# Patient Record
Sex: MALE | Race: WHITE | NOT HISPANIC OR LATINO | URBAN - METROPOLITAN AREA
[De-identification: names, ages, dates, MRNs, and addresses within clinical notes are randomized per-mention and may not be internally consistent; named-entity substitution may affect disease eponyms.]

---

## 2023-01-01 ENCOUNTER — HOSPITAL ENCOUNTER (INPATIENT)
Facility: HOSPITAL | Age: 63
LOS: 8 days | Discharge: HOME HEALTH | DRG: 862 | End: 2023-12-31
Attending: SURGERY | Admitting: SURGERY
Payer: COMMERCIAL

## 2023-01-01 VITALS
WEIGHT: 188.5 LBS | RESPIRATION RATE: 18 BRPM | TEMPERATURE: 99 F | DIASTOLIC BLOOD PRESSURE: 64 MMHG | HEIGHT: 71 IN | OXYGEN SATURATION: 92 % | HEART RATE: 79 BPM | BODY MASS INDEX: 26.39 KG/M2 | SYSTOLIC BLOOD PRESSURE: 132 MMHG

## 2023-01-01 DIAGNOSIS — N17.9 AKI (ACUTE KIDNEY INJURY): ICD-10-CM

## 2023-01-01 DIAGNOSIS — C79.9 METASTATIC ADENOCARCINOMA: Chronic | ICD-10-CM

## 2023-01-01 DIAGNOSIS — Z91.89 AT RISK FOR PROLONGED QT INTERVAL SYNDROME: ICD-10-CM

## 2023-01-01 DIAGNOSIS — Z71.89 ACP (ADVANCE CARE PLANNING): ICD-10-CM

## 2023-01-01 DIAGNOSIS — Z51.5 PALLIATIVE CARE ENCOUNTER: ICD-10-CM

## 2023-01-01 DIAGNOSIS — C16.9 GASTRIC ADENOCARCINOMA: ICD-10-CM

## 2023-01-01 DIAGNOSIS — I48.91 ATRIAL FIBRILLATION WITH RVR: ICD-10-CM

## 2023-01-01 DIAGNOSIS — T81.43XA POSTPROCEDURAL INTRAABDOMINAL ABSCESS: Primary | ICD-10-CM

## 2023-01-01 DIAGNOSIS — R52 PAIN: ICD-10-CM

## 2023-01-01 DIAGNOSIS — K65.9 PERITONITIS: ICD-10-CM

## 2023-01-01 LAB
ACANTHOCYTES BLD QL SMEAR: PRESENT
ALBUMIN SERPL BCP-MCNC: 1.4 G/DL (ref 3.5–5.2)
ALBUMIN SERPL BCP-MCNC: 1.5 G/DL (ref 3.5–5.2)
ALBUMIN SERPL BCP-MCNC: 1.7 G/DL (ref 3.5–5.2)
ALBUMIN SERPL BCP-MCNC: 1.7 G/DL (ref 3.5–5.2)
ALBUMIN SERPL BCP-MCNC: 2 G/DL (ref 3.5–5.2)
ALP SERPL-CCNC: 101 U/L (ref 55–135)
ALP SERPL-CCNC: 113 U/L (ref 55–135)
ALP SERPL-CCNC: 120 U/L (ref 55–135)
ALP SERPL-CCNC: 121 U/L (ref 55–135)
ALP SERPL-CCNC: 124 U/L (ref 55–135)
ALP SERPL-CCNC: 127 U/L (ref 55–135)
ALP SERPL-CCNC: 127 U/L (ref 55–135)
ALP SERPL-CCNC: 132 U/L (ref 55–135)
ALP SERPL-CCNC: 73 U/L (ref 55–135)
ALP SERPL-CCNC: 78 U/L (ref 55–135)
ALT SERPL W/O P-5'-P-CCNC: 10 U/L (ref 10–44)
ALT SERPL W/O P-5'-P-CCNC: 11 U/L (ref 10–44)
ALT SERPL W/O P-5'-P-CCNC: 11 U/L (ref 10–44)
ALT SERPL W/O P-5'-P-CCNC: 12 U/L (ref 10–44)
ALT SERPL W/O P-5'-P-CCNC: 12 U/L (ref 10–44)
ALT SERPL W/O P-5'-P-CCNC: 15 U/L (ref 10–44)
ALT SERPL W/O P-5'-P-CCNC: 27 U/L (ref 10–44)
ALT SERPL W/O P-5'-P-CCNC: 28 U/L (ref 10–44)
ALT SERPL W/O P-5'-P-CCNC: 31 U/L (ref 10–44)
ALT SERPL W/O P-5'-P-CCNC: 9 U/L (ref 10–44)
ANION GAP SERPL CALC-SCNC: 10 MMOL/L (ref 8–16)
ANION GAP SERPL CALC-SCNC: 11 MMOL/L (ref 8–16)
ANION GAP SERPL CALC-SCNC: 4 MMOL/L (ref 8–16)
ANION GAP SERPL CALC-SCNC: 5 MMOL/L (ref 8–16)
ANION GAP SERPL CALC-SCNC: 6 MMOL/L (ref 8–16)
ANION GAP SERPL CALC-SCNC: 7 MMOL/L (ref 8–16)
ANION GAP SERPL CALC-SCNC: 7 MMOL/L (ref 8–16)
ANION GAP SERPL CALC-SCNC: 8 MMOL/L (ref 8–16)
ANION GAP SERPL CALC-SCNC: 8 MMOL/L (ref 8–16)
ANION GAP SERPL CALC-SCNC: 9 MMOL/L (ref 8–16)
ANISOCYTOSIS BLD QL SMEAR: SLIGHT
APTT PPP: 39.4 SEC (ref 21–32)
AST SERPL-CCNC: 17 U/L (ref 10–40)
AST SERPL-CCNC: 18 U/L (ref 10–40)
AST SERPL-CCNC: 19 U/L (ref 10–40)
AST SERPL-CCNC: 21 U/L (ref 10–40)
AST SERPL-CCNC: 23 U/L (ref 10–40)
AST SERPL-CCNC: 24 U/L (ref 10–40)
AST SERPL-CCNC: 34 U/L (ref 10–40)
AST SERPL-CCNC: 38 U/L (ref 10–40)
BASO STIPL BLD QL SMEAR: ABNORMAL
BASOPHILS # BLD AUTO: 0.02 K/UL (ref 0–0.2)
BASOPHILS # BLD AUTO: 0.03 K/UL (ref 0–0.2)
BASOPHILS # BLD AUTO: 0.04 K/UL (ref 0–0.2)
BASOPHILS # BLD AUTO: 0.05 K/UL (ref 0–0.2)
BASOPHILS # BLD AUTO: 0.08 K/UL (ref 0–0.2)
BASOPHILS # BLD AUTO: 0.09 K/UL (ref 0–0.2)
BASOPHILS # BLD AUTO: ABNORMAL K/UL (ref 0–0.2)
BASOPHILS # BLD AUTO: ABNORMAL K/UL (ref 0–0.2)
BASOPHILS NFR BLD: 0 % (ref 0–1.9)
BASOPHILS NFR BLD: 0.2 % (ref 0–1.9)
BASOPHILS NFR BLD: 0.2 % (ref 0–1.9)
BASOPHILS NFR BLD: 0.3 % (ref 0–1.9)
BASOPHILS NFR BLD: 0.4 % (ref 0–1.9)
BASOPHILS NFR BLD: 0.5 % (ref 0–1.9)
BASOPHILS NFR BLD: 0.6 % (ref 0–1.9)
BILIRUB SERPL-MCNC: 0.3 MG/DL (ref 0.1–1)
BILIRUB SERPL-MCNC: 0.3 MG/DL (ref 0.1–1)
BILIRUB SERPL-MCNC: 0.4 MG/DL (ref 0.1–1)
BILIRUB SERPL-MCNC: 0.5 MG/DL (ref 0.1–1)
BILIRUB SERPL-MCNC: 0.6 MG/DL (ref 0.1–1)
BILIRUB SERPL-MCNC: 0.7 MG/DL (ref 0.1–1)
BILIRUB SERPL-MCNC: 0.8 MG/DL (ref 0.1–1)
BUN SERPL-MCNC: 14 MG/DL (ref 8–23)
BUN SERPL-MCNC: 15 MG/DL (ref 8–23)
BUN SERPL-MCNC: 16 MG/DL (ref 8–23)
BUN SERPL-MCNC: 17 MG/DL (ref 8–23)
BUN SERPL-MCNC: 24 MG/DL (ref 8–23)
BUN SERPL-MCNC: 46 MG/DL (ref 8–23)
BUN SERPL-MCNC: 63 MG/DL (ref 8–23)
BUN SERPL-MCNC: 70 MG/DL (ref 8–23)
BURR CELLS BLD QL SMEAR: ABNORMAL
CA-I BLDV-SCNC: 1.01 MMOL/L (ref 1.06–1.42)
CALCIUM SERPL-MCNC: 7.5 MG/DL (ref 8.7–10.5)
CALCIUM SERPL-MCNC: 7.6 MG/DL (ref 8.7–10.5)
CALCIUM SERPL-MCNC: 7.6 MG/DL (ref 8.7–10.5)
CALCIUM SERPL-MCNC: 7.7 MG/DL (ref 8.7–10.5)
CALCIUM SERPL-MCNC: 7.7 MG/DL (ref 8.7–10.5)
CALCIUM SERPL-MCNC: 7.8 MG/DL (ref 8.7–10.5)
CALCIUM SERPL-MCNC: 7.9 MG/DL (ref 8.7–10.5)
CALCIUM SERPL-MCNC: 8.1 MG/DL (ref 8.7–10.5)
CHLORIDE SERPL-SCNC: 104 MMOL/L (ref 95–110)
CHLORIDE SERPL-SCNC: 105 MMOL/L (ref 95–110)
CHLORIDE SERPL-SCNC: 89 MMOL/L (ref 95–110)
CHLORIDE SERPL-SCNC: 90 MMOL/L (ref 95–110)
CHLORIDE SERPL-SCNC: 92 MMOL/L (ref 95–110)
CHLORIDE SERPL-SCNC: 94 MMOL/L (ref 95–110)
CHLORIDE SERPL-SCNC: 95 MMOL/L (ref 95–110)
CHLORIDE SERPL-SCNC: 96 MMOL/L (ref 95–110)
CHLORIDE SERPL-SCNC: 98 MMOL/L (ref 95–110)
CHLORIDE SERPL-SCNC: 99 MMOL/L (ref 95–110)
CO2 SERPL-SCNC: 21 MMOL/L (ref 23–29)
CO2 SERPL-SCNC: 21 MMOL/L (ref 23–29)
CO2 SERPL-SCNC: 24 MMOL/L (ref 23–29)
CO2 SERPL-SCNC: 24 MMOL/L (ref 23–29)
CO2 SERPL-SCNC: 29 MMOL/L (ref 23–29)
CO2 SERPL-SCNC: 31 MMOL/L (ref 23–29)
CO2 SERPL-SCNC: 34 MMOL/L (ref 23–29)
CO2 SERPL-SCNC: 36 MMOL/L (ref 23–29)
CO2 SERPL-SCNC: 37 MMOL/L (ref 23–29)
CO2 SERPL-SCNC: 39 MMOL/L (ref 23–29)
CO2 SERPL-SCNC: 39 MMOL/L (ref 23–29)
CO2 SERPL-SCNC: 40 MMOL/L (ref 23–29)
CREAT SERPL-MCNC: 0.8 MG/DL (ref 0.5–1.4)
CREAT SERPL-MCNC: 0.9 MG/DL (ref 0.5–1.4)
CREAT SERPL-MCNC: 0.9 MG/DL (ref 0.5–1.4)
CREAT SERPL-MCNC: 1.1 MG/DL (ref 0.5–1.4)
CREAT SERPL-MCNC: 1.6 MG/DL (ref 0.5–1.4)
CREAT SERPL-MCNC: 2.4 MG/DL (ref 0.5–1.4)
CREAT SERPL-MCNC: 2.8 MG/DL (ref 0.5–1.4)
DACRYOCYTES BLD QL SMEAR: ABNORMAL
DIFFERENTIAL METHOD BLD: ABNORMAL
DOHLE BOD BLD QL SMEAR: PRESENT
EOSINOPHIL # BLD AUTO: 0 K/UL (ref 0–0.5)
EOSINOPHIL # BLD AUTO: ABNORMAL K/UL (ref 0–0.5)
EOSINOPHIL # BLD AUTO: ABNORMAL K/UL (ref 0–0.5)
EOSINOPHIL NFR BLD: 0 % (ref 0–8)
EOSINOPHIL NFR BLD: 0.1 % (ref 0–8)
EOSINOPHIL NFR BLD: 0.1 % (ref 0–8)
EOSINOPHIL NFR BLD: 0.2 % (ref 0–8)
EOSINOPHIL NFR BLD: 0.3 % (ref 0–8)
ERYTHROCYTE [DISTWIDTH] IN BLOOD BY AUTOMATED COUNT: 14.2 % (ref 11.5–14.5)
ERYTHROCYTE [DISTWIDTH] IN BLOOD BY AUTOMATED COUNT: 14.4 % (ref 11.5–14.5)
ERYTHROCYTE [DISTWIDTH] IN BLOOD BY AUTOMATED COUNT: 14.4 % (ref 11.5–14.5)
ERYTHROCYTE [DISTWIDTH] IN BLOOD BY AUTOMATED COUNT: 14.7 % (ref 11.5–14.5)
ERYTHROCYTE [DISTWIDTH] IN BLOOD BY AUTOMATED COUNT: 15.1 % (ref 11.5–14.5)
ERYTHROCYTE [DISTWIDTH] IN BLOOD BY AUTOMATED COUNT: 15.3 % (ref 11.5–14.5)
ERYTHROCYTE [DISTWIDTH] IN BLOOD BY AUTOMATED COUNT: 15.4 % (ref 11.5–14.5)
ERYTHROCYTE [DISTWIDTH] IN BLOOD BY AUTOMATED COUNT: 15.5 % (ref 11.5–14.5)
ERYTHROCYTE [DISTWIDTH] IN BLOOD BY AUTOMATED COUNT: 15.5 % (ref 11.5–14.5)
EST. GFR  (NO RACE VARIABLE): 24.6 ML/MIN/1.73 M^2
EST. GFR  (NO RACE VARIABLE): 29.6 ML/MIN/1.73 M^2
EST. GFR  (NO RACE VARIABLE): 48.1 ML/MIN/1.73 M^2
EST. GFR  (NO RACE VARIABLE): >60 ML/MIN/1.73 M^2
GIANT PLATELETS BLD QL SMEAR: PRESENT
GLUCOSE SERPL-MCNC: 104 MG/DL (ref 70–110)
GLUCOSE SERPL-MCNC: 121 MG/DL (ref 70–110)
GLUCOSE SERPL-MCNC: 124 MG/DL (ref 70–110)
GLUCOSE SERPL-MCNC: 125 MG/DL (ref 70–110)
GLUCOSE SERPL-MCNC: 127 MG/DL (ref 70–110)
GLUCOSE SERPL-MCNC: 128 MG/DL (ref 70–110)
GLUCOSE SERPL-MCNC: 138 MG/DL (ref 70–110)
GLUCOSE SERPL-MCNC: 164 MG/DL (ref 70–110)
GLUCOSE SERPL-MCNC: 71 MG/DL (ref 70–110)
GLUCOSE SERPL-MCNC: 71 MG/DL (ref 70–110)
GLUCOSE SERPL-MCNC: 77 MG/DL (ref 70–110)
GLUCOSE SERPL-MCNC: 83 MG/DL (ref 70–110)
HCT VFR BLD AUTO: 30.7 % (ref 40–54)
HCT VFR BLD AUTO: 32 % (ref 40–54)
HCT VFR BLD AUTO: 32.1 % (ref 40–54)
HCT VFR BLD AUTO: 32.2 % (ref 40–54)
HCT VFR BLD AUTO: 32.7 % (ref 40–54)
HCT VFR BLD AUTO: 33.3 % (ref 40–54)
HCT VFR BLD AUTO: 33.6 % (ref 40–54)
HCT VFR BLD AUTO: 34.2 % (ref 40–54)
HCT VFR BLD AUTO: 36.8 % (ref 40–54)
HGB BLD-MCNC: 10.2 G/DL (ref 14–18)
HGB BLD-MCNC: 10.2 G/DL (ref 14–18)
HGB BLD-MCNC: 10.7 G/DL (ref 14–18)
HGB BLD-MCNC: 10.8 G/DL (ref 14–18)
HGB BLD-MCNC: 11.1 G/DL (ref 14–18)
HGB BLD-MCNC: 11.1 G/DL (ref 14–18)
HGB BLD-MCNC: 11.4 G/DL (ref 14–18)
HGB BLD-MCNC: 11.5 G/DL (ref 14–18)
HGB BLD-MCNC: 11.8 G/DL (ref 14–18)
HYPOCHROMIA BLD QL SMEAR: ABNORMAL
IMM GRANULOCYTES # BLD AUTO: 0.11 K/UL (ref 0–0.04)
IMM GRANULOCYTES # BLD AUTO: 0.18 K/UL (ref 0–0.04)
IMM GRANULOCYTES # BLD AUTO: 0.32 K/UL (ref 0–0.04)
IMM GRANULOCYTES # BLD AUTO: 0.36 K/UL (ref 0–0.04)
IMM GRANULOCYTES # BLD AUTO: 0.61 K/UL (ref 0–0.04)
IMM GRANULOCYTES # BLD AUTO: 0.7 K/UL (ref 0–0.04)
IMM GRANULOCYTES # BLD AUTO: ABNORMAL K/UL (ref 0–0.04)
IMM GRANULOCYTES NFR BLD AUTO: 1.1 % (ref 0–0.5)
IMM GRANULOCYTES NFR BLD AUTO: 1.5 % (ref 0–0.5)
IMM GRANULOCYTES NFR BLD AUTO: 2.5 % (ref 0–0.5)
IMM GRANULOCYTES NFR BLD AUTO: 2.6 % (ref 0–0.5)
IMM GRANULOCYTES NFR BLD AUTO: 4.3 % (ref 0–0.5)
IMM GRANULOCYTES NFR BLD AUTO: 4.5 % (ref 0–0.5)
IMM GRANULOCYTES NFR BLD AUTO: ABNORMAL % (ref 0–0.5)
INR PPP: 1.1 (ref 0.8–1.2)
LYMPHOCYTES # BLD AUTO: 1.4 K/UL (ref 1–4.8)
LYMPHOCYTES # BLD AUTO: 1.5 K/UL (ref 1–4.8)
LYMPHOCYTES # BLD AUTO: 1.5 K/UL (ref 1–4.8)
LYMPHOCYTES # BLD AUTO: 1.9 K/UL (ref 1–4.8)
LYMPHOCYTES # BLD AUTO: ABNORMAL K/UL (ref 1–4.8)
LYMPHOCYTES # BLD AUTO: ABNORMAL K/UL (ref 1–4.8)
LYMPHOCYTES NFR BLD: 10.2 % (ref 18–48)
LYMPHOCYTES NFR BLD: 11.3 % (ref 18–48)
LYMPHOCYTES NFR BLD: 11.5 % (ref 18–48)
LYMPHOCYTES NFR BLD: 11.6 % (ref 18–48)
LYMPHOCYTES NFR BLD: 11.6 % (ref 18–48)
LYMPHOCYTES NFR BLD: 13.9 % (ref 18–48)
LYMPHOCYTES NFR BLD: 2 % (ref 18–48)
LYMPHOCYTES NFR BLD: 6.6 % (ref 18–48)
LYMPHOCYTES NFR BLD: 9 % (ref 18–48)
MAGNESIUM SERPL-MCNC: 1.6 MG/DL (ref 1.6–2.6)
MAGNESIUM SERPL-MCNC: 1.6 MG/DL (ref 1.6–2.6)
MAGNESIUM SERPL-MCNC: 1.7 MG/DL (ref 1.6–2.6)
MAGNESIUM SERPL-MCNC: 1.8 MG/DL (ref 1.6–2.6)
MAGNESIUM SERPL-MCNC: 1.9 MG/DL (ref 1.6–2.6)
MAGNESIUM SERPL-MCNC: 1.9 MG/DL (ref 1.6–2.6)
MCH RBC QN AUTO: 24.8 PG (ref 27–31)
MCH RBC QN AUTO: 24.8 PG (ref 27–31)
MCH RBC QN AUTO: 24.9 PG (ref 27–31)
MCH RBC QN AUTO: 24.9 PG (ref 27–31)
MCH RBC QN AUTO: 25.1 PG (ref 27–31)
MCH RBC QN AUTO: 25.4 PG (ref 27–31)
MCH RBC QN AUTO: 25.5 PG (ref 27–31)
MCHC RBC AUTO-ENTMCNC: 31.9 G/DL (ref 32–36)
MCHC RBC AUTO-ENTMCNC: 32.1 G/DL (ref 32–36)
MCHC RBC AUTO-ENTMCNC: 33 G/DL (ref 32–36)
MCHC RBC AUTO-ENTMCNC: 33.2 G/DL (ref 32–36)
MCHC RBC AUTO-ENTMCNC: 33.2 G/DL (ref 32–36)
MCHC RBC AUTO-ENTMCNC: 33.3 G/DL (ref 32–36)
MCHC RBC AUTO-ENTMCNC: 33.6 G/DL (ref 32–36)
MCHC RBC AUTO-ENTMCNC: 33.9 G/DL (ref 32–36)
MCHC RBC AUTO-ENTMCNC: 34.6 G/DL (ref 32–36)
MCV RBC AUTO: 74 FL (ref 82–98)
MCV RBC AUTO: 74 FL (ref 82–98)
MCV RBC AUTO: 75 FL (ref 82–98)
MCV RBC AUTO: 76 FL (ref 82–98)
MCV RBC AUTO: 76 FL (ref 82–98)
MCV RBC AUTO: 78 FL (ref 82–98)
MCV RBC AUTO: 79 FL (ref 82–98)
METAMYELOCYTES NFR BLD MANUAL: 0.7 %
METAMYELOCYTES NFR BLD MANUAL: 1 %
MONOCYTES # BLD AUTO: 1.3 K/UL (ref 0.3–1)
MONOCYTES # BLD AUTO: 1.8 K/UL (ref 0.3–1)
MONOCYTES # BLD AUTO: 2 K/UL (ref 0.3–1)
MONOCYTES # BLD AUTO: 2.1 K/UL (ref 0.3–1)
MONOCYTES # BLD AUTO: 2.3 K/UL (ref 0.3–1)
MONOCYTES # BLD AUTO: 3 K/UL (ref 0.3–1)
MONOCYTES # BLD AUTO: ABNORMAL K/UL (ref 0.3–1)
MONOCYTES # BLD AUTO: ABNORMAL K/UL (ref 0.3–1)
MONOCYTES NFR BLD: 11.3 % (ref 4–15)
MONOCYTES NFR BLD: 12.7 % (ref 4–15)
MONOCYTES NFR BLD: 14.8 % (ref 4–15)
MONOCYTES NFR BLD: 15.5 % (ref 4–15)
MONOCYTES NFR BLD: 15.7 % (ref 4–15)
MONOCYTES NFR BLD: 16.3 % (ref 4–15)
MONOCYTES NFR BLD: 17.9 % (ref 4–15)
MONOCYTES NFR BLD: 7 % (ref 4–15)
MONOCYTES NFR BLD: 8 % (ref 4–15)
MYELOCYTES NFR BLD MANUAL: 0.7 %
NEUTROPHILS # BLD AUTO: 10.8 K/UL (ref 1.8–7.7)
NEUTROPHILS # BLD AUTO: 7.1 K/UL (ref 1.8–7.7)
NEUTROPHILS # BLD AUTO: 8.6 K/UL (ref 1.8–7.7)
NEUTROPHILS # BLD AUTO: 8.8 K/UL (ref 1.8–7.7)
NEUTROPHILS # BLD AUTO: 9.1 K/UL (ref 1.8–7.7)
NEUTROPHILS # BLD AUTO: 9.9 K/UL (ref 1.8–7.7)
NEUTROPHILS NFR BLD: 65.6 % (ref 38–73)
NEUTROPHILS NFR BLD: 67.9 % (ref 38–73)
NEUTROPHILS NFR BLD: 69.6 % (ref 38–73)
NEUTROPHILS NFR BLD: 70.6 % (ref 38–73)
NEUTROPHILS NFR BLD: 71.7 % (ref 38–73)
NEUTROPHILS NFR BLD: 71.8 % (ref 38–73)
NEUTROPHILS NFR BLD: 78 % (ref 38–73)
NEUTROPHILS NFR BLD: 78 % (ref 38–73)
NEUTROPHILS NFR BLD: 81 % (ref 38–73)
NEUTS BAND NFR BLD MANUAL: 12 %
NEUTS BAND NFR BLD MANUAL: 2 %
NEUTS BAND NFR BLD MANUAL: 2.7 %
NRBC BLD-RTO: 0 /100 WBC
OVALOCYTES BLD QL SMEAR: ABNORMAL
OVALOCYTES BLD QL SMEAR: ABNORMAL
PHOSPHATE SERPL-MCNC: 1.7 MG/DL (ref 2.7–4.5)
PHOSPHATE SERPL-MCNC: 1.7 MG/DL (ref 2.7–4.5)
PHOSPHATE SERPL-MCNC: 1.9 MG/DL (ref 2.7–4.5)
PHOSPHATE SERPL-MCNC: 2.3 MG/DL (ref 2.7–4.5)
PHOSPHATE SERPL-MCNC: 2.6 MG/DL (ref 2.7–4.5)
PHOSPHATE SERPL-MCNC: 2.6 MG/DL (ref 2.7–4.5)
PHOSPHATE SERPL-MCNC: 2.7 MG/DL (ref 2.7–4.5)
PHOSPHATE SERPL-MCNC: 2.9 MG/DL (ref 2.7–4.5)
PHOSPHATE SERPL-MCNC: 3.5 MG/DL (ref 2.7–4.5)
PHOSPHATE SERPL-MCNC: 5.2 MG/DL (ref 2.7–4.5)
PLATELET # BLD AUTO: 104 K/UL (ref 150–450)
PLATELET # BLD AUTO: 133 K/UL (ref 150–450)
PLATELET # BLD AUTO: 152 K/UL (ref 150–450)
PLATELET # BLD AUTO: 230 K/UL (ref 150–450)
PLATELET # BLD AUTO: 249 K/UL (ref 150–450)
PLATELET # BLD AUTO: 298 K/UL (ref 150–450)
PLATELET # BLD AUTO: 87 K/UL (ref 150–450)
PLATELET # BLD AUTO: 88 K/UL (ref 150–450)
PLATELET # BLD AUTO: 95 K/UL (ref 150–450)
PLATELET BLD QL SMEAR: ABNORMAL
PMV BLD AUTO: 10 FL (ref 9.2–12.9)
PMV BLD AUTO: 10.1 FL (ref 9.2–12.9)
PMV BLD AUTO: 10.2 FL (ref 9.2–12.9)
PMV BLD AUTO: 11.1 FL (ref 9.2–12.9)
PMV BLD AUTO: 11.9 FL (ref 9.2–12.9)
PMV BLD AUTO: 8.9 FL (ref 9.2–12.9)
PMV BLD AUTO: 9.5 FL (ref 9.2–12.9)
PMV BLD AUTO: 9.6 FL (ref 9.2–12.9)
PMV BLD AUTO: 9.9 FL (ref 9.2–12.9)
POCT GLUCOSE: 101 MG/DL (ref 70–110)
POCT GLUCOSE: 103 MG/DL (ref 70–110)
POCT GLUCOSE: 115 MG/DL (ref 70–110)
POCT GLUCOSE: 116 MG/DL (ref 70–110)
POCT GLUCOSE: 121 MG/DL (ref 70–110)
POCT GLUCOSE: 121 MG/DL (ref 70–110)
POCT GLUCOSE: 123 MG/DL (ref 70–110)
POCT GLUCOSE: 124 MG/DL (ref 70–110)
POCT GLUCOSE: 125 MG/DL (ref 70–110)
POCT GLUCOSE: 132 MG/DL (ref 70–110)
POCT GLUCOSE: 133 MG/DL (ref 70–110)
POCT GLUCOSE: 134 MG/DL (ref 70–110)
POCT GLUCOSE: 135 MG/DL (ref 70–110)
POCT GLUCOSE: 136 MG/DL (ref 70–110)
POCT GLUCOSE: 137 MG/DL (ref 70–110)
POCT GLUCOSE: 137 MG/DL (ref 70–110)
POCT GLUCOSE: 142 MG/DL (ref 70–110)
POCT GLUCOSE: 153 MG/DL (ref 70–110)
POCT GLUCOSE: 158 MG/DL (ref 70–110)
POCT GLUCOSE: 159 MG/DL (ref 70–110)
POCT GLUCOSE: 166 MG/DL (ref 70–110)
POCT GLUCOSE: 167 MG/DL (ref 70–110)
POCT GLUCOSE: 187 MG/DL (ref 70–110)
POCT GLUCOSE: 67 MG/DL (ref 70–110)
POCT GLUCOSE: 75 MG/DL (ref 70–110)
POCT GLUCOSE: 76 MG/DL (ref 70–110)
POCT GLUCOSE: 87 MG/DL (ref 70–110)
POCT GLUCOSE: 90 MG/DL (ref 70–110)
POCT GLUCOSE: 91 MG/DL (ref 70–110)
POIKILOCYTOSIS BLD QL SMEAR: ABNORMAL
POIKILOCYTOSIS BLD QL SMEAR: SLIGHT
POLYCHROMASIA BLD QL SMEAR: ABNORMAL
POTASSIUM SERPL-SCNC: 3 MMOL/L (ref 3.5–5.1)
POTASSIUM SERPL-SCNC: 3 MMOL/L (ref 3.5–5.1)
POTASSIUM SERPL-SCNC: 3.3 MMOL/L (ref 3.5–5.1)
POTASSIUM SERPL-SCNC: 3.3 MMOL/L (ref 3.5–5.1)
POTASSIUM SERPL-SCNC: 3.6 MMOL/L (ref 3.5–5.1)
POTASSIUM SERPL-SCNC: 4.1 MMOL/L (ref 3.5–5.1)
POTASSIUM SERPL-SCNC: 4.1 MMOL/L (ref 3.5–5.1)
POTASSIUM SERPL-SCNC: 4.2 MMOL/L (ref 3.5–5.1)
POTASSIUM SERPL-SCNC: 4.4 MMOL/L (ref 3.5–5.1)
POTASSIUM SERPL-SCNC: 5.2 MMOL/L (ref 3.5–5.1)
PROT SERPL-MCNC: 4.6 G/DL (ref 6–8.4)
PROT SERPL-MCNC: 4.6 G/DL (ref 6–8.4)
PROT SERPL-MCNC: 4.7 G/DL (ref 6–8.4)
PROT SERPL-MCNC: 4.7 G/DL (ref 6–8.4)
PROT SERPL-MCNC: 4.8 G/DL (ref 6–8.4)
PROT SERPL-MCNC: 4.9 G/DL (ref 6–8.4)
PROT SERPL-MCNC: 5.1 G/DL (ref 6–8.4)
PROT SERPL-MCNC: 5.2 G/DL (ref 6–8.4)
PROT SERPL-MCNC: 5.4 G/DL (ref 6–8.4)
PROT SERPL-MCNC: 5.4 G/DL (ref 6–8.4)
PROTHROMBIN TIME: 11.8 SEC (ref 9–12.5)
RBC # BLD AUTO: 4.02 M/UL (ref 4.6–6.2)
RBC # BLD AUTO: 4.11 M/UL (ref 4.6–6.2)
RBC # BLD AUTO: 4.29 M/UL (ref 4.6–6.2)
RBC # BLD AUTO: 4.33 M/UL (ref 4.6–6.2)
RBC # BLD AUTO: 4.36 M/UL (ref 4.6–6.2)
RBC # BLD AUTO: 4.43 M/UL (ref 4.6–6.2)
RBC # BLD AUTO: 4.48 M/UL (ref 4.6–6.2)
RBC # BLD AUTO: 4.63 M/UL (ref 4.6–6.2)
RBC # BLD AUTO: 4.65 M/UL (ref 4.6–6.2)
SCHISTOCYTES BLD QL SMEAR: ABNORMAL
SCHISTOCYTES BLD QL SMEAR: PRESENT
SODIUM SERPL-SCNC: 133 MMOL/L (ref 136–145)
SODIUM SERPL-SCNC: 135 MMOL/L (ref 136–145)
SODIUM SERPL-SCNC: 136 MMOL/L (ref 136–145)
SODIUM SERPL-SCNC: 137 MMOL/L (ref 136–145)
SODIUM SERPL-SCNC: 138 MMOL/L (ref 136–145)
SPHEROCYTES BLD QL SMEAR: ABNORMAL
TOXIC GRANULES BLD QL SMEAR: PRESENT
TRIGL SERPL-MCNC: 153 MG/DL (ref 30–150)
WBC # BLD AUTO: 11.68 K/UL (ref 3.9–12.7)
WBC # BLD AUTO: 12.04 K/UL (ref 3.9–12.7)
WBC # BLD AUTO: 12.64 K/UL (ref 3.9–12.7)
WBC # BLD AUTO: 13.44 K/UL (ref 3.9–12.7)
WBC # BLD AUTO: 13.99 K/UL (ref 3.9–12.7)
WBC # BLD AUTO: 14.17 K/UL (ref 3.9–12.7)
WBC # BLD AUTO: 16.45 K/UL (ref 3.9–12.7)
WBC # BLD AUTO: 9.86 K/UL (ref 3.9–12.7)
WBC # BLD AUTO: 9.89 K/UL (ref 3.9–12.7)
WBC TOXIC VACUOLES BLD QL SMEAR: PRESENT
WBC TOXIC VACUOLES BLD QL SMEAR: PRESENT

## 2023-01-01 PROCEDURE — 84478 ASSAY OF TRIGLYCERIDES: CPT | Performed by: SURGERY

## 2023-01-01 PROCEDURE — 27000221 HC OXYGEN, UP TO 24 HOURS

## 2023-01-01 PROCEDURE — 94761 N-INVAS EAR/PLS OXIMETRY MLT: CPT

## 2023-01-01 PROCEDURE — 25000003 PHARM REV CODE 250: Performed by: STUDENT IN AN ORGANIZED HEALTH CARE EDUCATION/TRAINING PROGRAM

## 2023-01-01 PROCEDURE — 25000003 PHARM REV CODE 250

## 2023-01-01 PROCEDURE — 94664 DEMO&/EVAL PT USE INHALER: CPT

## 2023-01-01 PROCEDURE — 94640 AIRWAY INHALATION TREATMENT: CPT

## 2023-01-01 PROCEDURE — 25000242 PHARM REV CODE 250 ALT 637 W/ HCPCS: Performed by: STUDENT IN AN ORGANIZED HEALTH CARE EDUCATION/TRAINING PROGRAM

## 2023-01-01 PROCEDURE — 36573 INSJ PICC RS&I 5 YR+: CPT

## 2023-01-01 PROCEDURE — 82330 ASSAY OF CALCIUM: CPT | Performed by: SURGERY

## 2023-01-01 PROCEDURE — 63600175 PHARM REV CODE 636 W HCPCS

## 2023-01-01 PROCEDURE — 63600175 PHARM REV CODE 636 W HCPCS: Performed by: STUDENT IN AN ORGANIZED HEALTH CARE EDUCATION/TRAINING PROGRAM

## 2023-01-01 PROCEDURE — B4185 PARENTERAL SOL 10 GM LIPIDS: HCPCS

## 2023-01-01 PROCEDURE — 99223 1ST HOSP IP/OBS HIGH 75: CPT | Mod: ,,,

## 2023-01-01 PROCEDURE — 3E0436Z INTRODUCTION OF NUTRITIONAL SUBSTANCE INTO CENTRAL VEIN, PERCUTANEOUS APPROACH: ICD-10-PCS | Performed by: STUDENT IN AN ORGANIZED HEALTH CARE EDUCATION/TRAINING PROGRAM

## 2023-01-01 PROCEDURE — 99900035 HC TECH TIME PER 15 MIN (STAT)

## 2023-01-01 PROCEDURE — 99233 SBSQ HOSP IP/OBS HIGH 50: CPT | Mod: ,,, | Performed by: STUDENT IN AN ORGANIZED HEALTH CARE EDUCATION/TRAINING PROGRAM

## 2023-01-01 PROCEDURE — 63600175 PHARM REV CODE 636 W HCPCS: Performed by: SURGERY

## 2023-01-01 PROCEDURE — C1751 CATH, INF, PER/CENT/MIDLINE: HCPCS

## 2023-01-01 PROCEDURE — 83735 ASSAY OF MAGNESIUM: CPT | Performed by: SURGERY

## 2023-01-01 PROCEDURE — 80053 COMPREHEN METABOLIC PANEL: CPT | Performed by: STUDENT IN AN ORGANIZED HEALTH CARE EDUCATION/TRAINING PROGRAM

## 2023-01-01 PROCEDURE — 83735 ASSAY OF MAGNESIUM: CPT | Mod: 91 | Performed by: STUDENT IN AN ORGANIZED HEALTH CARE EDUCATION/TRAINING PROGRAM

## 2023-01-01 PROCEDURE — 85027 COMPLETE CBC AUTOMATED: CPT | Performed by: STUDENT IN AN ORGANIZED HEALTH CARE EDUCATION/TRAINING PROGRAM

## 2023-01-01 PROCEDURE — 25000003 PHARM REV CODE 250: Performed by: INTERNAL MEDICINE

## 2023-01-01 PROCEDURE — 25000003 PHARM REV CODE 250: Performed by: SURGERY

## 2023-01-01 PROCEDURE — 84100 ASSAY OF PHOSPHORUS: CPT | Performed by: STUDENT IN AN ORGANIZED HEALTH CARE EDUCATION/TRAINING PROGRAM

## 2023-01-01 PROCEDURE — A4216 STERILE WATER/SALINE, 10 ML: HCPCS | Performed by: SURGERY

## 2023-01-01 PROCEDURE — A4217 STERILE WATER/SALINE, 500 ML: HCPCS | Performed by: STUDENT IN AN ORGANIZED HEALTH CARE EDUCATION/TRAINING PROGRAM

## 2023-01-01 PROCEDURE — 84100 ASSAY OF PHOSPHORUS: CPT | Mod: 91 | Performed by: STUDENT IN AN ORGANIZED HEALTH CARE EDUCATION/TRAINING PROGRAM

## 2023-01-01 PROCEDURE — 25000242 PHARM REV CODE 250 ALT 637 W/ HCPCS: Performed by: NURSE PRACTITIONER

## 2023-01-01 PROCEDURE — 25500020 PHARM REV CODE 255

## 2023-01-01 PROCEDURE — 25500020 PHARM REV CODE 255: Performed by: SURGERY

## 2023-01-01 PROCEDURE — B4185 PARENTERAL SOL 10 GM LIPIDS: HCPCS | Performed by: STUDENT IN AN ORGANIZED HEALTH CARE EDUCATION/TRAINING PROGRAM

## 2023-01-01 PROCEDURE — 83735 ASSAY OF MAGNESIUM: CPT | Performed by: STUDENT IN AN ORGANIZED HEALTH CARE EDUCATION/TRAINING PROGRAM

## 2023-01-01 PROCEDURE — S5010 5% DEXTROSE AND 0.45% SALINE: HCPCS | Performed by: STUDENT IN AN ORGANIZED HEALTH CARE EDUCATION/TRAINING PROGRAM

## 2023-01-01 PROCEDURE — 0W9G30Z DRAINAGE OF PERITONEAL CAVITY WITH DRAINAGE DEVICE, PERCUTANEOUS APPROACH: ICD-10-PCS | Performed by: RADIOLOGY

## 2023-01-01 PROCEDURE — 83735 ASSAY OF MAGNESIUM: CPT

## 2023-01-01 PROCEDURE — 85025 COMPLETE CBC W/AUTO DIFF WBC: CPT | Performed by: STUDENT IN AN ORGANIZED HEALTH CARE EDUCATION/TRAINING PROGRAM

## 2023-01-01 PROCEDURE — 20000000 HC ICU ROOM

## 2023-01-01 PROCEDURE — 20600001 HC STEP DOWN PRIVATE ROOM

## 2023-01-01 PROCEDURE — 80048 BASIC METABOLIC PNL TOTAL CA: CPT | Mod: XB | Performed by: STUDENT IN AN ORGANIZED HEALTH CARE EDUCATION/TRAINING PROGRAM

## 2023-01-01 PROCEDURE — 99233 SBSQ HOSP IP/OBS HIGH 50: CPT | Mod: ,,,

## 2023-01-01 PROCEDURE — A4217 STERILE WATER/SALINE, 500 ML: HCPCS

## 2023-01-01 PROCEDURE — 99233 SBSQ HOSP IP/OBS HIGH 50: CPT | Mod: 24,,, | Performed by: STUDENT IN AN ORGANIZED HEALTH CARE EDUCATION/TRAINING PROGRAM

## 2023-01-01 PROCEDURE — 85730 THROMBOPLASTIN TIME PARTIAL: CPT | Performed by: SURGERY

## 2023-01-01 PROCEDURE — 02HV33Z INSERTION OF INFUSION DEVICE INTO SUPERIOR VENA CAVA, PERCUTANEOUS APPROACH: ICD-10-PCS | Performed by: SURGERY

## 2023-01-01 PROCEDURE — C9113 INJ PANTOPRAZOLE SODIUM, VIA: HCPCS

## 2023-01-01 PROCEDURE — 76937 US GUIDE VASCULAR ACCESS: CPT

## 2023-01-01 PROCEDURE — 25000003 PHARM REV CODE 250: Performed by: NURSE PRACTITIONER

## 2023-01-01 PROCEDURE — 85007 BL SMEAR W/DIFF WBC COUNT: CPT | Performed by: SURGERY

## 2023-01-01 PROCEDURE — 80048 BASIC METABOLIC PNL TOTAL CA: CPT | Mod: XB

## 2023-01-01 PROCEDURE — 84100 ASSAY OF PHOSPHORUS: CPT | Performed by: SURGERY

## 2023-01-01 PROCEDURE — 94667 MNPJ CHEST WALL 1ST: CPT

## 2023-01-01 PROCEDURE — 99291 CRITICAL CARE FIRST HOUR: CPT | Mod: 24,,, | Performed by: STUDENT IN AN ORGANIZED HEALTH CARE EDUCATION/TRAINING PROGRAM

## 2023-01-01 PROCEDURE — 80053 COMPREHEN METABOLIC PANEL: CPT | Performed by: SURGERY

## 2023-01-01 PROCEDURE — 85610 PROTHROMBIN TIME: CPT | Performed by: SURGERY

## 2023-01-01 PROCEDURE — 99223 1ST HOSP IP/OBS HIGH 75: CPT | Mod: 25,,, | Performed by: PHYSICIAN ASSISTANT

## 2023-01-01 PROCEDURE — S5010 5% DEXTROSE AND 0.45% SALINE: HCPCS

## 2023-01-01 PROCEDURE — 85007 BL SMEAR W/DIFF WBC COUNT: CPT | Performed by: STUDENT IN AN ORGANIZED HEALTH CARE EDUCATION/TRAINING PROGRAM

## 2023-01-01 PROCEDURE — 99223 1ST HOSP IP/OBS HIGH 75: CPT | Mod: ,,, | Performed by: STUDENT IN AN ORGANIZED HEALTH CARE EDUCATION/TRAINING PROGRAM

## 2023-01-01 PROCEDURE — 80053 COMPREHEN METABOLIC PANEL: CPT | Mod: 91 | Performed by: STUDENT IN AN ORGANIZED HEALTH CARE EDUCATION/TRAINING PROGRAM

## 2023-01-01 PROCEDURE — 85027 COMPLETE CBC AUTOMATED: CPT | Performed by: SURGERY

## 2023-01-01 PROCEDURE — 84100 ASSAY OF PHOSPHORUS: CPT

## 2023-01-01 PROCEDURE — 63600175 PHARM REV CODE 636 W HCPCS: Performed by: RADIOLOGY

## 2023-01-01 RX ORDER — CETIRIZINE HYDROCHLORIDE 10 MG/1
10 TABLET ORAL DAILY
Status: DISCONTINUED | OUTPATIENT
Start: 2023-01-01 | End: 2023-01-01 | Stop reason: HOSPADM

## 2023-01-01 RX ORDER — ACETAMINOPHEN 10 MG/ML
1000 INJECTION, SOLUTION INTRAVENOUS EVERY 8 HOURS
Status: DISPENSED | OUTPATIENT
Start: 2023-01-01 | End: 2023-01-01

## 2023-01-01 RX ORDER — CALCIUM GLUCONATE 20 MG/ML
2 INJECTION, SOLUTION INTRAVENOUS
Status: DISCONTINUED | OUTPATIENT
Start: 2023-01-01 | End: 2023-01-01

## 2023-01-01 RX ORDER — SULFAMETHOXAZOLE AND TRIMETHOPRIM 800; 160 MG/1; MG/1
1 TABLET ORAL 2 TIMES DAILY
Status: DISCONTINUED | OUTPATIENT
Start: 2023-01-01 | End: 2023-01-01 | Stop reason: HOSPADM

## 2023-01-01 RX ORDER — SENNOSIDES 8.6 MG/1
8.6 TABLET ORAL 2 TIMES DAILY
Status: DISCONTINUED | OUTPATIENT
Start: 2023-01-01 | End: 2023-01-01

## 2023-01-01 RX ORDER — POTASSIUM CHLORIDE 7.45 MG/ML
60 INJECTION INTRAVENOUS
Status: DISCONTINUED | OUTPATIENT
Start: 2023-01-01 | End: 2023-01-01

## 2023-01-01 RX ORDER — ACETAMINOPHEN 10 MG/ML
1000 INJECTION, SOLUTION INTRAVENOUS EVERY 8 HOURS
Status: DISCONTINUED | OUTPATIENT
Start: 2023-01-01 | End: 2023-01-01

## 2023-01-01 RX ORDER — POTASSIUM CHLORIDE 7.45 MG/ML
40 INJECTION INTRAVENOUS
Status: DISCONTINUED | OUTPATIENT
Start: 2023-01-01 | End: 2023-01-01

## 2023-01-01 RX ORDER — SODIUM CHLORIDE 0.9 % (FLUSH) 0.9 %
10 SYRINGE (ML) INJECTION EVERY 6 HOURS
Status: DISCONTINUED | OUTPATIENT
Start: 2023-01-01 | End: 2023-01-01 | Stop reason: HOSPADM

## 2023-01-01 RX ORDER — POTASSIUM CHLORIDE 29.8 MG/ML
40 INJECTION INTRAVENOUS
Status: DISCONTINUED | OUTPATIENT
Start: 2023-01-01 | End: 2023-01-01

## 2023-01-01 RX ORDER — LANOLIN ALCOHOL/MO/W.PET/CERES
800 CREAM (GRAM) TOPICAL ONCE
Status: COMPLETED | OUTPATIENT
Start: 2023-01-01 | End: 2023-01-01

## 2023-01-01 RX ORDER — LEVALBUTEROL INHALATION SOLUTION 0.63 MG/3ML
0.63 SOLUTION RESPIRATORY (INHALATION)
Status: DISCONTINUED | OUTPATIENT
Start: 2023-01-01 | End: 2023-01-01 | Stop reason: HOSPADM

## 2023-01-01 RX ORDER — MAGNESIUM SULFATE HEPTAHYDRATE 40 MG/ML
4 INJECTION, SOLUTION INTRAVENOUS
Status: DISCONTINUED | OUTPATIENT
Start: 2023-01-01 | End: 2023-01-01

## 2023-01-01 RX ORDER — CALCIUM GLUCONATE 20 MG/ML
1 INJECTION, SOLUTION INTRAVENOUS
Status: DISCONTINUED | OUTPATIENT
Start: 2023-01-01 | End: 2023-01-01

## 2023-01-01 RX ORDER — MAGNESIUM SULFATE HEPTAHYDRATE 40 MG/ML
2 INJECTION, SOLUTION INTRAVENOUS ONCE
Status: COMPLETED | OUTPATIENT
Start: 2023-01-01 | End: 2023-01-01

## 2023-01-01 RX ORDER — MAGNESIUM SULFATE HEPTAHYDRATE 40 MG/ML
2 INJECTION, SOLUTION INTRAVENOUS
Status: DISCONTINUED | OUTPATIENT
Start: 2023-01-01 | End: 2023-01-01

## 2023-01-01 RX ORDER — TAMSULOSIN HYDROCHLORIDE 0.4 MG/1
0.4 CAPSULE ORAL DAILY
Status: DISCONTINUED | OUTPATIENT
Start: 2023-01-01 | End: 2023-01-01

## 2023-01-01 RX ORDER — ACETAMINOPHEN 325 MG/1
650 TABLET ORAL EVERY 6 HOURS PRN
Status: DISCONTINUED | OUTPATIENT
Start: 2023-01-01 | End: 2023-01-01 | Stop reason: HOSPADM

## 2023-01-01 RX ORDER — ACETAMINOPHEN 10 MG/ML
1000 INJECTION, SOLUTION INTRAVENOUS EVERY 8 HOURS
Status: COMPLETED | OUTPATIENT
Start: 2023-01-01 | End: 2023-01-01

## 2023-01-01 RX ORDER — GUAIFENESIN 600 MG/1
600 TABLET, EXTENDED RELEASE ORAL 2 TIMES DAILY
Status: DISCONTINUED | OUTPATIENT
Start: 2023-01-01 | End: 2023-01-01 | Stop reason: HOSPADM

## 2023-01-01 RX ORDER — SODIUM,POTASSIUM PHOSPHATES 280-250MG
2 POWDER IN PACKET (EA) ORAL ONCE
Status: COMPLETED | OUTPATIENT
Start: 2023-01-01 | End: 2023-01-01

## 2023-01-01 RX ORDER — ONDANSETRON 2 MG/ML
4 INJECTION INTRAMUSCULAR; INTRAVENOUS EVERY 6 HOURS PRN
Status: DISCONTINUED | OUTPATIENT
Start: 2023-01-01 | End: 2023-01-01 | Stop reason: HOSPADM

## 2023-01-01 RX ORDER — HEPARIN SODIUM 5000 [USP'U]/ML
5000 INJECTION, SOLUTION INTRAVENOUS; SUBCUTANEOUS EVERY 8 HOURS
Status: DISCONTINUED | OUTPATIENT
Start: 2023-01-01 | End: 2023-01-01

## 2023-01-01 RX ORDER — SODIUM CHLORIDE 0.9 % (FLUSH) 0.9 %
10 SYRINGE (ML) INJECTION
Status: DISCONTINUED | OUTPATIENT
Start: 2023-01-01 | End: 2023-01-01 | Stop reason: HOSPADM

## 2023-01-01 RX ORDER — POTASSIUM CHLORIDE 7.45 MG/ML
80 INJECTION INTRAVENOUS
Status: DISCONTINUED | OUTPATIENT
Start: 2023-01-01 | End: 2023-01-01

## 2023-01-01 RX ORDER — ENOXAPARIN SODIUM 100 MG/ML
40 INJECTION SUBCUTANEOUS EVERY 24 HOURS
Status: DISCONTINUED | OUTPATIENT
Start: 2023-01-01 | End: 2023-01-01 | Stop reason: HOSPADM

## 2023-01-01 RX ORDER — DEXTROSE MONOHYDRATE, SODIUM CHLORIDE, AND POTASSIUM CHLORIDE 50; 1.49; 4.5 G/1000ML; G/1000ML; G/1000ML
INJECTION, SOLUTION INTRAVENOUS CONTINUOUS
Status: DISCONTINUED | OUTPATIENT
Start: 2023-01-01 | End: 2023-01-01

## 2023-01-01 RX ORDER — FLUCONAZOLE 2 MG/ML
200 INJECTION, SOLUTION INTRAVENOUS
Status: DISCONTINUED | OUTPATIENT
Start: 2023-01-01 | End: 2023-01-01

## 2023-01-01 RX ORDER — CALCIUM GLUCONATE 20 MG/ML
3 INJECTION, SOLUTION INTRAVENOUS
Status: DISCONTINUED | OUTPATIENT
Start: 2023-01-01 | End: 2023-01-01

## 2023-01-01 RX ORDER — PROCHLORPERAZINE EDISYLATE 5 MG/ML
2.5 INJECTION INTRAMUSCULAR; INTRAVENOUS EVERY 6 HOURS PRN
Status: DISCONTINUED | OUTPATIENT
Start: 2023-01-01 | End: 2023-01-01 | Stop reason: HOSPADM

## 2023-01-01 RX ORDER — OXYCODONE HYDROCHLORIDE 5 MG/1
5 TABLET ORAL EVERY 6 HOURS PRN
Status: DISCONTINUED | OUTPATIENT
Start: 2023-01-01 | End: 2023-01-01 | Stop reason: HOSPADM

## 2023-01-01 RX ORDER — GLUCAGON 1 MG
1 KIT INJECTION
Status: DISCONTINUED | OUTPATIENT
Start: 2023-01-01 | End: 2023-01-01 | Stop reason: HOSPADM

## 2023-01-01 RX ORDER — MORPHINE SULFATE 2 MG/ML
2 INJECTION, SOLUTION INTRAMUSCULAR; INTRAVENOUS EVERY 4 HOURS PRN
Status: DISCONTINUED | OUTPATIENT
Start: 2023-01-01 | End: 2023-01-01

## 2023-01-01 RX ORDER — SULFAMETHOXAZOLE AND TRIMETHOPRIM 800; 160 MG/1; MG/1
1 TABLET ORAL 2 TIMES DAILY
Qty: 28 TABLET | Refills: 0 | Status: SHIPPED | OUTPATIENT
Start: 2023-01-01 | End: 2024-01-01

## 2023-01-01 RX ORDER — LEVALBUTEROL INHALATION SOLUTION 0.63 MG/3ML
0.63 SOLUTION RESPIRATORY (INHALATION) EVERY 6 HOURS
Status: DISCONTINUED | OUTPATIENT
Start: 2023-01-01 | End: 2023-01-01

## 2023-01-01 RX ORDER — OXYCODONE HYDROCHLORIDE 10 MG/1
10 TABLET ORAL EVERY 6 HOURS PRN
Status: DISCONTINUED | OUTPATIENT
Start: 2023-01-01 | End: 2023-01-01 | Stop reason: HOSPADM

## 2023-01-01 RX ORDER — NALOXONE HCL 0.4 MG/ML
0.02 VIAL (ML) INJECTION
Status: DISCONTINUED | OUTPATIENT
Start: 2023-01-01 | End: 2023-01-01 | Stop reason: HOSPADM

## 2023-01-01 RX ORDER — DEXTROSE MONOHYDRATE AND SODIUM CHLORIDE 5; .45 G/100ML; G/100ML
INJECTION, SOLUTION INTRAVENOUS CONTINUOUS
Status: DISCONTINUED | OUTPATIENT
Start: 2023-01-01 | End: 2023-01-01

## 2023-01-01 RX ORDER — ACETAMINOPHEN 325 MG/1
650 TABLET ORAL EVERY 4 HOURS PRN
Status: DISCONTINUED | OUTPATIENT
Start: 2023-01-01 | End: 2023-01-01

## 2023-01-01 RX ORDER — FLUCONAZOLE 200 MG/1
200 TABLET ORAL DAILY
Qty: 14 TABLET | Refills: 0 | Status: SHIPPED | OUTPATIENT
Start: 2024-01-01 | End: 2023-01-01

## 2023-01-01 RX ORDER — FLUCONAZOLE 200 MG/1
200 TABLET ORAL DAILY
Status: DISCONTINUED | OUTPATIENT
Start: 2023-01-01 | End: 2023-01-01 | Stop reason: HOSPADM

## 2023-01-01 RX ORDER — PANTOPRAZOLE SODIUM 40 MG/1
40 TABLET, DELAYED RELEASE ORAL DAILY
Status: DISCONTINUED | OUTPATIENT
Start: 2023-01-01 | End: 2023-01-01 | Stop reason: HOSPADM

## 2023-01-01 RX ORDER — SULFAMETHOXAZOLE AND TRIMETHOPRIM 800; 160 MG/1; MG/1
1 TABLET ORAL 2 TIMES DAILY
Qty: 28 TABLET | Refills: 0 | Status: SHIPPED | OUTPATIENT
Start: 2023-01-01 | End: 2023-01-01

## 2023-01-01 RX ORDER — LIDOCAINE HYDROCHLORIDE 10 MG/ML
INJECTION INFILTRATION; PERINEURAL
Status: COMPLETED | OUTPATIENT
Start: 2023-01-01 | End: 2023-01-01

## 2023-01-01 RX ORDER — HYDROXYZINE HYDROCHLORIDE 25 MG/1
25 TABLET, FILM COATED ORAL ONCE
Status: COMPLETED | OUTPATIENT
Start: 2023-01-01 | End: 2023-01-01

## 2023-01-01 RX ORDER — PANTOPRAZOLE SODIUM 40 MG/1
40 TABLET, DELAYED RELEASE ORAL DAILY
COMMUNITY
Start: 2024-01-01

## 2023-01-01 RX ORDER — FUROSEMIDE 10 MG/ML
20 INJECTION INTRAMUSCULAR; INTRAVENOUS ONCE
Status: COMPLETED | OUTPATIENT
Start: 2023-01-01 | End: 2023-01-01

## 2023-01-01 RX ORDER — POTASSIUM CHLORIDE 14.9 MG/ML
20 INJECTION INTRAVENOUS
Status: DISCONTINUED | OUTPATIENT
Start: 2023-01-01 | End: 2023-01-01

## 2023-01-01 RX ORDER — MIRTAZAPINE 15 MG/1
15 TABLET, FILM COATED ORAL NIGHTLY
Status: DISCONTINUED | OUTPATIENT
Start: 2023-01-01 | End: 2023-01-01 | Stop reason: HOSPADM

## 2023-01-01 RX ORDER — TALC
6 POWDER (GRAM) TOPICAL NIGHTLY PRN
Status: DISCONTINUED | OUTPATIENT
Start: 2023-01-01 | End: 2023-01-01 | Stop reason: HOSPADM

## 2023-01-01 RX ORDER — MIDAZOLAM HYDROCHLORIDE 1 MG/ML
INJECTION INTRAMUSCULAR; INTRAVENOUS
Status: COMPLETED | OUTPATIENT
Start: 2023-01-01 | End: 2023-01-01

## 2023-01-01 RX ORDER — ACETAMINOPHEN 325 MG/1
650 TABLET ORAL EVERY 8 HOURS PRN
Status: DISCONTINUED | OUTPATIENT
Start: 2023-01-01 | End: 2023-01-01

## 2023-01-01 RX ORDER — PANTOPRAZOLE SODIUM 40 MG/10ML
40 INJECTION, POWDER, LYOPHILIZED, FOR SOLUTION INTRAVENOUS DAILY
Status: DISCONTINUED | OUTPATIENT
Start: 2023-01-01 | End: 2023-01-01

## 2023-01-01 RX ORDER — OXYCODONE AND ACETAMINOPHEN 10; 325 MG/1; MG/1
1 TABLET ORAL EVERY 4 HOURS PRN
Status: DISCONTINUED | OUTPATIENT
Start: 2023-01-01 | End: 2023-01-01

## 2023-01-01 RX ORDER — FLUCONAZOLE 200 MG/1
200 TABLET ORAL DAILY
Qty: 14 TABLET | Refills: 0 | Status: SHIPPED | OUTPATIENT
Start: 2024-01-01 | End: 2024-01-01

## 2023-01-01 RX ORDER — FENTANYL CITRATE 50 UG/ML
INJECTION, SOLUTION INTRAMUSCULAR; INTRAVENOUS
Status: COMPLETED | OUTPATIENT
Start: 2023-01-01 | End: 2023-01-01

## 2023-01-01 RX ADMIN — Medication 10 ML: at 12:12

## 2023-01-01 RX ADMIN — HEPARIN SODIUM 5000 UNITS: 5000 INJECTION INTRAVENOUS; SUBCUTANEOUS at 01:12

## 2023-01-01 RX ADMIN — POTASSIUM CHLORIDE, DEXTROSE MONOHYDRATE AND SODIUM CHLORIDE: 150; 5; 450 INJECTION, SOLUTION INTRAVENOUS at 09:12

## 2023-01-01 RX ADMIN — PIPERACILLIN SODIUM AND TAZOBACTAM SODIUM 4.5 G: 4; .5 INJECTION, POWDER, FOR SOLUTION INTRAVENOUS at 08:12

## 2023-01-01 RX ADMIN — DEXTROSE AND SODIUM CHLORIDE: 5; 450 INJECTION, SOLUTION INTRAVENOUS at 01:12

## 2023-01-01 RX ADMIN — MAGNESIUM SULFATE HEPTAHYDRATE 2 G: 40 INJECTION, SOLUTION INTRAVENOUS at 03:12

## 2023-01-01 RX ADMIN — CETIRIZINE HYDROCHLORIDE 10 MG: 10 TABLET, FILM COATED ORAL at 12:12

## 2023-01-01 RX ADMIN — IOHEXOL 75 ML: 350 INJECTION, SOLUTION INTRAVENOUS at 10:12

## 2023-01-01 RX ADMIN — PANTOPRAZOLE SODIUM 40 MG: 40 INJECTION, POWDER, FOR SOLUTION INTRAVENOUS at 08:12

## 2023-01-01 RX ADMIN — HEPARIN SODIUM 5000 UNITS: 5000 INJECTION INTRAVENOUS; SUBCUTANEOUS at 06:12

## 2023-01-01 RX ADMIN — PIPERACILLIN SODIUM AND TAZOBACTAM SODIUM 4.5 G: 4; .5 INJECTION, POWDER, FOR SOLUTION INTRAVENOUS at 09:12

## 2023-01-01 RX ADMIN — POTASSIUM & SODIUM PHOSPHATES POWDER PACK 280-160-250 MG 2 PACKET: 280-160-250 PACK at 12:12

## 2023-01-01 RX ADMIN — ONDANSETRON 4 MG: 2 INJECTION INTRAMUSCULAR; INTRAVENOUS at 04:12

## 2023-01-01 RX ADMIN — LEVALBUTEROL HYDROCHLORIDE 0.63 MG: 0.63 SOLUTION RESPIRATORY (INHALATION) at 11:12

## 2023-01-01 RX ADMIN — PROCHLORPERAZINE EDISYLATE 2.5 MG: 5 INJECTION INTRAMUSCULAR; INTRAVENOUS at 01:12

## 2023-01-01 RX ADMIN — CETIRIZINE HYDROCHLORIDE 10 MG: 10 TABLET, FILM COATED ORAL at 08:12

## 2023-01-01 RX ADMIN — I.V. FAT EMULSION 250 ML: 20 EMULSION INTRAVENOUS at 09:12

## 2023-01-01 RX ADMIN — SULFAMETHOXAZOLE AND TRIMETHOPRIM 1 TABLET: 800; 160 TABLET ORAL at 09:12

## 2023-01-01 RX ADMIN — FLUCONAZOLE 200 MG: 2 INJECTION, SOLUTION INTRAVENOUS at 11:12

## 2023-01-01 RX ADMIN — MIRTAZAPINE 15 MG: 15 TABLET, FILM COATED ORAL at 08:12

## 2023-01-01 RX ADMIN — Medication 10 ML: at 06:12

## 2023-01-01 RX ADMIN — PROCHLORPERAZINE EDISYLATE 2.5 MG: 5 INJECTION INTRAMUSCULAR; INTRAVENOUS at 09:12

## 2023-01-01 RX ADMIN — FLUCONAZOLE 200 MG: 2 INJECTION, SOLUTION INTRAVENOUS at 10:12

## 2023-01-01 RX ADMIN — MIRTAZAPINE 15 MG: 15 TABLET, FILM COATED ORAL at 09:12

## 2023-01-01 RX ADMIN — LEVALBUTEROL HYDROCHLORIDE 0.63 MG: 0.63 SOLUTION RESPIRATORY (INHALATION) at 01:12

## 2023-01-01 RX ADMIN — IOHEXOL 15 ML: 350 INJECTION, SOLUTION INTRAVENOUS at 02:12

## 2023-01-01 RX ADMIN — Medication 800 MG: at 12:12

## 2023-01-01 RX ADMIN — LEVALBUTEROL HYDROCHLORIDE 0.63 MG: 0.63 SOLUTION RESPIRATORY (INHALATION) at 07:12

## 2023-01-01 RX ADMIN — PROCHLORPERAZINE EDISYLATE 2.5 MG: 5 INJECTION INTRAMUSCULAR; INTRAVENOUS at 11:12

## 2023-01-01 RX ADMIN — ACETAMINOPHEN 650 MG: 325 TABLET ORAL at 09:12

## 2023-01-01 RX ADMIN — LIDOCAINE HYDROCHLORIDE 5 ML: 10 INJECTION, SOLUTION INFILTRATION; PERINEURAL at 04:12

## 2023-01-01 RX ADMIN — MORPHINE SULFATE 2 MG: 2 INJECTION, SOLUTION INTRAMUSCULAR; INTRAVENOUS at 08:12

## 2023-01-01 RX ADMIN — MAGNESIUM SULFATE HEPTAHYDRATE: 500 INJECTION, SOLUTION INTRAMUSCULAR; INTRAVENOUS at 10:12

## 2023-01-01 RX ADMIN — LEVALBUTEROL HYDROCHLORIDE 0.63 MG: 0.63 SOLUTION RESPIRATORY (INHALATION) at 08:12

## 2023-01-01 RX ADMIN — PANTOPRAZOLE SODIUM 40 MG: 40 TABLET, DELAYED RELEASE ORAL at 08:12

## 2023-01-01 RX ADMIN — OXYCODONE HYDROCHLORIDE 5 MG: 5 TABLET ORAL at 08:12

## 2023-01-01 RX ADMIN — PIPERACILLIN SODIUM AND TAZOBACTAM SODIUM 4.5 G: 4; .5 INJECTION, POWDER, FOR SOLUTION INTRAVENOUS at 12:12

## 2023-01-01 RX ADMIN — ENOXAPARIN SODIUM 40 MG: 40 INJECTION SUBCUTANEOUS at 05:12

## 2023-01-01 RX ADMIN — Medication 10 ML: at 02:12

## 2023-01-01 RX ADMIN — PROCHLORPERAZINE EDISYLATE 2.5 MG: 5 INJECTION INTRAMUSCULAR; INTRAVENOUS at 10:12

## 2023-01-01 RX ADMIN — GUAIFENESIN 600 MG: 600 TABLET, EXTENDED RELEASE ORAL at 08:12

## 2023-01-01 RX ADMIN — HEPARIN SODIUM 5000 UNITS: 5000 INJECTION INTRAVENOUS; SUBCUTANEOUS at 05:12

## 2023-01-01 RX ADMIN — MAGNESIUM SULFATE HEPTAHYDRATE: 500 INJECTION, SOLUTION INTRAMUSCULAR; INTRAVENOUS at 09:12

## 2023-01-01 RX ADMIN — LEVALBUTEROL HYDROCHLORIDE 0.63 MG: 0.63 SOLUTION RESPIRATORY (INHALATION) at 12:12

## 2023-01-01 RX ADMIN — PIPERACILLIN SODIUM AND TAZOBACTAM SODIUM 4.5 G: 4; .5 INJECTION, POWDER, FOR SOLUTION INTRAVENOUS at 04:12

## 2023-01-01 RX ADMIN — MAGNESIUM SULFATE HEPTAHYDRATE 2 G: 40 INJECTION, SOLUTION INTRAVENOUS at 04:12

## 2023-01-01 RX ADMIN — SULFAMETHOXAZOLE AND TRIMETHOPRIM 1 TABLET: 800; 160 TABLET ORAL at 08:12

## 2023-01-01 RX ADMIN — PANTOPRAZOLE SODIUM 40 MG: 40 INJECTION, POWDER, FOR SOLUTION INTRAVENOUS at 10:12

## 2023-01-01 RX ADMIN — Medication 10 ML: at 05:12

## 2023-01-01 RX ADMIN — ACETAMINOPHEN 1000 MG: 10 INJECTION, SOLUTION INTRAVENOUS at 05:12

## 2023-01-01 RX ADMIN — POTASSIUM CHLORIDE 10 MEQ: 7.46 INJECTION, SOLUTION INTRAVENOUS at 08:12

## 2023-01-01 RX ADMIN — LIDOCAINE HYDROCHLORIDE 5 ML: 10 INJECTION, SOLUTION INFILTRATION; PERINEURAL at 05:12

## 2023-01-01 RX ADMIN — DEXTROSE MONOHYDRATE 125 ML: 100 INJECTION, SOLUTION INTRAVENOUS at 07:12

## 2023-01-01 RX ADMIN — POTASSIUM PHOSPHATE, MONOBASIC AND POTASSIUM PHOSPHATE, DIBASIC 30 MMOL: 224; 236 INJECTION, SOLUTION, CONCENTRATE INTRAVENOUS at 10:12

## 2023-01-01 RX ADMIN — ACETAMINOPHEN 1000 MG: 10 INJECTION, SOLUTION INTRAVENOUS at 09:12

## 2023-01-01 RX ADMIN — IOHEXOL 15 ML: 350 INJECTION, SOLUTION INTRAVENOUS at 08:12

## 2023-01-01 RX ADMIN — MIDAZOLAM HYDROCHLORIDE 0.5 MG: 2 INJECTION, SOLUTION INTRAMUSCULAR; INTRAVENOUS at 04:12

## 2023-01-01 RX ADMIN — ONDANSETRON 4 MG: 2 INJECTION INTRAMUSCULAR; INTRAVENOUS at 06:12

## 2023-01-01 RX ADMIN — POTASSIUM CHLORIDE 10 MEQ: 7.46 INJECTION, SOLUTION INTRAVENOUS at 12:12

## 2023-01-01 RX ADMIN — OXYCODONE HYDROCHLORIDE 5 MG: 5 TABLET ORAL at 03:12

## 2023-01-01 RX ADMIN — PANTOPRAZOLE SODIUM 40 MG: 40 INJECTION, POWDER, FOR SOLUTION INTRAVENOUS at 09:12

## 2023-01-01 RX ADMIN — DEXTROSE AND SODIUM CHLORIDE: 5; 450 INJECTION, SOLUTION INTRAVENOUS at 04:12

## 2023-01-01 RX ADMIN — Medication 10 ML: at 11:12

## 2023-01-01 RX ADMIN — DEXTROSE AND SODIUM CHLORIDE: 5; 450 INJECTION, SOLUTION INTRAVENOUS at 09:12

## 2023-01-01 RX ADMIN — HYDROXYZINE HYDROCHLORIDE 25 MG: 25 TABLET, FILM COATED ORAL at 10:12

## 2023-01-01 RX ADMIN — MORPHINE SULFATE 2 MG: 2 INJECTION, SOLUTION INTRAMUSCULAR; INTRAVENOUS at 10:12

## 2023-01-01 RX ADMIN — PANTOPRAZOLE SODIUM 40 MG: 40 TABLET, DELAYED RELEASE ORAL at 09:12

## 2023-01-01 RX ADMIN — PIPERACILLIN SODIUM AND TAZOBACTAM SODIUM 4.5 G: 4; .5 INJECTION, POWDER, FOR SOLUTION INTRAVENOUS at 05:12

## 2023-01-01 RX ADMIN — PIPERACILLIN SODIUM AND TAZOBACTAM SODIUM 4.5 G: 4; .5 INJECTION, POWDER, FOR SOLUTION INTRAVENOUS at 06:12

## 2023-01-01 RX ADMIN — LEVALBUTEROL HYDROCHLORIDE 0.63 MG: 0.63 SOLUTION RESPIRATORY (INHALATION) at 04:12

## 2023-01-01 RX ADMIN — ENOXAPARIN SODIUM 40 MG: 40 INJECTION SUBCUTANEOUS at 04:12

## 2023-01-01 RX ADMIN — HEPARIN SODIUM 5000 UNITS: 5000 INJECTION INTRAVENOUS; SUBCUTANEOUS at 09:12

## 2023-01-01 RX ADMIN — ACETAMINOPHEN 1000 MG: 10 INJECTION, SOLUTION INTRAVENOUS at 03:12

## 2023-01-01 RX ADMIN — POTASSIUM CHLORIDE 10 MEQ: 7.46 INJECTION, SOLUTION INTRAVENOUS at 04:12

## 2023-01-01 RX ADMIN — FUROSEMIDE 20 MG: 10 INJECTION, SOLUTION INTRAMUSCULAR; INTRAVENOUS at 08:12

## 2023-01-01 RX ADMIN — GUAIFENESIN 600 MG: 600 TABLET, EXTENDED RELEASE ORAL at 09:12

## 2023-01-01 RX ADMIN — POTASSIUM CHLORIDE 10 MEQ: 7.46 INJECTION, SOLUTION INTRAVENOUS at 06:12

## 2023-01-01 RX ADMIN — POTASSIUM CHLORIDE 10 MEQ: 7.46 INJECTION, SOLUTION INTRAVENOUS at 05:12

## 2023-01-01 RX ADMIN — ASCORBIC ACID, VITAMIN A PALMITATE, CHOLECALCIFEROL, THIAMINE HYDROCHLORIDE, RIBOFLAVIN-5 PHOSPHATE SODIUM, PYRIDOXINE HYDROCHLORIDE, NIACINAMIDE, DEXPANTHENOL, ALPHA-TOCOPHEROL ACETATE, VITAMIN K1, FOLIC ACID, BIOTIN, CYANOCOBALAMIN: 200; 3300; 200; 6; 3.6; 6; 40; 15; 10; 150; 600; 60; 5 INJECTION, SOLUTION INTRAVENOUS at 09:12

## 2023-01-01 RX ADMIN — FENTANYL CITRATE 50 MCG: 50 INJECTION, SOLUTION INTRAMUSCULAR; INTRAVENOUS at 04:12

## 2023-01-01 RX ADMIN — PIPERACILLIN SODIUM AND TAZOBACTAM SODIUM 4.5 G: 4; .5 INJECTION, POWDER, FOR SOLUTION INTRAVENOUS at 01:12

## 2023-01-01 RX ADMIN — ONDANSETRON 4 MG: 2 INJECTION INTRAMUSCULAR; INTRAVENOUS at 02:12

## 2023-01-01 RX ADMIN — ONDANSETRON 4 MG: 2 INJECTION INTRAMUSCULAR; INTRAVENOUS at 11:12

## 2023-01-01 RX ADMIN — SODIUM CHLORIDE, POTASSIUM CHLORIDE, SODIUM LACTATE AND CALCIUM CHLORIDE 1000 ML: 600; 310; 30; 20 INJECTION, SOLUTION INTRAVENOUS at 09:12

## 2023-01-01 RX ADMIN — SODIUM PHOSPHATE, MONOBASIC, MONOHYDRATE AND SODIUM PHOSPHATE, DIBASIC, ANHYDROUS 30 MMOL: 142; 276 INJECTION, SOLUTION INTRAVENOUS at 02:12

## 2023-01-01 RX ADMIN — MORPHINE SULFATE 2 MG: 2 INJECTION, SOLUTION INTRAMUSCULAR; INTRAVENOUS at 01:12

## 2023-01-01 RX ADMIN — POTASSIUM PHOSPHATE, MONOBASIC AND POTASSIUM PHOSPHATE, DIBASIC 15 MMOL: 224; 236 INJECTION, SOLUTION, CONCENTRATE INTRAVENOUS at 12:12

## 2023-01-01 RX ADMIN — PROCHLORPERAZINE EDISYLATE 2.5 MG: 5 INJECTION INTRAMUSCULAR; INTRAVENOUS at 04:12

## 2023-01-01 RX ADMIN — PIPERACILLIN SODIUM AND TAZOBACTAM SODIUM 4.5 G: 4; .5 INJECTION, POWDER, FOR SOLUTION INTRAVENOUS at 02:12

## 2023-01-01 RX ADMIN — ACETAMINOPHEN 1000 MG: 10 INJECTION, SOLUTION INTRAVENOUS at 01:12

## 2023-01-01 RX ADMIN — ENOXAPARIN SODIUM 40 MG: 40 INJECTION SUBCUTANEOUS at 06:12

## 2023-01-01 RX ADMIN — LEVALBUTEROL HYDROCHLORIDE 0.63 MG: 0.63 SOLUTION RESPIRATORY (INHALATION) at 03:12

## 2023-01-01 RX ADMIN — ACETAMINOPHEN 650 MG: 325 TABLET ORAL at 05:12

## 2023-01-01 RX ADMIN — I.V. FAT EMULSION 250 ML: 20 EMULSION INTRAVENOUS at 10:12

## 2023-01-01 RX ADMIN — FLUCONAZOLE 200 MG: 200 TABLET ORAL at 08:12

## 2023-01-01 RX ADMIN — DEXTROSE AND SODIUM CHLORIDE: 5; 450 INJECTION, SOLUTION INTRAVENOUS at 08:12

## 2023-01-01 RX ADMIN — Medication 800 MG: at 02:12

## 2023-01-01 RX ADMIN — ONDANSETRON 4 MG: 2 INJECTION INTRAMUSCULAR; INTRAVENOUS at 08:12

## 2023-01-01 RX ADMIN — HEPARIN SODIUM 5000 UNITS: 5000 INJECTION INTRAVENOUS; SUBCUTANEOUS at 10:12

## 2023-01-01 RX ADMIN — POTASSIUM CHLORIDE 10 MEQ: 7.46 INJECTION, SOLUTION INTRAVENOUS at 03:12

## 2023-01-01 RX ADMIN — ACETAMINOPHEN 650 MG: 325 TABLET ORAL at 11:12

## 2023-01-01 RX ADMIN — CETIRIZINE HYDROCHLORIDE 10 MG: 10 TABLET, FILM COATED ORAL at 09:12

## 2023-01-01 RX ADMIN — SODIUM PHOSPHATE, MONOBASIC, MONOHYDRATE AND SODIUM PHOSPHATE, DIBASIC, ANHYDROUS 20.01 MMOL: 142; 276 INJECTION, SOLUTION INTRAVENOUS at 08:12

## 2023-01-01 RX ADMIN — FLUCONAZOLE 200 MG: 200 TABLET ORAL at 09:12

## 2023-01-01 RX ADMIN — ASCORBIC ACID, VITAMIN A PALMITATE, CHOLECALCIFEROL, THIAMINE HYDROCHLORIDE, RIBOFLAVIN-5 PHOSPHATE SODIUM, PYRIDOXINE HYDROCHLORIDE, NIACINAMIDE, DEXPANTHENOL, ALPHA-TOCOPHEROL ACETATE, VITAMIN K1, FOLIC ACID, BIOTIN, CYANOCOBALAMIN: 200; 3300; 200; 6; 3.6; 6; 40; 15; 10; 150; 600; 60; 5 INJECTION, SOLUTION INTRAVENOUS at 10:12

## 2023-01-01 RX ADMIN — SODIUM CHLORIDE 1000 ML: 9 INJECTION, SOLUTION INTRAVENOUS at 04:12

## 2023-01-01 RX ADMIN — ACETAMINOPHEN 1000 MG: 10 INJECTION, SOLUTION INTRAVENOUS at 07:12

## 2023-01-01 RX ADMIN — FENTANYL CITRATE 25 MCG: 50 INJECTION, SOLUTION INTRAMUSCULAR; INTRAVENOUS at 04:12

## 2023-01-01 RX ADMIN — Medication 6 MG: at 08:12

## 2023-06-27 ENCOUNTER — TELEPHONE (OUTPATIENT)
Dept: GASTROENTEROLOGY | Facility: CLINIC | Age: 63
End: 2023-06-27
Payer: COMMERCIAL

## 2023-06-27 NOTE — TELEPHONE ENCOUNTER
"Spoke with pt. Pt states he had imaging done with [facility] & was advised to see GI for review. Pt scheduled his own appointment but wanted to make sure we would be able to see him for "gastric wall thickening". Pt informed we could. Records requested from [facility]. Pt verbalized understanding to all.   "

## 2023-06-27 NOTE — TELEPHONE ENCOUNTER
----- Message from Brayan Montes, Patient Care Assistant sent at 6/27/2023  9:18 AM CDT -----  Contact: Pt  Type: Needs Medical Advice    Who Called: Pt  Best Call Back Number: [phone]  Inquiry/Question: Pt is calling to see how long it will be before he can get scheduled for an EGD. Please advise Thank you~

## 2023-06-28 ENCOUNTER — OFFICE VISIT (OUTPATIENT)
Dept: GASTROENTEROLOGY | Facility: CLINIC | Age: 63
End: 2023-06-28
Payer: COMMERCIAL

## 2023-06-28 VITALS — HEIGHT: 71 IN | BODY MASS INDEX: 27.16 KG/M2 | WEIGHT: 194 LBS

## 2023-06-28 DIAGNOSIS — R07.9 NONSPECIFIC CHEST PAIN: ICD-10-CM

## 2023-06-28 DIAGNOSIS — Z87.898 HISTORY OF NAUSEA AND VOMITING: ICD-10-CM

## 2023-06-28 DIAGNOSIS — Z87.898 HISTORY OF NASAL CONGESTION: ICD-10-CM

## 2023-06-28 DIAGNOSIS — R10.13 EPIGASTRIC PAIN: Primary | ICD-10-CM

## 2023-06-28 DIAGNOSIS — K31.89 GASTRIC WALL THICKENING: ICD-10-CM

## 2023-06-28 DIAGNOSIS — R93.3 ABNORMAL CT SCAN, GASTROINTESTINAL TRACT: ICD-10-CM

## 2023-06-28 DIAGNOSIS — Z12.11 SCREENING FOR COLON CANCER: ICD-10-CM

## 2023-06-28 DIAGNOSIS — R12 HEARTBURN: ICD-10-CM

## 2023-06-28 DIAGNOSIS — Z87.898 HISTORY OF DIARRHEA: ICD-10-CM

## 2023-06-28 DIAGNOSIS — R14.2 BELCHING: ICD-10-CM

## 2023-06-28 PROCEDURE — 1160F RVW MEDS BY RX/DR IN RCRD: CPT | Mod: CPTII,S$GLB,, | Performed by: NURSE PRACTITIONER

## 2023-06-28 PROCEDURE — 99999 PR PBB SHADOW E&M-EST. PATIENT-LVL III: CPT | Mod: PBBFAC,,, | Performed by: NURSE PRACTITIONER

## 2023-06-28 PROCEDURE — 99999 PR PBB SHADOW E&M-EST. PATIENT-LVL III: ICD-10-PCS | Mod: PBBFAC,,, | Performed by: NURSE PRACTITIONER

## 2023-06-28 PROCEDURE — 1159F PR MEDICATION LIST DOCUMENTED IN MEDICAL RECORD: ICD-10-PCS | Mod: CPTII,S$GLB,, | Performed by: NURSE PRACTITIONER

## 2023-06-28 PROCEDURE — 99204 OFFICE O/P NEW MOD 45 MIN: CPT | Mod: S$GLB,,, | Performed by: NURSE PRACTITIONER

## 2023-06-28 PROCEDURE — 99204 PR OFFICE/OUTPT VISIT, NEW, LEVL IV, 45-59 MIN: ICD-10-PCS | Mod: S$GLB,,, | Performed by: NURSE PRACTITIONER

## 2023-06-28 PROCEDURE — 3008F PR BODY MASS INDEX (BMI) DOCUMENTED: ICD-10-PCS | Mod: CPTII,S$GLB,, | Performed by: NURSE PRACTITIONER

## 2023-06-28 PROCEDURE — 1160F PR REVIEW ALL MEDS BY PRESCRIBER/CLIN PHARMACIST DOCUMENTED: ICD-10-PCS | Mod: CPTII,S$GLB,, | Performed by: NURSE PRACTITIONER

## 2023-06-28 PROCEDURE — 1159F MED LIST DOCD IN RCRD: CPT | Mod: CPTII,S$GLB,, | Performed by: NURSE PRACTITIONER

## 2023-06-28 PROCEDURE — 3008F BODY MASS INDEX DOCD: CPT | Mod: CPTII,S$GLB,, | Performed by: NURSE PRACTITIONER

## 2023-06-28 RX ORDER — SUCRALFATE 1 G/1
1 TABLET ORAL
Qty: 120 TABLET | Refills: 0 | Status: SHIPPED | OUTPATIENT
Start: 2023-06-28 | End: 2023-08-07

## 2023-06-28 RX ORDER — SUCRALFATE 1 G/1
TABLET ORAL 2 TIMES DAILY
COMMUNITY
Start: 2023-05-16 | End: 2023-06-28 | Stop reason: DRUGHIGH

## 2023-06-28 RX ORDER — OMEPRAZOLE 40 MG/1
40 CAPSULE, DELAYED RELEASE ORAL DAILY
COMMUNITY
End: 2023-07-13 | Stop reason: SDUPTHER

## 2023-06-28 RX ORDER — ONDANSETRON 8 MG/1
8 TABLET, ORALLY DISINTEGRATING ORAL 3 TIMES DAILY
COMMUNITY
Start: 2023-06-26 | End: 2023-10-31 | Stop reason: ALTCHOICE

## 2023-06-28 RX ORDER — PANTOPRAZOLE SODIUM 40 MG/1
40 TABLET, DELAYED RELEASE ORAL EVERY MORNING
COMMUNITY
Start: 2023-05-12 | End: 2023-06-28

## 2023-06-28 NOTE — PATIENT INSTRUCTIONS
"GERD (Adult)    The esophagus is a tube that carries food from the mouth to the stomach. A valve at the lower end of the esophagus prevents stomach acid from flowing upward. When this valve doesn't work properly, stomach contents may repeatedly flow back up (reflux) into the esophagus. This is called gastroesophageal reflux disease (GERD). GERD can irritate the esophagus. It can cause problems with swallowing or breathing. In severe cases, GERD can cause recurrent pneumonia or other serious problems.  Symptoms of reflux include burning, pressure or sharp pain in the upper abdomen or mid to lower chest. The pain can spread to the neck, back, or shoulder. There may be belching, an acid taste in the back of the throat, chronic cough, or sore throat or hoarseness. GERD symptoms often occur during the day after a big meal. They can also occur at night when lying down.   Home care  Lifestyle changes can help reduce symptoms. If needed, medicines may be prescribed. Symptoms often improve with treatment, but if treatment is stopped, the symptoms often return after a few months. So most persons with GERD will need to continue treatment.  Lifestyle changes  Limit or avoid fatty, fried, and spicy foods, as well as coffee, chocolate, mint, and foods with high acid content such as tomatoes and citrus fruit and juices (orange, grapefruit, lemon).  Dont eat large meals, especially at night. Frequent, smaller meals are best. Do not lie down right after eating. And dont eat anything 3 hours before going to bed.  Avoid drinking alcohol and smoking. As much as possible, stay away from second hand smoke.  If you are overweight, losing weight will reduce symptoms.   Avoid wearing tight clothing around your stomach area.  If your symptoms occur during sleep, use a foam wedge to elevate your upper body (not just your head.) Or, place 4" blocks under the head of your bed.  Medicines  If needed, medicines can help relieve the symptoms of " GERD and prevent damage to the esophagus. Discuss a medicine plan with your healthcare provider. This may include one or more of the following medicines:  Antacids to help neutralize the normal acids in your stomach.  Acid blockers (H2 blockers) to decrease acid production.  Acid inhibitors (PPIs) to decrease acid production in a different way than the blockers. They may work better, but can take a little longer to take effect.  Take an antacid 30-60 minutes after eating and at bedtime, but not at the same time as an acid blocker.  Try not to take medicines such as ibuprofen and aspirin. If you are taking aspirin for your heart or other medical reasons, talk to your healthcare provider about stopping it.  Follow-up care  Follow up with your healthcare provider or as advised by our staff.  When to seek medical advice  Call your healthcare provider if any of the following occur:  Stomach pain gets worse or moves to the lower right abdomen (appendix area)  Chest pain appears or gets worse, or spreads to the back, neck, shoulder, or arm  Frequent vomiting (cant keep down liquids)  Blood in the stool or vomit (red or black in color)  Feeling weak or dizzy  Fever of 100.4ºF (38ºC) or higher, or as directed by your healthcare provider  Date Last Reviewed: 6/23/2015  © 0648-8382 [employer]. [address] [place] [address], [place] [address]. All rights reserved. This information is not intended as a substitute for professional medical care. Always follow your healthcare professional's instructions.

## 2023-06-28 NOTE — PROGRESS NOTES
"Subjective:       Patient ID: Danish Valladares is a 62 y.o. male Body mass index is 27.06 kg/m².    Chief Complaint: GI Problem (Gastric wall thickening. )    This patient is new to me.     Reviewed medical records received from Dr. Bates's office, summarized below and in medical & surgical history (endoscopies, etc), copies made & given to nurse to be scanned into system:   Visit note reviewed: 5/18/2023 (protonix not helping, symptoms started after eating a steak and drinking red wine)  Labs: 5/19/2023 UA WNL; CBC WNL; CMP WNL except for glucose 113 (H), lipid panel WNL except for HDL 38 (L); TSH WNL; hemoglobin A1c 5.8 (H); PSA WNL h pylori IgG negative  6/22/2023 CT abdomen pelvis c/s contrast "impression: 1 thickening of the wall of the antrum of the stomach is most significant posteriorly and inferiorly. It measures up to 1 point a centimeters in thickness. If clinically indicated, endoscopy with biopsy of this area would be helpful for further evaluation. 2. Liver is normal in size and appearance." Kidney cyst noted to Left kidney.  6/9/2023 limited abdominal ultrasound "impression: 1. Heterogeneous echogenic liver questioning liver disease and possible gastric antral wall thickening versus artifact with pre and post contrast CT available for further assessment. 2. No other potentially significant findings detected sonographically in the right upper quadrant as described above."    GI Problem  The primary symptoms include weight loss (losing weight; has not eaten much in the past 4-5 days with having diarrhea and vomiting after CT with contrast, also has changed diet due to recent pre-diabetes diagnosis), abdominal pain, nausea (none since 6/25/2023), vomiting (none since 6/25/2023) and diarrhea (started after CT scan with contrast; none since 6/25/2023). Primary symptoms do not include fever, fatigue, melena, hematemesis, jaundice, hematochezia or dysuria.   The abdominal pain began more than 2 days ago " (started a few months ago). The abdominal pain is located in the epigastric region (described as gas pain). The abdominal pain radiates to the chest. The severity of the abdominal pain is 0/10 (currently). The abdominal pain is relieved by belching.   The illness does not include chills, dysphagia, odynophagia or constipation. Associated symptoms comments: Bowel movements are once daily currently of formed stool  TREATMENT: prilosec 40 mg once daily (mild relief at times)- started a month ago, carafate 1 gram BID- helps  PAST TREATMENT: protonix- no relief, Mylanta PRN helps. Significant associated medical issues include GERD (CHIEF COMPLAINT: started several months ago with increased belching; reflux).     Review of Systems   Constitutional:  Positive for weight loss (losing weight; has not eaten much in the past 4-5 days with having diarrhea and vomiting after CT with contrast, also has changed diet due to recent pre-diabetes diagnosis). Negative for appetite change, chills, fatigue and fever.   HENT:  Positive for congestion (history of chronic sinus issues). Negative for sore throat and trouble swallowing.    Respiratory:  Negative for cough, choking and shortness of breath.    Cardiovascular:  Positive for chest pain (described as intermittent discomfort, relieved with belching; woke him up from sleeping this past weekend; denies currently).   Gastrointestinal:  Positive for abdominal pain, diarrhea (started after CT scan with contrast; none since 6/25/2023), nausea (none since 6/25/2023) and vomiting (none since 6/25/2023). Negative for anal bleeding, blood in stool, constipation, dysphagia, hematemesis, hematochezia, jaundice, melena and rectal pain.   Genitourinary:  Negative for difficulty urinating, dysuria and flank pain.   Neurological:  Negative for weakness.       Past Medical History:   Diagnosis Date    Psoriasis      History reviewed. No pertinent surgical history.  Family History   Problem Relation  Age of Onset    Colon cancer Neg Hx     Crohn's disease Neg Hx     Esophageal cancer Neg Hx     Stomach cancer Neg Hx     Ulcerative colitis Neg Hx     Liver disease Neg Hx      Social History     Tobacco Use    Smoking status: Former     Types: Cigarettes     Quit date: 5/27/2013     Years since quitting: 10.0    Smokeless tobacco: Never   Substance Use Topics    Alcohol use: Yes     Comment: not a weekly basis     Wt Readings from Last 10 Encounters:   06/28/23 88 kg (194 lb 0.1 oz)     Lab Results   Component Value Date    WBC 7.0 05/19/2023    HGB 15.5 05/19/2023    HCT 47.0 05/19/2023     05/19/2023     Lab Results   Component Value Date    TSH 0.646 05/19/2023     Objective:      Physical Exam  Vitals and nursing note reviewed.   Constitutional:       General: He is not in acute distress.     Appearance: Normal appearance. He is well-developed. He is not diaphoretic.   HENT:      Mouth/Throat:      Lips: Pink. No lesions.      Mouth: Mucous membranes are moist. No oral lesions.      Tongue: No lesions.      Pharynx: Oropharynx is clear. No pharyngeal swelling or posterior oropharyngeal erythema.   Eyes:      General: No scleral icterus.     Conjunctiva/sclera: Conjunctivae normal.   Pulmonary:      Effort: Pulmonary effort is normal. No respiratory distress.      Breath sounds: Normal breath sounds. No wheezing.   Abdominal:      General: Bowel sounds are normal. There is no distension or abdominal bruit.      Palpations: Abdomen is soft. Abdomen is not rigid. There is no mass.      Tenderness: There is no abdominal tenderness. There is no guarding or rebound. Negative signs include Olmos's sign and McBurney's sign.   Skin:     General: Skin is warm and dry.      Coloration: Skin is not jaundiced or pale.      Findings: No erythema or rash.   Neurological:      Mental Status: He is alert and oriented to person, place, and time.   Psychiatric:         Behavior: Behavior normal.         Thought Content:  Thought content normal.         Judgment: Judgment normal.       Assessment:       1. Epigastric pain    2. Nonspecific chest pain    3. Heartburn    4. Belching    5. History of nausea and vomiting    6. History of diarrhea    7. History of nasal congestion    8. Screening for colon cancer    9. Abnormal CT scan, gastrointestinal tract    10. Gastric wall thickening        Plan:       Epigastric pain, Abnormal Ct scan, gastrointestinal tract, & Gastric wall thickening  -   INCREASE TO  sucralfate (CARAFATE) 1 gram tablet; Take 1 tablet (1 g total) by mouth 4 (four) times daily before meals and nightly.  Dispense: 120 tablet; Refill: 0  - CONTINUE PRILOSEC 40 MG ONCE DAILY AS DIRECTED  - avoid/minimize use of NSAIDs- since they can cause GI upset, bleeding and/or ulcers. If NSAID must be taken, recommend take with food.  - schedule EGD, discussed procedure with patient, including risks and benefits, patient verbalized understanding    Nonspecific chest pain  - schedule EGD, discussed procedure with patient, including risks and benefits, patient verbalized understanding  - follow-up with PCP &/or cardiologist for continued evaluation and management ASAP  - if experiencing symptoms of headache, chest pain, shortness of breath, and/or blurred vision, recommend going to ER for further evaluation and management    Heartburn & Belching  - schedule EGD, discussed procedure with patient, including risks and benefits, patient verbalized understanding  -   INCREASE TO  sucralfate (CARAFATE) 1 gram tablet; Take 1 tablet (1 g total) by mouth 4 (four) times daily before meals and nightly.  Dispense: 120 tablet; Refill: 0  - CONTINUE PRILOSEC 40 MG ONCE DAILY AS DIRECTED    History of nausea and vomiting  - schedule EGD, discussed procedure with patient, including risks and benefits, patient verbalized understanding  - CONTINUE ZOFRAN PRN AS DIRECTED FOR NAUSEA    History of diarrhea  - recommend OTC probiotic, such as Florastor  or Culturelle, taken as directed on packaging  - avoid lactose, alcohol, & caffeine  - avoid known triggers  - if diarrhea recurs, recommend follow-up for continued evaluation and management, to include stool studies    History of nasal congestion  Recommend follow-up with Primary Care Provider/ENT for continued evaluation and management.    Screening for colon cancer  - schedule Colonoscopy, discussed procedure with the patient, including risks and benefits, patient verbalized understanding    Follow up in about 1 month (around 7/28/2023), or if symptoms worsen or fail to improve.      If no improvement in symptoms or symptoms worsen, call/follow-up at clinic or go to ER.        45 minutes of total time spent on the encounter, which includes face to face time and non-face to face time preparing to see the patient (e.g., review of tests), Obtaining and/or reviewing separately obtained history, Documenting clinical information in the electronic or other health record, Independently interpreting results (not separately reported) and communicating results to the patient/family/caregiver, or Care coordination (not separately reported).

## 2023-07-13 DIAGNOSIS — R12 HEARTBURN: Primary | ICD-10-CM

## 2023-07-13 RX ORDER — OMEPRAZOLE 40 MG/1
40 CAPSULE, DELAYED RELEASE ORAL DAILY
Qty: 90 CAPSULE | Refills: 0 | Status: SHIPPED | OUTPATIENT
Start: 2023-07-13 | End: 2023-10-31 | Stop reason: ALTCHOICE

## 2023-07-13 NOTE — TELEPHONE ENCOUNTER
----- Message from Debbie Bebo sent at 7/13/2023 12:06 PM CDT -----  Regarding: Needs return call  Type: Needs Medical Advice  Who Called:  Danish    [id number] - [place] [address] AT [facility] [address] [facility], [facility] [address]  [address] [address] [address]  Phone: [phone] Fax: [phone]      Best Call Back Number: [phone]    Additional Information: Pt needs a script refill for omeprazole (PRILOSEC) 40 MG capsule       Sig - Route: Take 40 mg by mouth once daily. - Oral   Class: Historical Med     please call to christopher.

## 2023-07-24 ENCOUNTER — TELEPHONE (OUTPATIENT)
Dept: GASTROENTEROLOGY | Facility: CLINIC | Age: 63
End: 2023-07-24
Payer: COMMERCIAL

## 2023-07-24 NOTE — TELEPHONE ENCOUNTER
----- Message from eCly Dean sent at 7/24/2023 11:28 AM CDT -----  Type: Needs Medical Advice  Who Called:  pt  Best Call Back Number: [phone]    Additional Information: needs some information on last office visit, since then his symptoms have gotten worse. Please call back to advise. Thanks

## 2023-07-24 NOTE — TELEPHONE ENCOUNTER
Returned call to the patient, patient states that he is no better since he was last seen and continues to lose weight, patient states that he is having difficulty getting his insurance to pay for his medications as PA's have not praful completed, offered to set up an appointment with the patient, patient states that the appointment offered was only 2 days prior than his scope, patient demanded to have a call returned by a NP or physician and declined to set up the appointment.

## 2023-07-24 NOTE — TELEPHONE ENCOUNTER
Tell pt continue medications; EGD as scheduled 8/3.  If symptoms progressing in interim, recommend ER evaluation.

## 2023-07-24 NOTE — TELEPHONE ENCOUNTER
Called and notified patient of the message below from Dr. Marvin, patient verbalized understanding of this.    Per Dr. Marvin- continue medications; EGD as scheduled 8/3.  If symptoms progressing in interim, recommend ER evaluation.

## 2023-08-03 ENCOUNTER — ANESTHESIA EVENT (OUTPATIENT)
Dept: ENDOSCOPY | Facility: HOSPITAL | Age: 63
End: 2023-08-03
Payer: COMMERCIAL

## 2023-08-03 ENCOUNTER — HOSPITAL ENCOUNTER (OUTPATIENT)
Facility: HOSPITAL | Age: 63
Discharge: HOME | End: 2023-08-03
Attending: INTERNAL MEDICINE | Admitting: INTERNAL MEDICINE
Payer: COMMERCIAL

## 2023-08-03 ENCOUNTER — ANESTHESIA (OUTPATIENT)
Dept: ENDOSCOPY | Facility: HOSPITAL | Age: 63
End: 2023-08-03
Payer: COMMERCIAL

## 2023-08-03 VITALS
DIASTOLIC BLOOD PRESSURE: 73 MMHG | HEART RATE: 76 BPM | HEIGHT: 71 IN | RESPIRATION RATE: 16 BRPM | WEIGHT: 164 LBS | OXYGEN SATURATION: 100 % | SYSTOLIC BLOOD PRESSURE: 130 MMHG | BODY MASS INDEX: 22.96 KG/M2 | TEMPERATURE: 98 F

## 2023-08-03 DIAGNOSIS — R10.13 EPIGASTRIC PAIN: ICD-10-CM

## 2023-08-03 PROCEDURE — 27201012 HC FORCEPS, HOT/COLD, DISP: Mod: PO | Performed by: INTERNAL MEDICINE

## 2023-08-03 PROCEDURE — D9220A PRA ANESTHESIA: Mod: ANES,,, | Performed by: ANESTHESIOLOGY

## 2023-08-03 PROCEDURE — 37000009 HC ANESTHESIA EA ADD 15 MINS: Mod: PO | Performed by: INTERNAL MEDICINE

## 2023-08-03 PROCEDURE — 88342 IMHCHEM/IMCYTCHM 1ST ANTB: CPT | Mod: 26,,, | Performed by: PATHOLOGY

## 2023-08-03 PROCEDURE — D9220A PRA ANESTHESIA: ICD-10-PCS | Mod: ANES,,, | Performed by: ANESTHESIOLOGY

## 2023-08-03 PROCEDURE — 88312 PR  SPECIAL STAINS,GROUP I: ICD-10-PCS | Mod: 26,,, | Performed by: PATHOLOGY

## 2023-08-03 PROCEDURE — D9220A PRA ANESTHESIA: Mod: CRNA,,, | Performed by: NURSE ANESTHETIST, CERTIFIED REGISTERED

## 2023-08-03 PROCEDURE — 88305 TISSUE EXAM BY PATHOLOGIST: CPT | Mod: 26,,, | Performed by: PATHOLOGY

## 2023-08-03 PROCEDURE — 88342 CHG IMMUNOCYTOCHEMISTRY: ICD-10-PCS | Mod: 26,,, | Performed by: PATHOLOGY

## 2023-08-03 PROCEDURE — 88341 IMHCHEM/IMCYTCHM EA ADD ANTB: CPT | Mod: 59,PO | Performed by: PATHOLOGY

## 2023-08-03 PROCEDURE — 63600175 PHARM REV CODE 636 W HCPCS: Mod: PO | Performed by: INTERNAL MEDICINE

## 2023-08-03 PROCEDURE — 43239 EGD BIOPSY SINGLE/MULTIPLE: CPT | Mod: 74,PO | Performed by: INTERNAL MEDICINE

## 2023-08-03 PROCEDURE — 43239 PR EGD, FLEX, W/BIOPSY, SGL/MULTI: ICD-10-PCS | Mod: 52,,, | Performed by: INTERNAL MEDICINE

## 2023-08-03 PROCEDURE — 88312 SPECIAL STAINS GROUP 1: CPT | Mod: PO | Performed by: PATHOLOGY

## 2023-08-03 PROCEDURE — D9220A PRA ANESTHESIA: ICD-10-PCS | Mod: CRNA,,, | Performed by: NURSE ANESTHETIST, CERTIFIED REGISTERED

## 2023-08-03 PROCEDURE — 63600175 PHARM REV CODE 636 W HCPCS: Mod: PO | Performed by: NURSE ANESTHETIST, CERTIFIED REGISTERED

## 2023-08-03 PROCEDURE — 88312 SPECIAL STAINS GROUP 1: CPT | Mod: 26,,, | Performed by: PATHOLOGY

## 2023-08-03 PROCEDURE — 88305 TISSUE EXAM BY PATHOLOGIST: ICD-10-PCS | Mod: 26,,, | Performed by: PATHOLOGY

## 2023-08-03 PROCEDURE — 88305 TISSUE EXAM BY PATHOLOGIST: CPT | Mod: PO | Performed by: PATHOLOGY

## 2023-08-03 PROCEDURE — 88341 IMHCHEM/IMCYTCHM EA ADD ANTB: CPT | Mod: 26,,, | Performed by: PATHOLOGY

## 2023-08-03 PROCEDURE — 43239 EGD BIOPSY SINGLE/MULTIPLE: CPT | Mod: 52,,, | Performed by: INTERNAL MEDICINE

## 2023-08-03 PROCEDURE — 88341 PR IHC OR ICC EACH ADD'L SINGLE ANTIBODY  STAINPR: ICD-10-PCS | Mod: 26,,, | Performed by: PATHOLOGY

## 2023-08-03 PROCEDURE — 88342 IMHCHEM/IMCYTCHM 1ST ANTB: CPT | Mod: PO | Performed by: PATHOLOGY

## 2023-08-03 PROCEDURE — 37000008 HC ANESTHESIA 1ST 15 MINUTES: Mod: PO | Performed by: INTERNAL MEDICINE

## 2023-08-03 PROCEDURE — 25000003 PHARM REV CODE 250: Mod: PO | Performed by: NURSE ANESTHETIST, CERTIFIED REGISTERED

## 2023-08-03 RX ORDER — SODIUM CHLORIDE 0.9 % (FLUSH) 0.9 %
10 SYRINGE (ML) INJECTION
Status: DISCONTINUED | OUTPATIENT
Start: 2023-08-03 | End: 2023-08-03 | Stop reason: HOSPADM

## 2023-08-03 RX ORDER — SODIUM CHLORIDE, SODIUM LACTATE, POTASSIUM CHLORIDE, CALCIUM CHLORIDE 600; 310; 30; 20 MG/100ML; MG/100ML; MG/100ML; MG/100ML
INJECTION, SOLUTION INTRAVENOUS CONTINUOUS
Status: DISCONTINUED | OUTPATIENT
Start: 2023-08-03 | End: 2023-08-03 | Stop reason: HOSPADM

## 2023-08-03 RX ORDER — LIDOCAINE HYDROCHLORIDE 20 MG/ML
INJECTION INTRAVENOUS
Status: DISCONTINUED | OUTPATIENT
Start: 2023-08-03 | End: 2023-08-03

## 2023-08-03 RX ORDER — PROPOFOL 10 MG/ML
VIAL (ML) INTRAVENOUS
Status: DISCONTINUED | OUTPATIENT
Start: 2023-08-03 | End: 2023-08-03

## 2023-08-03 RX ADMIN — PROPOFOL 40 MG: 10 INJECTION, EMULSION INTRAVENOUS at 12:08

## 2023-08-03 RX ADMIN — LIDOCAINE HYDROCHLORIDE 75 MG: 20 INJECTION INTRAVENOUS at 12:08

## 2023-08-03 RX ADMIN — SODIUM CHLORIDE, POTASSIUM CHLORIDE, SODIUM LACTATE AND CALCIUM CHLORIDE: 600; 310; 30; 20 INJECTION, SOLUTION INTRAVENOUS at 12:08

## 2023-08-03 RX ADMIN — PROPOFOL 120 MG: 10 INJECTION, EMULSION INTRAVENOUS at 12:08

## 2023-08-03 NOTE — H&P
History & Physical - Short Stay  Gastroenterology      SUBJECTIVE:     Procedure: EGD    Chief Complaint/Indication for Procedure: Epigastric Pain and Reflux    PTA Medications   Medication Sig    omeprazole (PRILOSEC) 40 MG capsule Take 1 capsule (40 mg total) by mouth once daily.    sucralfate (CARAFATE) 1 gram tablet Take 1 tablet (1 g total) by mouth 4 (four) times daily before meals and nightly.    ondansetron (ZOFRAN-ODT) 8 MG TbDL Take 8 mg by mouth 3 (three) times daily.       Review of patient's allergies indicates:   Allergen Reactions    Penicillins Rash        Past Medical History:   Diagnosis Date    Psoriasis      History reviewed. No pertinent surgical history.  Family History   Problem Relation Age of Onset    Colon cancer Neg Hx     Crohn's disease Neg Hx     Esophageal cancer Neg Hx     Stomach cancer Neg Hx     Ulcerative colitis Neg Hx     Liver disease Neg Hx      Social History     Tobacco Use    Smoking status: Former     Current packs/day: 0.00     Types: Cigarettes     Quit date: 5/27/2013     Years since quitting: 10.1    Smokeless tobacco: Never   Substance Use Topics    Alcohol use: Yes     Comment: not a weekly basis         OBJECTIVE:     Vital Signs (Most Recent)       Physical Exam:                                                       GENERAL:  Comfortable, in no acute distress.                                 HEENT EXAM:  Nonicteric.  No adenopathy.  Oropharynx is clear.               NECK:  Supple.                                                               LUNGS:  Clear.                                                               CARDIAC:  Regular rate and rhythm.  S1, S2.  No murmur.                      ABDOMEN:  Soft, positive bowel sounds, nontender.  No hepatosplenomegaly or masses.  No rebound or guarding.                                             EXTREMITIES:  No edema.     MENTAL STATUS:  Normal, alert and oriented.      ASSESSMENT/PLAN:     Assessment: Epigastric  Pain and Reflux    Plan: EGD    Anesthesia Plan: General    ASA Grade: ASA 2 - Patient with mild systemic disease with no functional limitations    MALLAMPATI SCORE:  I (soft palate, uvula, fauces, and tonsillar pillars visible)

## 2023-08-03 NOTE — PROVATION PATIENT INSTRUCTIONS
Discharge Summary/Instructions after an Endoscopic Procedure  Patient Name: Danish Valladares  Patient MRN: [id number]  Patient YOB: 1960  Thursday, August 3, 2023  Loco Marvin MD  Dear patient,  As a result of recent federal legislation (The Federal Cures Act), you may   receive lab or pathology results from your procedure in your [facility]   account before your physician is able to contact you. Your physician or   their representative will relay the results to you with their   recommendations at their soonest availability.  Thank you,  RESTRICTIONS:  During your procedure today, you received medications for sedation.  These   medications may affect your judgment, balance and coordination.  Therefore,   for 24 hours, you have the following restrictions:   - DO NOT drive a car, operate machinery, make legal/financial decisions,   sign important papers or drink alcohol.    ACTIVITY:  Today: no heavy lifting, straining or running due to procedural   sedation/anesthesia.  The following day: return to full activity including work.  DIET:  Eat and drink normally unless instructed otherwise.     TREATMENT FOR COMMON SIDE EFFECTS:  - Mild abdominal pain, nausea, belching, bloating or excessive gas:  rest,   eat lightly and use a heating pad.  - Sore Throat: treat with throat lozenges and/or gargle with warm salt   water.  - Because air was used during the procedure, expelling large amounts of air   from your rectum or belching is normal.  - If a bowel prep was taken, you may not have a bowel movement for 1-3 days.    This is normal.  SYMPTOMS TO WATCH FOR AND REPORT TO YOUR PHYSICIAN:  1. Abdominal pain or bloating, other than gas cramps.  2. Chest pain.  3. Back pain.  4. Signs of infection such as: chills or fever occurring within 24 hours   after the procedure.  5. Rectal bleeding, which would show as bright red, maroon, or black stools.   (A tablespoon of blood from the rectum is not serious, especially  if   hemorrhoids are present.)  6. Vomiting.  7. Weakness or dizziness.  GO DIRECTLY TO THE NEAREST EMERGENCY ROOM IF YOU HAVE ANY OF THE FOLLOWING:      Difficulty breathing              Chills and/or fever over 101 F   Persistent vomiting and/or vomiting blood   Severe abdominal pain   Severe chest pain   Black, tarry stools   Bleeding- more than one tablespoon   Any other symptom or condition that you feel may need urgent attention  Your doctor recommends these additional instructions:  If any biopsies were taken, your doctors clinic will contact you in 1 to 2   weeks with any results.  We are waiting for your pathology results.   Continue your present medications.   You are being discharged to home.  For questions, problems or results please call your physician - Loco Marvin MD at Work:  [phone].  EMERGENCY PHONE NUMBER: [phone], LAB RESULTS: [phone]  IF A COMPLICATION OR EMERGENCY SITUATION ARISES AND YOU ARE UNABLE TO REACH   YOUR PHYSICIAN - GO DIRECTLY TO THE EMERGENCY ROOM.  ___________________________________________  Nurse Signature  ___________________________________________  Patient/Designated Responsible Party Signature  Loco Marvin MD  8/3/2023 1:07:55 PM  This report has been verified and signed electronically.  Dear patient,  As a result of recent federal legislation (The Federal Cures Act), you may   receive lab or pathology results from your procedure in your [facility]   account before your physician is able to contact you. Your physician or   their representative will relay the results to you with their   recommendations at their soonest availability.  Thank you.  PROVATION

## 2023-08-03 NOTE — TRANSFER OF CARE
"Anesthesia Transfer of Care Note    Patient: Danish Valladares    Procedure(s) Performed: Procedure(s) (LRB):  EGD (ESOPHAGOGASTRODUODENOSCOPY) (N/A)    Patient location: PACU    Anesthesia Type: general    Transport from OR: Transported from OR on room air with adequate spontaneous ventilation    Post pain: adequate analgesia    Post assessment: no apparent anesthetic complications    Post vital signs: stable    Level of consciousness: awake and sedated    Nausea/Vomiting: no nausea/vomiting    Complications: none    Transfer of care protocol was followed      Last vitals:   Visit Vitals  /66   Pulse 74   Temp 36.5 °C (97.7 °F) (Skin)   Resp 13   Ht 5' 11" (1.803 m)   Wt 74.4 kg (164 lb)   SpO2 98%   BMI 22.87 kg/m²     "

## 2023-08-03 NOTE — ANESTHESIA POSTPROCEDURE EVALUATION
Anesthesia Post Evaluation    Patient: Danish Valladares    Procedure(s) Performed: Procedure(s) (LRB):  EGD (ESOPHAGOGASTRODUODENOSCOPY) (N/A)    Final Anesthesia Type: general      Patient location during evaluation: PACU  Patient participation: Yes- Able to Participate  Level of consciousness: awake and alert and oriented  Post-procedure vital signs: reviewed and stable  Pain management: adequate  Airway patency: patent    PONV status at discharge: No PONV  Anesthetic complications: no      Cardiovascular status: blood pressure returned to baseline  Respiratory status: unassisted, spontaneous ventilation and room air  Hydration status: euvolemic  Follow-up not needed.          Vitals Value Taken Time   /73 08/03/23 1345   Temp 36.5 °C (97.7 °F) 08/03/23 1305   Pulse 76 08/03/23 1345   Resp 16 08/03/23 1345   SpO2 100 % 08/03/23 1345         Event Time   Out of Recovery 13:45:29         Pain/Feliz Score: Feliz Score: 8 (8/3/2023  1:13 PM)

## 2023-08-03 NOTE — ANESTHESIA PREPROCEDURE EVALUATION
08/03/2023  Danish Valladares is a 62 y.o., male.      Pre-op Assessment    I have reviewed the NPO Status.   I have reviewed the Medications.     Review of Systems  Anesthesia Hx:  No problems with previous Anesthesia    Social:  Non-Smoker    Cardiovascular:   Exercise tolerance: good    Pulmonary:  Pulmonary Normal    Hepatic/GI:   Bloating/weight loss   Neurological:  Neurology Normal    Endocrine:  Endocrine Normal        Physical Exam  General: Well nourished    Airway:  Mallampati: II   Mouth Opening: Normal  TM Distance: Normal  Tongue: Normal  Neck ROM: Normal ROM    Dental:  Intact    Chest/Lungs:  Clear to auscultation, Normal Respiratory Rate        Anesthesia Plan  Type of Anesthesia, risks & benefits discussed:    Anesthesia Type: Gen Natural Airway  Intra-op Monitoring Plan: Standard ASA Monitors  Induction:  IV  Informed Consent: Informed consent signed with the Patient and all parties understand the risks and agree with anesthesia plan.  All questions answered.   ASA Score: 2    Ready For Surgery From Anesthesia Perspective.     .

## 2023-08-03 NOTE — DISCHARGE SUMMARY
[facility]  Discharge Note  Short Stay    Discharge Note  Short Stay      SUMMARY     Admit Date: 8/3/2023    Attending Physician: Loco Marvin MD     Discharge Physician: Loco Marvin MD    Discharge Date: 8/3/2023 1:08 PM    Final Diagnosis: Epigastric pain [R10.13]  Belching [R14.2]  Gastroesophageal reflux disease, unspecified whether esophagitis present [K21.9]    Disposition: HOME OR SELF CARE    Patient Instructions:   Current Discharge Medication List        CONTINUE these medications which have NOT CHANGED    Details   omeprazole (PRILOSEC) 40 MG capsule Take 1 capsule (40 mg total) by mouth once daily.  Qty: 90 capsule, Refills: 0    Associated Diagnoses: Heartburn      sucralfate (CARAFATE) 1 gram tablet Take 1 tablet (1 g total) by mouth 4 (four) times daily before meals and nightly.  Qty: 120 tablet, Refills: 0    Associated Diagnoses: Epigastric pain      ondansetron (ZOFRAN-ODT) 8 MG TbDL Take 8 mg by mouth 3 (three) times daily.             Discharge Procedure Orders (must include Diet, Follow-up, Activity)    Follow Up:  Follow up with PCP as previously scheduled  Resume routine diet.  Activity as tolerated.    No driving day of procedure.

## 2023-08-07 DIAGNOSIS — R10.13 EPIGASTRIC PAIN: ICD-10-CM

## 2023-08-07 RX ORDER — SUCRALFATE 1 G/1
TABLET ORAL
Qty: 360 TABLET | Refills: 0 | Status: ON HOLD | OUTPATIENT
Start: 2023-08-07 | End: 2023-09-25 | Stop reason: HOSPADM

## 2023-08-09 ENCOUNTER — PATIENT MESSAGE (OUTPATIENT)
Dept: ENDOSCOPY | Facility: HOSPITAL | Age: 63
End: 2023-08-09
Payer: COMMERCIAL

## 2023-08-09 NOTE — TELEPHONE ENCOUNTER
OK to give work excuse for this week. Any additional time off will need to be done through PCP. OTC laxative of choice for constipation. Gastric biopsies are still pending.

## 2023-08-10 NOTE — TELEPHONE ENCOUNTER
[place] [facility] [place] for [place], where if he needs surgery can be done there.

## 2023-08-14 ENCOUNTER — TELEPHONE (OUTPATIENT)
Dept: GASTROENTEROLOGY | Facility: CLINIC | Age: 63
End: 2023-08-14
Payer: COMMERCIAL

## 2023-08-14 DIAGNOSIS — C16.9 MALIGNANT NEOPLASM OF STOMACH, UNSPECIFIED LOCATION: Primary | ICD-10-CM

## 2023-08-14 LAB
FINAL PATHOLOGIC DIAGNOSIS: ABNORMAL
GROSS: ABNORMAL
Lab: ABNORMAL
MICROSCOPIC EXAM: ABNORMAL
SUPPLEMENTAL DIAGNOSIS: ABNORMAL

## 2023-08-14 NOTE — TELEPHONE ENCOUNTER
----- Message from Jd Cruz sent at 8/14/2023  8:06 AM CDT -----  Type: Needs Medical Advice  Who Called:  pt  Symptoms (please be specific):  pt said he need to speak to the dr--said he sure he knows what it's about--said he been trying to get in touch with him all last week and he need a plan of action today--please call and advise  Best Call Back Number: [phone]    Additional Information: thank you

## 2023-08-14 NOTE — TELEPHONE ENCOUNTER
I informed pt of gastric biopsy results, carcinoma (which he was already aware via the portal).  Needs referral [place] [facility] [place], Ash Barros, and also referral to Hem/Onc (Lucina).  Instructed pt to go to [facility] in interim if needed. Pt and pt's wife understand and in agreement.

## 2023-08-14 NOTE — TELEPHONE ENCOUNTER
Spoke with pt. Canceled c-scope. Pt informed referrals have been placed to hem/onc & to Dr. Barros's office. Pt informed to go to ER if he cannot be seen by Wednesday. Pt verbalized understanding to all.

## 2023-08-14 NOTE — TELEPHONE ENCOUNTER
----- Message from Casi Nunez sent at 8/14/2023  9:12 AM CDT -----  Type: Needs Medical Advice  Who Called:  pt     Best Call Back Number: [phone]'   Additional Information: pt wants to cancel procedure please advise

## 2023-08-15 ENCOUNTER — TELEPHONE (OUTPATIENT)
Dept: SURGERY | Facility: CLINIC | Age: 63
End: 2023-08-15
Payer: COMMERCIAL

## 2023-08-16 ENCOUNTER — HOSPITAL ENCOUNTER (INPATIENT)
Facility: HOSPITAL | Age: 63
LOS: 16 days | Discharge: HOME HEALTH | DRG: 326 | End: 2023-09-01
Attending: EMERGENCY MEDICINE | Admitting: SURGERY
Payer: COMMERCIAL

## 2023-08-16 DIAGNOSIS — C16.9 GASTRIC ADENOCARCINOMA: Primary | ICD-10-CM

## 2023-08-16 DIAGNOSIS — C16.9 GASTRIC CARCINOMA: ICD-10-CM

## 2023-08-16 DIAGNOSIS — R11.10 EMESIS: ICD-10-CM

## 2023-08-16 LAB
ALBUMIN SERPL BCP-MCNC: 4.1 G/DL (ref 3.5–5.2)
ALP SERPL-CCNC: 45 U/L (ref 55–135)
ALT SERPL W/O P-5'-P-CCNC: 39 U/L (ref 10–44)
ANION GAP SERPL CALC-SCNC: 19 MMOL/L (ref 8–16)
AST SERPL-CCNC: 23 U/L (ref 10–40)
BASOPHILS # BLD AUTO: 0.04 K/UL (ref 0–0.2)
BASOPHILS NFR BLD: 0.5 % (ref 0–1.9)
BILIRUB SERPL-MCNC: 1.5 MG/DL (ref 0.1–1)
BUN BLD-MCNC: 24 MG/DL (ref 4–21)
BUN SERPL-MCNC: 23 MG/DL (ref 8–23)
BUN SERPL-MCNC: 24 MG/DL (ref 6–30)
CALCIUM SERPL-MCNC: 9.7 MG/DL (ref 8.7–10.5)
CANCER AG19-9 SERPL-ACNC: 15.4 U/ML (ref 0–40)
CEA SERPL-MCNC: 2.5 NG/ML (ref 0–5)
CHLORIDE SERPL-SCNC: 84 MMOL/L (ref 95–110)
CHLORIDE SERPL-SCNC: 85 MMOL/L (ref 95–110)
CHLORIDE: 84 MMOL/L
CO2 SERPL-SCNC: 33 MMOL/L
CO2 SERPL-SCNC: 35 MMOL/L (ref 23–29)
CREAT SERPL-MCNC: 1.3 MG/DL
CREAT SERPL-MCNC: 1.3 MG/DL (ref 0.5–1.4)
CREAT SERPL-MCNC: 1.3 MG/DL (ref 0.5–1.4)
DIFFERENTIAL METHOD: NORMAL
EOSINOPHIL # BLD AUTO: 0 K/UL (ref 0–0.5)
EOSINOPHIL NFR BLD: 0.3 % (ref 0–8)
ERYTHROCYTE [DISTWIDTH] IN BLOOD BY AUTOMATED COUNT: 12.7 % (ref 11.5–14.5)
EST. GFR  (NO RACE VARIABLE): >60 ML/MIN/1.73 M^2
GLUCOSE SERPL-MCNC: 104 MG/DL (ref 70–110)
GLUCOSE SERPL-MCNC: 109 MG/DL (ref 70–110)
GLUCOSE: 109 MG/DL
HCT VFR BLD AUTO: 50 % (ref 40–54)
HCT VFR BLD CALC: 52 %PCV (ref 36–54)
HCT: 52
HGB BLD-MCNC: 16.7 G/DL (ref 14–18)
IMM GRANULOCYTES # BLD AUTO: 0.02 K/UL (ref 0–0.04)
IMM GRANULOCYTES NFR BLD AUTO: 0.3 % (ref 0–0.5)
IONIZED CALCIUM I-STAT: 1.02
LIPASE SERPL-CCNC: 22 U/L (ref 4–60)
LYMPHOCYTES # BLD AUTO: 1.6 K/UL (ref 1–4.8)
LYMPHOCYTES NFR BLD: 20.8 % (ref 18–48)
MCH RBC QN AUTO: 27.3 PG (ref 27–31)
MCHC RBC AUTO-ENTMCNC: 33.4 G/DL (ref 32–36)
MCV RBC AUTO: 82 FL (ref 82–98)
MONOCYTES # BLD AUTO: 1 K/UL (ref 0.3–1)
MONOCYTES NFR BLD: 13.1 % (ref 4–15)
NEUTROPHILS # BLD AUTO: 4.9 K/UL (ref 1.8–7.7)
NEUTROPHILS NFR BLD: 65 % (ref 38–73)
NRBC BLD-RTO: 0 /100 WBC
PLATELET # BLD AUTO: 264 K/UL (ref 150–450)
PMV BLD AUTO: 10.7 FL (ref 9.2–12.9)
POC IONIZED CALCIUM: 1.02 MMOL/L (ref 1.06–1.42)
POC TCO2 (MEASURED): 33 MMOL/L (ref 23–29)
POTASSIUM BLD-SCNC: 3 MMOL/L (ref 3.5–5.1)
POTASSIUM SERPL-SCNC: 3.2 MMOL/L (ref 3.5–5.1)
POTASSIUM: 3 MMOL/L
PREALB SERPL-MCNC: 20 MG/DL (ref 20–43)
PROT SERPL-MCNC: 7.4 G/DL (ref 6–8.4)
RBC # BLD AUTO: 6.12 M/UL (ref 4.6–6.2)
SAMPLE: ABNORMAL
SODIUM BLD-SCNC: 134 MMOL/L (ref 136–145)
SODIUM SERPL-SCNC: 139 MMOL/L (ref 136–145)
SODIUM: 134 MMOL/L
WBC # BLD AUTO: 7.53 K/UL (ref 3.9–12.7)

## 2023-08-16 PROCEDURE — 25000003 PHARM REV CODE 250: Performed by: PHYSICIAN ASSISTANT

## 2023-08-16 PROCEDURE — 25000003 PHARM REV CODE 250: Performed by: STUDENT IN AN ORGANIZED HEALTH CARE EDUCATION/TRAINING PROGRAM

## 2023-08-16 PROCEDURE — 85025 COMPLETE CBC W/AUTO DIFF WBC: CPT | Performed by: PHYSICIAN ASSISTANT

## 2023-08-16 PROCEDURE — 63600175 PHARM REV CODE 636 W HCPCS: Performed by: STUDENT IN AN ORGANIZED HEALTH CARE EDUCATION/TRAINING PROGRAM

## 2023-08-16 PROCEDURE — 99285 EMERGENCY DEPT VISIT HI MDM: CPT | Mod: 25

## 2023-08-16 PROCEDURE — 63600175 PHARM REV CODE 636 W HCPCS: Performed by: PHYSICIAN ASSISTANT

## 2023-08-16 PROCEDURE — 25500020 PHARM REV CODE 255: Performed by: SURGERY

## 2023-08-16 PROCEDURE — 84134 ASSAY OF PREALBUMIN: CPT | Performed by: STUDENT IN AN ORGANIZED HEALTH CARE EDUCATION/TRAINING PROGRAM

## 2023-08-16 PROCEDURE — 94761 N-INVAS EAR/PLS OXIMETRY MLT: CPT

## 2023-08-16 PROCEDURE — 20600001 HC STEP DOWN PRIVATE ROOM

## 2023-08-16 PROCEDURE — 80053 COMPREHEN METABOLIC PANEL: CPT | Performed by: PHYSICIAN ASSISTANT

## 2023-08-16 PROCEDURE — 96374 THER/PROPH/DIAG INJ IV PUSH: CPT

## 2023-08-16 PROCEDURE — 93010 ELECTROCARDIOGRAM REPORT: CPT | Mod: ,,, | Performed by: INTERNAL MEDICINE

## 2023-08-16 PROCEDURE — 83690 ASSAY OF LIPASE: CPT | Performed by: PHYSICIAN ASSISTANT

## 2023-08-16 PROCEDURE — 96361 HYDRATE IV INFUSION ADD-ON: CPT

## 2023-08-16 PROCEDURE — 82378 CARCINOEMBRYONIC ANTIGEN: CPT | Performed by: STUDENT IN AN ORGANIZED HEALTH CARE EDUCATION/TRAINING PROGRAM

## 2023-08-16 PROCEDURE — 93010 EKG 12-LEAD: ICD-10-PCS | Mod: ,,, | Performed by: INTERNAL MEDICINE

## 2023-08-16 PROCEDURE — 86301 IMMUNOASSAY TUMOR CA 19-9: CPT | Performed by: STUDENT IN AN ORGANIZED HEALTH CARE EDUCATION/TRAINING PROGRAM

## 2023-08-16 PROCEDURE — 80047 BASIC METABLC PNL IONIZED CA: CPT

## 2023-08-16 PROCEDURE — 93005 ELECTROCARDIOGRAM TRACING: CPT

## 2023-08-16 RX ORDER — ONDANSETRON 2 MG/ML
8 INJECTION INTRAMUSCULAR; INTRAVENOUS EVERY 6 HOURS PRN
Status: DISCONTINUED | OUTPATIENT
Start: 2023-08-16 | End: 2023-08-16

## 2023-08-16 RX ORDER — FAMOTIDINE 10 MG/ML
20 INJECTION INTRAVENOUS 2 TIMES DAILY
Status: DISCONTINUED | OUTPATIENT
Start: 2023-08-16 | End: 2023-09-01 | Stop reason: HOSPADM

## 2023-08-16 RX ORDER — SODIUM CHLORIDE 0.9 % (FLUSH) 0.9 %
10 SYRINGE (ML) INJECTION
Status: DISCONTINUED | OUTPATIENT
Start: 2023-08-16 | End: 2023-09-01 | Stop reason: HOSPADM

## 2023-08-16 RX ORDER — ONDANSETRON 2 MG/ML
4 INJECTION INTRAMUSCULAR; INTRAVENOUS
Status: COMPLETED | OUTPATIENT
Start: 2023-08-16 | End: 2023-08-16

## 2023-08-16 RX ORDER — POTASSIUM CHLORIDE 7.45 MG/ML
10 INJECTION INTRAVENOUS
Status: DISPENSED | OUTPATIENT
Start: 2023-08-16 | End: 2023-08-16

## 2023-08-16 RX ORDER — LIDOCAINE HYDROCHLORIDE 10 MG/ML
1 INJECTION, SOLUTION EPIDURAL; INFILTRATION; INTRACAUDAL; PERINEURAL ONCE AS NEEDED
Status: DISCONTINUED | OUTPATIENT
Start: 2023-08-16 | End: 2023-09-01 | Stop reason: HOSPADM

## 2023-08-16 RX ORDER — POTASSIUM CHLORIDE 7.45 MG/ML
INJECTION INTRAVENOUS
Status: DISPENSED
Start: 2023-08-16 | End: 2023-08-17

## 2023-08-16 RX ORDER — PROCHLORPERAZINE EDISYLATE 5 MG/ML
5 INJECTION INTRAMUSCULAR; INTRAVENOUS EVERY 4 HOURS PRN
Status: DISCONTINUED | OUTPATIENT
Start: 2023-08-16 | End: 2023-09-01 | Stop reason: HOSPADM

## 2023-08-16 RX ORDER — ONDANSETRON 2 MG/ML
4 INJECTION INTRAMUSCULAR; INTRAVENOUS EVERY 6 HOURS PRN
Status: DISCONTINUED | OUTPATIENT
Start: 2023-08-16 | End: 2023-08-16

## 2023-08-16 RX ORDER — FAMOTIDINE 10 MG/ML
INJECTION INTRAVENOUS
Status: COMPLETED
Start: 2023-08-16 | End: 2023-08-18

## 2023-08-16 RX ORDER — ENOXAPARIN SODIUM 100 MG/ML
40 INJECTION SUBCUTANEOUS EVERY 24 HOURS
Status: DISCONTINUED | OUTPATIENT
Start: 2023-08-16 | End: 2023-09-01

## 2023-08-16 RX ORDER — SODIUM CHLORIDE, SODIUM LACTATE, POTASSIUM CHLORIDE, CALCIUM CHLORIDE 600; 310; 30; 20 MG/100ML; MG/100ML; MG/100ML; MG/100ML
INJECTION, SOLUTION INTRAVENOUS CONTINUOUS
Status: DISCONTINUED | OUTPATIENT
Start: 2023-08-16 | End: 2023-08-17

## 2023-08-16 RX ADMIN — FAMOTIDINE 20 MG: 10 INJECTION, SOLUTION INTRAVENOUS at 08:08

## 2023-08-16 RX ADMIN — POTASSIUM CHLORIDE 10 MEQ: 7.46 INJECTION, SOLUTION INTRAVENOUS at 06:08

## 2023-08-16 RX ADMIN — POTASSIUM CHLORIDE 10 MEQ: 7.46 INJECTION, SOLUTION INTRAVENOUS at 08:08

## 2023-08-16 RX ADMIN — SODIUM CHLORIDE 1000 ML: 9 INJECTION, SOLUTION INTRAVENOUS at 11:08

## 2023-08-16 RX ADMIN — IOHEXOL 75 ML: 350 INJECTION, SOLUTION INTRAVENOUS at 05:08

## 2023-08-16 RX ADMIN — SODIUM CHLORIDE, POTASSIUM CHLORIDE, SODIUM LACTATE AND CALCIUM CHLORIDE: 600; 310; 30; 20 INJECTION, SOLUTION INTRAVENOUS at 05:08

## 2023-08-16 RX ADMIN — ENOXAPARIN SODIUM 40 MG: 40 INJECTION SUBCUTANEOUS at 06:08

## 2023-08-16 RX ADMIN — ONDANSETRON 4 MG: 2 INJECTION INTRAMUSCULAR; INTRAVENOUS at 11:08

## 2023-08-16 NOTE — ED NOTES
Patient identifiers verified and correct for   LOC: The patient is awake, alert and aware of environment with an appropriate affect, the patient is oriented x 3 and speaking appropriately.   APPEARANCE: Patient appears comfortable and in no acute distress, patient is clean and well groomed.  SKIN: The skin is warm and dry, color consistent with ethnicity, patient has normal skin turgor and moist mucus membranes, skin intact, no breakdown or bruising noted.   MUSCULOSKELETAL: Patient moving all extremities spontaneously, no swelling noted.  RESPIRATORY: Airway is open and patent, respirations are spontaneous, patient has a normal effort and rate, no accessory muscle use noted, pt placed on continuous pulse ox with O2 sats noted at 97% on room air.  CARDIAC: Pt placed on cardiac monitor. Patient has a normal rate and regular rhythm, no edema noted, capillary refill < 3 seconds.   GASTRO: Soft and non tender to palpation, no distention noted, normoactive bowel sounds present in all four quadrants. Pt states bowel movements have been regular. Pt report nausea and gas.  : Pt denies any pain or frequency with urination.  NEURO: Pt opens eyes spontaneously, behavior appropriate to situation, follows commands, facial expression symmetrical, bilateral hand grasp equal and even, purposeful motor response noted, normal sensation in all extremities when touched with a finger.

## 2023-08-16 NOTE — CONSULTS
[facility] - Emergency Dept  Surgical Oncology  Consult Note    Inpatient consult to Surgical Oncology  Consult performed by: Vijaya Hernandez MD  Consult ordered by: Farhat Thomas III, MD        Subjective:     Chief Complaint/Reason for Admission: abdominal pain    History of Present Illness:   Patient is a 62y M w/ hx of psoriasis who presents to the ER with significant abdominal pain, no bowel movement for over 2 weeks. He has a history of a recent EGD w/ biopsy - path showing diagnosis of gastric carcinoma.   The patient reports he began having intolerance to food early May of this year. This has progressed to him only being able to tolerate some very soft foods or liquids. He reports he vomits about once a day and is unable to control it when it happens. He has had a weight loss of around 30lbs since this started. He has been passing gas about 2x/day but has not had a bowel movement in a month.     He has no other medical problems. No hx of abdominal surgeries. A history of smoking from youth until about 50yrs old - 2 ppd - quit about 10 years ago. No AC, no hx of MI or CVA. Family history of brain cancer in his father. Says he is otherwise an active person and has been able to work up until about a week or two ago.     No current facility-administered medications on file prior to encounter.     Current Outpatient Medications on File Prior to Encounter   Medication Sig    omeprazole (PRILOSEC) 40 MG capsule Take 1 capsule (40 mg total) by mouth once daily.    ondansetron (ZOFRAN-ODT) 8 MG TbDL Take 8 mg by mouth 3 (three) times daily.    sucralfate (CARAFATE) 1 gram tablet TAKE 1 TABLET(1 GRAM) BY MOUTH FOUR TIMES DAILY BEFORE MEALS AND AT NIGHT       Review of patient's allergies indicates:   Allergen Reactions    Penicillins Rash       Past Medical History:   Diagnosis Date    Psoriasis      Past Surgical History:   Procedure Laterality Date    ESOPHAGOGASTRODUODENOSCOPY N/A 8/3/2023    Procedure: EGD  (ESOPHAGOGASTRODUODENOSCOPY);  Surgeon: Loco Marvin MD;  Location: [facility];  Service: Gastroenterology;  Laterality: N/A;    TONSILLECTOMY       Family History    None       Tobacco Use    Smoking status: Former     Current packs/day: 0.00     Types: Cigarettes     Quit date: 5/27/2013     Years since quitting: 10.2    Smokeless tobacco: Never   Substance and Sexual Activity    Alcohol use: Yes     Comment: occ    Drug use: Never    Sexual activity: Yes     Partners: Female     Review of Systems   Constitutional:  Positive for fatigue and unexpected weight change. Negative for appetite change and chills.   HENT: Negative.     Eyes: Negative.    Respiratory: Negative.     Cardiovascular: Negative.    Gastrointestinal:  Positive for abdominal pain, constipation, nausea and vomiting. Negative for abdominal distention, anal bleeding, blood in stool, diarrhea and rectal pain.   Genitourinary: Negative.      Objective:     Vital Signs (Most Recent):  Temp: 98.2 °F (36.8 °C) (08/16/23 1020)  Pulse: 71 (08/16/23 1205)  Resp: 18 (08/16/23 1205)  BP: (!) 142/76 (08/16/23 1205)  SpO2: 98 % (08/16/23 1205) Vital Signs (24h Range):  Temp:  [98.2 °F (36.8 °C)] 98.2 °F (36.8 °C)  Pulse:  [] 71  Resp:  [16-18] 18  SpO2:  [95 %-98 %] 98 %  BP: (132-144)/(59-81) 142/76        There is no height or weight on file to calculate BMI.    No intake or output data in the 24 hours ending 08/16/23 1412    Physical Exam  Constitutional:       General: He is not in acute distress.     Appearance: Normal appearance.   HENT:      Head: Normocephalic and atraumatic.      Mouth/Throat:      Mouth: Mucous membranes are moist.   Eyes:      Pupils: Pupils are equal, round, and reactive to light.   Cardiovascular:      Rate and Rhythm: Normal rate.   Pulmonary:      Effort: Pulmonary effort is normal. No respiratory distress.   Abdominal:      General: Abdomen is flat. There is no distension.      Palpations: Abdomen is soft.    Musculoskeletal:         General: Normal range of motion.      Cervical back: Normal range of motion.   Skin:     General: Skin is warm and dry.   Neurological:      General: No focal deficit present.      Mental Status: He is alert and oriented to person, place, and time.   Psychiatric:         Mood and Affect: Mood normal.         Behavior: Behavior normal.         Significant Labs:  All pertinent labs from the last 24 hours have been reviewed.    Significant Diagnostics:  I have reviewed all pertinent imaging results/findings within the past 24 hours.    Assessment/Plan:     There are no hospital problems to display for this patient.  Patient is a 62 y M w/ no significant past medical history who presents to the ER today with abdominal pain and a recent diagnosis of gastric adenocarcinoma. No bowel movements for several weeks, concern for obstruction.     Admit to surgical oncology  NPO  mIVF  PPI  CT chest abdomen pelvis w/ PO and IV contrast  CEA, CA 19-9  Pre-albumin  EKG  Home meds as appropriate  May plan to place NGT following imaging  DVT ppx  Ambulate, OOBTC    Dispo: floor      Thank you for your consult. I will follow-up with patient. Please contact us if you have any additional questions.    Vijaya Hernandez MD  General Surgery  [facility] - [facility]

## 2023-08-16 NOTE — ED PROVIDER NOTES
Encounter Date: 8/16/2023       History     Chief Complaint   Patient presents with    Abdominal Pain     Weight loss, decreased appetite, has stomach cancer states was told to come in for blockage, been on liquid diet for 2 weeks      Mr. Valladares is a 62 y.o. male w/ PMH of psoriasis who is presenting with abdominal pain and inability to tolerate solid foods and without a bowel movement for last 2 weeks. He states this has been ongoing since early May with progressive intolerance of solid foods and decreased appetite. He reports losing 30-35 pounds since this started, and he now can only tolerate a liquid diet. He states the abdominal pain only occurs later in the day after his stomach has filled from eating. He will also have associated daily nausea and vomiting as the day progresses. He is able to pass flatus, but hasn't had a bowel movement in the last 2 weeks roughly. He saw his GI 2 weeks ago and had an EGD done, but the scope was reportedly unable to pass through the stomach. A biopsy was then done and showed a gastric carcinoma in the antrum of the stomach. He has been referred to Surgical Oncology and Heme/Onc, but he was instructed to come to the ED in the interim as a means to possibly get into surgery. He denies any chest pain, SOB, dizziness, hematemesis, hemoptysis, dysuria, melena, or hematochezia.     The history is provided by the patient and the spouse.     Review of patient's allergies indicates:   Allergen Reactions    Penicillins Rash     Past Medical History:   Diagnosis Date    Psoriasis      Past Surgical History:   Procedure Laterality Date    ESOPHAGOGASTRODUODENOSCOPY N/A 8/3/2023    Procedure: EGD (ESOPHAGOGASTRODUODENOSCOPY);  Surgeon: Loco Marvin MD;  Location: [facility];  Service: Gastroenterology;  Laterality: N/A;    TONSILLECTOMY       Family History   Problem Relation Age of Onset    Colon cancer Neg Hx     Crohn's disease Neg Hx     Esophageal cancer Neg Hx     Stomach  cancer Neg Hx     Ulcerative colitis Neg Hx     Liver disease Neg Hx      Social History     Tobacco Use    Smoking status: Former     Current packs/day: 0.00     Types: Cigarettes     Quit date: 5/27/2013     Years since quitting: 10.2    Smokeless tobacco: Never   Substance Use Topics    Alcohol use: Yes     Comment: occ    Drug use: Never     Review of Systems    Physical Exam     Initial Vitals [08/16/23 1020]   BP Pulse Resp Temp SpO2   (!) 144/59 100 18 98.2 °F (36.8 °C) 95 %      MAP       --         Physical Exam    Constitutional: He appears well-developed. No distress.   HENT:   Head: Normocephalic and atraumatic.   Eyes: EOM are normal. Pupils are equal, round, and reactive to light.   Cardiovascular:  Normal rate, regular rhythm, normal heart sounds and intact distal pulses.           Pulmonary/Chest: Breath sounds normal. He has no wheezes. He has no rhonchi. He has no rales.   Abdominal: Abdomen is soft. He exhibits no distension. There is no abdominal tenderness.   Absent bowel sounds There is no rebound and no guarding.   Musculoskeletal:         General: No tenderness or edema.     Neurological: He is alert and oriented to person, place, and time.   Skin: Skin is warm and dry.   Psychiatric: He has a normal mood and affect. His behavior is normal. Judgment and thought content normal.         ED Course   Procedures  Labs Reviewed   COMPREHENSIVE METABOLIC PANEL - Abnormal; Notable for the following components:       Result Value    Potassium 3.2 (*)     Chloride 85 (*)     CO2 35 (*)     Total Bilirubin 1.5 (*)     Alkaline Phosphatase 45 (*)     Anion Gap 19 (*)     All other components within normal limits   ISTAT CHEM8 - Abnormal; Notable for the following components:    BUN 24 (*)     All other components within normal limits   ISTAT PROCEDURE - Abnormal; Notable for the following components:    POC Sodium 134 (*)     POC Potassium 3.0 (*)     POC Chloride 84 (*)     POC TCO2 (MEASURED) 33 (*)      POC Ionized Calcium 1.02 (*)     All other components within normal limits   CBC W/ AUTO DIFFERENTIAL   LIPASE   URINALYSIS, REFLEX TO URINE CULTURE   PREALBUMIN   CEA   CANCER ANTIGEN 19-9          Imaging Results    None          Medications   LIDOcaine (PF) 10 mg/ml (1%) injection 10 mg (has no administration in time range)   sodium chloride 0.9% flush 10 mL (has no administration in time range)   lactated ringers infusion (has no administration in time range)   enoxaparin injection 30 mg (has no administration in time range)   ondansetron injection 8 mg (has no administration in time range)   famotidine (PF) injection 20 mg (has no administration in time range)   sodium chloride 0.9% bolus 1,000 mL 1,000 mL (0 mLs Intravenous Stopped 8/16/23 1208)   ondansetron injection 4 mg (4 mg Intravenous Given 8/16/23 1108)     Medical Decision Making:   Initial Assessment:   Patient is a 62 y.o. male with a recent diagnosis of gastric carcinoma located in the antrum, and he is presenting with complaints of inability to tolerate solid foods, no bowel movement in last 2 weeks, and abdominal pain. Vitally stable and benign physical exam.   Lipase WNL. CBC unremarkable. CMP unremarkable except for decreased potassium, chloride, and increased bicarb, likely due to daily vomiting causing hypokalemic hypochloremic metabolic alkalosis.     Likely gastric outlet obstruction due to antral gastric carcinoma.  Surgical Oncology was consulted, and patient was seen by them in the ED.       APC / Resident Notes:   I evaluated this patient with the resident physician was involved in all aspects of his care.  I obtained history from the patient and performed a physical examination.  He I discussed his case with Surgical Oncology staff and he was admitted to their service for further management.  I agree with the history, examination, assessment, and plan as documented by the resident physician.                     Clinical Impression:    Final diagnoses:  [R11.10] Emesis  [C16.9] Gastric carcinoma        ED Disposition Condition    Admit                 Farhat Thomas III, MD  08/23/23 7142

## 2023-08-16 NOTE — PROGRESS NOTES
Pharmacist Dose Adjustment Note    Danish Valladares is a 62 y.o. male being treated with the enoxaparin 30 mg every 24 hours for VTE ppx     Patient Data:    Vital Signs (Most Recent):  Temp: 98.2 °F (36.8 °C) (08/16/23 1020)  Pulse: 94 (08/16/23 1528)  Resp: 20 (08/16/23 1528)  BP: 135/73 (08/16/23 1435)  SpO2: 95 % (08/16/23 1528) Vital Signs (72h Range):  Temp:  [98.2 °F (36.8 °C)]   Pulse:  []   Resp:  [16-20]   BP: (131-144)/(59-81)   SpO2:  [95 %-98 %]      Recent Labs   Lab 08/16/23  1058 08/16/23  1109   CREATININE 1.3 1.3     Serum creatinine: 1.3 mg/dL 08/16/23 1109  Estimated creatinine clearance: 62 mL/min    Adjusted to   Enoxaparin 40 mg every 24 hours per protocol     Pharmacist's Name: Adela Smith  Pharmacist's Extension: [id number]

## 2023-08-16 NOTE — FIRST PROVIDER EVALUATION
Emergency Department TeleTriage Encounter Note      CHIEF COMPLAINT    Chief Complaint   Patient presents with    Abdominal Pain     Weight loss, decreased appetite, has stomach cancer states was told to come in for blockage, been on liquid diet for 2 weeks        VITAL SIGNS   Initial Vitals [08/16/23 1020]   BP Pulse Resp Temp SpO2   (!) 144/59 100 18 98.2 °F (36.8 °C) 95 %      MAP       --            ALLERGIES    Review of patient's allergies indicates:   Allergen Reactions    Penicillins Rash       PROVIDER TRIAGE NOTE  Patient presents with complaint of N/V and abdominal pain. Also reports no bowel movements. Recently diagnosed with stomach cancer. No fever. Decreased urine.      Phy:   Constitutional: well nourished, well developed, appearing stated age, NAD   HEENT: NCAT, symmetrical lids, No obvious facial deformity.  Normal phonation. Normal Conjunctiva   Neck: NAROM   Respiratory: Normal effort.  No obvious use of accessory muscles   Musculoskeletal: Moved upper extremities well   Neuro: Alert, answers questions appropriately    Psych: appropriate mood and affect      Initial orders will be placed and care will be transferred to an alternate provider when patient is roomed for a full evaluation. Any additional orders and the final disposition will be determined by that provider.        ORDERS  Labs Reviewed   CBC W/ AUTO DIFFERENTIAL   COMPREHENSIVE METABOLIC PANEL   LIPASE   URINALYSIS, REFLEX TO URINE CULTURE   ISTAT CHEM8       ED Orders (720h ago, onward)      Start Ordered     Status Ordering Provider    08/16/23 1100 08/16/23 1050  sodium chloride 0.9% bolus 1,000 mL 1,000 mL  ED 1 Time         Ordered LOULOU CASANOVA    08/16/23 1100 08/16/23 1050  ondansetron injection 4 mg  ED 1 Time         Ordered LOULOU CASANOVA    08/16/23 1051 08/16/23 1050  Vital signs  Every 2 hours         Ordered LOULOU CASANOVA    08/16/23 1050 08/16/23 1050  Diet NPO  Diet effective  now         Ordered NEGROTTO RYAN, LOULOU    08/16/23 1050 08/16/23 1050  Insert peripheral IV  Once         Ordered NEGROTTO RYAN, LOULOU    08/16/23 1050 08/16/23 1050  CBC W/ AUTO DIFFERENTIAL  STAT         Ordered NEGROTTO RYAN, LOULOU    08/16/23 1050 08/16/23 1050  Comp. Metabolic Panel  STAT         Ordered NEGROTTO RYAN, LOULOU    08/16/23 1050 08/16/23 1050  ISTAT CHEM8  Once         Ordered NEGROTTO RYAN, LOULOU    08/16/23 1050 08/16/23 1050  Lipase  STAT         Ordered NEGROTTO RYAN, LOULOU    08/16/23 1050 08/16/23 1050  Urinalysis, Reflex to Urine Culture Urine, Clean Catch  STAT         Ordered LOULOU CASANOVA              Virtual Visit Note: The provider triage portion of this emergency department evaluation and documentation was performed via ebindle, a HIPAA-compliant telemedicine application, in concert with a tele-presenter in the room. A face to face patient evaluation with one of my colleagues will occur once the patient is placed in an emergency department room.      DISCLAIMER: This note was prepared with [employer] voice recognition transcription software. Garbled syntax, mangled pronouns, and other bizarre constructions may be attributed to that software system.

## 2023-08-17 PROBLEM — C16.9 GASTRIC ADENOCARCINOMA: Status: ACTIVE | Noted: 2023-08-17

## 2023-08-17 LAB
ALBUMIN SERPL BCP-MCNC: 3 G/DL (ref 3.5–5.2)
ALP SERPL-CCNC: 34 U/L (ref 55–135)
ALT SERPL W/O P-5'-P-CCNC: 24 U/L (ref 10–44)
ANION GAP SERPL CALC-SCNC: 12 MMOL/L (ref 8–16)
ANION GAP SERPL CALC-SCNC: 15 MMOL/L (ref 8–16)
AST SERPL-CCNC: 18 U/L (ref 10–40)
BASOPHILS # BLD AUTO: 0.04 K/UL (ref 0–0.2)
BASOPHILS NFR BLD: 0.7 % (ref 0–1.9)
BILIRUB SERPL-MCNC: 1 MG/DL (ref 0.1–1)
BUN SERPL-MCNC: 14 MG/DL (ref 8–23)
BUN SERPL-MCNC: 19 MG/DL (ref 8–23)
CALCIUM SERPL-MCNC: 8.1 MG/DL (ref 8.7–10.5)
CALCIUM SERPL-MCNC: 8.3 MG/DL (ref 8.7–10.5)
CHLORIDE SERPL-SCNC: 93 MMOL/L (ref 95–110)
CHLORIDE SERPL-SCNC: 95 MMOL/L (ref 95–110)
CO2 SERPL-SCNC: 28 MMOL/L (ref 23–29)
CO2 SERPL-SCNC: 33 MMOL/L (ref 23–29)
CREAT SERPL-MCNC: 0.8 MG/DL (ref 0.5–1.4)
CREAT SERPL-MCNC: 1 MG/DL (ref 0.5–1.4)
DIFFERENTIAL METHOD: ABNORMAL
EOSINOPHIL # BLD AUTO: 0 K/UL (ref 0–0.5)
EOSINOPHIL NFR BLD: 0.7 % (ref 0–8)
ERYTHROCYTE [DISTWIDTH] IN BLOOD BY AUTOMATED COUNT: 12.7 % (ref 11.5–14.5)
EST. GFR  (NO RACE VARIABLE): >60 ML/MIN/1.73 M^2
EST. GFR  (NO RACE VARIABLE): >60 ML/MIN/1.73 M^2
GLUCOSE SERPL-MCNC: 67 MG/DL (ref 70–110)
GLUCOSE SERPL-MCNC: 75 MG/DL (ref 70–110)
HCT VFR BLD AUTO: 38.2 % (ref 40–54)
HGB BLD-MCNC: 13 G/DL (ref 14–18)
IMM GRANULOCYTES # BLD AUTO: 0.01 K/UL (ref 0–0.04)
IMM GRANULOCYTES NFR BLD AUTO: 0.2 % (ref 0–0.5)
LYMPHOCYTES # BLD AUTO: 1.8 K/UL (ref 1–4.8)
LYMPHOCYTES NFR BLD: 32.3 % (ref 18–48)
MAGNESIUM SERPL-MCNC: 1.9 MG/DL (ref 1.6–2.6)
MCH RBC QN AUTO: 27.4 PG (ref 27–31)
MCHC RBC AUTO-ENTMCNC: 34 G/DL (ref 32–36)
MCV RBC AUTO: 80 FL (ref 82–98)
MONOCYTES # BLD AUTO: 0.8 K/UL (ref 0.3–1)
MONOCYTES NFR BLD: 14.6 % (ref 4–15)
NEUTROPHILS # BLD AUTO: 2.8 K/UL (ref 1.8–7.7)
NEUTROPHILS NFR BLD: 51.5 % (ref 38–73)
NRBC BLD-RTO: 0 /100 WBC
PHOSPHATE SERPL-MCNC: 3.3 MG/DL (ref 2.7–4.5)
PLATELET # BLD AUTO: 175 K/UL (ref 150–450)
PMV BLD AUTO: 10 FL (ref 9.2–12.9)
POTASSIUM SERPL-SCNC: 2.7 MMOL/L (ref 3.5–5.1)
POTASSIUM SERPL-SCNC: 3 MMOL/L (ref 3.5–5.1)
PROT SERPL-MCNC: 5.2 G/DL (ref 6–8.4)
RBC # BLD AUTO: 4.75 M/UL (ref 4.6–6.2)
SODIUM SERPL-SCNC: 138 MMOL/L (ref 136–145)
SODIUM SERPL-SCNC: 138 MMOL/L (ref 136–145)
WBC # BLD AUTO: 5.41 K/UL (ref 3.9–12.7)

## 2023-08-17 PROCEDURE — 25000003 PHARM REV CODE 250: Performed by: SURGERY

## 2023-08-17 PROCEDURE — 36573 INSJ PICC RS&I 5 YR+: CPT

## 2023-08-17 PROCEDURE — 63600175 PHARM REV CODE 636 W HCPCS: Performed by: STUDENT IN AN ORGANIZED HEALTH CARE EDUCATION/TRAINING PROGRAM

## 2023-08-17 PROCEDURE — 76937 US GUIDE VASCULAR ACCESS: CPT

## 2023-08-17 PROCEDURE — 63600175 PHARM REV CODE 636 W HCPCS

## 2023-08-17 PROCEDURE — B4185 PARENTERAL SOL 10 GM LIPIDS: HCPCS | Performed by: STUDENT IN AN ORGANIZED HEALTH CARE EDUCATION/TRAINING PROGRAM

## 2023-08-17 PROCEDURE — 25000003 PHARM REV CODE 250: Performed by: STUDENT IN AN ORGANIZED HEALTH CARE EDUCATION/TRAINING PROGRAM

## 2023-08-17 PROCEDURE — 80048 BASIC METABOLIC PNL TOTAL CA: CPT | Mod: XB | Performed by: STUDENT IN AN ORGANIZED HEALTH CARE EDUCATION/TRAINING PROGRAM

## 2023-08-17 PROCEDURE — 94761 N-INVAS EAR/PLS OXIMETRY MLT: CPT

## 2023-08-17 PROCEDURE — 83735 ASSAY OF MAGNESIUM: CPT | Performed by: STUDENT IN AN ORGANIZED HEALTH CARE EDUCATION/TRAINING PROGRAM

## 2023-08-17 PROCEDURE — 99223 PR INITIAL HOSPITAL CARE,LEVL III: ICD-10-PCS | Mod: ,,, | Performed by: SURGERY

## 2023-08-17 PROCEDURE — 99223 1ST HOSP IP/OBS HIGH 75: CPT | Mod: ,,, | Performed by: SURGERY

## 2023-08-17 PROCEDURE — 36415 COLL VENOUS BLD VENIPUNCTURE: CPT | Performed by: STUDENT IN AN ORGANIZED HEALTH CARE EDUCATION/TRAINING PROGRAM

## 2023-08-17 PROCEDURE — A4217 STERILE WATER/SALINE, 500 ML: HCPCS | Performed by: STUDENT IN AN ORGANIZED HEALTH CARE EDUCATION/TRAINING PROGRAM

## 2023-08-17 PROCEDURE — 20600001 HC STEP DOWN PRIVATE ROOM

## 2023-08-17 PROCEDURE — A4216 STERILE WATER/SALINE, 10 ML: HCPCS | Performed by: SURGERY

## 2023-08-17 PROCEDURE — 85025 COMPLETE CBC W/AUTO DIFF WBC: CPT | Performed by: STUDENT IN AN ORGANIZED HEALTH CARE EDUCATION/TRAINING PROGRAM

## 2023-08-17 PROCEDURE — 84100 ASSAY OF PHOSPHORUS: CPT | Performed by: STUDENT IN AN ORGANIZED HEALTH CARE EDUCATION/TRAINING PROGRAM

## 2023-08-17 PROCEDURE — 80053 COMPREHEN METABOLIC PANEL: CPT | Performed by: STUDENT IN AN ORGANIZED HEALTH CARE EDUCATION/TRAINING PROGRAM

## 2023-08-17 PROCEDURE — C1751 CATH, INF, PER/CENT/MIDLINE: HCPCS

## 2023-08-17 RX ORDER — HYDROMORPHONE HYDROCHLORIDE 1 MG/ML
1 INJECTION, SOLUTION INTRAMUSCULAR; INTRAVENOUS; SUBCUTANEOUS
Status: DISCONTINUED | OUTPATIENT
Start: 2023-08-17 | End: 2023-08-19

## 2023-08-17 RX ORDER — SODIUM CHLORIDE 0.9 % (FLUSH) 0.9 %
10 SYRINGE (ML) INJECTION
Status: DISCONTINUED | OUTPATIENT
Start: 2023-08-17 | End: 2023-09-01 | Stop reason: HOSPADM

## 2023-08-17 RX ORDER — LIDOCAINE HYDROCHLORIDE 10 MG/ML
1 INJECTION INFILTRATION; PERINEURAL ONCE AS NEEDED
Status: DISCONTINUED | OUTPATIENT
Start: 2023-08-17 | End: 2023-09-01 | Stop reason: HOSPADM

## 2023-08-17 RX ORDER — POTASSIUM CHLORIDE 7.45 MG/ML
10 INJECTION INTRAVENOUS
Status: COMPLETED | OUTPATIENT
Start: 2023-08-17 | End: 2023-08-17

## 2023-08-17 RX ORDER — HYDROMORPHONE HYDROCHLORIDE 1 MG/ML
0.5 INJECTION, SOLUTION INTRAMUSCULAR; INTRAVENOUS; SUBCUTANEOUS EVERY 6 HOURS PRN
Status: DISCONTINUED | OUTPATIENT
Start: 2023-08-17 | End: 2023-08-17

## 2023-08-17 RX ORDER — HYDROMORPHONE HYDROCHLORIDE 1 MG/ML
0.5 INJECTION, SOLUTION INTRAMUSCULAR; INTRAVENOUS; SUBCUTANEOUS
Status: DISCONTINUED | OUTPATIENT
Start: 2023-08-17 | End: 2023-08-19

## 2023-08-17 RX ORDER — SODIUM CHLORIDE 0.9 % (FLUSH) 0.9 %
10 SYRINGE (ML) INJECTION EVERY 6 HOURS
Status: DISCONTINUED | OUTPATIENT
Start: 2023-08-17 | End: 2023-09-01 | Stop reason: HOSPADM

## 2023-08-17 RX ORDER — DEXTROSE, SODIUM CHLORIDE, SODIUM LACTATE, POTASSIUM CHLORIDE, AND CALCIUM CHLORIDE 5; .6; .31; .03; .02 G/100ML; G/100ML; G/100ML; G/100ML; G/100ML
INJECTION, SOLUTION INTRAVENOUS CONTINUOUS
Status: DISCONTINUED | OUTPATIENT
Start: 2023-08-17 | End: 2023-08-18

## 2023-08-17 RX ADMIN — Medication 10 ML: at 06:08

## 2023-08-17 RX ADMIN — POTASSIUM CHLORIDE 10 MEQ: 7.46 INJECTION, SOLUTION INTRAVENOUS at 01:08

## 2023-08-17 RX ADMIN — SOYBEAN OIL 250 ML: 20 INJECTION, SOLUTION INTRAVENOUS at 09:08

## 2023-08-17 RX ADMIN — POTASSIUM CHLORIDE 10 MEQ: 7.46 INJECTION, SOLUTION INTRAVENOUS at 06:08

## 2023-08-17 RX ADMIN — POTASSIUM CHLORIDE 10 MEQ: 7.46 INJECTION, SOLUTION INTRAVENOUS at 09:08

## 2023-08-17 RX ADMIN — SODIUM CHLORIDE, SODIUM LACTATE, POTASSIUM CHLORIDE, CALCIUM CHLORIDE AND DEXTROSE MONOHYDRATE: 5; 600; 310; 30; 20 INJECTION, SOLUTION INTRAVENOUS at 06:08

## 2023-08-17 RX ADMIN — POTASSIUM CHLORIDE 10 MEQ: 7.46 INJECTION, SOLUTION INTRAVENOUS at 08:08

## 2023-08-17 RX ADMIN — MAGNESIUM SULFATE HEPTAHYDRATE: 500 INJECTION, SOLUTION INTRAMUSCULAR; INTRAVENOUS at 09:08

## 2023-08-17 RX ADMIN — FAMOTIDINE 20 MG: 10 INJECTION, SOLUTION INTRAVENOUS at 08:08

## 2023-08-17 RX ADMIN — SODIUM CHLORIDE, POTASSIUM CHLORIDE, SODIUM LACTATE AND CALCIUM CHLORIDE: 600; 310; 30; 20 INJECTION, SOLUTION INTRAVENOUS at 09:08

## 2023-08-17 RX ADMIN — Medication 10 ML: at 12:08

## 2023-08-17 RX ADMIN — POTASSIUM CHLORIDE 10 MEQ: 7.46 INJECTION, SOLUTION INTRAVENOUS at 11:08

## 2023-08-17 RX ADMIN — POTASSIUM CHLORIDE 10 MEQ: 7.46 INJECTION, SOLUTION INTRAVENOUS at 07:08

## 2023-08-17 RX ADMIN — POTASSIUM CHLORIDE 10 MEQ: 7.46 INJECTION, SOLUTION INTRAVENOUS at 02:08

## 2023-08-17 RX ADMIN — ENOXAPARIN SODIUM 40 MG: 40 INJECTION SUBCUTANEOUS at 05:08

## 2023-08-17 NOTE — PROGRESS NOTES
[facility]  General Surgery  Progress Note    Subjective:     History of Present Illness:  No notes on file    Post-Op Info:  * No surgery found *         Interval History: Had one episode of emesis overnight, it was clear and yellow in color. Pt reports that this is how it has been since this all started in 5/2023. NGT placed and pulled out 800cc of clear gastric contents. Pt denies nausea and reports feeling better after NGT was placed. Pt denies pain and admits passing a little flatus. Denies BM (has not had a full BM in a month).     Medications:  Continuous Infusions:   lactated ringers 125 mL/hr at 08/16/23 1739     Scheduled Meds:   enoxparin  40 mg Subcutaneous Daily    famotidine (PF)  20 mg Intravenous BID    potassium chloride  10 mEq Intravenous Q1H     PRN Meds:LIDOcaine (PF) 10 mg/ml (1%), prochlorperazine, sodium chloride 0.9%     Review of patient's allergies indicates:   Allergen Reactions    Penicillins Rash     Objective:     Vital Signs (Most Recent):  Temp: 98.5 °F (36.9 °C) (08/17/23 0717)  Pulse: 71 (08/17/23 0717)  Resp: 18 (08/17/23 0717)  BP: 130/65 (08/17/23 0717)  SpO2: (!) 93 % (08/17/23 0717) Vital Signs (24h Range):  Temp:  [98.1 °F (36.7 °C)-98.5 °F (36.9 °C)] 98.5 °F (36.9 °C)  Pulse:  [] 71  Resp:  [16-21] 18  SpO2:  [93 %-98 %] 93 %  BP: (120-144)/(59-81) 130/65     Weight: 74.4 kg (164 lb 0.4 oz)  Body mass index is 22.88 kg/m².    Intake/Output - Last 3 Shifts         08/15 0700  08/16 0659 08/16 0700  08/17 0659 08/17 0700  08/18 0659           Stool Occurrence  0 x     Emesis Occurrence  1 x              Physical Exam  Constitutional:       Appearance: Normal appearance.   HENT:      Head: Normocephalic and atraumatic.      Nose: Nose normal.      Comments: NGT     Mouth/Throat:      Mouth: Mucous membranes are moist.      Pharynx: Oropharynx is clear.   Eyes:      Extraocular Movements: Extraocular movements intact.      Conjunctiva/sclera: Conjunctivae  normal.   Cardiovascular:      Rate and Rhythm: Normal rate and regular rhythm.   Pulmonary:      Effort: Pulmonary effort is normal.      Breath sounds: Normal breath sounds.   Abdominal:      General: Abdomen is flat.      Palpations: Abdomen is soft.   Skin:     General: Skin is warm and dry.      Capillary Refill: Capillary refill takes less than 2 seconds.   Neurological:      General: No focal deficit present.      Mental Status: He is alert.   Psychiatric:         Mood and Affect: Mood normal.         Thought Content: Thought content normal.          Significant Labs:  I have reviewed all pertinent lab results within the past 24 hours.    Significant Diagnostics:  I have reviewed all pertinent imaging results/findings within the past 24 hours.    Assessment/Plan:     Gastric adenocarcinoma  Patient is a 62 y M w/ no significant past medical history (has not seen a doctor in over 40 years) who presented to the ER on 8/16 with abdominal pain and a recent diagnosis of gastric adenocarcinoma. No bowel movements for several weeks, concerning for obstruction. After review of images, very small amounts of contrast going through from stomach to duodenum. Will plan for distal gastrectomy on 8/18 after repletion of fluids and balance of electrolytes.     -cont NGT to LIS  -cont NPO  -cont mIVF  -PPI  -Home meds as appropriate  -DVT ppx  -Ambulate, OOBTC    Dispo: pending surgical planning        Bubba Chirinos MD  General Surgery  [facility]

## 2023-08-17 NOTE — CONSULTS
D/L PICC placed in right basilic vein, 38 cm in length with 0 cm exposed. Arm circumference 30 cm. Lot# [id number]

## 2023-08-17 NOTE — CONSULTS
RD consulted for TPN recs.    Recommendations    1) TPN RECS: 230 D + 95 AA + IV Lipids to provide 1662 kcals, 95g PRO, (GIR: 2.14)     2) ADAT per MD     3) Monitor labs     4) Following    Goals: Meet % een/epn by next RD f/u  Nutrition Goal Status: new  Communication of RD Recs: other (comment) (POC)    Re-consult as need. RD to follow.    Sudha Ulrich RD, LDN

## 2023-08-17 NOTE — PLAN OF CARE
Recommendations     1) TPN RECS: 230 D + 95 AA + IV Lipids to provide 1662 kcals, 95g PRO, (GIR: 2.14)      2) ADAT per MD      3) Monitor labs      4) Following     Goals: Meet % een/epn by next RD f/u  Nutrition Goal Status: new  Communication of RD Recs: other (comment) (POC)

## 2023-08-17 NOTE — SUBJECTIVE & OBJECTIVE
Interval History: Had one episode of emesis overnight, it was clear and yellow in color. Pt reports that this is how it has been since this all started in 5/2023. NGT placed and pulled out 800cc of clear gastric contents. Pt denies nausea and reports feeling better after NGT was placed. Pt denies pain and admits passing a little flatus. Denies BM (has not had a full BM in a month).     Medications:  Continuous Infusions:   lactated ringers 125 mL/hr at 08/16/23 1739     Scheduled Meds:   enoxparin  40 mg Subcutaneous Daily    famotidine (PF)  20 mg Intravenous BID    potassium chloride  10 mEq Intravenous Q1H     PRN Meds:LIDOcaine (PF) 10 mg/ml (1%), prochlorperazine, sodium chloride 0.9%     Review of patient's allergies indicates:   Allergen Reactions    Penicillins Rash     Objective:     Vital Signs (Most Recent):  Temp: 98.5 °F (36.9 °C) (08/17/23 0717)  Pulse: 71 (08/17/23 0717)  Resp: 18 (08/17/23 0717)  BP: 130/65 (08/17/23 0717)  SpO2: (!) 93 % (08/17/23 0717) Vital Signs (24h Range):  Temp:  [98.1 °F (36.7 °C)-98.5 °F (36.9 °C)] 98.5 °F (36.9 °C)  Pulse:  [] 71  Resp:  [16-21] 18  SpO2:  [93 %-98 %] 93 %  BP: (120-144)/(59-81) 130/65     Weight: 74.4 kg (164 lb 0.4 oz)  Body mass index is 22.88 kg/m².    Intake/Output - Last 3 Shifts         08/15 0700  08/16 0659 08/16 0700 08/17 0659 08/17 0700 08/18 0659           Stool Occurrence  0 x     Emesis Occurrence  1 x              Physical Exam  Constitutional:       Appearance: Normal appearance.   HENT:      Head: Normocephalic and atraumatic.      Nose: Nose normal.      Comments: NGT     Mouth/Throat:      Mouth: Mucous membranes are moist.      Pharynx: Oropharynx is clear.   Eyes:      Extraocular Movements: Extraocular movements intact.      Conjunctiva/sclera: Conjunctivae normal.   Cardiovascular:      Rate and Rhythm: Normal rate and regular rhythm.   Pulmonary:      Effort: Pulmonary effort is normal.      Breath sounds: Normal breath  sounds.   Abdominal:      General: Abdomen is flat.      Palpations: Abdomen is soft.   Skin:     General: Skin is warm and dry.      Capillary Refill: Capillary refill takes less than 2 seconds.   Neurological:      General: No focal deficit present.      Mental Status: He is alert.   Psychiatric:         Mood and Affect: Mood normal.         Thought Content: Thought content normal.          Significant Labs:  I have reviewed all pertinent lab results within the past 24 hours.    Significant Diagnostics:  I have reviewed all pertinent imaging results/findings within the past 24 hours.

## 2023-08-17 NOTE — NURSING
[facility] Plan of Care Note  Dx emesis    Shift Events vomited once when arrived to unit    Goals of Care: no emesis    Neuro: AAOx4    Vital Signs: stable     Respiratory: room air    Diet: NPO    Is patient tolerating current diet? yes    GTTS: LR@125    Urine Output/Bowel Movement:     Drains/Tubes/Tube Feeds (include total output/shift): n/a    Lines: peripheral      Accuchecks:n/a    Skin: nonblanchable redness to bilateral buttocks, left elbow skin tear    Fall Risk Score: 4    Activity level? independent    Any scheduled procedures? N/a    Any safety concerns? N/a    Other: n/a

## 2023-08-17 NOTE — ASSESSMENT & PLAN NOTE
Patient is a 62 y M w/ no significant past medical history (has not seen a doctor in over 40 years) who presented to the ER on 8/16 with abdominal pain and a recent diagnosis of gastric adenocarcinoma. No bowel movements for several weeks, concerning for obstruction. After review of images, very small amounts of contrast going through from stomach to duodenum. Will plan for distal gastrectomy on 8/18 after repletion of fluids and balance of electrolytes.     -cont NGT to LIS  -cont NPO  -cont mIVF  -PPI  -Home meds as appropriate  -DVT ppx  -Ambulate, OOBTC    Dispo: pending surgical planning

## 2023-08-17 NOTE — NURSING
On call resident paged for low BGM seen on labs done at 1413, not resulted until 1630. Pt asymptomatic at this time, but not starting TPN until 2200. Pt also c/o headache, icepack given

## 2023-08-17 NOTE — NURSING TRANSFER
Nursing Transfer Note      8/17/2023   4:20 AM    Nurse giving handoff:SHER Jeong  Nurse receiving handoff:SHER Alfonso    Reason patient is being transferred: admit     Transfer To: [facility]    Transfer via stretcher    Transfer with IV pump      Transported by transport    Transfer Vital Signs:  Blood Pressure:120/65  Heart Rate:84  O2:94  Temperature:98.1  Respirations:20    Telemetry:   Order for Tele Monitor? No    Additional Lines:     4eyes on Skin: yes    Medicines sent: none    Any special needs or follow-up needed: no    Patient belongings transferred with patient: Yes    Chart send with patient: Yes    Notified: wife at bedside    Patient reassessed at:  (date, time)  1  Upon arrival to floor: patient oriented to room, call bell in reach, and bed in lowest position

## 2023-08-17 NOTE — NURSING
Nurses Note -- 4 Eyes      8/17/2023   4:15 AM      Skin assessed during: Admit      [] No Altered Skin Integrity Present    []Prevention Measures Documented      [x] Yes- Altered Skin Integrity Present or Discovered   [] LDA Added if Not in Epic (Describe Wound)   [x] New Altered Skin Integrity was Present on Admit and Documented in LDA   [] Wound Image Taken    Wound Care Consulted? Yes    Attending Nurse:  Naty Rodriguez RN/Staff Member:   Tex Charge nurse

## 2023-08-18 ENCOUNTER — ANESTHESIA EVENT (OUTPATIENT)
Dept: SURGERY | Facility: HOSPITAL | Age: 63
DRG: 326 | End: 2023-08-18
Payer: COMMERCIAL

## 2023-08-18 LAB
ANION GAP SERPL CALC-SCNC: 8 MMOL/L (ref 8–16)
ANION GAP SERPL CALC-SCNC: 8 MMOL/L (ref 8–16)
BUN SERPL-MCNC: 10 MG/DL (ref 8–23)
BUN SERPL-MCNC: 8 MG/DL (ref 8–23)
CALCIUM SERPL-MCNC: 8.3 MG/DL (ref 8.7–10.5)
CALCIUM SERPL-MCNC: 8.4 MG/DL (ref 8.7–10.5)
CHLORIDE SERPL-SCNC: 100 MMOL/L (ref 95–110)
CHLORIDE SERPL-SCNC: 94 MMOL/L (ref 95–110)
CO2 SERPL-SCNC: 33 MMOL/L (ref 23–29)
CO2 SERPL-SCNC: 34 MMOL/L (ref 23–29)
CREAT SERPL-MCNC: 0.8 MG/DL (ref 0.5–1.4)
CREAT SERPL-MCNC: 0.8 MG/DL (ref 0.5–1.4)
EST. GFR  (NO RACE VARIABLE): >60 ML/MIN/1.73 M^2
EST. GFR  (NO RACE VARIABLE): >60 ML/MIN/1.73 M^2
GLUCOSE SERPL-MCNC: 108 MG/DL (ref 70–110)
GLUCOSE SERPL-MCNC: 135 MG/DL (ref 70–110)
MAGNESIUM SERPL-MCNC: 1.8 MG/DL (ref 1.6–2.6)
PHOSPHATE SERPL-MCNC: 1.7 MG/DL (ref 2.7–4.5)
POTASSIUM SERPL-SCNC: 2.8 MMOL/L (ref 3.5–5.1)
POTASSIUM SERPL-SCNC: 3.1 MMOL/L (ref 3.5–5.1)
SODIUM SERPL-SCNC: 135 MMOL/L (ref 136–145)
SODIUM SERPL-SCNC: 142 MMOL/L (ref 136–145)
TRIGL SERPL-MCNC: 148 MG/DL (ref 30–150)

## 2023-08-18 PROCEDURE — 99232 PR SUBSEQUENT HOSPITAL CARE,LEVL II: ICD-10-PCS | Mod: ,,, | Performed by: SURGERY

## 2023-08-18 PROCEDURE — 63600175 PHARM REV CODE 636 W HCPCS: Performed by: STUDENT IN AN ORGANIZED HEALTH CARE EDUCATION/TRAINING PROGRAM

## 2023-08-18 PROCEDURE — 84100 ASSAY OF PHOSPHORUS: CPT | Performed by: STUDENT IN AN ORGANIZED HEALTH CARE EDUCATION/TRAINING PROGRAM

## 2023-08-18 PROCEDURE — A4216 STERILE WATER/SALINE, 10 ML: HCPCS | Performed by: SURGERY

## 2023-08-18 PROCEDURE — 20600001 HC STEP DOWN PRIVATE ROOM

## 2023-08-18 PROCEDURE — 80048 BASIC METABOLIC PNL TOTAL CA: CPT | Mod: 91

## 2023-08-18 PROCEDURE — 83735 ASSAY OF MAGNESIUM: CPT | Performed by: STUDENT IN AN ORGANIZED HEALTH CARE EDUCATION/TRAINING PROGRAM

## 2023-08-18 PROCEDURE — 63600175 PHARM REV CODE 636 W HCPCS: Performed by: NURSE PRACTITIONER

## 2023-08-18 PROCEDURE — B4185 PARENTERAL SOL 10 GM LIPIDS: HCPCS | Performed by: NURSE PRACTITIONER

## 2023-08-18 PROCEDURE — 25000003 PHARM REV CODE 250

## 2023-08-18 PROCEDURE — 99232 SBSQ HOSP IP/OBS MODERATE 35: CPT | Mod: ,,, | Performed by: SURGERY

## 2023-08-18 PROCEDURE — 63600175 PHARM REV CODE 636 W HCPCS

## 2023-08-18 PROCEDURE — 25000003 PHARM REV CODE 250: Performed by: STUDENT IN AN ORGANIZED HEALTH CARE EDUCATION/TRAINING PROGRAM

## 2023-08-18 PROCEDURE — 97161 PT EVAL LOW COMPLEX 20 MIN: CPT

## 2023-08-18 PROCEDURE — 84478 ASSAY OF TRIGLYCERIDES: CPT | Performed by: STUDENT IN AN ORGANIZED HEALTH CARE EDUCATION/TRAINING PROGRAM

## 2023-08-18 PROCEDURE — 25000003 PHARM REV CODE 250: Performed by: NURSE PRACTITIONER

## 2023-08-18 PROCEDURE — 80048 BASIC METABOLIC PNL TOTAL CA: CPT | Performed by: STUDENT IN AN ORGANIZED HEALTH CARE EDUCATION/TRAINING PROGRAM

## 2023-08-18 PROCEDURE — 25000003 PHARM REV CODE 250: Performed by: SURGERY

## 2023-08-18 PROCEDURE — A4217 STERILE WATER/SALINE, 500 ML: HCPCS | Performed by: NURSE PRACTITIONER

## 2023-08-18 RX ORDER — OXYMETAZOLINE HCL 0.05 %
2 SPRAY, NON-AEROSOL (ML) NASAL 2 TIMES DAILY
Status: DISPENSED | OUTPATIENT
Start: 2023-08-18 | End: 2023-08-21

## 2023-08-18 RX ORDER — POTASSIUM CHLORIDE 7.45 MG/ML
10 INJECTION INTRAVENOUS
Status: COMPLETED | OUTPATIENT
Start: 2023-08-18 | End: 2023-08-18

## 2023-08-18 RX ORDER — MAGNESIUM SULFATE HEPTAHYDRATE 40 MG/ML
2 INJECTION, SOLUTION INTRAVENOUS ONCE
Status: COMPLETED | OUTPATIENT
Start: 2023-08-18 | End: 2023-08-19

## 2023-08-18 RX ORDER — POTASSIUM CHLORIDE 7.45 MG/ML
10 INJECTION INTRAVENOUS
Status: COMPLETED | OUTPATIENT
Start: 2023-08-18 | End: 2023-08-19

## 2023-08-18 RX ADMIN — MAGNESIUM SULFATE HEPTAHYDRATE 2 G: 40 INJECTION, SOLUTION INTRAVENOUS at 11:08

## 2023-08-18 RX ADMIN — ENOXAPARIN SODIUM 40 MG: 40 INJECTION SUBCUTANEOUS at 08:08

## 2023-08-18 RX ADMIN — Medication 10 ML: at 05:08

## 2023-08-18 RX ADMIN — IBUPROFEN 400 MG: 800 INJECTION INTRAVENOUS at 12:08

## 2023-08-18 RX ADMIN — POTASSIUM PHOSPHATE, MONOBASIC AND POTASSIUM PHOSPHATE, DIBASIC 30 MMOL: 224; 236 INJECTION, SOLUTION, CONCENTRATE INTRAVENOUS at 11:08

## 2023-08-18 RX ADMIN — FAMOTIDINE 20 MG: 10 INJECTION, SOLUTION INTRAVENOUS at 07:08

## 2023-08-18 RX ADMIN — POTASSIUM CHLORIDE 10 MEQ: 7.46 INJECTION, SOLUTION INTRAVENOUS at 12:08

## 2023-08-18 RX ADMIN — POTASSIUM CHLORIDE 10 MEQ: 7.46 INJECTION, SOLUTION INTRAVENOUS at 10:08

## 2023-08-18 RX ADMIN — POTASSIUM CHLORIDE 10 MEQ: 7.46 INJECTION, SOLUTION INTRAVENOUS at 01:08

## 2023-08-18 RX ADMIN — Medication 10 ML: at 12:08

## 2023-08-18 RX ADMIN — SOYBEAN OIL 250 ML: 20 INJECTION, SOLUTION INTRAVENOUS at 09:08

## 2023-08-18 RX ADMIN — POTASSIUM CHLORIDE 10 MEQ: 7.46 INJECTION, SOLUTION INTRAVENOUS at 11:08

## 2023-08-18 RX ADMIN — POTASSIUM CHLORIDE 10 MEQ: 7.46 INJECTION, SOLUTION INTRAVENOUS at 04:08

## 2023-08-18 RX ADMIN — MAGNESIUM SULFATE HEPTAHYDRATE: 500 INJECTION, SOLUTION INTRAMUSCULAR; INTRAVENOUS at 09:08

## 2023-08-18 RX ADMIN — OXYMETAZOLINE HCL 2 SPRAY: 0.05 SPRAY NASAL at 09:08

## 2023-08-18 RX ADMIN — FAMOTIDINE 20 MG: 10 INJECTION, SOLUTION INTRAVENOUS at 09:08

## 2023-08-18 RX ADMIN — POTASSIUM CHLORIDE 10 MEQ: 7.46 INJECTION, SOLUTION INTRAVENOUS at 05:08

## 2023-08-18 RX ADMIN — SODIUM CHLORIDE, SODIUM LACTATE, POTASSIUM CHLORIDE, CALCIUM CHLORIDE AND DEXTROSE MONOHYDRATE: 5; 600; 310; 30; 20 INJECTION, SOLUTION INTRAVENOUS at 05:08

## 2023-08-18 RX ADMIN — POTASSIUM CHLORIDE 10 MEQ: 7.46 INJECTION, SOLUTION INTRAVENOUS at 03:08

## 2023-08-18 NOTE — SUBJECTIVE & OBJECTIVE
Interval History: No issues overnight. NGT with 300 cc output. Feeling better, ambulating out of bed. No nausea/vomiting. TPN via PICC    Medications:  Continuous Infusions:   dextrose 5% lactated ringers 125 mL/hr at 08/18/23 0536    TPN ADULT CENTRAL LINE CUSTOM 42 mL/hr at 08/17/23 2105     Scheduled Meds:   enoxparin  40 mg Subcutaneous Daily    famotidine (PF)  20 mg Intravenous BID    fat emulsion 20%  250 mL Intravenous Daily    oxymetazoline  2 spray Each Nostril BID    potassium chloride  10 mEq Intravenous Q1H    potassium phosphate IVPB  30 mmol Intravenous Once    sodium chloride 0.9%  10 mL Intravenous Q6H     PRN Meds:HYDROmorphone, HYDROmorphone, LIDOcaine (PF) 10 mg/ml (1%), LIDOcaine HCL 10 mg/ml (1%), prochlorperazine, sodium chloride 0.9%, Flushing PICC Protocol **AND** sodium chloride 0.9% **AND** sodium chloride 0.9%     Review of patient's allergies indicates:   Allergen Reactions    Penicillins Rash     Objective:     Vital Signs (Most Recent):  Temp: 98.5 °F (36.9 °C) (08/18/23 0711)  Pulse: 77 (08/18/23 0711)  Resp: 18 (08/18/23 0711)  BP: (!) 140/74 (08/18/23 0711)  SpO2: 95 % (08/18/23 0711) Vital Signs (24h Range):  Temp:  [96.4 °F (35.8 °C)-98.5 °F (36.9 °C)] 98.5 °F (36.9 °C)  Pulse:  [64-93] 77  Resp:  [18-19] 18  SpO2:  [93 %-95 %] 95 %  BP: (131-162)/(64-88) 140/74     Weight: 74.4 kg (164 lb 0.4 oz)  Body mass index is 22.88 kg/m².    Intake/Output - Last 3 Shifts         08/16 0700 08/17 0659 08/17 0700 08/18 0659 08/18 0700 08/19 0659    I.V. (mL/kg)  10 (0.1)     Total Intake(mL/kg)  10 (0.1)     Drains  1000     Total Output  1000     Net  -990            Stool Occurrence 0 x 0 x     Emesis Occurrence 1 x               Physical Exam  Vitals and nursing note reviewed.   Constitutional:       General: He is not in acute distress.  Cardiovascular:      Rate and Rhythm: Normal rate.      Pulses: Normal pulses.   Pulmonary:      Effort: Pulmonary effort is normal. No respiratory  distress.   Abdominal:      General: There is no distension.      Palpations: Abdomen is soft.      Tenderness: There is no abdominal tenderness.   Neurological:      Mental Status: He is alert.          Significant Labs:  I have reviewed all pertinent lab results within the past 24 hours.  CBC:   Recent Labs   Lab 08/17/23 0458   WBC 5.41   RBC 4.75   HGB 13.0*   HCT 38.2*      MCV 80*   MCH 27.4   MCHC 34.0     BMP:   Recent Labs   Lab 08/18/23  0537   *   *   K 2.8*   CL 94*   CO2 33*   BUN 10   CREATININE 0.8   CALCIUM 8.3*   MG 1.8     CMP:   Recent Labs   Lab 08/17/23  0458 08/17/23  1413 08/18/23  0537   GLU 75   < > 135*   CALCIUM 8.3*   < > 8.3*   ALBUMIN 3.0*  --   --    PROT 5.2*  --   --       < > 135*   K 2.7*   < > 2.8*   CO2 33*   < > 33*   CL 93*   < > 94*   BUN 19   < > 10   CREATININE 1.0   < > 0.8   ALKPHOS 34*  --   --    ALT 24  --   --    AST 18  --   --    BILITOT 1.0  --   --     < > = values in this interval not displayed.       Significant Diagnostics:  I have reviewed all pertinent imaging results/findings within the past 24 hours.

## 2023-08-18 NOTE — ASSESSMENT & PLAN NOTE
Patient is a 62 y M w/ no significant past medical history (has not seen a doctor in over 40 years) who presented to the ER on 8/16 with abdominal pain and a recent diagnosis of gastric adenocarcinoma. No bowel movements for several weeks, concerning for obstruction. After review of images, very small amounts of contrast going through from stomach to duodenum. Will plan for distal gastrectomy on 8/18 after repletion of fluids and balance of electrolytes.     -NGT to LIS  -NPO  -TPN  -PPI  -DVT ppx  -Ambulate, OOBTC    Dispo: Anticipate surgery (EGD, gastrectomy) on 8/21   multiple medical complaints

## 2023-08-18 NOTE — PROGRESS NOTES
[facility]  General Surgery  Progress Note    Subjective:     History of Present Illness:      Post-Op Info:  Procedure(s) (LRB):  GASTRECTOMY (N/A)  EGD (ESOPHAGOGASTRODUODENOSCOPY) (N/A)         Interval History: No issues overnight. NGT with 300 cc output. Feeling better, ambulating out of bed. No nausea/vomiting. TPN via PICC    Medications:  Continuous Infusions:   dextrose 5% lactated ringers 125 mL/hr at 08/18/23 0536    TPN ADULT CENTRAL LINE CUSTOM 42 mL/hr at 08/17/23 2105     Scheduled Meds:   enoxparin  40 mg Subcutaneous Daily    famotidine (PF)  20 mg Intravenous BID    fat emulsion 20%  250 mL Intravenous Daily    oxymetazoline  2 spray Each Nostril BID    potassium chloride  10 mEq Intravenous Q1H    potassium phosphate IVPB  30 mmol Intravenous Once    sodium chloride 0.9%  10 mL Intravenous Q6H     PRN Meds:HYDROmorphone, HYDROmorphone, LIDOcaine (PF) 10 mg/ml (1%), LIDOcaine HCL 10 mg/ml (1%), prochlorperazine, sodium chloride 0.9%, Flushing PICC Protocol **AND** sodium chloride 0.9% **AND** sodium chloride 0.9%     Review of patient's allergies indicates:   Allergen Reactions    Penicillins Rash     Objective:     Vital Signs (Most Recent):  Temp: 98.5 °F (36.9 °C) (08/18/23 0711)  Pulse: 77 (08/18/23 0711)  Resp: 18 (08/18/23 0711)  BP: (!) 140/74 (08/18/23 0711)  SpO2: 95 % (08/18/23 0711) Vital Signs (24h Range):  Temp:  [96.4 °F (35.8 °C)-98.5 °F (36.9 °C)] 98.5 °F (36.9 °C)  Pulse:  [64-93] 77  Resp:  [18-19] 18  SpO2:  [93 %-95 %] 95 %  BP: (131-162)/(64-88) 140/74     Weight: 74.4 kg (164 lb 0.4 oz)  Body mass index is 22.88 kg/m².    Intake/Output - Last 3 Shifts         08/16 0700 08/17 0659 08/17 0700 08/18 0659 08/18 0700 08/19 0659    I.V. (mL/kg)  10 (0.1)     Total Intake(mL/kg)  10 (0.1)     Drains  1000     Total Output  1000     Net  -990            Stool Occurrence 0 x 0 x     Emesis Occurrence 1 x               Physical Exam  Vitals and nursing note reviewed.    Constitutional:       General: He is not in acute distress.  Cardiovascular:      Rate and Rhythm: Normal rate.      Pulses: Normal pulses.   Pulmonary:      Effort: Pulmonary effort is normal. No respiratory distress.   Abdominal:      General: There is no distension.      Palpations: Abdomen is soft.      Tenderness: There is no abdominal tenderness.   Neurological:      Mental Status: He is alert.          Significant Labs:  I have reviewed all pertinent lab results within the past 24 hours.  CBC:   Recent Labs   Lab 08/17/23  0458   WBC 5.41   RBC 4.75   HGB 13.0*   HCT 38.2*      MCV 80*   MCH 27.4   MCHC 34.0     BMP:   Recent Labs   Lab 08/18/23  0537   *   *   K 2.8*   CL 94*   CO2 33*   BUN 10   CREATININE 0.8   CALCIUM 8.3*   MG 1.8     CMP:   Recent Labs   Lab 08/17/23  0458 08/17/23  1413 08/18/23  0537   GLU 75   < > 135*   CALCIUM 8.3*   < > 8.3*   ALBUMIN 3.0*  --   --    PROT 5.2*  --   --       < > 135*   K 2.7*   < > 2.8*   CO2 33*   < > 33*   CL 93*   < > 94*   BUN 19   < > 10   CREATININE 1.0   < > 0.8   ALKPHOS 34*  --   --    ALT 24  --   --    AST 18  --   --    BILITOT 1.0  --   --     < > = values in this interval not displayed.       Significant Diagnostics:  I have reviewed all pertinent imaging results/findings within the past 24 hours.    Assessment/Plan:     Gastric adenocarcinoma  Patient is a 62 y M w/ no significant past medical history (has not seen a doctor in over 40 years) who presented to the ER on 8/16 with abdominal pain and a recent diagnosis of gastric adenocarcinoma. No bowel movements for several weeks, concerning for obstruction. After review of images, very small amounts of contrast going through from stomach to duodenum. Will plan for distal gastrectomy on 8/18 after repletion of fluids and balance of electrolytes.     -NGT to LIS  -NPO  -TPN  -PPI  -DVT ppx  -Ambulate, OOBTC    Dispo: Anticipate surgery (EGD, gastrectomy) on  8/21        Diana Alvares MD  General Surgery  [facility]  [facility]

## 2023-08-18 NOTE — NURSING
[facility] Plan of Care Note  Dx emesis     Shift Events n/a     Goals of Care: no emesis     Neuro: AAOx4     Vital Signs: stable            Respiratory: room air     Diet: NPO     Is patient tolerating current diet? yes     GTTS: D5LR@125     Urine Output/Bowel Movement:      Drains/Tubes/Tube Feeds (include total output/shift): nasogastric tube drained 300ml     Lines: right upper PICC, peripheral        Accuchecks:n/a     Skin: edness to bilateral buttocks, left elbow skin tear     Fall Risk Score: 6     Activity level? independent     Any scheduled procedures? N/a     Any safety concerns? N/a     Other: n/a

## 2023-08-18 NOTE — PT/OT/SLP EVAL
"Physical Therapy Evaluation and Discharge Note    Patient Name:  Danish Valladares   MRN:  [id number]    Recommendations:     Discharge Recommendations: home  Discharge Equipment Recommendations: none   Barriers to discharge: None    Assessment:     Danish Valladares is a 62 y.o. male admitted with a medical diagnosis of Emesis. Patient ambulated 400' with no AD and independence, ascended/descended 10 steps with no HR and supervision assistance. At this time, patient is functioning at their prior level of function and does not require further acute PT services.     Recent Surgery: Procedure(s) (LRB):  GASTRECTOMY (N/A)  EGD (ESOPHAGOGASTRODUODENOSCOPY) (N/A)      Plan:     During this hospitalization, patient does not require further acute PT services.  Please re-consult if situation changes.      Subjective     Chief Complaint: "I've just got a bad headache", per wife "I have him walking the floors multiple times a day"  Patient/Family Comments/goals: "I had a BM last night!", return home ASAP  Pain/Comfort:  Pain Rating 1: 8/10  Location 1:  (headache)  Pain Addressed 1: Distraction, Reposition, Nurse notified  Pain Rating Post-Intervention 1: 8/10    Patients cultural, spiritual, Temple conflicts given the current situation: no    Living Environment:  The patient lives with his wife in a [facility], [address] with [employer] HR, T/S. Drives, working as a  at [employer].   Prior to admission, patients level of function was independent.  Equipment used at home: none.  DME owned (not currently used): none.  Upon discharge, patient will have assistance from wife.    Objective:     Communicated with RN prior to session.  Patient found up in chair with NG tube upon PT entry to room. Wife at bedside, patient with pants on in hospital gown.     General Precautions: Standard, fall    Orthopedic Precautions:N/A   Braces: N/A  Respiratory Status: Room air    Exams:    Cognitive Exam  Patient is A&O x4 and follows 100% of one -step " commands    Fine Motor Coordination   -       WNL     Postural Exam Patient presented with the following abnormalities:    -       Rounded shoulders  -       Forward head     Sensation    -       Light touch intact PAULO UE and LE   Skin Integrity/Edema     -       Skin integrity: visibly intact  -       Edema: NA   R LE ROM WNL   R LE Strength 5/5 hip flexion, knee ext/flex, and ankle DF/PF   L LE ROM WNL   L LE Strength  5/5 hip flexion, knee ext/flex, and ankle DF/PF          Functional Mobility:    Bed Mobility  Deferred, sitting up in chair   Transfers Sit to Stand:  independent    Gait  Gait Distance: 400 ft with no AD  Assistance Level: independent  Description: reciprocal strides, no gross deviations   Stairs Ascended/descended: 10 stairs   Quality: reciprocal, good speed  Level of assist: supervision assistance   Rails used: no HR used        AM-PAC 6 CLICK MOBILITY  Total Score:24       Treatment  and Education:  Patient and wife educated on:  -role of therapy  -goals of session  -PT POC  -benefits of out of bed mobility and consequences of immobility  -calling for staff assist to mobilize safely  Patient agreeable to mobilize with therapy.     The patient and his wife are in agreement with PT POC: discharge acute PT, patient responsible for ambulating 3x/day, sitting up in chair. He is aware to request therapy re-consult should he experience a decline in functional mobility.     He is safe to ambulate with RN/family supervision.     AM-PAC 6 CLICK MOBILITY  Total Score:24     Patient left ambulatory in room/travis with all lines intact, call button in reach, and wife present.    GOALS:   Multidisciplinary Problems       Physical Therapy Goals       Not on file              Multidisciplinary Problems (Resolved)          Problem: Physical Therapy    Goal Priority Disciplines Outcome Goal Variances Interventions   Physical Therapy Goal   (Resolved)     PT, PT/OT Met     Description: The patient is at their  functional baseline, they are safe to return home with no therapy needs. Discharge acute PT, patient in agreement and aware to request therapy re-consult should they experience a decline in functional mobility. Encouraged patient to continue ambulating daily with RN assist to prevent functional decline.     The patient is safe to ambulate with supervision.                        History:     Past Medical History:   Diagnosis Date    Psoriasis        Past Surgical History:   Procedure Laterality Date    ESOPHAGOGASTRODUODENOSCOPY N/A 8/3/2023    Procedure: EGD (ESOPHAGOGASTRODUODENOSCOPY);  Surgeon: Loco Marvin MD;  [facility]: [facility];  Service: Gastroenterology;  Laterality: N/A;    TONSILLECTOMY         Time Tracking:     PT Received On: 08/18/23  PT Start Time: 1027     PT Stop Time: 1038  PT Total Time (min): 11 min     Billable Minutes: Evaluation 11      08/18/2023

## 2023-08-18 NOTE — PLAN OF CARE
Problem: Physical Therapy  Goal: Physical Therapy Goal  Description: The patient is at their functional baseline, they are safe to return home with no therapy needs. Discharge acute PT, patient in agreement and aware to request therapy re-consult should they experience a decline in functional mobility. Encouraged patient to continue ambulating daily with RN assist to prevent functional decline.     The patient is safe to ambulate with supervision.   Outcome: Met   Radha Nuñez, PT  8/18/2023

## 2023-08-18 NOTE — ANESTHESIA PREPROCEDURE EVALUATION
[facility]  Anesthesia Pre-Operative Evaluation         Patient Name: Danish Valladares  YOB: 1960  MRN: [id number]    SUBJECTIVE:     Pre-operative evaluation for Procedure(s) (LRB):  GASTRECTOMY (N/A)  EGD (ESOPHAGOGASTRODUODENOSCOPY) (N/A)     08/18/2023    Danish Valladares is a 62 y.o. male w/ a significant PMHx of psoriasis, former smoking, and gastric adenocarcinoma who is admitted for GOO 2/2 antral gastric carcinoma.     Patient now presents for the above procedure(s).      LDA:   PICC Double Lumen 08/17/23 1117 right basilic (Active)   Line Necessity Review Medication caustic to vasculature 08/17/23 1930   Verification by X-ray Yes 08/17/23 1930   Site Assessment No redness;No swelling;No warmth;No drainage 08/17/23 1930   Extremity Assessment Distal to IV No abnormal discoloration;No redness;No swelling;No warmth 08/17/23 1930   Line Securement Device Secured with sutureless device 08/17/23 1930   Dressing Type CHG impregnated dressing/sponge;Central line dressing;Transparent (Tegaderm) 08/17/23 1930   Dressing Status Clean;Dry;Intact 08/17/23 1930   Dressing Intervention Integrity maintained 08/17/23 1930   Date on Dressing 08/17/23 08/17/23 1930   Dressing Due to be Changed 08/24/23 08/17/23 1930   Distal Patency/Care flushed w/o difficulty;blood return present;normal saline locked 08/17/23 1115   Proximal 1 Patency/Care flushed w/o difficulty;blood return present;normal saline locked 08/17/23 1115   Current Insertion Depth (cm) 38 cm 08/17/23 1115   Current Exposed Catheter (cm) 0 cm 08/17/23 1115   Extremity Circumference (cm) 30 cm 08/17/23 1115   Number of days: 0            Peripheral IV - Single Lumen 08/16/23 1741 20 G Left Antecubital (Active)   Site Assessment Clean;Dry;Intact;No redness;No swelling 08/18/23 0000   Extremity Assessment Distal to IV No  redness;No abnormal discoloration;No swelling;No warmth 08/17/23 1930   Line Status Saline locked 08/18/23 0000   Dressing Status Clean;Dry;Intact 08/18/23 0000   Dressing Intervention Integrity maintained 08/18/23 0000   Dressing Change Due 08/20/23 08/17/23 1930   Site Change Due 08/20/23 08/17/23 1930   Reason Not Rotated Not due 08/16/23 2004   Number of days: 1            NG/OG Tube 08/17/23 0655 Right nostril (Active)   Tolerance no signs/symptoms of discomfort 08/17/23 1930   Securement secured to nostril center;secured to nostril center w/ adhesive device 08/17/23 1930   Clamp Status/Tolerance unclamped;no emesis;no nausea;no residual;no restlessness 08/17/23 1930   Suction Setting/Drainage Method suction at;low;intermittent setting 08/17/23 1930   Insertion Site Appearance no redness, warmth, tenderness, skin breakdown, drainage 08/17/23 1930   Drainage Brown 08/17/23 1930   Tube Output(mL)(Include Discarded Residual) 300 mL 08/18/23 0540   Number of days: 1       Prev airway: None documented.    Drips:    dextrose 5% lactated ringers 125 mL/hr at 08/18/23 0536    TPN ADULT CENTRAL LINE CUSTOM 42 mL/hr at 08/17/23 2105       Patient Active Problem List   Diagnosis    Emesis    Gastric adenocarcinoma       Review of patient's allergies indicates:   Allergen Reactions    Penicillins Rash       Current Inpatient Medications:   enoxparin  40 mg Subcutaneous Daily    famotidine (PF)  20 mg Intravenous BID    fat emulsion 20%  250 mL Intravenous Daily    oxymetazoline  2 spray Each Nostril BID    potassium chloride  10 mEq Intravenous Q1H    potassium phosphate IVPB  30 mmol Intravenous Once    sodium chloride 0.9%  10 mL Intravenous Q6H       No current facility-administered medications on file prior to encounter.     Current Outpatient Medications on File Prior to Encounter   Medication Sig Dispense Refill    omeprazole (PRILOSEC) 40 MG capsule Take 1 capsule (40 mg total) by mouth once daily. 90  capsule 0    ondansetron (ZOFRAN-ODT) 8 MG TbDL Take 8 mg by mouth 3 (three) times daily.      sucralfate (CARAFATE) 1 gram tablet TAKE 1 TABLET(1 GRAM) BY MOUTH FOUR TIMES DAILY BEFORE MEALS AND AT NIGHT 360 tablet 0       Past Surgical History:   Procedure Laterality Date    ESOPHAGOGASTRODUODENOSCOPY N/A 8/3/2023    Procedure: EGD (ESOPHAGOGASTRODUODENOSCOPY);  Surgeon: Loco Marvin MD;  Location: [facility];  Service: Gastroenterology;  Laterality: N/A;    TONSILLECTOMY         Social History     Socioeconomic History    Marital status:    Tobacco Use    Smoking status: Former     Current packs/day: 0.00     Types: Cigarettes     Quit date: 5/27/2013     Years since quitting: 10.2    Smokeless tobacco: Never   Substance and Sexual Activity    Alcohol use: Yes     Comment: occ    Drug use: Never    Sexual activity: Yes     Partners: Female       OBJECTIVE:     Vital Signs Range (Last 24H):  Temp:  [35.8 °C (96.4 °F)-36.9 °C (98.5 °F)]   Pulse:  [64-93]   Resp:  [18-19]   BP: (131-162)/(64-88)   SpO2:  [93 %-95 %]       Significant Labs:  Lab Results   Component Value Date    WBC 5.41 08/17/2023    HGB 13.0 (L) 08/17/2023    HCT 38.2 (L) 08/17/2023     08/17/2023    CHOL 156 05/19/2023    TRIG 148 08/18/2023    HDL 38 (L) 05/19/2023    ALT 24 08/17/2023    AST 18 08/17/2023     (L) 08/18/2023    K 2.8 (L) 08/18/2023    CL 94 (L) 08/18/2023    CREATININE 0.8 08/18/2023    BUN 10 08/18/2023    CO2 33 (H) 08/18/2023    TSH 0.646 05/19/2023    HGBA1C 5.8 (H) 05/19/2023       Diagnostic Studies: No relevant studies.    EKG:   Results for orders placed or performed during the hospital encounter of 08/16/23   EKG 12-lead    Collection Time: 08/16/23  3:44 PM    Narrative    Test Reason : C16.9,    Vent. Rate : 085 BPM     Atrial Rate : 085 BPM     P-R Int : 142 ms          QRS Dur : 096 ms      QT Int : 482 ms       P-R-T Axes : 083 077 081 degrees     QTc Int : 573 ms    Normal sinus  rhythm with sinus arrhythmia  Biatrial enlargement  Incomplete right bundle branch block    Prolonged QT  Abnormal ECG  No previous ECGs available  Confirmed by PAUL PIKE MD ([id number]) on 8/17/2023 8:23:19 AM    Referred By: [email or web address]   SELF           Confirmed By:PAUL PIKE MD       2D ECHO:  TTE:  No results found for this or any previous visit.    JENNIFER:  No results found for this or any previous visit.    ASSESSMENT/PLAN:           Pre-op Assessment    I have reviewed the Patient Summary Reports.     I have reviewed the Nursing Notes.    I have reviewed the Medications.     Review of Systems  Anesthesia Hx:  No problems with previous Anesthesia  History of prior surgery of interest to airway management or planning: Denies Family Hx of Anesthesia complications.   Denies Personal Hx of Anesthesia complications.   Social:  No Alcohol Use, Former Smoker    Hematology/Oncology:         -- Denies Anemia: Current/Recent Cancer.   Cardiovascular:   Denies Hypertension.  Denies CAD.     Denies CHF. no hyperlipidemia    Pulmonary:   Denies COPD.  Denies Asthma.    Renal/:   Denies Chronic Renal Disease.     Hepatic/GI:   Denies GERD.    Musculoskeletal:   Denies Arthritis.     Neurological:   Denies CVA. Denies Seizures.    Endocrine:   Denies Diabetes.    Psych:   Denies Psychiatric History.          Physical Exam  General: Well nourished, Cooperative, Alert and Oriented  NG Tube   Airway:  Mallampati: I   Mouth Opening: Normal  TM Distance: Normal  Tongue: Normal  Neck ROM: Normal ROM    Dental:  Dentures  Full top and bottom dentures      Anesthesia Plan  Type of Anesthesia, risks & benefits discussed:    Anesthesia Type: Gen ETT  Intra-op Monitoring Plan: Standard ASA Monitors  Post Op Pain Control Plan: multimodal analgesia and IV/PO Opioids PRN  Induction:  IV and rapid sequence  Airway Plan: Direct and Video, Post-Induction  ASA Score: 3  Day of Surgery Review of History & Physical: H&P Update referred to the  surgeon/provider.    Ready For Surgery From Anesthesia Perspective.     .

## 2023-08-19 LAB
ALBUMIN SERPL BCP-MCNC: 3 G/DL (ref 3.5–5.2)
ALP SERPL-CCNC: 39 U/L (ref 55–135)
ALT SERPL W/O P-5'-P-CCNC: 23 U/L (ref 10–44)
ANION GAP SERPL CALC-SCNC: 10 MMOL/L (ref 8–16)
AST SERPL-CCNC: 16 U/L (ref 10–40)
BILIRUB SERPL-MCNC: 0.8 MG/DL (ref 0.1–1)
BUN SERPL-MCNC: 9 MG/DL (ref 8–23)
CALCIUM SERPL-MCNC: 8.4 MG/DL (ref 8.7–10.5)
CHLORIDE SERPL-SCNC: 99 MMOL/L (ref 95–110)
CO2 SERPL-SCNC: 30 MMOL/L (ref 23–29)
CREAT SERPL-MCNC: 0.8 MG/DL (ref 0.5–1.4)
EST. GFR  (NO RACE VARIABLE): >60 ML/MIN/1.73 M^2
GLUCOSE SERPL-MCNC: 103 MG/DL (ref 70–110)
MAGNESIUM SERPL-MCNC: 2.2 MG/DL (ref 1.6–2.6)
PHOSPHATE SERPL-MCNC: 5 MG/DL (ref 2.7–4.5)
POTASSIUM SERPL-SCNC: 3.5 MMOL/L (ref 3.5–5.1)
PROT SERPL-MCNC: 5.7 G/DL (ref 6–8.4)
SODIUM SERPL-SCNC: 139 MMOL/L (ref 136–145)

## 2023-08-19 PROCEDURE — 80053 COMPREHEN METABOLIC PANEL: CPT | Performed by: SURGERY

## 2023-08-19 PROCEDURE — 83735 ASSAY OF MAGNESIUM: CPT | Performed by: SURGERY

## 2023-08-19 PROCEDURE — 20600001 HC STEP DOWN PRIVATE ROOM

## 2023-08-19 PROCEDURE — 84100 ASSAY OF PHOSPHORUS: CPT | Performed by: SURGERY

## 2023-08-19 PROCEDURE — 25000003 PHARM REV CODE 250

## 2023-08-19 PROCEDURE — 25000003 PHARM REV CODE 250: Performed by: STUDENT IN AN ORGANIZED HEALTH CARE EDUCATION/TRAINING PROGRAM

## 2023-08-19 PROCEDURE — A4216 STERILE WATER/SALINE, 10 ML: HCPCS | Performed by: SURGERY

## 2023-08-19 PROCEDURE — A4217 STERILE WATER/SALINE, 500 ML: HCPCS

## 2023-08-19 PROCEDURE — 25000003 PHARM REV CODE 250: Performed by: SURGERY

## 2023-08-19 PROCEDURE — 63600175 PHARM REV CODE 636 W HCPCS

## 2023-08-19 PROCEDURE — B4185 PARENTERAL SOL 10 GM LIPIDS: HCPCS

## 2023-08-19 PROCEDURE — 63600175 PHARM REV CODE 636 W HCPCS: Performed by: STUDENT IN AN ORGANIZED HEALTH CARE EDUCATION/TRAINING PROGRAM

## 2023-08-19 RX ORDER — POTASSIUM CHLORIDE 7.45 MG/ML
10 INJECTION INTRAVENOUS
Status: COMPLETED | OUTPATIENT
Start: 2023-08-19 | End: 2023-08-19

## 2023-08-19 RX ORDER — HYDROMORPHONE HYDROCHLORIDE 1 MG/ML
0.5 INJECTION, SOLUTION INTRAMUSCULAR; INTRAVENOUS; SUBCUTANEOUS
Status: DISCONTINUED | OUTPATIENT
Start: 2023-08-19 | End: 2023-08-23

## 2023-08-19 RX ORDER — POTASSIUM CHLORIDE 7.45 MG/ML
10 INJECTION INTRAVENOUS
Status: DISCONTINUED | OUTPATIENT
Start: 2023-08-19 | End: 2023-08-19

## 2023-08-19 RX ADMIN — ENOXAPARIN SODIUM 40 MG: 40 INJECTION SUBCUTANEOUS at 05:08

## 2023-08-19 RX ADMIN — Medication 10 ML: at 11:08

## 2023-08-19 RX ADMIN — Medication 10 ML: at 12:08

## 2023-08-19 RX ADMIN — POTASSIUM CHLORIDE 10 MEQ: 7.46 INJECTION, SOLUTION INTRAVENOUS at 04:08

## 2023-08-19 RX ADMIN — FAMOTIDINE 20 MG: 10 INJECTION, SOLUTION INTRAVENOUS at 08:08

## 2023-08-19 RX ADMIN — POTASSIUM CHLORIDE 10 MEQ: 7.46 INJECTION, SOLUTION INTRAVENOUS at 06:08

## 2023-08-19 RX ADMIN — POTASSIUM CHLORIDE 10 MEQ: 7.46 INJECTION, SOLUTION INTRAVENOUS at 12:08

## 2023-08-19 RX ADMIN — POTASSIUM CHLORIDE 10 MEQ: 7.46 INJECTION, SOLUTION INTRAVENOUS at 09:08

## 2023-08-19 RX ADMIN — Medication 10 ML: at 05:08

## 2023-08-19 RX ADMIN — POTASSIUM CHLORIDE 10 MEQ: 7.46 INJECTION, SOLUTION INTRAVENOUS at 11:08

## 2023-08-19 RX ADMIN — POTASSIUM CHLORIDE 10 MEQ: 7.46 INJECTION, SOLUTION INTRAVENOUS at 01:08

## 2023-08-19 RX ADMIN — MAGNESIUM SULFATE HEPTAHYDRATE: 500 INJECTION, SOLUTION INTRAMUSCULAR; INTRAVENOUS at 11:08

## 2023-08-19 RX ADMIN — IBUPROFEN 400 MG: 800 INJECTION INTRAVENOUS at 11:08

## 2023-08-19 RX ADMIN — POTASSIUM CHLORIDE 10 MEQ: 7.46 INJECTION, SOLUTION INTRAVENOUS at 03:08

## 2023-08-19 RX ADMIN — Medication 10 ML: at 01:08

## 2023-08-19 RX ADMIN — POTASSIUM CHLORIDE 10 MEQ: 7.46 INJECTION, SOLUTION INTRAVENOUS at 02:08

## 2023-08-19 RX ADMIN — Medication 10 ML: at 06:08

## 2023-08-19 RX ADMIN — POTASSIUM PHOSPHATE, MONOBASIC AND POTASSIUM PHOSPHATE, DIBASIC 30 MMOL: 224; 236 INJECTION, SOLUTION, CONCENTRATE INTRAVENOUS at 02:08

## 2023-08-19 RX ADMIN — SOYBEAN OIL 250 ML: 20 INJECTION, SOLUTION INTRAVENOUS at 11:08

## 2023-08-19 NOTE — SUBJECTIVE & OBJECTIVE
Interval History: No acute events overnight, HDS, AF.     Medications:  Continuous Infusions:   TPN ADULT CENTRAL LINE CUSTOM 42 mL/hr at 08/18/23 2128     Scheduled Meds:   enoxparin  40 mg Subcutaneous Daily    famotidine (PF)  20 mg Intravenous BID    fat emulsion 20%  250 mL Intravenous Daily    oxymetazoline  2 spray Each Nostril BID    potassium phosphate IVPB  30 mmol Intravenous Once    sodium chloride 0.9%  10 mL Intravenous Q6H     PRN Meds:HYDROmorphone, ibuprofen, LIDOcaine (PF) 10 mg/ml (1%), LIDOcaine HCL 10 mg/ml (1%), prochlorperazine, sodium chloride 0.9%, Flushing PICC Protocol **AND** sodium chloride 0.9% **AND** sodium chloride 0.9%     Review of patient's allergies indicates:   Allergen Reactions    Penicillins Rash     Objective:     Vital Signs (Most Recent):  Temp: 98.8 °F (37.1 °C) (08/18/23 1953)  Pulse: 69 (08/18/23 1953)  Resp: 19 (08/18/23 1953)  BP: 121/64 (08/18/23 1953)  SpO2: 96 % (08/18/23 1953) Vital Signs (24h Range):  Temp:  [98.2 °F (36.8 °C)-98.8 °F (37.1 °C)] 98.8 °F (37.1 °C)  Pulse:  [69-81] 69  Resp:  [18-19] 19  SpO2:  [93 %-96 %] 96 %  BP: (117-127)/(64-69) 121/64     Weight: 74.4 kg (164 lb 0.4 oz)  Body mass index is 22.88 kg/m².    Intake/Output - Last 3 Shifts         08/17 0700  08/18 0659 08/18 0700 08/19 0659 08/19 0700 08/20 0659    I.V. (mL/kg) 10 (0.1)      Total Intake(mL/kg) 10 (0.1)      Drains 1000 500     Stool  0     Total Output 1000 500     Net -990 -500            Stool Occurrence 0 x 1 x              Physical Exam  Vitals and nursing note reviewed.   Constitutional:       General: He is not in acute distress.  Cardiovascular:      Rate and Rhythm: Normal rate.      Pulses: Normal pulses.   Pulmonary:      Effort: Pulmonary effort is normal. No respiratory distress.   Abdominal:      General: There is no distension.      Palpations: Abdomen is soft.      Tenderness: There is no abdominal tenderness.   Neurological:      Mental Status: He is alert.           Significant Labs:  I have reviewed all pertinent lab results within the past 24 hours.    Significant Diagnostics:  I have reviewed all pertinent imaging results/findings within the past 24 hours.

## 2023-08-19 NOTE — ASSESSMENT & PLAN NOTE
Patient is a 62 y M w/ no significant past medical history (has not seen a doctor in over 40 years) who presented to the ER on 8/16 with abdominal pain and a recent diagnosis of gastric adenocarcinoma. No bowel movements for several weeks, concerning for obstruction. After review of images, very small amounts of contrast going through from stomach to duodenum. Will plan for distal gastrectomy on 8/18 after repletion of fluids and balance of electrolytes.     - Plan for OR Gastrectomy and EGD 8/21  - Informed consent to be obtained   -NGT to LIS  -NPO  -TPN  -PPI  -DVT ppx  -Ambulate, OOBTC  - Daily labs  - Replete electrolytes PRN       Dispo: Anticipate surgery (EGD, gastrectomy) on 8/21

## 2023-08-19 NOTE — PROGRESS NOTES
[facility] - [facility]  General Surgery  Progress Note    Subjective:         Post-Op Info:  Procedure(s) (LRB):  GASTRECTOMY (N/A)  EGD (ESOPHAGOGASTRODUODENOSCOPY) (N/A)         Interval History: No issues overnight, feeling well this morning. 750 cc thin brown output from NGT. Ambulating multiple times down hallways/stairs. NPO with TPN. Slight improvement to sinus drainage from right nare (NGT site)    Medications:  Continuous Infusions:   TPN ADULT CENTRAL LINE CUSTOM 42 mL/hr at 08/18/23 2128    TPN ADULT CENTRAL LINE CUSTOM       Scheduled Meds:   enoxparin  40 mg Subcutaneous Daily    famotidine (PF)  20 mg Intravenous BID    fat emulsion 20%  250 mL Intravenous Daily    fat emulsion 20%  250 mL Intravenous Daily    oxymetazoline  2 spray Each Nostril BID    potassium chloride  10 mEq Intravenous Q1H    sodium chloride 0.9%  10 mL Intravenous Q6H     PRN Meds:HYDROmorphone, ibuprofen, LIDOcaine (PF) 10 mg/ml (1%), LIDOcaine HCL 10 mg/ml (1%), prochlorperazine, sodium chloride 0.9%, Flushing PICC Protocol **AND** sodium chloride 0.9% **AND** sodium chloride 0.9%     Review of patient's allergies indicates:   Allergen Reactions    Penicillins Rash     Objective:     Vital Signs (Most Recent):  Temp: 98.8 °F (37.1 °C) (08/18/23 1953)  Pulse: 69 (08/18/23 1953)  Resp: 19 (08/18/23 1953)  BP: 121/64 (08/18/23 1953)  SpO2: 96 % (08/18/23 1953) Vital Signs (24h Range):  Temp:  [98.2 °F (36.8 °C)-98.8 °F (37.1 °C)] 98.8 °F (37.1 °C)  Pulse:  [69-81] 69  Resp:  [18-19] 19  SpO2:  [93 %-96 %] 96 %  BP: (117-127)/(64-69) 121/64     Weight: 74.4 kg (164 lb 0.4 oz)  Body mass index is 22.88 kg/m².    Intake/Output - Last 3 Shifts         08/17 0700  08/18 0659 08/18 0700 08/19 0659 08/19 0700 08/20 0659    I.V. (mL/kg) 10 (0.1)      Total Intake(mL/kg) 10 (0.1)      Drains 1000 500     Stool  0     Total Output 1000 500     Net -990 -500            Stool Occurrence 0 x 1 x              Physical Exam  Vitals and  nursing note reviewed.   Constitutional:       General: He is not in acute distress.  Cardiovascular:      Rate and Rhythm: Normal rate.      Pulses: Normal pulses.   Pulmonary:      Effort: Pulmonary effort is normal. No respiratory distress.   Abdominal:      General: There is no distension.      Palpations: Abdomen is soft.      Tenderness: There is no abdominal tenderness.      Comments:  thin, light brown output overnight; soft non-tender and non-distended abdomen    Neurological:      Mental Status: He is alert.          Significant Labs:  I have reviewed all pertinent lab results within the past 24 hours.  CBC:   Recent Labs   Lab 08/17/23  0458   WBC 5.41   RBC 4.75   HGB 13.0*   HCT 38.2*      MCV 80*   MCH 27.4   MCHC 34.0     BMP:   Recent Labs   Lab 08/19/23  0559         K 3.5   CL 99   CO2 30*   BUN 9   CREATININE 0.8   CALCIUM 8.4*   MG 2.2     CMP:   Recent Labs   Lab 08/19/23  0559      CALCIUM 8.4*   ALBUMIN 3.0*   PROT 5.7*      K 3.5   CO2 30*   CL 99   BUN 9   CREATININE 0.8   ALKPHOS 39*   ALT 23   AST 16   BILITOT 0.8       Significant Diagnostics:  I have reviewed all pertinent imaging results/findings within the past 24 hours.    Assessment/Plan:     Gastric adenocarcinoma  Patient is a 62 y M w/ no significant past medical history (has not seen a doctor in over 40 years) who presented to the ER on 8/16 with abdominal pain and a recent diagnosis of gastric adenocarcinoma. No bowel movements for several weeks, concerning for obstruction. After review of images, very small amounts of contrast going through from stomach to duodenum. Will plan for distal gastrectomy on 8/18 after repletion of fluids and balance of electrolytes.     - Plan for OR Gastrectomy and EGD 8/21. Will obtain informed consent  -NGT to LIS  -NPO  -TPN  -PPI  -DVT ppx  -Ambulate, OOBTC  - Daily labs  - Replete electrolytes PRN       Dispo: Anticipate surgery (EGD, gastrectomy) on  8/21        Diana Alvares MD  General Surgery  [facility]  GIS

## 2023-08-19 NOTE — PLAN OF CARE
Problem: Adult Inpatient Plan of Care  Goal: Plan of Care Review  Outcome: Ongoing, Progressing  Goal: Patient-Specific Goal (Individualized)  Outcome: Ongoing, Progressing  Goal: Absence of Hospital-Acquired Illness or Injury  Outcome: Ongoing, Progressing  Goal: Optimal Comfort and Wellbeing  Outcome: Ongoing, Progressing  Goal: Readiness for Transition of Care  Outcome: Ongoing, Progressing     Problem: Infection  Goal: Absence of Infection Signs and Symptoms  Outcome: Ongoing, Progressing     Problem: Impaired Wound Healing  Goal: Optimal Wound Healing  Outcome: Ongoing, Progressing       [facility] Plan of Care Note  Dx: Emesis     Shift Events: none     Goals of Care: Electrolyte Maintenance     Neuro: WDL     Vital Signs: WDL     Respiratory: WDL     Diet: NPO     Is patient tolerating current diet? NA     GTTS: TPN/ Lipids     Urine Output/Bowel Movement: up to toilet     Drains/Tubes/Tube Feeds (include total output/shift):      Lines: R UA Double Lumen PICC PIVx 1     Accuchecks: NA     Skin: WDL     Fall Risk Score: 4     Activity level? Independent     Any scheduled procedures?      Any safety concerns? NA     Other: NA

## 2023-08-19 NOTE — SUBJECTIVE & OBJECTIVE
Interval History: No issues overnight, feeling well this morning. 750 cc thin brown output from NGT. Ambulating multiple times down hallways/stairs. NPO with TPN. Slight improvement to sinus drainage from right nare (NGT site)    Medications:  Continuous Infusions:   TPN ADULT CENTRAL LINE CUSTOM 42 mL/hr at 08/18/23 2128    TPN ADULT CENTRAL LINE CUSTOM       Scheduled Meds:   enoxparin  40 mg Subcutaneous Daily    famotidine (PF)  20 mg Intravenous BID    fat emulsion 20%  250 mL Intravenous Daily    fat emulsion 20%  250 mL Intravenous Daily    oxymetazoline  2 spray Each Nostril BID    potassium chloride  10 mEq Intravenous Q1H    sodium chloride 0.9%  10 mL Intravenous Q6H     PRN Meds:HYDROmorphone, ibuprofen, LIDOcaine (PF) 10 mg/ml (1%), LIDOcaine HCL 10 mg/ml (1%), prochlorperazine, sodium chloride 0.9%, Flushing PICC Protocol **AND** sodium chloride 0.9% **AND** sodium chloride 0.9%     Review of patient's allergies indicates:   Allergen Reactions    Penicillins Rash     Objective:     Vital Signs (Most Recent):  Temp: 98.8 °F (37.1 °C) (08/18/23 1953)  Pulse: 69 (08/18/23 1953)  Resp: 19 (08/18/23 1953)  BP: 121/64 (08/18/23 1953)  SpO2: 96 % (08/18/23 1953) Vital Signs (24h Range):  Temp:  [98.2 °F (36.8 °C)-98.8 °F (37.1 °C)] 98.8 °F (37.1 °C)  Pulse:  [69-81] 69  Resp:  [18-19] 19  SpO2:  [93 %-96 %] 96 %  BP: (117-127)/(64-69) 121/64     Weight: 74.4 kg (164 lb 0.4 oz)  Body mass index is 22.88 kg/m².    Intake/Output - Last 3 Shifts         08/17 0700  08/18 0659 08/18 0700 08/19 0659 08/19 0700 08/20 0659    I.V. (mL/kg) 10 (0.1)      Total Intake(mL/kg) 10 (0.1)      Drains 1000 500     Stool  0     Total Output 1000 500     Net -990 -500            Stool Occurrence 0 x 1 x              Physical Exam  Vitals and nursing note reviewed.   Constitutional:       General: He is not in acute distress.  Cardiovascular:      Rate and Rhythm: Normal rate.      Pulses: Normal pulses.   Pulmonary:       Effort: Pulmonary effort is normal. No respiratory distress.   Abdominal:      General: There is no distension.      Palpations: Abdomen is soft.      Tenderness: There is no abdominal tenderness.      Comments:  thin, light brown output overnight; soft non-tender and non-distended abdomen    Neurological:      Mental Status: He is alert.          Significant Labs:  I have reviewed all pertinent lab results within the past 24 hours.  CBC:   Recent Labs   Lab 08/17/23  0458   WBC 5.41   RBC 4.75   HGB 13.0*   HCT 38.2*      MCV 80*   MCH 27.4   MCHC 34.0     BMP:   Recent Labs   Lab 08/19/23  0559         K 3.5   CL 99   CO2 30*   BUN 9   CREATININE 0.8   CALCIUM 8.4*   MG 2.2     CMP:   Recent Labs   Lab 08/19/23  0559      CALCIUM 8.4*   ALBUMIN 3.0*   PROT 5.7*      K 3.5   CO2 30*   CL 99   BUN 9   CREATININE 0.8   ALKPHOS 39*   ALT 23   AST 16   BILITOT 0.8       Significant Diagnostics:  I have reviewed all pertinent imaging results/findings within the past 24 hours.

## 2023-08-19 NOTE — PLAN OF CARE
[facility] Plan of Care Note  Dx emesis     Shift Events K replacement today      Goals of Care: no emesis     Neuro: AAOx4     Vital Signs: stable            Respiratory: room air     Diet: NPO     Is patient tolerating current diet? yes     GTTS: TPN     Urine Output/Bowel Movement:      Drains/Tubes/Tube Feeds (include total output/shift): nasogastric tube drained 500 ml     Lines: right upper PICC, peripheral        Accuchecks:n/a     Skin: edness to bilateral buttocks, left elbow skin tear     Fall Risk Score: 6     Activity level? independent     Any scheduled procedures? N/a     Any safety concerns? N/a     Other: n/a

## 2023-08-19 NOTE — PROGRESS NOTES
[facility]  General Surgery  Progress Note    Subjective:     History of Present Illness:  No notes on file    Post-Op Info:  Procedure(s) (LRB):  GASTRECTOMY (N/A)  EGD (ESOPHAGOGASTRODUODENOSCOPY) (N/A)         Interval History: No acute events overnight, HDS, AF.      Medications:  Continuous Infusions:   TPN ADULT CENTRAL LINE CUSTOM 42 mL/hr at 08/18/23 2128    TPN ADULT CENTRAL LINE CUSTOM        Scheduled Meds:   enoxparin  40 mg Subcutaneous Daily    famotidine (PF)  20 mg Intravenous BID    fat emulsion 20%  250 mL Intravenous Daily    fat emulsion 20%  250 mL Intravenous Daily    oxymetazoline  2 spray Each Nostril BID    potassium chloride  10 mEq Intravenous Q1H    sodium chloride 0.9%  10 mL Intravenous Q6H      PRN Meds:HYDROmorphone, ibuprofen, LIDOcaine (PF) 10 mg/ml (1%), LIDOcaine HCL 10 mg/ml (1%), prochlorperazine, sodium chloride 0.9%, Flushing PICC Protocol **AND** sodium chloride 0.9% **AND** sodium chloride 0.9%           Review of patient's allergies indicates:   Allergen Reactions    Penicillins Rash      Objective:      Vital Signs (Most Recent):  Temp: 98.8 °F (37.1 °C) (08/18/23 1953)  Pulse: 69 (08/18/23 1953)  Resp: 19 (08/18/23 1953)  BP: 121/64 (08/18/23 1953)  SpO2: 96 % (08/18/23 1953) Vital Signs (24h Range):  Temp:  [98.2 °F (36.8 °C)-98.8 °F (37.1 °C)] 98.8 °F (37.1 °C)  Pulse:  [69-81] 69  Resp:  [18-19] 19  SpO2:  [93 %-96 %] 96 %  BP: (117-127)/(64-69) 121/64      Weight: 74.4 kg (164 lb 0.4 oz)  Body mass index is 22.88 kg/m².     Intake/Output - Last 3 Shifts           08/17 0700  08/18 0659 08/18 0700 08/19 0659 08/19 0700 08/20 0659     I.V. (mL/kg) 10 (0.1)         Total Intake(mL/kg) 10 (0.1)         Drains 1000 500       Stool   0       Total Output 1000 500       Net -990 -500                   Stool Occurrence 0 x 1 x                  Physical Exam  Vitals and nursing note reviewed.   Constitutional:       General: He is not in acute  distress.  Cardiovascular:      Rate and Rhythm: Normal rate.      Pulses: Normal pulses.   Pulmonary:      Effort: Pulmonary effort is normal. No respiratory distress.   Abdominal:      General: There is no distension.      Palpations: Abdomen is soft.      Tenderness: There is no abdominal tenderness.      Comments:  thin, light brown output overnight; soft non-tender and non-distended abdomen    Neurological:      Mental Status: He is alert.            Significant Labs:  I have reviewed all pertinent lab results within the past 24 hours.  CBC:       Recent Labs   Lab 08/17/23  0458   WBC 5.41   RBC 4.75   HGB 13.0*   HCT 38.2*      MCV 80*   MCH 27.4   MCHC 34.0      BMP:       Recent Labs   Lab 08/19/23  0559         K 3.5   CL 99   CO2 30*   BUN 9   CREATININE 0.8   CALCIUM 8.4*   MG 2.2      CMP:       Recent Labs   Lab 08/19/23  0559      CALCIUM 8.4*   ALBUMIN 3.0*   PROT 5.7*      K 3.5   CO2 30*   CL 99   BUN 9   CREATININE 0.8   ALKPHOS 39*   ALT 23   AST 16   BILITOT 0.8         Significant Diagnostics:  I have reviewed all pertinent imaging results/findings within the past 24 hours.      Assessment/Plan:     Gastric adenocarcinoma  Patient is a 62 y M w/ no significant past medical history (has not seen a doctor in over 40 years) who presented to the ER on 8/16 with abdominal pain and a recent diagnosis of gastric adenocarcinoma. No bowel movements for several weeks, concerning for obstruction. After review of images, very small amounts of contrast going through from stomach to duodenum. Will plan for distal gastrectomy on 8/18 after repletion of fluids and balance of electrolytes.     - Plan for OR Gastrectomy and EGD 8/21  - Informed consent to be obtained   -NGT to LIS  -NPO  -TPN  -PPI  -DVT ppx  -Ambulate, OOBTC  - Daily labs  - Replete electrolytes PRN       Dispo: Anticipate surgery (EGD, gastrectomy) on 8/21        Angel Coffey MD  General Surgery  [facility]  [facility]

## 2023-08-19 NOTE — ASSESSMENT & PLAN NOTE
Patient is a 62 y M w/ no significant past medical history (has not seen a doctor in over 40 years) who presented to the ER on 8/16 with abdominal pain and a recent diagnosis of gastric adenocarcinoma. No bowel movements for several weeks, concerning for obstruction. After review of images, very small amounts of contrast going through from stomach to duodenum. Will plan for distal gastrectomy on 8/18 after repletion of fluids and balance of electrolytes.     - Plan for OR Gastrectomy and EGD 8/21. Will obtain informed consent  -NGT to LIS  -NPO  -TPN  -PPI  -DVT ppx  -Ambulate, OOBTC  - Daily labs  - Replete electrolytes PRN       Dispo: Anticipate surgery (EGD, gastrectomy) on 8/21

## 2023-08-20 LAB
ALBUMIN SERPL BCP-MCNC: 2.9 G/DL (ref 3.5–5.2)
ALP SERPL-CCNC: 37 U/L (ref 55–135)
ALT SERPL W/O P-5'-P-CCNC: 26 U/L (ref 10–44)
ANION GAP SERPL CALC-SCNC: 8 MMOL/L (ref 8–16)
AST SERPL-CCNC: 20 U/L (ref 10–40)
BILIRUB SERPL-MCNC: 0.8 MG/DL (ref 0.1–1)
BUN SERPL-MCNC: 16 MG/DL (ref 8–23)
CALCIUM SERPL-MCNC: 8.6 MG/DL (ref 8.7–10.5)
CHLORIDE SERPL-SCNC: 102 MMOL/L (ref 95–110)
CO2 SERPL-SCNC: 28 MMOL/L (ref 23–29)
CREAT SERPL-MCNC: 0.8 MG/DL (ref 0.5–1.4)
EST. GFR  (NO RACE VARIABLE): >60 ML/MIN/1.73 M^2
GLUCOSE SERPL-MCNC: 95 MG/DL (ref 70–110)
MAGNESIUM SERPL-MCNC: 2.1 MG/DL (ref 1.6–2.6)
PHOSPHATE SERPL-MCNC: 3.6 MG/DL (ref 2.7–4.5)
POTASSIUM SERPL-SCNC: 4.2 MMOL/L (ref 3.5–5.1)
PROT SERPL-MCNC: 5.7 G/DL (ref 6–8.4)
SODIUM SERPL-SCNC: 138 MMOL/L (ref 136–145)

## 2023-08-20 PROCEDURE — 80053 COMPREHEN METABOLIC PANEL: CPT | Performed by: NURSE PRACTITIONER

## 2023-08-20 PROCEDURE — 63600175 PHARM REV CODE 636 W HCPCS: Performed by: STUDENT IN AN ORGANIZED HEALTH CARE EDUCATION/TRAINING PROGRAM

## 2023-08-20 PROCEDURE — A4217 STERILE WATER/SALINE, 500 ML: HCPCS | Performed by: SURGERY

## 2023-08-20 PROCEDURE — 83735 ASSAY OF MAGNESIUM: CPT | Performed by: STUDENT IN AN ORGANIZED HEALTH CARE EDUCATION/TRAINING PROGRAM

## 2023-08-20 PROCEDURE — 25000003 PHARM REV CODE 250: Performed by: STUDENT IN AN ORGANIZED HEALTH CARE EDUCATION/TRAINING PROGRAM

## 2023-08-20 PROCEDURE — 20600001 HC STEP DOWN PRIVATE ROOM

## 2023-08-20 PROCEDURE — 84100 ASSAY OF PHOSPHORUS: CPT | Performed by: STUDENT IN AN ORGANIZED HEALTH CARE EDUCATION/TRAINING PROGRAM

## 2023-08-20 PROCEDURE — B4185 PARENTERAL SOL 10 GM LIPIDS: HCPCS

## 2023-08-20 PROCEDURE — 25000003 PHARM REV CODE 250: Performed by: SURGERY

## 2023-08-20 PROCEDURE — 63600175 PHARM REV CODE 636 W HCPCS: Performed by: SURGERY

## 2023-08-20 PROCEDURE — 25000003 PHARM REV CODE 250

## 2023-08-20 PROCEDURE — A4216 STERILE WATER/SALINE, 10 ML: HCPCS | Performed by: SURGERY

## 2023-08-20 RX ADMIN — OXYMETAZOLINE HCL 2 SPRAY: 0.05 SPRAY NASAL at 08:08

## 2023-08-20 RX ADMIN — FAMOTIDINE 20 MG: 10 INJECTION, SOLUTION INTRAVENOUS at 09:08

## 2023-08-20 RX ADMIN — Medication 10 ML: at 06:08

## 2023-08-20 RX ADMIN — MAGNESIUM SULFATE HEPTAHYDRATE: 500 INJECTION, SOLUTION INTRAMUSCULAR; INTRAVENOUS at 11:08

## 2023-08-20 RX ADMIN — SOYBEAN OIL 250 ML: 20 INJECTION, SOLUTION INTRAVENOUS at 11:08

## 2023-08-20 RX ADMIN — Medication 10 ML: at 12:08

## 2023-08-20 RX ADMIN — FAMOTIDINE 20 MG: 10 INJECTION, SOLUTION INTRAVENOUS at 12:08

## 2023-08-20 RX ADMIN — ENOXAPARIN SODIUM 40 MG: 40 INJECTION SUBCUTANEOUS at 06:08

## 2023-08-20 RX ADMIN — IBUPROFEN 400 MG: 800 INJECTION INTRAVENOUS at 06:08

## 2023-08-20 RX ADMIN — FAMOTIDINE 20 MG: 10 INJECTION, SOLUTION INTRAVENOUS at 08:08

## 2023-08-20 NOTE — SUBJECTIVE & OBJECTIVE
Interval History: No acute events overnight, HDS, AF. No N/V, ambulating okay     Medications:  Continuous Infusions:   TPN ADULT CENTRAL LINE CUSTOM 45 mL/hr at 08/19/23 2354    TPN ADULT CENTRAL LINE CUSTOM       Scheduled Meds:   enoxparin  40 mg Subcutaneous Daily    famotidine (PF)  20 mg Intravenous BID    fat emulsion 20%  250 mL Intravenous Daily    fat emulsion 20%  250 mL Intravenous Daily    oxymetazoline  2 spray Each Nostril BID    sodium chloride 0.9%  10 mL Intravenous Q6H     PRN Meds:HYDROmorphone, ibuprofen, LIDOcaine (PF) 10 mg/ml (1%), LIDOcaine HCL 10 mg/ml (1%), prochlorperazine, sodium chloride 0.9%, Flushing PICC Protocol **AND** sodium chloride 0.9% **AND** sodium chloride 0.9%     Review of patient's allergies indicates:   Allergen Reactions    Penicillins Rash     Objective:     Vital Signs (Most Recent):  Temp: 98 °F (36.7 °C) (08/20/23 0734)  Pulse: 98 (08/20/23 0734)  Resp: 20 (08/20/23 0734)  BP: 104/60 (08/20/23 0734)  SpO2: 97 % (08/20/23 0734) Vital Signs (24h Range):  Temp:  [98 °F (36.7 °C)-98.6 °F (37 °C)] 98 °F (36.7 °C)  Pulse:  [72-98] 98  Resp:  [17-20] 20  SpO2:  [94 %-99 %] 97 %  BP: (104-142)/(60-79) 104/60     Weight: 74.4 kg (164 lb 0.4 oz)  Body mass index is 22.88 kg/m².    Intake/Output - Last 3 Shifts         08/18 0700 08/19 0659 08/19 0700 08/20 0659 08/20 0700 08/21 0659    P.O.  0     I.V. (mL/kg)       Total Intake(mL/kg)  0 (0)     Drains 500 500     Stool 0      Total Output 500 500     Net -500 -500            Stool Occurrence 1 x               Physical Exam  Vitals and nursing note reviewed.   Constitutional:       General: He is not in acute distress.  Cardiovascular:      Rate and Rhythm: Normal rate.      Pulses: Normal pulses.   Pulmonary:      Effort: Pulmonary effort is normal. No respiratory distress.   Abdominal:      General: There is no distension.      Palpations: Abdomen is soft.      Tenderness: There is no abdominal tenderness.       Comments: NGT thin, light brown output overnight; soft non-tender and non-distended abdomen    Neurological:      Mental Status: He is alert.          Significant Labs:  I have reviewed all pertinent lab results within the past 24 hours.  CBC:   Recent Labs   Lab 08/17/23  0458   WBC 5.41   RBC 4.75   HGB 13.0*   HCT 38.2*      MCV 80*   MCH 27.4   MCHC 34.0     CMP:   Recent Labs   Lab 08/20/23  0551   GLU 95   CALCIUM 8.6*   ALBUMIN 2.9*   PROT 5.7*      K 4.2   CO2 28      BUN 16   CREATININE 0.8   ALKPHOS 37*   ALT 26   AST 20   BILITOT 0.8       Significant Diagnostics:  I have reviewed all pertinent imaging results/findings within the past 24 hours.

## 2023-08-20 NOTE — ASSESSMENT & PLAN NOTE
Patient is a 62 y M w/ no significant past medical history (has not seen a doctor in over 40 years) who presented to the ER on 8/16 with abdominal pain and a recent diagnosis of gastric adenocarcinoma. No bowel movements for several weeks, concerning for obstruction. After review of images, very small amounts of contrast going through from stomach to duodenum. Will plan for distal gastrectomy on 8/18 after repletion of fluids and balance of electrolytes.     - Plan for OR Gastrectomy and EGD 8/21. Informed consent obtained   -NGT to LIS  -NPO  -TPN  -PPI  -DVT ppx  -Ambulate, OOBTC  - Daily labs  - Replete electrolytes PRN       Dispo: Anticipate surgery (EGD, gastrectomy) on 8/21

## 2023-08-20 NOTE — PLAN OF CARE
Problem: Adult Inpatient Plan of Care  Goal: Plan of Care Review  Outcome: Ongoing, Progressing  Goal: Patient-Specific Goal (Individualized)  Outcome: Ongoing, Progressing  Goal: Absence of Hospital-Acquired Illness or Injury  Outcome: Ongoing, Progressing  Goal: Optimal Comfort and Wellbeing  Outcome: Ongoing, Progressing  Goal: Readiness for Transition of Care  Outcome: Ongoing, Progressing     Problem: Infection  Goal: Absence of Infection Signs and Symptoms  Outcome: Ongoing, Progressing     Problem: Impaired Wound Healing  Goal: Optimal Wound Healing  Outcome: Ongoing, Progressing                                              [facility] Plan of Care Note  Dx: Emesis     Shift Events: none     Goals of Care: TPN/Lipids, Electrolyte Maintenance     Neuro: WDL     Vital Signs: WDL     Respiratory: WDL     Diet: NPO     Is patient tolerating current diet? NA     GTTS: TPN/ Lipids     Urine Output/Bowel Movement: up to toilet     Drains/Tubes/Tube Feeds (include total output/shift): see flow sheets     Lines: R UA Double Lumen PICC PIVx 1     Accuchecks: NA     Skin: WDL     Fall Risk Score: 4     Activity level? Independent     Any scheduled procedures? 8/21     Any safety concerns? NA     Other: no changes overnight.

## 2023-08-20 NOTE — PROGRESS NOTES
[facility]  General Surgery  Progress Note    Subjective:     History of Present Illness:  No notes on file    Post-Op Info:  Procedure(s) (LRB):  GASTRECTOMY (N/A)  EGD (ESOPHAGOGASTRODUODENOSCOPY) (N/A)         Interval History: No acute events overnight, HDS, AF. No N/V, ambulating okay     Medications:  Continuous Infusions:   TPN ADULT CENTRAL LINE CUSTOM 45 mL/hr at 08/19/23 2354    TPN ADULT CENTRAL LINE CUSTOM       Scheduled Meds:   enoxparin  40 mg Subcutaneous Daily    famotidine (PF)  20 mg Intravenous BID    fat emulsion 20%  250 mL Intravenous Daily    fat emulsion 20%  250 mL Intravenous Daily    oxymetazoline  2 spray Each Nostril BID    sodium chloride 0.9%  10 mL Intravenous Q6H     PRN Meds:HYDROmorphone, ibuprofen, LIDOcaine (PF) 10 mg/ml (1%), LIDOcaine HCL 10 mg/ml (1%), prochlorperazine, sodium chloride 0.9%, Flushing PICC Protocol **AND** sodium chloride 0.9% **AND** sodium chloride 0.9%     Review of patient's allergies indicates:   Allergen Reactions    Penicillins Rash     Objective:     Vital Signs (Most Recent):  Temp: 98 °F (36.7 °C) (08/20/23 0734)  Pulse: 98 (08/20/23 0734)  Resp: 20 (08/20/23 0734)  BP: 104/60 (08/20/23 0734)  SpO2: 97 % (08/20/23 0734) Vital Signs (24h Range):  Temp:  [98 °F (36.7 °C)-98.6 °F (37 °C)] 98 °F (36.7 °C)  Pulse:  [72-98] 98  Resp:  [17-20] 20  SpO2:  [94 %-99 %] 97 %  BP: (104-142)/(60-79) 104/60     Weight: 74.4 kg (164 lb 0.4 oz)  Body mass index is 22.88 kg/m².    Intake/Output - Last 3 Shifts         08/18 0700 08/19 0659 08/19 0700 08/20 0659 08/20 0700 08/21 0659    P.O.  0     I.V. (mL/kg)       Total Intake(mL/kg)  0 (0)     Drains 500 500     Stool 0      Total Output 500 500     Net -500 -500            Stool Occurrence 1 x               Physical Exam  Vitals and nursing note reviewed.   Constitutional:       General: He is not in acute distress.  Cardiovascular:      Rate and Rhythm: Normal rate.      Pulses: Normal pulses.    Pulmonary:      Effort: Pulmonary effort is normal. No respiratory distress.   Abdominal:      General: There is no distension.      Palpations: Abdomen is soft.      Tenderness: There is no abdominal tenderness.      Comments: NGT thin, light brown output overnight; soft non-tender and non-distended abdomen    Neurological:      Mental Status: He is alert.          Significant Labs:  I have reviewed all pertinent lab results within the past 24 hours.  CBC:   Recent Labs   Lab 08/17/23  0458   WBC 5.41   RBC 4.75   HGB 13.0*   HCT 38.2*      MCV 80*   MCH 27.4   MCHC 34.0     CMP:   Recent Labs   Lab 08/20/23  0551   GLU 95   CALCIUM 8.6*   ALBUMIN 2.9*   PROT 5.7*      K 4.2   CO2 28      BUN 16   CREATININE 0.8   ALKPHOS 37*   ALT 26   AST 20   BILITOT 0.8       Significant Diagnostics:  I have reviewed all pertinent imaging results/findings within the past 24 hours.    Assessment/Plan:     Gastric adenocarcinoma  Patient is a 62 y M w/ no significant past medical history (has not seen a doctor in over 40 years) who presented to the ER on 8/16 with abdominal pain and a recent diagnosis of gastric adenocarcinoma. No bowel movements for several weeks, concerning for obstruction. After review of images, very small amounts of contrast going through from stomach to duodenum. Will plan for distal gastrectomy on 8/18 after repletion of fluids and balance of electrolytes.     - Plan for OR Gastrectomy and EGD 8/21. Informed consent obtained   -NGT to LIS  -NPO  -TPN  -PPI  -DVT ppx  -Ambulate, OOBTC  - Daily labs  - Replete electrolytes PRN       Dispo: Anticipate surgery (EGD, gastrectomy) on 8/21        Angel Coffey MD  General Surgery  [facility]

## 2023-08-21 ENCOUNTER — ANESTHESIA (OUTPATIENT)
Dept: SURGERY | Facility: HOSPITAL | Age: 63
DRG: 326 | End: 2023-08-21
Payer: COMMERCIAL

## 2023-08-21 LAB
ALBUMIN SERPL BCP-MCNC: 3 G/DL (ref 3.5–5.2)
ALP SERPL-CCNC: 38 U/L (ref 55–135)
ALT SERPL W/O P-5'-P-CCNC: 29 U/L (ref 10–44)
ANION GAP SERPL CALC-SCNC: 7 MMOL/L (ref 8–16)
AST SERPL-CCNC: 22 U/L (ref 10–40)
BILIRUB SERPL-MCNC: 0.6 MG/DL (ref 0.1–1)
BUN SERPL-MCNC: 23 MG/DL (ref 8–23)
CALCIUM SERPL-MCNC: 8.7 MG/DL (ref 8.7–10.5)
CHLORIDE SERPL-SCNC: 106 MMOL/L (ref 95–110)
CO2 SERPL-SCNC: 28 MMOL/L (ref 23–29)
CREAT SERPL-MCNC: 0.8 MG/DL (ref 0.5–1.4)
EST. GFR  (NO RACE VARIABLE): >60 ML/MIN/1.73 M^2
GLUCOSE SERPL-MCNC: 99 MG/DL (ref 70–110)
MAGNESIUM SERPL-MCNC: 2.1 MG/DL (ref 1.6–2.6)
PHOSPHATE SERPL-MCNC: 3.1 MG/DL (ref 2.7–4.5)
POTASSIUM SERPL-SCNC: 4.2 MMOL/L (ref 3.5–5.1)
PROT SERPL-MCNC: 5.8 G/DL (ref 6–8.4)
SODIUM SERPL-SCNC: 141 MMOL/L (ref 136–145)

## 2023-08-21 PROCEDURE — 49999 PR BIOPSY, PERITONEUM, OPEN: ICD-10-PCS | Mod: ,,, | Performed by: SURGERY

## 2023-08-21 PROCEDURE — 20600001 HC STEP DOWN PRIVATE ROOM

## 2023-08-21 PROCEDURE — 43632 REMOVAL OF STOMACH PARTIAL: CPT | Mod: ,,, | Performed by: SURGERY

## 2023-08-21 PROCEDURE — 71000033 HC RECOVERY, INTIAL HOUR: Performed by: SURGERY

## 2023-08-21 PROCEDURE — 25000003 PHARM REV CODE 250: Performed by: NURSE ANESTHETIST, CERTIFIED REGISTERED

## 2023-08-21 PROCEDURE — 63600175 PHARM REV CODE 636 W HCPCS

## 2023-08-21 PROCEDURE — 71000039 HC RECOVERY, EACH ADD'L HOUR: Performed by: SURGERY

## 2023-08-21 PROCEDURE — 25000242 PHARM REV CODE 250 ALT 637 W/ HCPCS: Performed by: STUDENT IN AN ORGANIZED HEALTH CARE EDUCATION/TRAINING PROGRAM

## 2023-08-21 PROCEDURE — D9220A PRA ANESTHESIA: ICD-10-PCS | Mod: CRNA,,, | Performed by: NURSE ANESTHETIST, CERTIFIED REGISTERED

## 2023-08-21 PROCEDURE — 63600175 PHARM REV CODE 636 W HCPCS: Performed by: STUDENT IN AN ORGANIZED HEALTH CARE EDUCATION/TRAINING PROGRAM

## 2023-08-21 PROCEDURE — D9220A PRA ANESTHESIA: Mod: CRNA,,, | Performed by: NURSE ANESTHETIST, CERTIFIED REGISTERED

## 2023-08-21 PROCEDURE — A4216 STERILE WATER/SALINE, 10 ML: HCPCS | Performed by: SURGERY

## 2023-08-21 PROCEDURE — 37000009 HC ANESTHESIA EA ADD 15 MINS: Performed by: SURGERY

## 2023-08-21 PROCEDURE — 80053 COMPREHEN METABOLIC PANEL: CPT | Performed by: NURSE PRACTITIONER

## 2023-08-21 PROCEDURE — A4217 STERILE WATER/SALINE, 500 ML: HCPCS | Performed by: STUDENT IN AN ORGANIZED HEALTH CARE EDUCATION/TRAINING PROGRAM

## 2023-08-21 PROCEDURE — 25000003 PHARM REV CODE 250: Performed by: STUDENT IN AN ORGANIZED HEALTH CARE EDUCATION/TRAINING PROGRAM

## 2023-08-21 PROCEDURE — 27201423 OPTIME MED/SURG SUP & DEVICES STERILE SUPPLY: Performed by: SURGERY

## 2023-08-21 PROCEDURE — 36000709 HC OR TIME LEV III EA ADD 15 MIN: Performed by: SURGERY

## 2023-08-21 PROCEDURE — 25000003 PHARM REV CODE 250: Performed by: SURGERY

## 2023-08-21 PROCEDURE — 94640 AIRWAY INHALATION TREATMENT: CPT

## 2023-08-21 PROCEDURE — 83735 ASSAY OF MAGNESIUM: CPT | Performed by: STUDENT IN AN ORGANIZED HEALTH CARE EDUCATION/TRAINING PROGRAM

## 2023-08-21 PROCEDURE — 63600175 PHARM REV CODE 636 W HCPCS: Performed by: NURSE ANESTHETIST, CERTIFIED REGISTERED

## 2023-08-21 PROCEDURE — 71000015 HC POSTOP RECOV 1ST HR: Performed by: SURGERY

## 2023-08-21 PROCEDURE — 36000708 HC OR TIME LEV III 1ST 15 MIN: Performed by: SURGERY

## 2023-08-21 PROCEDURE — D9220A PRA ANESTHESIA: Mod: ANES,,, | Performed by: ANESTHESIOLOGY

## 2023-08-21 PROCEDURE — 49999 UNLISTED PX ABD PERTM&OMN: CPT | Mod: ,,, | Performed by: SURGERY

## 2023-08-21 PROCEDURE — 84100 ASSAY OF PHOSPHORUS: CPT | Performed by: STUDENT IN AN ORGANIZED HEALTH CARE EDUCATION/TRAINING PROGRAM

## 2023-08-21 PROCEDURE — D9220A PRA ANESTHESIA: ICD-10-PCS | Mod: ANES,,, | Performed by: ANESTHESIOLOGY

## 2023-08-21 PROCEDURE — 94761 N-INVAS EAR/PLS OXIMETRY MLT: CPT

## 2023-08-21 PROCEDURE — B4185 PARENTERAL SOL 10 GM LIPIDS: HCPCS | Performed by: STUDENT IN AN ORGANIZED HEALTH CARE EDUCATION/TRAINING PROGRAM

## 2023-08-21 PROCEDURE — 63600175 PHARM REV CODE 636 W HCPCS: Performed by: ANESTHESIOLOGY

## 2023-08-21 PROCEDURE — 71000016 HC POSTOP RECOV ADDL HR: Performed by: SURGERY

## 2023-08-21 PROCEDURE — 43632 PR REMV STOMACH,PART,DISTAL,GASTROJEJUN: ICD-10-PCS | Mod: ,,, | Performed by: SURGERY

## 2023-08-21 PROCEDURE — 37000008 HC ANESTHESIA 1ST 15 MINUTES: Performed by: SURGERY

## 2023-08-21 RX ORDER — LIDOCAINE HYDROCHLORIDE 20 MG/ML
INJECTION INTRAVENOUS
Status: DISCONTINUED | OUTPATIENT
Start: 2023-08-21 | End: 2023-08-21

## 2023-08-21 RX ORDER — CEFAZOLIN SODIUM 1 G/3ML
INJECTION, POWDER, FOR SOLUTION INTRAMUSCULAR; INTRAVENOUS
Status: DISCONTINUED | OUTPATIENT
Start: 2023-08-21 | End: 2023-08-21

## 2023-08-21 RX ORDER — ROCURONIUM BROMIDE 10 MG/ML
INJECTION, SOLUTION INTRAVENOUS
Status: DISCONTINUED | OUTPATIENT
Start: 2023-08-21 | End: 2023-08-21

## 2023-08-21 RX ORDER — HYDROMORPHONE HYDROCHLORIDE 1 MG/ML
INJECTION, SOLUTION INTRAMUSCULAR; INTRAVENOUS; SUBCUTANEOUS
Status: COMPLETED
Start: 2023-08-21 | End: 2023-08-21

## 2023-08-21 RX ORDER — SODIUM CHLORIDE 0.9 % (FLUSH) 0.9 %
10 SYRINGE (ML) INJECTION
Status: DISCONTINUED | OUTPATIENT
Start: 2023-08-21 | End: 2023-08-21 | Stop reason: HOSPADM

## 2023-08-21 RX ORDER — HYDROMORPHONE HCL IN 0.9% NACL 6 MG/30 ML
PATIENT CONTROLLED ANALGESIA SYRINGE INTRAVENOUS CONTINUOUS
Status: DISCONTINUED | OUTPATIENT
Start: 2023-08-21 | End: 2023-08-22

## 2023-08-21 RX ORDER — VASOPRESSIN 20 [USP'U]/ML
INJECTION, SOLUTION INTRAMUSCULAR; SUBCUTANEOUS
Status: DISCONTINUED | OUTPATIENT
Start: 2023-08-21 | End: 2023-08-21

## 2023-08-21 RX ORDER — ONDANSETRON 2 MG/ML
INJECTION INTRAMUSCULAR; INTRAVENOUS
Status: DISCONTINUED | OUTPATIENT
Start: 2023-08-21 | End: 2023-08-21

## 2023-08-21 RX ORDER — PROPOFOL 10 MG/ML
VIAL (ML) INTRAVENOUS
Status: DISCONTINUED | OUTPATIENT
Start: 2023-08-21 | End: 2023-08-21

## 2023-08-21 RX ORDER — DEXMEDETOMIDINE HYDROCHLORIDE 100 UG/ML
INJECTION, SOLUTION INTRAVENOUS
Status: DISCONTINUED | OUTPATIENT
Start: 2023-08-21 | End: 2023-08-21

## 2023-08-21 RX ORDER — METRONIDAZOLE 500 MG/100ML
INJECTION, SOLUTION INTRAVENOUS
Status: DISCONTINUED | OUTPATIENT
Start: 2023-08-21 | End: 2023-08-21

## 2023-08-21 RX ORDER — ACETAMINOPHEN 10 MG/ML
INJECTION, SOLUTION INTRAVENOUS
Status: DISCONTINUED | OUTPATIENT
Start: 2023-08-21 | End: 2023-08-21

## 2023-08-21 RX ORDER — DEXAMETHASONE SODIUM PHOSPHATE 4 MG/ML
INJECTION, SOLUTION INTRA-ARTICULAR; INTRALESIONAL; INTRAMUSCULAR; INTRAVENOUS; SOFT TISSUE
Status: DISCONTINUED | OUTPATIENT
Start: 2023-08-21 | End: 2023-08-21

## 2023-08-21 RX ORDER — LEVALBUTEROL INHALATION SOLUTION 0.63 MG/3ML
0.63 SOLUTION RESPIRATORY (INHALATION)
Status: DISPENSED | OUTPATIENT
Start: 2023-08-21 | End: 2023-08-23

## 2023-08-21 RX ORDER — MIDAZOLAM HYDROCHLORIDE 1 MG/ML
INJECTION INTRAMUSCULAR; INTRAVENOUS
Status: DISCONTINUED | OUTPATIENT
Start: 2023-08-21 | End: 2023-08-21

## 2023-08-21 RX ORDER — HALOPERIDOL 5 MG/ML
0.5 INJECTION INTRAMUSCULAR EVERY 10 MIN PRN
Status: DISCONTINUED | OUTPATIENT
Start: 2023-08-21 | End: 2023-08-21 | Stop reason: HOSPADM

## 2023-08-21 RX ORDER — FENTANYL CITRATE 50 UG/ML
INJECTION, SOLUTION INTRAMUSCULAR; INTRAVENOUS
Status: DISCONTINUED | OUTPATIENT
Start: 2023-08-21 | End: 2023-08-21

## 2023-08-21 RX ORDER — PHENYLEPHRINE HYDROCHLORIDE 10 MG/ML
INJECTION INTRAVENOUS
Status: DISCONTINUED | OUTPATIENT
Start: 2023-08-21 | End: 2023-08-21

## 2023-08-21 RX ORDER — SUCCINYLCHOLINE CHLORIDE 20 MG/ML
INJECTION INTRAMUSCULAR; INTRAVENOUS
Status: DISCONTINUED | OUTPATIENT
Start: 2023-08-21 | End: 2023-08-21

## 2023-08-21 RX ORDER — HYDROMORPHONE HYDROCHLORIDE 1 MG/ML
0.2 INJECTION, SOLUTION INTRAMUSCULAR; INTRAVENOUS; SUBCUTANEOUS EVERY 5 MIN PRN
Status: DISCONTINUED | OUTPATIENT
Start: 2023-08-21 | End: 2023-08-21 | Stop reason: HOSPADM

## 2023-08-21 RX ORDER — KETAMINE HCL IN 0.9 % NACL 50 MG/5 ML
SYRINGE (ML) INTRAVENOUS
Status: DISCONTINUED | OUTPATIENT
Start: 2023-08-21 | End: 2023-08-21

## 2023-08-21 RX ORDER — NALOXONE HCL 0.4 MG/ML
0.02 VIAL (ML) INJECTION
Status: DISCONTINUED | OUTPATIENT
Start: 2023-08-21 | End: 2023-08-22

## 2023-08-21 RX ADMIN — PHENYLEPHRINE HYDROCHLORIDE 200 MCG: 10 INJECTION INTRAVENOUS at 01:08

## 2023-08-21 RX ADMIN — SODIUM CHLORIDE: 0.9 INJECTION, SOLUTION INTRAVENOUS at 12:08

## 2023-08-21 RX ADMIN — VASOPRESSIN 1 UNITS: 20 INJECTION INTRAVENOUS at 02:08

## 2023-08-21 RX ADMIN — ACETAMINOPHEN 1000 MG: 10 INJECTION, SOLUTION INTRAVENOUS at 03:08

## 2023-08-21 RX ADMIN — VASOPRESSIN 1 UNITS: 20 INJECTION INTRAVENOUS at 01:08

## 2023-08-21 RX ADMIN — FENTANYL CITRATE 50 MCG: 50 INJECTION, SOLUTION INTRAMUSCULAR; INTRAVENOUS at 04:08

## 2023-08-21 RX ADMIN — VASOPRESSIN 2 UNITS: 20 INJECTION INTRAVENOUS at 02:08

## 2023-08-21 RX ADMIN — HYDROMORPHONE HYDROCHLORIDE 0.2 MG: 1 INJECTION, SOLUTION INTRAMUSCULAR; INTRAVENOUS; SUBCUTANEOUS at 05:08

## 2023-08-21 RX ADMIN — PROPOFOL 150 MG: 10 INJECTION, EMULSION INTRAVENOUS at 12:08

## 2023-08-21 RX ADMIN — LIDOCAINE HYDROCHLORIDE 80 MG: 20 INJECTION INTRAVENOUS at 12:08

## 2023-08-21 RX ADMIN — DEXMEDETOMIDINE 8 MCG: 100 INJECTION, SOLUTION, CONCENTRATE INTRAVENOUS at 04:08

## 2023-08-21 RX ADMIN — ROCURONIUM BROMIDE 20 MG: 10 INJECTION, SOLUTION INTRAVENOUS at 02:08

## 2023-08-21 RX ADMIN — DEXAMETHASONE SODIUM PHOSPHATE 4 MG: 4 INJECTION, SOLUTION INTRAMUSCULAR; INTRAVENOUS at 01:08

## 2023-08-21 RX ADMIN — Medication 10 ML: at 05:08

## 2023-08-21 RX ADMIN — SOYBEAN OIL 250 ML: 20 INJECTION, SOLUTION INTRAVENOUS at 09:08

## 2023-08-21 RX ADMIN — Medication 10 ML: at 12:08

## 2023-08-21 RX ADMIN — ONDANSETRON 4 MG: 2 INJECTION INTRAMUSCULAR; INTRAVENOUS at 03:08

## 2023-08-21 RX ADMIN — SUCCINYLCHOLINE CHLORIDE 160 MG: 20 INJECTION, SOLUTION INTRAMUSCULAR; INTRAVENOUS at 12:08

## 2023-08-21 RX ADMIN — ROCURONIUM BROMIDE 45 MG: 10 INJECTION, SOLUTION INTRAVENOUS at 01:08

## 2023-08-21 RX ADMIN — FAMOTIDINE 20 MG: 10 INJECTION, SOLUTION INTRAVENOUS at 09:08

## 2023-08-21 RX ADMIN — SODIUM CHLORIDE, SODIUM ACETATE ANHYDROUS, SODIUM GLUCONATE, POTASSIUM CHLORIDE, AND MAGNESIUM CHLORIDE: 526; 222; 502; 37; 30 INJECTION, SOLUTION INTRAVENOUS at 01:08

## 2023-08-21 RX ADMIN — ROCURONIUM BROMIDE 5 MG: 10 INJECTION, SOLUTION INTRAVENOUS at 12:08

## 2023-08-21 RX ADMIN — Medication: at 07:08

## 2023-08-21 RX ADMIN — Medication 15 MG: at 02:08

## 2023-08-21 RX ADMIN — Medication 15 MG: at 03:08

## 2023-08-21 RX ADMIN — SUGAMMADEX 200 MG: 100 INJECTION, SOLUTION INTRAVENOUS at 04:08

## 2023-08-21 RX ADMIN — MAGNESIUM SULFATE HEPTAHYDRATE: 500 INJECTION, SOLUTION INTRAMUSCULAR; INTRAVENOUS at 09:08

## 2023-08-21 RX ADMIN — Medication 20 MG: at 01:08

## 2023-08-21 RX ADMIN — PHENYLEPHRINE HYDROCHLORIDE 100 MCG: 10 INJECTION INTRAVENOUS at 02:08

## 2023-08-21 RX ADMIN — ROCURONIUM BROMIDE 20 MG: 10 INJECTION, SOLUTION INTRAVENOUS at 01:08

## 2023-08-21 RX ADMIN — LEVALBUTEROL HYDROCHLORIDE 0.63 MG: 0.63 SOLUTION RESPIRATORY (INHALATION) at 08:08

## 2023-08-21 RX ADMIN — METRONIDAZOLE 500 MG: 500 INJECTION, SOLUTION INTRAVENOUS at 01:08

## 2023-08-21 RX ADMIN — ENOXAPARIN SODIUM 40 MG: 40 INJECTION SUBCUTANEOUS at 07:08

## 2023-08-21 RX ADMIN — DEXMEDETOMIDINE 4 MCG: 100 INJECTION, SOLUTION, CONCENTRATE INTRAVENOUS at 04:08

## 2023-08-21 RX ADMIN — VASOPRESSIN 2 UNITS: 20 INJECTION INTRAVENOUS at 03:08

## 2023-08-21 RX ADMIN — CEFAZOLIN 2 G: 330 INJECTION, POWDER, FOR SOLUTION INTRAMUSCULAR; INTRAVENOUS at 01:08

## 2023-08-21 RX ADMIN — FENTANYL CITRATE 50 MCG: 50 INJECTION, SOLUTION INTRAMUSCULAR; INTRAVENOUS at 01:08

## 2023-08-21 RX ADMIN — FENTANYL CITRATE 50 MCG: 50 INJECTION, SOLUTION INTRAMUSCULAR; INTRAVENOUS at 12:08

## 2023-08-21 RX ADMIN — SODIUM CHLORIDE, SODIUM ACETATE ANHYDROUS, SODIUM GLUCONATE, POTASSIUM CHLORIDE, AND MAGNESIUM CHLORIDE: 526; 222; 502; 37; 30 INJECTION, SOLUTION INTRAVENOUS at 02:08

## 2023-08-21 RX ADMIN — MIDAZOLAM HYDROCHLORIDE 2 MG: 2 INJECTION, SOLUTION INTRAMUSCULAR; INTRAVENOUS at 12:08

## 2023-08-21 RX ADMIN — VASOPRESSIN 3 UNITS: 20 INJECTION INTRAVENOUS at 02:08

## 2023-08-21 NOTE — PROGRESS NOTES
"[facility]  General Surgery  Progress Note    Subjective:     History of Present Illness:  No notes on file    Post-Op Info:  Procedure(s) (LRB):  GASTRECTOMY (N/A)  EGD (ESOPHAGOGASTRODUODENOSCOPY) (N/A)         Interval History: No acute events overnight. Feeling well, reports that he's "hungry". In good spirits with family at bedside this morning. NGT put out 1200ml gastric content.     Medications:  Continuous Infusions:   TPN ADULT CENTRAL LINE CUSTOM 45 mL/hr at 08/20/23 2307     Scheduled Meds:   enoxparin  40 mg Subcutaneous Daily    famotidine (PF)  20 mg Intravenous BID    fat emulsion 20%  250 mL Intravenous Daily    oxymetazoline  2 spray Each Nostril BID    sodium chloride 0.9%  10 mL Intravenous Q6H     PRN Meds:HYDROmorphone, ibuprofen, LIDOcaine (PF) 10 mg/ml (1%), LIDOcaine HCL 10 mg/ml (1%), prochlorperazine, sodium chloride 0.9%, Flushing PICC Protocol **AND** sodium chloride 0.9% **AND** sodium chloride 0.9%     Review of patient's allergies indicates:   Allergen Reactions    Penicillins Rash     Objective:     Vital Signs (Most Recent):  Temp: 98.1 °F (36.7 °C) (08/21/23 0521)  Pulse: 63 (08/21/23 0521)  Resp: 18 (08/21/23 0521)  BP: 124/67 (08/21/23 0521)  SpO2: 99 % (08/21/23 0521) Vital Signs (24h Range):  Temp:  [98 °F (36.7 °C)-98.5 °F (36.9 °C)] 98.1 °F (36.7 °C)  Pulse:  [61-98] 63  Resp:  [18-20] 18  SpO2:  [95 %-99 %] 99 %  BP: (104-138)/(60-82) 124/67     Weight: 74.4 kg (164 lb 0.4 oz)  Body mass index is 22.88 kg/m².    Intake/Output - Last 3 Shifts         08/19 0700 08/20 0659 08/20 0700 08/21 0659 08/21 0700 08/22 0659    P.O. 0 0     Total Intake(mL/kg) 0 (0) 0 (0)     Urine (mL/kg/hr)  0 (0)     Drains 500 1200     Stool  0     Total Output 500 1200     Net -500 -1200            Urine Occurrence  5 x     Stool Occurrence  0 x              Physical Exam  Vitals and nursing note reviewed.   Constitutional:       General: He is not in acute " distress.  Cardiovascular:      Rate and Rhythm: Normal rate.      Pulses: Normal pulses.   Pulmonary:      Effort: Pulmonary effort is normal. No respiratory distress.   Abdominal:      General: There is no distension.      Palpations: Abdomen is soft.      Tenderness: There is no abdominal tenderness.      Comments: NGT thin gastric content, soft non-tender and non-distended abdomen    Neurological:      Mental Status: He is alert.          Significant Labs:  I have reviewed all pertinent lab results within the past 24 hours.  CBC:   Recent Labs   Lab 08/17/23  0458   WBC 5.41   RBC 4.75   HGB 13.0*   HCT 38.2*      MCV 80*   MCH 27.4   MCHC 34.0     CMP:   Recent Labs   Lab 08/21/23  0457   GLU 99   CALCIUM 8.7   ALBUMIN 3.0*   PROT 5.8*      K 4.2   CO2 28      BUN 23   CREATININE 0.8   ALKPHOS 38*   ALT 29   AST 22   BILITOT 0.6       Significant Diagnostics:  I have reviewed all pertinent imaging results/findings within the past 24 hours.    Assessment/Plan:     Gastric adenocarcinoma  Patient is a 62 y M w/ no significant past medical history (has not seen a doctor in over 40 years) who presented to the ER on 8/16 with abdominal pain and a recent diagnosis of gastric adenocarcinoma. No bowel movements for several weeks, concerning for obstruction. After review of images, very small amounts of contrast going through from stomach to duodenum. NGT placed for decompression. PICC placed and started on TPN for nutrition optimization as well as correction of electrolytes. To OR today for gastrectomy.     - Plan for OR Gastrectomy and EGD 8/21. Informed consent obtained   - NGT to LIWS  - NPO  - TPN, renew daily   - PPI  - DVT ppx  - Ambulate, OOBTC  - Daily labs  - Replete electrolytes PRN       Dispo: [facility], OR today         Tex Lucas MD  General Surgery  [facility] - [facility]

## 2023-08-21 NOTE — TRANSFER OF CARE
"Anesthesia Transfer of Care Note    Patient: Danish Valladares    Procedure(s) Performed: Procedure(s) (LRB):  GASTRECTOMY (N/A)    Patient location: PACU    Anesthesia Type: general    Transport from OR: Transported from OR on 100% O2 by closed face mask with adequate spontaneous ventilation    Post pain: adequate analgesia    Post assessment: no apparent anesthetic complications and tolerated procedure well    Post vital signs: stable    Level of consciousness: awake, alert and oriented    Nausea/Vomiting: no nausea/vomiting    Complications: none    Transfer of care protocol was followed      Last vitals:   Visit Vitals  /68   Pulse 73   Temp 37.1 °C (98.8 °F) (Oral)   Resp 20   Ht 5' 11" (1.803 m)   Wt 74.4 kg (164 lb 0.4 oz)   SpO2 (!) 94%   BMI 22.88 kg/m²     "

## 2023-08-21 NOTE — PLAN OF CARE
[facility] Plan of Care Note  Dx Pending Gastrectomy and EGD on tomorrow      Shift Events None     Goals of Care: no emesis     Neuro: AAOx4     Vital Signs: stable            Respiratory: room air     Diet: NPO     Is patient tolerating current diet? yes     GTTS: TPN     Urine Output/Bowel Movement:      Drains/Tubes/Tube Feeds (include total output/shift): nasogastric tube drained 750 ml     Lines: right upper PICC, peripheral        Accuchecks:n/a     Skin: edness to bilateral buttocks, left elbow skin tear     Fall Risk Score: 6     Activity level? independent     Any scheduled procedures? N/a     Any safety concerns? N/a     Other: n/a

## 2023-08-21 NOTE — PLAN OF CARE
Problem: Adult Inpatient Plan of Care  Goal: Plan of Care Review  Outcome: Ongoing, Progressing  Goal: Patient-Specific Goal (Individualized)  Outcome: Ongoing, Progressing  Goal: Absence of Hospital-Acquired Illness or Injury  Outcome: Ongoing, Progressing  Goal: Optimal Comfort and Wellbeing  Outcome: Ongoing, Progressing  Goal: Readiness for Transition of Care  Outcome: Ongoing, Progressing     Problem: Infection  Goal: Absence of Infection Signs and Symptoms  Outcome: Ongoing, Progressing     Problem: Impaired Wound Healing  Goal: Optimal Wound Healing  Outcome: Ongoing, Progressing                                              [facility] Plan of Care Note  Dx: Emesis     Shift Events: none     Goals of Care: TPN/Lipids, Electrolyte Maintenance     Neuro: WDL     Vital Signs: WDL     Respiratory: WDL     Diet: NPO     Is patient tolerating current diet? NA     GTTS: TPN/ Lipids     Urine Output/Bowel Movement: up to toilet     Drains/Tubes/Tube Feeds (include total output/shift): see flow sheets     Lines: R UA Double Lumen PICC PIVx 1     Accuchecks: NA     Skin: WDL     Fall Risk Score: 4     Activity level? Independent     Any scheduled procedures? 8/21     Any safety concerns? NA     Other: no changes overnight. NADN, no complaints of n/v.

## 2023-08-21 NOTE — BRIEF OP NOTE
[facility] - Surgery ([facility])  Brief Operative Note    SUMMARY     Surgery Date: 8/21/2023     Surgeon(s) and Role:     * Antwan Moy MD - Primary     * Mino Dockery MD - Resident - Assisting     * Diana Alvares MD - Resident - Chief        Pre-op Diagnosis:  Gastric adenocarcinoma [C16.9]    Post-op Diagnosis:  Post-Op Diagnosis Codes:     * Gastric adenocarcinoma [C16.9]    Procedure(s) (LRB):  GASTRECTOMY (N/A)    Anesthesia: General    Operative Findings: See op note    Estimated Blood Loss: * No values recorded between 8/21/2023  1:18 PM and 8/21/2023  4:56 PM *    Estimated Blood Loss has been documented.         Specimens:   Specimen (24h ago, onward)       Start     Ordered    08/21/23 1602  Specimen to Pathology, Surgery General Surgery  Once        Comments: Pre-op Diagnosis: Gastric adenocarcinoma [C16.9]Procedure(s):GASTRECTOMYEGD (ESOPHAGOGASTRODUODENOSCOPY) Number of specimens: 4Name of specimens: 1.) peritoneal nodules - frozen. 2.) peritoneal nodules - frozen. 3.) Distal gastrectomy - permanent. 4.) peritoneal nodules - permanent.     References:    Click here for ordering Quick Tip   Question Answer Comment   Procedure Type: General Surgery    Specimen Class: Known or suspected malignancy    Which provider would you like to cc? ANTWAN MOY        08/21/23 3243                    [id number]

## 2023-08-21 NOTE — SUBJECTIVE & OBJECTIVE
"Interval History: No acute events overnight. Feeling well, reports that he's "hungry". In good spirits with family at bedside this morning. NGT put out 1200ml gastric content.     Medications:  Continuous Infusions:   TPN ADULT CENTRAL LINE CUSTOM 45 mL/hr at 08/20/23 2307     Scheduled Meds:   enoxparin  40 mg Subcutaneous Daily    famotidine (PF)  20 mg Intravenous BID    fat emulsion 20%  250 mL Intravenous Daily    oxymetazoline  2 spray Each Nostril BID    sodium chloride 0.9%  10 mL Intravenous Q6H     PRN Meds:HYDROmorphone, ibuprofen, LIDOcaine (PF) 10 mg/ml (1%), LIDOcaine HCL 10 mg/ml (1%), prochlorperazine, sodium chloride 0.9%, Flushing PICC Protocol **AND** sodium chloride 0.9% **AND** sodium chloride 0.9%     Review of patient's allergies indicates:   Allergen Reactions    Penicillins Rash     Objective:     Vital Signs (Most Recent):  Temp: 98.1 °F (36.7 °C) (08/21/23 0521)  Pulse: 63 (08/21/23 0521)  Resp: 18 (08/21/23 0521)  BP: 124/67 (08/21/23 0521)  SpO2: 99 % (08/21/23 0521) Vital Signs (24h Range):  Temp:  [98 °F (36.7 °C)-98.5 °F (36.9 °C)] 98.1 °F (36.7 °C)  Pulse:  [61-98] 63  Resp:  [18-20] 18  SpO2:  [95 %-99 %] 99 %  BP: (104-138)/(60-82) 124/67     Weight: 74.4 kg (164 lb 0.4 oz)  Body mass index is 22.88 kg/m².    Intake/Output - Last 3 Shifts         08/19 0700 08/20 0659 08/20 0700 08/21 0659 08/21 0700 08/22 0659    P.O. 0 0     Total Intake(mL/kg) 0 (0) 0 (0)     Urine (mL/kg/hr)  0 (0)     Drains 500 1200     Stool  0     Total Output 500 1200     Net -500 -1200            Urine Occurrence  5 x     Stool Occurrence  0 x              Physical Exam  Vitals and nursing note reviewed.   Constitutional:       General: He is not in acute distress.  Cardiovascular:      Rate and Rhythm: Normal rate.      Pulses: Normal pulses.   Pulmonary:      Effort: Pulmonary effort is normal. No respiratory distress.   Abdominal:      General: There is no distension.      Palpations: Abdomen is " soft.      Tenderness: There is no abdominal tenderness.      Comments: NGT thin gastric content, soft non-tender and non-distended abdomen    Neurological:      Mental Status: He is alert.          Significant Labs:  I have reviewed all pertinent lab results within the past 24 hours.  CBC:   Recent Labs   Lab 08/17/23  0458   WBC 5.41   RBC 4.75   HGB 13.0*   HCT 38.2*      MCV 80*   MCH 27.4   MCHC 34.0     CMP:   Recent Labs   Lab 08/21/23  0457   GLU 99   CALCIUM 8.7   ALBUMIN 3.0*   PROT 5.8*      K 4.2   CO2 28      BUN 23   CREATININE 0.8   ALKPHOS 38*   ALT 29   AST 22   BILITOT 0.6       Significant Diagnostics:  I have reviewed all pertinent imaging results/findings within the past 24 hours.

## 2023-08-21 NOTE — PHYSICIAN QUERY
PT Name: Danish Valladares  MR #: [id number]     DOCUMENTATION CLARIFICATION     CDS: Brandy Capley, RN  Email: [email or web address]     This form is a permanent document in the medical record.    Query Date: August 21, 2023    By submitting this query, we are merely seeking further clarification of documentation to reflect the severity of illness of your patient. Please utilize your independent clinical judgment when addressing the question(s) below.    The Medical Record reflects the following:     Indicators   Supporting Clinical Findings Location in Medical Record   X Lab Value(s)    08/16/23 10:58 08/17/23 04:58 08/17/23 14:13 08/18/23 05:37 08/18/23 20:46   Potassium 3.2 (L) 2.7 (LL) 3.0 (L) 2.8 (L) 3.1 (L)        Labs 8/16-8/18   X Treatment/Medication potassium chloride 10 mEq in 100 mL IVPB  Dose: 10 mEq  Freq: Every hour Route: IV  Start: 08/16/23 1745 End: 08/16/23 2144    potassium chloride 10 mEq in 100 mL IVPB  Dose: 10 mEq  Freq: Every hour Route: IV  Start: 08/17/23 0616 End: 08/17/23 1421    potassium chloride 10 mEq in 100 mL IVPB  Dose: 10 mEq  Freq: Every hour Route: IV  Start: 08/17/23 1800 End: 08/17/23 2101    potassium chloride 10 mEq in 100 mL IVPB  Dose: 10 mEq  Freq: Every hour Route: IV  Start: 08/18/23 0930 End: 08/18/23 1718    potassium chloride 10 mEq in 100 mL IVPB  Dose: 10 mEq  Freq: Every hour Route: IV  Start: 08/18/23 2230 End: 08/19/23 0610   MAR 8/16-8/18   X Other GOO 2/2 distal gastric cancer.  Has lost 20#, albumin and prealbumin ok.  Rec decompression, start TPN, plan on exploration/resection when electrolytes corrected, stomach empty.     General surgery consults 8/16, filed 8/17     Provider, please specify the diagnosis or diagnoses that correspond(s) to the above indicators. Elroy all that apply:    [ xx  ] Hypokalemia     [   ] Other electrolyte disturbance (please specify): __________     [   ] Clinically Undetermined       Please document in your progress notes daily for  the duration of treatment until resolved, and include in your discharge summary.

## 2023-08-21 NOTE — ASSESSMENT & PLAN NOTE
Patient is a 62 y M w/ no significant past medical history (has not seen a doctor in over 40 years) who presented to the ER on 8/16 with abdominal pain and a recent diagnosis of gastric adenocarcinoma. No bowel movements for several weeks, concerning for obstruction. After review of images, very small amounts of contrast going through from stomach to duodenum. NGT placed for decompression. PICC placed and started on TPN for nutrition optimization as well as correction of electrolytes. To OR today for gastrectomy.     - Plan for OR Gastrectomy and EGD 8/21. Informed consent obtained   - NGT to LIWS  - NPO  - TPN, renew daily   - PPI  - DVT ppx  - Ambulate, OOBTC  - Daily labs  - Replete electrolytes PRN       Dispo: [facility], OR today

## 2023-08-21 NOTE — ANESTHESIA PROCEDURE NOTES
Intubation    Date/Time: 8/21/2023 1:00 PM    Performed by: Ad Spencer CRNA  Authorized by: Steven Oates MD    Intubation:     Induction:  Rapid sequence induction    Intubated:  Postinduction    Mask Ventilation:  Not attempted    Attempts:  1    Attempted By:  CRNA    Method of Intubation:  Video laryngoscopy    Blade:  Cole 4    Laryngeal View Grade: Grade I - full view of cords      Difficult Airway Encountered?: No      Complications:  None    Airway Device:  Oral endotracheal tube    Airway Device Size:  8.0    Style/Cuff Inflation:  Cuffed (inflated to minimal occlusive pressure)    Tube secured:  23    Secured at:  The lips    Placement Verified By:  Capnometry    Complicating Factors:  None    Findings Post-Intubation:  BS equal bilateral and atraumatic/condition of teeth unchanged

## 2023-08-22 LAB
ALBUMIN SERPL BCP-MCNC: 2.8 G/DL (ref 3.5–5.2)
ALP SERPL-CCNC: 40 U/L (ref 55–135)
ALT SERPL W/O P-5'-P-CCNC: 42 U/L (ref 10–44)
ANION GAP SERPL CALC-SCNC: 5 MMOL/L (ref 8–16)
AST SERPL-CCNC: 33 U/L (ref 10–40)
BASOPHILS # BLD AUTO: 0.01 K/UL (ref 0–0.2)
BASOPHILS NFR BLD: 0.1 % (ref 0–1.9)
BILIRUB SERPL-MCNC: 0.5 MG/DL (ref 0.1–1)
BUN SERPL-MCNC: 26 MG/DL (ref 8–23)
CALCIUM SERPL-MCNC: 8.1 MG/DL (ref 8.7–10.5)
CHLORIDE SERPL-SCNC: 107 MMOL/L (ref 95–110)
CO2 SERPL-SCNC: 27 MMOL/L (ref 23–29)
CREAT SERPL-MCNC: 0.8 MG/DL (ref 0.5–1.4)
DIFFERENTIAL METHOD: ABNORMAL
EOSINOPHIL # BLD AUTO: 0 K/UL (ref 0–0.5)
EOSINOPHIL NFR BLD: 0 % (ref 0–8)
ERYTHROCYTE [DISTWIDTH] IN BLOOD BY AUTOMATED COUNT: 12.8 % (ref 11.5–14.5)
EST. GFR  (NO RACE VARIABLE): >60 ML/MIN/1.73 M^2
GLUCOSE SERPL-MCNC: 139 MG/DL (ref 70–110)
HCT VFR BLD AUTO: 39.4 % (ref 40–54)
HGB BLD-MCNC: 13.2 G/DL (ref 14–18)
IMM GRANULOCYTES # BLD AUTO: 0.05 K/UL (ref 0–0.04)
IMM GRANULOCYTES NFR BLD AUTO: 0.4 % (ref 0–0.5)
LYMPHOCYTES # BLD AUTO: 0.7 K/UL (ref 1–4.8)
LYMPHOCYTES NFR BLD: 5.8 % (ref 18–48)
MAGNESIUM SERPL-MCNC: 2.1 MG/DL (ref 1.6–2.6)
MCH RBC QN AUTO: 27.7 PG (ref 27–31)
MCHC RBC AUTO-ENTMCNC: 33.5 G/DL (ref 32–36)
MCV RBC AUTO: 83 FL (ref 82–98)
MONOCYTES # BLD AUTO: 1.3 K/UL (ref 0.3–1)
MONOCYTES NFR BLD: 10.9 % (ref 4–15)
NEUTROPHILS # BLD AUTO: 9.5 K/UL (ref 1.8–7.7)
NEUTROPHILS NFR BLD: 82.8 % (ref 38–73)
NRBC BLD-RTO: 0 /100 WBC
PHOSPHATE SERPL-MCNC: 2.6 MG/DL (ref 2.7–4.5)
PLATELET # BLD AUTO: 175 K/UL (ref 150–450)
PMV BLD AUTO: 10.7 FL (ref 9.2–12.9)
POTASSIUM SERPL-SCNC: 4.4 MMOL/L (ref 3.5–5.1)
PROT SERPL-MCNC: 5.5 G/DL (ref 6–8.4)
RBC # BLD AUTO: 4.77 M/UL (ref 4.6–6.2)
SODIUM SERPL-SCNC: 139 MMOL/L (ref 136–145)
WBC # BLD AUTO: 11.51 K/UL (ref 3.9–12.7)

## 2023-08-22 PROCEDURE — 63600175 PHARM REV CODE 636 W HCPCS: Performed by: STUDENT IN AN ORGANIZED HEALTH CARE EDUCATION/TRAINING PROGRAM

## 2023-08-22 PROCEDURE — A4217 STERILE WATER/SALINE, 500 ML: HCPCS

## 2023-08-22 PROCEDURE — 84100 ASSAY OF PHOSPHORUS: CPT | Performed by: STUDENT IN AN ORGANIZED HEALTH CARE EDUCATION/TRAINING PROGRAM

## 2023-08-22 PROCEDURE — 25000242 PHARM REV CODE 250 ALT 637 W/ HCPCS: Performed by: STUDENT IN AN ORGANIZED HEALTH CARE EDUCATION/TRAINING PROGRAM

## 2023-08-22 PROCEDURE — 25000003 PHARM REV CODE 250

## 2023-08-22 PROCEDURE — 85025 COMPLETE CBC W/AUTO DIFF WBC: CPT | Performed by: STUDENT IN AN ORGANIZED HEALTH CARE EDUCATION/TRAINING PROGRAM

## 2023-08-22 PROCEDURE — 99900035 HC TECH TIME PER 15 MIN (STAT)

## 2023-08-22 PROCEDURE — 94640 AIRWAY INHALATION TREATMENT: CPT

## 2023-08-22 PROCEDURE — 63600175 PHARM REV CODE 636 W HCPCS: Performed by: NURSE PRACTITIONER

## 2023-08-22 PROCEDURE — 25000003 PHARM REV CODE 250: Performed by: STUDENT IN AN ORGANIZED HEALTH CARE EDUCATION/TRAINING PROGRAM

## 2023-08-22 PROCEDURE — 63600175 PHARM REV CODE 636 W HCPCS

## 2023-08-22 PROCEDURE — 83735 ASSAY OF MAGNESIUM: CPT | Performed by: STUDENT IN AN ORGANIZED HEALTH CARE EDUCATION/TRAINING PROGRAM

## 2023-08-22 PROCEDURE — B4185 PARENTERAL SOL 10 GM LIPIDS: HCPCS | Performed by: STUDENT IN AN ORGANIZED HEALTH CARE EDUCATION/TRAINING PROGRAM

## 2023-08-22 PROCEDURE — 94761 N-INVAS EAR/PLS OXIMETRY MLT: CPT

## 2023-08-22 PROCEDURE — 97165 OT EVAL LOW COMPLEX 30 MIN: CPT

## 2023-08-22 PROCEDURE — 80053 COMPREHEN METABOLIC PANEL: CPT | Performed by: STUDENT IN AN ORGANIZED HEALTH CARE EDUCATION/TRAINING PROGRAM

## 2023-08-22 PROCEDURE — 20600001 HC STEP DOWN PRIVATE ROOM

## 2023-08-22 RX ORDER — SCOLOPAMINE TRANSDERMAL SYSTEM 1 MG/1
1 PATCH, EXTENDED RELEASE TRANSDERMAL
Status: DISCONTINUED | OUTPATIENT
Start: 2023-08-22 | End: 2023-09-01 | Stop reason: HOSPADM

## 2023-08-22 RX ORDER — ONDANSETRON 2 MG/ML
4 INJECTION INTRAMUSCULAR; INTRAVENOUS EVERY 6 HOURS
Status: DISCONTINUED | OUTPATIENT
Start: 2023-08-22 | End: 2023-08-22

## 2023-08-22 RX ORDER — ACETAMINOPHEN 10 MG/ML
1000 INJECTION, SOLUTION INTRAVENOUS EVERY 8 HOURS
Status: COMPLETED | OUTPATIENT
Start: 2023-08-22 | End: 2023-08-23

## 2023-08-22 RX ORDER — NALOXONE HCL 0.4 MG/ML
0.02 VIAL (ML) INJECTION
Status: DISCONTINUED | OUTPATIENT
Start: 2023-08-22 | End: 2023-08-24

## 2023-08-22 RX ORDER — ONDANSETRON 2 MG/ML
8 INJECTION INTRAMUSCULAR; INTRAVENOUS EVERY 6 HOURS
Status: DISCONTINUED | OUTPATIENT
Start: 2023-08-22 | End: 2023-08-23

## 2023-08-22 RX ORDER — HYDROMORPHONE HCL IN 0.9% NACL 6 MG/30 ML
PATIENT CONTROLLED ANALGESIA SYRINGE INTRAVENOUS CONTINUOUS
Status: DISCONTINUED | OUTPATIENT
Start: 2023-08-22 | End: 2023-08-23

## 2023-08-22 RX ADMIN — Medication: at 04:08

## 2023-08-22 RX ADMIN — ACETAMINOPHEN 1000 MG: 10 INJECTION, SOLUTION INTRAVENOUS at 10:08

## 2023-08-22 RX ADMIN — FAMOTIDINE 20 MG: 10 INJECTION, SOLUTION INTRAVENOUS at 10:08

## 2023-08-22 RX ADMIN — METHOCARBAMOL 500 MG: 100 INJECTION, SOLUTION INTRAMUSCULAR; INTRAVENOUS at 11:08

## 2023-08-22 RX ADMIN — ONDANSETRON 8 MG: 2 INJECTION INTRAMUSCULAR; INTRAVENOUS at 06:08

## 2023-08-22 RX ADMIN — LEVALBUTEROL HYDROCHLORIDE 0.63 MG: 0.63 SOLUTION RESPIRATORY (INHALATION) at 01:08

## 2023-08-22 RX ADMIN — METHOCARBAMOL 500 MG: 100 INJECTION, SOLUTION INTRAMUSCULAR; INTRAVENOUS at 10:08

## 2023-08-22 RX ADMIN — ONDANSETRON 4 MG: 2 INJECTION INTRAMUSCULAR; INTRAVENOUS at 07:08

## 2023-08-22 RX ADMIN — SOYBEAN OIL 250 ML: 20 INJECTION, SOLUTION INTRAVENOUS at 10:08

## 2023-08-22 RX ADMIN — MAGNESIUM SULFATE HEPTAHYDRATE: 500 INJECTION, SOLUTION INTRAMUSCULAR; INTRAVENOUS at 10:08

## 2023-08-22 RX ADMIN — ACETAMINOPHEN 1000 MG: 10 INJECTION, SOLUTION INTRAVENOUS at 03:08

## 2023-08-22 RX ADMIN — FAMOTIDINE 20 MG: 10 INJECTION, SOLUTION INTRAVENOUS at 07:08

## 2023-08-22 RX ADMIN — SCOPALAMINE 1 PATCH: 1 PATCH, EXTENDED RELEASE TRANSDERMAL at 07:08

## 2023-08-22 RX ADMIN — LEVALBUTEROL HYDROCHLORIDE 0.63 MG: 0.63 SOLUTION RESPIRATORY (INHALATION) at 07:08

## 2023-08-22 RX ADMIN — ENOXAPARIN SODIUM 40 MG: 40 INJECTION SUBCUTANEOUS at 05:08

## 2023-08-22 RX ADMIN — ONDANSETRON 8 MG: 2 INJECTION INTRAMUSCULAR; INTRAVENOUS at 01:08

## 2023-08-22 NOTE — OP NOTE
Siddhartha Thurman - [facility]  Surgery Department  Operative Note       Date of Procedure: 8/21/2023       Surgeon(s):  Surgeon(s) and Role:     * Eulogio Moy MD - Primary     * Mino Dockery MD - Resident - Assisting     * Diana Alvares MD - Resident - Chief      Pre-Operative Diagnosis:   Gastric adenocarcinoma [C16.9]  Gastric outlet obstruction  Severe protein-calorie malnutrition    Post-Operative Diagnosis:   Same with peritoneal metastases     Anesthesia: General    Operative Findings:   Plaques grossly consistent with peritoneal metastases on falciform, lesser sac, mesocolon, frozen = atypical cells  Bulky distal gastric cancer with outlet obstruction  Palliative resection with B2 antecolic/isoperistaltic reconstruction    Procedures:  Distal gastrectomy with Bilroth II reconstruction  Biopsy of peritoneal nodules    Estimated Blood Loss (EBL):  <50 mL      Specimen(s):  submitted    Indications:  Danish Valladares presents for the above procedures.  Risks and benefits were reviewed including bleeding, infection, damage to local structures, discovery of additional disease, need for additional procedures, death, and imponderables.  He understands and gave informed consent to proceed.    Details:  The patient was transported to the operating room and satisfactory anesthesia established.  Preoperative antibiotics were administered.  The patient was placed in the supine position and extremities were padded and protected throughout.  A casarez catheter was placed.  Appropriate lines were placed by anesthesia.    The operative field was prepped and draped in sterile fashion.  A timeout was performed.  The abdomen was entered sharply via a  midline incision through the linea alba.  The peritoneum was breached sharply under direct vision.  The falciform ligament was divided.  An Len wound retractor was placed and an Omni retractor was used for exposure.    The abdomen was explored.  There was a bulky  "gastric mass in the distal stomach.  There was no ascites, the liver had no focal nodule.  There were numerous plaques along the falciform, lesser sac and transverse mesocolon which grossly looked like peritoneal metastases.  These were sampled and submitted for frozen section which would return as "atypical".  With this information, a palliative gastrectomy was planned with his ongoing gastric outlet obstruction.      The lesser sac was entered and the hepatic flexure of the colon mobilized. The duodenum was Kocherized and the alejandro elevated into the field.  Adhesions to the pancreas were divided and the gastrocolic ligament divided up the to the short gastric vessels.  The antrum was relatively adherent and I was concerned about invasion into the pancreas.  To sort this out, we divided the right gastric and gastroepiploic arteries on the wall of the duodenm with silks, and opened the anterior peritoneum over the alejandro, exposing duodenum distal to the pylorus which was pliable and non-dilated.  We were able to dissect circumferentially here beyond tumor and place a vessel loop.  I felt we could get the tumor up and we divided the mid body of the stomach by isolating the lesser and greater curve vessels with ties and dividing the stomach with serial firings of the black EndoGIA.      With the prior vessel loop on the duodenum as a target, the stomach was elevated up and off the pancreas through an inflammatory plane without violating the gland.   This got us to the prior vessel loop, verifying we were clear of the bile duct.   The duodenum was divided with a 60 mm purple EndoGIA with good effect, freeing the specimen.  I examined the interior of the stomach on the back table.  Our proximal margin was not grossly through tumor but the entire stomach was thick and I would not be surprised with an R1 margin in this palliative situation.      Attention was turned to the reconstruction.  The corners of the stomach were " oversewn with silk.  An antecolic, isoperistaltic Bilroth 2 gastojejunostomy was fashioned in two layers with 2-0 vicryl and 3-0 PDS.  Both limbs were patent and the NG was repositioned.  The duodenal stump had some capillary bleeding and surgicel was applied   The field was inspected for hemostasis.    A separate sterile closure setup was used.  The abdomen was closed over a fish retractor using looped #1 PDS suture in continuous shallow bite/short advance fashion.  Wound was irrigated copiously and the skin was closed with Monocryl and Dermabond.      All needle, lap, and sponge counts were reported as correct.  I communicated the intraoperative findings with the family following the procedure.     Condition: Good    Disposition: PACU - hemodynamically stable.    Attestation: I was present and scrubbed for the key portions of the procedure.

## 2023-08-22 NOTE — PROGRESS NOTES
Siddhartha Thurman - [facility]  General Surgery  Progress Note    Subjective:     History of Present Illness:  No notes on file    Post-Op Info:  Procedure(s) (LRB):  GASTRECTOMY (N/A)   1 Day Post-Op     Interval History: POD 1 from open distal gastrectomy with BII reconstruction. Has had some difficulty with pain control and confusion overnight. Also with complaints of persistent nausea. NG tube with appropriate output since surgery. No bowel function yet.     Medications:  Continuous Infusions:   hydromorphone in 0.9 % NaCl 6 mg/30 ml      TPN ADULT CENTRAL LINE CUSTOM 45 mL/hr at 08/21/23 2128     Scheduled Meds:   acetaminophen  1,000 mg Intravenous Q8H    enoxparin  40 mg Subcutaneous Daily    famotidine (PF)  20 mg Intravenous BID    levalbuterol  0.63 mg Nebulization Q6H WAKE    ondansetron  4 mg Intravenous Q6H    scopolamine  1 patch Transdermal Q3 Days    sodium chloride 0.9%  10 mL Intravenous Q6H     PRN Meds:HYDROmorphone, LIDOcaine (PF) 10 mg/ml (1%), LIDOcaine HCL 10 mg/ml (1%), naloxone, prochlorperazine, sodium chloride 0.9%, Flushing PICC Protocol **AND** sodium chloride 0.9% **AND** sodium chloride 0.9%     Review of patient's allergies indicates:   Allergen Reactions    Penicillins Rash     Objective:     Vital Signs (Most Recent):  Temp: 98 °F (36.7 °C) (08/22/23 0410)  Pulse: (!) 51 (08/22/23 0744)  Resp: 20 (08/22/23 0744)  BP: 139/66 (08/22/23 0410)  SpO2: (!) 91 % (08/22/23 0744) Vital Signs (24h Range):  Temp:  [97.2 °F (36.2 °C)-98.8 °F (37.1 °C)] 98 °F (36.7 °C)  Pulse:  [51-85] 51  Resp:  [11-20] 20  SpO2:  [91 %-100 %] 91 %  BP: (125-170)/(66-81) 139/66     Weight: 74.4 kg (164 lb 0.4 oz)  Body mass index is 22.88 kg/m².    Intake/Output - Last 3 Shifts         08/20 0700 08/21 0659 08/21 0700 08/22 0659 08/22 0700 08/23 0659    P.O. 0      I.V. (mL/kg)  1000 (13.4)     IV Piggyback  1100     Total Intake(mL/kg) 0 (0) 2100 (28.2)     Urine (mL/kg/hr) 0 (0) 200 (0.1)     Drains 1200 350     Stool  0 0     Total Output 1200 550     Net -1200 +1550            Urine Occurrence 5 x      Stool Occurrence 0 x 0 x              Physical Exam  Vitals and nursing note reviewed.   Constitutional:       Appearance: He is not ill-appearing.   HENT:      Head: Normocephalic.      Nose:      Comments: NG to LIWS     Mouth/Throat:      Mouth: Mucous membranes are dry.   Eyes:      General: No scleral icterus.     Conjunctiva/sclera: Conjunctivae normal.   Cardiovascular:      Rate and Rhythm: Normal rate.      Pulses: Normal pulses.   Pulmonary:      Effort: Pulmonary effort is normal. No respiratory distress.      Breath sounds: No wheezing.   Abdominal:      General: Abdomen is flat. There is no distension.      Palpations: Abdomen is soft.      Tenderness: There is abdominal tenderness.      Comments: Midline incision with dressing in place. No strikethrough. Maryellen-incisional tenderness present. Abdomen is soft.   Genitourinary:     Comments: Zapata  Musculoskeletal:         General: No swelling. Normal range of motion.   Skin:     General: Skin is warm and dry.      Coloration: Skin is not jaundiced or pale.   Neurological:      Mental Status: He is alert.          Significant Labs:  I have reviewed all pertinent lab results within the past 24 hours.  CBC:   Recent Labs   Lab 08/22/23  0459   WBC 11.51   RBC 4.77   HGB 13.2*   HCT 39.4*      MCV 83   MCH 27.7   MCHC 33.5     CMP:   Recent Labs   Lab 08/22/23  0459   *   CALCIUM 8.1*   ALBUMIN 2.8*   PROT 5.5*      K 4.4   CO2 27      BUN 26*   CREATININE 0.8   ALKPHOS 40*   ALT 42   AST 33   BILITOT 0.5       Significant Diagnostics:  I have reviewed all pertinent imaging results/findings within the past 24 hours.    Assessment/Plan:     Gastric adenocarcinoma  Patient is a 62 y M w/ no significant past medical history (has not seen a doctor in over 40 years) who presented to the ER on 8/16 with abdominal pain and a recent diagnosis of gastric  adenocarcinoma. No bowel movements for several weeks, concerning for obstruction. After review of images, very small amounts of contrast going through from stomach to duodenum. NGT placed for decompression. PICC placed and started on TPN for nutrition optimization as well as correction of electrolytes. S/P Open distal gastrectomy with BII reconstruction on 8/21/23.    - NGT to LIWS  - NPO  - Scheduled Zofran  - Multimodal pain control. IV Tylenol and Robaxin added today  - PCA  - TPN, renew daily   - Continue casarez this morning due to mobility restrictions  - PPI  - DVT ppx  - PT/OT ordered for postoperative care and rehabilitation  - OK to Ambulate, OOBTC  - Daily labs  - Replete electrolytes PRN       Dispo: [facility], OR today         Mino Dockery MD  General Surgery  Siddhartha Thurman - [facility]

## 2023-08-22 NOTE — PROGRESS NOTES
Mr Valladares agitated and trying to come out of bed, ? Disorientation and appears to be in a lot of pain, guarding of abdomen noted, settled with IV analgesia.    During visiting hours refused any family to visit in recovery, spouse updated about the same    Mr Valladares much calm once the pain settled, PCA initiated and explained the use of PCA, says not in pain and does not want to use the PCA at the moment.

## 2023-08-22 NOTE — PT/OT/SLP EVAL
Occupational Therapy   Evaluation    Name: Danish Valladares  MRN: [id number]  Admitting Diagnosis: Emesis  Recent Surgery: Procedure(s) (LRB):  GASTRECTOMY (N/A) 1 Day Post-Op    Recommendations:     Discharge Recommendations: home  Discharge Equipment Recommendations:  none  Barriers to discharge:  None    Assessment:     Danish Valladares is a 62 y.o. male with a medical diagnosis of Emesis.  He presents s/p gastrectomy on 8/21. Performance deficits affecting function: impaired endurance, impaired functional mobility, gait instability, impaired self care skills, impaired balance, decreased coordination.      Rehab Prognosis: Good; patient would benefit from acute skilled OT services to address these deficits and reach maximum level of function.       Plan:     Patient to be seen 3 x/week to address the above listed problems via self-care/home management, therapeutic activities, therapeutic exercises  Plan of Care Expires: 09/21/23  Plan of Care Reviewed with: patient, spouse    Subjective     Occupational Profile:  Living Environment: The patient lives with his wife in a [facility], [facility] with [employer], working as a  at [employer].   Prior to admission, patients level of function was independent.    Equipment used at home: none.  DME owned (not currently used): none.    Upon discharge, patient will have assistance from wife.    Pain/Comfort:  Pain Rating 1: 0/10    Patients cultural, spiritual, Latter day conflicts given the current situation:      Objective:     Communicated with: rn prior to session.  Patient found supine with peripheral IV, casarez catheter upon OT entry to room.    General Precautions: Standard, fall  Orthopedic Precautions:    Braces:    Respiratory Status: Room air    Occupational Performance:    Bed Mobility:    Patient completed Rolling/Turning to Right with contact guard assistance  Patient completed Scooting/Bridging with stand by assistance  Patient completed Supine to Sit  with minimum assistance    Functional Mobility/Transfers:  Patient completed Sit <> Stand Transfer with minimum assistance  with  hand-held assist   Patient completed Bed <> Chair Transfer using Stand Pivot technique with contact guard assistance with hand-held assist    Activities of Daily Living:  Feeding:  independence  Grooming: supervision EOB.    Cognitive/Visual Perceptual:  Cognitive/Psychosocial Skills:     -       Oriented to: Person, Place, Time, and Situation   -       Safety awareness/insight to disability: intact     Physical Exam:  BUE AROM/MMT: WNL    AMPAC 6 Click ADL:  AMPAC Total Score: 19    Treatment & Education:  UE ROM/MMT  Bed mobility training / assessment  Functional mobility assessment  Sit/standing balance assessment  Educated on importance of sitting OOB in bedside chair to promote increased strength, endurance & breathing.  Discussed OT POC / Post-acute plan    Patient left up in chair with all lines intact and call button in reach    GOALS:   Multidisciplinary Problems       Occupational Therapy Goals          Problem: Occupational Therapy    Goal Priority Disciplines Outcome Interventions   Occupational Therapy Goal     OT, PT/OT Ongoing, Progressing    Description: Goals to be met by: 8/29/23    Patient will increase functional independence with ADLs by performing:    LE Dressing with Supervision.  Grooming while standing at sink with Supervision.  Toileting from toilet with Supervision for hygiene and clothing management.   Supine to sit with Supervision.  Toilet transfer to toilet with Modified [place].                         History:     Past Medical History:   Diagnosis Date    Psoriasis          Past Surgical History:   Procedure Laterality Date    ESOPHAGOGASTRODUODENOSCOPY N/A 8/3/2023    Procedure: EGD (ESOPHAGOGASTRODUODENOSCOPY);  Surgeon: Loco Marvin MD;  Location: [facility];  Service: Gastroenterology;  Laterality: N/A;    GASTRECTOMY N/A 8/21/2023     Procedure: GASTRECTOMY;  Surgeon: Eulogio Moy MD;  Location: [facility] OR [address] [place] [address];  Service: General;  Laterality: N/A;  Open gastrectomy with EGD. Needs Omni retractor, requesting room 24    TONSILLECTOMY         Time Tracking:     OT Date of Treatment: 08/22/23  OT Start Time: 1047  OT Stop Time: 1103  OT Total Time (min): 16 min    Billable Minutes:Evaluation 16    8/22/2023

## 2023-08-22 NOTE — PLAN OF CARE
Dx: Emesis     Shift Events: None     Goals of Care: TPN/Lipids, Electrolyte Maintenance, pain control, prevent nausea     Neuro: WDL     Vital Signs: see flowsheet     Respiratory: .5 L NC, ETCO2 monitor     Diet: NPO     Is patient tolerating current diet? NA     GTTS: TPN/ Lipids/PCA     Urine Output/Bowel Movement: Zapata     Drains/Tubes/Tube Feeds (include total output/shift): NGT to R nare, see flowsheet for output     Lines: HAM Double Lumen     Accuchecks: NA     Skin: WDL     Fall Risk Score: 4     Activity level? 1a     Any scheduled procedures? Recent gastrectomy 8/21     Any safety concerns? NA     Other: NGT to LIWS has been intermittently clogging, new order to flush with 30 mL about every 4 hours per MD        Problem: Adult Inpatient Plan of Care  Goal: Absence of Hospital-Acquired Illness or Injury  Outcome: Ongoing, Progressing  Goal: Optimal Comfort and Wellbeing  Outcome: Ongoing, Progressing     Problem: Fall Injury Risk  Goal: Absence of Fall and Fall-Related Injury  Outcome: Ongoing, Progressing

## 2023-08-22 NOTE — NURSING TRANSFER
Nursing Transfer Note      Reason patient is being transferred: post procedure    Transfer To: room 1024    Transfer via bed    Transfer with TPN infusion, PCA, IV Carrier infusion (NaCl)    Transported by PCTx2    Order for Tele Monitor? No    Additional Lines: Oxygen Zapata catheter    Medicines sent: none    Any special needs or follow-up needed: routine    Patient belongings transferred with patient:  none    Chart send with patient: Yes    Notified: spouse    Patient reassessed at: 8/21/2023 2938

## 2023-08-22 NOTE — ASSESSMENT & PLAN NOTE
Patient is a 62 y M w/ no significant past medical history (has not seen a doctor in over 40 years) who presented to the ER on 8/16 with abdominal pain and a recent diagnosis of gastric adenocarcinoma. No bowel movements for several weeks, concerning for obstruction. After review of images, very small amounts of contrast going through from stomach to duodenum. NGT placed for decompression. PICC placed and started on TPN for nutrition optimization as well as correction of electrolytes. S/P Open distal gastrectomy with BII reconstruction on 8/21/23.    - NGT to LIWS  - NPO  - Scheduled Zofran  - Multimodal pain control. IV Tylenol and Robaxin added today  - PCA  - TPN, renew daily   - PPI  - DVT ppx  - PT/OT ordered for postoperative care and rehabilitation  - OK to Ambulate, OOBTC  - Daily labs  - Replete electrolytes PRN       Dispo: [facility], OR today

## 2023-08-22 NOTE — SUBJECTIVE & OBJECTIVE
Interval History: POD 1 from open distal gastrectomy with BII reconstruction. Has had some difficulty with pain control and confusion overnight. Also with complaints of persistent nausea. NG tube with appropriate output since surgery. No bowel function yet.     Medications:  Continuous Infusions:   hydromorphone in 0.9 % NaCl 6 mg/30 ml      TPN ADULT CENTRAL LINE CUSTOM 45 mL/hr at 08/21/23 2128     Scheduled Meds:   acetaminophen  1,000 mg Intravenous Q8H    enoxparin  40 mg Subcutaneous Daily    famotidine (PF)  20 mg Intravenous BID    levalbuterol  0.63 mg Nebulization Q6H WAKE    ondansetron  4 mg Intravenous Q6H    scopolamine  1 patch Transdermal Q3 Days    sodium chloride 0.9%  10 mL Intravenous Q6H     PRN Meds:HYDROmorphone, LIDOcaine (PF) 10 mg/ml (1%), LIDOcaine HCL 10 mg/ml (1%), naloxone, prochlorperazine, sodium chloride 0.9%, Flushing PICC Protocol **AND** sodium chloride 0.9% **AND** sodium chloride 0.9%     Review of patient's allergies indicates:   Allergen Reactions    Penicillins Rash     Objective:     Vital Signs (Most Recent):  Temp: 98 °F (36.7 °C) (08/22/23 0410)  Pulse: (!) 51 (08/22/23 0744)  Resp: 20 (08/22/23 0744)  BP: 139/66 (08/22/23 0410)  SpO2: (!) 91 % (08/22/23 0744) Vital Signs (24h Range):  Temp:  [97.2 °F (36.2 °C)-98.8 °F (37.1 °C)] 98 °F (36.7 °C)  Pulse:  [51-85] 51  Resp:  [11-20] 20  SpO2:  [91 %-100 %] 91 %  BP: (125-170)/(66-81) 139/66     Weight: 74.4 kg (164 lb 0.4 oz)  Body mass index is 22.88 kg/m².    Intake/Output - Last 3 Shifts         08/20 0700  08/21 0659 08/21 0700 08/22 0659 08/22 0700 08/23 0659    P.O. 0      I.V. (mL/kg)  1000 (13.4)     IV Piggyback  1100     Total Intake(mL/kg) 0 (0) 2100 (28.2)     Urine (mL/kg/hr) 0 (0) 200 (0.1)     Drains 1200 350     Stool 0 0     Total Output 1200 550     Net -1200 +1550            Urine Occurrence 5 x      Stool Occurrence 0 x 0 x              Physical Exam  Vitals and nursing note reviewed.   Constitutional:        Appearance: He is not ill-appearing.   HENT:      Head: Normocephalic.      Nose:      Comments: NG to LIWS     Mouth/Throat:      Mouth: Mucous membranes are dry.   Eyes:      General: No scleral icterus.     Conjunctiva/sclera: Conjunctivae normal.   Cardiovascular:      Rate and Rhythm: Normal rate.      Pulses: Normal pulses.   Pulmonary:      Effort: Pulmonary effort is normal. No respiratory distress.      Breath sounds: No wheezing.   Abdominal:      General: Abdomen is flat. There is no distension.      Palpations: Abdomen is soft.      Tenderness: There is abdominal tenderness.      Comments: Midline incision with dressing in place. No strikethrough. Maryellen-incisional tenderness present. Abdomen is soft.   Genitourinary:     Comments: Zapata  Musculoskeletal:         General: No swelling. Normal range of motion.   Skin:     General: Skin is warm and dry.      Coloration: Skin is not jaundiced or pale.   Neurological:      Mental Status: He is alert.          Significant Labs:  I have reviewed all pertinent lab results within the past 24 hours.  CBC:   Recent Labs   Lab 08/22/23  0459   WBC 11.51   RBC 4.77   HGB 13.2*   HCT 39.4*      MCV 83   MCH 27.7   MCHC 33.5     CMP:   Recent Labs   Lab 08/22/23  0459   *   CALCIUM 8.1*   ALBUMIN 2.8*   PROT 5.5*      K 4.4   CO2 27      BUN 26*   CREATININE 0.8   ALKPHOS 40*   ALT 42   AST 33   BILITOT 0.5       Significant Diagnostics:  I have reviewed all pertinent imaging results/findings within the past 24 hours.

## 2023-08-22 NOTE — PLAN OF CARE
Problem: Occupational Therapy  Goal: Occupational Therapy Goal  Description: Goals to be met by: 8/29/23    Patient will increase functional independence with ADLs by performing:    LE Dressing with Supervision.  Grooming while standing at sink with Supervision.  Toileting from toilet with Supervision for hygiene and clothing management.   Supine to sit with Supervision.  Toilet transfer to toilet with Modified [place].    Outcome: Ongoing, Progressing

## 2023-08-22 NOTE — ANESTHESIA POSTPROCEDURE EVALUATION
Anesthesia Post Evaluation    Patient: Danish Valladares    Procedure(s) Performed: Procedure(s) (LRB):  GASTRECTOMY (N/A)    Final Anesthesia Type: general      Patient location during evaluation: PACU  Patient participation: Yes- Able to Participate  Level of consciousness: awake and alert  Post-procedure vital signs: reviewed and stable  Pain management: adequate  Airway patency: patent    PONV status at discharge: No PONV  Anesthetic complications: no      Cardiovascular status: blood pressure returned to baseline  Respiratory status: unassisted  Hydration status: euvolemic  Follow-up not needed.          Vitals Value Taken Time   /74 08/22/23 0848   Temp 36.4 °C (97.6 °F) 08/22/23 0848   Pulse 87 08/22/23 1118   Resp 18 08/22/23 1135   SpO2 91 % 08/22/23 1118         Event Time   Out of Recovery 08/21/2023 18:00:00         Pain/Feliz Score: Pain Rating Prior to Med Admin: 0 (8/21/2023  7:25 PM)  Feliz Score: 9 (8/21/2023  8:30 PM)

## 2023-08-22 NOTE — ASSESSMENT & PLAN NOTE
Patient is a 62 y M w/ no significant past medical history (has not seen a doctor in over 40 years) who presented to the ER on 8/16 with abdominal pain and a recent diagnosis of gastric adenocarcinoma. No bowel movements for several weeks, concerning for obstruction. After review of images, very small amounts of contrast going through from stomach to duodenum. NGT placed for decompression. PICC placed and started on TPN for nutrition optimization as well as correction of electrolytes. S/P Open distal gastrectomy with BII reconstruction on 8/21/23.    - NGT to LIWS. Will re-check output at noon today to determine if we can remove this  - NPO. Sips of liquids / ice chips later today if NG removed.  - Scheduled Zofran  - Multimodal pain control. IV Tylenol and Robaxin added. Will begin transition to PO regimen once NG removed.   - Continue PCA for now.   - TPN, renew daily   - CHELA casarez today  - PPI  - DVT ppx  - PT/OT ordered for postoperative care and rehabilitation  - OK to Ambulate, OOBTC  - Daily labs  - Replete electrolytes PRN       Dispo: Continue inpatient care

## 2023-08-22 NOTE — PT/OT/SLP PROGRESS
Physical Therapy  Not Seen  Patient Name:  Danish Valladares   MRN:  [id number]  Admitting Diagnosis:  Emesis   Recent Surgery: Procedure(s) (LRB):  GASTRECTOMY (N/A) 1 Day Post-Op  Admit Date: 8/16/2023  Length of Stay: 6 days    Patient not seen on this date for PT evaluation - patient declined this morning, saying he had just gotten up and comfortable in the recliner. PT unable to return this afternoon for second attempt. PT will check back tomorrow to complete evaluation.    Ruth Monroy, PT, DPT  8/22/2023

## 2023-08-23 LAB
ALBUMIN SERPL BCP-MCNC: 2.8 G/DL (ref 3.5–5.2)
ALP SERPL-CCNC: 45 U/L (ref 55–135)
ALT SERPL W/O P-5'-P-CCNC: 33 U/L (ref 10–44)
ANION GAP SERPL CALC-SCNC: 7 MMOL/L (ref 8–16)
AST SERPL-CCNC: 21 U/L (ref 10–40)
BASOPHILS # BLD AUTO: 0.03 K/UL (ref 0–0.2)
BASOPHILS NFR BLD: 0.2 % (ref 0–1.9)
BILIRUB SERPL-MCNC: 0.6 MG/DL (ref 0.1–1)
BUN SERPL-MCNC: 23 MG/DL (ref 8–23)
CALCIUM SERPL-MCNC: 8.4 MG/DL (ref 8.7–10.5)
CHLORIDE SERPL-SCNC: 105 MMOL/L (ref 95–110)
CO2 SERPL-SCNC: 26 MMOL/L (ref 23–29)
CREAT SERPL-MCNC: 0.7 MG/DL (ref 0.5–1.4)
DIFFERENTIAL METHOD: ABNORMAL
EOSINOPHIL # BLD AUTO: 0.1 K/UL (ref 0–0.5)
EOSINOPHIL NFR BLD: 0.9 % (ref 0–8)
ERYTHROCYTE [DISTWIDTH] IN BLOOD BY AUTOMATED COUNT: 13.1 % (ref 11.5–14.5)
EST. GFR  (NO RACE VARIABLE): >60 ML/MIN/1.73 M^2
GLUCOSE SERPL-MCNC: 97 MG/DL (ref 70–110)
HCT VFR BLD AUTO: 41.5 % (ref 40–54)
HGB BLD-MCNC: 13.8 G/DL (ref 14–18)
IMM GRANULOCYTES # BLD AUTO: 0.05 K/UL (ref 0–0.04)
IMM GRANULOCYTES NFR BLD AUTO: 0.4 % (ref 0–0.5)
LYMPHOCYTES # BLD AUTO: 1.5 K/UL (ref 1–4.8)
LYMPHOCYTES NFR BLD: 12.1 % (ref 18–48)
MAGNESIUM SERPL-MCNC: 2.1 MG/DL (ref 1.6–2.6)
MCH RBC QN AUTO: 27.8 PG (ref 27–31)
MCHC RBC AUTO-ENTMCNC: 33.3 G/DL (ref 32–36)
MCV RBC AUTO: 84 FL (ref 82–98)
MONOCYTES # BLD AUTO: 1.5 K/UL (ref 0.3–1)
MONOCYTES NFR BLD: 12.3 % (ref 4–15)
NEUTROPHILS # BLD AUTO: 9 K/UL (ref 1.8–7.7)
NEUTROPHILS NFR BLD: 74.1 % (ref 38–73)
NRBC BLD-RTO: 0 /100 WBC
PHOSPHATE SERPL-MCNC: 2.4 MG/DL (ref 2.7–4.5)
PLATELET # BLD AUTO: 200 K/UL (ref 150–450)
PMV BLD AUTO: 10.4 FL (ref 9.2–12.9)
POTASSIUM SERPL-SCNC: 4.1 MMOL/L (ref 3.5–5.1)
PREALB SERPL-MCNC: 11 MG/DL (ref 20–43)
PROT SERPL-MCNC: 5.8 G/DL (ref 6–8.4)
RBC # BLD AUTO: 4.97 M/UL (ref 4.6–6.2)
SODIUM SERPL-SCNC: 138 MMOL/L (ref 136–145)
WBC # BLD AUTO: 12.1 K/UL (ref 3.9–12.7)

## 2023-08-23 PROCEDURE — 94664 DEMO&/EVAL PT USE INHALER: CPT

## 2023-08-23 PROCEDURE — A4217 STERILE WATER/SALINE, 500 ML: HCPCS

## 2023-08-23 PROCEDURE — 25000003 PHARM REV CODE 250: Performed by: STUDENT IN AN ORGANIZED HEALTH CARE EDUCATION/TRAINING PROGRAM

## 2023-08-23 PROCEDURE — 84134 ASSAY OF PREALBUMIN: CPT | Performed by: STUDENT IN AN ORGANIZED HEALTH CARE EDUCATION/TRAINING PROGRAM

## 2023-08-23 PROCEDURE — 85025 COMPLETE CBC W/AUTO DIFF WBC: CPT | Performed by: STUDENT IN AN ORGANIZED HEALTH CARE EDUCATION/TRAINING PROGRAM

## 2023-08-23 PROCEDURE — 83735 ASSAY OF MAGNESIUM: CPT | Performed by: STUDENT IN AN ORGANIZED HEALTH CARE EDUCATION/TRAINING PROGRAM

## 2023-08-23 PROCEDURE — 27000646 HC AEROBIKA DEVICE

## 2023-08-23 PROCEDURE — 97530 THERAPEUTIC ACTIVITIES: CPT

## 2023-08-23 PROCEDURE — 63600175 PHARM REV CODE 636 W HCPCS: Performed by: STUDENT IN AN ORGANIZED HEALTH CARE EDUCATION/TRAINING PROGRAM

## 2023-08-23 PROCEDURE — 80053 COMPREHEN METABOLIC PANEL: CPT | Performed by: STUDENT IN AN ORGANIZED HEALTH CARE EDUCATION/TRAINING PROGRAM

## 2023-08-23 PROCEDURE — 99900035 HC TECH TIME PER 15 MIN (STAT)

## 2023-08-23 PROCEDURE — 20600001 HC STEP DOWN PRIVATE ROOM

## 2023-08-23 PROCEDURE — 94799 UNLISTED PULMONARY SVC/PX: CPT

## 2023-08-23 PROCEDURE — 63600175 PHARM REV CODE 636 W HCPCS

## 2023-08-23 PROCEDURE — 94640 AIRWAY INHALATION TREATMENT: CPT

## 2023-08-23 PROCEDURE — 25000242 PHARM REV CODE 250 ALT 637 W/ HCPCS: Performed by: STUDENT IN AN ORGANIZED HEALTH CARE EDUCATION/TRAINING PROGRAM

## 2023-08-23 PROCEDURE — 25000003 PHARM REV CODE 250

## 2023-08-23 PROCEDURE — 94761 N-INVAS EAR/PLS OXIMETRY MLT: CPT

## 2023-08-23 PROCEDURE — 84100 ASSAY OF PHOSPHORUS: CPT | Performed by: STUDENT IN AN ORGANIZED HEALTH CARE EDUCATION/TRAINING PROGRAM

## 2023-08-23 PROCEDURE — A4216 STERILE WATER/SALINE, 10 ML: HCPCS | Performed by: STUDENT IN AN ORGANIZED HEALTH CARE EDUCATION/TRAINING PROGRAM

## 2023-08-23 PROCEDURE — B4185 PARENTERAL SOL 10 GM LIPIDS: HCPCS

## 2023-08-23 PROCEDURE — 97164 PT RE-EVAL EST PLAN CARE: CPT

## 2023-08-23 RX ORDER — OXYCODONE HYDROCHLORIDE 10 MG/1
10 TABLET ORAL EVERY 4 HOURS PRN
Status: DISCONTINUED | OUTPATIENT
Start: 2023-08-23 | End: 2023-08-24

## 2023-08-23 RX ORDER — HYDROMORPHONE HYDROCHLORIDE 1 MG/ML
0.2 INJECTION, SOLUTION INTRAMUSCULAR; INTRAVENOUS; SUBCUTANEOUS
Status: DISCONTINUED | OUTPATIENT
Start: 2023-08-23 | End: 2023-08-28

## 2023-08-23 RX ORDER — LIDOCAINE 50 MG/G
1 PATCH TOPICAL
Status: DISCONTINUED | OUTPATIENT
Start: 2023-08-23 | End: 2023-09-01 | Stop reason: HOSPADM

## 2023-08-23 RX ORDER — ONDANSETRON 2 MG/ML
8 INJECTION INTRAMUSCULAR; INTRAVENOUS EVERY 6 HOURS PRN
Status: DISCONTINUED | OUTPATIENT
Start: 2023-08-23 | End: 2023-09-01 | Stop reason: HOSPADM

## 2023-08-23 RX ORDER — LEVALBUTEROL INHALATION SOLUTION 0.63 MG/3ML
0.63 SOLUTION RESPIRATORY (INHALATION)
Status: DISPENSED | OUTPATIENT
Start: 2023-08-23 | End: 2023-08-25

## 2023-08-23 RX ORDER — OXYCODONE HYDROCHLORIDE 10 MG/1
10 TABLET ORAL EVERY 6 HOURS PRN
Status: DISCONTINUED | OUTPATIENT
Start: 2023-08-23 | End: 2023-08-23

## 2023-08-23 RX ORDER — OXYCODONE HYDROCHLORIDE 5 MG/1
5 TABLET ORAL EVERY 4 HOURS PRN
Status: DISCONTINUED | OUTPATIENT
Start: 2023-08-23 | End: 2023-08-26

## 2023-08-23 RX ADMIN — POTASSIUM PHOSPHATE, MONOBASIC AND POTASSIUM PHOSPHATE, DIBASIC 15 MMOL: 224; 236 INJECTION, SOLUTION, CONCENTRATE INTRAVENOUS at 10:08

## 2023-08-23 RX ADMIN — ACETAMINOPHEN 1000 MG: 10 INJECTION, SOLUTION INTRAVENOUS at 06:08

## 2023-08-23 RX ADMIN — LIDOCAINE 1 PATCH: 50 PATCH CUTANEOUS at 05:08

## 2023-08-23 RX ADMIN — FAMOTIDINE 20 MG: 10 INJECTION, SOLUTION INTRAVENOUS at 09:08

## 2023-08-23 RX ADMIN — ONDANSETRON 8 MG: 2 INJECTION INTRAMUSCULAR; INTRAVENOUS at 05:08

## 2023-08-23 RX ADMIN — Medication 10 ML: at 06:08

## 2023-08-23 RX ADMIN — HYDROMORPHONE HYDROCHLORIDE 0.2 MG: 1 INJECTION, SOLUTION INTRAMUSCULAR; INTRAVENOUS; SUBCUTANEOUS at 09:08

## 2023-08-23 RX ADMIN — FAMOTIDINE 20 MG: 10 INJECTION, SOLUTION INTRAVENOUS at 08:08

## 2023-08-23 RX ADMIN — METHOCARBAMOL 500 MG: 100 INJECTION, SOLUTION INTRAMUSCULAR; INTRAVENOUS at 09:08

## 2023-08-23 RX ADMIN — Medication 10 ML: at 05:08

## 2023-08-23 RX ADMIN — Medication 10 ML: at 12:08

## 2023-08-23 RX ADMIN — LEVALBUTEROL HYDROCHLORIDE 0.63 MG: 0.63 SOLUTION RESPIRATORY (INHALATION) at 01:08

## 2023-08-23 RX ADMIN — ONDANSETRON 8 MG: 2 INJECTION INTRAMUSCULAR; INTRAVENOUS at 11:08

## 2023-08-23 RX ADMIN — ONDANSETRON 8 MG: 2 INJECTION INTRAMUSCULAR; INTRAVENOUS at 12:08

## 2023-08-23 RX ADMIN — LEVALBUTEROL HYDROCHLORIDE 0.63 MG: 0.63 SOLUTION RESPIRATORY (INHALATION) at 08:08

## 2023-08-23 RX ADMIN — OXYCODONE HYDROCHLORIDE 10 MG: 10 TABLET ORAL at 07:08

## 2023-08-23 RX ADMIN — OXYCODONE HYDROCHLORIDE 10 MG: 10 TABLET ORAL at 03:08

## 2023-08-23 RX ADMIN — METHOCARBAMOL 500 MG: 100 INJECTION, SOLUTION INTRAMUSCULAR; INTRAVENOUS at 02:08

## 2023-08-23 RX ADMIN — OXYCODONE HYDROCHLORIDE 5 MG: 5 TABLET ORAL at 10:08

## 2023-08-23 RX ADMIN — SOYBEAN OIL 250 ML: 20 INJECTION, SOLUTION INTRAVENOUS at 09:08

## 2023-08-23 RX ADMIN — MAGNESIUM SULFATE HEPTAHYDRATE: 500 INJECTION, SOLUTION INTRAMUSCULAR; INTRAVENOUS at 09:08

## 2023-08-23 RX ADMIN — METHOCARBAMOL 500 MG: 100 INJECTION, SOLUTION INTRAMUSCULAR; INTRAVENOUS at 05:08

## 2023-08-23 RX ADMIN — POTASSIUM PHOSPHATE, MONOBASIC AND POTASSIUM PHOSPHATE, DIBASIC 15 MMOL: 224; 236 INJECTION, SOLUTION, CONCENTRATE INTRAVENOUS at 03:08

## 2023-08-23 RX ADMIN — ENOXAPARIN SODIUM 40 MG: 40 INJECTION SUBCUTANEOUS at 05:08

## 2023-08-23 NOTE — PLAN OF CARE
Problem: Physical Therapy  Goal: Physical Therapy Goal  Description: Goals to be met by: 9/3     Patient will increase functional independence with mobility by performin. Supine to sit with Modified [place]  2. Sit to supine with Modified [place]  3. Sit to stand transfer with Modified [place]  4. Gait  x 400 feet with Supervision using LRAD or no AD.   5. Ascend/descend 6 stair with unilateral Handrails Stand-by Assistance using LRAD or No AD.     2023 1255 by Radha Nuñez, PT  Outcome: Ongoing, Progressing  2023 1254 by Radha Nuñez, PT  Reactivated   Re-Evaluation completed, initiated plan of care.   Radha Nuñez, PT  2023

## 2023-08-23 NOTE — PROGRESS NOTES
"[facility]  Adult Nutrition  Progress Note    SUMMARY     Recommendations    1) TPN RECS: 230 D + 95 AA + IV Lipids to provide 1662 kcals, 95g PRO, (GIR: 2.14)      2) ADAT per MD      3) Monitor labs      4) Following     Goals: Meet % een/epn by next RD f/u  Nutrition Goal Status: progressing towards goal  Communication of RD Recs: other (comment) (POC)    Assessment and Plan    Nutrition Problem  Inadequate oral intake    Related to (etiology):   Inability to consume sufficient energy     Signs and Symptoms (as evidenced by):   NPO     Interventions/Recommendations (treatment strategy):  Collaboration with other providers  PN    Nutrition Diagnosis Status:   New      Reason for Assessment    Reason For Assessment: RD follow-up  Diagnosis: other (see comments) (emesis)  Relevant Medical History: esophagogastroduodenoscopy  Interdisciplinary Rounds: did not attend  General Information Comments: RD f/u. TPN infusing. Pt denies N/V. Rn provided routine/preventative zofran during f/u. Pt appears nourished. Family member at bedside endorses pt being sick for 4 months. Per chart review noted with 30# wt loss in 2 months. RD to follow up and completed NFPE.   Nutrition Discharge Planning: pending medical course    Nutrition Risk Screen    Nutrition Risk Screen: tube feeding or parenteral nutrition    Nutrition/Diet History    Spiritual, Cultural Beliefs, Christianity Practices, Values that Affect Care: no  Food Allergies: NKFA    Anthropometrics    Temp: 98.5 °F (36.9 °C)  Height Method: Stated  Height: 5' 11" (180.3 cm)  Height (inches): 71 in  Weight Method: Bed Scale  Weight: 74.4 kg (164 lb)  Weight (lb): 164 lb  Ideal Body Weight (IBW), Male: 172 lb  % Ideal Body Weight, Male (lb): 95.35 %  BMI (Calculated): 22.9  BMI Grade: 18.5-24.9 - normal       Lab/Procedures/Meds    Pertinent Labs Reviewed: reviewed  Pertinent Labs Comments: hemoglobin: 13.8, phos:2.4  Pertinent Medications Reviewed: " reviewed  Pertinent Medications Comments: enoxaparin, famotidine      Estimated/Assessed Needs    Weight Used For Calorie Calculations: 74.4 kg (164 lb)  Energy Calorie Requirements (kcal): 6141-5041 (20-25 kcal/kg)  Energy Need Method: Kcal/kg  Protein Requirements:  (1.2-1.4)  Weight Used For Protein Calculations: 74.4 kg (164 lb)     Estimated Fluid Requirement Method: RDA Method  RDA Method (mL): 1487  CHO Requirement: 185-232      Nutrition Prescription Ordered    Current Diet Order: NPO  Current Nutrition Support Formula Ordered: Other (Comment) (TPN: 230 D + 95AA + IV Lipids)    Evaluation of Received Nutrient/Fluid Intake    Parenteral Calories (kcal): 1662  Parenteral Protein (gm): 95  Lipid Calories (kcals): 500 kcals  GIR (Glucose Infusion Rate) (mg/kg/min): 2.14 mg/kg/min  % Kcal Needs: 100  % Protein Needs: 100  I/O: -371 L since admit  Comments: LBM 8/18  Tolerance: tolerating  % Intake of Estimated Energy Needs: 75 - 100 %  % Meal Intake: NPO    Nutrition Risk    Level of Risk/Frequency of Follow-up: low (f/u 1x/week)     Monitor and Evaluation    Food and Nutrient Intake: parenteral nutrition intake  Food and Nutrient Adminstration: enteral and parenteral nutrition administration  Knowledge/Beliefs/Attitudes: food and nutrition knowledge/skill, beliefs and attitudes  Physical Activity and Function: nutrition-related ADLs and IADLs  Anthropometric Measurements: height/length, weight change, weight, body mass index  Biochemical Data, Medical Tests and Procedures: gastrointestinal profile, glucose/endocrine profile, electrolyte and renal panel, inflammatory profile, lipid profile  Nutrition-Focused Physical Findings: overall appearance     Nutrition Follow-Up    RD Follow-up?: Yes    Sudha Ulrich RD, LDN

## 2023-08-23 NOTE — SUBJECTIVE & OBJECTIVE
Interval History: No major changes overnight. Still with no significant bowel function. NG output low overnight. Pain better controlled today.     Medications:  Continuous Infusions:   hydromorphone in 0.9 % NaCl 6 mg/30 ml      TPN ADULT CENTRAL LINE CUSTOM 45 mL/hr at 08/22/23 2221    TPN ADULT CENTRAL LINE CUSTOM       Scheduled Meds:   enoxparin  40 mg Subcutaneous Daily    famotidine (PF)  20 mg Intravenous BID    fat emulsion 20%  250 mL Intravenous Daily    fat emulsion 20%  250 mL Intravenous Daily    levalbuterol  0.63 mg Nebulization Q6H WAKE    methocarbamol (ROBAXIN) IVPB  500 mg Intravenous Q8H    ondansetron  8 mg Intravenous Q6H    potassium phosphate IVPB  15 mmol Intravenous Once    scopolamine  1 patch Transdermal Q3 Days    sodium chloride 0.9%  10 mL Intravenous Q6H     PRN Meds:HYDROmorphone, LIDOcaine (PF) 10 mg/ml (1%), LIDOcaine HCL 10 mg/ml (1%), naloxone, prochlorperazine, sodium chloride 0.9%, Flushing PICC Protocol **AND** sodium chloride 0.9% **AND** sodium chloride 0.9%     Review of patient's allergies indicates:   Allergen Reactions    Penicillins Rash     Objective:     Vital Signs (Most Recent):  Temp: 98.5 °F (36.9 °C) (08/23/23 0731)  Pulse: 80 (08/23/23 0818)  Resp: 18 (08/23/23 0818)  BP: (!) 146/72 (08/23/23 0731)  SpO2: 95 % (08/23/23 0818) Vital Signs (24h Range):  Temp:  [98.3 °F (36.8 °C)-99 °F (37.2 °C)] 98.5 °F (36.9 °C)  Pulse:  [65-87] 80  Resp:  [18-20] 18  SpO2:  [89 %-95 %] 95 %  BP: (118-166)/(62-92) 146/72     Weight: 74.4 kg (164 lb 0.4 oz)  Body mass index is 22.88 kg/m².    Intake/Output - Last 3 Shifts         08/21 0700  08/22 0659 08/22 0700  08/23 0659 08/23 0700  08/24 0659    P.O.       I.V. (mL/kg) 1000 (13.4)      NG/GT  40     IV Piggyback 1100      Total Intake(mL/kg) 2100 (28.2) 40 (0.5)     Urine (mL/kg/hr) 200 (0.1) 1250 (0.7) 125 (0.8)    Drains 350 860     Stool 0 0     Total Output 550 2110 125    Net +1550 -2070 -125           Stool Occurrence 0  x 0 x              Physical Exam  Vitals and nursing note reviewed.   Constitutional:       Appearance: He is not ill-appearing.   HENT:      Head: Normocephalic.      Nose:      Comments: NG to LIWS     Mouth/Throat:      Mouth: Mucous membranes are dry.   Eyes:      General: No scleral icterus.     Conjunctiva/sclera: Conjunctivae normal.   Cardiovascular:      Rate and Rhythm: Normal rate.      Pulses: Normal pulses.   Pulmonary:      Effort: Pulmonary effort is normal. No respiratory distress.      Breath sounds: No wheezing.   Abdominal:      General: Abdomen is flat. There is no distension.      Palpations: Abdomen is soft.      Tenderness: There is abdominal tenderness.      Comments: Midline incision with dressing in place. No strikethrough. Maryellen-incisional tenderness present. Abdomen is soft.   Genitourinary:     Comments: Zapata  Musculoskeletal:         General: No swelling. Normal range of motion.   Skin:     General: Skin is warm and dry.      Coloration: Skin is not jaundiced or pale.   Neurological:      Mental Status: He is alert.          Significant Labs:  I have reviewed all pertinent lab results within the past 24 hours.  CBC:   Recent Labs   Lab 08/23/23  0449   WBC 12.10   RBC 4.97   HGB 13.8*   HCT 41.5      MCV 84   MCH 27.8   MCHC 33.3     CMP:   Recent Labs   Lab 08/23/23  0449   GLU 97   CALCIUM 8.4*   ALBUMIN 2.8*   PROT 5.8*      K 4.1   CO2 26      BUN 23   CREATININE 0.7   ALKPHOS 45*   ALT 33   AST 21   BILITOT 0.6       Significant Diagnostics:  I have reviewed all pertinent imaging results/findings within the past 24 hours.

## 2023-08-23 NOTE — PROGRESS NOTES
[facility]  General Surgery  Progress Note    Subjective:     History of Present Illness:  No notes on file    Post-Op Info:  Procedure(s) (LRB):  GASTRECTOMY (N/A)   2 Days Post-Op     Interval History: No major changes overnight. Still with no significant bowel function. NG output low overnight. Pain better controlled today.     Medications:  Continuous Infusions:   hydromorphone in 0.9 % NaCl 6 mg/30 ml      TPN ADULT CENTRAL LINE CUSTOM 45 mL/hr at 08/22/23 2221    TPN ADULT CENTRAL LINE CUSTOM       Scheduled Meds:   enoxparin  40 mg Subcutaneous Daily    famotidine (PF)  20 mg Intravenous BID    fat emulsion 20%  250 mL Intravenous Daily    fat emulsion 20%  250 mL Intravenous Daily    levalbuterol  0.63 mg Nebulization Q6H WAKE    methocarbamol (ROBAXIN) IVPB  500 mg Intravenous Q8H    ondansetron  8 mg Intravenous Q6H    potassium phosphate IVPB  15 mmol Intravenous Once    scopolamine  1 patch Transdermal Q3 Days    sodium chloride 0.9%  10 mL Intravenous Q6H     PRN Meds:HYDROmorphone, LIDOcaine (PF) 10 mg/ml (1%), LIDOcaine HCL 10 mg/ml (1%), naloxone, prochlorperazine, sodium chloride 0.9%, Flushing PICC Protocol **AND** sodium chloride 0.9% **AND** sodium chloride 0.9%     Review of patient's allergies indicates:   Allergen Reactions    Penicillins Rash     Objective:     Vital Signs (Most Recent):  Temp: 98.5 °F (36.9 °C) (08/23/23 0731)  Pulse: 80 (08/23/23 0818)  Resp: 18 (08/23/23 0818)  BP: (!) 146/72 (08/23/23 0731)  SpO2: 95 % (08/23/23 0818) Vital Signs (24h Range):  Temp:  [98.3 °F (36.8 °C)-99 °F (37.2 °C)] 98.5 °F (36.9 °C)  Pulse:  [65-87] 80  Resp:  [18-20] 18  SpO2:  [89 %-95 %] 95 %  BP: (118-166)/(62-92) 146/72     Weight: 74.4 kg (164 lb 0.4 oz)  Body mass index is 22.88 kg/m².    Intake/Output - Last 3 Shifts         08/21 0700 08/22 0659 08/22 0700 08/23 0659 08/23 0700 08/24 0659    P.O.       I.V. (mL/kg) 1000 (13.4)      NG/GT  40     IV Piggyback 1100       Total Intake(mL/kg) 2100 (28.2) 40 (0.5)     Urine (mL/kg/hr) 200 (0.1) 1250 (0.7) 125 (0.8)    Drains 350 860     Stool 0 0     Total Output 550 2110 125    Net +1550 -2070 -125           Stool Occurrence 0 x 0 x              Physical Exam  Vitals and nursing note reviewed.   Constitutional:       Appearance: He is not ill-appearing.   HENT:      Head: Normocephalic.      Nose:      Comments: NG to LIWS     Mouth/Throat:      Mouth: Mucous membranes are dry.   Eyes:      General: No scleral icterus.     Conjunctiva/sclera: Conjunctivae normal.   Cardiovascular:      Rate and Rhythm: Normal rate.      Pulses: Normal pulses.   Pulmonary:      Effort: Pulmonary effort is normal. No respiratory distress.      Breath sounds: No wheezing.   Abdominal:      General: Abdomen is flat. There is no distension.      Palpations: Abdomen is soft.      Tenderness: There is abdominal tenderness.      Comments: Midline incision with dressing in place. No strikethrough. Maryellen-incisional tenderness present. Abdomen is soft.   Genitourinary:     Comments: Zapata  Musculoskeletal:         General: No swelling. Normal range of motion.   Skin:     General: Skin is warm and dry.      Coloration: Skin is not jaundiced or pale.   Neurological:      Mental Status: He is alert.          Significant Labs:  I have reviewed all pertinent lab results within the past 24 hours.  CBC:   Recent Labs   Lab 08/23/23  0449   WBC 12.10   RBC 4.97   HGB 13.8*   HCT 41.5      MCV 84   MCH 27.8   MCHC 33.3     CMP:   Recent Labs   Lab 08/23/23  0449   GLU 97   CALCIUM 8.4*   ALBUMIN 2.8*   PROT 5.8*      K 4.1   CO2 26      BUN 23   CREATININE 0.7   ALKPHOS 45*   ALT 33   AST 21   BILITOT 0.6       Significant Diagnostics:  I have reviewed all pertinent imaging results/findings within the past 24 hours.    Assessment/Plan:     Gastric adenocarcinoma  Patient is a 62 y M w/ no significant past medical history (has not seen a doctor in over  40 years) who presented to the ER on 8/16 with abdominal pain and a recent diagnosis of gastric adenocarcinoma. No bowel movements for several weeks, concerning for obstruction. After review of images, very small amounts of contrast going through from stomach to duodenum. NGT placed for decompression. PICC placed and started on TPN for nutrition optimization as well as correction of electrolytes. S/P Open distal gastrectomy with BII reconstruction on 8/21/23.    - NGT to LIWS. Will re-check output at noon today to determine if we can remove this  - NPO. Sips of liquids / ice chips later today if NG removed.  - Scheduled Zofran  - Multimodal pain control. IV Tylenol and Robaxin added. Will begin transition to PO regimen once NG removed.   - Continue PCA for now.   - TPN, renew daily   - CHELA casarez today  - PPI  - DVT ppx  - PT/OT ordered for postoperative care and rehabilitation  - OK to Ambulate, OOBTC  - Daily labs  - Replete electrolytes PRN       Dispo: Continue inpatient care        Mino Dockery MD  General Surgery  [facility]

## 2023-08-23 NOTE — PLAN OF CARE
Pt AAOx4.   VSS on RA.   Zapata catheter removed this morning, patient has voided.   NGT removed this morning, no issues.   Tolerating sips of water and ice chips  Scheduled zofran for nausea prevention  POC discussed with pt and all questions and concerns addressed. Refer to flowsheet for VS and further assessment.      Problem: Adult Inpatient Plan of Care  Goal: Plan of Care Review  Outcome: Ongoing, Progressing  Goal: Patient-Specific Goal (Individualized)  Outcome: Ongoing, Progressing  Goal: Absence of Hospital-Acquired Illness or Injury  Outcome: Ongoing, Progressing  Goal: Optimal Comfort and Wellbeing  Outcome: Ongoing, Progressing  Goal: Readiness for Transition of Care  Outcome: Ongoing, Progressing     Problem: Infection  Goal: Absence of Infection Signs and Symptoms  Outcome: Ongoing, Progressing     Problem: Impaired Wound Healing  Goal: Optimal Wound Healing  Outcome: Ongoing, Progressing     Problem: Fall Injury Risk  Goal: Absence of Fall and Fall-Related Injury  Outcome: Ongoing, Progressing     Problem: Skin Injury Risk Increased  Goal: Skin Health and Integrity  Outcome: Ongoing, Progressing

## 2023-08-23 NOTE — PLAN OF CARE
Problem: Adult Inpatient Plan of Care  Goal: Plan of Care Review  Outcome: Ongoing, Progressing  Goal: Patient-Specific Goal (Individualized)  Outcome: Ongoing, Progressing  Goal: Absence of Hospital-Acquired Illness or Injury  Outcome: Ongoing, Progressing  Goal: Optimal Comfort and Wellbeing  Outcome: Ongoing, Progressing  Goal: Readiness for Transition of Care  Outcome: Ongoing, Progressing     Problem: Infection  Goal: Absence of Infection Signs and Symptoms  Outcome: Ongoing, Progressing     Problem: Impaired Wound Healing  Goal: Optimal Wound Healing  Outcome: Ongoing, Progressing     Problem: Skin Injury Risk Increased  Goal: Skin Health and Integrity  Outcome: Ongoing, Progressing      [facility] PLAN OF CARE   Dx: Emesis     Shift Events: None     Goals of Care: TPN/Lipids, Electrolyte Maintenance, pain control, prevent nausea     Neuro: WDL     Vital Signs: Vitals Stable     Respiratory: RA (PCA DISCONTINUED )     Diet: NPO     Is patient tolerating current diet? YES     GTTS: TPN/ Lipids     Urine Output/Bowel Movement: DUE TO VOID VEGA Discontinued 0615 08/22/23     Drains/Tubes/Tube Feeds (include total output/shift): NGT to R nare, see flowsheet for output     Lines: HAM Double Lumen     Accuchecks: NA     Skin: WDL midline incision island dressing in place     Fall Risk Score: 14     Activity level? up to chair  Patient no longer wants the PCA he did not keep the co2 /resp detector on patient stated to let md know he would like some other means of pain management

## 2023-08-23 NOTE — PLAN OF CARE
Recommendations    1) TPN RECS: 230 D + 95 AA + IV Lipids to provide 1662 kcals, 95g PRO, (GIR: 2.14)      2) ADAT per MD      3) Monitor labs      4) Following     Goals: Meet % een/epn by next RD f/u  Nutrition Goal Status: progressing towards goal  Communication of RD Recs: other (comment) (POC)

## 2023-08-23 NOTE — PT/OT/SLP PROGRESS
Occupational Therapy   Treatment    Name: Danish Valladares  MRN: [id number]  Admitting Diagnosis:  Emesis  2 Days Post-Op    Recommendations:     Discharge Recommendations: home  Discharge Equipment Recommendations:  none  Barriers to discharge:  None    Assessment:     Danish Valladares is a 62 y.o. male with a medical diagnosis of Emesis. Pt walked 280' this session with HHA. Performance deficits affecting function are impaired endurance, impaired self care skills, gait instability, decreased coordination, pain.     Rehab Prognosis:  Good; patient would benefit from acute skilled OT services to address these deficits and reach maximum level of function.       Plan:     Patient to be seen 3 x/week to address the above listed problems via self-care/home management, therapeutic exercises, therapeutic activities  Plan of Care Expires: 09/21/23  Plan of Care Reviewed with: patient, spouse    Subjective     Pain/Comfort:  Pain Rating 1: 0/10    Objective:     Communicated with: rn prior to session.  Patient found supine with peripheral IV, telemetry upon OT entry to room.    General Precautions: Standard, fall    Orthopedic Precautions:   Braces:    Respiratory Status: Room air     Occupational Performance:     Bed Mobility:    Patient completed Supine to Sit with minimum assistance  Patient completed Sit to Supine with minimum assistance     Functional Mobility/Transfers:  Patient completed Sit <> Stand Transfer with contact guard assistance  with  hand-held assist   Functional Mobility: Pt walked 280' this session with HHA.    Activities of Daily Living:  Lower Body Dressing: moderate assistance  Pt declined all other ADLs.    [facility] 6 Click ADL: 20    Treatment & Education:  Discussed OT POC and progress.    Patient left supine with all lines intact and call button in reach    GOALS:   Multidisciplinary Problems       Occupational Therapy Goals          Problem: Occupational Therapy    Goal Priority Disciplines Outcome  Interventions   Occupational Therapy Goal     OT, PT/OT Ongoing, Progressing    Description: Goals to be met by: 8/29/23    Patient will increase functional independence with ADLs by performing:    LE Dressing with Supervision.  Grooming while standing at sink with Supervision.  Toileting from toilet with Supervision for hygiene and clothing management.   Supine to sit with Supervision.  Toilet transfer to toilet with Modified [place].                         Time Tracking:     OT Date of Treatment: 08/23/23  OT Start Time: 0823  OT Stop Time: 0854  OT Total Time (min): 31 min    Billable Minutes:Therapeutic Activity 31    OT/TIMOTEO: OT          8/23/2023

## 2023-08-23 NOTE — PT/OT/SLP RE-EVAL
"Physical Therapy Re-evaluation    Patient Name:  Danish Valladares   MRN:  [id number]    Recommendations:     Discharge Recommendations: home health PT  Discharge Equipment Recommendations: shower chair, walker, rolling   Barriers to discharge: None    Assessment:     Danish Valladares is a 62 y.o. male admitted with a medical diagnosis of Emesis.  He presents with the following impairments/functional limitations: weakness, impaired endurance, gait instability, impaired functional mobility, decreased lower extremity function, decreased upper extremity function, pain, edema, impaired skin.   The patient presents for PT re-evaluation s/p gastrectomy on 8/21. His mobility is primarily limited due to post-op pain. He demo'd kyphotic posture due to incisional pulling at abdomen. He had decreased activity tolerance due to pain and nausea (RN notified). He would benefit from HHPT to address the above deficits.   Rehab Prognosis:  Good; patient would benefit from acute skilled PT services to address these deficits and reach maximum level of function.      Recent Surgery: Procedure(s) (LRB):  GASTRECTOMY (N/A) 2 Days Post-Op    Plan:     During this hospitalization, patient to be seen 3 x/week to address the above listed problems via gait training, therapeutic activities, therapeutic exercises, neuromuscular re-education  Plan of Care Expires:  09/22/23  Plan of Care Reviewed with: patient, spouse    Subjective     Communicated with RN prior to session.  Patient found HOB elevated with peripheral IV, PICC line, telemetry upon PT entry to room, agreeable to evaluation.  Wife at bedside.     Chief Complaint: "It hurts up in my shoulders, I can't take a breath without it hurting"  Patient comments/goals: return to PLOF  Pain/Comfort:  Pain Rating 1: 0/10  Location - Side 1: Left  Location 1: abdomen  Pain Addressed 1: Reposition, Distraction, Cessation of Activity  Pain Rating Post-Intervention 1:  (increased pain and soreness " with mobility)    Patients cultural, spiritual, Christianity conflicts given the current situation: no      Objective:     Patient found with: peripheral IV, PICC line, telemetry     General Precautions: Standard, fall  Orthopedic Precautions: N/A  Braces: N/A  Respiratory Status: Room air    Exams:    Cognitive Exam  Patient is A&O x4 and follows 100% of one -step commands    Fine Motor Coordination   -       WNL     Postural Exam Patient presented with the following abnormalities:    -       Rounded shoulders  -       Forward head  -       Kyphosis  -       Posterior pelvic tilt  -        Sensation    -       Light touch intact PAULO LE   Skin Integrity/Edema     -       Skin integrity: visibly intact  -       Edema: mild PAULO lower extremities   R LE ROM WNL   R LE Strength WNL   L LE ROM WNL   L LE Strength  WNL     \  Functional Mobility:    Bed Mobility  Supine to Sit on the R side:  minimum assistance HHA, assist at LE and trunk   Transfers Sit to Stand:  contact guard assist ,from EOB with no AD, cued for pushing with UE   Gait  Gait Distance: 175 ft with no AD  Assistance Level: contact guard assist   Description: kyphotic rigid posture, decreased arm swing, decreased step length, deliberate gait speed/pacing, decreased foot clearance and step length    Patient needing sitting rest break due to fatigue, patient unable to walk back to room due to nausea, patient wheeled back to room in bedside chair        AM-PAC 6 CLICK MOBILITY  Total Score:18       Treatment  and Education:   Patient and wife educated on:  -role of therapy  -goals of session  -PT POC  -benefits of out of bed mobility and consequences of immobility  -calling for staff assist to mobilize safely  Patient agreeable to mobilize with therapy.     Gait training: cued for upright posture, reciprocal strides, cued to ambulate inside RW KEN, pacing for energy conservation     Patient encouraged to ambulate, sit up in chair 3x/day to prevent  deconditioning during hospitalization. Patient verbalized understanding and agreement to mobilize only with RN assist for safety.     Patient safe to ambulate with RN assist x1 person.     Patient left up in chair with all lines intact and call button in reach.    GOALS:   Multidisciplinary Problems       Physical Therapy Goals          Problem: Physical Therapy    Goal Priority Disciplines Outcome Goal Variances Interventions   Physical Therapy Goal     PT, PT/OT Ongoing, Progressing     Description: Goals to be met by: 9/3     Patient will increase functional independence with mobility by performin. Supine to sit with Modified [place]  2. Sit to supine with Modified [place]  3. Sit to stand transfer with Modified [place]  4. Gait  x 400 feet with Supervision using LRAD or no AD.   5. Ascend/descend 6 stair with unilateral Handrails Stand-by Assistance using LRAD or No AD.                          History:     Past Medical History:   Diagnosis Date    Psoriasis        Past Surgical History:   Procedure Laterality Date    ESOPHAGOGASTRODUODENOSCOPY N/A 8/3/2023    Procedure: EGD (ESOPHAGOGASTRODUODENOSCOPY);  Surgeon: Loco Marvin MD;  Location: [facility];  Service: Gastroenterology;  Laterality: N/A;    GASTRECTOMY N/A 2023    Procedure: GASTRECTOMY;  Surgeon: Eulogio Moy MD;  Location: [address];  Service: General;  Laterality: N/A;  Open gastrectomy with EGD. Needs Omni retractor, requesting room 24    TONSILLECTOMY         Time Tracking:     PT Received On: 23  PT Start Time: 1037     PT Stop Time: 1100  PT Total Time (min): 23 min     Billable Minutes: Re-eval 10 and Therapeutic Activity 13      2023

## 2023-08-24 LAB
ALBUMIN SERPL BCP-MCNC: 2.4 G/DL (ref 3.5–5.2)
ALP SERPL-CCNC: 48 U/L (ref 55–135)
ALT SERPL W/O P-5'-P-CCNC: 24 U/L (ref 10–44)
ANION GAP SERPL CALC-SCNC: 6 MMOL/L (ref 8–16)
AST SERPL-CCNC: 17 U/L (ref 10–40)
BASOPHILS # BLD AUTO: 0.02 K/UL (ref 0–0.2)
BASOPHILS NFR BLD: 0.2 % (ref 0–1.9)
BILIRUB SERPL-MCNC: 0.8 MG/DL (ref 0.1–1)
BUN SERPL-MCNC: 19 MG/DL (ref 8–23)
CALCIUM SERPL-MCNC: 8.4 MG/DL (ref 8.7–10.5)
CHLORIDE SERPL-SCNC: 104 MMOL/L (ref 95–110)
CO2 SERPL-SCNC: 26 MMOL/L (ref 23–29)
CREAT SERPL-MCNC: 0.7 MG/DL (ref 0.5–1.4)
DIFFERENTIAL METHOD: ABNORMAL
EOSINOPHIL # BLD AUTO: 0.1 K/UL (ref 0–0.5)
EOSINOPHIL NFR BLD: 0.8 % (ref 0–8)
ERYTHROCYTE [DISTWIDTH] IN BLOOD BY AUTOMATED COUNT: 13.5 % (ref 11.5–14.5)
EST. GFR  (NO RACE VARIABLE): >60 ML/MIN/1.73 M^2
GLUCOSE SERPL-MCNC: 108 MG/DL (ref 70–110)
HCT VFR BLD AUTO: 37.5 % (ref 40–54)
HGB BLD-MCNC: 12.4 G/DL (ref 14–18)
IMM GRANULOCYTES # BLD AUTO: 0.04 K/UL (ref 0–0.04)
IMM GRANULOCYTES NFR BLD AUTO: 0.5 % (ref 0–0.5)
LYMPHOCYTES # BLD AUTO: 1.3 K/UL (ref 1–4.8)
LYMPHOCYTES NFR BLD: 15.6 % (ref 18–48)
MAGNESIUM SERPL-MCNC: 1.8 MG/DL (ref 1.6–2.6)
MCH RBC QN AUTO: 26.9 PG (ref 27–31)
MCHC RBC AUTO-ENTMCNC: 33.1 G/DL (ref 32–36)
MCV RBC AUTO: 81 FL (ref 82–98)
MONOCYTES # BLD AUTO: 0.9 K/UL (ref 0.3–1)
MONOCYTES NFR BLD: 11.2 % (ref 4–15)
NEUTROPHILS # BLD AUTO: 5.9 K/UL (ref 1.8–7.7)
NEUTROPHILS NFR BLD: 71.7 % (ref 38–73)
NRBC BLD-RTO: 0 /100 WBC
PHOSPHATE SERPL-MCNC: 2.8 MG/DL (ref 2.7–4.5)
PLATELET # BLD AUTO: 175 K/UL (ref 150–450)
PMV BLD AUTO: 10.4 FL (ref 9.2–12.9)
POTASSIUM SERPL-SCNC: 4 MMOL/L (ref 3.5–5.1)
PROT SERPL-MCNC: 5.3 G/DL (ref 6–8.4)
RBC # BLD AUTO: 4.61 M/UL (ref 4.6–6.2)
SODIUM SERPL-SCNC: 136 MMOL/L (ref 136–145)
WBC # BLD AUTO: 8.25 K/UL (ref 3.9–12.7)

## 2023-08-24 PROCEDURE — 25000003 PHARM REV CODE 250

## 2023-08-24 PROCEDURE — 20600001 HC STEP DOWN PRIVATE ROOM

## 2023-08-24 PROCEDURE — 25000003 PHARM REV CODE 250: Performed by: STUDENT IN AN ORGANIZED HEALTH CARE EDUCATION/TRAINING PROGRAM

## 2023-08-24 PROCEDURE — 63600175 PHARM REV CODE 636 W HCPCS: Performed by: STUDENT IN AN ORGANIZED HEALTH CARE EDUCATION/TRAINING PROGRAM

## 2023-08-24 PROCEDURE — 27000646 HC AEROBIKA DEVICE

## 2023-08-24 PROCEDURE — 99900035 HC TECH TIME PER 15 MIN (STAT)

## 2023-08-24 PROCEDURE — 80053 COMPREHEN METABOLIC PANEL: CPT | Performed by: STUDENT IN AN ORGANIZED HEALTH CARE EDUCATION/TRAINING PROGRAM

## 2023-08-24 PROCEDURE — 84100 ASSAY OF PHOSPHORUS: CPT | Performed by: STUDENT IN AN ORGANIZED HEALTH CARE EDUCATION/TRAINING PROGRAM

## 2023-08-24 PROCEDURE — A4216 STERILE WATER/SALINE, 10 ML: HCPCS | Performed by: STUDENT IN AN ORGANIZED HEALTH CARE EDUCATION/TRAINING PROGRAM

## 2023-08-24 PROCEDURE — B4185 PARENTERAL SOL 10 GM LIPIDS: HCPCS | Performed by: STUDENT IN AN ORGANIZED HEALTH CARE EDUCATION/TRAINING PROGRAM

## 2023-08-24 PROCEDURE — A4217 STERILE WATER/SALINE, 500 ML: HCPCS | Performed by: STUDENT IN AN ORGANIZED HEALTH CARE EDUCATION/TRAINING PROGRAM

## 2023-08-24 PROCEDURE — 94799 UNLISTED PULMONARY SVC/PX: CPT

## 2023-08-24 PROCEDURE — 63600175 PHARM REV CODE 636 W HCPCS

## 2023-08-24 PROCEDURE — 94664 DEMO&/EVAL PT USE INHALER: CPT

## 2023-08-24 PROCEDURE — 94640 AIRWAY INHALATION TREATMENT: CPT

## 2023-08-24 PROCEDURE — 85025 COMPLETE CBC W/AUTO DIFF WBC: CPT | Performed by: STUDENT IN AN ORGANIZED HEALTH CARE EDUCATION/TRAINING PROGRAM

## 2023-08-24 PROCEDURE — 83735 ASSAY OF MAGNESIUM: CPT | Performed by: STUDENT IN AN ORGANIZED HEALTH CARE EDUCATION/TRAINING PROGRAM

## 2023-08-24 PROCEDURE — 25000242 PHARM REV CODE 250 ALT 637 W/ HCPCS: Performed by: STUDENT IN AN ORGANIZED HEALTH CARE EDUCATION/TRAINING PROGRAM

## 2023-08-24 PROCEDURE — 94761 N-INVAS EAR/PLS OXIMETRY MLT: CPT

## 2023-08-24 RX ORDER — METHOCARBAMOL 500 MG/1
500 TABLET, FILM COATED ORAL 4 TIMES DAILY
Status: DISCONTINUED | OUTPATIENT
Start: 2023-08-24 | End: 2023-08-26

## 2023-08-24 RX ORDER — ENOXAPARIN SODIUM 100 MG/ML
40 INJECTION SUBCUTANEOUS DAILY
Qty: 5.6 ML | Refills: 0 | Status: ON HOLD | OUTPATIENT
Start: 2023-08-24 | End: 2023-09-25 | Stop reason: HOSPADM

## 2023-08-24 RX ORDER — ACETAMINOPHEN 325 MG/1
650 TABLET ORAL EVERY 6 HOURS
Status: DISCONTINUED | OUTPATIENT
Start: 2023-08-24 | End: 2023-08-26

## 2023-08-24 RX ADMIN — METHOCARBAMOL 500 MG: 500 TABLET ORAL at 09:08

## 2023-08-24 RX ADMIN — HYDROMORPHONE HYDROCHLORIDE 0.2 MG: 1 INJECTION, SOLUTION INTRAMUSCULAR; INTRAVENOUS; SUBCUTANEOUS at 09:08

## 2023-08-24 RX ADMIN — MAGNESIUM SULFATE HEPTAHYDRATE: 500 INJECTION, SOLUTION INTRAMUSCULAR; INTRAVENOUS at 10:08

## 2023-08-24 RX ADMIN — LEVALBUTEROL HYDROCHLORIDE 0.63 MG: 0.63 SOLUTION RESPIRATORY (INHALATION) at 02:08

## 2023-08-24 RX ADMIN — METHOCARBAMOL 500 MG: 100 INJECTION, SOLUTION INTRAMUSCULAR; INTRAVENOUS at 05:08

## 2023-08-24 RX ADMIN — FAMOTIDINE 20 MG: 10 INJECTION, SOLUTION INTRAVENOUS at 09:08

## 2023-08-24 RX ADMIN — Medication 10 ML: at 06:08

## 2023-08-24 RX ADMIN — OXYCODONE HYDROCHLORIDE 10 MG: 10 TABLET ORAL at 05:08

## 2023-08-24 RX ADMIN — SCOPALAMINE 1 PATCH: 1 PATCH, EXTENDED RELEASE TRANSDERMAL at 08:08

## 2023-08-24 RX ADMIN — OXYCODONE HYDROCHLORIDE 10 MG: 10 TABLET ORAL at 01:08

## 2023-08-24 RX ADMIN — OXYCODONE HYDROCHLORIDE 10 MG: 10 TABLET ORAL at 09:08

## 2023-08-24 RX ADMIN — I.V. FAT EMULSION 250 ML: 20 EMULSION INTRAVENOUS at 10:08

## 2023-08-24 RX ADMIN — METHOCARBAMOL 500 MG: 100 INJECTION, SOLUTION INTRAMUSCULAR; INTRAVENOUS at 01:08

## 2023-08-24 RX ADMIN — LEVALBUTEROL HYDROCHLORIDE 0.63 MG: 0.63 SOLUTION RESPIRATORY (INHALATION) at 07:08

## 2023-08-24 RX ADMIN — FAMOTIDINE 20 MG: 10 INJECTION, SOLUTION INTRAVENOUS at 08:08

## 2023-08-24 RX ADMIN — METHOCARBAMOL 500 MG: 500 TABLET ORAL at 05:08

## 2023-08-24 RX ADMIN — ENOXAPARIN SODIUM 40 MG: 40 INJECTION SUBCUTANEOUS at 05:08

## 2023-08-24 RX ADMIN — OXYCODONE HYDROCHLORIDE 5 MG: 5 TABLET ORAL at 05:08

## 2023-08-24 RX ADMIN — Medication 10 ML: at 12:08

## 2023-08-24 RX ADMIN — LEVALBUTEROL HYDROCHLORIDE 0.63 MG: 0.63 SOLUTION RESPIRATORY (INHALATION) at 09:08

## 2023-08-24 RX ADMIN — ACETAMINOPHEN 650 MG: 325 TABLET ORAL at 05:08

## 2023-08-24 NOTE — PLAN OF CARE
08/24/23 1350   Post-Acute Status   Post-Acute Authorization Placement   Post-Acute Placement Status Referrals Sent      referral sent.

## 2023-08-24 NOTE — SUBJECTIVE & OBJECTIVE
Interval History: NAEON. Pt pain controlled. No Gas. Denies N/V. Wants to get up and walk again today.    Medications:  Continuous Infusions:   TPN ADULT CENTRAL LINE CUSTOM 45 mL/hr at 08/23/23 2154     Scheduled Meds:   enoxparin  40 mg Subcutaneous Daily    famotidine (PF)  20 mg Intravenous BID    fat emulsion 20%  250 mL Intravenous Daily    levalbuterol  0.63 mg Nebulization Q6H WAKE    LIDOcaine  1 patch Transdermal Q24H    methocarbamol (ROBAXIN) IVPB  500 mg Intravenous Q8H    scopolamine  1 patch Transdermal Q3 Days    sodium chloride 0.9%  10 mL Intravenous Q6H     PRN Meds:HYDROmorphone, LIDOcaine (PF) 10 mg/ml (1%), LIDOcaine HCL 10 mg/ml (1%), naloxone, ondansetron, oxyCODONE, oxyCODONE, prochlorperazine, sodium chloride 0.9%, Flushing PICC Protocol **AND** sodium chloride 0.9% **AND** sodium chloride 0.9%     Review of patient's allergies indicates:   Allergen Reactions    Penicillins Rash     Objective:     Vital Signs (Most Recent):  Temp: 98.3 °F (36.8 °C) (08/24/23 0531)  Pulse: 76 (08/24/23 0727)  Resp: 18 (08/24/23 0727)  BP: 125/62 (08/24/23 0531)  SpO2: (!) 94 % (08/24/23 0727) Vital Signs (24h Range):  Temp:  [97.5 °F (36.4 °C)-99.2 °F (37.3 °C)] 98.3 °F (36.8 °C)  Pulse:  [71-88] 76  Resp:  [17-18] 18  SpO2:  [89 %-95 %] 94 %  BP: (118-143)/(61-72) 125/62     Weight: 74.4 kg (164 lb)  Body mass index is 22.87 kg/m².    Intake/Output - Last 3 Shifts         08/22 0700 08/23 0659 08/23 0700 08/24 0659 08/24 0700 08/25 0659    I.V. (mL/kg)       NG/GT 40      IV Piggyback   0    Total Intake(mL/kg) 40 (0.5)  0 (0)    Urine (mL/kg/hr) 1250 (0.7) 125 (0.1)     Drains 860      Stool 0      Total Output 2110 125     Net -2070 -125 0           Stool Occurrence 0 x 0 x              Physical Exam  Vitals and nursing note reviewed.   Constitutional:       Appearance: He is not ill-appearing.   HENT:      Head: Normocephalic.      Nose:      Comments: NG to LIWS     Mouth/Throat:      Mouth: Mucous  membranes are dry.   Eyes:      General: No scleral icterus.     Conjunctiva/sclera: Conjunctivae normal.   Cardiovascular:      Rate and Rhythm: Normal rate.      Pulses: Normal pulses.   Pulmonary:      Effort: Pulmonary effort is normal. No respiratory distress.      Breath sounds: No wheezing.   Abdominal:      General: Abdomen is flat. There is no distension.      Palpations: Abdomen is soft.      Tenderness: There is abdominal tenderness.      Comments: Midline incision with dressing in place. No strikethrough. Maryellen-incisional tenderness present. Abdomen is soft.   Genitourinary:     Comments: [place]  Musculoskeletal:         General: No swelling. Normal range of motion.   Skin:     General: Skin is warm and dry.      Coloration: Skin is not jaundiced or pale.   Neurological:      Mental Status: He is alert.          Significant Labs:  I have reviewed all pertinent lab results within the past 24 hours.    Significant Diagnostics:  I have reviewed all pertinent imaging results/findings within the past 24 hours.

## 2023-08-24 NOTE — ASSESSMENT & PLAN NOTE
Patient is a 62 y M w/ no significant past medical history (has not seen a doctor in over 40 years) who presented to the ER on 8/16 with abdominal pain and a recent diagnosis of gastric adenocarcinoma. No bowel movements for several weeks, concerning for obstruction. After review of images, very small amounts of contrast going through from stomach to duodenum. NGT placed for decompression. PICC placed and started on TPN for nutrition optimization as well as correction of electrolytes. S/P Open distal gastrectomy with BII reconstruction on 8/21/23. Recovering appropriately.    - Sips of liquids / ice chips  - Scheduled Zofran  - Multimodal pain control. IV Tylenol and Robaxin added. Transition to PO  - TPN, renew daily   - PPI  - DVT ppx  - PT/OT ordered for postoperative care and rehabilitation  - OK to Ambulate, OOBTC  - Daily labs  - Replete electrolytes PRN       Dispo: Continue inpatient care

## 2023-08-24 NOTE — PLAN OF CARE
Problem: Adult Inpatient Plan of Care  Goal: Plan of Care Review  Outcome: Ongoing, Progressing  Goal: Patient-Specific Goal (Individualized)  Outcome: Ongoing, Progressing  Goal: Absence of Hospital-Acquired Illness or Injury  Outcome: Ongoing, Progressing  Goal: Optimal Comfort and Wellbeing  Outcome: Ongoing, Progressing  Goal: Readiness for Transition of Care  Outcome: Ongoing, Progressing     Problem: Infection  Goal: Absence of Infection Signs and Symptoms  Outcome: Ongoing, Progressing     Problem: Impaired Wound Healing  Goal: Optimal Wound Healing  Outcome: Ongoing, Progressing     Problem: Skin Injury Risk Increased  Goal: Skin Health and Integrity  Outcome: Ongoing, Progressing      [facility] PLAN OF CARE   Dx: Emesis     Shift Events: None     Goals of Care: TPN/Lipids, Electrolyte Maintenance, pain control, prevent nausea     Neuro: WDL     Vital Signs: Vitals Stable     Respiratory: RA      Diet: NPO ice chips sips     Is patient tolerating current diet? YES     GTTS: TPN/ Lipids/lipds     Urine Output/Bowel Movement: continent b&b     Drains/Tubes/Tube Feeds (include total output/shift):      Lines: HAM Double Lumen     Accuchecks: NA     Skin: WDL midline incision island dressing in place     Fall Risk Score: 14     Activity level? up to chair

## 2023-08-24 NOTE — PROGRESS NOTES
Siddhartha Thurman - [facility]  Surgical Oncology  Progress Note    Subjective:     Post-Op Info:  Procedure(s) (LRB):  GASTRECTOMY (N/A)   3 Days Post-Op     Interval History: NAEON. Pt pain controlled. No Gas. Denies N/V. Wants to get up and walk again today.    Medications:  Continuous Infusions:   TPN ADULT CENTRAL LINE CUSTOM 45 mL/hr at 08/23/23 2154     Scheduled Meds:   enoxparin  40 mg Subcutaneous Daily    famotidine (PF)  20 mg Intravenous BID    fat emulsion 20%  250 mL Intravenous Daily    levalbuterol  0.63 mg Nebulization Q6H WAKE    LIDOcaine  1 patch Transdermal Q24H    methocarbamol (ROBAXIN) IVPB  500 mg Intravenous Q8H    scopolamine  1 patch Transdermal Q3 Days    sodium chloride 0.9%  10 mL Intravenous Q6H     PRN Meds:HYDROmorphone, LIDOcaine (PF) 10 mg/ml (1%), LIDOcaine HCL 10 mg/ml (1%), naloxone, ondansetron, oxyCODONE, oxyCODONE, prochlorperazine, sodium chloride 0.9%, Flushing PICC Protocol **AND** sodium chloride 0.9% **AND** sodium chloride 0.9%     Review of patient's allergies indicates:   Allergen Reactions    Penicillins Rash     Objective:     Vital Signs (Most Recent):  Temp: 98.3 °F (36.8 °C) (08/24/23 0531)  Pulse: 76 (08/24/23 0727)  Resp: 18 (08/24/23 0727)  BP: 125/62 (08/24/23 0531)  SpO2: (!) 94 % (08/24/23 0727) Vital Signs (24h Range):  Temp:  [97.5 °F (36.4 °C)-99.2 °F (37.3 °C)] 98.3 °F (36.8 °C)  Pulse:  [71-88] 76  Resp:  [17-18] 18  SpO2:  [89 %-95 %] 94 %  BP: (118-143)/(61-72) 125/62     Weight: 74.4 kg (164 lb)  Body mass index is 22.87 kg/m².    Intake/Output - Last 3 Shifts         08/22 0700 08/23 0659 08/23 0700 08/24 0659 08/24 0700  08/25 0659    I.V. (mL/kg)       NG/GT 40      IV Piggyback   0    Total Intake(mL/kg) 40 (0.5)  0 (0)    Urine (mL/kg/hr) 1250 (0.7) 125 (0.1)     Drains 860      Stool 0      Total Output 2110 125     Net -2070 -125 0           Stool Occurrence 0 x 0 x              Physical Exam  Vitals and nursing note reviewed.    Constitutional:       Appearance: He is not ill-appearing.   HENT:      Head: Normocephalic.      Mouth/Throat:      Mouth: Mucous membranes are dry.   Eyes:      General: No scleral icterus.     Conjunctiva/sclera: Conjunctivae normal.   Cardiovascular:      Rate and Rhythm: Normal rate.      Pulses: Normal pulses.   Pulmonary:      Effort: Pulmonary effort is normal. No respiratory distress.      Breath sounds: No wheezing.   Abdominal:      General: Abdomen is flat. There is no distension.      Palpations: Abdomen is soft.      Tenderness: There is abdominal tenderness.      Comments: Midline incision with no erythema or induration  Musculoskeletal:         General: No swelling. Normal range of motion.   Skin:     General: Skin is warm and dry.      Coloration: Skin is not jaundiced or pale.   Neurological:      Mental Status: He is alert.          Significant Labs:  I have reviewed all pertinent lab results within the past 24 hours.    Significant Diagnostics:  I have reviewed all pertinent imaging results/findings within the past 24 hours.    Assessment/Plan:     Gastric adenocarcinoma  Patient is a 62 y M w/ no significant past medical history (has not seen a doctor in over 40 years) who presented to the ER on 8/16 with abdominal pain and a recent diagnosis of gastric adenocarcinoma. No bowel movements for several weeks, concerning for obstruction. After review of images, very small amounts of contrast going through from stomach to duodenum. NGT placed for decompression. PICC placed and started on TPN for nutrition optimization as well as correction of electrolytes. S/P Open distal gastrectomy with BII reconstruction on 8/21/23. Recovering appropriately.    - Sips of liquids / ice chips  - Scheduled Zofran  - Multimodal pain control. IV Tylenol and Robaxin added. Transition to PO  - TPN, renew daily   - PPI  - DVT ppx  - PT/OT ordered for postoperative care and rehabilitation  - OK to Ambulate, OOBTC  -  Daily labs  - Replete electrolytes PRN       Dispo: Continue inpatient care        Bubba Chirinos MD  General Surgery  [facility]  GIS

## 2023-08-24 NOTE — PLAN OF CARE
[facility] Plan of Care Note  Dx Final diagnoses:  [R11.10] Emesis  [C16.9] Gastric carcinoma      Shift Events ambulated hallway multiple times, no nausea, pain under control, no flatus or bm today     Goals of Care: ambulation, pain control, gi function     Neuro: A&O4    Vital Signs: VSS    Respiratory: RA    Diet: NPO c ice chips    Is patient tolerating current diet? N/a    GTTS: TPN    Urine Output/Bowel Movement: voiding without difficulty. No bm today     Drains/Tubes/Tube Feeds (include total output/shift): see flowsheet    Lines: PICC right arm      Accuchecks: n/a     Skin: WDL midline incision     Fall Risk Score: 12    Activity level? Ambulates independently     Any scheduled procedures? no    Any safety concerns? no    Other: no        Problem: Adult Inpatient Plan of Care  Goal: Plan of Care Review  Outcome: Ongoing, Progressing  Goal: Patient-Specific Goal (Individualized)  Outcome: Ongoing, Progressing  Goal: Absence of Hospital-Acquired Illness or Injury  Outcome: Ongoing, Progressing  Goal: Optimal Comfort and Wellbeing  Outcome: Ongoing, Progressing  Goal: Readiness for Transition of Care  Outcome: Ongoing, Progressing     Problem: Infection  Goal: Absence of Infection Signs and Symptoms  Outcome: Ongoing, Progressing     Problem: Impaired Wound Healing  Goal: Optimal Wound Healing  Outcome: Ongoing, Progressing     Problem: Fall Injury Risk  Goal: Absence of Fall and Fall-Related Injury  Outcome: Ongoing, Progressing     Problem: Skin Injury Risk Increased  Goal: Skin Health and Integrity  Outcome: Ongoing, Progressing

## 2023-08-24 NOTE — PLAN OF CARE
[facility]       HOME  HEALTH ORDERS                                    FACE TO FACE ENCOUNTER      Patient Name: Danish Valladares  YOB: 1960    PCP: Yfn Walker MD   PCP Address: [id number] [place] [address]  PCP Phone Number: [phone]  PCP Fax: [phone]    Encounter Date: 09/01/2023    Admit to Home Health    Diagnoses:  Active Hospital Problems    Diagnosis  POA    *Gastric adenocarcinoma [C16.9]  Yes    Emesis [R11.10]  Yes      Resolved Hospital Problems   No resolved problems to display.     I have seen and examined this patient face to face today. My clinical findings that support the need for the home health skilled services and home bound status are the following:  Weakness/numbness causing balance and gait disturbance due to Surgery making it taxing to leave home.    Allergies:  Review of patient's allergies indicates:   Allergen Reactions    Penicillins Rash       Diet: Full liquid diet    Activities: activity as tolerated    Nursing:   SN to complete comprehensive assessment including routine vital signs. Instruct on disease process and s/s of complications to report to MD. Review/verify medication list sent home with the patient at time of discharge  and instruct patient/caregiver as needed. Frequency may be adjusted depending on start of care date.    Notify MD if SBP > 160 or < 90; DBP > 90 or < 50; HR > 120 or < 50; Temp > 101    CONSULTS:    Physical Therapy to evaluate and treat. Evaluate for home safety and equipment needs; Establish/upgrade home exercise program. Perform / instruct on therapeutic exercises, gait training, transfer training, and Range of Motion.    WOUND CARE ORDERS  yes:  Surgical Wound:  Location: midline abdomen with derma bond. May shower.     MISCELLANEOUS CARE:  Home Infusion Therapy:   SN to perform Infusion Therapy/Central Line Care.  Review Central Line Care & Central Line Flush  with patient.    Administer (drug and dose): TPN      Scrub the Hub: Prior to accessing the line, always perform a 30 second alcohol scrub  Each lumen of the central line is to be flushed at least daily with 10 mL Normal Saline and 3 mL Heparin flush (10 units/mL)  Skilled Nurse (SN) may draw blood from IV access  Blood Draw Procedure:   - Aspirate at least 5 mL of blood   - Discard   - Obtain specimen   - Change injection cap   - Flush with 20 mL Normal Saline followed by a                 3-5 mL Heparin flush (10 units/mL)  Central :   - Sterile dressing changes are done weekly and as needed.   - Use chlor-hexadine scrub to cleanse site, apply Biopatch to insertion site,       apply securement device dressing   - Injection caps are changed weekly and after EVERY lab draw.   - If sterile gauze is under dressing to control oozing,                 dressing change must be performed every 24 hours until gauze is not needed.    TPN ADULT CENTRAL LINE CUSTOM     TPN ADULT CENTRAL LINE CUSTOM   [[id number]]    Ordered Dose: -- Route: Intravenous Frequency: Continuous @ 45 mL/hr over 24 Hours   Volume: 1,080 mL Infusion Site: Central     Start Date/Time (Original Order): 08/17/23 2200 End Date/Time: 09/01/23 2133    Start Date/Time (After Last Reorder): 08/31/23 2200         Order Status: Active   Ordering User: Dr. Eulogio Moy MD Ordering Date/Time: Thu Aug 31, 2023 0931   Ordering Provider: Dr. Eulogio Moy MD Authorizing Provider: Dr. Eulogio Moy MD      Components    Component Order Dose Admin Dose   parenteral amino acid 15 % Solp 95 g 94.95 g   dextrose 70% Solp 230 g 229.999 g   sterile water Solp 7.43 mLs 7.43 mL   sodium acetate 2 mEq/mL Soln 60 mEq 60 mEq   sodium chloride (23.4%) HYPERTONIC 4 mEq/mL Solp 60 mEq 60 mEq   sodium phosphate 3 mmol/mL Soln 12 mmol 12 mmol   potassium phosphate 3 mmol/mL Soln 6 mmol 6 mmol   potassium chloride 2 mEq/mL Soln 70 mEq 70 mEq   magnesium sulfate 4  mEq/mL (50 %) Soln 2 g 2 g   calcium gluconate 100 mg/mL (10%) Soln 1 g 1 g   mvi, (ADULT) no.4 with vit K 3,300 unit- 150 mcg/10 mL Soln 10 mLs 10 mL   trace elements, (Tralement for patients >/= 10kg) Zn(3000mcg/mL)-Cu(300mcg/mL)-Mn (55mcg/mL)-Se (60mcg/mL) 3 mg-0.3 mg-55 mcg-60 mcg/mL Soln 1 mL 1 mL                Fat 20% infusion 250mL QD    PICC line care per nursing protocol    LABS: SN to perform labs: CBC, CMP, Mag, Phos, pre-albumin Q Monday and fax results to Dr. Danish Barros, Dr. Eulogio Moy, Dr. Ash Barros [phone]   Phone [phone] or [phone]      Medications: Review discharge medications with patient and family and provide education.      Current Discharge Medication List        START taking these medications    Details   acetaminophen (TYLENOL) 650 MG TbSR Take 1 tablet (650 mg total) by mouth every 8 (eight) hours.  Qty: 90 tablet, Refills: 0      bisacodyL (DULCOLAX) 10 mg Supp Place 1 suppository (10 mg total) rectally daily as needed.  Qty: 10 suppository, Refills: 0      enoxaparin (LOVENOX) 40 mg/0.4 mL Syrg Inject 0.4 mLs (40 mg total) into the skin Daily. for 14 days  Qty: 5.6 mL, Refills: 0      oxyCODONE (ROXICODONE) 5 MG immediate release tablet Take 1 tablet (5 mg total) by mouth every 4 (four) hours as needed for Pain.  Qty: 20 tablet, Refills: 0    Comments: Quantity prescribed more than 7 day supply? No      polyethylene glycol (GLYCOLAX) 17 gram/dose powder Use cap to measure 17 grams, mix in liquid and take by mouth once daily.  Qty: 238 g, Refills: 7           CONTINUE these medications which have NOT CHANGED    Details   omeprazole (PRILOSEC) 40 MG capsule Take 1 capsule (40 mg total) by mouth once daily.  Qty: 90 capsule, Refills: 0    Associated Diagnoses: Heartburn      ondansetron (ZOFRAN-ODT) 8 MG TbDL Take 8 mg by mouth 3 (three) times daily.      sucralfate (CARAFATE) 1 gram tablet TAKE 1 TABLET(1 GRAM) BY MOUTH FOUR TIMES DAILY BEFORE MEALS AND AT NIGHT  Qty:  360 tablet, Refills: 0    Comments: **Patient requests 90 days supply**  Associated Diagnoses: Epigastric pain             I certify that this patient is confined to his home and needs intermittent skilled nursing care and physical therapy.          Electronically Signed  _________________________________  Rachel Baker NP  09/01/2023

## 2023-08-25 LAB
ALBUMIN SERPL BCP-MCNC: 2.4 G/DL (ref 3.5–5.2)
ALP SERPL-CCNC: 62 U/L (ref 55–135)
ALT SERPL W/O P-5'-P-CCNC: 28 U/L (ref 10–44)
ANION GAP SERPL CALC-SCNC: 7 MMOL/L (ref 8–16)
AST SERPL-CCNC: 23 U/L (ref 10–40)
BASOPHILS # BLD AUTO: 0.04 K/UL (ref 0–0.2)
BASOPHILS NFR BLD: 0.6 % (ref 0–1.9)
BILIRUB SERPL-MCNC: 1.2 MG/DL (ref 0.1–1)
BUN SERPL-MCNC: 20 MG/DL (ref 8–23)
CALCIUM SERPL-MCNC: 8.4 MG/DL (ref 8.7–10.5)
CHLORIDE SERPL-SCNC: 101 MMOL/L (ref 95–110)
CO2 SERPL-SCNC: 27 MMOL/L (ref 23–29)
CREAT SERPL-MCNC: 0.6 MG/DL (ref 0.5–1.4)
DIFFERENTIAL METHOD: ABNORMAL
EOSINOPHIL # BLD AUTO: 0.1 K/UL (ref 0–0.5)
EOSINOPHIL NFR BLD: 1.8 % (ref 0–8)
ERYTHROCYTE [DISTWIDTH] IN BLOOD BY AUTOMATED COUNT: 13.3 % (ref 11.5–14.5)
EST. GFR  (NO RACE VARIABLE): >60 ML/MIN/1.73 M^2
GLUCOSE SERPL-MCNC: 110 MG/DL (ref 70–110)
HCT VFR BLD AUTO: 36.4 % (ref 40–54)
HGB BLD-MCNC: 12.1 G/DL (ref 14–18)
IMM GRANULOCYTES # BLD AUTO: 0.04 K/UL (ref 0–0.04)
IMM GRANULOCYTES NFR BLD AUTO: 0.6 % (ref 0–0.5)
LYMPHOCYTES # BLD AUTO: 1.3 K/UL (ref 1–4.8)
LYMPHOCYTES NFR BLD: 18.5 % (ref 18–48)
MAGNESIUM SERPL-MCNC: 1.9 MG/DL (ref 1.6–2.6)
MCH RBC QN AUTO: 27.7 PG (ref 27–31)
MCHC RBC AUTO-ENTMCNC: 33.2 G/DL (ref 32–36)
MCV RBC AUTO: 83 FL (ref 82–98)
MONOCYTES # BLD AUTO: 0.9 K/UL (ref 0.3–1)
MONOCYTES NFR BLD: 13.3 % (ref 4–15)
NEUTROPHILS # BLD AUTO: 4.4 K/UL (ref 1.8–7.7)
NEUTROPHILS NFR BLD: 65.2 % (ref 38–73)
NRBC BLD-RTO: 0 /100 WBC
PHOSPHATE SERPL-MCNC: 2.9 MG/DL (ref 2.7–4.5)
PLATELET # BLD AUTO: 155 K/UL (ref 150–450)
PMV BLD AUTO: 10.1 FL (ref 9.2–12.9)
POTASSIUM SERPL-SCNC: 4 MMOL/L (ref 3.5–5.1)
PROT SERPL-MCNC: 5.4 G/DL (ref 6–8.4)
RBC # BLD AUTO: 4.37 M/UL (ref 4.6–6.2)
SODIUM SERPL-SCNC: 135 MMOL/L (ref 136–145)
TRIGL SERPL-MCNC: 138 MG/DL (ref 30–150)
WBC # BLD AUTO: 6.76 K/UL (ref 3.9–12.7)

## 2023-08-25 PROCEDURE — 25000003 PHARM REV CODE 250

## 2023-08-25 PROCEDURE — B4185 PARENTERAL SOL 10 GM LIPIDS: HCPCS | Performed by: STUDENT IN AN ORGANIZED HEALTH CARE EDUCATION/TRAINING PROGRAM

## 2023-08-25 PROCEDURE — 25000003 PHARM REV CODE 250: Performed by: STUDENT IN AN ORGANIZED HEALTH CARE EDUCATION/TRAINING PROGRAM

## 2023-08-25 PROCEDURE — 99900035 HC TECH TIME PER 15 MIN (STAT)

## 2023-08-25 PROCEDURE — 94640 AIRWAY INHALATION TREATMENT: CPT

## 2023-08-25 PROCEDURE — 94664 DEMO&/EVAL PT USE INHALER: CPT

## 2023-08-25 PROCEDURE — 84478 ASSAY OF TRIGLYCERIDES: CPT | Performed by: STUDENT IN AN ORGANIZED HEALTH CARE EDUCATION/TRAINING PROGRAM

## 2023-08-25 PROCEDURE — 94761 N-INVAS EAR/PLS OXIMETRY MLT: CPT

## 2023-08-25 PROCEDURE — A4217 STERILE WATER/SALINE, 500 ML: HCPCS | Performed by: STUDENT IN AN ORGANIZED HEALTH CARE EDUCATION/TRAINING PROGRAM

## 2023-08-25 PROCEDURE — 94799 UNLISTED PULMONARY SVC/PX: CPT

## 2023-08-25 PROCEDURE — 80053 COMPREHEN METABOLIC PANEL: CPT | Performed by: STUDENT IN AN ORGANIZED HEALTH CARE EDUCATION/TRAINING PROGRAM

## 2023-08-25 PROCEDURE — 25000242 PHARM REV CODE 250 ALT 637 W/ HCPCS: Performed by: STUDENT IN AN ORGANIZED HEALTH CARE EDUCATION/TRAINING PROGRAM

## 2023-08-25 PROCEDURE — 85025 COMPLETE CBC W/AUTO DIFF WBC: CPT | Performed by: STUDENT IN AN ORGANIZED HEALTH CARE EDUCATION/TRAINING PROGRAM

## 2023-08-25 PROCEDURE — 63600175 PHARM REV CODE 636 W HCPCS: Performed by: STUDENT IN AN ORGANIZED HEALTH CARE EDUCATION/TRAINING PROGRAM

## 2023-08-25 PROCEDURE — 20600001 HC STEP DOWN PRIVATE ROOM

## 2023-08-25 PROCEDURE — 83735 ASSAY OF MAGNESIUM: CPT | Performed by: STUDENT IN AN ORGANIZED HEALTH CARE EDUCATION/TRAINING PROGRAM

## 2023-08-25 PROCEDURE — A4216 STERILE WATER/SALINE, 10 ML: HCPCS | Performed by: STUDENT IN AN ORGANIZED HEALTH CARE EDUCATION/TRAINING PROGRAM

## 2023-08-25 PROCEDURE — 84100 ASSAY OF PHOSPHORUS: CPT | Performed by: STUDENT IN AN ORGANIZED HEALTH CARE EDUCATION/TRAINING PROGRAM

## 2023-08-25 RX ADMIN — ACETAMINOPHEN 650 MG: 325 TABLET ORAL at 05:08

## 2023-08-25 RX ADMIN — LIDOCAINE 1 PATCH: 50 PATCH CUTANEOUS at 05:08

## 2023-08-25 RX ADMIN — OXYCODONE HYDROCHLORIDE 5 MG: 5 TABLET ORAL at 05:08

## 2023-08-25 RX ADMIN — FAMOTIDINE 20 MG: 10 INJECTION, SOLUTION INTRAVENOUS at 10:08

## 2023-08-25 RX ADMIN — ACETAMINOPHEN 650 MG: 325 TABLET ORAL at 06:08

## 2023-08-25 RX ADMIN — OXYCODONE HYDROCHLORIDE 5 MG: 5 TABLET ORAL at 06:08

## 2023-08-25 RX ADMIN — METHOCARBAMOL 500 MG: 500 TABLET ORAL at 09:08

## 2023-08-25 RX ADMIN — FAMOTIDINE 20 MG: 10 INJECTION, SOLUTION INTRAVENOUS at 09:08

## 2023-08-25 RX ADMIN — LEVALBUTEROL HYDROCHLORIDE 0.63 MG: 0.63 SOLUTION RESPIRATORY (INHALATION) at 07:08

## 2023-08-25 RX ADMIN — METHOCARBAMOL 500 MG: 500 TABLET ORAL at 01:08

## 2023-08-25 RX ADMIN — OXYCODONE HYDROCHLORIDE 5 MG: 5 TABLET ORAL at 10:08

## 2023-08-25 RX ADMIN — MAGNESIUM SULFATE HEPTAHYDRATE: 500 INJECTION, SOLUTION INTRAMUSCULAR; INTRAVENOUS at 10:08

## 2023-08-25 RX ADMIN — Medication 10 ML: at 06:08

## 2023-08-25 RX ADMIN — ACETAMINOPHEN 650 MG: 325 TABLET ORAL at 01:08

## 2023-08-25 RX ADMIN — METHOCARBAMOL 500 MG: 500 TABLET ORAL at 05:08

## 2023-08-25 RX ADMIN — OXYCODONE HYDROCHLORIDE 5 MG: 5 TABLET ORAL at 02:08

## 2023-08-25 RX ADMIN — METHOCARBAMOL 500 MG: 500 TABLET ORAL at 10:08

## 2023-08-25 RX ADMIN — SOYBEAN OIL 250 ML: 20 INJECTION, SOLUTION INTRAVENOUS at 10:08

## 2023-08-25 RX ADMIN — OXYCODONE HYDROCHLORIDE 5 MG: 5 TABLET ORAL at 09:08

## 2023-08-25 RX ADMIN — ENOXAPARIN SODIUM 40 MG: 40 INJECTION SUBCUTANEOUS at 05:08

## 2023-08-25 NOTE — PLAN OF CARE
[facility] Plan of Care Note  Dx Emesis, Gastric carcinoma      Shift Events no significant events noted     Goals of Care: ambulation, pain control, gi function      Neuro: A&O4     Vital Signs: VSS     Respiratory: RA     Diet: NPO c ice chips, sips of water     Is patient tolerating current diet? N/a     GTTS: TPN     Urine Output/Bowel Movement: voiding without difficulty. No bm today      Drains/Tubes/Tube Feeds (include total output/shift): see flowsheet     Lines: PICC right arm      Accuchecks: n/a      Skin: WDL midline incision      Fall Risk Score: 12     Activity level? Ambulates independently      Any scheduled procedures? no     Any safety concerns? no     Other: no

## 2023-08-25 NOTE — ASSESSMENT & PLAN NOTE
Patient is a 62 y M w/ no significant past medical history (has not seen a doctor in over 40 years) who presented to the ER on 8/16 with abdominal pain and a recent diagnosis of gastric adenocarcinoma. No bowel movements for several weeks, concerning for obstruction. After review of images, very small amounts of contrast going through from stomach to duodenum. NGT placed for decompression. PICC placed and started on TPN for nutrition optimization as well as correction of electrolytes. S/P Open distal gastrectomy with BII reconstruction on 8/21/23. Recovering appropriately.    - CLD  - Scheduled Zofran  - Multimodal pain control. Meds transitioned to PO yesterday  - TPN, renew daily   - PPI  - DVT ppx  - PT/OT ordered for postoperative care and rehabilitation  - OK to Ambulate, OOBTC  - Daily labs  - Replete electrolytes PRN     Dispo: Continue inpatient care

## 2023-08-25 NOTE — PLAN OF CARE
[facility] [place] Plan of Care Note  Dx Final diagnoses:  [R11.10] Emesis  [C16.9] Gastric carcinoma        Shift Events ambulated hallway multiple times, no nausea, pain under control, passing gas. Lovenox teaching and kit provided to patient and wife. Wife practiced giving lovenox dose this evening, would like to continue practicing until d/c       Goals of Care: ambulation, pain control, gi function      Neuro: A&O4     Vital Signs: VSS     Respiratory: RA     Diet: clears     Is patient tolerating current diet? yes     GTTS: TPN     Urine Output/Bowel Movement: voiding without difficulty. No bm today      Drains/Tubes/Tube Feeds (include total output/shift): see flowsheet     Lines: PICC right arm        Accuchecks: n/a      Skin: WDL midline incision      Fall Risk Score: 12     Activity level? Ambulates independently      Any scheduled procedures? no     Any safety concerns? no     Other: no       Problem: Adult Inpatient Plan of Care  Goal: Plan of Care Review  Outcome: Ongoing, Progressing  Goal: Patient-Specific Goal (Individualized)  Outcome: Ongoing, Progressing  Goal: Absence of Hospital-Acquired Illness or Injury  Outcome: Ongoing, Progressing  Goal: Optimal Comfort and Wellbeing  Outcome: Ongoing, Progressing  Goal: Readiness for Transition of Care  Outcome: Ongoing, Progressing     Problem: Infection  Goal: Absence of Infection Signs and Symptoms  Outcome: Ongoing, Progressing     Problem: Impaired Wound Healing  Goal: Optimal Wound Healing  Outcome: Ongoing, Progressing     Problem: Fall Injury Risk  Goal: Absence of Fall and Fall-Related Injury  Outcome: Ongoing, Progressing     Problem: Skin Injury Risk Increased  Goal: Skin Health and Integrity  Outcome: Ongoing, Progressing

## 2023-08-25 NOTE — PROGRESS NOTES
Siddhartha Thurman - [facility]  General Surgery  Progress Note    Subjective:     History of Present Illness:  No notes on file    Post-Op Info:  Procedure(s) (LRB):  GASTRECTOMY (N/A)   4 Days Post-Op     Interval History: No major changes overnight. Looks good this morning. In good spirits. Pain better controlled. Ambulating well. Still no bowel function, but tolerating clears.     Medications:  Continuous Infusions:   TPN ADULT CENTRAL LINE CUSTOM 45 mL/hr at 08/24/23 2205     Scheduled Meds:   acetaminophen  650 mg Oral Q6H    enoxparin  40 mg Subcutaneous Daily    famotidine (PF)  20 mg Intravenous BID    fat emulsion 20%  250 mL Intravenous Daily    levalbuterol  0.63 mg Nebulization Q6H WAKE    LIDOcaine  1 patch Transdermal Q24H    methocarbamoL  500 mg Oral QID    scopolamine  1 patch Transdermal Q3 Days    sodium chloride 0.9%  10 mL Intravenous Q6H     PRN Meds:HYDROmorphone, LIDOcaine (PF) 10 mg/ml (1%), LIDOcaine HCL 10 mg/ml (1%), ondansetron, oxyCODONE, prochlorperazine, sodium chloride 0.9%, Flushing PICC Protocol **AND** sodium chloride 0.9% **AND** sodium chloride 0.9%     Review of patient's allergies indicates:   Allergen Reactions    Penicillins Rash     Objective:     Vital Signs (Most Recent):  Temp: 97.4 °F (36.3 °C) (08/25/23 0739)  Pulse: 81 (08/25/23 0740)  Resp: 18 (08/25/23 0740)  BP: 113/60 (08/25/23 0739)  SpO2: (!) 92 % (08/25/23 0739) Vital Signs (24h Range):  Temp:  [97.4 °F (36.3 °C)-98.5 °F (36.9 °C)] 97.4 °F (36.3 °C)  Pulse:  [72-89] 81  Resp:  [17-18] 18  SpO2:  [89 %-95 %] 92 %  BP: (113-139)/(56-69) 113/60     Weight: 74.4 kg (164 lb)  Body mass index is 22.87 kg/m².    Intake/Output - Last 3 Shifts         08/23 0700 08/24 0659 08/24 0700 08/25 0659 08/25 0700 08/26 0659    P.O.  0     NG/GT       IV Piggyback  97.7     TPN  1128.8     Total Intake(mL/kg)  1226.5 (16.5)     Urine (mL/kg/hr) 125 (0.1)      Drains       Stool       Total Output 125      Net -125 +1226.5             Urine Occurrence  3 x     Stool Occurrence 0 x 0 x              Physical Exam  Vitals and nursing note reviewed.   Constitutional:       Appearance: He is not ill-appearing.   HENT:      Head: Normocephalic.      Mouth/Throat:      Mouth: Mucous membranes are moist.      Pharynx: Oropharynx is clear.   Eyes:      General: No scleral icterus.     Conjunctiva/sclera: Conjunctivae normal.   Cardiovascular:      Rate and Rhythm: Normal rate.      Pulses: Normal pulses.   Pulmonary:      Effort: Pulmonary effort is normal. No respiratory distress.      Breath sounds: No wheezing.   Abdominal:      General: Abdomen is flat. There is no distension.      Palpations: Abdomen is soft.      Tenderness: There is abdominal tenderness.      Comments: Midline incision CDI. Maryellen-incisional tenderness present. Abdomen is soft, but mildly distended.   Musculoskeletal:         General: No swelling. Normal range of motion.   Skin:     General: Skin is warm and dry.      Coloration: Skin is not jaundiced or pale.   Neurological:      General: No focal deficit present.      Mental Status: He is alert and oriented to person, place, and time.          Significant Labs:  I have reviewed all pertinent lab results within the past 24 hours.  CBC:   Recent Labs   Lab 08/25/23  0519   WBC 6.76   RBC 4.37*   HGB 12.1*   HCT 36.4*      MCV 83   MCH 27.7   MCHC 33.2     CMP:   Recent Labs   Lab 08/25/23 0519      CALCIUM 8.4*   ALBUMIN 2.4*   PROT 5.4*   *   K 4.0   CO2 27      BUN 20   CREATININE 0.6   ALKPHOS 62   ALT 28   AST 23   BILITOT 1.2*       Significant Diagnostics:  I have reviewed all pertinent imaging results/findings within the past 24 hours.    Assessment/Plan:     Gastric adenocarcinoma  Patient is a 62 y M w/ no significant past medical history (has not seen a doctor in over 40 years) who presented to the ER on 8/16 with abdominal pain and a recent diagnosis of gastric adenocarcinoma. No bowel  movements for several weeks, concerning for obstruction. After review of images, very small amounts of contrast going through from stomach to duodenum. NGT placed for decompression. PICC placed and started on TPN for nutrition optimization as well as correction of electrolytes. S/P Open distal gastrectomy with BII reconstruction on 8/21/23. Recovering appropriately.    - CLD  - Scheduled Zofran  - Multimodal pain control. Meds transitioned to PO yesterday  - TPN, renew daily   - PPI  - DVT ppx  - PT/OT ordered for postoperative care and rehabilitation  - OK to Ambulate, OOBTC  - Daily labs  - Replete electrolytes PRN     Dispo: Continue inpatient care        Mino Dockery MD  General Surgery  [facility]

## 2023-08-25 NOTE — SUBJECTIVE & OBJECTIVE
Interval History: No major changes overnight. Looks good this morning. In good spirits. Pain better controlled. Ambulating well. Still no bowel function, but tolerating clears.     Medications:  Continuous Infusions:   TPN ADULT CENTRAL LINE CUSTOM 45 mL/hr at 08/24/23 2205     Scheduled Meds:   acetaminophen  650 mg Oral Q6H    enoxparin  40 mg Subcutaneous Daily    famotidine (PF)  20 mg Intravenous BID    fat emulsion 20%  250 mL Intravenous Daily    levalbuterol  0.63 mg Nebulization Q6H WAKE    LIDOcaine  1 patch Transdermal Q24H    methocarbamoL  500 mg Oral QID    scopolamine  1 patch Transdermal Q3 Days    sodium chloride 0.9%  10 mL Intravenous Q6H     PRN Meds:HYDROmorphone, LIDOcaine (PF) 10 mg/ml (1%), LIDOcaine HCL 10 mg/ml (1%), ondansetron, oxyCODONE, prochlorperazine, sodium chloride 0.9%, Flushing PICC Protocol **AND** sodium chloride 0.9% **AND** sodium chloride 0.9%     Review of patient's allergies indicates:   Allergen Reactions    Penicillins Rash     Objective:     Vital Signs (Most Recent):  Temp: 97.4 °F (36.3 °C) (08/25/23 0739)  Pulse: 81 (08/25/23 0740)  Resp: 18 (08/25/23 0740)  BP: 113/60 (08/25/23 0739)  SpO2: (!) 92 % (08/25/23 0739) Vital Signs (24h Range):  Temp:  [97.4 °F (36.3 °C)-98.5 °F (36.9 °C)] 97.4 °F (36.3 °C)  Pulse:  [72-89] 81  Resp:  [17-18] 18  SpO2:  [89 %-95 %] 92 %  BP: (113-139)/(56-69) 113/60     Weight: 74.4 kg (164 lb)  Body mass index is 22.87 kg/m².    Intake/Output - Last 3 Shifts         08/23 0700 08/24 0659 08/24 0700 08/25 0659 08/25 0700 08/26 0659    P.O.  0     NG/GT       IV Piggyback  97.7     TPN  1128.8     Total Intake(mL/kg)  1226.5 (16.5)     Urine (mL/kg/hr) 125 (0.1)      Drains       Stool       Total Output 125      Net -125 +1226.5            Urine Occurrence  3 x     Stool Occurrence 0 x 0 x              Physical Exam  Vitals and nursing note reviewed.   Constitutional:       Appearance: He is not ill-appearing.   HENT:      Head:  Normocephalic.      Mouth/Throat:      Mouth: Mucous membranes are moist.      Pharynx: Oropharynx is clear.   Eyes:      General: No scleral icterus.     Conjunctiva/sclera: Conjunctivae normal.   Cardiovascular:      Rate and Rhythm: Normal rate.      Pulses: Normal pulses.   Pulmonary:      Effort: Pulmonary effort is normal. No respiratory distress.      Breath sounds: No wheezing.   Abdominal:      General: Abdomen is flat. There is no distension.      Palpations: Abdomen is soft.      Tenderness: There is abdominal tenderness.      Comments: Midline incision CDI. Maryellen-incisional tenderness present. Abdomen is soft, but mildly distended.   Musculoskeletal:         General: No swelling. Normal range of motion.   Skin:     General: Skin is warm and dry.      Coloration: Skin is not jaundiced or pale.   Neurological:      General: No focal deficit present.      Mental Status: He is alert and oriented to person, place, and time.          Significant Labs:  I have reviewed all pertinent lab results within the past 24 hours.  CBC:   Recent Labs   Lab 08/25/23  0519   WBC 6.76   RBC 4.37*   HGB 12.1*   HCT 36.4*      MCV 83   MCH 27.7   MCHC 33.2     CMP:   Recent Labs   Lab 08/25/23 0519      CALCIUM 8.4*   ALBUMIN 2.4*   PROT 5.4*   *   K 4.0   CO2 27      BUN 20   CREATININE 0.6   ALKPHOS 62   ALT 28   AST 23   BILITOT 1.2*       Significant Diagnostics:  I have reviewed all pertinent imaging results/findings within the past 24 hours.

## 2023-08-25 NOTE — PT/OT/SLP PROGRESS
Physical Therapy      Patient Name:  Danish Valladares   MRN:  [id number]    Patient not seen today secondary to   pt just returning from ambulating with family in hallway and visiting with family. Will follow-up next scheduled treatment per PT POC.

## 2023-08-26 LAB
ALBUMIN SERPL BCP-MCNC: 2.6 G/DL (ref 3.5–5.2)
ALP SERPL-CCNC: 93 U/L (ref 55–135)
ALT SERPL W/O P-5'-P-CCNC: 35 U/L (ref 10–44)
ANION GAP SERPL CALC-SCNC: 7 MMOL/L (ref 8–16)
AST SERPL-CCNC: 25 U/L (ref 10–40)
BASOPHILS # BLD AUTO: 0.03 K/UL (ref 0–0.2)
BASOPHILS NFR BLD: 0.4 % (ref 0–1.9)
BILIRUB SERPL-MCNC: 1.8 MG/DL (ref 0.1–1)
BUN SERPL-MCNC: 18 MG/DL (ref 8–23)
CALCIUM SERPL-MCNC: 8.8 MG/DL (ref 8.7–10.5)
CHLORIDE SERPL-SCNC: 101 MMOL/L (ref 95–110)
CO2 SERPL-SCNC: 27 MMOL/L (ref 23–29)
CREAT SERPL-MCNC: 0.7 MG/DL (ref 0.5–1.4)
DIFFERENTIAL METHOD: ABNORMAL
EOSINOPHIL # BLD AUTO: 0.1 K/UL (ref 0–0.5)
EOSINOPHIL NFR BLD: 1.3 % (ref 0–8)
ERYTHROCYTE [DISTWIDTH] IN BLOOD BY AUTOMATED COUNT: 13.6 % (ref 11.5–14.5)
EST. GFR  (NO RACE VARIABLE): >60 ML/MIN/1.73 M^2
GLUCOSE SERPL-MCNC: 133 MG/DL (ref 70–110)
HCT VFR BLD AUTO: 40.5 % (ref 40–54)
HGB BLD-MCNC: 13.3 G/DL (ref 14–18)
IMM GRANULOCYTES # BLD AUTO: 0.06 K/UL (ref 0–0.04)
IMM GRANULOCYTES NFR BLD AUTO: 0.8 % (ref 0–0.5)
LYMPHOCYTES # BLD AUTO: 1.1 K/UL (ref 1–4.8)
LYMPHOCYTES NFR BLD: 14.5 % (ref 18–48)
MAGNESIUM SERPL-MCNC: 1.9 MG/DL (ref 1.6–2.6)
MCH RBC QN AUTO: 26.7 PG (ref 27–31)
MCHC RBC AUTO-ENTMCNC: 32.8 G/DL (ref 32–36)
MCV RBC AUTO: 81 FL (ref 82–98)
MONOCYTES # BLD AUTO: 1 K/UL (ref 0.3–1)
MONOCYTES NFR BLD: 13.7 % (ref 4–15)
NEUTROPHILS # BLD AUTO: 5.2 K/UL (ref 1.8–7.7)
NEUTROPHILS NFR BLD: 69.3 % (ref 38–73)
NRBC BLD-RTO: 0 /100 WBC
PHOSPHATE SERPL-MCNC: 3.2 MG/DL (ref 2.7–4.5)
PLATELET # BLD AUTO: 222 K/UL (ref 150–450)
PMV BLD AUTO: 9.7 FL (ref 9.2–12.9)
POTASSIUM SERPL-SCNC: 3.9 MMOL/L (ref 3.5–5.1)
PROT SERPL-MCNC: 5.8 G/DL (ref 6–8.4)
RBC # BLD AUTO: 4.98 M/UL (ref 4.6–6.2)
SODIUM SERPL-SCNC: 135 MMOL/L (ref 136–145)
WBC # BLD AUTO: 7.51 K/UL (ref 3.9–12.7)

## 2023-08-26 PROCEDURE — 25000003 PHARM REV CODE 250: Performed by: STUDENT IN AN ORGANIZED HEALTH CARE EDUCATION/TRAINING PROGRAM

## 2023-08-26 PROCEDURE — 85025 COMPLETE CBC W/AUTO DIFF WBC: CPT | Performed by: STUDENT IN AN ORGANIZED HEALTH CARE EDUCATION/TRAINING PROGRAM

## 2023-08-26 PROCEDURE — 84100 ASSAY OF PHOSPHORUS: CPT | Performed by: STUDENT IN AN ORGANIZED HEALTH CARE EDUCATION/TRAINING PROGRAM

## 2023-08-26 PROCEDURE — 20600001 HC STEP DOWN PRIVATE ROOM

## 2023-08-26 PROCEDURE — 63600175 PHARM REV CODE 636 W HCPCS: Performed by: STUDENT IN AN ORGANIZED HEALTH CARE EDUCATION/TRAINING PROGRAM

## 2023-08-26 PROCEDURE — 25000003 PHARM REV CODE 250

## 2023-08-26 PROCEDURE — 83735 ASSAY OF MAGNESIUM: CPT | Performed by: STUDENT IN AN ORGANIZED HEALTH CARE EDUCATION/TRAINING PROGRAM

## 2023-08-26 PROCEDURE — A4216 STERILE WATER/SALINE, 10 ML: HCPCS | Performed by: STUDENT IN AN ORGANIZED HEALTH CARE EDUCATION/TRAINING PROGRAM

## 2023-08-26 PROCEDURE — 63600175 PHARM REV CODE 636 W HCPCS

## 2023-08-26 PROCEDURE — 80053 COMPREHEN METABOLIC PANEL: CPT | Performed by: STUDENT IN AN ORGANIZED HEALTH CARE EDUCATION/TRAINING PROGRAM

## 2023-08-26 PROCEDURE — 63600175 PHARM REV CODE 636 W HCPCS: Performed by: SURGERY

## 2023-08-26 RX ORDER — KETOROLAC TROMETHAMINE 15 MG/ML
15 INJECTION, SOLUTION INTRAMUSCULAR; INTRAVENOUS EVERY 6 HOURS
Status: DISPENSED | OUTPATIENT
Start: 2023-08-26 | End: 2023-08-29

## 2023-08-26 RX ORDER — BISACODYL 10 MG
10 SUPPOSITORY, RECTAL RECTAL DAILY PRN
Status: DISCONTINUED | OUTPATIENT
Start: 2023-08-26 | End: 2023-08-27

## 2023-08-26 RX ORDER — ACETAMINOPHEN 10 MG/ML
1000 INJECTION, SOLUTION INTRAVENOUS EVERY 8 HOURS
Status: DISPENSED | OUTPATIENT
Start: 2023-08-26 | End: 2023-08-27

## 2023-08-26 RX ORDER — ACETAMINOPHEN 325 MG/1
650 TABLET ORAL EVERY 6 HOURS
Status: DISCONTINUED | OUTPATIENT
Start: 2023-08-26 | End: 2023-08-26

## 2023-08-26 RX ORDER — OXYMETAZOLINE HCL 0.05 %
2 SPRAY, NON-AEROSOL (ML) NASAL 2 TIMES DAILY PRN
Status: DISPENSED | OUTPATIENT
Start: 2023-08-26 | End: 2023-08-29

## 2023-08-26 RX ADMIN — HYDROMORPHONE HYDROCHLORIDE 0.2 MG: 1 INJECTION, SOLUTION INTRAMUSCULAR; INTRAVENOUS; SUBCUTANEOUS at 09:08

## 2023-08-26 RX ADMIN — METHYLNALTREXONE BROMIDE 12 MG: 12 INJECTION, SOLUTION SUBCUTANEOUS at 02:08

## 2023-08-26 RX ADMIN — ONDANSETRON 8 MG: 2 INJECTION INTRAMUSCULAR; INTRAVENOUS at 09:08

## 2023-08-26 RX ADMIN — LIDOCAINE 1 PATCH: 50 PATCH CUTANEOUS at 05:08

## 2023-08-26 RX ADMIN — Medication 10 ML: at 05:08

## 2023-08-26 RX ADMIN — Medication 10 ML: at 12:08

## 2023-08-26 RX ADMIN — FAMOTIDINE 20 MG: 10 INJECTION, SOLUTION INTRAVENOUS at 09:08

## 2023-08-26 RX ADMIN — ACETAMINOPHEN 1000 MG: 10 INJECTION, SOLUTION INTRAVENOUS at 02:08

## 2023-08-26 RX ADMIN — ACETAMINOPHEN 650 MG: 325 TABLET ORAL at 05:08

## 2023-08-26 RX ADMIN — KETOROLAC TROMETHAMINE 15 MG: 15 INJECTION, SOLUTION INTRAMUSCULAR; INTRAVENOUS at 06:08

## 2023-08-26 RX ADMIN — ACETAMINOPHEN 650 MG: 325 TABLET ORAL at 12:08

## 2023-08-26 RX ADMIN — KETOROLAC TROMETHAMINE 15 MG: 15 INJECTION, SOLUTION INTRAMUSCULAR; INTRAVENOUS at 02:08

## 2023-08-26 RX ADMIN — PROCHLORPERAZINE EDISYLATE 5 MG: 5 INJECTION INTRAMUSCULAR; INTRAVENOUS at 01:08

## 2023-08-26 RX ADMIN — BISACODYL 10 MG: 10 SUPPOSITORY RECTAL at 09:08

## 2023-08-26 RX ADMIN — OXYCODONE HYDROCHLORIDE 5 MG: 5 TABLET ORAL at 05:08

## 2023-08-26 RX ADMIN — ENOXAPARIN SODIUM 40 MG: 40 INJECTION SUBCUTANEOUS at 05:08

## 2023-08-26 RX ADMIN — METHOCARBAMOL 500 MG: 100 INJECTION, SOLUTION INTRAMUSCULAR; INTRAVENOUS at 12:08

## 2023-08-26 NOTE — SUBJECTIVE & OBJECTIVE
Interval History: Emesis this AM. Continued to feel nausea. KUB Stat ordered and is concerning for ileus. However, pt is having gas. Pt amenable to NGT. Pain overall well controlled.     Medications:  Continuous Infusions:   TPN ADULT CENTRAL LINE CUSTOM 45 mL/hr at 08/25/23 2207    TPN ADULT CENTRAL LINE CUSTOM       Scheduled Meds:   acetaminophen  650 mg Per NG tube Q6H    enoxparin  40 mg Subcutaneous Daily    famotidine (PF)  20 mg Intravenous BID    fat emulsion 20%  250 mL Intravenous Daily    fat emulsion 20%  250 mL Intravenous Daily    LIDOcaine  1 patch Transdermal Q24H    methocarbamol (ROBAXIN) IVPB  500 mg Intravenous Q6H    scopolamine  1 patch Transdermal Q3 Days    sodium chloride 0.9%  10 mL Intravenous Q6H     PRN Meds:bisacodyL, HYDROmorphone, LIDOcaine (PF) 10 mg/ml (1%), LIDOcaine HCL 10 mg/ml (1%), ondansetron, prochlorperazine, sodium chloride 0.9%, Flushing PICC Protocol **AND** sodium chloride 0.9% **AND** sodium chloride 0.9%     Review of patient's allergies indicates:   Allergen Reactions    Penicillins Rash     Objective:     Vital Signs (Most Recent):  Temp: 98.1 °F (36.7 °C) (08/26/23 0817)  Pulse: 82 (08/26/23 0817)  Resp: (!) 22 (08/26/23 0928)  BP: (!) 140/85 (08/26/23 0817)  SpO2: (!) 92 % (08/26/23 0817) Vital Signs (24h Range):  Temp:  [98 °F (36.7 °C)-99.1 °F (37.3 °C)] 98.1 °F (36.7 °C)  Pulse:  [78-91] 82  Resp:  [16-22] 22  SpO2:  [92 %-93 %] 92 %  BP: (104-152)/(61-85) 140/85     Weight: 74.4 kg (164 lb)  Body mass index is 22.87 kg/m².    Intake/Output - Last 3 Shifts         08/24 0700 08/25 0659 08/25 0700 08/26 0659 08/26 0700 08/27 0659    P.O. 0 570     IV Piggyback 97.7      TPN 1128.8 249.5     Total Intake(mL/kg) 1226.5 (16.5) 819.5 (11)     Urine (mL/kg/hr)       Total Output       Net +1226.5 +819.5            Urine Occurrence 3 x 4 x     Stool Occurrence 0 x 0 x     Emesis Occurrence  1 x              Physical Exam  Vitals and nursing note reviewed.    Constitutional:       Appearance: He is not ill-appearing.   HENT:      Head: Normocephalic.      Mouth/Throat:      Mouth: Mucous membranes are moist.      Pharynx: Oropharynx is clear.   Eyes:      General: No scleral icterus.     Conjunctiva/sclera: Conjunctivae normal.   Cardiovascular:      Rate and Rhythm: Normal rate.      Pulses: Normal pulses.   Pulmonary:      Effort: Pulmonary effort is normal. No respiratory distress.      Breath sounds: No wheezing.   Abdominal:      General: Abdomen is flat. There is distension.      Palpations: Abdomen is soft.      Tenderness: There is abdominal tenderness.      Comments: Midline incision CDI. Maryellen-incisional tenderness present. Abdomen is soft, but mildly distended.   Musculoskeletal:         General: No swelling. Normal range of motion.   Skin:     General: Skin is warm and dry.      Coloration: Skin is not jaundiced or pale.   Neurological:      General: No focal deficit present.      Mental Status: He is alert and oriented to person, place, and time.          Significant Labs:  I have reviewed all pertinent lab results within the past 24 hours.    Significant Diagnostics:  I have reviewed all pertinent imaging results/findings within the past 24 hours.

## 2023-08-26 NOTE — PROGRESS NOTES
Siddhartha Thurman - [facility]  General Surgery  Progress Note    Subjective:     History of Present Illness:  No notes on file    Post-Op Info:  Procedure(s) (LRB):  GASTRECTOMY (N/A)   5 Days Post-Op     Interval History: Emesis this AM. Continued to feel nausea. KUB Stat ordered and is concerning for ileus. However, pt is having gas. Pt amenable to NGT. Pain overall well controlled.     Medications:  Continuous Infusions:   TPN ADULT CENTRAL LINE CUSTOM 45 mL/hr at 08/25/23 2207    TPN ADULT CENTRAL LINE CUSTOM       Scheduled Meds:   acetaminophen  650 mg Per NG tube Q6H    enoxparin  40 mg Subcutaneous Daily    famotidine (PF)  20 mg Intravenous BID    fat emulsion 20%  250 mL Intravenous Daily    fat emulsion 20%  250 mL Intravenous Daily    LIDOcaine  1 patch Transdermal Q24H    methocarbamol (ROBAXIN) IVPB  500 mg Intravenous Q6H    scopolamine  1 patch Transdermal Q3 Days    sodium chloride 0.9%  10 mL Intravenous Q6H     PRN Meds:bisacodyL, HYDROmorphone, LIDOcaine (PF) 10 mg/ml (1%), LIDOcaine HCL 10 mg/ml (1%), ondansetron, prochlorperazine, sodium chloride 0.9%, Flushing PICC Protocol **AND** sodium chloride 0.9% **AND** sodium chloride 0.9%     Review of patient's allergies indicates:   Allergen Reactions    Penicillins Rash     Objective:     Vital Signs (Most Recent):  Temp: 98.1 °F (36.7 °C) (08/26/23 0817)  Pulse: 82 (08/26/23 0817)  Resp: (!) 22 (08/26/23 0928)  BP: (!) 140/85 (08/26/23 0817)  SpO2: (!) 92 % (08/26/23 0817) Vital Signs (24h Range):  Temp:  [98 °F (36.7 °C)-99.1 °F (37.3 °C)] 98.1 °F (36.7 °C)  Pulse:  [78-91] 82  Resp:  [16-22] 22  SpO2:  [92 %-93 %] 92 %  BP: (104-152)/(61-85) 140/85     Weight: 74.4 kg (164 lb)  Body mass index is 22.87 kg/m².    Intake/Output - Last 3 Shifts         08/24 0700 08/25 0659 08/25 0700 08/26 0659 08/26 0700 08/27 0659    P.O. 0 570     IV Piggyback 97.7      TPN 1128.8 249.5     Total Intake(mL/kg) 1226.5 (16.5) 819.5 (11)     Urine (mL/kg/hr)        Total Output       Net +1226.5 +819.5            Urine Occurrence 3 x 4 x     Stool Occurrence 0 x 0 x     Emesis Occurrence  1 x              Physical Exam  Vitals and nursing note reviewed.   Constitutional:       Appearance: He is not ill-appearing.   HENT:      Head: Normocephalic.      Mouth/Throat:      Mouth: Mucous membranes are moist.      Pharynx: Oropharynx is clear.   Eyes:      General: No scleral icterus.     Conjunctiva/sclera: Conjunctivae normal.   Cardiovascular:      Rate and Rhythm: Normal rate.      Pulses: Normal pulses.   Pulmonary:      Effort: Pulmonary effort is normal. No respiratory distress.      Breath sounds: No wheezing.   Abdominal:      General: Abdomen is flat. There is distension.      Palpations: Abdomen is soft.      Tenderness: There is abdominal tenderness.      Comments: Midline incision CDI. Maryellen-incisional tenderness present. Abdomen is soft, but mildly distended.   Musculoskeletal:         General: No swelling. Normal range of motion.   Skin:     General: Skin is warm and dry.      Coloration: Skin is not jaundiced or pale.   Neurological:      General: No focal deficit present.      Mental Status: He is alert and oriented to person, place, and time.          Significant Labs:  I have reviewed all pertinent lab results within the past 24 hours.    Significant Diagnostics:  I have reviewed all pertinent imaging results/findings within the past 24 hours.    Assessment/Plan:     Gastric adenocarcinoma  Patient is a 62 y M w/ no significant past medical history (has not seen a doctor in over 40 years) who presented to the ER on 8/16 with abdominal pain and a recent diagnosis of gastric adenocarcinoma. No bowel movements for several weeks, concerning for obstruction. After review of images, very small amounts of contrast going through from stomach to duodenum. NGT placed for decompression. PICC placed and started on TPN for nutrition optimization as well as correction of  electrolytes. S/P Open distal gastrectomy with BII reconstruction on 8/21/23. Today (8/26) pt having increasing distention with nausea and emesis, KUB showing diffuse dialation likely 2/2 to ileus.     - NPO, with ice chips and sips  - NGT to LIS   - zofran PRN  - Multimodal pain control, switched to IV today  - TPN, renew daily   - PPI  - DVT ppx  - PT/OT ordered for postoperative care and rehabilitation  - OK to Ambulate, OOBTC  - Daily labs  - Replete electrolytes PRN     Dispo: Continue inpatient care        Bubba Chirinos MD  General Surgery  [facility]

## 2023-08-26 NOTE — PLAN OF CARE
[facility] Plan of Care Note  Dx: emesis r/t gastric CA    Shift Events: N/A    Goals of Care: tolerate a diet, maintain nutritional status, pain management, have a BM.    Neuro: ANOx4    Vital Signs: WNL  98.2 / 82 HR / 18 / RR / 93% / 139/82    Respiratory: RA    Diet: CLD    Is patient tolerating current diet? No - distended since broth from breakfast 8/25, bowel sounds hypoactive; emesis x1 but denies nausea and is passing flatus    GTTS: TPN @ 45    Urine Output/Bowel Movement: UOP adequate per RR, no BM    Drains/Tubes/Tube Feeds (include total output/shift): N/A    Lines: HAM PICC    Accuchecks:N/A    Skin: ML + DB    Fall Risk Score: 9     Activity level? independent    Any scheduled procedures? N/A    Any safety concerns? N/A    Other: encouraging ambulation, implement bowel care     Problem: Adult Inpatient Plan of Care  Goal: Optimal Comfort and Wellbeing  Outcome: Ongoing, Progressing     Problem: Impaired Wound Healing  Goal: Optimal Wound Healing  Outcome: Ongoing, Progressing     Problem: Skin Injury Risk Increased  Goal: Skin Health and Integrity  Outcome: Ongoing, Progressing     Problem: Fall Injury Risk  Goal: Absence of Fall and Fall-Related Injury  Outcome: Ongoing, Progressing     Problem: Adult Inpatient Plan of Care  Goal: Plan of Care Review  Outcome: Ongoing, Progressing

## 2023-08-26 NOTE — PLAN OF CARE
Patient AO x 4, pain localized to abdominal incision, well controlled with scheduled and PRN regimen. Emesis x 1, large amount black in trashcan. Notified RUPAL BRANTLEY ordered, NGT ordered and placed. Connected to ILWS, 150 cc output. Given zofran and compazine PRN for nausea. Given methylnaltrexone to encourage BM. Vital signs stable will continue to monitor.     Problem: Adult Inpatient Plan of Care  Goal: Plan of Care Review  Outcome: Ongoing, Progressing  Goal: Patient-Specific Goal (Individualized)  Outcome: Ongoing, Progressing  Goal: Absence of Hospital-Acquired Illness or Injury  Outcome: Ongoing, Progressing  Goal: Optimal Comfort and Wellbeing  Outcome: Ongoing, Progressing  Goal: Readiness for Transition of Care  Outcome: Ongoing, Progressing     Problem: Infection  Goal: Absence of Infection Signs and Symptoms  Outcome: Ongoing, Progressing     Problem: Impaired Wound Healing  Goal: Optimal Wound Healing  Outcome: Ongoing, Progressing     Problem: Fall Injury Risk  Goal: Absence of Fall and Fall-Related Injury  Outcome: Ongoing, Progressing     Problem: Skin Injury Risk Increased  Goal: Skin Health and Integrity  Outcome: Ongoing, Progressing

## 2023-08-26 NOTE — ASSESSMENT & PLAN NOTE
Patient is a 62 y M w/ no significant past medical history (has not seen a doctor in over 40 years) who presented to the ER on 8/16 with abdominal pain and a recent diagnosis of gastric adenocarcinoma. No bowel movements for several weeks, concerning for obstruction. After review of images, very small amounts of contrast going through from stomach to duodenum. NGT placed for decompression. PICC placed and started on TPN for nutrition optimization as well as correction of electrolytes. S/P Open distal gastrectomy with BII reconstruction on 8/21/23. Today (8/26) pt having increasing distention with nausea and emesis, KUB showing diffuse dialation likely 2/2 to ileus.     - NPO, with ice chips and sips  - NGT to LIS   - zofran PRN  - Multimodal pain control, switched to IV today  - TPN, renew daily   - PPI  - DVT ppx  - PT/OT ordered for postoperative care and rehabilitation  - OK to Ambulate, OOBTC  - Daily labs  - Replete electrolytes PRN     Dispo: Continue inpatient care

## 2023-08-27 LAB
ALBUMIN SERPL BCP-MCNC: 2.4 G/DL (ref 3.5–5.2)
ALP SERPL-CCNC: 88 U/L (ref 55–135)
ALT SERPL W/O P-5'-P-CCNC: 34 U/L (ref 10–44)
ANION GAP SERPL CALC-SCNC: 9 MMOL/L (ref 8–16)
AST SERPL-CCNC: 27 U/L (ref 10–40)
BASOPHILS # BLD AUTO: 0.04 K/UL (ref 0–0.2)
BASOPHILS NFR BLD: 0.5 % (ref 0–1.9)
BILIRUB SERPL-MCNC: 1.8 MG/DL (ref 0.1–1)
BUN SERPL-MCNC: 26 MG/DL (ref 8–23)
CALCIUM SERPL-MCNC: 8.8 MG/DL (ref 8.7–10.5)
CHLORIDE SERPL-SCNC: 102 MMOL/L (ref 95–110)
CO2 SERPL-SCNC: 26 MMOL/L (ref 23–29)
CREAT SERPL-MCNC: 0.7 MG/DL (ref 0.5–1.4)
DIFFERENTIAL METHOD: ABNORMAL
EOSINOPHIL # BLD AUTO: 0 K/UL (ref 0–0.5)
EOSINOPHIL NFR BLD: 0.5 % (ref 0–8)
ERYTHROCYTE [DISTWIDTH] IN BLOOD BY AUTOMATED COUNT: 13.8 % (ref 11.5–14.5)
EST. GFR  (NO RACE VARIABLE): >60 ML/MIN/1.73 M^2
GLUCOSE SERPL-MCNC: 137 MG/DL (ref 70–110)
HCT VFR BLD AUTO: 40.6 % (ref 40–54)
HGB BLD-MCNC: 13.7 G/DL (ref 14–18)
IMM GRANULOCYTES # BLD AUTO: 0.07 K/UL (ref 0–0.04)
IMM GRANULOCYTES NFR BLD AUTO: 0.9 % (ref 0–0.5)
LYMPHOCYTES # BLD AUTO: 1.2 K/UL (ref 1–4.8)
LYMPHOCYTES NFR BLD: 15 % (ref 18–48)
MAGNESIUM SERPL-MCNC: 2.1 MG/DL (ref 1.6–2.6)
MCH RBC QN AUTO: 27.6 PG (ref 27–31)
MCHC RBC AUTO-ENTMCNC: 33.7 G/DL (ref 32–36)
MCV RBC AUTO: 82 FL (ref 82–98)
MONOCYTES # BLD AUTO: 1.1 K/UL (ref 0.3–1)
MONOCYTES NFR BLD: 13.2 % (ref 4–15)
NEUTROPHILS # BLD AUTO: 5.6 K/UL (ref 1.8–7.7)
NEUTROPHILS NFR BLD: 69.9 % (ref 38–73)
NRBC BLD-RTO: 0 /100 WBC
PHOSPHATE SERPL-MCNC: 3.5 MG/DL (ref 2.7–4.5)
PLATELET # BLD AUTO: 271 K/UL (ref 150–450)
PMV BLD AUTO: 10.1 FL (ref 9.2–12.9)
POTASSIUM SERPL-SCNC: 4.4 MMOL/L (ref 3.5–5.1)
PROT SERPL-MCNC: 5.8 G/DL (ref 6–8.4)
RBC # BLD AUTO: 4.97 M/UL (ref 4.6–6.2)
SODIUM SERPL-SCNC: 137 MMOL/L (ref 136–145)
WBC # BLD AUTO: 8.06 K/UL (ref 3.9–12.7)

## 2023-08-27 PROCEDURE — 84100 ASSAY OF PHOSPHORUS: CPT | Performed by: STUDENT IN AN ORGANIZED HEALTH CARE EDUCATION/TRAINING PROGRAM

## 2023-08-27 PROCEDURE — 63600175 PHARM REV CODE 636 W HCPCS: Performed by: SURGERY

## 2023-08-27 PROCEDURE — 94761 N-INVAS EAR/PLS OXIMETRY MLT: CPT

## 2023-08-27 PROCEDURE — A4217 STERILE WATER/SALINE, 500 ML: HCPCS | Performed by: STUDENT IN AN ORGANIZED HEALTH CARE EDUCATION/TRAINING PROGRAM

## 2023-08-27 PROCEDURE — 25000003 PHARM REV CODE 250: Performed by: STUDENT IN AN ORGANIZED HEALTH CARE EDUCATION/TRAINING PROGRAM

## 2023-08-27 PROCEDURE — 94664 DEMO&/EVAL PT USE INHALER: CPT

## 2023-08-27 PROCEDURE — 85025 COMPLETE CBC W/AUTO DIFF WBC: CPT | Performed by: STUDENT IN AN ORGANIZED HEALTH CARE EDUCATION/TRAINING PROGRAM

## 2023-08-27 PROCEDURE — 63600175 PHARM REV CODE 636 W HCPCS: Performed by: STUDENT IN AN ORGANIZED HEALTH CARE EDUCATION/TRAINING PROGRAM

## 2023-08-27 PROCEDURE — 80053 COMPREHEN METABOLIC PANEL: CPT | Performed by: STUDENT IN AN ORGANIZED HEALTH CARE EDUCATION/TRAINING PROGRAM

## 2023-08-27 PROCEDURE — A4216 STERILE WATER/SALINE, 10 ML: HCPCS | Performed by: STUDENT IN AN ORGANIZED HEALTH CARE EDUCATION/TRAINING PROGRAM

## 2023-08-27 PROCEDURE — A4217 STERILE WATER/SALINE, 500 ML: HCPCS

## 2023-08-27 PROCEDURE — 27000646 HC AEROBIKA DEVICE

## 2023-08-27 PROCEDURE — 25000003 PHARM REV CODE 250

## 2023-08-27 PROCEDURE — 63600175 PHARM REV CODE 636 W HCPCS

## 2023-08-27 PROCEDURE — B4185 PARENTERAL SOL 10 GM LIPIDS: HCPCS

## 2023-08-27 PROCEDURE — B4185 PARENTERAL SOL 10 GM LIPIDS: HCPCS | Performed by: STUDENT IN AN ORGANIZED HEALTH CARE EDUCATION/TRAINING PROGRAM

## 2023-08-27 PROCEDURE — 20600001 HC STEP DOWN PRIVATE ROOM

## 2023-08-27 PROCEDURE — 83735 ASSAY OF MAGNESIUM: CPT | Performed by: STUDENT IN AN ORGANIZED HEALTH CARE EDUCATION/TRAINING PROGRAM

## 2023-08-27 PROCEDURE — 99900035 HC TECH TIME PER 15 MIN (STAT)

## 2023-08-27 RX ORDER — BISACODYL 10 MG
10 SUPPOSITORY, RECTAL RECTAL DAILY PRN
Status: DISCONTINUED | OUTPATIENT
Start: 2023-08-27 | End: 2023-09-01 | Stop reason: HOSPADM

## 2023-08-27 RX ADMIN — FAMOTIDINE 20 MG: 10 INJECTION, SOLUTION INTRAVENOUS at 09:08

## 2023-08-27 RX ADMIN — Medication 10 ML: at 06:08

## 2023-08-27 RX ADMIN — METHYLNALTREXONE BROMIDE 12 MG: 12 INJECTION, SOLUTION SUBCUTANEOUS at 09:08

## 2023-08-27 RX ADMIN — KETOROLAC TROMETHAMINE 15 MG: 15 INJECTION, SOLUTION INTRAMUSCULAR; INTRAVENOUS at 05:08

## 2023-08-27 RX ADMIN — FAMOTIDINE 20 MG: 10 INJECTION, SOLUTION INTRAVENOUS at 10:08

## 2023-08-27 RX ADMIN — Medication 10 ML: at 11:08

## 2023-08-27 RX ADMIN — KETOROLAC TROMETHAMINE 15 MG: 15 INJECTION, SOLUTION INTRAMUSCULAR; INTRAVENOUS at 12:08

## 2023-08-27 RX ADMIN — Medication 10 ML: at 12:08

## 2023-08-27 RX ADMIN — MAGNESIUM SULFATE HEPTAHYDRATE: 500 INJECTION, SOLUTION INTRAMUSCULAR; INTRAVENOUS at 10:08

## 2023-08-27 RX ADMIN — KETOROLAC TROMETHAMINE 15 MG: 15 INJECTION, SOLUTION INTRAMUSCULAR; INTRAVENOUS at 11:08

## 2023-08-27 RX ADMIN — BISACODYL 10 MG: 10 SUPPOSITORY RECTAL at 05:08

## 2023-08-27 RX ADMIN — SOYBEAN OIL 250 ML: 20 INJECTION, SOLUTION INTRAVENOUS at 10:08

## 2023-08-27 RX ADMIN — SOYBEAN OIL 250 ML: 20 INJECTION, SOLUTION INTRAVENOUS at 12:08

## 2023-08-27 RX ADMIN — MAGNESIUM SULFATE HEPTAHYDRATE: 500 INJECTION, SOLUTION INTRAMUSCULAR; INTRAVENOUS at 12:08

## 2023-08-27 RX ADMIN — Medication 10 ML: at 05:08

## 2023-08-27 RX ADMIN — ENOXAPARIN SODIUM 40 MG: 40 INJECTION SUBCUTANEOUS at 05:08

## 2023-08-27 NOTE — SUBJECTIVE & OBJECTIVE
Interval History: Pt reporting nausea and small volume emesis when NGT is disconnected for ambulation. Denies nausea currently. Reports pain is well controlled. Admits passing flatus and having a BM.    Medications:  Continuous Infusions:   TPN ADULT CENTRAL LINE CUSTOM 45 mL/hr at 08/27/23 0018     Scheduled Meds:   acetaminophen  1,000 mg Intravenous Q8H    enoxparin  40 mg Subcutaneous Daily    famotidine (PF)  20 mg Intravenous BID    fat emulsion 20%  250 mL Intravenous Daily    ketorolac  15 mg Intravenous Q6H    LIDOcaine  1 patch Transdermal Q24H    methylnaltrexone  12 mg Subcutaneous Daily    scopolamine  1 patch Transdermal Q3 Days    sodium chloride 0.9%  10 mL Intravenous Q6H     PRN Meds:bisacodyL, HYDROmorphone, LIDOcaine (PF) 10 mg/ml (1%), LIDOcaine HCL 10 mg/ml (1%), ondansetron, oxymetazoline, prochlorperazine, sodium chloride 0.9%, Flushing PICC Protocol **AND** sodium chloride 0.9% **AND** sodium chloride 0.9%     Review of patient's allergies indicates:   Allergen Reactions    Penicillins Rash     Objective:     Vital Signs (Most Recent):  Temp: 98.2 °F (36.8 °C) (08/27/23 0751)  Pulse: 79 (08/27/23 0751)  Resp: 18 (08/27/23 0751)  BP: 118/66 (08/27/23 0751)  SpO2: (!) 92 % (08/27/23 0751) Vital Signs (24h Range):  Temp:  [96.2 °F (35.7 °C)-98.7 °F (37.1 °C)] 98.2 °F (36.8 °C)  Pulse:  [79-97] 79  Resp:  [15-22] 18  SpO2:  [92 %-95 %] 92 %  BP: (107-156)/(64-75) 118/66     Weight: 74.4 kg (164 lb)  Body mass index is 22.87 kg/m².    Intake/Output - Last 3 Shifts         08/25 0700  08/26 0659 08/26 0700 08/27 0659 08/27 0700 08/28 0659    P.O. 570      IV Piggyback       .5      Total Intake(mL/kg) 819.5 (11)      Drains  150 300    Total Output  150 300    Net +819.5 -150 -300           Urine Occurrence 4 x      Stool Occurrence 0 x 1 x     Emesis Occurrence 1 x               Physical Exam  Vitals and nursing note reviewed.   Constitutional:       Appearance: He is not ill-appearing.    HENT:      Head: Normocephalic.      Mouth/Throat:      Mouth: Mucous membranes are moist.      Pharynx: Oropharynx is clear.   Eyes:      General: No scleral icterus.     Conjunctiva/sclera: Conjunctivae normal.   Cardiovascular:      Rate and Rhythm: Normal rate.      Pulses: Normal pulses.   Pulmonary:      Effort: Pulmonary effort is normal. No respiratory distress.      Breath sounds: No wheezing.   Abdominal:      General: Abdomen is flat.      Palpations: Abdomen is soft.      Comments: Midline incision CDI. Maryellen-incisional tenderness present. Abdomen is soft, but mildly tender and distended   Musculoskeletal:         General: No swelling. Normal range of motion.   Skin:     General: Skin is warm and dry.      Coloration: Skin is not jaundiced or pale.   Neurological:      General: No focal deficit present.      Mental Status: He is alert and oriented to person, place, and time.          Significant Labs:  I have reviewed all pertinent lab results within the past 24 hours.    Significant Diagnostics:  I have reviewed all pertinent imaging results/findings within the past 24 hours.

## 2023-08-27 NOTE — NURSING
Pt shift uneventful presented with no complaints of N/V given suppository as requested. No BM at this time Pt ambulated today and showered. Wife at bedside supportive. NGT remains intact to LCS appears comfortable at this time resting

## 2023-08-27 NOTE — ASSESSMENT & PLAN NOTE
Patient is a 62 y M w/ no significant past medical history (has not seen a doctor in over 40 years) who presented to the ER on 8/16 with abdominal pain and a recent diagnosis of gastric adenocarcinoma. No bowel movements for several weeks, concerning for obstruction. After review of images, very small amounts of contrast going through from stomach to duodenum. NGT placed for decompression. PICC placed and started on TPN for nutrition optimization as well as correction of electrolytes. S/P Open distal gastrectomy with BII reconstruction on 8/21/23. Today (8/26) pt having increasing distention with nausea and emesis, KUB showing diffuse dialation likely 2/2 to ileus with improvement after NGT placement, reconfirmed position on 8/27.     - NPO, with ice chips and sips  - NGT to LIS   - okay to disconnect NGT for walks in hallway BID-TID, please reconnect to LIS after  - re-order TPN  - zofran PRN  - Multimodal pain control, switched to IV today  - PPI  - DVT ppx  - PT/OT ordered for postoperative care and rehabilitation  - OK to Ambulate, OOBTC  - Daily labs  - Replete electrolytes PRN     Dispo: Continue inpatient care

## 2023-08-27 NOTE — PLAN OF CARE
[facility] Plan of Care Note  Dx: emesis r/t gastric CA     Shift Events: suppository given x1. No emesis overnight. Intermittent nausea, not requiring PRN intervention. Pain well controlled.     Goals of Care: tolerate a diet, maintain nutritional status, pain management.     Neuro: ANOx4     Vital Signs: WNL  96.2 / 92 HR / 18 / RR / 95% / 107/64     Respiratory: RA     Diet: NPO      Is patient tolerating current diet? Yes.      GTTS: TPN @ 45     Urine Output/Bowel Movement: UOP adequate per RR, 1 small formed brown BM     Drains/Tubes/Tube Feeds (include total output/shift): L NGT, 300 green dilute output     Lines: HAM PICC     Accuchecks:N/A     Skin: ML + DB     Fall Risk Score: 9            Activity level? independent     Any scheduled procedures? N/A     Any safety concerns? N/A     Other: encouraging ambulation, implement bowel care   Problem: Adult Inpatient Plan of Care  Goal: Optimal Comfort and Wellbeing  Outcome: Ongoing, Progressing     Problem: Infection  Goal: Absence of Infection Signs and Symptoms  Outcome: Ongoing, Progressing     Problem: Impaired Wound Healing  Goal: Optimal Wound Healing  Outcome: Ongoing, Progressing     Problem: Fall Injury Risk  Goal: Absence of Fall and Fall-Related Injury  Outcome: Ongoing, Progressing     Problem: Skin Injury Risk Increased  Goal: Skin Health and Integrity  Outcome: Ongoing, Progressing     Problem: Adult Inpatient Plan of Care  Goal: Patient-Specific Goal - have a BM, tolerate diet  Outcome: Ongoing, Progressing

## 2023-08-27 NOTE — PROGRESS NOTES
Siddhartha Thurman - [facility]  General Surgery  Progress Note    Subjective:     History of Present Illness:  No notes on file    Post-Op Info:  Procedure(s) (LRB):  GASTRECTOMY (N/A)   6 Days Post-Op     Interval History: Pt reporting nausea and small volume emesis when NGT is disconnected for ambulation. Denies nausea currently. Reports pain is well controlled. Admits passing flatus and having a BM.    Medications:  Continuous Infusions:   TPN ADULT CENTRAL LINE CUSTOM 45 mL/hr at 08/27/23 0018     Scheduled Meds:   acetaminophen  1,000 mg Intravenous Q8H    enoxparin  40 mg Subcutaneous Daily    famotidine (PF)  20 mg Intravenous BID    fat emulsion 20%  250 mL Intravenous Daily    ketorolac  15 mg Intravenous Q6H    LIDOcaine  1 patch Transdermal Q24H    methylnaltrexone  12 mg Subcutaneous Daily    scopolamine  1 patch Transdermal Q3 Days    sodium chloride 0.9%  10 mL Intravenous Q6H     PRN Meds:bisacodyL, HYDROmorphone, LIDOcaine (PF) 10 mg/ml (1%), LIDOcaine HCL 10 mg/ml (1%), ondansetron, oxymetazoline, prochlorperazine, sodium chloride 0.9%, Flushing PICC Protocol **AND** sodium chloride 0.9% **AND** sodium chloride 0.9%     Review of patient's allergies indicates:   Allergen Reactions    Penicillins Rash     Objective:     Vital Signs (Most Recent):  Temp: 98.2 °F (36.8 °C) (08/27/23 0751)  Pulse: 79 (08/27/23 0751)  Resp: 18 (08/27/23 0751)  BP: 118/66 (08/27/23 0751)  SpO2: (!) 92 % (08/27/23 0751) Vital Signs (24h Range):  Temp:  [96.2 °F (35.7 °C)-98.7 °F (37.1 °C)] 98.2 °F (36.8 °C)  Pulse:  [79-97] 79  Resp:  [15-22] 18  SpO2:  [92 %-95 %] 92 %  BP: (107-156)/(64-75) 118/66     Weight: 74.4 kg (164 lb)  Body mass index is 22.87 kg/m².    Intake/Output - Last 3 Shifts         08/25 0700 08/26 0659 08/26 0700 08/27 0659 08/27 0700 08/28 0659    P.O. 570      IV Piggyback       .5      Total Intake(mL/kg) 819.5 (11)      Drains  150 300    Total Output  150 300    Net +819.5 -150 -300            Urine Occurrence 4 x      Stool Occurrence 0 x 1 x     Emesis Occurrence 1 x               Physical Exam  Vitals and nursing note reviewed.   Constitutional:       Appearance: He is not ill-appearing.   HENT:      Head: Normocephalic.      Mouth/Throat:      Mouth: Mucous membranes are moist.      Pharynx: Oropharynx is clear.   Eyes:      General: No scleral icterus.     Conjunctiva/sclera: Conjunctivae normal.   Cardiovascular:      Rate and Rhythm: Normal rate.      Pulses: Normal pulses.   Pulmonary:      Effort: Pulmonary effort is normal. No respiratory distress.      Breath sounds: No wheezing.   Abdominal:      General: Abdomen is flat.      Palpations: Abdomen is soft.      Comments: Midline incision CDI. Maryellen-incisional tenderness present. Abdomen is soft, but mildly tender and distended   Musculoskeletal:         General: No swelling. Normal range of motion.   Skin:     General: Skin is warm and dry.      Coloration: Skin is not jaundiced or pale.   Neurological:      General: No focal deficit present.      Mental Status: He is alert and oriented to person, place, and time.          Significant Labs:  I have reviewed all pertinent lab results within the past 24 hours.    Significant Diagnostics:  I have reviewed all pertinent imaging results/findings within the past 24 hours.    Assessment/Plan:     Gastric adenocarcinoma  Patient is a 62 y M w/ no significant past medical history (has not seen a doctor in over 40 years) who presented to the ER on 8/16 with abdominal pain and a recent diagnosis of gastric adenocarcinoma. No bowel movements for several weeks, concerning for obstruction. After review of images, very small amounts of contrast going through from stomach to duodenum. NGT placed for decompression. PICC placed and started on TPN for nutrition optimization as well as correction of electrolytes. S/P Open distal gastrectomy with BII reconstruction on 8/21/23. Today (8/26) pt having increasing  distention with nausea and emesis, KUB showing diffuse dialation likely 2/2 to ileus with improvement after NGT placement, reconfirmed position on 8/27.     - NPO, with ice chips and sips  - NGT to LIS   - okay to disconnect NGT for walks in hallway BID-TID, please reconnect to LIS after  - re-order TPN  - zofran PRN  - Multimodal pain control, switched to IV today  - PPI  - DVT ppx  - PT/OT ordered for postoperative care and rehabilitation  - OK to Ambulate, OOBTC  - Daily labs  - Replete electrolytes PRN     Dispo: Continue inpatient care        Bubba Chirinos MD  General Surgery  [facility]

## 2023-08-28 LAB
ALBUMIN SERPL BCP-MCNC: 2.4 G/DL (ref 3.5–5.2)
ALP SERPL-CCNC: 86 U/L (ref 55–135)
ALT SERPL W/O P-5'-P-CCNC: 51 U/L (ref 10–44)
ANION GAP SERPL CALC-SCNC: 9 MMOL/L (ref 8–16)
AST SERPL-CCNC: 41 U/L (ref 10–40)
BASOPHILS # BLD AUTO: 0.03 K/UL (ref 0–0.2)
BASOPHILS NFR BLD: 0.5 % (ref 0–1.9)
BILIRUB SERPL-MCNC: 1.9 MG/DL (ref 0.1–1)
BUN SERPL-MCNC: 35 MG/DL (ref 8–23)
CALCIUM SERPL-MCNC: 8.6 MG/DL (ref 8.7–10.5)
CHLORIDE SERPL-SCNC: 104 MMOL/L (ref 95–110)
CO2 SERPL-SCNC: 24 MMOL/L (ref 23–29)
CREAT SERPL-MCNC: 0.7 MG/DL (ref 0.5–1.4)
DIFFERENTIAL METHOD: ABNORMAL
EOSINOPHIL # BLD AUTO: 0 K/UL (ref 0–0.5)
EOSINOPHIL NFR BLD: 0.5 % (ref 0–8)
ERYTHROCYTE [DISTWIDTH] IN BLOOD BY AUTOMATED COUNT: 14 % (ref 11.5–14.5)
EST. GFR  (NO RACE VARIABLE): >60 ML/MIN/1.73 M^2
GLUCOSE SERPL-MCNC: 118 MG/DL (ref 70–110)
HCT VFR BLD AUTO: 37.7 % (ref 40–54)
HGB BLD-MCNC: 12.4 G/DL (ref 14–18)
IMM GRANULOCYTES # BLD AUTO: 0.06 K/UL (ref 0–0.04)
IMM GRANULOCYTES NFR BLD AUTO: 1 % (ref 0–0.5)
LYMPHOCYTES # BLD AUTO: 1.1 K/UL (ref 1–4.8)
LYMPHOCYTES NFR BLD: 18.3 % (ref 18–48)
MAGNESIUM SERPL-MCNC: 2.2 MG/DL (ref 1.6–2.6)
MCH RBC QN AUTO: 27 PG (ref 27–31)
MCHC RBC AUTO-ENTMCNC: 32.9 G/DL (ref 32–36)
MCV RBC AUTO: 82 FL (ref 82–98)
MONOCYTES # BLD AUTO: 0.9 K/UL (ref 0.3–1)
MONOCYTES NFR BLD: 14 % (ref 4–15)
NEUTROPHILS # BLD AUTO: 4.1 K/UL (ref 1.8–7.7)
NEUTROPHILS NFR BLD: 65.7 % (ref 38–73)
NRBC BLD-RTO: 0 /100 WBC
PHOSPHATE SERPL-MCNC: 3.7 MG/DL (ref 2.7–4.5)
PLATELET # BLD AUTO: 243 K/UL (ref 150–450)
PLATELET BLD QL SMEAR: ABNORMAL
PMV BLD AUTO: 10 FL (ref 9.2–12.9)
POTASSIUM SERPL-SCNC: 4.2 MMOL/L (ref 3.5–5.1)
PROT SERPL-MCNC: 5.6 G/DL (ref 6–8.4)
RBC # BLD AUTO: 4.6 M/UL (ref 4.6–6.2)
SODIUM SERPL-SCNC: 137 MMOL/L (ref 136–145)
WBC # BLD AUTO: 6.23 K/UL (ref 3.9–12.7)

## 2023-08-28 PROCEDURE — 20600001 HC STEP DOWN PRIVATE ROOM

## 2023-08-28 PROCEDURE — A4217 STERILE WATER/SALINE, 500 ML: HCPCS

## 2023-08-28 PROCEDURE — 25000003 PHARM REV CODE 250

## 2023-08-28 PROCEDURE — 25000003 PHARM REV CODE 250: Performed by: STUDENT IN AN ORGANIZED HEALTH CARE EDUCATION/TRAINING PROGRAM

## 2023-08-28 PROCEDURE — 63600175 PHARM REV CODE 636 W HCPCS: Performed by: SURGERY

## 2023-08-28 PROCEDURE — 83735 ASSAY OF MAGNESIUM: CPT | Performed by: STUDENT IN AN ORGANIZED HEALTH CARE EDUCATION/TRAINING PROGRAM

## 2023-08-28 PROCEDURE — 84100 ASSAY OF PHOSPHORUS: CPT | Performed by: STUDENT IN AN ORGANIZED HEALTH CARE EDUCATION/TRAINING PROGRAM

## 2023-08-28 PROCEDURE — B4185 PARENTERAL SOL 10 GM LIPIDS: HCPCS

## 2023-08-28 PROCEDURE — A4216 STERILE WATER/SALINE, 10 ML: HCPCS | Performed by: STUDENT IN AN ORGANIZED HEALTH CARE EDUCATION/TRAINING PROGRAM

## 2023-08-28 PROCEDURE — 25500020 PHARM REV CODE 255

## 2023-08-28 PROCEDURE — 63600175 PHARM REV CODE 636 W HCPCS: Performed by: STUDENT IN AN ORGANIZED HEALTH CARE EDUCATION/TRAINING PROGRAM

## 2023-08-28 PROCEDURE — 63600175 PHARM REV CODE 636 W HCPCS

## 2023-08-28 PROCEDURE — 80053 COMPREHEN METABOLIC PANEL: CPT | Performed by: STUDENT IN AN ORGANIZED HEALTH CARE EDUCATION/TRAINING PROGRAM

## 2023-08-28 PROCEDURE — 25500020 PHARM REV CODE 255: Performed by: SURGERY

## 2023-08-28 PROCEDURE — 85025 COMPLETE CBC W/AUTO DIFF WBC: CPT | Performed by: STUDENT IN AN ORGANIZED HEALTH CARE EDUCATION/TRAINING PROGRAM

## 2023-08-28 RX ADMIN — FAMOTIDINE 20 MG: 10 INJECTION, SOLUTION INTRAVENOUS at 09:08

## 2023-08-28 RX ADMIN — Medication 10 ML: at 06:08

## 2023-08-28 RX ADMIN — Medication 10 ML: at 05:08

## 2023-08-28 RX ADMIN — MAGNESIUM SULFATE HEPTAHYDRATE: 500 INJECTION, SOLUTION INTRAMUSCULAR; INTRAVENOUS at 10:08

## 2023-08-28 RX ADMIN — Medication 10 ML: at 12:08

## 2023-08-28 RX ADMIN — DIATRIZOATE MEGLUMINE AND DIATRIZOATE SODIUM 90 ML: 660; 100 LIQUID ORAL; RECTAL at 10:08

## 2023-08-28 RX ADMIN — KETOROLAC TROMETHAMINE 15 MG: 15 INJECTION, SOLUTION INTRAMUSCULAR; INTRAVENOUS at 11:08

## 2023-08-28 RX ADMIN — ENOXAPARIN SODIUM 40 MG: 40 INJECTION SUBCUTANEOUS at 04:08

## 2023-08-28 RX ADMIN — KETOROLAC TROMETHAMINE 15 MG: 15 INJECTION, SOLUTION INTRAMUSCULAR; INTRAVENOUS at 06:08

## 2023-08-28 RX ADMIN — Medication 10 ML: at 11:08

## 2023-08-28 RX ADMIN — SOYBEAN OIL 250 ML: 20 INJECTION, SOLUTION INTRAVENOUS at 10:08

## 2023-08-28 RX ADMIN — DIATRIZOATE MEGLUMINE AND DIATRIZOATE SODIUM 90 ML: 660; 100 LIQUID ORAL; RECTAL at 11:08

## 2023-08-28 RX ADMIN — FAMOTIDINE 20 MG: 10 INJECTION, SOLUTION INTRAVENOUS at 08:08

## 2023-08-28 RX ADMIN — KETOROLAC TROMETHAMINE 15 MG: 15 INJECTION, SOLUTION INTRAMUSCULAR; INTRAVENOUS at 05:08

## 2023-08-28 RX ADMIN — IOHEXOL 75 ML: 350 INJECTION, SOLUTION INTRAVENOUS at 12:08

## 2023-08-28 RX ADMIN — METHYLNALTREXONE BROMIDE 12 MG: 12 INJECTION, SOLUTION SUBCUTANEOUS at 02:08

## 2023-08-28 NOTE — PT/OT/SLP PROGRESS
Physical Therapy      Patient Name:  Danish Valladares   MRN:  [id number]    Patient not seen today secondary to  (pt and family reports they are awaiting to talk to MD, for possible reports of testing this date). Will follow-up next scheduled treatment per PT POC.

## 2023-08-28 NOTE — PROGRESS NOTES
Siddhartha Thurman - [facility]  General Surgery  Progress Note    Subjective:     History of Present Illness:  No notes on file    Post-Op Info:  Procedure(s) (LRB):  GASTRECTOMY (N/A)   7 Days Post-Op     Interval History: NAEON. No N/V while connected to NGT. OOB as tolerated. Pain well controlled.    Medications:  Continuous Infusions:   TPN ADULT CENTRAL LINE CUSTOM 45 mL/hr at 08/27/23 2257    TPN ADULT CENTRAL LINE CUSTOM       Scheduled Meds:   enoxparin  40 mg Subcutaneous Daily    famotidine (PF)  20 mg Intravenous BID    fat emulsion 20%  250 mL Intravenous Daily    fat emulsion 20%  250 mL Intravenous Daily    ketorolac  15 mg Intravenous Q6H    LIDOcaine  1 patch Transdermal Q24H    methylnaltrexone  12 mg Subcutaneous Daily    scopolamine  1 patch Transdermal Q3 Days    sodium chloride 0.9%  10 mL Intravenous Q6H     PRN Meds:bisacodyL, HYDROmorphone, LIDOcaine (PF) 10 mg/ml (1%), LIDOcaine HCL 10 mg/ml (1%), ondansetron, oxymetazoline, prochlorperazine, sodium chloride 0.9%, Flushing PICC Protocol **AND** sodium chloride 0.9% **AND** sodium chloride 0.9%     Review of patient's allergies indicates:   Allergen Reactions    Penicillins Rash     Objective:     Vital Signs (Most Recent):  Temp: 98.4 °F (36.9 °C) (08/28/23 0719)  Pulse: 76 (08/28/23 0719)  Resp: 16 (08/28/23 0345)  BP: 125/72 (08/28/23 0719)  SpO2: 96 % (08/28/23 0719) Vital Signs (24h Range):  Temp:  [97.5 °F (36.4 °C)-98.6 °F (37 °C)] 98.4 °F (36.9 °C)  Pulse:  [72-87] 76  Resp:  [16-18] 16  SpO2:  [92 %-96 %] 96 %  BP: (118-139)/(61-76) 125/72     Weight: 74.4 kg (164 lb)  Body mass index is 22.87 kg/m².    Intake/Output - Last 3 Shifts         08/26 0700 08/27 0659 08/27 0700 08/28 0659 08/28 0700  08/29 0659    P.O.  60     TPN  2739.7     Total Intake(mL/kg)  2799.7 (37.6)     Urine (mL/kg/hr)  0 (0)     Drains 150 1000     Stool  0     Total Output 150 1000     Net -150 +1799.7            Urine Occurrence  3 x     Stool Occurrence 1 x 0 x               Physical Exam  Vitals and nursing note reviewed.   Constitutional:       Appearance: He is not ill-appearing.   HENT:      Head: Normocephalic.      Mouth/Throat:      Mouth: Mucous membranes are moist.      Pharynx: Oropharynx is clear.   Eyes:      General: No scleral icterus.     Conjunctiva/sclera: Conjunctivae normal.   Cardiovascular:      Rate and Rhythm: Normal rate.      Pulses: Normal pulses.   Pulmonary:      Effort: Pulmonary effort is normal. No respiratory distress.      Breath sounds: No wheezing.   Abdominal:      General: Abdomen is flat.      Palpations: Abdomen is soft.      Comments: Midline incision CDI. Maryellen-incisional tenderness present. Abdomen is soft, but mildly tender and distended   Musculoskeletal:         General: No swelling. Normal range of motion.   Skin:     General: Skin is warm and dry.      Coloration: Skin is not jaundiced or pale.   Neurological:      General: No focal deficit present.      Mental Status: He is alert and oriented to person, place, and time.          Significant Labs:  I have reviewed all pertinent lab results within the past 24 hours.    Significant Diagnostics:  I have reviewed all pertinent imaging results/findings within the past 24 hours.    Assessment/Plan:     Gastric adenocarcinoma  Patient is a 62 y M w/ no significant past medical history (has not seen a doctor in over 40 years) who presented to the ER on 8/16 with abdominal pain and a recent diagnosis of gastric adenocarcinoma. No bowel movements for several weeks, concerning for obstruction. After review of images, very small amounts of contrast going through from stomach to duodenum. NGT placed for decompression. PICC placed and started on TPN for nutrition optimization as well as correction of electrolytes. S/P Open distal gastrectomy with BII reconstruction on 8/21/23. Today (8/26) pt having increasing distention with nausea and emesis, KUB showing diffuse dialation likely 2/2 to ileus with  improvement after NGT placement, reconfirmed position on 8/27. Currently awaiting ROBF however progressing slow, will r/o anastomotic stricture today.    - CT scan with gastrografin contrast and IV contrast today  - cont NPO, with ice chips and sips  - cont NGT to LIS   - okay to disconnect NGT for walks in hallway BID-TID, please reconnect to LIS after  - re-order TPN  - zofran PRN  - Multimodal pain control, switched to IV today  - PPI  - DVT ppx  - PT/OT ordered for postoperative care and rehabilitation  - OK to Ambulate, OOBTC  - Daily labs  - Replete electrolytes PRN     Dispo: Continue inpatient care        Bubba Chiirnos MD  General Surgery  [facility]

## 2023-08-28 NOTE — SUBJECTIVE & OBJECTIVE
Interval History: NAEON. No N/V while connected to NGT. OOB as tolerated. Pain well controlled.    Medications:  Continuous Infusions:   TPN ADULT CENTRAL LINE CUSTOM 45 mL/hr at 08/27/23 2257    TPN ADULT CENTRAL LINE CUSTOM       Scheduled Meds:   enoxparin  40 mg Subcutaneous Daily    famotidine (PF)  20 mg Intravenous BID    fat emulsion 20%  250 mL Intravenous Daily    fat emulsion 20%  250 mL Intravenous Daily    ketorolac  15 mg Intravenous Q6H    LIDOcaine  1 patch Transdermal Q24H    methylnaltrexone  12 mg Subcutaneous Daily    scopolamine  1 patch Transdermal Q3 Days    sodium chloride 0.9%  10 mL Intravenous Q6H     PRN Meds:bisacodyL, HYDROmorphone, LIDOcaine (PF) 10 mg/ml (1%), LIDOcaine HCL 10 mg/ml (1%), ondansetron, oxymetazoline, prochlorperazine, sodium chloride 0.9%, Flushing PICC Protocol **AND** sodium chloride 0.9% **AND** sodium chloride 0.9%     Review of patient's allergies indicates:   Allergen Reactions    Penicillins Rash     Objective:     Vital Signs (Most Recent):  Temp: 98.4 °F (36.9 °C) (08/28/23 0719)  Pulse: 76 (08/28/23 0719)  Resp: 16 (08/28/23 0345)  BP: 125/72 (08/28/23 0719)  SpO2: 96 % (08/28/23 0719) Vital Signs (24h Range):  Temp:  [97.5 °F (36.4 °C)-98.6 °F (37 °C)] 98.4 °F (36.9 °C)  Pulse:  [72-87] 76  Resp:  [16-18] 16  SpO2:  [92 %-96 %] 96 %  BP: (118-139)/(61-76) 125/72     Weight: 74.4 kg (164 lb)  Body mass index is 22.87 kg/m².    Intake/Output - Last 3 Shifts         08/26 0700 08/27 0659 08/27 0700 08/28 0659 08/28 0700 08/29 0659    P.O.  60     TPN  2739.7     Total Intake(mL/kg)  2799.7 (37.6)     Urine (mL/kg/hr)  0 (0)     Drains 150 1000     Stool  0     Total Output 150 1000     Net -150 +1799.7            Urine Occurrence  3 x     Stool Occurrence 1 x 0 x              Physical Exam  Vitals and nursing note reviewed.   Constitutional:       Appearance: He is not ill-appearing.   HENT:      Head: Normocephalic.      Mouth/Throat:      Mouth: Mucous  membranes are moist.      Pharynx: Oropharynx is clear.   Eyes:      General: No scleral icterus.     Conjunctiva/sclera: Conjunctivae normal.   Cardiovascular:      Rate and Rhythm: Normal rate.      Pulses: Normal pulses.   Pulmonary:      Effort: Pulmonary effort is normal. No respiratory distress.      Breath sounds: No wheezing.   Abdominal:      General: Abdomen is flat.      Palpations: Abdomen is soft.      Comments: Midline incision CDI. Maryellen-incisional tenderness present. Abdomen is soft, but mildly tender and distended   Musculoskeletal:         General: No swelling. Normal range of motion.   Skin:     General: Skin is warm and dry.      Coloration: Skin is not jaundiced or pale.   Neurological:      General: No focal deficit present.      Mental Status: He is alert and oriented to person, place, and time.          Significant Labs:  I have reviewed all pertinent lab results within the past 24 hours.    Significant Diagnostics:  I have reviewed all pertinent imaging results/findings within the past 24 hours.

## 2023-08-28 NOTE — ASSESSMENT & PLAN NOTE
Patient is a 62 y M w/ no significant past medical history (has not seen a doctor in over 40 years) who presented to the ER on 8/16 with abdominal pain and a recent diagnosis of gastric adenocarcinoma. No bowel movements for several weeks, concerning for obstruction. After review of images, very small amounts of contrast going through from stomach to duodenum. NGT placed for decompression. PICC placed and started on TPN for nutrition optimization as well as correction of electrolytes. S/P Open distal gastrectomy with BII reconstruction on 8/21/23. Today (8/26) pt having increasing distention with nausea and emesis, KUB showing diffuse dialation likely 2/2 to ileus with improvement after NGT placement, reconfirmed position on 8/27. Currently awaiting ROBF.    - cont NPO, with ice chips and sips  - cont NGT to LIS   - okay to disconnect NGT for walks in hallway BID-TID, please reconnect to LIS after  - re-order TPN  - zofran PRN  - Multimodal pain control, switched to IV today  - PPI  - DVT ppx  - PT/OT ordered for postoperative care and rehabilitation  - OK to Ambulate, OOBTC  - Daily labs  - Replete electrolytes PRN     Dispo: Continue inpatient care

## 2023-08-28 NOTE — PLAN OF CARE
[facility] Plan of Care Note  Dx: emesis r/t gastric CA     Shift Events: suppository given x1. No emesis overnight. Intermittent nausea, not requiring PRN intervention. Pain well controlled.     Goals of Care: monitor NG output, pain management, bowel motility     Neuro: AOx4     Vital Signs:VSS     Respiratory: RA     Diet: NPO      Is patient tolerating current diet? N/A.      GTTS: TPN @ 45/Lipids ongoing     Urine Output/Bowel Movement: UOP adequate per RR, NO BM this shift     Drains/Tubes/Tube Feeds (include total output/shift): L NGT, 400 green dilute output     Lines: HAM PICC     Accuchecks:N/A     Skin: ML + DB     Fall Risk Score: 9            Activity level? independent     Any scheduled procedures? N/A     Any safety concerns? N/A      Problem: Adult Inpatient Plan of Care  Goal: Plan of Care Review  Outcome: Ongoing, Progressing  Goal: Patient-Specific Goal (Individualized)  Outcome: Ongoing, Progressing  Goal: Absence of Hospital-Acquired Illness or Injury  Outcome: Ongoing, Progressing  Goal: Optimal Comfort and Wellbeing  Outcome: Ongoing, Progressing  Goal: Readiness for Transition of Care  Outcome: Ongoing, Progressing     Problem: Infection  Goal: Absence of Infection Signs and Symptoms  Outcome: Ongoing, Progressing     Problem: Impaired Wound Healing  Goal: Optimal Wound Healing  Outcome: Ongoing, Progressing     Problem: Fall Injury Risk  Goal: Absence of Fall and Fall-Related Injury  Outcome: Ongoing, Progressing     Problem: Skin Injury Risk Increased  Goal: Skin Health and Integrity  Outcome: Ongoing, Progressing

## 2023-08-29 LAB
ALBUMIN SERPL BCP-MCNC: 2.5 G/DL (ref 3.5–5.2)
ALP SERPL-CCNC: 105 U/L (ref 55–135)
ALT SERPL W/O P-5'-P-CCNC: 77 U/L (ref 10–44)
ANION GAP SERPL CALC-SCNC: 8 MMOL/L (ref 8–16)
AST SERPL-CCNC: 48 U/L (ref 10–40)
BASOPHILS # BLD AUTO: 0.02 K/UL (ref 0–0.2)
BASOPHILS NFR BLD: 0.2 % (ref 0–1.9)
BILIRUB SERPL-MCNC: 2.4 MG/DL (ref 0.1–1)
BUN SERPL-MCNC: 29 MG/DL (ref 8–23)
CALCIUM SERPL-MCNC: 8.7 MG/DL (ref 8.7–10.5)
CHLORIDE SERPL-SCNC: 106 MMOL/L (ref 95–110)
CO2 SERPL-SCNC: 27 MMOL/L (ref 23–29)
CREAT SERPL-MCNC: 0.7 MG/DL (ref 0.5–1.4)
DIFFERENTIAL METHOD: ABNORMAL
EOSINOPHIL # BLD AUTO: 0 K/UL (ref 0–0.5)
EOSINOPHIL NFR BLD: 0.3 % (ref 0–8)
ERYTHROCYTE [DISTWIDTH] IN BLOOD BY AUTOMATED COUNT: 14.2 % (ref 11.5–14.5)
EST. GFR  (NO RACE VARIABLE): >60 ML/MIN/1.73 M^2
GLUCOSE SERPL-MCNC: 132 MG/DL (ref 70–110)
HCT VFR BLD AUTO: 36.2 % (ref 40–54)
HGB BLD-MCNC: 12.2 G/DL (ref 14–18)
IMM GRANULOCYTES # BLD AUTO: 0.08 K/UL (ref 0–0.04)
IMM GRANULOCYTES NFR BLD AUTO: 0.9 % (ref 0–0.5)
LYMPHOCYTES # BLD AUTO: 0.9 K/UL (ref 1–4.8)
LYMPHOCYTES NFR BLD: 10.3 % (ref 18–48)
MAGNESIUM SERPL-MCNC: 2 MG/DL (ref 1.6–2.6)
MCH RBC QN AUTO: 27.3 PG (ref 27–31)
MCHC RBC AUTO-ENTMCNC: 33.7 G/DL (ref 32–36)
MCV RBC AUTO: 81 FL (ref 82–98)
MONOCYTES # BLD AUTO: 1 K/UL (ref 0.3–1)
MONOCYTES NFR BLD: 10.9 % (ref 4–15)
NEUTROPHILS # BLD AUTO: 7.1 K/UL (ref 1.8–7.7)
NEUTROPHILS NFR BLD: 77.4 % (ref 38–73)
NRBC BLD-RTO: 0 /100 WBC
PHOSPHATE SERPL-MCNC: 3.3 MG/DL (ref 2.7–4.5)
PLATELET # BLD AUTO: 243 K/UL (ref 150–450)
PMV BLD AUTO: 9.5 FL (ref 9.2–12.9)
POTASSIUM SERPL-SCNC: 3.8 MMOL/L (ref 3.5–5.1)
PROT SERPL-MCNC: 5.7 G/DL (ref 6–8.4)
RBC # BLD AUTO: 4.47 M/UL (ref 4.6–6.2)
SODIUM SERPL-SCNC: 141 MMOL/L (ref 136–145)
WBC # BLD AUTO: 9.11 K/UL (ref 3.9–12.7)

## 2023-08-29 PROCEDURE — A4217 STERILE WATER/SALINE, 500 ML: HCPCS | Performed by: STUDENT IN AN ORGANIZED HEALTH CARE EDUCATION/TRAINING PROGRAM

## 2023-08-29 PROCEDURE — A4216 STERILE WATER/SALINE, 10 ML: HCPCS | Performed by: STUDENT IN AN ORGANIZED HEALTH CARE EDUCATION/TRAINING PROGRAM

## 2023-08-29 PROCEDURE — 25000003 PHARM REV CODE 250: Performed by: STUDENT IN AN ORGANIZED HEALTH CARE EDUCATION/TRAINING PROGRAM

## 2023-08-29 PROCEDURE — 84100 ASSAY OF PHOSPHORUS: CPT | Performed by: STUDENT IN AN ORGANIZED HEALTH CARE EDUCATION/TRAINING PROGRAM

## 2023-08-29 PROCEDURE — 63600175 PHARM REV CODE 636 W HCPCS: Performed by: STUDENT IN AN ORGANIZED HEALTH CARE EDUCATION/TRAINING PROGRAM

## 2023-08-29 PROCEDURE — B4185 PARENTERAL SOL 10 GM LIPIDS: HCPCS | Performed by: STUDENT IN AN ORGANIZED HEALTH CARE EDUCATION/TRAINING PROGRAM

## 2023-08-29 PROCEDURE — 85025 COMPLETE CBC W/AUTO DIFF WBC: CPT | Performed by: STUDENT IN AN ORGANIZED HEALTH CARE EDUCATION/TRAINING PROGRAM

## 2023-08-29 PROCEDURE — 83735 ASSAY OF MAGNESIUM: CPT | Performed by: STUDENT IN AN ORGANIZED HEALTH CARE EDUCATION/TRAINING PROGRAM

## 2023-08-29 PROCEDURE — 20600001 HC STEP DOWN PRIVATE ROOM

## 2023-08-29 PROCEDURE — 80053 COMPREHEN METABOLIC PANEL: CPT | Performed by: SURGERY

## 2023-08-29 RX ORDER — ACETAMINOPHEN 10 MG/ML
1000 INJECTION, SOLUTION INTRAVENOUS EVERY 8 HOURS
Status: DISPENSED | OUTPATIENT
Start: 2023-08-29 | End: 2023-08-30

## 2023-08-29 RX ORDER — KETOROLAC TROMETHAMINE 15 MG/ML
15 INJECTION, SOLUTION INTRAMUSCULAR; INTRAVENOUS EVERY 6 HOURS
Status: DISPENSED | OUTPATIENT
Start: 2023-08-29 | End: 2023-09-01

## 2023-08-29 RX ORDER — SIMETHICONE 80 MG
1 TABLET,CHEWABLE ORAL 3 TIMES DAILY PRN
Status: DISCONTINUED | OUTPATIENT
Start: 2023-08-29 | End: 2023-09-01 | Stop reason: HOSPADM

## 2023-08-29 RX ADMIN — ENOXAPARIN SODIUM 40 MG: 40 INJECTION SUBCUTANEOUS at 03:08

## 2023-08-29 RX ADMIN — KETOROLAC TROMETHAMINE 15 MG: 15 INJECTION, SOLUTION INTRAMUSCULAR; INTRAVENOUS at 05:08

## 2023-08-29 RX ADMIN — SIMETHICONE 80 MG: 80 TABLET, CHEWABLE ORAL at 01:08

## 2023-08-29 RX ADMIN — FAMOTIDINE 20 MG: 10 INJECTION, SOLUTION INTRAVENOUS at 09:08

## 2023-08-29 RX ADMIN — SOYBEAN OIL 250 ML: 20 INJECTION, SOLUTION INTRAVENOUS at 09:08

## 2023-08-29 RX ADMIN — Medication 10 ML: at 06:08

## 2023-08-29 RX ADMIN — MAGNESIUM SULFATE HEPTAHYDRATE: 500 INJECTION, SOLUTION INTRAMUSCULAR; INTRAVENOUS at 09:08

## 2023-08-29 RX ADMIN — SIMETHICONE 80 MG: 80 TABLET, CHEWABLE ORAL at 09:08

## 2023-08-29 RX ADMIN — KETOROLAC TROMETHAMINE 15 MG: 15 INJECTION, SOLUTION INTRAMUSCULAR; INTRAVENOUS at 06:08

## 2023-08-29 RX ADMIN — KETOROLAC TROMETHAMINE 15 MG: 15 INJECTION, SOLUTION INTRAMUSCULAR; INTRAVENOUS at 12:08

## 2023-08-29 RX ADMIN — Medication 10 ML: at 12:08

## 2023-08-29 RX ADMIN — Medication 10 ML: at 01:08

## 2023-08-29 NOTE — PT/OT/SLP PROGRESS
Physical Therapy      Patient Name:  Danish Valladares   MRN:  [id number]    PT to bedside for PT treatment session. Pt declined therapy services due to pain. Unable to make second attempt. PT to return when appropriate.

## 2023-08-29 NOTE — NURSING
Pt at this time is out of bed sitting in chair NGT advanced 5cm as ordered, c/o severe gas pains pt reported that he is passing flatus, given Simethicone 80mg and NGT clamped as ordered for 6 hours will monitor progress. Wife at bedside.

## 2023-08-29 NOTE — PROGRESS NOTES
[facility]artha Thurman - [facility]  General Surgery  Progress Note    Subjective:     History of Present Illness:  No notes on file    Post-Op Info:  Procedure(s) (LRB):  GASTRECTOMY (N/A)   8 Days Post-Op     Interval History: No acute events overnight, discussed CT findings yesterday which appear consistent with ileus. Patient still not passing much gas from below, having gas pains in abdomen, crampy/colicky pain that moves around. Denies nausea. Not much coming from NGT.    Medications:  Continuous Infusions:   TPN ADULT CENTRAL LINE CUSTOM 45 mL/hr at 08/29/23 0605     Scheduled Meds:   acetaminophen  1,000 mg Intravenous Q8H    enoxparin  40 mg Subcutaneous Daily    famotidine (PF)  20 mg Intravenous BID    fat emulsion 20%  250 mL Intravenous Daily    ketorolac  15 mg Intravenous Q6H    ketorolac  15 mg Intravenous Q6H    LIDOcaine  1 patch Transdermal Q24H    scopolamine  1 patch Transdermal Q3 Days    sodium chloride 0.9%  10 mL Intravenous Q6H     PRN Meds:bisacodyL, LIDOcaine (PF) 10 mg/ml (1%), LIDOcaine HCL 10 mg/ml (1%), ondansetron, oxymetazoline, prochlorperazine, sodium chloride 0.9%, Flushing PICC Protocol **AND** sodium chloride 0.9% **AND** sodium chloride 0.9%     Review of patient's allergies indicates:   Allergen Reactions    Penicillins Rash     Objective:     Vital Signs (Most Recent):  Temp: 97.6 °F (36.4 °C) (08/29/23 0714)  Pulse: 76 (08/29/23 0714)  Resp: 18 (08/29/23 0714)  BP: 136/70 (08/29/23 0714)  SpO2: (!) 94 % (08/29/23 0714) Vital Signs (24h Range):  Temp:  [97.1 °F (36.2 °C)-98.2 °F (36.8 °C)] 97.6 °F (36.4 °C)  Pulse:  [70-92] 76  Resp:  [18] 18  SpO2:  [93 %-95 %] 94 %  BP: (111-148)/(64-84) 136/70     Weight: 74.4 kg (164 lb)  Body mass index is 22.87 kg/m².    Intake/Output - Last 3 Shifts         08/27 0700 08/28 0659 08/28 0700 08/29 0659 08/29 0700 08/30 0659    P.O. 60 0     TPN 2739.7 2345.1     Total Intake(mL/kg) 2799.7 (37.6) 2345.1 (31.5)     Urine (mL/kg/hr) 0 (0) 0 (0)      Drains 1000 200     Stool 0      Total Output 1000 200     Net +1799.7 +2145.1            Urine Occurrence 3 x 6 x     Stool Occurrence 0 x               Physical Exam  Vitals and nursing note reviewed.   Constitutional:       Appearance: He is not ill-appearing.   HENT:      Head: Normocephalic.      Nose:      Comments: NGT in place, thin gastric content      Mouth/Throat:      Mouth: Mucous membranes are moist.      Pharynx: Oropharynx is clear.   Eyes:      General: No scleral icterus.     Conjunctiva/sclera: Conjunctivae normal.   Cardiovascular:      Rate and Rhythm: Normal rate.      Pulses: Normal pulses.   Pulmonary:      Effort: Pulmonary effort is normal. No respiratory distress.      Breath sounds: No wheezing.   Abdominal:      General: Abdomen is flat.      Palpations: Abdomen is soft.      Comments: Midline incision CDI. Maryellen-incisional tenderness present. Abdomen is soft, but mildly tender and distended   Musculoskeletal:         General: No swelling. Normal range of motion.   Skin:     General: Skin is warm and dry.      Coloration: Skin is not jaundiced or pale.   Neurological:      General: No focal deficit present.      Mental Status: He is alert and oriented to person, place, and time.          Significant Labs:  I have reviewed all pertinent lab results within the past 24 hours.  CBC:   Recent Labs   Lab 08/29/23  0603   WBC 9.11   RBC 4.47*   HGB 12.2*   HCT 36.2*      MCV 81*   MCH 27.3   MCHC 33.7     CMP:   Recent Labs   Lab 08/29/23  0603   *   CALCIUM 8.7   ALBUMIN 2.5*   PROT 5.7*      K 3.8   CO2 27      BUN 29*   CREATININE 0.7   ALKPHOS 105   ALT 77*   AST 48*   BILITOT 2.4*       Significant Diagnostics:  I have reviewed all pertinent imaging results/findings within the past 24 hours.    Assessment/Plan:     * Gastric adenocarcinoma  Patient is a 62 y M w/ no significant past medical history (has not seen a doctor in over 40 years) who presented to the  ER on 8/16 with abdominal pain and a recent diagnosis of gastric adenocarcinoma. No bowel movements for several weeks, concerning for obstruction. After review of images, very small amounts of contrast going through from stomach to duodenum. NGT placed for decompression. PICC placed and started on TPN for nutrition optimization as well as correction of electrolytes. S/P Open distal gastrectomy with BII reconstruction on 8/21/23. Today (8/26) pt having increasing distention with nausea and emesis, KUB showing diffuse dialation likely 2/2 to ileus with improvement after NGT placement, reconfirmed position on 8/27. Currently awaiting ROBF. CT A/P obtained 8/28 consistent with ileus, no evidence of leak or abscess.     - cont NPO, with ice chips and sips  - cont NGT to LIS, will advance 5cm today as it appears to terminate in distal esophagus.   - okay to disconnect NGT for walks in hallway BID-TID, please reconnect to LIS after  - re-order TPN  - zofran PRN  - Multimodal pain control (renew Tylenol and Toradol orders)   - PPI  - DVT ppx  - PT/OT ordered for postoperative care and rehabilitation  - Encourage ambulation , OOBTC    Dispo: Continue inpatient care, awaiting return of bowel function         Tex Lucas MD  General Surgery  [facility]

## 2023-08-29 NOTE — SUBJECTIVE & OBJECTIVE
Interval History: No acute events overnight, discussed CT findings yesterday which appear consistent with ileus. Patient still not passing much gas from below, having gas pains in abdomen, crampy/colicky pain that moves around. Denies nausea. Not much coming from NGT.    Medications:  Continuous Infusions:   TPN ADULT CENTRAL LINE CUSTOM 45 mL/hr at 08/29/23 0605     Scheduled Meds:   acetaminophen  1,000 mg Intravenous Q8H    enoxparin  40 mg Subcutaneous Daily    famotidine (PF)  20 mg Intravenous BID    fat emulsion 20%  250 mL Intravenous Daily    ketorolac  15 mg Intravenous Q6H    ketorolac  15 mg Intravenous Q6H    LIDOcaine  1 patch Transdermal Q24H    scopolamine  1 patch Transdermal Q3 Days    sodium chloride 0.9%  10 mL Intravenous Q6H     PRN Meds:bisacodyL, LIDOcaine (PF) 10 mg/ml (1%), LIDOcaine HCL 10 mg/ml (1%), ondansetron, oxymetazoline, prochlorperazine, sodium chloride 0.9%, Flushing PICC Protocol **AND** sodium chloride 0.9% **AND** sodium chloride 0.9%     Review of patient's allergies indicates:   Allergen Reactions    Penicillins Rash     Objective:     Vital Signs (Most Recent):  Temp: 97.6 °F (36.4 °C) (08/29/23 0714)  Pulse: 76 (08/29/23 0714)  Resp: 18 (08/29/23 0714)  BP: 136/70 (08/29/23 0714)  SpO2: (!) 94 % (08/29/23 0714) Vital Signs (24h Range):  Temp:  [97.1 °F (36.2 °C)-98.2 °F (36.8 °C)] 97.6 °F (36.4 °C)  Pulse:  [70-92] 76  Resp:  [18] 18  SpO2:  [93 %-95 %] 94 %  BP: (111-148)/(64-84) 136/70     Weight: 74.4 kg (164 lb)  Body mass index is 22.87 kg/m².    Intake/Output - Last 3 Shifts         08/27 0700 08/28 0659 08/28 0700 08/29 0659 08/29 0700 08/30 0659    P.O. 60 0     TPN 2739.7 2345.1     Total Intake(mL/kg) 2799.7 (37.6) 2345.1 (31.5)     Urine (mL/kg/hr) 0 (0) 0 (0)     Drains 1000 200     Stool 0      Total Output 1000 200     Net +1799.7 +2145.1            Urine Occurrence 3 x 6 x     Stool Occurrence 0 x               Physical Exam  Vitals and nursing note  reviewed.   Constitutional:       Appearance: He is not ill-appearing.   HENT:      Head: Normocephalic.      Nose:      Comments: NGT in place, thin gastric content      Mouth/Throat:      Mouth: Mucous membranes are moist.      Pharynx: Oropharynx is clear.   Eyes:      General: No scleral icterus.     Conjunctiva/sclera: Conjunctivae normal.   Cardiovascular:      Rate and Rhythm: Normal rate.      Pulses: Normal pulses.   Pulmonary:      Effort: Pulmonary effort is normal. No respiratory distress.      Breath sounds: No wheezing.   Abdominal:      General: Abdomen is flat.      Palpations: Abdomen is soft.      Comments: Midline incision CDI. Maryellen-incisional tenderness present. Abdomen is soft, but mildly tender and distended   Musculoskeletal:         General: No swelling. Normal range of motion.   Skin:     General: Skin is warm and dry.      Coloration: Skin is not jaundiced or pale.   Neurological:      General: No focal deficit present.      Mental Status: He is alert and oriented to person, place, and time.          Significant Labs:  I have reviewed all pertinent lab results within the past 24 hours.  CBC:   Recent Labs   Lab 08/29/23  0603   WBC 9.11   RBC 4.47*   HGB 12.2*   HCT 36.2*      MCV 81*   MCH 27.3   MCHC 33.7     CMP:   Recent Labs   Lab 08/29/23  0603   *   CALCIUM 8.7   ALBUMIN 2.5*   PROT 5.7*      K 3.8   CO2 27      BUN 29*   CREATININE 0.7   ALKPHOS 105   ALT 77*   AST 48*   BILITOT 2.4*       Significant Diagnostics:  I have reviewed all pertinent imaging results/findings within the past 24 hours.

## 2023-08-29 NOTE — ASSESSMENT & PLAN NOTE
Patient is a 62 y M w/ no significant past medical history (has not seen a doctor in over 40 years) who presented to the ER on 8/16 with abdominal pain and a recent diagnosis of gastric adenocarcinoma. No bowel movements for several weeks, concerning for obstruction. After review of images, very small amounts of contrast going through from stomach to duodenum. NGT placed for decompression. PICC placed and started on TPN for nutrition optimization as well as correction of electrolytes. S/P Open distal gastrectomy with BII reconstruction on 8/21/23. Today (8/26) pt having increasing distention with nausea and emesis, KUB showing diffuse dialation likely 2/2 to ileus with improvement after NGT placement, reconfirmed position on 8/27. Currently awaiting ROBF. CT A/P obtained 8/28 consistent with ileus, no evidence of leak or abscess.     - cont NPO, with ice chips and sips  - cont NGT to LIS, will advance 5cm today as it appears to terminate in distal esophagus.   - okay to disconnect NGT for walks in hallway BID-TID, please reconnect to LIS after  - re-order TPN  - zofran PRN  - Multimodal pain control (renew Tylenol and Toradol orders)   - PPI  - DVT ppx  - PT/OT ordered for postoperative care and rehabilitation  - Encourage ambulation , OOBTC    Dispo: Continue inpatient care, awaiting return of bowel function

## 2023-08-29 NOTE — PLAN OF CARE
Plan of care reviewed with the patient at the beginning of the shift. He remains NPO. NGT to LIS. TPN infusing. Pain managed with scheduled Toradol. Fall precautions maintained. He is up independently without difficulty. Pt remained free from falls and injury this shift. Bed locked in lowest position, side rails up x2, call light within reach. Instructed pt to call for assistance as needed. Pt verbalized understanding. Vitals stable. Pt afebrile overnight. No acute issues overnight. Will continue to monitor.

## 2023-08-30 LAB
ALBUMIN SERPL BCP-MCNC: 2.6 G/DL (ref 3.5–5.2)
ALP SERPL-CCNC: 104 U/L (ref 55–135)
ALT SERPL W/O P-5'-P-CCNC: 73 U/L (ref 10–44)
ANION GAP SERPL CALC-SCNC: 9 MMOL/L (ref 8–16)
AST SERPL-CCNC: 33 U/L (ref 10–40)
BASOPHILS # BLD AUTO: 0.03 K/UL (ref 0–0.2)
BASOPHILS NFR BLD: 0.3 % (ref 0–1.9)
BILIRUB SERPL-MCNC: 2.9 MG/DL (ref 0.1–1)
BUN SERPL-MCNC: 30 MG/DL (ref 8–23)
CALCIUM SERPL-MCNC: 8.7 MG/DL (ref 8.7–10.5)
CHLORIDE SERPL-SCNC: 106 MMOL/L (ref 95–110)
CO2 SERPL-SCNC: 24 MMOL/L (ref 23–29)
CREAT SERPL-MCNC: 0.7 MG/DL (ref 0.5–1.4)
DIFFERENTIAL METHOD: ABNORMAL
EOSINOPHIL # BLD AUTO: 0 K/UL (ref 0–0.5)
EOSINOPHIL NFR BLD: 0.3 % (ref 0–8)
ERYTHROCYTE [DISTWIDTH] IN BLOOD BY AUTOMATED COUNT: 14.2 % (ref 11.5–14.5)
EST. GFR  (NO RACE VARIABLE): >60 ML/MIN/1.73 M^2
FINAL PATHOLOGIC DIAGNOSIS: ABNORMAL
FROZEN SECTION DIAGNOSIS: ABNORMAL
FROZEN SECTION FOOTNOTE: ABNORMAL
GLUCOSE SERPL-MCNC: 115 MG/DL (ref 70–110)
GROSS: ABNORMAL
HCT VFR BLD AUTO: 36.6 % (ref 40–54)
HGB BLD-MCNC: 12.2 G/DL (ref 14–18)
IMM GRANULOCYTES # BLD AUTO: 0.17 K/UL (ref 0–0.04)
IMM GRANULOCYTES NFR BLD AUTO: 1.7 % (ref 0–0.5)
LYMPHOCYTES # BLD AUTO: 1.8 K/UL (ref 1–4.8)
LYMPHOCYTES NFR BLD: 17.4 % (ref 18–48)
Lab: ABNORMAL
MAGNESIUM SERPL-MCNC: 2.1 MG/DL (ref 1.6–2.6)
MCH RBC QN AUTO: 27.7 PG (ref 27–31)
MCHC RBC AUTO-ENTMCNC: 33.3 G/DL (ref 32–36)
MCV RBC AUTO: 83 FL (ref 82–98)
MICROSCOPIC EXAM: ABNORMAL
MONOCYTES # BLD AUTO: 1 K/UL (ref 0.3–1)
MONOCYTES NFR BLD: 9.9 % (ref 4–15)
NEUTROPHILS # BLD AUTO: 7.2 K/UL (ref 1.8–7.7)
NEUTROPHILS NFR BLD: 70.4 % (ref 38–73)
NRBC BLD-RTO: 0 /100 WBC
PHOSPHATE SERPL-MCNC: 3.6 MG/DL (ref 2.7–4.5)
PLATELET # BLD AUTO: 270 K/UL (ref 150–450)
PMV BLD AUTO: 10 FL (ref 9.2–12.9)
POTASSIUM SERPL-SCNC: 4 MMOL/L (ref 3.5–5.1)
PROT SERPL-MCNC: 5.9 G/DL (ref 6–8.4)
RBC # BLD AUTO: 4.4 M/UL (ref 4.6–6.2)
SODIUM SERPL-SCNC: 139 MMOL/L (ref 136–145)
WBC # BLD AUTO: 10.19 K/UL (ref 3.9–12.7)

## 2023-08-30 PROCEDURE — 97116 GAIT TRAINING THERAPY: CPT | Mod: CQ

## 2023-08-30 PROCEDURE — 80053 COMPREHEN METABOLIC PANEL: CPT | Performed by: SURGERY

## 2023-08-30 PROCEDURE — 25000003 PHARM REV CODE 250: Performed by: STUDENT IN AN ORGANIZED HEALTH CARE EDUCATION/TRAINING PROGRAM

## 2023-08-30 PROCEDURE — B4185 PARENTERAL SOL 10 GM LIPIDS: HCPCS | Performed by: STUDENT IN AN ORGANIZED HEALTH CARE EDUCATION/TRAINING PROGRAM

## 2023-08-30 PROCEDURE — 63600175 PHARM REV CODE 636 W HCPCS: Performed by: STUDENT IN AN ORGANIZED HEALTH CARE EDUCATION/TRAINING PROGRAM

## 2023-08-30 PROCEDURE — A4216 STERILE WATER/SALINE, 10 ML: HCPCS | Performed by: STUDENT IN AN ORGANIZED HEALTH CARE EDUCATION/TRAINING PROGRAM

## 2023-08-30 PROCEDURE — 85025 COMPLETE CBC W/AUTO DIFF WBC: CPT | Performed by: STUDENT IN AN ORGANIZED HEALTH CARE EDUCATION/TRAINING PROGRAM

## 2023-08-30 PROCEDURE — 84100 ASSAY OF PHOSPHORUS: CPT | Performed by: STUDENT IN AN ORGANIZED HEALTH CARE EDUCATION/TRAINING PROGRAM

## 2023-08-30 PROCEDURE — 83735 ASSAY OF MAGNESIUM: CPT | Performed by: STUDENT IN AN ORGANIZED HEALTH CARE EDUCATION/TRAINING PROGRAM

## 2023-08-30 PROCEDURE — A4217 STERILE WATER/SALINE, 500 ML: HCPCS | Performed by: STUDENT IN AN ORGANIZED HEALTH CARE EDUCATION/TRAINING PROGRAM

## 2023-08-30 PROCEDURE — 20600001 HC STEP DOWN PRIVATE ROOM

## 2023-08-30 RX ORDER — ACETAMINOPHEN 10 MG/ML
1000 INJECTION, SOLUTION INTRAVENOUS EVERY 8 HOURS
Status: DISCONTINUED | OUTPATIENT
Start: 2023-08-30 | End: 2023-08-31

## 2023-08-30 RX ORDER — POLYETHYLENE GLYCOL 3350 17 G/17G
17 POWDER, FOR SOLUTION ORAL DAILY
Status: DISCONTINUED | OUTPATIENT
Start: 2023-08-30 | End: 2023-09-01 | Stop reason: HOSPADM

## 2023-08-30 RX ADMIN — FAMOTIDINE 20 MG: 10 INJECTION, SOLUTION INTRAVENOUS at 08:08

## 2023-08-30 RX ADMIN — KETOROLAC TROMETHAMINE 15 MG: 15 INJECTION, SOLUTION INTRAMUSCULAR; INTRAVENOUS at 06:08

## 2023-08-30 RX ADMIN — KETOROLAC TROMETHAMINE 15 MG: 15 INJECTION, SOLUTION INTRAMUSCULAR; INTRAVENOUS at 11:08

## 2023-08-30 RX ADMIN — SIMETHICONE 80 MG: 80 TABLET, CHEWABLE ORAL at 12:08

## 2023-08-30 RX ADMIN — FAMOTIDINE 20 MG: 10 INJECTION, SOLUTION INTRAVENOUS at 09:08

## 2023-08-30 RX ADMIN — Medication 10 ML: at 11:08

## 2023-08-30 RX ADMIN — Medication 10 ML: at 06:08

## 2023-08-30 RX ADMIN — SIMETHICONE 80 MG: 80 TABLET, CHEWABLE ORAL at 06:08

## 2023-08-30 RX ADMIN — Medication 10 ML: at 12:08

## 2023-08-30 RX ADMIN — ENOXAPARIN SODIUM 40 MG: 40 INJECTION SUBCUTANEOUS at 05:08

## 2023-08-30 RX ADMIN — SOYBEAN OIL 250 ML: 20 INJECTION, SOLUTION INTRAVENOUS at 09:08

## 2023-08-30 RX ADMIN — KETOROLAC TROMETHAMINE 15 MG: 15 INJECTION, SOLUTION INTRAMUSCULAR; INTRAVENOUS at 12:08

## 2023-08-30 RX ADMIN — POLYETHYLENE GLYCOL 3350 17 G: 17 POWDER, FOR SOLUTION ORAL at 02:08

## 2023-08-30 RX ADMIN — KETOROLAC TROMETHAMINE 15 MG: 15 INJECTION, SOLUTION INTRAMUSCULAR; INTRAVENOUS at 02:08

## 2023-08-30 RX ADMIN — Medication 10 ML: at 05:08

## 2023-08-30 RX ADMIN — MAGNESIUM SULFATE HEPTAHYDRATE: 500 INJECTION, SOLUTION INTRAMUSCULAR; INTRAVENOUS at 09:08

## 2023-08-30 NOTE — PLAN OF CARE
Patient not medical ready for discharge at this time.  SW/CM will continue to follow patient's progress to discharge.SW anticipates  HH with [facility] and TPN with referral to [facility]. Family will be able to provide transportation home at d/c. SW/CM remains available for any family concerns or needs , SW currently unable to assess [facility] for referrals.    12:19 PM   SW informed Amany with [facility] via Chat  I'm unable to assess [facility] for referral. To review this pt for TPN. d/c 4-5 days out. SW will follow.      Malou Cam LMSW  PRN - [facility]  [phone]

## 2023-08-30 NOTE — PT/OT/SLP PROGRESS
Physical Therapy Treatment    Patient Name:  Danish Valladares   MRN:  [id number]    Recommendations:     Discharge Recommendations: home health PT  Discharge Equipment Recommendations: shower chair, walker, rolling  Barriers to discharge: None    Assessment:     Danish Valladares is a 62 y.o. male admitted with a medical diagnosis of Gastric adenocarcinoma.  He presents with the following impairments/functional limitations: impaired endurance, impaired cardiopulmonary response to activity.    Rehab Prognosis: Good; patient would benefit from acute skilled PT services to address these deficits and reach maximum level of function.    Recent Surgery: Procedure(s) (LRB):  GASTRECTOMY (N/A) 9 Days Post-Op    Plan:     During this hospitalization, patient to be seen 3 x/week to address the identified rehab impairments via gait training, therapeutic activities, therapeutic exercises, neuromuscular re-education and progress toward the following goals:    Plan of Care Expires:  09/22/23    Subjective     Chief Complaint: pt agreeable to PT  Patient/Family Comments/goals: ready to go home   Pain/Comfort:  Pain Rating 1: 0/10      Objective:     Communicated with RN prior to session.  Patient found HOB elevated with NG tube, peripheral IV, telemetry upon PT entry to room.     General Precautions: Standard, fall  Orthopedic Precautions: N/A  Braces: N/A  Respiratory Status: Room air     Functional Mobility:  Bed Mobility:     Supine to Sit: modified independence and HOB elevated  Sit to Supine: modified independence and HOB elevated  Transfers:     Sit to Stand:  supervision with no AD  Gait: ~500 feet SBA with no AD with no significant gait deficits       AM-PAC 6 CLICK MOBILITY  Turning over in bed (including adjusting bedclothes, sheets and blankets)?: 4  Sitting down on and standing up from a chair with arms (e.g., wheelchair, bedside commode, etc.): 4  Moving from lying on back to sitting on the side of the bed?: 4  Moving  to and from a bed to a chair (including a wheelchair)?: 4  Need to walk in hospital room?: 4  Climbing 3-5 steps with a railing?: 3  Basic Mobility Total Score: 23       Treatment & Education:  Stairs not attempted due to PIV.  Education provided on safety, calling for assistance when needed.    Bedside table in front of patient and area set up for function, convenience, and safety. RN aware of patient's mobility needs and status. Questions/concerns addressed within PTA scope of practice; patient  with no further questions. Time was provided for active listening, discussion of health disposition, and discussion of safe discharge.      Patient left HOB elevated with all lines intact, call button in reach, and wife present..    GOALS:   Multidisciplinary Problems       Physical Therapy Goals          Problem: Physical Therapy    Goal Priority Disciplines Outcome Goal Variances Interventions   Physical Therapy Goal     PT, PT/OT Ongoing, Progressing     Description: Goals to be met by: 9/3     Patient will increase functional independence with mobility by performin. Supine to sit with Modified [place]  2. Sit to supine with Modified Campbell  3. Sit to stand transfer with Modified Campbell  4. Gait  x 400 feet with Supervision using LRAD or no AD.   5. Ascend/descend 6 stair with unilateral Handrails Stand-by Assistance using LRAD or No AD.                          Time Tracking:     PT Received On: 23  PT Start Time: 957     PT Stop Time: 1009  PT Total Time (min): 12 min     Billable Minutes: Gait Training 12    Treatment Type: Treatment  PT/PTA: PTA     Number of PTA visits since last PT visit: 2023

## 2023-08-30 NOTE — PLAN OF CARE
[facility] Plan of Care Note  Dx: emesis r/t gastric CA     Shift Events: suppository given x1. No emesis overnight. Intermittent nausea, not requiring PRN intervention. Pt still c/o gas pain.     Goals of Care: monitor NG output, pain management, bowel motility     Neuro: AOx4     Vital Signs:VSS     Respiratory: RA     Diet: NPO      Is patient tolerating current diet? N/A.      GTTS: TPN @ 45/Lipids ongoing     Urine Output/Bowel Movement: UOP adequate per RR, NO BM this shift     Drains/Tubes/Tube Feeds (include total output/shift): L NGT, 400 green dilute output     Lines: HAM PICC     Accuchecks:N/A     Skin: ML + DB     Fall Risk Score: 9            Activity level? independent     Any scheduled procedures? N/A     Any safety concerns? N/A    Problem: Adult Inpatient Plan of Care  Goal: Plan of Care Review  Outcome: Ongoing, Progressing  Goal: Patient-Specific Goal (Individualized)  Outcome: Ongoing, Progressing  Goal: Absence of Hospital-Acquired Illness or Injury  Outcome: Ongoing, Progressing  Goal: Optimal Comfort and Wellbeing  Outcome: Ongoing, Progressing  Goal: Readiness for Transition of Care  Outcome: Ongoing, Progressing     Problem: Infection  Goal: Absence of Infection Signs and Symptoms  Outcome: Ongoing, Progressing     Problem: Impaired Wound Healing  Goal: Optimal Wound Healing  Outcome: Ongoing, Progressing     Problem: Fall Injury Risk  Goal: Absence of Fall and Fall-Related Injury  Outcome: Ongoing, Progressing     Problem: Skin Injury Risk Increased  Goal: Skin Health and Integrity  Outcome: Ongoing, Progressing

## 2023-08-30 NOTE — PLAN OF CARE
08/30/23 1431   Post-Acute Status   Post-Acute Authorization Home Health   Home Health Status Pending medical clearance/testing   Discharge Delays None known at this time   Discharge Plan   Discharge Plan A Home with family;Home Health       Update on home health:   [facility] Phone: [phone]   -Yes, willing to accept patient      BASILIO Covarrubias - [facility]  [phone]

## 2023-08-30 NOTE — PROGRESS NOTES
Shift Events: suppository given x1. No emesis overnight. Intermittent nausea, not requiring PRN intervention. Pt still c/o gas pain.oob walking halls often. Showered, linens changed, passing gas     Goals of Care:pain management, bowel motility     Neuro: AOx4     Vital Signs:VSS     Respiratory: RA     Diet: Clear liquid      Is patient tolerating current diet? yes     GTTS: TPN @ 45/Lipids ongoing     Urine Output/Bowel Movement: OOB independent to BR, BM x3 per patient and pt spouse      Drains/Tubes/Tube Feeds (include total output/shift): NGT pulled     Lines: HAM PICC     Accuchecks:N/A     Skin: ML + DB     Fall Risk Score: 9            Activity level? independent     Any scheduled procedures? N/A     Any safety concerns? N/A

## 2023-08-30 NOTE — ASSESSMENT & PLAN NOTE
Patient is a 62 y M w/ no significant past medical history (has not seen a doctor in over 40 years) who presented to the ER on 8/16 with abdominal pain and a recent diagnosis of gastric adenocarcinoma. No bowel movements for several weeks, concerning for obstruction. After review of images, very small amounts of contrast going through from stomach to duodenum. NGT placed for decompression. PICC placed and started on TPN for nutrition optimization as well as correction of electrolytes. S/P Open distal gastrectomy with BII reconstruction on 8/21/23. Today (8/26) pt having increasing distention with nausea and emesis, KUB showing diffuse dialation likely 2/2 to ileus with improvement after NGT placement, reconfirmed position on 8/27. Currently awaiting ROBF. CT A/P obtained 8/28 consistent with ileus, no evidence of leak or abscess.     Now with return of bowel function and minimal NGT output. Will remove NGT today, but keep NPO with ice chips.     - cont NPO, with ice chips and sips  - Discontinue NGT   - re-order TPN  - zofran PRN  - Multimodal pain control (renew Tylenol and Toradol orders)   - PPI  - DVT ppx  - PT/OT ordered for postoperative care and rehabilitation  - Encourage ambulation , OOBTC    Dispo: Continue inpatient care

## 2023-08-30 NOTE — SUBJECTIVE & OBJECTIVE
Interval History:   - Had two bowel movements overnight   - Feels much better today compared to yesterday, still has some mild crampy pain   - Denies any nausea   - NGT 150ml output     Medications:  Continuous Infusions:   TPN ADULT CENTRAL LINE CUSTOM 45 mL/hr at 08/29/23 2139     Scheduled Meds:   acetaminophen  1,000 mg Intravenous Q8H    acetaminophen  1,000 mg Intravenous Q8H    enoxparin  40 mg Subcutaneous Daily    famotidine (PF)  20 mg Intravenous BID    fat emulsion 20%  250 mL Intravenous Daily    ketorolac  15 mg Intravenous Q6H    LIDOcaine  1 patch Transdermal Q24H    scopolamine  1 patch Transdermal Q3 Days    sodium chloride 0.9%  10 mL Intravenous Q6H     PRN Meds:bisacodyL, LIDOcaine (PF) 10 mg/ml (1%), LIDOcaine HCL 10 mg/ml (1%), ondansetron, prochlorperazine, simethicone, sodium chloride 0.9%, Flushing PICC Protocol **AND** sodium chloride 0.9% **AND** sodium chloride 0.9%     Review of patient's allergies indicates:   Allergen Reactions    Penicillins Rash     Objective:     Vital Signs (Most Recent):  Temp: 98.1 °F (36.7 °C) (08/30/23 0728)  Pulse: 85 (08/30/23 0728)  Resp: 18 (08/30/23 0728)  BP: 135/68 (08/30/23 0728)  SpO2: 95 % (08/30/23 0728) Vital Signs (24h Range):  Temp:  [96.1 °F (35.6 °C)-99.1 °F (37.3 °C)] 98.1 °F (36.7 °C)  Pulse:  [] 85  Resp:  [18] 18  SpO2:  [93 %-96 %] 95 %  BP: (124-150)/(68-87) 135/68     Weight: 74.4 kg (164 lb)  Body mass index is 22.87 kg/m².    Intake/Output - Last 3 Shifts         08/28 0700 08/29 0659 08/29 0700 08/30 0659 08/30 0700 08/31 0659    P.O. 0      NG/GT  200     TPN 2345.1 612     Total Intake(mL/kg) 2345.1 (31.5) 812 (10.9)     Urine (mL/kg/hr) 0 (0) 0 (0)     Drains 200 150     Stool  0     Total Output 200 150     Net +2145.1 +662            Urine Occurrence 6 x 6 x     Stool Occurrence  2 x              Physical Exam  Vitals and nursing note reviewed.   Constitutional:       Appearance: He is not ill-appearing.   HENT:       Head: Normocephalic.      Nose:      Comments: NGT in place, thin gastric content      Mouth/Throat:      Mouth: Mucous membranes are moist.      Pharynx: Oropharynx is clear.   Eyes:      General: No scleral icterus.     Conjunctiva/sclera: Conjunctivae normal.   Cardiovascular:      Rate and Rhythm: Normal rate.      Pulses: Normal pulses.   Pulmonary:      Effort: Pulmonary effort is normal. No respiratory distress.      Breath sounds: No wheezing.   Abdominal:      General: Abdomen is flat.      Palpations: Abdomen is soft.      Comments: Midline incision CDI. Maryellen-incisional tenderness present. Abdomen is soft, mildly tender, nondistended    Musculoskeletal:         General: No swelling. Normal range of motion.   Skin:     General: Skin is warm and dry.      Coloration: Skin is not jaundiced or pale.   Neurological:      General: No focal deficit present.      Mental Status: He is alert and oriented to person, place, and time.          Significant Labs:  I have reviewed all pertinent lab results within the past 24 hours.  CBC:   Recent Labs   Lab 08/30/23  0507   WBC 10.19   RBC 4.40*   HGB 12.2*   HCT 36.6*      MCV 83   MCH 27.7   MCHC 33.3     CMP:   Recent Labs   Lab 08/30/23  0507   *   CALCIUM 8.7   ALBUMIN 2.6*   PROT 5.9*      K 4.0   CO2 24      BUN 30*   CREATININE 0.7   ALKPHOS 104   ALT 73*   AST 33   BILITOT 2.9*       Significant Diagnostics:  I have reviewed all pertinent imaging results/findings within the past 24 hours.

## 2023-08-30 NOTE — PROGRESS NOTES
Siddhartha Thurman - [facility]  General Surgery  Progress Note    Subjective:     History of Present Illness:  No notes on file    Post-Op Info:  Procedure(s) (LRB):  GASTRECTOMY (N/A)   9 Days Post-Op     Interval History:   - Had two bowel movements overnight   - Feels much better today compared to yesterday, still has some mild crampy pain   - Denies any nausea   - NGT 150ml output     Medications:  Continuous Infusions:   TPN ADULT CENTRAL LINE CUSTOM 45 mL/hr at 08/29/23 2139     Scheduled Meds:   acetaminophen  1,000 mg Intravenous Q8H    acetaminophen  1,000 mg Intravenous Q8H    enoxparin  40 mg Subcutaneous Daily    famotidine (PF)  20 mg Intravenous BID    fat emulsion 20%  250 mL Intravenous Daily    ketorolac  15 mg Intravenous Q6H    LIDOcaine  1 patch Transdermal Q24H    scopolamine  1 patch Transdermal Q3 Days    sodium chloride 0.9%  10 mL Intravenous Q6H     PRN Meds:bisacodyL, LIDOcaine (PF) 10 mg/ml (1%), LIDOcaine HCL 10 mg/ml (1%), ondansetron, prochlorperazine, simethicone, sodium chloride 0.9%, Flushing PICC Protocol **AND** sodium chloride 0.9% **AND** sodium chloride 0.9%     Review of patient's allergies indicates:   Allergen Reactions    Penicillins Rash     Objective:     Vital Signs (Most Recent):  Temp: 98.1 °F (36.7 °C) (08/30/23 0728)  Pulse: 85 (08/30/23 0728)  Resp: 18 (08/30/23 0728)  BP: 135/68 (08/30/23 0728)  SpO2: 95 % (08/30/23 0728) Vital Signs (24h Range):  Temp:  [96.1 °F (35.6 °C)-99.1 °F (37.3 °C)] 98.1 °F (36.7 °C)  Pulse:  [] 85  Resp:  [18] 18  SpO2:  [93 %-96 %] 95 %  BP: (124-150)/(68-87) 135/68     Weight: 74.4 kg (164 lb)  Body mass index is 22.87 kg/m².    Intake/Output - Last 3 Shifts         08/28 0700 08/29 0659 08/29 0700 08/30 0659 08/30 0700  08/31 0659    P.O. 0      NG/GT  200     TPN 2345.1 612     Total Intake(mL/kg) 2345.1 (31.5) 812 (10.9)     Urine (mL/kg/hr) 0 (0) 0 (0)     Drains 200 150     Stool  0     Total Output 200 150     Net +2145.1  +662            Urine Occurrence 6 x 6 x     Stool Occurrence  2 x              Physical Exam  Vitals and nursing note reviewed.   Constitutional:       Appearance: He is not ill-appearing.   HENT:      Head: Normocephalic.      Nose:      Comments: NGT in place, thin gastric content      Mouth/Throat:      Mouth: Mucous membranes are moist.      Pharynx: Oropharynx is clear.   Eyes:      General: No scleral icterus.     Conjunctiva/sclera: Conjunctivae normal.   Cardiovascular:      Rate and Rhythm: Normal rate.      Pulses: Normal pulses.   Pulmonary:      Effort: Pulmonary effort is normal. No respiratory distress.      Breath sounds: No wheezing.   Abdominal:      General: Abdomen is flat.      Palpations: Abdomen is soft.      Comments: Midline incision CDI. Maryellen-incisional tenderness present. Abdomen is soft, mildly tender, nondistended    Musculoskeletal:         General: No swelling. Normal range of motion.   Skin:     General: Skin is warm and dry.      Coloration: Skin is not jaundiced or pale.   Neurological:      General: No focal deficit present.      Mental Status: He is alert and oriented to person, place, and time.          Significant Labs:  I have reviewed all pertinent lab results within the past 24 hours.  CBC:   Recent Labs   Lab 08/30/23  0507   WBC 10.19   RBC 4.40*   HGB 12.2*   HCT 36.6*      MCV 83   MCH 27.7   MCHC 33.3     CMP:   Recent Labs   Lab 08/30/23  0507   *   CALCIUM 8.7   ALBUMIN 2.6*   PROT 5.9*      K 4.0   CO2 24      BUN 30*   CREATININE 0.7   ALKPHOS 104   ALT 73*   AST 33   BILITOT 2.9*       Significant Diagnostics:  I have reviewed all pertinent imaging results/findings within the past 24 hours.    Assessment/Plan:     * Gastric adenocarcinoma  Patient is a 62 y M w/ no significant past medical history (has not seen a doctor in over 40 years) who presented to the ER on 8/16 with abdominal pain and a recent diagnosis of gastric adenocarcinoma. No  bowel movements for several weeks, concerning for obstruction. After review of images, very small amounts of contrast going through from stomach to duodenum. NGT placed for decompression. PICC placed and started on TPN for nutrition optimization as well as correction of electrolytes. S/P Open distal gastrectomy with BII reconstruction on 8/21/23. Today (8/26) pt having increasing distention with nausea and emesis, KUB showing diffuse dialation likely 2/2 to ileus with improvement after NGT placement, reconfirmed position on 8/27. Currently awaiting ROBF. CT A/P obtained 8/28 consistent with ileus, no evidence of leak or abscess.     Now with return of bowel function and minimal NGT output. Will remove NGT today, but keep NPO with ice chips.     - cont NPO, with ice chips and sips  - Discontinue NGT   - re-order TPN  - zofran PRN  - Multimodal pain control (renew Tylenol and Toradol orders)   - PPI  - DVT ppx  - PT/OT ordered for postoperative care and rehabilitation  - Encourage ambulation , OOBTC    Dispo: Continue inpatient care        Tex Lucas MD  General Surgery  [facility]

## 2023-08-31 LAB
ALBUMIN SERPL BCP-MCNC: 2.3 G/DL (ref 3.5–5.2)
ALP SERPL-CCNC: 90 U/L (ref 55–135)
ALT SERPL W/O P-5'-P-CCNC: 55 U/L (ref 10–44)
ANION GAP SERPL CALC-SCNC: 8 MMOL/L (ref 8–16)
AST SERPL-CCNC: 27 U/L (ref 10–40)
BASOPHILS # BLD AUTO: 0.05 K/UL (ref 0–0.2)
BASOPHILS # BLD AUTO: 0.05 K/UL (ref 0–0.2)
BASOPHILS NFR BLD: 0.6 % (ref 0–1.9)
BASOPHILS NFR BLD: 0.6 % (ref 0–1.9)
BILIRUB SERPL-MCNC: 1.8 MG/DL (ref 0.1–1)
BUN SERPL-MCNC: 28 MG/DL (ref 8–23)
CALCIUM SERPL-MCNC: 8.2 MG/DL (ref 8.7–10.5)
CHLORIDE SERPL-SCNC: 106 MMOL/L (ref 95–110)
CO2 SERPL-SCNC: 25 MMOL/L (ref 23–29)
CREAT SERPL-MCNC: 0.7 MG/DL (ref 0.5–1.4)
DIFFERENTIAL METHOD: ABNORMAL
DIFFERENTIAL METHOD: ABNORMAL
EOSINOPHIL # BLD AUTO: 0.1 K/UL (ref 0–0.5)
EOSINOPHIL # BLD AUTO: 0.1 K/UL (ref 0–0.5)
EOSINOPHIL NFR BLD: 0.6 % (ref 0–8)
EOSINOPHIL NFR BLD: 0.9 % (ref 0–8)
ERYTHROCYTE [DISTWIDTH] IN BLOOD BY AUTOMATED COUNT: 14.4 % (ref 11.5–14.5)
ERYTHROCYTE [DISTWIDTH] IN BLOOD BY AUTOMATED COUNT: 14.5 % (ref 11.5–14.5)
EST. GFR  (NO RACE VARIABLE): >60 ML/MIN/1.73 M^2
GLUCOSE SERPL-MCNC: 109 MG/DL (ref 70–110)
HCT VFR BLD AUTO: 32 % (ref 40–54)
HCT VFR BLD AUTO: 32.3 % (ref 40–54)
HGB BLD-MCNC: 10.6 G/DL (ref 14–18)
HGB BLD-MCNC: 10.7 G/DL (ref 14–18)
IMM GRANULOCYTES # BLD AUTO: 0.11 K/UL (ref 0–0.04)
IMM GRANULOCYTES # BLD AUTO: 0.13 K/UL (ref 0–0.04)
IMM GRANULOCYTES NFR BLD AUTO: 1.2 % (ref 0–0.5)
IMM GRANULOCYTES NFR BLD AUTO: 1.5 % (ref 0–0.5)
LYMPHOCYTES # BLD AUTO: 1.4 K/UL (ref 1–4.8)
LYMPHOCYTES # BLD AUTO: 1.5 K/UL (ref 1–4.8)
LYMPHOCYTES NFR BLD: 16.3 % (ref 18–48)
LYMPHOCYTES NFR BLD: 16.5 % (ref 18–48)
MAGNESIUM SERPL-MCNC: 2 MG/DL (ref 1.6–2.6)
MCH RBC QN AUTO: 27.6 PG (ref 27–31)
MCH RBC QN AUTO: 27.9 PG (ref 27–31)
MCHC RBC AUTO-ENTMCNC: 33.1 G/DL (ref 32–36)
MCHC RBC AUTO-ENTMCNC: 33.1 G/DL (ref 32–36)
MCV RBC AUTO: 83 FL (ref 82–98)
MCV RBC AUTO: 84 FL (ref 82–98)
MONOCYTES # BLD AUTO: 0.7 K/UL (ref 0.3–1)
MONOCYTES # BLD AUTO: 0.7 K/UL (ref 0.3–1)
MONOCYTES NFR BLD: 8 % (ref 4–15)
MONOCYTES NFR BLD: 8 % (ref 4–15)
NEUTROPHILS # BLD AUTO: 6.3 K/UL (ref 1.8–7.7)
NEUTROPHILS # BLD AUTO: 6.6 K/UL (ref 1.8–7.7)
NEUTROPHILS NFR BLD: 72.8 % (ref 38–73)
NEUTROPHILS NFR BLD: 73 % (ref 38–73)
NRBC BLD-RTO: 0 /100 WBC
NRBC BLD-RTO: 0 /100 WBC
PHOSPHATE SERPL-MCNC: 3.3 MG/DL (ref 2.7–4.5)
PLATELET # BLD AUTO: 223 K/UL (ref 150–450)
PLATELET # BLD AUTO: 240 K/UL (ref 150–450)
PMV BLD AUTO: 10.1 FL (ref 9.2–12.9)
PMV BLD AUTO: 10.4 FL (ref 9.2–12.9)
POTASSIUM SERPL-SCNC: 4.2 MMOL/L (ref 3.5–5.1)
PREALB SERPL-MCNC: 18 MG/DL (ref 20–43)
PROT SERPL-MCNC: 5.1 G/DL (ref 6–8.4)
RBC # BLD AUTO: 3.84 M/UL (ref 4.6–6.2)
RBC # BLD AUTO: 3.84 M/UL (ref 4.6–6.2)
SODIUM SERPL-SCNC: 139 MMOL/L (ref 136–145)
WBC # BLD AUTO: 8.64 K/UL (ref 3.9–12.7)
WBC # BLD AUTO: 9.04 K/UL (ref 3.9–12.7)

## 2023-08-31 PROCEDURE — 85025 COMPLETE CBC W/AUTO DIFF WBC: CPT | Mod: 91 | Performed by: STUDENT IN AN ORGANIZED HEALTH CARE EDUCATION/TRAINING PROGRAM

## 2023-08-31 PROCEDURE — A4216 STERILE WATER/SALINE, 10 ML: HCPCS | Performed by: STUDENT IN AN ORGANIZED HEALTH CARE EDUCATION/TRAINING PROGRAM

## 2023-08-31 PROCEDURE — 85025 COMPLETE CBC W/AUTO DIFF WBC: CPT | Performed by: STUDENT IN AN ORGANIZED HEALTH CARE EDUCATION/TRAINING PROGRAM

## 2023-08-31 PROCEDURE — 84100 ASSAY OF PHOSPHORUS: CPT | Performed by: STUDENT IN AN ORGANIZED HEALTH CARE EDUCATION/TRAINING PROGRAM

## 2023-08-31 PROCEDURE — 25000003 PHARM REV CODE 250: Performed by: STUDENT IN AN ORGANIZED HEALTH CARE EDUCATION/TRAINING PROGRAM

## 2023-08-31 PROCEDURE — 80053 COMPREHEN METABOLIC PANEL: CPT | Performed by: SURGERY

## 2023-08-31 PROCEDURE — A4217 STERILE WATER/SALINE, 500 ML: HCPCS | Performed by: STUDENT IN AN ORGANIZED HEALTH CARE EDUCATION/TRAINING PROGRAM

## 2023-08-31 PROCEDURE — 20600001 HC STEP DOWN PRIVATE ROOM

## 2023-08-31 PROCEDURE — 83735 ASSAY OF MAGNESIUM: CPT | Performed by: STUDENT IN AN ORGANIZED HEALTH CARE EDUCATION/TRAINING PROGRAM

## 2023-08-31 PROCEDURE — 63600175 PHARM REV CODE 636 W HCPCS: Performed by: STUDENT IN AN ORGANIZED HEALTH CARE EDUCATION/TRAINING PROGRAM

## 2023-08-31 PROCEDURE — 84134 ASSAY OF PREALBUMIN: CPT | Performed by: STUDENT IN AN ORGANIZED HEALTH CARE EDUCATION/TRAINING PROGRAM

## 2023-08-31 PROCEDURE — B4185 PARENTERAL SOL 10 GM LIPIDS: HCPCS | Performed by: STUDENT IN AN ORGANIZED HEALTH CARE EDUCATION/TRAINING PROGRAM

## 2023-08-31 RX ADMIN — KETOROLAC TROMETHAMINE 15 MG: 15 INJECTION, SOLUTION INTRAMUSCULAR; INTRAVENOUS at 10:08

## 2023-08-31 RX ADMIN — SOYBEAN OIL 250 ML: 20 INJECTION, SOLUTION INTRAVENOUS at 09:08

## 2023-08-31 RX ADMIN — FAMOTIDINE 20 MG: 10 INJECTION, SOLUTION INTRAVENOUS at 09:08

## 2023-08-31 RX ADMIN — Medication 10 ML: at 12:08

## 2023-08-31 RX ADMIN — ENOXAPARIN SODIUM 40 MG: 40 INJECTION SUBCUTANEOUS at 05:08

## 2023-08-31 RX ADMIN — Medication 10 ML: at 06:08

## 2023-08-31 RX ADMIN — MAGNESIUM SULFATE HEPTAHYDRATE: 500 INJECTION, SOLUTION INTRAMUSCULAR; INTRAVENOUS at 09:08

## 2023-08-31 RX ADMIN — Medication 10 ML: at 10:08

## 2023-08-31 RX ADMIN — KETOROLAC TROMETHAMINE 15 MG: 15 INJECTION, SOLUTION INTRAMUSCULAR; INTRAVENOUS at 06:08

## 2023-08-31 NOTE — PROGRESS NOTES
"[facility]iddhartha destiny [facility]  Adult Nutrition  Progress Note    SUMMARY       Recommendations    1) TPN RECS: 230 D + 95 AA + IV Lipids to provide 1662 kcals, 95g PRO, (GIR: 2.14)      2) Continue FLD, ADAT per MD     3) Monitor labs        4) Following    Goals: Meet % een/epn by next RD f/u  Nutrition Goal Status: progressing towards goal  Communication of RD Recs: other (comment) (POC)    Assessment and Plan    Nutrition Problem  Inadequate oral intake     Related to (etiology):   Inability to consume sufficient energy      Signs and Symptoms (as evidenced by):   NPO      Interventions/Recommendations (treatment strategy):  Collaboration with other providers  PN     Nutrition Diagnosis Status:   Continues     Reason for Assessment    Reason For Assessment: RD follow-up  Diagnosis: other (see comments) (emesis)  Relevant Medical History: esophagogastroduodenoscopy  Interdisciplinary Rounds: did not attend  General Information Comments: Pt seen at bedside for f/u, endorses eating cream of wheat and fruit for breakfast. Chedder and broccoli soup at bedside for lunch, approved by MD per pt. Endorses loose stools. Following  Nutrition Discharge Planning: pending medical course    Nutrition Risk Screen    Nutrition Risk Screen: tube feeding or parenteral nutrition    Nutrition/Diet History    Spiritual, Cultural Beliefs, Religion Practices, Values that Affect Care: no  Food Allergies: NKFA    Anthropometrics    Temp: 98.5 °F (36.9 °C)  Height Method: Stated  Height: 5' 11" (180.3 cm)  Height (inches): 71 in  Weight Method: Bed Scale  Weight: 74.4 kg (164 lb)  Weight (lb): 164 lb  Ideal Body Weight (IBW), Male: 172 lb  % Ideal Body Weight, Male (lb): 95.35 %  BMI (Calculated): 22.9  BMI Grade: 18.5-24.9 - normal       Lab/Procedures/Meds    Pertinent Labs Reviewed: reviewed  Pertinent Labs Comments: h/h: 10.7/32.3, BUN: 28, ALt: 55  Pertinent Medications Reviewed: reviewed  Pertinent Medications Comments: enoxaparin, " famotidine, polyethylene glycol      Estimated/Assessed Needs    Weight Used For Calorie Calculations: 74.4 kg (164 lb)  Energy Calorie Requirements (kcal): 1321-7474 (20-25 kcal/kg)  Energy Need Method: Kcal/kg  Protein Requirements:  (1.2-1.4)  Weight Used For Protein Calculations: 74.4 kg (164 lb)     Estimated Fluid Requirement Method: RDA Method  RDA Method (mL): 1487  CHO Requirement: 185-232      Nutrition Prescription Ordered    Current Diet Order: NPO  Current Nutrition Support Formula Ordered: Other (Comment) (TPN: 230 D + 95AA + IV Lipids)    Evaluation of Received Nutrient/Fluid Intake    Parenteral Calories (kcal): 1662  Parenteral Protein (gm): 95  Lipid Calories (kcals): 500 kcals  GIR (Glucose Infusion Rate) (mg/kg/min): 2.14 mg/kg/min  % Kcal Needs: 100  % Protein Needs: 100  I/O: -371 L since admit  Comments: lbm 8/30  Tolerance: tolerating  % Intake of Estimated Energy Needs: 25 - 50 %  % Meal Intake: 25 - 50 %    Nutrition Risk    Level of Risk/Frequency of Follow-up: low (f/u 1x/week)     Monitor and Evaluation    Food and Nutrient Intake: parenteral nutrition intake  Food and Nutrient Adminstration: enteral and parenteral nutrition administration  Knowledge/Beliefs/Attitudes: food and nutrition knowledge/skill, beliefs and attitudes  Physical Activity and Function: nutrition-related ADLs and IADLs  Anthropometric Measurements: height/length, weight change, weight, body mass index  Biochemical Data, Medical Tests and Procedures: gastrointestinal profile, glucose/endocrine profile, electrolyte and renal panel, inflammatory profile, lipid profile  Nutrition-Focused Physical Findings: overall appearance     Nutrition Follow-Up    RD Follow-up?: Yes    Sudha Ulrich RD, LDN

## 2023-08-31 NOTE — PLAN OF CARE
Shift Events: Advanced diet to full liquid/ tolerating well      Goals of Care:pain management, bowel motility     Neuro: AOx4     Vital Signs:VSS     Respiratory: RA     Diet: Clear liquid      Is patient tolerating current diet? yes     GTTS: TPN @ 45/Lipids ongoing     Urine Output/Bowel Movement: OOB independent to BR, BM x1 per patient      Drains/Tubes/Tube Feeds (include total output/shift): NGT pulled 8/30     Lines: HAM PICC     Accuchecks:N/A     Skin: ML + DB     Fall Risk Score: 9            Activity level? independent     Any scheduled procedures? N/A     Any safety concerns? N/A    Problem: Adult Inpatient Plan of Care  Goal: Plan of Care Review  Outcome: Ongoing, Progressing  Goal: Patient-Specific Goal (Individualized)  Outcome: Ongoing, Progressing  Goal: Absence of Hospital-Acquired Illness or Injury  Outcome: Ongoing, Progressing  Goal: Optimal Comfort and Wellbeing  Outcome: Ongoing, Progressing  Goal: Readiness for Transition of Care  Outcome: Ongoing, Progressing     Problem: Infection  Goal: Absence of Infection Signs and Symptoms  Outcome: Ongoing, Progressing     Problem: Impaired Wound Healing  Goal: Optimal Wound Healing  Outcome: Ongoing, Progressing     Problem: Fall Injury Risk  Goal: Absence of Fall and Fall-Related Injury  Outcome: Ongoing, Progressing     Problem: Skin Injury Risk Increased  Goal: Skin Health and Integrity  Outcome: Ongoing, Progressing

## 2023-08-31 NOTE — PLAN OF CARE
Siddhartha Thurman [facility]  Discharge Reassessment    Primary Care Provider: Yfn Walker MD    Expected Discharge Date: 9/3/2023    Reassessment (most recent)       Discharge Reassessment - 08/31/23 1700          Discharge Reassessment    Assessment Type Discharge Planning Assessment     Did the patient's condition or plan change since previous assessment? Yes     Discharge Plan discussed with: Patient     Discharge Plan A Home Health     Discharge Plan B Home Health     DME Needed Upon Discharge  none     Transition of Care Barriers None                     Pt. Going home with TPN when medically ready. [facility]     D/C tomorrow or Saturday.

## 2023-08-31 NOTE — ASSESSMENT & PLAN NOTE
Patient is a 62 y M w/ no significant past medical history (has not seen a doctor in over 40 years) who presented to the ER on 8/16 with abdominal pain and a recent diagnosis of gastric adenocarcinoma. No bowel movements for several weeks, concerning for obstruction. After review of images, very small amounts of contrast going through from stomach to duodenum. NGT placed for decompression. PICC placed and started on TPN for nutrition optimization as well as correction of electrolytes. S/P Open distal gastrectomy with BII reconstruction on 8/21/23. Today (8/26) pt having increasing distention with nausea and emesis, KUB showing diffuse dialation likely 2/2 to ileus with improvement after NGT placement, reconfirmed position on 8/27. CT A/P obtained 8/28 consistent with ileus, no evidence of leak or abscess. ROBF on 8/30.    - cont CLD  - re-order TPN  - zofran PRN  - Multimodal pain control (renew Tylenol and Toradol orders)   - PPI  - DVT ppx  - PT/OT ordered for postoperative care and rehabilitation  - Encourage ambulation , OOBTC    Dispo: pending toleration of diet

## 2023-08-31 NOTE — PLAN OF CARE
Recommendations    1) TPN RECS: 230 D + 95 AA + IV Lipids to provide 1662 kcals, 95g PRO, (GIR: 2.14)        2) Continue FLD, ADAT per MD     3) Monitor labs        4) Following    Goals: Meet % een/epn by next RD f/u  Nutrition Goal Status: progressing towards goal  Communication of RD Recs: other (comment) (POC)

## 2023-08-31 NOTE — PLAN OF CARE
Shift Events: No emesis overnight. Pt still c/o gas pain.     Goals of Care:pain management, bowel motility     Neuro: AOx4     Vital Signs:VSS     Respiratory: RA     Diet: Clear liquid      Is patient tolerating current diet? yes     GTTS: TPN @ 45/Lipids ongoing     Urine Output/Bowel Movement: OOB independent to BR, BM x1 per patient      Drains/Tubes/Tube Feeds (include total output/shift): NGT pulled 8/30     Lines: HAM PICC     Accuchecks:N/A     Skin: ML + DB     Fall Risk Score: 9            Activity level? independent     Any scheduled procedures? N/A     Any safety concerns? N/A    Problem: Adult Inpatient Plan of Care  Goal: Plan of Care Review  Outcome: Ongoing, Progressing  Goal: Patient-Specific Goal (Individualized)  Outcome: Ongoing, Progressing  Goal: Absence of Hospital-Acquired Illness or Injury  Outcome: Ongoing, Progressing  Goal: Optimal Comfort and Wellbeing  Outcome: Ongoing, Progressing  Goal: Readiness for Transition of Care  Outcome: Ongoing, Progressing     Problem: Infection  Goal: Absence of Infection Signs and Symptoms  Outcome: Ongoing, Progressing     Problem: Impaired Wound Healing  Goal: Optimal Wound Healing  Outcome: Ongoing, Progressing     Problem: Fall Injury Risk  Goal: Absence of Fall and Fall-Related Injury  Outcome: Ongoing, Progressing     Problem: Skin Injury Risk Increased  Goal: Skin Health and Integrity  Outcome: Ongoing, Progressing

## 2023-08-31 NOTE — PROGRESS NOTES
Siddhartha Thurman - [facility]  General Surgery  Progress Note    Subjective:     History of Present Illness:  No notes on file    Post-Op Info:  Procedure(s) (LRB):  GASTRECTOMY (N/A)   10 Days Post-Op     Interval History: NAEON. Pt had 3 BM yesterday. Denies N/V. Pain well controlled.     Medications:  Continuous Infusions:   TPN ADULT CENTRAL LINE CUSTOM 45 mL/hr at 08/30/23 2125     Scheduled Meds:   acetaminophen  1,000 mg Intravenous Q8H    enoxparin  40 mg Subcutaneous Daily    famotidine (PF)  20 mg Intravenous BID    fat emulsion 20%  250 mL Intravenous Daily    ketorolac  15 mg Intravenous Q6H    LIDOcaine  1 patch Transdermal Q24H    polyethylene glycol  17 g Oral Daily    scopolamine  1 patch Transdermal Q3 Days    sodium chloride 0.9%  10 mL Intravenous Q6H     PRN Meds:bisacodyL, LIDOcaine (PF) 10 mg/ml (1%), LIDOcaine HCL 10 mg/ml (1%), ondansetron, prochlorperazine, simethicone, sodium chloride 0.9%, Flushing PICC Protocol **AND** sodium chloride 0.9% **AND** sodium chloride 0.9%     Review of patient's allergies indicates:   Allergen Reactions    Penicillins Rash     Objective:     Vital Signs (Most Recent):  Temp: 98.2 °F (36.8 °C) (08/31/23 0409)  Pulse: 82 (08/31/23 0409)  Resp: 15 (08/31/23 0409)  BP: 124/67 (08/31/23 0409)  SpO2: (!) 93 % (08/31/23 0409) Vital Signs (24h Range):  Temp:  [98.1 °F (36.7 °C)-99 °F (37.2 °C)] 98.2 °F (36.8 °C)  Pulse:  [80-99] 82  Resp:  [15-18] 15  SpO2:  [93 %-95 %] 93 %  BP: (121-135)/(64-74) 124/67     Weight: 74.4 kg (164 lb)  Body mass index is 22.87 kg/m².    Intake/Output - Last 3 Shifts         08/29 0700 08/30 0659 08/30 0700 08/31 0659 08/31 0700 09/01 0659    P.O.       NG/            Total Intake(mL/kg) 812 (10.9)      Urine (mL/kg/hr) 0 (0)      Drains 150      Stool 0      Total Output 150      Net +662             Urine Occurrence 6 x      Stool Occurrence 2 x               Physical Exam  Vitals and nursing note reviewed.   Constitutional:        Appearance: He is not ill-appearing.   HENT:      Head: Normocephalic.      Mouth/Throat:      Mouth: Mucous membranes are moist.      Pharynx: Oropharynx is clear.   Eyes:      General: No scleral icterus.     Conjunctiva/sclera: Conjunctivae normal.   Cardiovascular:      Rate and Rhythm: Normal rate.      Pulses: Normal pulses.   Pulmonary:      Effort: Pulmonary effort is normal. No respiratory distress.      Breath sounds: No wheezing.   Abdominal:      General: Abdomen is flat.      Palpations: Abdomen is soft.      Comments: Midline incision CDI. Maryellen-incisional tenderness present. Abdomen is soft, mildly tender and distended   Musculoskeletal:         General: No swelling. Normal range of motion.   Skin:     General: Skin is warm and dry.      Coloration: Skin is not jaundiced or pale.   Neurological:      General: No focal deficit present.      Mental Status: He is alert and oriented to person, place, and time.          Significant Labs:  I have reviewed all pertinent lab results within the past 24 hours.    Significant Diagnostics:  I have reviewed all pertinent imaging results/findings within the past 24 hours.    Assessment/Plan:     * Gastric adenocarcinoma  Patient is a 62 y M w/ no significant past medical history (has not seen a doctor in over 40 years) who presented to the ER on 8/16 with abdominal pain and a recent diagnosis of gastric adenocarcinoma. No bowel movements for several weeks, concerning for obstruction. After review of images, very small amounts of contrast going through from stomach to duodenum. NGT placed for decompression. PICC placed and started on TPN for nutrition optimization as well as correction of electrolytes. S/P Open distal gastrectomy with BII reconstruction on 8/21/23. Today (8/26) pt having increasing distention with nausea and emesis, KUB showing diffuse dialation likely 2/2 to ileus with improvement after NGT placement, reconfirmed position on 8/27. CT A/P obtained  8/28 consistent with ileus, no evidence of leak or abscess. ROBF on 8/30.    - cont CLD  - re-order TPN  - zofran PRN  - Multimodal pain control (renew Tylenol and Toradol orders)   - PPI  - DVT ppx  - PT/OT ordered for postoperative care and rehabilitation  - Encourage ambulation , OOBTC    Dispo: pending toleration of diet        Bubba Chirinos MD  General Surgery  [facility]

## 2023-08-31 NOTE — SUBJECTIVE & OBJECTIVE
Interval History: NAEON. Pt had 3 BM yesterday. Denies N/V. Pain well controlled.     Medications:  Continuous Infusions:   TPN ADULT CENTRAL LINE CUSTOM 45 mL/hr at 08/30/23 2125     Scheduled Meds:   acetaminophen  1,000 mg Intravenous Q8H    enoxparin  40 mg Subcutaneous Daily    famotidine (PF)  20 mg Intravenous BID    fat emulsion 20%  250 mL Intravenous Daily    ketorolac  15 mg Intravenous Q6H    LIDOcaine  1 patch Transdermal Q24H    polyethylene glycol  17 g Oral Daily    scopolamine  1 patch Transdermal Q3 Days    sodium chloride 0.9%  10 mL Intravenous Q6H     PRN Meds:bisacodyL, LIDOcaine (PF) 10 mg/ml (1%), LIDOcaine HCL 10 mg/ml (1%), ondansetron, prochlorperazine, simethicone, sodium chloride 0.9%, Flushing PICC Protocol **AND** sodium chloride 0.9% **AND** sodium chloride 0.9%     Review of patient's allergies indicates:   Allergen Reactions    Penicillins Rash     Objective:     Vital Signs (Most Recent):  Temp: 98.2 °F (36.8 °C) (08/31/23 0409)  Pulse: 82 (08/31/23 0409)  Resp: 15 (08/31/23 0409)  BP: 124/67 (08/31/23 0409)  SpO2: (!) 93 % (08/31/23 0409) Vital Signs (24h Range):  Temp:  [98.1 °F (36.7 °C)-99 °F (37.2 °C)] 98.2 °F (36.8 °C)  Pulse:  [80-99] 82  Resp:  [15-18] 15  SpO2:  [93 %-95 %] 93 %  BP: (121-135)/(64-74) 124/67     Weight: 74.4 kg (164 lb)  Body mass index is 22.87 kg/m².    Intake/Output - Last 3 Shifts         08/29 0700 08/30 0659 08/30 0700 08/31 0659 08/31 0700 09/01 0659    P.O.       NG/            Total Intake(mL/kg) 812 (10.9)      Urine (mL/kg/hr) 0 (0)      Drains 150      Stool 0      Total Output 150      Net +662             Urine Occurrence 6 x      Stool Occurrence 2 x               Physical Exam  Vitals and nursing note reviewed.   Constitutional:       Appearance: He is not ill-appearing.   HENT:      Head: Normocephalic.      Nose:      Comments: NGT in place, thin gastric content      Mouth/Throat:      Mouth: Mucous membranes are moist.       Pharynx: Oropharynx is clear.   Eyes:      General: No scleral icterus.     Conjunctiva/sclera: Conjunctivae normal.   Cardiovascular:      Rate and Rhythm: Normal rate.      Pulses: Normal pulses.   Pulmonary:      Effort: Pulmonary effort is normal. No respiratory distress.      Breath sounds: No wheezing.   Abdominal:      General: Abdomen is flat.      Palpations: Abdomen is soft.      Comments: Midline incision CDI. Maryellen-incisional tenderness present. Abdomen is soft, mildly tender and distended   Musculoskeletal:         General: No swelling. Normal range of motion.   Skin:     General: Skin is warm and dry.      Coloration: Skin is not jaundiced or pale.   Neurological:      General: No focal deficit present.      Mental Status: He is alert and oriented to person, place, and time.          Significant Labs:  I have reviewed all pertinent lab results within the past 24 hours.    Significant Diagnostics:  I have reviewed all pertinent imaging results/findings within the past 24 hours.

## 2023-09-01 ENCOUNTER — TELEPHONE (OUTPATIENT)
Dept: HEMATOLOGY/ONCOLOGY | Facility: CLINIC | Age: 63
End: 2023-09-01
Payer: COMMERCIAL

## 2023-09-01 VITALS
OXYGEN SATURATION: 94 % | BODY MASS INDEX: 22.96 KG/M2 | HEIGHT: 71 IN | RESPIRATION RATE: 18 BRPM | TEMPERATURE: 99 F | SYSTOLIC BLOOD PRESSURE: 135 MMHG | HEART RATE: 81 BPM | DIASTOLIC BLOOD PRESSURE: 63 MMHG | WEIGHT: 164 LBS

## 2023-09-01 DIAGNOSIS — C16.9 GASTRIC ADENOCARCINOMA: Primary | ICD-10-CM

## 2023-09-01 LAB
ALBUMIN SERPL BCP-MCNC: 2.4 G/DL (ref 3.5–5.2)
ALP SERPL-CCNC: 108 U/L (ref 55–135)
ALT SERPL W/O P-5'-P-CCNC: 49 U/L (ref 10–44)
ANION GAP SERPL CALC-SCNC: 9 MMOL/L (ref 8–16)
AST SERPL-CCNC: 23 U/L (ref 10–40)
BASOPHILS # BLD AUTO: 0.04 K/UL (ref 0–0.2)
BASOPHILS NFR BLD: 0.3 % (ref 0–1.9)
BILIRUB SERPL-MCNC: 1.5 MG/DL (ref 0.1–1)
BUN SERPL-MCNC: 26 MG/DL (ref 8–23)
CALCIUM SERPL-MCNC: 8 MG/DL (ref 8.7–10.5)
CHLORIDE SERPL-SCNC: 104 MMOL/L (ref 95–110)
CO2 SERPL-SCNC: 23 MMOL/L (ref 23–29)
CREAT SERPL-MCNC: 0.7 MG/DL (ref 0.5–1.4)
DIFFERENTIAL METHOD: ABNORMAL
EOSINOPHIL # BLD AUTO: 0.1 K/UL (ref 0–0.5)
EOSINOPHIL NFR BLD: 0.4 % (ref 0–8)
ERYTHROCYTE [DISTWIDTH] IN BLOOD BY AUTOMATED COUNT: 14.4 % (ref 11.5–14.5)
EST. GFR  (NO RACE VARIABLE): >60 ML/MIN/1.73 M^2
GLUCOSE SERPL-MCNC: 105 MG/DL (ref 70–110)
HCT VFR BLD AUTO: 32.2 % (ref 40–54)
HGB BLD-MCNC: 10.8 G/DL (ref 14–18)
IMM GRANULOCYTES # BLD AUTO: 0.11 K/UL (ref 0–0.04)
IMM GRANULOCYTES NFR BLD AUTO: 0.9 % (ref 0–0.5)
LYMPHOCYTES # BLD AUTO: 1 K/UL (ref 1–4.8)
LYMPHOCYTES NFR BLD: 7.9 % (ref 18–48)
MAGNESIUM SERPL-MCNC: 1.8 MG/DL (ref 1.6–2.6)
MCH RBC QN AUTO: 27.8 PG (ref 27–31)
MCHC RBC AUTO-ENTMCNC: 33.5 G/DL (ref 32–36)
MCV RBC AUTO: 83 FL (ref 82–98)
MONOCYTES # BLD AUTO: 0.6 K/UL (ref 0.3–1)
MONOCYTES NFR BLD: 5.2 % (ref 4–15)
NEUTROPHILS # BLD AUTO: 10.2 K/UL (ref 1.8–7.7)
NEUTROPHILS NFR BLD: 85.3 % (ref 38–73)
NRBC BLD-RTO: 0 /100 WBC
PHOSPHATE SERPL-MCNC: 2.9 MG/DL (ref 2.7–4.5)
PLATELET # BLD AUTO: 233 K/UL (ref 150–450)
PMV BLD AUTO: 10.2 FL (ref 9.2–12.9)
POTASSIUM SERPL-SCNC: 3.9 MMOL/L (ref 3.5–5.1)
PROT SERPL-MCNC: 5.3 G/DL (ref 6–8.4)
RBC # BLD AUTO: 3.88 M/UL (ref 4.6–6.2)
SODIUM SERPL-SCNC: 136 MMOL/L (ref 136–145)
TRIGL SERPL-MCNC: 143 MG/DL (ref 30–150)
WBC # BLD AUTO: 11.95 K/UL (ref 3.9–12.7)

## 2023-09-01 PROCEDURE — 84100 ASSAY OF PHOSPHORUS: CPT | Performed by: STUDENT IN AN ORGANIZED HEALTH CARE EDUCATION/TRAINING PROGRAM

## 2023-09-01 PROCEDURE — 80053 COMPREHEN METABOLIC PANEL: CPT | Performed by: SURGERY

## 2023-09-01 PROCEDURE — 84478 ASSAY OF TRIGLYCERIDES: CPT | Performed by: STUDENT IN AN ORGANIZED HEALTH CARE EDUCATION/TRAINING PROGRAM

## 2023-09-01 PROCEDURE — 85025 COMPLETE CBC W/AUTO DIFF WBC: CPT | Performed by: STUDENT IN AN ORGANIZED HEALTH CARE EDUCATION/TRAINING PROGRAM

## 2023-09-01 PROCEDURE — 97116 GAIT TRAINING THERAPY: CPT | Mod: CQ

## 2023-09-01 PROCEDURE — 63600175 PHARM REV CODE 636 W HCPCS: Performed by: STUDENT IN AN ORGANIZED HEALTH CARE EDUCATION/TRAINING PROGRAM

## 2023-09-01 PROCEDURE — 25000003 PHARM REV CODE 250: Performed by: STUDENT IN AN ORGANIZED HEALTH CARE EDUCATION/TRAINING PROGRAM

## 2023-09-01 PROCEDURE — 83735 ASSAY OF MAGNESIUM: CPT | Performed by: STUDENT IN AN ORGANIZED HEALTH CARE EDUCATION/TRAINING PROGRAM

## 2023-09-01 PROCEDURE — A4216 STERILE WATER/SALINE, 10 ML: HCPCS | Performed by: STUDENT IN AN ORGANIZED HEALTH CARE EDUCATION/TRAINING PROGRAM

## 2023-09-01 RX ORDER — OXYCODONE HYDROCHLORIDE 5 MG/1
5 TABLET ORAL EVERY 4 HOURS PRN
Qty: 20 TABLET | Refills: 0 | Status: SHIPPED | OUTPATIENT
Start: 2023-09-01 | End: 2023-09-08

## 2023-09-01 RX ORDER — BISACODYL 10 MG
10 SUPPOSITORY, RECTAL RECTAL DAILY PRN
Qty: 10 SUPPOSITORY | Refills: 0 | Status: SHIPPED | OUTPATIENT
Start: 2023-09-01 | End: 2023-10-31

## 2023-09-01 RX ORDER — DEXTROMETHORPHAN HYDROBROMIDE, GUAIFENESIN 5; 100 MG/5ML; MG/5ML
650 LIQUID ORAL EVERY 8 HOURS
Qty: 90 TABLET | Refills: 0 | Status: SHIPPED | OUTPATIENT
Start: 2023-09-01 | End: 2023-10-01

## 2023-09-01 RX ORDER — POLYETHYLENE GLYCOL 3350 17 G/17G
17 POWDER, FOR SOLUTION ORAL DAILY
Qty: 238 G | Refills: 7 | Status: SHIPPED | OUTPATIENT
Start: 2023-09-01 | End: 2023-10-31 | Stop reason: ALTCHOICE

## 2023-09-01 RX ORDER — ENOXAPARIN SODIUM 100 MG/ML
40 INJECTION SUBCUTANEOUS EVERY 24 HOURS
Status: DISCONTINUED | OUTPATIENT
Start: 2023-09-01 | End: 2023-09-01 | Stop reason: HOSPADM

## 2023-09-01 RX ADMIN — KETOROLAC TROMETHAMINE 15 MG: 15 INJECTION, SOLUTION INTRAMUSCULAR; INTRAVENOUS at 04:09

## 2023-09-01 RX ADMIN — FAMOTIDINE 20 MG: 10 INJECTION, SOLUTION INTRAVENOUS at 08:09

## 2023-09-01 RX ADMIN — Medication 10 ML: at 04:09

## 2023-09-01 NOTE — SUBJECTIVE & OBJECTIVE
Interval History: NAEON. No emesis, no nausea. Pain well controlled. Tolerating CLD. Having BM and flatus. OOB as tolerated.    Medications:  Continuous Infusions:   TPN ADULT CENTRAL LINE CUSTOM 45 mL/hr at 08/31/23 2134     Scheduled Meds:   enoxparin  40 mg Subcutaneous Daily    famotidine (PF)  20 mg Intravenous BID    fat emulsion 20%  250 mL Intravenous Daily    ketorolac  15 mg Intravenous Q6H    LIDOcaine  1 patch Transdermal Q24H    polyethylene glycol  17 g Oral Daily    scopolamine  1 patch Transdermal Q3 Days    sodium chloride 0.9%  10 mL Intravenous Q6H     PRN Meds:bisacodyL, LIDOcaine (PF) 10 mg/ml (1%), LIDOcaine HCL 10 mg/ml (1%), ondansetron, prochlorperazine, simethicone, sodium chloride 0.9%, Flushing PICC Protocol **AND** sodium chloride 0.9% **AND** sodium chloride 0.9%     Review of patient's allergies indicates:   Allergen Reactions    Penicillins Rash     Objective:     Vital Signs (Most Recent):  Temp: 98.7 °F (37.1 °C) (09/01/23 0418)  Pulse: 97 (09/01/23 0418)  Resp: 17 (09/01/23 0418)  BP: (!) 119/56 (09/01/23 0418)  SpO2: (!) 93 % (09/01/23 0418) Vital Signs (24h Range):  Temp:  [98.5 °F (36.9 °C)-99.1 °F (37.3 °C)] 98.7 °F (37.1 °C)  Pulse:  [72-97] 97  Resp:  [17-18] 17  SpO2:  [92 %-95 %] 93 %  BP: (119-146)/(56-71) 119/56     Weight: 74.4 kg (164 lb)  Body mass index is 22.87 kg/m².    Intake/Output - Last 3 Shifts         08/30 0700  08/31 0659 08/31 0700 09/01 0659    Urine (mL/kg/hr)  400 (0.2)    Total Output  400    Net  -400                   Physical Exam  Vitals and nursing note reviewed.   Constitutional:       Appearance: He is not ill-appearing.   HENT:      Head: Normocephalic.      Mouth/Throat:      Mouth: Mucous membranes are moist.      Pharynx: Oropharynx is clear.   Eyes:      General: No scleral icterus.     Conjunctiva/sclera: Conjunctivae normal.   Cardiovascular:      Rate and Rhythm: Normal rate.      Pulses: Normal pulses.   Pulmonary:      Effort:  Pulmonary effort is normal. No respiratory distress.      Breath sounds: No wheezing.   Abdominal:      General: Abdomen is flat.      Palpations: Abdomen is soft.      Comments: Midline incision CDI. Maryellen-incisional tenderness present. Abdomen is soft, mildly tender and distended, improved since yesterday   Musculoskeletal:         General: No swelling. Normal range of motion.   Skin:     General: Skin is warm and dry.      Coloration: Skin is not jaundiced or pale.   Neurological:      General: No focal deficit present.      Mental Status: He is alert and oriented to person, place, and time.          Significant Labs:  I have reviewed all pertinent lab results within the past 24 hours.    Significant Diagnostics:  I have reviewed all pertinent imaging results/findings within the past 24 hours.

## 2023-09-01 NOTE — PT/OT/SLP PROGRESS
Physical Therapy Treatment    Patient Name:  Danish Valladares   MRN:  [id number]    Recommendations:     Discharge Recommendations: home health PT  Discharge Equipment Recommendations: shower chair, walker, rolling  Barriers to discharge: None    Assessment:     Danish Valladares is a 62 y.o. male admitted with a medical diagnosis of Gastric adenocarcinoma.  He presents with the following impairments/functional limitations: impaired endurance, impaired cardiopulmonary response to activity . Pt  motivated and cooperative with treatment session. Pt Progressing with PT Intervention. Pt would continue to benefit from skilled PT to address overall functional mobility, goals , and to return to functional baseline.  Goals remain appropriate.      Rehab Prognosis: Good; patient would benefit from acute skilled PT services to address these deficits and reach maximum level of function.    Recent Surgery: Procedure(s) (LRB):  GASTRECTOMY (N/A) 11 Days Post-Op    Plan:     During this hospitalization, patient to be seen 3 x/week to address the identified rehab impairments via gait training, therapeutic activities, therapeutic exercises, neuromuscular re-education and progress toward the following goals:    Plan of Care Expires:  09/22/23    Subjective     Chief Complaint: gas pains   Patient/Family Comments/goals: I need to use the bathroom first  Pain/Comfort:  Pain Rating 1: 0/10  Pain Rating Post-Intervention 1: 0/10      Objective:     Communicated with RN prior to session.  Patient found HOB elevated with telemetry, peripheral IV upon PT entry to room.     General Precautions: Standard, fall  Orthopedic Precautions: N/A  Braces: N/A  Respiratory Status: Room air     Functional Mobility:  Bed Mobility:     Supine to Sit: modified independence and HOB elevated  Sit to Supine: modified independence and HOB elevated  Transfers:     Sit to Stand:  supervision with no AD  Gait: 150 feet SBA with no AD with no significant gait  deficits   Stairs:  Pt ascended/descended 5 stair(s) with No Assistive Device with right handrail with Stand-by Assistance.       AM-PAC 6 CLICK MOBILITY  Turning over in bed (including adjusting bedclothes, sheets and blankets)?: 4  Sitting down on and standing up from a chair with arms (e.g., wheelchair, bedside commode, etc.): 4  Moving from lying on back to sitting on the side of the bed?: 4  Moving to and from a bed to a chair (including a wheelchair)?: 4  Need to walk in hospital room?: 4  Climbing 3-5 steps with a railing?: 3  Basic Mobility Total Score: 23       Treatment & Education:  Therapist provided instruction and educated of  patient on progress, safety,d/c,PT POC,   proper body mechanics, energy conservation, and fall prevention strategies during tasks listed above, on the effects of prolonged immobility and the importance of performing OOB activity and exercises to promote healing and reduce recovery time        Updated white board with appropriate PT mobility information for medical team notification     Donned pt shoes    Call nursing/pct to transfer to chair/use bathroom. Pt stated understanding      Bedside table in front of patient and area set up for function, convenience, and safety. RN aware of patient's mobility needs and status. Questions/concerns addressed within PTA scope of practice; patient  with no further questions. Time was provided for active listening, discussion of health disposition, and discussion of safe discharge. Pt?verbalized?agreement     Patient left sitting edge of bed with all lines intact, call button in reach, nsg notified, and family present..    GOALS:   Multidisciplinary Problems       Physical Therapy Goals          Problem: Physical Therapy    Goal Priority Disciplines Outcome Goal Variances Interventions   Physical Therapy Goal     PT, PT/OT Ongoing, Progressing     Description: Goals to be met by: 9/3     Patient will increase functional independence with  mobility by performin. Supine to sit with Modified [place]  2. Sit to supine with Modified [place]  3. Sit to stand transfer with Modified [place]  4. Gait  x 400 feet with Supervision using LRAD or no AD.   5. Ascend/descend 6 stair with unilateral Handrails Stand-by Assistance using LRAD or No AD.                          Time Tracking:     PT Received On: 23  PT Start Time: 54     PT Stop Time: 1004  PT Total Time (min): 10 min     Billable Minutes: Gait Training 10    Treatment Type: Treatment  PT/PTA: PTA     Number of PTA visits since last PT visit: 2     2023

## 2023-09-01 NOTE — PROGRESS NOTES
[facility] Home IV TPN Education/Discharge Planning Note:      [facility] educator met with the patient and his wife and discussed discharge plan for home IV TPN. Mr Danish Valladares will discharge home with family support. The patient's wife will assist in infusing TPN via CADD pump. The patient & his wife were educated on [facility] procedure & [facility] S.A.[facility] mat & TPN mat provided. Patient education checklist reviewed and acknowledged by the patient and his family and they are agreeable to discharge with home infusion plan of care. Using a demonstration PICC, IV administration process using aspetic technique was reviewed with successful return demonstration by the patient & his wife. The patient & his wife feel comfortable with home infusion process after bedside education provided. Patient will discharge home with IV TPN continuous over 24 hours for an estimated end of therapy date TBD by Dr Moy on an outpatient basis. Dosing/hang schedule time is 13:00 daily (may shift on an outpatient basis to better accommodate patient's home schedule); patient was successfully connected to home TPN pump by his wife at 13:00 with very little assistance from myself. The patient's wife also successfully flushed second lumen of double lumen PICC with saline and locked by heparin to ascertain both lumens of DL PICC functioned properly prior to discharge. It is to be noted that the red lumen was difficult to flush, and was met with resistance; it did flush successfully, and home health aware of resistance and will plan to assess it during home admit on 9/2. Extension sets not placed on DL PICC, as patient will not be self infusing. [facility] will follow patient for weekly dressing changes and lab draws.      Additional education materials also provided. Time allotted for questions; questions addressed appropriately. Patients home TPN and supplies have been  delivered to the bedside. Provided patient with [facility] support number [phone] & [facility] phone number [phone]. Patient is for discharge home from [facility] perspective. Patient's discharge planning team and bedside nurse updated with the information above.      -Patient accepted to care by [facility] and report called to Keisha Lacy. They confirmed that the patient has been accepted onto services, and will be seen on 9/2 for admit. They have also made the [place] agency aware of sluggish red lumen of DL PICC.  -Phone number [phone]     Please do not hesitate to reach out for any additional needs..    Bishnu Torres MS, RDN, LDN  Clinical Liaison & Dietitian  [facility]   Phone: [phone]  [email or web address]

## 2023-09-01 NOTE — PLAN OF CARE
Shift Events: Advanced diet to full liquid/ tolerating well      Goals of Care: pain management, bowel motility, manage n/v     Neuro: AOx4     Vital Signs:VSS     Respiratory: RA     Diet: full liquid     Is patient tolerating current diet? yes     GTTS: TPN @ 45/Lipids @ 20.8     Urine Output/Bowel Movement: OOB independent to BR, BM x1 per patient      Drains/Tubes/Tube Feeds (include total output/shift): NGT pulled 8/30     Lines: HAM PICC     Accuchecks:N/A     Skin: ML + DB     Fall Risk Score: 9            Activity level? independent     Any scheduled procedures? N/A     Any safety concerns? N/A    Problem: Adult Inpatient Plan of Care  Goal: Patient-Specific Goal (Individualized)  Outcome: Ongoing, Progressing  Goal: Absence of Hospital-Acquired Illness or Injury  Outcome: Ongoing, Progressing  Goal: Readiness for Transition of Care  Outcome: Ongoing, Progressing

## 2023-09-01 NOTE — PROGRESS NOTES
This RN reviewed discharge instructions and medications with patient and spouse. Patient and spouse verbalized understanding. Medications picked up by spouse from [facility]. Home TPN teaching provided by dietician. Supplies brought by dietician sent with patient upon discharge. Patient denied wheelchair transport. Sent with all belongings.

## 2023-09-01 NOTE — DISCHARGE SUMMARY
[facility] - [facility]  General Surgery  Discharge Summary      Patient Name: Danish Valladares  MRN: [id number]  Admission Date: 8/16/2023  Hospital Length of Stay: 16 days  Discharge Date and Time:  09/01/2023 8:34 AM  Attending Physician: Eulogio Moy, *   Discharging Provider: Rachel Baker NP  Primary Care Provider: Yfn Walker MD    HPI:   Patient is a 62y M w/ hx of psoriasis who presents to the ER with significant abdominal pain, no bowel movement for over 2 weeks. He has a history of a recent EGD w/ biopsy - path showing diagnosis of gastric carcinoma.   The patient reports he began having intolerance to food early May of this year. This has progressed to him only being able to tolerate some very soft foods or liquids. He reports he vomits about once a day and is unable to control it when it happens. He has had a weight loss of around 30lbs since this started. He has been passing gas about 2x/day but has not had a bowel movement in a month.      He has no other medical problems. No hx of abdominal surgeries. A history of smoking from youth until about 50yrs old - 2 ppd - quit about 10 years ago. No AC, no hx of MI or CVA. Family history of brain cancer in his father. Says he is otherwise an active person and has been able to work up until about a week or two ago.     Procedure(s) (LRB):  GASTRECTOMY (N/A)      Indwelling Lines/Drains at time of discharge:   Lines/Drains/Airways     Peripherally Inserted Central Catheter Line  Duration           PICC Double Lumen 08/17/23 1117 right basilic 14 days              Hospital Course:  Mr. Valladares is a 61 y/o male with no significant past medical history (has not seen a doctor in over 40 years) who presented to the ER on 8/16/2023 with abdominal pain and a recent diagnosis of gastric adenocarcinoma. He has had no bowel movements for several weeks and this is concerning for obstruction. After review of images, there were very small amounts of contrast  going through from stomach to duodenum. An NGT was placed for decompression. A PICC was placed and he was started on TPN for nutrition optimization as well as correction of electrolytes. Mr. Valladares is s/p Open distal gastrectomy with BII reconstruction on 8/21/23. On 8/26/2023 the patient was having increased abdominal distention with nausea and emesis. The KUB showed diffuse dilation likely 2/2 to ileus with improvement after NGT placement. A CT A/P was obtained on 8/28/2023 and was consistent with ileus, no evidence of leak or abscess. Please see epic for complete details. Patient with ROBF on 8/30/2023.  His diet was advanced and he is tolerating minimal amounts of a full diet and will discharge home on TPN via PICC line.  He is voiding and ambulating without difficulty.  Patient with no complaints of nausea, vomiting, chest pain or shortness of breath.  His vital signs stable. He is afebrile. He is positive for flatus and positive for bowel sounds.    CV: RRR  Lungs: CTA bilaterally  Abdomen:  Soft, non-tender, non-distended, positive for bowel sounds, incision clean, dry and intact.    Goals of Care Treatment Preferences:  Code Status: Full Code      Consults:   Consults (From admission, onward)        Status Ordering Provider     Inpatient consult to Midline team  Once        Provider:  (Not yet assigned)    Completed ANTWAN SQUIRES     Inpatient consult to Registered Dietitian/Nutritionist  Once        Provider:  (Not yet assigned)    Completed ROLANDO HEATON     Inpatient consult to PICC team ([facility])  Once        Provider:  (Not yet assigned)    Completed ROLANDO HEATON     Inpatient consult to Surgical Oncology  Once        Provider:  (Not yet assigned)    Completed RICK CHIN III          Significant Diagnostic Studies: Labs:   CMP   Recent Labs   Lab 08/31/23  0411 09/01/23  0451    136   K 4.2 3.9    104   CO2 25 23    105   BUN 28* 26*   CREATININE 0.7 0.7   CALCIUM 8.2* 8.0*    PROT 5.1* 5.3*   ALBUMIN 2.3* 2.4*   BILITOT 1.8* 1.5*   ALKPHOS 90 108   AST 27 23   ALT 55* 49*   ANIONGAP 8 9    and CBC   Recent Labs   Lab 08/31/23  0411 08/31/23  1540 09/01/23  0451   WBC 9.04 8.64 11.95   HGB 10.7* 10.6* 10.8*   HCT 32.3* 32.0* 32.2*    223 233       Pending Diagnostic Studies:     Procedure Component Value Units Date/Time    Comprehensive Metabolic Panel [[id number]] Collected: 08/22/23 0459    Order Status: Sent Lab Status: In process Updated: 08/22/23 0516    Specimen: Blood     Magnesium [[id number]] Collected: 08/22/23 0459    Order Status: Sent Lab Status: In process Updated: 08/22/23 0516    Specimen: Blood     Phosphorus [[id number]] Collected: 08/22/23 0459    Order Status: Sent Lab Status: In process Updated: 08/22/23 0516    Specimen: Blood         Final Active Diagnoses:    Diagnosis Date Noted POA    PRINCIPAL PROBLEM:  Gastric adenocarcinoma [C16.9] 08/17/2023 Yes    Emesis [R11.10] 08/16/2023 Yes      Problems Resolved During this Admission:      Discharged Condition: good    Disposition: [facility] Care [facility] [place] [facility]    Follow Up:   Follow-up Information     Eulogio Moy MD Follow up in 1 week(s).    Specialties: Surgical Oncology, General Surgery  Contact information:  [address] [place] [address] [phone]                       Patient Instructions:   No discharge procedures on file.  Medications:  Reconciled Home Medications:      Medication List      START taking these medications    acetaminophen 650 MG Tbsr  Commonly known as: TYLENOL  Take 1 tablet (650 mg total) by mouth every 8 (eight) hours.     bisacodyL 10 mg Supp  Commonly known as: DULCOLAX  Place 1 suppository (10 mg total) rectally daily as needed.     enoxaparin 40 mg/0.4 mL Syrg  Commonly known as: LOVENOX  Inject 0.4 mLs (40 mg total) into the skin Daily. for 14 days     oxyCODONE 5 MG immediate release tablet  Commonly known as: ROXICODONE  Take 1 tablet (5 mg total) by mouth  every 4 (four) hours as needed for Pain.     polyethylene glycol 17 gram/dose powder  Commonly known as: GLYCOLAX  Use cap to measure 17 grams, mix in liquid and take by mouth once daily.        CONTINUE taking these medications    omeprazole 40 MG capsule  Commonly known as: PRILOSEC  Take 1 capsule (40 mg total) by mouth once daily.     ondansetron 8 MG Tbdl  Commonly known as: ZOFRAN-ODT  Take 8 mg by mouth 3 (three) times daily.     sucralfate 1 gram tablet  Commonly known as: CARAFATE  TAKE 1 TABLET(1 GRAM) BY MOUTH FOUR TIMES DAILY BEFORE MEALS AND AT NIGHT          Time spent on the discharge of patient: 20 minutes    Rachel Baker NP  General Surgery  [facility]

## 2023-09-01 NOTE — ASSESSMENT & PLAN NOTE
Patient is a 62 y M w/ no significant past medical history (has not seen a doctor in over 40 years) who presented to the ER on 8/16 with abdominal pain and a recent diagnosis of gastric adenocarcinoma. No bowel movements for several weeks, concerning for obstruction. After review of images, very small amounts of contrast going through from stomach to duodenum. NGT placed for decompression. PICC placed and started on TPN for nutrition optimization as well as correction of electrolytes. S/P Open distal gastrectomy with BII reconstruction on 8/21/23. Today (8/26) pt having increasing distention with nausea and emesis, KUB showing diffuse dialation likely 2/2 to ileus with improvement after NGT placement, reconfirmed position on 8/27. CT A/P obtained 8/28 consistent with ileus, no evidence of leak or abscess. ROBF on 8/30.    - cont CLD  - no re-order on TPN today, 1/2 rate, home today with home TPN  - zofran PRN  - Multimodal pain control (renew Tylenol and Toradol orders)   - PPI  - DVT ppx  - PT/OT ordered for postoperative care and rehabilitation  - Encourage ambulation , OOBTC    Dispo: today vs tomorrow

## 2023-09-01 NOTE — PROGRESS NOTES
[facility] - [facility]  General Surgery  Progress Note    Subjective:     Post-Op Info:  Procedure(s) (LRB):  GASTRECTOMY (N/A)   11 Days Post-Op     Interval History: NAEON. No emesis, no nausea. Pain well controlled. Tolerating CLD. Having BM and flatus. OOB as tolerated.    Medications:  Continuous Infusions:   TPN ADULT CENTRAL LINE CUSTOM 45 mL/hr at 08/31/23 2134     Scheduled Meds:   enoxparin  40 mg Subcutaneous Daily    famotidine (PF)  20 mg Intravenous BID    fat emulsion 20%  250 mL Intravenous Daily    ketorolac  15 mg Intravenous Q6H    LIDOcaine  1 patch Transdermal Q24H    polyethylene glycol  17 g Oral Daily    scopolamine  1 patch Transdermal Q3 Days    sodium chloride 0.9%  10 mL Intravenous Q6H     PRN Meds:bisacodyL, LIDOcaine (PF) 10 mg/ml (1%), LIDOcaine HCL 10 mg/ml (1%), ondansetron, prochlorperazine, simethicone, sodium chloride 0.9%, Flushing PICC Protocol **AND** sodium chloride 0.9% **AND** sodium chloride 0.9%     Review of patient's allergies indicates:   Allergen Reactions    Penicillins Rash     Objective:     Vital Signs (Most Recent):  Temp: 98.7 °F (37.1 °C) (09/01/23 0418)  Pulse: 97 (09/01/23 0418)  Resp: 17 (09/01/23 0418)  BP: (!) 119/56 (09/01/23 0418)  SpO2: (!) 93 % (09/01/23 0418) Vital Signs (24h Range):  Temp:  [98.5 °F (36.9 °C)-99.1 °F (37.3 °C)] 98.7 °F (37.1 °C)  Pulse:  [72-97] 97  Resp:  [17-18] 17  SpO2:  [92 %-95 %] 93 %  BP: (119-146)/(56-71) 119/56     Weight: 74.4 kg (164 lb)  Body mass index is 22.87 kg/m².    Intake/Output - Last 3 Shifts         08/30 0700  08/31 0659 08/31 0700 09/01 0659    Urine (mL/kg/hr)  400 (0.2)    Total Output  400    Net  -400                   Physical Exam  Vitals and nursing note reviewed.   Constitutional:       Appearance: He is not ill-appearing.   HENT:      Head: Normocephalic.      Mouth/Throat:      Mouth: Mucous membranes are moist.      Pharynx: Oropharynx is clear.   Eyes:      General: No scleral icterus.      Conjunctiva/sclera: Conjunctivae normal.   Cardiovascular:      Rate and Rhythm: Normal rate.      Pulses: Normal pulses.   Pulmonary:      Effort: Pulmonary effort is normal. No respiratory distress.      Breath sounds: No wheezing.   Abdominal:      General: Abdomen is flat.      Palpations: Abdomen is soft.      Comments: Midline incision CDI. Maryellen-incisional tenderness present. Abdomen is soft, mildly tender and distended, improved since yesterday   Musculoskeletal:         General: No swelling. Normal range of motion.   Skin:     General: Skin is warm and dry.      Coloration: Skin is not jaundiced or pale.   Neurological:      General: No focal deficit present.      Mental Status: He is alert and oriented to person, place, and time.          Significant Labs:  I have reviewed all pertinent lab results within the past 24 hours.    Significant Diagnostics:  I have reviewed all pertinent imaging results/findings within the past 24 hours.    Assessment/Plan:     * Gastric adenocarcinoma  Patient is a 62 y M w/ no significant past medical history (has not seen a doctor in over 40 years) who presented to the ER on 8/16 with abdominal pain and a recent diagnosis of gastric adenocarcinoma. No bowel movements for several weeks, concerning for obstruction. After review of images, very small amounts of contrast going through from stomach to duodenum. NGT placed for decompression. PICC placed and started on TPN for nutrition optimization as well as correction of electrolytes. S/P Open distal gastrectomy with BII reconstruction on 8/21/23. Today (8/26) pt having increasing distention with nausea and emesis, KUB showing diffuse dialation likely 2/2 to ileus with improvement after NGT placement, reconfirmed position on 8/27. CT A/P obtained 8/28 consistent with ileus, no evidence of leak or abscess. ROBF on 8/30.    - cont CLD  - no re-order on TPN today, 1/2 rate, home today with home TPN  - zofran PRN  - Multimodal pain  control (renew Tylenol and Toradol orders)   - PPI  - DVT ppx  - PT/OT ordered for postoperative care and rehabilitation  - Encourage ambulation , OOBTC    Dispo: today vs tomorrow, f/u in 1 week with Dr. Chinmay Chirinos MD  General Surgery  [facility]

## 2023-09-05 ENCOUNTER — LAB VISIT (OUTPATIENT)
Dept: LAB | Facility: HOSPITAL | Age: 63
End: 2023-09-05
Attending: SURGERY
Payer: COMMERCIAL

## 2023-09-05 ENCOUNTER — TELEPHONE (OUTPATIENT)
Dept: SURGERY | Facility: CLINIC | Age: 63
End: 2023-09-05
Payer: COMMERCIAL

## 2023-09-05 DIAGNOSIS — C16.9 STOMACH CANCER: Primary | ICD-10-CM

## 2023-09-05 LAB
ALBUMIN SERPL BCP-MCNC: 1.8 G/DL (ref 3.5–5.2)
ALP SERPL-CCNC: 110 U/L (ref 55–135)
ALT SERPL W/O P-5'-P-CCNC: 19 U/L (ref 10–44)
ANION GAP SERPL CALC-SCNC: 10 MMOL/L (ref 8–16)
AST SERPL-CCNC: 11 U/L (ref 10–40)
BASOPHILS # BLD AUTO: 0.04 K/UL (ref 0–0.2)
BASOPHILS NFR BLD: 0.2 % (ref 0–1.9)
BILIRUB SERPL-MCNC: 1.7 MG/DL (ref 0.1–1)
BUN SERPL-MCNC: 29 MG/DL (ref 8–23)
CALCIUM SERPL-MCNC: 8.3 MG/DL (ref 8.7–10.5)
CHLORIDE SERPL-SCNC: 99 MMOL/L (ref 95–110)
CO2 SERPL-SCNC: 25 MMOL/L (ref 23–29)
CREAT SERPL-MCNC: 0.6 MG/DL (ref 0.5–1.4)
DIFFERENTIAL METHOD: ABNORMAL
EOSINOPHIL # BLD AUTO: 0 K/UL (ref 0–0.5)
EOSINOPHIL NFR BLD: 0 % (ref 0–8)
ERYTHROCYTE [DISTWIDTH] IN BLOOD BY AUTOMATED COUNT: 15.3 % (ref 11.5–14.5)
EST. GFR  (NO RACE VARIABLE): >60 ML/MIN/1.73 M^2
GLUCOSE SERPL-MCNC: 145 MG/DL (ref 70–110)
HCT VFR BLD AUTO: 35.4 % (ref 40–54)
HGB BLD-MCNC: 11.8 G/DL (ref 14–18)
IMM GRANULOCYTES # BLD AUTO: 0.13 K/UL (ref 0–0.04)
IMM GRANULOCYTES NFR BLD AUTO: 0.6 % (ref 0–0.5)
LYMPHOCYTES # BLD AUTO: 0.7 K/UL (ref 1–4.8)
LYMPHOCYTES NFR BLD: 3.2 % (ref 18–48)
MAGNESIUM SERPL-MCNC: 1.8 MG/DL (ref 1.6–2.6)
MCH RBC QN AUTO: 27.8 PG (ref 27–31)
MCHC RBC AUTO-ENTMCNC: 33.3 G/DL (ref 32–36)
MCV RBC AUTO: 84 FL (ref 82–98)
MONOCYTES # BLD AUTO: 2 K/UL (ref 0.3–1)
MONOCYTES NFR BLD: 9.2 % (ref 4–15)
NEUTROPHILS # BLD AUTO: 18.5 K/UL (ref 1.8–7.7)
NEUTROPHILS NFR BLD: 86.8 % (ref 38–73)
NRBC BLD-RTO: 0 /100 WBC
PHOSPHATE SERPL-MCNC: 3.4 MG/DL (ref 2.7–4.5)
PLATELET # BLD AUTO: 452 K/UL (ref 150–450)
PMV BLD AUTO: 10.9 FL (ref 9.2–12.9)
POTASSIUM SERPL-SCNC: 3.9 MMOL/L (ref 3.5–5.1)
PREALB SERPL-MCNC: 4 MG/DL (ref 20–43)
PROT SERPL-MCNC: 5.5 G/DL (ref 6–8.4)
RBC # BLD AUTO: 4.24 M/UL (ref 4.6–6.2)
SODIUM SERPL-SCNC: 134 MMOL/L (ref 136–145)
WBC # BLD AUTO: 21.29 K/UL (ref 3.9–12.7)

## 2023-09-05 PROCEDURE — G0180 MD CERTIFICATION HHA PATIENT: HCPCS | Mod: ,,, | Performed by: SURGERY

## 2023-09-05 PROCEDURE — 85025 COMPLETE CBC W/AUTO DIFF WBC: CPT | Performed by: SURGERY

## 2023-09-05 PROCEDURE — 83735 ASSAY OF MAGNESIUM: CPT | Performed by: SURGERY

## 2023-09-05 PROCEDURE — 84100 ASSAY OF PHOSPHORUS: CPT | Performed by: SURGERY

## 2023-09-05 PROCEDURE — 80053 COMPREHEN METABOLIC PANEL: CPT | Performed by: SURGERY

## 2023-09-05 PROCEDURE — 84134 ASSAY OF PREALBUMIN: CPT | Performed by: SURGERY

## 2023-09-05 PROCEDURE — G0180 PR HOME HEALTH MD CERTIFICATION: ICD-10-PCS | Mod: ,,, | Performed by: SURGERY

## 2023-09-05 NOTE — TELEPHONE ENCOUNTER
I called patient to discuss [employer] testing and financial assistance. He was recently discharged from the hospital and pain is not currently controlled. Sent mychart information with phone number to discuss when he would be available. Pt verbalized understanding and appreciative for the call and understanding.

## 2023-09-05 NOTE — TELEPHONE ENCOUNTER
Spoke with pt and his wife. Pt reports abd pain that is not being relieved by Tylenol only. Pt requesting Toradol as that medication worked best for him as an inpatient. Reports gas pain and incisional pain. Denies F/N/V. Tolerating liquids and protein drinks. Reports watery stools. Taking Miralax. Symptoms discussed with Dr. Moy. Patient would prefer to avoid narcotics as he is concerned with constipation. Recommendation made to begin alternating Tylenol and Ibuprofen for better pain control. Continue with liquids as tolerated. Conitnue Miralax. Pt instructed to call office in the am (9/6) if pain is not controlled. Wife and patient verbalized understanding.

## 2023-09-05 NOTE — TELEPHONE ENCOUNTER
----- Message from Celina Beaver sent at 9/5/2023  8:02 AM CDT -----  Regarding: Rx Questions  Contact: pt @[phone]  Patient's Wife called and says the rx  acetaminophen (TYLENOL) 650 MG  is not working for pt and would prefer ibuprofen . Patient received the rx from the [facility] on Friday 9/1/23.  Please c/b to advice Thanks

## 2023-09-06 NOTE — PHYSICIAN QUERY
PT Name: Danish Valladares  MR #: [id number]    DOCUMENTATION CLARIFICATION     CDS: Brandy Capley, RN  Email: [email or web address]     This form is a permanent document in the medical record.     Query Date: September 6, 2023    By submitting this query, we are merely seeking further clarification of documentation.. Please utilize your independent clinical judgment when addressing the question(s) below.    The medical record contains the following:   Indicators  Supporting Clinical Findings Location in Medical Record   X Registered Dietician Diagnosis Nutrition Problem Inadequate oral intake  Related to (etiology): Inability to consume sufficient energy   Signs and Symptoms (as evidenced by): NPO     Pt appears nourished.   Nutrition progress notes 8/23    Energy Intake     X Weight Loss Per chart review noted with 30# wt loss in 2 months   Nutrition progress notes 8/23    Fat Loss      Muscle Loss     X Edema/Fluid Accumulation Musculoskeletal:         General: No tenderness or edema   ED provider notes 8/16, filed 8/23      Reduced  Strength (by dynamometer)     X Weight, BMI, Usual Body Weight Weight: 74.4 kg (164 lb)  Weight (lb): 164 lb  Ideal Body Weight (IBW), Male: 172 lb  % Ideal Body Weight, Male (lb): 95.35 %  BMI (Calculated): 22.9  BMI Grade: 18.5-24.9 - normal   Nutrition progress notes 8/23    Delayed Wound Healing     X Acute or Chronic Illness Patient presents with   Abdominal Pain      Weight loss, decreased appetite, has stomach cancer states was told to come in for blockage, been on liquid diet for 2 weeks     Mr. Valladares is a 62 y.o. male w/ PMH of psoriasis who is presenting with abdominal pain and inability to tolerate solid foods and without a bowel movement for last 2 weeks. He states this has been ongoing since early May with progressive intolerance of solid foods and decreased appetite. He reports losing 30-35 pounds since this started, and he now can only tolerate a liquid diet. He  states the abdominal pain only occurs later in the day after his stomach has filled from eating. He will also have associated daily nausea and vomiting as the day progresses. He is able to pass flatus, but hasn't had a bowel movement in the last 2 weeks roughly.     CMP unremarkable except for decreased potassium, chloride, and increased bicarb, likely due to daily vomiting causing hypokalemic hypochloremic metabolic alkalosis.    GOO 2/2 distal gastric cancer.  Has lost 20#, albumin and prealbumin ok.  Rec decompression, start TPN, plan on exploration/resection when electrolytes corrected, stomach empty.      Date of Procedure: 8/21/2023      Pre-Operative Diagnosis:   Gastric adenocarcinoma [C16.9]  Gastric outlet obstruction  Severe protein-calorie malnutrition     Post-Operative Diagnosis:   Same with peritoneal metastases     Procedures:  Distal gastrectomy with Bilroth II reconstruction  Biopsy of peritoneal nodules   ED provider notes 8/16, filed 8/23                          General surgery consults 8/16, filed 8/17      Op note 8/22    Social or Environmental Circumstances     X Treatment PICC placed and started on TPN for nutrition optimization as well as correction of electrolytes.   General surgery progress notes 8/21   X Other well nourished    Clear liquid diet    Full liquid diet    First provider eval 8/16    Orders 8/30    Orders 8/31     Academy of Nutrition and Dietetics (Academy) and the American Society for Parenteral and Enteral Nutrition (A.S.P.E.N.) Clinical Characteristics to support Malnutrition      Criteria for mild malnutrition is defined as 1 characteristic outlined above within the established moderate or severe parameters.  A minimum of 2 out of the 6 characteristics noted above are recommended for a diagnosis of moderate or severe malnutrition.  Chronic illness/injury is a disease/condition lasting 3 months or longer.    The noted clinical guidelines are only system guidelines and do  not replace the providers clinical judgment.    Due to the conflicting clinical picture, please clinically validate the diagnosis of _severe malnutrition_.    If validated, please provide additional clinical support for the diagnosis.       [   ] Above stated diagnosis is not confirmed and/or it has been ruled out     [   ] Above stated diagnosis is not confirmed and/or it has been ruled out, other diagnosis ruled in (please specify):  (  ) mild malnutrition- 1 characteristic outlined above within the established moderate or severe parameters  (  ) abnormal weight loss  (  ) underweight  (  ) Other (please specify): ____________________     [  xx ] Above stated diagnosis is confirmed. Please specify clinical support (signs, symptoms, and treatment) for the confirmed diagnosis: ________________   [   ] Other clarification (please specify): ___________________         Please document in your progress notes daily for the duration of treatment until resolved and  include in your discharge summary.      References:    TERRY Chappell., & LATIA Poole. (2022, April). Assessment and management of anorexia and cachexia in palliative care. Retrieved May 23, 2022, from https://[email or web address].Buzztala/contents/assessment-and-management-of-anorexia-[email or web address]-cachexia-in-palliative-care?[email or web address]_link     SATNAM Wasserman, PhD, RD, Nettie ORDOÑEZ P., PhD, RN, STEVE Bergman MD, PhD, Lavell DONOVAN A., MS, RD, [facility], NOÉ Bailon, MS, RD, The Academy Malnutrition Work Group, The A.S.P.E.N. Board of Directors. (2012). Consensus Statement: Academy of Nutrition and Dietetics and American Society for Parenteral and Enteral Nutrition: Characteristics Recommended for the Identification and Documentation of Adult Malnutrition (Undernutrition). Journal of Parenteral and Enteral Nutrition, 36(3), 275-283. doi:10.1177/0211384106831532     Form No. 57093

## 2023-09-10 LAB
DNA RANGE(S) EXAMINED NAR: NORMAL
GENE DIS ANL INTERP-IMP: POSITIVE
GENE DIS ASSESSED: NORMAL
GENE MUT TESTED BLD/T: 2.1 M/MB
MSI CA SPEC-IMP: NORMAL
PD-L1 BY 22C3 TISS IMSTN DOC: NEGATIVE
REASON FOR STUDY: NORMAL
TEMPUS FUSIONADDENDUM: NORMAL
TEMPUS PD-L1 (22C3) COMBINED POSITIVE SCORE: <1
TEMPUS PD-L1 (22C3) TUMOR PROPORTION SCORE: <1 %
TEMPUS PORTAL: NORMAL
TEMPUS THERAPY1: NORMAL
TEMPUS THERAPYCOUNT: 1

## 2023-09-12 ENCOUNTER — OFFICE VISIT (OUTPATIENT)
Dept: SURGERY | Facility: CLINIC | Age: 63
End: 2023-09-12
Payer: COMMERCIAL

## 2023-09-12 VITALS
OXYGEN SATURATION: 96 % | DIASTOLIC BLOOD PRESSURE: 66 MMHG | WEIGHT: 185 LBS | HEART RATE: 104 BPM | BODY MASS INDEX: 25.9 KG/M2 | SYSTOLIC BLOOD PRESSURE: 115 MMHG | HEIGHT: 71 IN

## 2023-09-12 DIAGNOSIS — R10.9 ABDOMINAL PAIN, UNSPECIFIED ABDOMINAL LOCATION: Primary | ICD-10-CM

## 2023-09-12 DIAGNOSIS — C16.9 GASTRIC ADENOCARCINOMA: ICD-10-CM

## 2023-09-12 PROCEDURE — 99024 POSTOP FOLLOW-UP VISIT: CPT | Mod: S$GLB,,,

## 2023-09-12 PROCEDURE — 3078F PR MOST RECENT DIASTOLIC BLOOD PRESSURE < 80 MM HG: ICD-10-PCS | Mod: CPTII,S$GLB,,

## 2023-09-12 PROCEDURE — 99999 PR PBB SHADOW E&M-EST. PATIENT-LVL III: CPT | Mod: PBBFAC,,,

## 2023-09-12 PROCEDURE — 3008F PR BODY MASS INDEX (BMI) DOCUMENTED: ICD-10-PCS | Mod: CPTII,S$GLB,,

## 2023-09-12 PROCEDURE — 3078F DIAST BP <80 MM HG: CPT | Mod: CPTII,S$GLB,,

## 2023-09-12 PROCEDURE — 99024 PR POST-OP FOLLOW-UP VISIT: ICD-10-PCS | Mod: S$GLB,,,

## 2023-09-12 PROCEDURE — 3074F SYST BP LT 130 MM HG: CPT | Mod: CPTII,S$GLB,,

## 2023-09-12 PROCEDURE — 99999 PR PBB SHADOW E&M-EST. PATIENT-LVL III: ICD-10-PCS | Mod: PBBFAC,,,

## 2023-09-12 PROCEDURE — 3074F PR MOST RECENT SYSTOLIC BLOOD PRESSURE < 130 MM HG: ICD-10-PCS | Mod: CPTII,S$GLB,,

## 2023-09-12 PROCEDURE — 3008F BODY MASS INDEX DOCD: CPT | Mod: CPTII,S$GLB,,

## 2023-09-12 RX ORDER — IBUPROFEN 200 MG
200 TABLET ORAL EVERY 6 HOURS PRN
COMMUNITY

## 2023-09-12 RX ORDER — LEVOFLOXACIN 750 MG/1
750 TABLET ORAL DAILY
Qty: 7 TABLET | Refills: 0 | Status: ON HOLD | OUTPATIENT
Start: 2023-09-12 | End: 2023-09-25 | Stop reason: HOSPADM

## 2023-09-13 ENCOUNTER — DOCUMENTATION ONLY (OUTPATIENT)
Dept: HEMATOLOGY/ONCOLOGY | Facility: CLINIC | Age: 63
End: 2023-09-13
Payer: COMMERCIAL

## 2023-09-14 ENCOUNTER — HOSPITAL ENCOUNTER (INPATIENT)
Facility: HOSPITAL | Age: 63
LOS: 11 days | Discharge: HOME HEALTH | DRG: 856 | End: 2023-09-25
Attending: EMERGENCY MEDICINE | Admitting: SURGERY
Payer: COMMERCIAL

## 2023-09-14 ENCOUNTER — HOSPITAL ENCOUNTER (OUTPATIENT)
Dept: RADIOLOGY | Facility: HOSPITAL | Age: 63
Discharge: HOME | End: 2023-09-14
Payer: COMMERCIAL

## 2023-09-14 DIAGNOSIS — R10.9 ABDOMINAL PAIN, UNSPECIFIED ABDOMINAL LOCATION: ICD-10-CM

## 2023-09-14 DIAGNOSIS — T81.43XA POSTPROCEDURAL INTRAABDOMINAL ABSCESS: ICD-10-CM

## 2023-09-14 DIAGNOSIS — L24.A9 IRRITANT CONTACT DERMATITIS DUE FRICTION OR CONTACT WITH OTHER SPECIFIED BODY FLUIDS: Primary | ICD-10-CM

## 2023-09-14 PROBLEM — R18.8 ABDOMINAL FLUID COLLECTION: Status: ACTIVE | Noted: 2023-01-01

## 2023-09-14 LAB
ABO + RH BLD: NORMAL
ALBUMIN SERPL BCP-MCNC: 1.4 G/DL (ref 3.5–5.2)
ALP SERPL-CCNC: 187 U/L (ref 55–135)
ALT SERPL W/O P-5'-P-CCNC: 38 U/L (ref 10–44)
ANION GAP SERPL CALC-SCNC: 12 MMOL/L (ref 8–16)
ANISOCYTOSIS BLD QL SMEAR: SLIGHT
AST SERPL-CCNC: 35 U/L (ref 10–40)
BASOPHILS # BLD AUTO: 0.07 K/UL (ref 0–0.2)
BASOPHILS NFR BLD: 0.4 % (ref 0–1.9)
BILIRUB SERPL-MCNC: 1.1 MG/DL (ref 0.1–1)
BLD GP AB SCN CELLS X3 SERPL QL: NORMAL
BUN SERPL-MCNC: 22 MG/DL (ref 8–23)
CALCIUM SERPL-MCNC: 8.1 MG/DL (ref 8.7–10.5)
CHLORIDE SERPL-SCNC: 100 MMOL/L (ref 95–110)
CO2 SERPL-SCNC: 26 MMOL/L (ref 23–29)
CREAT SERPL-MCNC: 0.8 MG/DL (ref 0.5–1.4)
DACRYOCYTES BLD QL SMEAR: ABNORMAL
DIFFERENTIAL METHOD: ABNORMAL
EOSINOPHIL # BLD AUTO: 0 K/UL (ref 0–0.5)
EOSINOPHIL NFR BLD: 0.1 % (ref 0–8)
ERYTHROCYTE [DISTWIDTH] IN BLOOD BY AUTOMATED COUNT: 15.3 % (ref 11.5–14.5)
EST. GFR  (NO RACE VARIABLE): >60 ML/MIN/1.73 M^2
GLUCOSE SERPL-MCNC: 489 MG/DL (ref 70–110)
GRAM STN SPEC: NORMAL
HCT VFR BLD AUTO: 30 % (ref 40–54)
HGB BLD-MCNC: 9.8 G/DL (ref 14–18)
HYPOCHROMIA BLD QL SMEAR: ABNORMAL
IMM GRANULOCYTES # BLD AUTO: 0.57 K/UL (ref 0–0.04)
IMM GRANULOCYTES NFR BLD AUTO: 3.2 % (ref 0–0.5)
INR PPP: 1.1 (ref 0.8–1.2)
LYMPHOCYTES # BLD AUTO: 1.1 K/UL (ref 1–4.8)
LYMPHOCYTES NFR BLD: 6.3 % (ref 18–48)
MAGNESIUM SERPL-MCNC: 2.4 MG/DL (ref 1.6–2.6)
MCH RBC QN AUTO: 27.7 PG (ref 27–31)
MCHC RBC AUTO-ENTMCNC: 32.7 G/DL (ref 32–36)
MCV RBC AUTO: 85 FL (ref 82–98)
MONOCYTES # BLD AUTO: 1.6 K/UL (ref 0.3–1)
MONOCYTES NFR BLD: 8.9 % (ref 4–15)
NEUTROPHILS # BLD AUTO: 14.4 K/UL (ref 1.8–7.7)
NEUTROPHILS NFR BLD: 81.1 % (ref 38–73)
NRBC BLD-RTO: 0 /100 WBC
OVALOCYTES BLD QL SMEAR: ABNORMAL
PLATELET # BLD AUTO: 416 K/UL (ref 150–450)
PMV BLD AUTO: 10.3 FL (ref 9.2–12.9)
POCT GLUCOSE: 123 MG/DL (ref 70–110)
POIKILOCYTOSIS BLD QL SMEAR: SLIGHT
POLYCHROMASIA BLD QL SMEAR: ABNORMAL
POTASSIUM SERPL-SCNC: 5.3 MMOL/L (ref 3.5–5.1)
PROT SERPL-MCNC: 5.9 G/DL (ref 6–8.4)
PROTHROMBIN TIME: 12.2 SEC (ref 9–12.5)
RBC # BLD AUTO: 3.54 M/UL (ref 4.6–6.2)
SODIUM SERPL-SCNC: 138 MMOL/L (ref 136–145)
SPECIMEN OUTDATE: NORMAL
SPHEROCYTES BLD QL SMEAR: ABNORMAL
TOXIC GRANULES BLD QL SMEAR: PRESENT
WBC # BLD AUTO: 17.73 K/UL (ref 3.9–12.7)

## 2023-09-14 PROCEDURE — 74177 CT ABD & PELVIS W/CONTRAST: CPT | Mod: 26,,, | Performed by: RADIOLOGY

## 2023-09-14 PROCEDURE — 87070 CULTURE OTHR SPECIMN AEROBIC: CPT | Mod: 59 | Performed by: SURGERY

## 2023-09-14 PROCEDURE — 25000003 PHARM REV CODE 250: Performed by: STUDENT IN AN ORGANIZED HEALTH CARE EDUCATION/TRAINING PROGRAM

## 2023-09-14 PROCEDURE — 74177 CT ABDOMEN PELVIS WITH CONTRAST: ICD-10-PCS | Mod: 26,,, | Performed by: RADIOLOGY

## 2023-09-14 PROCEDURE — 63600175 PHARM REV CODE 636 W HCPCS: Performed by: STUDENT IN AN ORGANIZED HEALTH CARE EDUCATION/TRAINING PROGRAM

## 2023-09-14 PROCEDURE — 93010 EKG 12-LEAD: ICD-10-PCS | Mod: ,,, | Performed by: INTERNAL MEDICINE

## 2023-09-14 PROCEDURE — 63600175 PHARM REV CODE 636 W HCPCS

## 2023-09-14 PROCEDURE — 87205 SMEAR GRAM STAIN: CPT | Mod: 59 | Performed by: NURSE PRACTITIONER

## 2023-09-14 PROCEDURE — 87077 CULTURE AEROBIC IDENTIFY: CPT | Mod: 59 | Performed by: SURGERY

## 2023-09-14 PROCEDURE — 25000003 PHARM REV CODE 250: Performed by: NURSE PRACTITIONER

## 2023-09-14 PROCEDURE — A9698 NON-RAD CONTRAST MATERIALNOC: HCPCS

## 2023-09-14 PROCEDURE — 99223 1ST HOSP IP/OBS HIGH 75: CPT | Mod: 25,,,

## 2023-09-14 PROCEDURE — 86901 BLOOD TYPING SEROLOGIC RH(D): CPT

## 2023-09-14 PROCEDURE — 63600175 PHARM REV CODE 636 W HCPCS: Performed by: NURSE PRACTITIONER

## 2023-09-14 PROCEDURE — 87205 SMEAR GRAM STAIN: CPT | Mod: 59 | Performed by: SURGERY

## 2023-09-14 PROCEDURE — 63600175 PHARM REV CODE 636 W HCPCS: Performed by: RADIOLOGY

## 2023-09-14 PROCEDURE — 63600175 PHARM REV CODE 636 W HCPCS: Performed by: SURGERY

## 2023-09-14 PROCEDURE — 93010 ELECTROCARDIOGRAM REPORT: CPT | Mod: ,,, | Performed by: INTERNAL MEDICINE

## 2023-09-14 PROCEDURE — 99223 PR INITIAL HOSPITAL CARE,LEVL III: ICD-10-PCS | Mod: 25,,,

## 2023-09-14 PROCEDURE — 20600001 HC STEP DOWN PRIVATE ROOM

## 2023-09-14 PROCEDURE — 93005 ELECTROCARDIOGRAM TRACING: CPT

## 2023-09-14 PROCEDURE — 25500020 PHARM REV CODE 255

## 2023-09-14 PROCEDURE — 25000003 PHARM REV CODE 250: Performed by: RADIOLOGY

## 2023-09-14 PROCEDURE — 87040 BLOOD CULTURE FOR BACTERIA: CPT | Performed by: NURSE PRACTITIONER

## 2023-09-14 PROCEDURE — 87076 CULTURE ANAEROBE IDENT EACH: CPT | Performed by: SURGERY

## 2023-09-14 PROCEDURE — 87186 SC STD MICRODIL/AGAR DIL: CPT | Performed by: SURGERY

## 2023-09-14 PROCEDURE — 74177 CT ABD & PELVIS W/CONTRAST: CPT | Mod: TC

## 2023-09-14 PROCEDURE — 94640 AIRWAY INHALATION TREATMENT: CPT

## 2023-09-14 PROCEDURE — 25000003 PHARM REV CODE 250: Performed by: SURGERY

## 2023-09-14 PROCEDURE — 87070 CULTURE OTHR SPECIMN AEROBIC: CPT | Performed by: NURSE PRACTITIONER

## 2023-09-14 PROCEDURE — 25000242 PHARM REV CODE 250 ALT 637 W/ HCPCS: Performed by: NURSE PRACTITIONER

## 2023-09-14 PROCEDURE — 85610 PROTHROMBIN TIME: CPT

## 2023-09-14 PROCEDURE — 87075 CULTR BACTERIA EXCEPT BLOOD: CPT | Performed by: SURGERY

## 2023-09-14 PROCEDURE — 87186 SC STD MICRODIL/AGAR DIL: CPT | Mod: 59 | Performed by: NURSE PRACTITIONER

## 2023-09-14 PROCEDURE — 80053 COMPREHEN METABOLIC PANEL: CPT

## 2023-09-14 PROCEDURE — 87077 CULTURE AEROBIC IDENTIFY: CPT | Mod: 59 | Performed by: NURSE PRACTITIONER

## 2023-09-14 PROCEDURE — 94761 N-INVAS EAR/PLS OXIMETRY MLT: CPT

## 2023-09-14 PROCEDURE — 82150 ASSAY OF AMYLASE: CPT | Performed by: NURSE PRACTITIONER

## 2023-09-14 PROCEDURE — 99285 EMERGENCY DEPT VISIT HI MDM: CPT | Mod: 25

## 2023-09-14 PROCEDURE — 83735 ASSAY OF MAGNESIUM: CPT

## 2023-09-14 PROCEDURE — 85025 COMPLETE CBC W/AUTO DIFF WBC: CPT

## 2023-09-14 PROCEDURE — 82962 GLUCOSE BLOOD TEST: CPT

## 2023-09-14 RX ORDER — HYDROMORPHONE HYDROCHLORIDE 1 MG/ML
0.5 INJECTION, SOLUTION INTRAMUSCULAR; INTRAVENOUS; SUBCUTANEOUS EVERY 4 HOURS PRN
Status: DISCONTINUED | OUTPATIENT
Start: 2023-09-14 | End: 2023-09-14

## 2023-09-14 RX ORDER — ENOXAPARIN SODIUM 100 MG/ML
40 INJECTION SUBCUTANEOUS EVERY 24 HOURS
Status: DISCONTINUED | OUTPATIENT
Start: 2023-09-15 | End: 2023-09-25 | Stop reason: HOSPADM

## 2023-09-14 RX ORDER — LEVALBUTEROL INHALATION SOLUTION 0.63 MG/3ML
0.63 SOLUTION RESPIRATORY (INHALATION)
Status: DISCONTINUED | OUTPATIENT
Start: 2023-09-14 | End: 2023-09-25 | Stop reason: HOSPADM

## 2023-09-14 RX ORDER — TALC
6 POWDER (GRAM) TOPICAL NIGHTLY PRN
Status: DISCONTINUED | OUTPATIENT
Start: 2023-09-14 | End: 2023-09-18

## 2023-09-14 RX ORDER — HYDROMORPHONE HYDROCHLORIDE 1 MG/ML
1 INJECTION, SOLUTION INTRAMUSCULAR; INTRAVENOUS; SUBCUTANEOUS EVERY 4 HOURS PRN
Status: DISCONTINUED | OUTPATIENT
Start: 2023-09-14 | End: 2023-09-15

## 2023-09-14 RX ORDER — SODIUM CHLORIDE 0.9 % (FLUSH) 0.9 %
10 SYRINGE (ML) INJECTION
Status: DISCONTINUED | OUTPATIENT
Start: 2023-09-14 | End: 2023-09-25 | Stop reason: HOSPADM

## 2023-09-14 RX ORDER — MIDAZOLAM HYDROCHLORIDE 1 MG/ML
INJECTION INTRAMUSCULAR; INTRAVENOUS
Status: COMPLETED | OUTPATIENT
Start: 2023-09-14 | End: 2023-09-14

## 2023-09-14 RX ORDER — INSULIN ASPART 100 [IU]/ML
0-5 INJECTION, SOLUTION INTRAVENOUS; SUBCUTANEOUS EVERY 6 HOURS PRN
Status: DISCONTINUED | OUTPATIENT
Start: 2023-09-14 | End: 2023-09-25 | Stop reason: HOSPADM

## 2023-09-14 RX ORDER — ONDANSETRON 2 MG/ML
4 INJECTION INTRAMUSCULAR; INTRAVENOUS EVERY 6 HOURS PRN
Status: DISCONTINUED | OUTPATIENT
Start: 2023-09-14 | End: 2023-09-22

## 2023-09-14 RX ORDER — ACETAMINOPHEN 500 MG
1000 TABLET ORAL EVERY 8 HOURS
Status: DISCONTINUED | OUTPATIENT
Start: 2023-09-14 | End: 2023-09-25 | Stop reason: HOSPADM

## 2023-09-14 RX ORDER — PANTOPRAZOLE SODIUM 40 MG/1
40 TABLET, DELAYED RELEASE ORAL DAILY
Status: DISCONTINUED | OUTPATIENT
Start: 2023-09-15 | End: 2023-09-25 | Stop reason: HOSPADM

## 2023-09-14 RX ORDER — ACETAMINOPHEN 325 MG/1
650 TABLET ORAL EVERY 6 HOURS PRN
Status: DISCONTINUED | OUTPATIENT
Start: 2023-09-14 | End: 2023-09-14

## 2023-09-14 RX ORDER — SODIUM CHLORIDE, SODIUM LACTATE, POTASSIUM CHLORIDE, CALCIUM CHLORIDE 600; 310; 30; 20 MG/100ML; MG/100ML; MG/100ML; MG/100ML
INJECTION, SOLUTION INTRAVENOUS CONTINUOUS
Status: DISCONTINUED | OUTPATIENT
Start: 2023-09-14 | End: 2023-09-15

## 2023-09-14 RX ORDER — HYDROMORPHONE HYDROCHLORIDE 1 MG/ML
0.2 INJECTION, SOLUTION INTRAMUSCULAR; INTRAVENOUS; SUBCUTANEOUS
Status: DISCONTINUED | OUTPATIENT
Start: 2023-09-14 | End: 2023-09-14

## 2023-09-14 RX ORDER — GLUCAGON 1 MG
1 KIT INJECTION
Status: DISCONTINUED | OUTPATIENT
Start: 2023-09-14 | End: 2023-09-25 | Stop reason: HOSPADM

## 2023-09-14 RX ORDER — IBUPROFEN 200 MG
200 TABLET ORAL EVERY 6 HOURS PRN
Status: DISCONTINUED | OUTPATIENT
Start: 2023-09-14 | End: 2023-09-14

## 2023-09-14 RX ORDER — LIDOCAINE HYDROCHLORIDE 10 MG/ML
INJECTION INFILTRATION; PERINEURAL
Status: COMPLETED | OUTPATIENT
Start: 2023-09-14 | End: 2023-09-14

## 2023-09-14 RX ORDER — FENTANYL CITRATE 50 UG/ML
INJECTION, SOLUTION INTRAMUSCULAR; INTRAVENOUS
Status: COMPLETED | OUTPATIENT
Start: 2023-09-14 | End: 2023-09-14

## 2023-09-14 RX ORDER — HYDROMORPHONE HYDROCHLORIDE 1 MG/ML
0.5 INJECTION, SOLUTION INTRAMUSCULAR; INTRAVENOUS; SUBCUTANEOUS
Status: DISCONTINUED | OUTPATIENT
Start: 2023-09-14 | End: 2023-09-15

## 2023-09-14 RX ADMIN — HYDROMORPHONE HYDROCHLORIDE 1 MG: 1 INJECTION, SOLUTION INTRAMUSCULAR; INTRAVENOUS; SUBCUTANEOUS at 11:09

## 2023-09-14 RX ADMIN — SODIUM CHLORIDE, POTASSIUM CHLORIDE, SODIUM LACTATE AND CALCIUM CHLORIDE 1000 ML: 600; 310; 30; 20 INJECTION, SOLUTION INTRAVENOUS at 05:09

## 2023-09-14 RX ADMIN — MIDAZOLAM HYDROCHLORIDE 1 MG: 2 INJECTION, SOLUTION INTRAMUSCULAR; INTRAVENOUS at 03:09

## 2023-09-14 RX ADMIN — FENTANYL CITRATE 25 MCG: 0.05 INJECTION, SOLUTION INTRAMUSCULAR; INTRAVENOUS at 03:09

## 2023-09-14 RX ADMIN — HYDROMORPHONE HYDROCHLORIDE 0.5 MG: 1 INJECTION, SOLUTION INTRAMUSCULAR; INTRAVENOUS; SUBCUTANEOUS at 05:09

## 2023-09-14 RX ADMIN — FENTANYL CITRATE 50 MCG: 0.05 INJECTION, SOLUTION INTRAMUSCULAR; INTRAVENOUS at 03:09

## 2023-09-14 RX ADMIN — ACETAMINOPHEN 1000 MG: 500 TABLET ORAL at 07:09

## 2023-09-14 RX ADMIN — HYDROMORPHONE HYDROCHLORIDE 1 MG: 1 INJECTION, SOLUTION INTRAMUSCULAR; INTRAVENOUS; SUBCUTANEOUS at 07:09

## 2023-09-14 RX ADMIN — IOHEXOL 300 ML: 9 SOLUTION ORAL at 08:09

## 2023-09-14 RX ADMIN — IOHEXOL 75 ML: 350 INJECTION, SOLUTION INTRAVENOUS at 08:09

## 2023-09-14 RX ADMIN — PIPERACILLIN SODIUM AND TAZOBACTAM SODIUM 4.5 G: 4; .5 INJECTION, POWDER, FOR SOLUTION INTRAVENOUS at 07:09

## 2023-09-14 RX ADMIN — SODIUM CHLORIDE 2000 MG: 9 INJECTION, SOLUTION INTRAVENOUS at 03:09

## 2023-09-14 RX ADMIN — SODIUM CHLORIDE, POTASSIUM CHLORIDE, SODIUM LACTATE AND CALCIUM CHLORIDE: 600; 310; 30; 20 INJECTION, SOLUTION INTRAVENOUS at 09:09

## 2023-09-14 RX ADMIN — MIDAZOLAM HYDROCHLORIDE 0.5 MG: 2 INJECTION, SOLUTION INTRAMUSCULAR; INTRAVENOUS at 03:09

## 2023-09-14 RX ADMIN — LIDOCAINE HYDROCHLORIDE 10 ML: 10 INJECTION, SOLUTION INFILTRATION; PERINEURAL at 03:09

## 2023-09-14 RX ADMIN — LEVALBUTEROL HYDROCHLORIDE 0.63 MG: 0.63 SOLUTION RESPIRATORY (INHALATION) at 05:09

## 2023-09-14 NOTE — Clinical Note
Diagnosis: Postprocedural intraabdominal abscess [[id number]]   Admitting Provider:: ANTWAN SQUIRES [[id number]]   Future Attending Provider: ANTWAN SQUIRES [[id number]]   Bed request comments: charles   Reason for IP Medical Treatment  (Clinical interventions that can only be accomplished in the IP setting? ) :: abdominal distension; cough; intra abdominal abscess   I certify that Inpatient services for greater than or equal to 2 midnights are medically necessary:: Yes   Plans for Post-Acute care--if anticipated (pick the single best option):: C. Discharge home with home health services   Special Needs:: No Special Needs [1]

## 2023-09-14 NOTE — PLAN OF CARE
Abscess drain placement complete. 2 drains placed.  Pt tolerated well. VSS. No signs or symptoms of distress noted.  Site CDI.  Pt will be transferred to recovery bed and report to be given at bedside.

## 2023-09-14 NOTE — H&P
[facility] - Emergency Dept  History & Physical    SUBJECTIVE:     Chief Complaint/Reason for Admission: cough, abdominal distension,abdominal bloating    History of Present Illness:  Patient is a 62 y.o. male presents to the ED on 9/14/2023 with cough and  abdominal distension.  CT on 9/14/2023 with intra abdominal fluid collection measuring 17.5 cm x 17.8 cm in the right abdomen. Mr. Valladares is s/p open distal gastrectomy with BII reconstruction on 8/21/23. He is with c/o of a productive cough with yellow to brown sputum for approximately a week.  Patient was seen in clinic on 9/12/23 by Radha Smith NP/Dr. Moy and was placed on levofloxacin.  Patient is + for a bowel movement 2 days ago and is + flatus. Tolerating very little PO intake with no c/o N/V.  He was on TPN while inpatient for his last hospitalization and d/c'ed on home TPN via PICC line since his discharge on 9/1/2023.      (Not in a hospital admission)      Review of patient's allergies indicates:   Allergen Reactions    Penicillins Rash       Past Medical History:   Diagnosis Date    Psoriasis     Stomach cancer      Past Surgical History:   Procedure Laterality Date    ESOPHAGOGASTRODUODENOSCOPY N/A 8/3/2023    Procedure: EGD (ESOPHAGOGASTRODUODENOSCOPY);  Surgeon: Loco Marvin MD;  Location: [facility];  Service: Gastroenterology;  Laterality: N/A;    GASTRECTOMY N/A 8/21/2023    Procedure: GASTRECTOMY;  Surgeon: Eulogio Moy MD;  Location: [address];  Service: General;  Laterality: N/A;  Open gastrectomy with EGD. Needs Omni retractor, requesting room 24    TONSILLECTOMY       Family History   Problem Relation Age of Onset    Colon cancer Neg Hx     Crohn's disease Neg Hx     Esophageal cancer Neg Hx     Stomach cancer Neg Hx     Ulcerative colitis Neg Hx     Liver disease Neg Hx      Social History     Tobacco Use    Smoking status: Former     Current packs/day: 0.00     Types: Cigarettes     Quit date: 5/27/2013      Years since quitting: 10.3    Smokeless tobacco: Never   Substance Use Topics    Alcohol use: Yes     Comment: occ    Drug use: Never        Review of Systems:  Constitutional: no fever or chills  Eyes: no visual changes  Cardiovascular: no chest pain or palpitations  Gastrointestinal: positive for abdominal pain, negative for nausea and vomiting  Musculoskeletal: no arthralgias or myalgias  Neurological: no seizures or tremors  Behavioral/Psych: negative for tobacco use    OBJECTIVE:     Vital Signs (Most Recent):  Temp: 97.9 °F (36.6 °C) (09/14/23 1130)  Pulse: 109 (09/14/23 1342)  Resp: 20 (09/14/23 1348)  BP: (!) 143/78 (09/14/23 1348)  SpO2: 95 % (09/14/23 1342)    Physical Exam:  General: fatigued, moderate distress  Eyes: conjunctivae/corneas clear. PERRL.   Neck: supple, symmetrical, trachea midline, no JVD and thyroid not enlarged, symmetric, no tenderness/mass/nodules  Lungs:  clear to auscultation bilaterally and normal respiratory effort  Cardiovascular: Heart: regular rate and rhythm, S1, S2 normal, no murmur, click, rub or gallop. Chest Wall: no tenderness. Extremities: +1 bilateral LE edema. Pulses: 2+ and symmetric.  Abdomen/Rectal: Abdomen: abnormal findings:  distended and hypoactive bowel sounds. Rectal: not examined  Skin: Skin color, texture, turgor normal. No rashes or lesions  Musculoskeletal:normal gait  Neurologic: Normal strength and tone. No focal numbness or weakness  Psych/Behavioral:  Normal. and Alert and oriented, appropriate affect.    Laboratory:  I have reviewed all pertinent lab results within the past 24 hours.  CBC:   Recent Labs   Lab 09/14/23  1242   WBC 17.73*   RBC 3.54*   HGB 9.8*   HCT 30.0*      MCV 85   MCH 27.7   MCHC 32.7     CMP:   Recent Labs   Lab 09/14/23  1242   *   CALCIUM 8.1*   ALBUMIN 1.4*   PROT 5.9*      K 5.3*   CO2 26      BUN 22   CREATININE 0.8   ALKPHOS 187*   ALT 38   AST 35   BILITOT 1.1*       Diagnostic Results:  Labs:  Reviewed  CT: Reviewed  CT A/P with contrast 9/14/2023. See epic for complete report    FINDINGS:  Since prior exam there has been interval development of defined, rim enhancing fluid collections within the right abdomen. Although the collections are multiple regions the do appear to all connect with greatest transaxial dimension of 17.5 cm and craniocaudad dimension of 17.8 cm. Collections track around the liver and into the subdiaphragmatic space.     On the left there is connection to what appears to be a forming well-defined collection with some components of adjacent free fluid.     The total intra-abdominal fluid has increased in quantity compared with what was seen on 08/28/2023.     The liver, spleen, adrenal glands, right kidney and pancreas are unremarkable. There is mild left pelvicaliectasis new from prior exam with the ureter dilated to the level of the pelvic inflammatory changes. No obstructing calculus is visualized.     There are mildly dilated loops of small bowel visualized within the mid abdomen.     The osseous structures show degenerative change.     Impression:     There are findings concerning for large intra-abdominal abscesses in this postoperative patient.  It appears that the large collections all connect.  Compared with what was seen on 08/28/2023 this represents a new finding, specifically the there has been an increase in the amount of intra-abdominal fluid with development of well-defined, rim enhancing collections.     There are dilated loops of small bowel possibly representing ileus versus obstruction.     There has been interval development of mild left pelvicaliectasis without evidence obstructing calculus.  The ureters dilated to the level of the inflammatory changes in the pelvis.      ASSESSMENT/PLAN:     Mr. Valladares is a 61 y/o male s/p  open distal gastrectomy with BII reconstruction on 8/21/23 and admitted for intra abdominal fluid collection on CT on 9/14/23 and cough.      Plan: Continue Current   - IR today for right abdominal fluid aspiration and drain placement. F/u cultures.   - NPO  - TPN via PICC line  - IVAB Zosyn and Vanc  - f/u blood cultures  - PT, OOB chair, Ambulate TID  - DVT/GI prophylaxis

## 2023-09-14 NOTE — PLAN OF CARE
Pt arrived to ir 173 for abscess drain. Pt oriented to unit and staff. Plan of care reviewed with patient, patient verbalizes understanding. Comfort measures utilized. Pt safely transferred from stretcher to procedural table. Pt was unable to lay flat (pain limited) and so pt was propped up with wedge and pillows. Fall risk reviewed with patient, fall risk interventions maintained. Safety strap applied, positioner pillows utilized to minimize pressure points. Blankets applied. Pt prepped and draped utilizing standard sterile technique. Patient placed on continuous monitoring, as required by sedation policy. Timeouts completed utilizing standard universal time-out, per department and facility policy. RN to remain at bedside, continuous monitoring maintained. Pt resting comfortably. Denies pain/discomfort. Will continue to monitor. See flow sheets for monitoring, medication administration, and updates.

## 2023-09-14 NOTE — H&P
Please see IR consult note dated 9/14/2023    Amirah Henry PA-C  Interventional Radiology  Spectra [id number]  9/14/2023

## 2023-09-14 NOTE — ED PROVIDER NOTES
Encounter Date: 9/14/2023       History     Chief Complaint   Patient presents with    Abdominal Swelling     Abdominal swelling x 1 week. Had CT scan performed today, instructed to come to ED for further work up. Hx of stomach cancer. PICC w/ TPN infusion.      Danish Vlaladares is a 62 y.o. male with a past medical history of gastric adenocarcinoma s/p gastrectomy with general surgery, Dr. Moy, 08/21/2023.  Patient reports that over the past week he has had productive cough as well as progressive abdominal distention and bloating.  He was last seen by Dr. Moy in clinic on 09/12/2023 and prescribed levofloxacin for his productive cough.  Since that time, the patient has failed to progress.  He was seen this morning where a contrast-enhanced CT abdomen and pelvis was obtained and demonstrated the large intra-abdominal abscess with multiple rim enhancing fluid collections.  There is also large volume intra-abdominal fluid.  This study also demonstrated multiple dilated loops of bowel concerning for SBO versus ileus.  Patient reports that he has no abdominal pain, has not had nausea, vomiting, fevers, chills, or chest pain.  He has had some mild shortness of breath and multiple bouts of diarrhea.  Of note, the patient has been taking TPN and has slowly advance his diet to soft foods by mouth.         Review of patient's allergies indicates:   Allergen Reactions    Penicillins Rash     Past Medical History:   Diagnosis Date    Psoriasis     Stomach cancer      Past Surgical History:   Procedure Laterality Date    ESOPHAGOGASTRODUODENOSCOPY N/A 8/3/2023    Procedure: EGD (ESOPHAGOGASTRODUODENOSCOPY);  Surgeon: Loco Marvin MD;  Location: [facility];  Service: Gastroenterology;  Laterality: N/A;    GASTRECTOMY N/A 8/21/2023    Procedure: GASTRECTOMY;  Surgeon: Eulogio Moy MD;  Location: [facility] OR [address] [place] [address];  Service: General;  Laterality: N/A;  Open gastrectomy with EGD. Needs Omni retractor,  requesting room 24    TONSILLECTOMY       Family History   Problem Relation Age of Onset    Colon cancer Neg Hx     Crohn's disease Neg Hx     Esophageal cancer Neg Hx     Stomach cancer Neg Hx     Ulcerative colitis Neg Hx     Liver disease Neg Hx      Social History     Tobacco Use    Smoking status: Former     Current packs/day: 0.00     Types: Cigarettes     Quit date: 5/27/2013     Years since quitting: 10.3    Smokeless tobacco: Never   Substance Use Topics    Alcohol use: Yes     Comment: occ    Drug use: Never     Review of Systems   Constitutional:  Positive for activity change, appetite change and fatigue. Negative for chills and fever.   Respiratory:  Positive for cough, choking and shortness of breath.         Positive for productive cough with yellow-green sputum.   Cardiovascular:  Negative for chest pain.   Gastrointestinal:  Positive for abdominal distention and diarrhea. Negative for nausea, rectal pain and vomiting.   Genitourinary:  Negative for difficulty urinating and dysuria.   Skin:  Positive for wound. Negative for rash.        Positive for well-healing midline abdominal incision   Neurological:  Positive for weakness. Negative for dizziness.       Physical Exam     Initial Vitals [09/14/23 1130]   BP Pulse Resp Temp SpO2   101/60 106 18 97.9 °F (36.6 °C) 96 %      MAP       --         Physical Exam    Constitutional: He appears well-developed and well-nourished. He is not diaphoretic. No distress.   HENT:   Head: Normocephalic and atraumatic.   Eyes: Conjunctivae and EOM are normal. Pupils are equal, round, and reactive to light. Right eye exhibits no discharge. Left eye exhibits no discharge. No scleral icterus.   Neck: Neck supple.   Normal range of motion.  Cardiovascular:  Normal rate and regular rhythm.           Pulmonary/Chest: No respiratory distress. He exhibits no tenderness.   Breath sounds are decreased at the right lung base.  Inspiratory rhonchi in the bilateral apexes.    Abdominal: Abdomen is soft. He exhibits distension. He exhibits no mass. There is no abdominal tenderness.   The abdomen is notably distended.  There is a well-healing midline abdominal incision. There is no rebound and no guarding.   Musculoskeletal:         General: Normal range of motion.      Cervical back: Normal range of motion and neck supple.     Neurological: He is alert and oriented to person, place, and time.   Skin: Skin is warm. Capillary refill takes 2 to 3 seconds. No rash noted. No erythema.   Psychiatric: He has a normal mood and affect.         ED Course   Procedures  Labs Reviewed   CBC W/ AUTO DIFFERENTIAL - Abnormal; Notable for the following components:       Result Value    WBC 17.73 (*)     RBC 3.54 (*)     Hemoglobin 9.8 (*)     Hematocrit 30.0 (*)     RDW 15.3 (*)     Immature Granulocytes 3.2 (*)     Gran # (ANC) 14.4 (*)     Immature Grans (Abs) 0.57 (*)     Mono # 1.6 (*)     Gran % 81.1 (*)     Lymph % 6.3 (*)     All other components within normal limits   COMPREHENSIVE METABOLIC PANEL - Abnormal; Notable for the following components:    Potassium 5.3 (*)     Glucose 489 (*)     Calcium 8.1 (*)     Total Protein 5.9 (*)     Albumin 1.4 (*)     Total Bilirubin 1.1 (*)     Alkaline Phosphatase 187 (*)     All other components within normal limits   CULTURE, BLOOD   GRAM STAIN   CULTURE, FLUID  (AEROBIC) WITH GRAM STAIN   GRAM STAIN   CULTURE, ANAEROBIC   CULTURE, AEROBIC  (SPECIFY SOURCE)   MAGNESIUM   PROTIME-INR   AMYLASE, BODY FLUID (REFERENCE LAB OR NON-[facility])   TYPE & SCREEN   POCT GLUCOSE MONITORING CONTINUOUS   POCT GLUCOSE MONITORING CONTINUOUS        ECG Results              EKG 12-lead (Final result)  Result time 09/14/23 17:06:53      Final result by Interface, Lab In [facility] (09/14/23 17:06:53)                   Narrative:    Test Reason : T81.43XA,    Vent. Rate : 105 BPM     Atrial Rate : 105 BPM     P-R Int : 124 ms          QRS Dur : 100 ms      QT Int : 330 ms        P-R-T Axes : 087 083 088 degrees     QTc Int : 436 ms    Sinus tachycardia with occasional Premature ventricular complexes  Incomplete right bundle branch block  Borderline Abnormal ECG  When compared with ECG of 16-AUG-2023 15:44,  Premature ventricular complexes are now Present  Confirmed by Osvaldo Kemp MD ([id number]) on 9/14/2023 5:06:43 PM    Referred By: System System           Confirmed By:Osvaldo Kemp MD                                  Imaging Results              X-Ray Chest 1 View (Final result)  Result time 09/14/23 17:45:23      Final result by Jitendra Rodriguez MD (09/14/23 17:45:23)                   Impression:      As above      Electronically signed by: Jitendra Rodriguez MD  Date:    09/14/2023  Time:    17:45               Narrative:    EXAMINATION:  XR CHEST 1 VIEW    CLINICAL HISTORY:  Infection following a procedure, organ and space surgical site, initial encounter    TECHNIQUE:  Single frontal view of the chest was performed.    COMPARISON:  08/17/2023    FINDINGS:  The cardiomediastinal silhouette is not enlarged.  Right PICC catheter tip projects over the distal SVC.  There is elevation of the right hemidiaphragm..  There is no pleural effusion.  The trachea is midline.  The lungs are symmetrically expanded bilaterally with minimal left basilar subsegmental atelectasis..  No large focal consolidation seen.  There is no pneumothorax.  The osseous structures are remarkable for degenerative changes..  Drainage catheter projects over the right upper quadrant.                                       IR Abscess Drainage With Tube Placement (In process)                   X-Rays:   Independently Interpreted Readings:   Chest X-Ray: The heart is normal size.  There is no pleural effusion.  There is no osseous abnormality.  There is a small degree of left basilar atelectasis.  The right hemidiaphragm is slightly elevated.     Medications   sodium chloride 0.9% flush 10 mL (has no administration in time  range)   melatonin tablet 6 mg (has no administration in time range)   TPN ADULT CENTRAL LINE CUSTOM (has no administration in time range)   fat emulsion 20% infusion 250 mL (has no administration in time range)   piperacillin-tazobactam (ZOSYN) 4.5 g in dextrose 5 % in water (D5W) 100 mL IVPB (MB+) (has no administration in time range)   levalbuterol nebulizer solution 0.63 mg (0.63 mg Nebulization Given 9/14/23 1717)   pantoprazole EC tablet 40 mg (has no administration in time range)   enoxaparin injection 40 mg (has no administration in time range)   ondansetron injection 4 mg (has no administration in time range)   methocarbamoL (ROBAXIN) 500 mg in dextrose 5 % (D5W) 100 mL IVPB (has no administration in time range)   lactated ringers bolus 1,000 mL (1,000 mLs Intravenous New Bag 9/14/23 4464)   vancomycin - pharmacy to dose (has no administration in time range)   lactated ringers infusion (has no administration in time range)   HYDROmorphone injection 0.5 mg (has no administration in time range)   HYDROmorphone injection 1 mg (has no administration in time range)   dextrose 10% bolus 125 mL 125 mL (has no administration in time range)   glucagon (human recombinant) injection 1 mg (has no administration in time range)   insulin aspart U-100 pen 0-5 Units (has no administration in time range)   acetaminophen tablet 1,000 mg (has no administration in time range)   vancomycin (VANCOCIN) 2,000 mg in sodium chloride 0.9% 250 mL IVPB (Vial-Mate) (2,000 mg Intravenous New Bag 9/14/23 1500)   midazolam (VERSED) 1 mg/mL injection (1 mg Intravenous Given 9/14/23 1549)   fentaNYL 50 mcg/mL injection (50 mcg Intravenous Given 9/14/23 1549)   LIDOcaine HCL 10 mg/ml (1%) injection (10 mLs Other Given 9/14/23 1515)     Medical Decision Making  Danish Valladares is a 62 y.o. male with a past medical history of gastric adenocarcinoma s/p gastrectomy with general surgery, Dr. Moy, 08/21/2023.  Patient had a contrast-enhanced  CT scan this morning which showed a large intra-abdominal abscess with multiple ring-enhancing fluid collections, increased volume of intra-abdominal fluid as compared to prior studies, and multiple dilated loops of bowel concerning for SBO versus ileus.  Patient and family were contacted to return to the emergency department for further evaluation and admission to general surgery for intervention.  On initial presentation to the emergency department, patient is afebrile and hemodynamically stable.  On physical exam, the patient's abdomen is notably distended.  He is soft, nontender to palpation, and does not display any rebound or guarding.  He does have decreased breath sounds at the base of the right long and bilateral inspiratory rhonchi over the upper lung fields.  Patient was found to have leukocytosis of 17.7.  His H and H were stable at 9.8 and 30, respectively.  His CMP showed mild hyperkalemia at 5.3 and a blood glucose of 489.  The CMP was drawn off the patient's TPN line, thus I believe that the blood glucose was inaccurate.  Fingerstick blood glucose was 123.  CXR demonstrated no pleural effusion or sinus and pneumonia.  I sent an inpatient consult to General surgery who admitted the patient.  The patient remained NPO in the emergency department and was taken to the IR suite for drain placement.  Two drains were placed by Interventional Radiology and the patient returned to the emergency department stable condition.  His pain was treated both multimodal medications with input from the general surgery team.     Amount and/or Complexity of Data Reviewed  Labs: ordered.  Radiology: ordered.  ECG/medicine tests:  Decision-making details documented in ED Course.    Risk  Decision regarding hospitalization.              Attending Attestation:   Physician Attestation Statement for Resident:  As the supervising MD   Physician Attestation Statement: I have personally seen and examined this patient.   I agree  with the above history.  -:   As the supervising MD I agree with the above PE.   -: 62-year-old male distended abdomen.  No acute distress.  Nontoxic.   As the supervising MD I agree with the above treatment, course, plan, and disposition.    I have reviewed and agree with the residents interpretation of the following: lab data and CT scans.                 ED Course as of 09/14/23 1808   u Sep 14, 2023   1304 EKG 12-lead  EKG independently interpreted by myself shows sinus tachycardia rate of 105, normal intervals, incomplete right bundle-branch block, no acute ST T wave abnormalities.  Compared to an EKG on August 16, 2023 shows no change. [SM]      ED Course User Index  [SM] Reji Pringle DO                    Clinical Impression:   Final diagnoses:  [T81.43XA] Postprocedural intraabdominal abscess        ED Disposition Condition    Admit Stable                SARAH Flores MD  Resident  09/14/23 1808       Reji Pringle,   09/14/23 2034

## 2023-09-14 NOTE — ED TRIAGE NOTES
Pt c/o abdominal swelling x 1wk.  States he had CT scan today and told to come to ED for admission.  Pt having pain on the right side of his abdomen.  Pt arrives with TPN infusing via PICC line.   Pt having intermittent diarrhea since surgery.   Denies n/v.

## 2023-09-14 NOTE — CARE UPDATE
Pt arrived to MPU 5 for recovery of a abscess drain placement pt to recover for 30 mins and then return to ED. Pt has Vanc infusing and is to finish antibiotic in ED.

## 2023-09-14 NOTE — ED NOTES
Patient identifiers verified and correct for Danish Valladares  LOC: The patient is awake, alert and aware of environment with an appropriate affect, the patient is oriented x 3 and speaking appropriately.   APPEARANCE: Patient appears comfortable and in no acute distress, patient is clean and well groomed.  SKIN: The skin is warm and dry, color consistent with ethnicity, patient has normal skin turgor and moist mucus membranes, skin intact, no breakdown or bruising noted.   MUSCULOSKELETAL: Patient moving all extremities spontaneously, no swelling noted.  RESPIRATORY: Airway is open and patent, respirations are spontaneous, patient has a normal effort and rate, no accessory muscle. (+) cough  CARDIAC: Pt placed on cardiac monitor. Patient has a normal rate and regular rhythm, no edema noted, capillary refill < 3 seconds.   GASTRO:  Abdominal distention noted.  Bruising to RLQ noted.  Upper midline incision healing, intact, no drainage noted.  : Pt denies any pain or frequency with urination.  NEURO: Pt opens eyes spontaneously, behavior appropriate to situation, follows commands, facial expression symmetrical, bilateral hand grasp equal and even, purposeful motor response noted, normal sensation in all extremities when touched with a finger.

## 2023-09-14 NOTE — ED NOTES
"Pharmacokinetic Initial Assessment and Plan: IV Vancomycin     Intravenous vancomycin 2000 mg x once was given in the ED on 09/14 at 1500  Continue Vancomycin 1500 mg IV every 12 hours   Draw vancomycin trough level 30 min prior to 4 th dose on 09/16 at approximately 0600  Desired empiric serum trough concentration is 10 to 20 mcg/mL     Pharmacy will continue to follow and monitor vancomycin. [id number] with any questions regarding this assessment.      Thank you for the consult,   Cecilia Smith; Jamshid        Patient brief summary:  Danish Valladares is a 62 y.o. male initiated on antimicrobial therapy with IV Vancomycin for treatment of suspected intra-abdominal infection    Drug Allergies:   Review of patient's allergies indicates:   Allergen Reactions    Penicillins Rash       Actual Body Weight:   83.9 kg    Renal Function:   Estimated Creatinine Clearance: 102 mL/min (based on SCr of 0.8 mg/dL).,     CBC (last 72 hours):  Recent Labs   Lab Result Units 09/14/23  1242   WBC K/uL 17.73*   Hemoglobin g/dL 9.8*   Hematocrit % 30.0*   Platelets K/uL 416   Gran % % 81.1*   Lymph % % 6.3*   Mono % % 8.9   Eosinophil % % 0.1   Basophil % % 0.4   Differential Method  Automated       Metabolic Panel (last 72 hours):  Recent Labs   Lab Result Units 09/14/23  1242   Sodium mmol/L 138   Potassium mmol/L 5.3*   Chloride mmol/L 100   CO2 mmol/L 26   Glucose mg/dL 489*   BUN mg/dL 22   Creatinine mg/dL 0.8   Albumin g/dL 1.4*   Total Bilirubin mg/dL 1.1*   Alkaline Phosphatase U/L 187*   AST U/L 35   ALT U/L 38   Magnesium mg/dL 2.4       Drug levels (last 3 results):  No results for input(s): "VANCOMYCINRA", "VANCORANDOM", "VANCOMYCINPE", "VANCOPEAK", "VANCOMYCINTR", "VANCOTROUGH" in the last 72 hours.    Microbiologic Results:  Microbiology Results (last 7 days)       Procedure Component Value Units Date/Time    Gram stain [[id number]] Collected: 09/14/23 1528    Order Status: Sent Specimen: Abscess from " Abdomen Updated: 09/14/23 1704    Aerobic culture [[id number]] Collected: 09/14/23 1528    Order Status: Sent Specimen: Abscess from Abdomen Updated: 09/14/23 1703    Culture, Anaerobe [[id number]] Collected: 09/14/23 1528    Order Status: Sent Specimen: Abscess from Abdomen Updated: 09/14/23 1703    Culture, Body Fluid (Aerobic) w/ GS [[id number]] Collected: 09/14/23 1530    Order Status: Sent Specimen: Body Fluid from Peritoneal Fluid Updated: 09/14/23 1644    Gram stain [[id number]] Collected: 09/14/23 1530    Order Status: Sent Specimen: Abscess from Abdomen Updated: 09/14/23 1643    Culture, Fluid  (Aerobic) [[id number]]     Order Status: Canceled Specimen: Body Fluid     Culture, Anaerobe [[id number]] Collected: 09/14/23 1528    Order Status: Canceled Specimen: Abscess from Abdomen     Blood culture [[id number]] Collected: 09/14/23 1345    Order Status: Sent Specimen: Blood from Line, PICC Right Basilic Updated: 09/14/23 1448

## 2023-09-14 NOTE — CONSULTS
Consult Note  Interventional Radiology      Date: 9/14/2023   Primary team: Networked reference to record MARTÍN , ChinmayEulogio, *   Room/bed: ED 33/33    Inpatient consult to Interventional Radiology  Consult performed by: Amirah Henry PA-C  Consult ordered by: Rachel Baker NP           Reason for Consult:   s/p gastrectomy 8/21/23 with intra abdominal fluid collection     SUBJECTIVE:     Chief Complaint:  intra abdominal fluid collections    History of Present Illness:  Danish Valladares is a 62 y.o. male with a past medical history of gastric adenocarcinoma s/p gastrectomy with Dr. Moy 08/21/2023  who presented after CT scan done outpatient revealed an intra abdominal fluid collection. Interventional Radiology has been consulted for image guided percutaneous aspiration/drain placement for an intraabdominal fluid collection measuring 17.5 cm x 17.8 cm in the right abdomen. He is afebrile with leukocytosis (WBC 17).       Review of Systems   Constitutional: Negative.    HENT: Negative.     Respiratory: Negative.     Cardiovascular: Negative.    Gastrointestinal: Negative.    Musculoskeletal: Negative.    Skin: Negative.    Neurological: Negative.         Scheduled Meds:   fat emulsion 20%  250 mL Intravenous Daily    piperacillin-tazobactam (Zosyn) IV (PEDS and ADULTS) (extended infusion is not appropriate)  4.5 g Intravenous Q8H    vancomycin (VANCOCIN) IV (PEDS and ADULTS)  2,000 mg Intravenous Q12H     Continuous Infusions:   TPN ADULT CENTRAL LINE CUSTOM       PRN Meds:melatonin, sodium chloride 0.9%    Review of patient's allergies indicates:   Allergen Reactions    Penicillins Rash       Past Medical History:   Diagnosis Date    Psoriasis     Stomach cancer      Past Surgical History:   Procedure Laterality Date    ESOPHAGOGASTRODUODENOSCOPY N/A 8/3/2023    Procedure: EGD (ESOPHAGOGASTRODUODENOSCOPY);  Surgeon: Loco Marvin MD;  Location: [facility];  Service: Gastroenterology;   Laterality: N/A;    GASTRECTOMY N/A 8/21/2023    Procedure: GASTRECTOMY;  Surgeon: Eulogio Moy MD;  Location: [facility] OR [address] [place] [address];  Service: General;  Laterality: N/A;  Open gastrectomy with EGD. Needs Omni retractor, requesting room 24    TONSILLECTOMY       Family History   Problem Relation Age of Onset    Colon cancer Neg Hx     Crohn's disease Neg Hx     Esophageal cancer Neg Hx     Stomach cancer Neg Hx     Ulcerative colitis Neg Hx     Liver disease Neg Hx      Social History     Tobacco Use    Smoking status: Former     Current packs/day: 0.00     Types: Cigarettes     Quit date: 5/27/2013     Years since quitting: 10.3    Smokeless tobacco: Never   Substance Use Topics    Alcohol use: Yes     Comment: occ    Drug use: Never       OBJECTIVE:     Vital Signs (Most Recent)  Temp: 97.9 °F (36.6 °C) (09/14/23 1130)  Pulse: 97 (09/14/23 1233)  Resp: 20 (09/14/23 1233)  BP: 138/76 (09/14/23 1233)  SpO2: 97 % (09/14/23 1233)    Physical Exam:   Physical Exam  Constitutional:       Appearance: Normal appearance.   HENT:      Head: Normocephalic.   Cardiovascular:      Rate and Rhythm: Tachycardia present.   Pulmonary:      Effort: Pulmonary effort is normal.   Abdominal:      General: There is distension.   Neurological:      Mental Status: He is alert. Mental status is at baseline.   Psychiatric:         Mood and Affect: Mood normal.         Laboratory  I have reviewed all pertinent lab results within the past 24 hours.  CBC:   Recent Labs   Lab 09/14/23  1242   WBC 17.73*   RBC 3.54*   HGB 9.8*   HCT 30.0*      MCV 85   MCH 27.7   MCHC 32.7     CMP:   Recent Labs   Lab 09/11/23  1144      CALCIUM 7.6*   ALBUMIN 1.4*   PROT 4.6*      K 3.9   CO2 25      BUN 21   CREATININE 0.5   ALKPHOS 175*   ALT 30   AST 25   BILITOT 0.9     Coagulation:   Recent Labs   Lab 09/14/23  1242   LABPROT 12.2   INR 1.1         Anticoagulants/Antiplatelets:   No anticoagulation    ASA/Mallampati  ASA:  3  Mallampati: 2    Imaging:  Reviewed by Piotr Marie MD.     ASSESSMENT/PLAN:     Assessment:  62 y.o. male with a past medical history of recent gastrectomy 8/21/23 who has been referred to IR for image guided percutaneous drain placement/aspiration  for management of a large intraabdominal fluid collection in the right abdomen     Plan:  Will proceed with US guided percutaneous aspiration/drain placement for a large intra abdominal fluid collection on 9/14/23.   Sedation plan: up to moderate  Continue keeping patient NPO  Anticoagulation history reviewed.Coagulation labs reviewed.  Thank you for the consult. Please contact with questions via [employer] secure chat.    Amirah Henry PA-C  Interventional Radiology  9/14/2023

## 2023-09-14 NOTE — NURSING
Pt in MPU for recovery. Report given at bedside to SHER Chong. Report called to ED nurse SHER Gan.

## 2023-09-15 ENCOUNTER — ANESTHESIA EVENT (OUTPATIENT)
Dept: SURGERY | Facility: HOSPITAL | Age: 63
DRG: 856 | End: 2023-09-15
Payer: COMMERCIAL

## 2023-09-15 DIAGNOSIS — C16.9 GASTRIC ADENOCARCINOMA: Primary | ICD-10-CM

## 2023-09-15 LAB
ALBUMIN SERPL BCP-MCNC: 1.2 G/DL (ref 3.5–5.2)
ALP SERPL-CCNC: 168 U/L (ref 55–135)
ALT SERPL W/O P-5'-P-CCNC: 40 U/L (ref 10–44)
AMYLASE FLD-CCNC: NORMAL U/L
ANION GAP SERPL CALC-SCNC: 10 MMOL/L (ref 8–16)
ANISOCYTOSIS BLD QL SMEAR: SLIGHT
AST SERPL-CCNC: 33 U/L (ref 10–40)
BASOPHILS # BLD AUTO: 0.05 K/UL (ref 0–0.2)
BASOPHILS NFR BLD: 0.2 % (ref 0–1.9)
BILIRUB SERPL-MCNC: 1.5 MG/DL (ref 0.1–1)
BUN SERPL-MCNC: 18 MG/DL (ref 8–23)
CALCIUM SERPL-MCNC: 7.7 MG/DL (ref 8.7–10.5)
CHLORIDE SERPL-SCNC: 100 MMOL/L (ref 95–110)
CO2 SERPL-SCNC: 27 MMOL/L (ref 23–29)
CREAT SERPL-MCNC: 0.5 MG/DL (ref 0.5–1.4)
DACRYOCYTES BLD QL SMEAR: ABNORMAL
DIFFERENTIAL METHOD: ABNORMAL
EOSINOPHIL # BLD AUTO: 0 K/UL (ref 0–0.5)
EOSINOPHIL NFR BLD: 0 % (ref 0–8)
ERYTHROCYTE [DISTWIDTH] IN BLOOD BY AUTOMATED COUNT: 15.3 % (ref 11.5–14.5)
EST. GFR  (NO RACE VARIABLE): >60 ML/MIN/1.73 M^2
GLUCOSE SERPL-MCNC: 102 MG/DL (ref 70–110)
HCT VFR BLD AUTO: 26.8 % (ref 40–54)
HGB BLD-MCNC: 9 G/DL (ref 14–18)
HYPOCHROMIA BLD QL SMEAR: ABNORMAL
IMM GRANULOCYTES # BLD AUTO: 0.97 K/UL (ref 0–0.04)
IMM GRANULOCYTES NFR BLD AUTO: 4 % (ref 0–0.5)
LYMPHOCYTES # BLD AUTO: 1.3 K/UL (ref 1–4.8)
LYMPHOCYTES NFR BLD: 5.5 % (ref 18–48)
MAGNESIUM SERPL-MCNC: 1.7 MG/DL (ref 1.6–2.6)
MCH RBC QN AUTO: 27.1 PG (ref 27–31)
MCHC RBC AUTO-ENTMCNC: 33.6 G/DL (ref 32–36)
MCV RBC AUTO: 81 FL (ref 82–98)
MONOCYTES # BLD AUTO: 1.9 K/UL (ref 0.3–1)
MONOCYTES NFR BLD: 8 % (ref 4–15)
NEUTROPHILS # BLD AUTO: 19.9 K/UL (ref 1.8–7.7)
NEUTROPHILS NFR BLD: 82.3 % (ref 38–73)
NRBC BLD-RTO: 0 /100 WBC
OVALOCYTES BLD QL SMEAR: ABNORMAL
PHOSPHATE SERPL-MCNC: 3.4 MG/DL (ref 2.7–4.5)
PLATELET # BLD AUTO: 440 K/UL (ref 150–450)
PLATELET BLD QL SMEAR: ABNORMAL
PMV BLD AUTO: 9.8 FL (ref 9.2–12.9)
POCT GLUCOSE: 101 MG/DL (ref 70–110)
POCT GLUCOSE: 114 MG/DL (ref 70–110)
POCT GLUCOSE: 115 MG/DL (ref 70–110)
POCT GLUCOSE: >500 MG/DL (ref 70–110)
POIKILOCYTOSIS BLD QL SMEAR: SLIGHT
POLYCHROMASIA BLD QL SMEAR: ABNORMAL
POTASSIUM SERPL-SCNC: 3.8 MMOL/L (ref 3.5–5.1)
PROT SERPL-MCNC: 4.9 G/DL (ref 6–8.4)
RBC # BLD AUTO: 3.32 M/UL (ref 4.6–6.2)
SODIUM SERPL-SCNC: 137 MMOL/L (ref 136–145)
SPHEROCYTES BLD QL SMEAR: ABNORMAL
WBC # BLD AUTO: 24.16 K/UL (ref 3.9–12.7)

## 2023-09-15 PROCEDURE — 80053 COMPREHEN METABOLIC PANEL: CPT | Performed by: STUDENT IN AN ORGANIZED HEALTH CARE EDUCATION/TRAINING PROGRAM

## 2023-09-15 PROCEDURE — A4217 STERILE WATER/SALINE, 500 ML: HCPCS | Performed by: SURGERY

## 2023-09-15 PROCEDURE — 25000003 PHARM REV CODE 250: Performed by: NURSE PRACTITIONER

## 2023-09-15 PROCEDURE — 25000003 PHARM REV CODE 250: Performed by: SURGERY

## 2023-09-15 PROCEDURE — 27000221 HC OXYGEN, UP TO 24 HOURS

## 2023-09-15 PROCEDURE — 63600175 PHARM REV CODE 636 W HCPCS

## 2023-09-15 PROCEDURE — 25000003 PHARM REV CODE 250

## 2023-09-15 PROCEDURE — 25000003 PHARM REV CODE 250: Performed by: STUDENT IN AN ORGANIZED HEALTH CARE EDUCATION/TRAINING PROGRAM

## 2023-09-15 PROCEDURE — 94640 AIRWAY INHALATION TREATMENT: CPT

## 2023-09-15 PROCEDURE — 20600001 HC STEP DOWN PRIVATE ROOM

## 2023-09-15 PROCEDURE — 63600175 PHARM REV CODE 636 W HCPCS: Performed by: SURGERY

## 2023-09-15 PROCEDURE — 94799 UNLISTED PULMONARY SVC/PX: CPT

## 2023-09-15 PROCEDURE — 25000242 PHARM REV CODE 250 ALT 637 W/ HCPCS: Performed by: NURSE PRACTITIONER

## 2023-09-15 PROCEDURE — 27000646 HC AEROBIKA DEVICE

## 2023-09-15 PROCEDURE — 84100 ASSAY OF PHOSPHORUS: CPT | Performed by: STUDENT IN AN ORGANIZED HEALTH CARE EDUCATION/TRAINING PROGRAM

## 2023-09-15 PROCEDURE — 94664 DEMO&/EVAL PT USE INHALER: CPT

## 2023-09-15 PROCEDURE — 99900035 HC TECH TIME PER 15 MIN (STAT)

## 2023-09-15 PROCEDURE — 83735 ASSAY OF MAGNESIUM: CPT | Performed by: STUDENT IN AN ORGANIZED HEALTH CARE EDUCATION/TRAINING PROGRAM

## 2023-09-15 PROCEDURE — 94761 N-INVAS EAR/PLS OXIMETRY MLT: CPT

## 2023-09-15 PROCEDURE — 85025 COMPLETE CBC W/AUTO DIFF WBC: CPT | Performed by: STUDENT IN AN ORGANIZED HEALTH CARE EDUCATION/TRAINING PROGRAM

## 2023-09-15 PROCEDURE — 63600175 PHARM REV CODE 636 W HCPCS: Performed by: NURSE PRACTITIONER

## 2023-09-15 PROCEDURE — 63600175 PHARM REV CODE 636 W HCPCS: Performed by: STUDENT IN AN ORGANIZED HEALTH CARE EDUCATION/TRAINING PROGRAM

## 2023-09-15 PROCEDURE — B4185 PARENTERAL SOL 10 GM LIPIDS: HCPCS | Performed by: STUDENT IN AN ORGANIZED HEALTH CARE EDUCATION/TRAINING PROGRAM

## 2023-09-15 RX ORDER — HYDROMORPHONE HYDROCHLORIDE 1 MG/ML
0.5 INJECTION, SOLUTION INTRAMUSCULAR; INTRAVENOUS; SUBCUTANEOUS EVERY 6 HOURS PRN
Status: DISCONTINUED | OUTPATIENT
Start: 2023-09-15 | End: 2023-09-15

## 2023-09-15 RX ORDER — DEXTROSE MONOHYDRATE, SODIUM CHLORIDE, AND POTASSIUM CHLORIDE 50; 1.49; 4.5 G/1000ML; G/1000ML; G/1000ML
INJECTION, SOLUTION INTRAVENOUS CONTINUOUS
Status: DISCONTINUED | OUTPATIENT
Start: 2023-09-15 | End: 2023-09-19

## 2023-09-15 RX ORDER — OXYCODONE HYDROCHLORIDE 5 MG/1
5 TABLET ORAL EVERY 4 HOURS PRN
Status: DISCONTINUED | OUTPATIENT
Start: 2023-09-15 | End: 2023-09-25 | Stop reason: HOSPADM

## 2023-09-15 RX ORDER — FLUCONAZOLE 2 MG/ML
200 INJECTION, SOLUTION INTRAVENOUS
Status: DISCONTINUED | OUTPATIENT
Start: 2023-09-15 | End: 2023-09-20

## 2023-09-15 RX ORDER — TRAMADOL HYDROCHLORIDE 50 MG/1
50 TABLET ORAL EVERY 6 HOURS PRN
Status: DISCONTINUED | OUTPATIENT
Start: 2023-09-15 | End: 2023-09-15

## 2023-09-15 RX ORDER — HYDROMORPHONE HYDROCHLORIDE 1 MG/ML
1 INJECTION, SOLUTION INTRAMUSCULAR; INTRAVENOUS; SUBCUTANEOUS
Status: DISCONTINUED | OUTPATIENT
Start: 2023-09-15 | End: 2023-09-15

## 2023-09-15 RX ORDER — HYDROMORPHONE HYDROCHLORIDE 1 MG/ML
0.5 INJECTION, SOLUTION INTRAMUSCULAR; INTRAVENOUS; SUBCUTANEOUS
Status: DISCONTINUED | OUTPATIENT
Start: 2023-09-15 | End: 2023-09-15

## 2023-09-15 RX ORDER — OXYCODONE HYDROCHLORIDE 10 MG/1
10 TABLET ORAL EVERY 4 HOURS PRN
Status: DISCONTINUED | OUTPATIENT
Start: 2023-09-15 | End: 2023-09-25 | Stop reason: HOSPADM

## 2023-09-15 RX ORDER — GABAPENTIN 100 MG/1
100 CAPSULE ORAL 3 TIMES DAILY
Status: DISCONTINUED | OUTPATIENT
Start: 2023-09-15 | End: 2023-09-25 | Stop reason: HOSPADM

## 2023-09-15 RX ORDER — MORPHINE SULFATE 2 MG/ML
5 INJECTION, SOLUTION INTRAMUSCULAR; INTRAVENOUS EVERY 6 HOURS PRN
Status: DISCONTINUED | OUTPATIENT
Start: 2023-09-15 | End: 2023-09-15

## 2023-09-15 RX ADMIN — LEVALBUTEROL HYDROCHLORIDE 0.63 MG: 0.63 SOLUTION RESPIRATORY (INHALATION) at 07:09

## 2023-09-15 RX ADMIN — GABAPENTIN 100 MG: 300 CAPSULE ORAL at 01:09

## 2023-09-15 RX ADMIN — ACETAMINOPHEN 1000 MG: 500 TABLET ORAL at 09:09

## 2023-09-15 RX ADMIN — OXYCODONE HYDROCHLORIDE 10 MG: 10 TABLET ORAL at 11:09

## 2023-09-15 RX ADMIN — HYDROMORPHONE HYDROCHLORIDE 1 MG: 1 INJECTION, SOLUTION INTRAMUSCULAR; INTRAVENOUS; SUBCUTANEOUS at 09:09

## 2023-09-15 RX ADMIN — VANCOMYCIN HYDROCHLORIDE 1500 MG: 1.5 INJECTION, POWDER, LYOPHILIZED, FOR SOLUTION INTRAVENOUS at 06:09

## 2023-09-15 RX ADMIN — METHOCARBAMOL 500 MG: 100 INJECTION, SOLUTION INTRAMUSCULAR; INTRAVENOUS at 08:09

## 2023-09-15 RX ADMIN — ACETAMINOPHEN 1000 MG: 500 TABLET ORAL at 01:09

## 2023-09-15 RX ADMIN — DEXTROSE, SODIUM CHLORIDE, AND POTASSIUM CHLORIDE: 5; .45; .15 INJECTION INTRAVENOUS at 04:09

## 2023-09-15 RX ADMIN — ACETAMINOPHEN 1000 MG: 500 TABLET ORAL at 06:09

## 2023-09-15 RX ADMIN — HYDROMORPHONE HYDROCHLORIDE 1 MG: 1 INJECTION, SOLUTION INTRAMUSCULAR; INTRAVENOUS; SUBCUTANEOUS at 06:09

## 2023-09-15 RX ADMIN — GABAPENTIN 100 MG: 300 CAPSULE ORAL at 09:09

## 2023-09-15 RX ADMIN — SODIUM CHLORIDE, POTASSIUM CHLORIDE, SODIUM LACTATE AND CALCIUM CHLORIDE 500 ML: 600; 310; 30; 20 INJECTION, SOLUTION INTRAVENOUS at 10:09

## 2023-09-15 RX ADMIN — SOYBEAN OIL 250 ML: 20 INJECTION, SOLUTION INTRAVENOUS at 09:09

## 2023-09-15 RX ADMIN — DEXTROSE, SODIUM CHLORIDE, AND POTASSIUM CHLORIDE: 5; .45; .15 INJECTION INTRAVENOUS at 06:09

## 2023-09-15 RX ADMIN — PIPERACILLIN SODIUM AND TAZOBACTAM SODIUM 4.5 G: 4; .5 INJECTION, POWDER, FOR SOLUTION INTRAVENOUS at 09:09

## 2023-09-15 RX ADMIN — OXYCODONE HYDROCHLORIDE 10 MG: 10 TABLET ORAL at 04:09

## 2023-09-15 RX ADMIN — PIPERACILLIN SODIUM AND TAZOBACTAM SODIUM 4.5 G: 4; .5 INJECTION, POWDER, FOR SOLUTION INTRAVENOUS at 05:09

## 2023-09-15 RX ADMIN — HYDROMORPHONE HYDROCHLORIDE 1 MG: 1 INJECTION, SOLUTION INTRAMUSCULAR; INTRAVENOUS; SUBCUTANEOUS at 11:09

## 2023-09-15 RX ADMIN — HYDROMORPHONE HYDROCHLORIDE 1 MG: 1 INJECTION, SOLUTION INTRAMUSCULAR; INTRAVENOUS; SUBCUTANEOUS at 01:09

## 2023-09-15 RX ADMIN — MAGNESIUM SULFATE HEPTAHYDRATE: 500 INJECTION, SOLUTION INTRAMUSCULAR; INTRAVENOUS at 09:09

## 2023-09-15 RX ADMIN — PANTOPRAZOLE SODIUM 40 MG: 40 TABLET, DELAYED RELEASE ORAL at 08:09

## 2023-09-15 RX ADMIN — ENOXAPARIN SODIUM 40 MG: 40 INJECTION SUBCUTANEOUS at 05:09

## 2023-09-15 RX ADMIN — PIPERACILLIN SODIUM AND TAZOBACTAM SODIUM 4.5 G: 4; .5 INJECTION, POWDER, FOR SOLUTION INTRAVENOUS at 01:09

## 2023-09-15 RX ADMIN — METHOCARBAMOL 500 MG: 100 INJECTION, SOLUTION INTRAMUSCULAR; INTRAVENOUS at 12:09

## 2023-09-15 RX ADMIN — FLUCONAZOLE 200 MG: 2 INJECTION, SOLUTION INTRAVENOUS at 12:09

## 2023-09-15 NOTE — ANESTHESIA PREPROCEDURE EVALUATION
[facility]  Anesthesia Pre-Operative Evaluation         Patient Name: Danish Valladares  YOB: 1960  MRN: [id number]    SUBJECTIVE:     Pre-operative evaluation for Procedure(s) (LRB):  DRAINAGE, ABDOMEN, LAPAROSCOPIC (N/A)     09/15/2023    Danish Valladares is a 62 y.o. male w/ a significant PMHx of gastric adenocarcinoma s/p open distal gastrectomy with BII reconstruction on 8/21/23 who is admitted for intra-abdominal fluid collection noted on CT 9/14/23. Patient is s/p IR drain placement for fluid collection. Now presenting for laparoscopic drainage. Pt still has symptomatic GERD with a sour taste in mouth when he lays flat.     Pt is alert and oriented x3 with awareness of what the procedure is.     Patient now presents for the above procedure(s).    LDA:   PICC Double Lumen 08/17/23 1117 right basilic (Active)   Line Necessity Review Medication caustic to vasculature 09/15/23 0122   Verification by X-ray Yes 08/31/23 2000   Site Assessment No drainage;No redness;No swelling;No warmth 09/15/23 0730   Extremity Assessment Distal to IV No abnormal discoloration 09/15/23 0730   Line Securement Device Secured with sutureless device 09/15/23 0730   Dressing Type CHG impregnated dressing/sponge;Central line dressing 09/15/23 0730   Dressing Status Clean;Dry;Intact 09/15/23 0730   Dressing Intervention Integrity maintained 09/15/23 0730   Date on Dressing 09/11/23 09/15/23 0730   Dressing Due to be Changed 09/18/23 09/15/23 0730   Distal Patency/Care infusing 09/15/23 0730   Proximal 1 Patency/Care infusing 09/15/23 0730   Current Insertion Depth (cm) 38 cm 08/17/23 1115   Current Exposed Catheter (cm) 0 cm 08/17/23 1115   Extremity Circumference (cm) 30 cm 08/17/23 1115   Number of days: 29            Closed/Suction Drain 09/14/23 1552 Tube - 1 Right RLQ Bulb 12 Fr. (Active)   Site Description Unable to view 09/15/23 0730   Dressing Type Central line dressing 09/15/23 0730   Dressing  Status Clean;Dry;Intact 09/15/23 0730   Dressing Intervention Integrity maintained 09/15/23 0730   Drainage Bile;Green 09/15/23 0730   Status To bulb suction 09/15/23 0730   Output (mL) 90 mL 09/15/23 1000   Number of days: 0            Closed/Suction Drain 09/14/23 1553 Tube - 2 Lateral RLQ Bulb 14 Fr. (Active)   Site Description Unable to view 09/15/23 0730   Dressing Type Central line dressing 09/15/23 0730   Dressing Status Clean;Dry;Intact 09/15/23 0730   Dressing Intervention Integrity maintained 09/15/23 0730   Drainage Dark red;Milky 09/15/23 0730   Status To bulb suction 09/15/23 0730   Output (mL) 400 mL 09/15/23 1000   Number of days: 0       Prev airway: None documented.    Drips:    dextrose 5 % and 0.45 % NaCl with KCl 20 mEq 125 mL/hr at 09/15/23 0645    TPN ADULT CENTRAL LINE CUSTOM         Patient Active Problem List   Diagnosis    Emesis    Gastric adenocarcinoma    Abdominal fluid collection       Review of patient's allergies indicates:   Allergen Reactions    Penicillins Rash       Current Inpatient Medications:   acetaminophen  1,000 mg Oral Q8H    enoxparin  40 mg Subcutaneous Q24H (prophylaxis, 1700)    fat emulsion 20%  250 mL Intravenous Daily    fluconazole (DIFLUCAN) IV (PEDS and ADULTS)  200 mg Intravenous Q24H    levalbuterol  0.63 mg Nebulization Q6H WAKE    pantoprazole  40 mg Oral Daily    piperacillin-tazobactam (Zosyn) IV (PEDS and ADULTS) (extended infusion is not appropriate)  4.5 g Intravenous Q8H    vancomycin (VANCOCIN) IV (PEDS and ADULTS)  1,500 mg Intravenous Q12H       No current facility-administered medications on file prior to encounter.     Current Outpatient Medications on File Prior to Encounter   Medication Sig Dispense Refill    acetaminophen (TYLENOL) 650 MG TbSR Take 1 tablet (650 mg total) by mouth every 8 (eight) hours. 90 tablet 0    bisacodyL (DULCOLAX) 10 mg Supp Place 1 suppository (10 mg total) rectally daily as needed. 10 suppository 0     enoxaparin (LOVENOX) 40 mg/0.4 mL Syrg Inject 0.4 mLs (40 mg total) into the skin Daily. for 14 days 5.6 mL 0    ibuprofen (ADVIL,MOTRIN) 200 MG tablet Take 200 mg by mouth every 6 (six) hours as needed for Pain.      levoFLOXacin (LEVAQUIN) 750 MG tablet Take 1 tablet (750 mg total) by mouth once daily. for 7 days 7 tablet 0    omeprazole (PRILOSEC) 40 MG capsule Take 1 capsule (40 mg total) by mouth once daily. 90 capsule 0    ondansetron (ZOFRAN-ODT) 8 MG TbDL Take 8 mg by mouth 3 (three) times daily.      polyethylene glycol (GLYCOLAX) 17 gram/dose powder Use cap to measure 17 grams, mix in liquid and take by mouth once daily. 238 g 7    sucralfate (CARAFATE) 1 gram tablet TAKE 1 TABLET(1 GRAM) BY MOUTH FOUR TIMES DAILY BEFORE MEALS AND AT NIGHT 360 tablet 0       Past Surgical History:   Procedure Laterality Date    ESOPHAGOGASTRODUODENOSCOPY N/A 8/3/2023    Procedure: EGD (ESOPHAGOGASTRODUODENOSCOPY);  Surgeon: Loco Marvin MD;  Location: [facility];  Service: Gastroenterology;  Laterality: N/A;    GASTRECTOMY N/A 8/21/2023    Procedure: GASTRECTOMY;  Surgeon: Eulogio Moy MD;  Location: [address];  Service: General;  Laterality: N/A;  Open gastrectomy with EGD. Needs Omni retractor, requesting room 24    TONSILLECTOMY         Social History:  Tobacco Use: Medium Risk (9/14/2023)    Patient History     Smoking Tobacco Use: Former     Smokeless Tobacco Use: Never     Passive Exposure: Not on file      Alcohol Use: Unknown (9/15/2023)    AUDIT-C     Frequency of Alcohol Consumption: Patient refused     Average Number of Drinks: Patient refused     Frequency of Binge Drinking: Patient refused        OBJECTIVE:     Vital Signs Range (Last 24H):  Temp:  [36.3 °C (97.3 °F)-37.5 °C (99.5 °F)]   Pulse:  []   Resp:  [13-20]   BP: (126-159)/(59-86)   SpO2:  [84 %-100 %]       Significant Labs:  Lab Results   Component Value Date    WBC 24.16 (H) 09/15/2023    HGB 9.0 (L)  09/15/2023    HCT 26.8 (L) 09/15/2023     09/15/2023    CHOL 156 05/19/2023    TRIG 143 09/01/2023    HDL 38 (L) 05/19/2023    ALT 40 09/15/2023    AST 33 09/15/2023     09/15/2023    K 3.8 09/15/2023     09/15/2023    CREATININE 0.5 09/15/2023    BUN 18 09/15/2023    CO2 27 09/15/2023    TSH 0.646 05/19/2023    INR 1.1 09/14/2023    HGBA1C 5.8 (H) 05/19/2023       Diagnostic Studies: No relevant studies.    EKG:   Results for orders placed or performed during the hospital encounter of 09/14/23   EKG 12-lead    Collection Time: 09/14/23 12:36 PM    Narrative    Test Reason : T81.43XA,    Vent. Rate : 105 BPM     Atrial Rate : 105 BPM     P-R Int : 124 ms          QRS Dur : 100 ms      QT Int : 330 ms       P-R-T Axes : 087 083 088 degrees     QTc Int : 436 ms    Sinus tachycardia with occasional Premature ventricular complexes  Incomplete right bundle branch block  Borderline Abnormal ECG  When compared with ECG of 16-AUG-2023 15:44,  Premature ventricular complexes are now Present  Confirmed by Osvaldo Kemp MD ([id number]) on 9/14/2023 5:06:43 PM    Referred By: System System           Confirmed By:Osvaldo Kemp MD       2D ECHO:  TTE:  No results found for this or any previous visit.    JENNIFER:  No results found for this or any previous visit.    ASSESSMENT/PLAN:       Pre-op Assessment    I have reviewed the Patient Summary Reports.     I have reviewed the Nursing Notes. I have reviewed the NPO Status.   I have reviewed the Medications.     Review of Systems  Anesthesia Hx:  No problems with previous Anesthesia  History of prior surgery of interest to airway management or planning:  Denies Personal Hx of Anesthesia complications.   Hematology/Oncology:         -- Cancer in past history:   Cardiovascular:  Cardiovascular Normal     Pulmonary:  Pulmonary Normal    Renal/:  Renal/ Normal     Hepatic/GI:   GERD    Neurological:  Neurology Normal        Physical Exam  General: Well nourished,  Cooperative, Alert and Oriented    Airway:  Mallampati: II / I  Mouth Opening: Normal  Tongue: Normal  Neck ROM: Normal ROM    Dental:  Dentures        Anesthesia Plan  Type of Anesthesia, risks & benefits discussed:    Anesthesia Type: Gen ETT  Intra-op Monitoring Plan: Standard ASA Monitors  Post Op Pain Control Plan: multimodal analgesia and IV/PO Opioids PRN  Induction:  IV  Airway Plan: Direct, Post-Induction  Informed Consent: Informed consent signed with the Patient and all parties understand the risks and agree with anesthesia plan.  All questions answered.   ASA Score: 3  Day of Surgery Review of History & Physical: H&P Update referred to the surgeon/provider.    Ready For Surgery From Anesthesia Perspective.     .

## 2023-09-15 NOTE — PROGRESS NOTES
Recommendations    1) Continue CLD, ADAT per MD   2) Continue at home TPN regimine: 230 D + 110 AA + IV Lipids to provide 1722 kcals, 110 g PRO, GIR: 1.90   3) Monitor Labs      Goals: Meet % een/epn by next RD f/u  Nutrition Goal Status: new  Communication of RD Recs: other (comment) (POC)    Thanks,    Sudha Ulrich RD, LDN

## 2023-09-15 NOTE — PLAN OF CARE
Recommendations     1) Continue CLD, ADAT per MD   2) Continue at home TPN regimine: 230 D + 110 AA + IV Lipids to provide 1722 kcals, 110 g PRO, GIR: 1.90   3) Monitor Labs        Goals: Meet % een/epn by next RD f/u  Nutrition Goal Status: new  Communication of RD Recs: other (comment) (POC)

## 2023-09-15 NOTE — NURSING TRANSFER
Nursing Transfer Note      9/15/2023   12:50 AM    Nurse giving handoff:SHER Shore  Nurse receiving handoff SHER Alfonso    Reason patient is being transferred:     Transfer To: [facility]    Transfer via stretcher    Transfer with     Transported by Nurse    Transfer Vital Signs:  Blood Pressure:142/78  Heart Rate:102  O2:91  Temperature:9803  Respirations:20    Telemetry:   Order for Tele Monitor? No    Additional Lines:     4eyes on Skin: yes    Medicines sent: n/a    Any special needs or follow-up needed: n/a    Patient belongings transferred with patient: Yes    Chart send with patient: Yes    Notified: spouse at bedside    Patient reassessed at:  (date, time)  1  Upon arrival to floor: patient oriented to room, call bell in reach, and bed in lowest position

## 2023-09-15 NOTE — PT/OT/SLP PROGRESS
Physical Therapy      Patient Name:  Danish Valladares   MRN:  [id number]    Patient not seen today secondary to pt refusal upon PT attempt at 1530 2/2 increased pain levels. Will follow-up as appropriate.    9/15/2023

## 2023-09-15 NOTE — PLAN OF CARE
[facility]  Initial Discharge Assessment       Primary Care Provider: Yfn Walker MD    Admission Diagnosis: Postprocedural intraabdominal abscess [T81.43XA]    Admission Date: 9/14/2023  Expected Discharge Date: 9/22/2023    Assessment completed with pt and Spouse Fatimah Valladares [phone] at bedside. Pt with recent admits 8/16/23-9/1/23 and discharge home with [facility] and TPN from [facility] infusion. Pt denied DME. Confirmed PCP , insurance , and pharmacy as [facility]. Pts spouse is available to provide transport home. Pts discharge plan is home with resumption of HH . CM following for additional needs.     Transition of Care Barriers: None    Payor: BLUE CROSS BLUE SHIELD / [facility] / Product Type: Commercial /     Extended Emergency Contact Information  Primary Emergency Contact: Fatimah Valladares   [place]  Mobile Phone: [phone]  Relation: Spouse    Discharge Plan A: Home with family, Home Health  Discharge Plan B: Home      [id number] [place] [address] [facility] [address]  [address]  [facility] [address]  Phone: [phone] Fax: [phone]      Initial Assessment (most recent)       Adult Discharge Assessment - 09/15/23 1238          Discharge Assessment    Assessment Type Discharge Planning Assessment     Confirmed/corrected address, phone number and insurance Yes     Confirmed Demographics Correct on Facesheet     Source of Information patient;family     People in Home spouse     Facility Arrived From: Home     Do you expect to return to your current living situation? Yes     Do you have help at home or someone to help you manage your care at home? Yes     Who are your caregiver(s) and their phone number(s)? Spouse Fatimah Valladares [phone]     Prior to hospitilization cognitive status: Alert/Oriented     Current cognitive status: Alert/Oriented     Home Layout Able to  live on 1st floor     Equipment Currently Used at Home none     Readmission within 30 days? Yes     Patient currently being followed by outpatient case management? No     Do you currently have service(s) that help you manage your care at home? Yes     Name and Contact number of agency [facility]     Is the pt/caregiver preference to resume services with current agency Yes     Do you take prescription medications? Yes     Do you have prescription coverage? Yes     Do you have any problems affording any of your prescribed medications? No     Is the patient taking medications as prescribed? yes     Who is going to help you get home at discharge? Spouse Fatimah Valladares [phone]     How do you get to doctors appointments? car, drives self     Are you on dialysis? No     Do you take coumadin? No     DME Needed Upon Discharge  none     Discharge Plan discussed with: Patient;Spouse/sig other     Name(s) and Number(s) Spouse Fatimah Valladares [phone]     Transition of Care Barriers None     Discharge Plan A Home with family;Home Health     Discharge Plan B Home        Physical Activity    On average, how many days per week do you engage in moderate to strenuous exercise (like a brisk walk)? Patient refused     On average, how many minutes do you engage in exercise at this level? Patient refused        Financial Resource Strain    How hard is it for you to pay for the very basics like food, housing, medical care, and heating? Not hard at all        Housing Stability    In the last 12 months, was there a time when you were not able to pay the mortgage or rent on time? No     In the last 12 months, was there a time when you did not have a steady place to sleep or slept in a shelter (including now)? No        Transportation Needs    In the past 12 months, has lack of transportation kept you from medical appointments or from getting medications? No     In the past 12 months, has lack of transportation kept you  from meetings, work, or from getting things needed for daily living? No        Food Insecurity    Within the past 12 months, you worried that your food would run out before you got the money to buy more. Never true     Within the past 12 months, the food you bought just didn't last and you didn't have money to get more. Never true        Stress    Do you feel stress - tense, restless, nervous, or anxious, or unable to sleep at night because your mind is troubled all the time - these days? Patient refused        Social Connections    In a typical week, how many times do you talk on the phone with family, friends, or neighbors? Once a week     How often do you get together with friends or relatives? Once a week     How often do you attend Christianity or Taoist services? Patient refused     Do you belong to any clubs or organizations such as Christianity groups, unions, fraternal or athletic groups, or school groups? Patient refused     How often do you attend meetings of the clubs or organizations you belong to? Patient refused     Are you , , , , never , or living with a partner?         Alcohol Use    Q1: How often do you have a drink containing alcohol? Patient refused     Q2: How many drinks containing alcohol do you have on a typical day when you are drinking? Patient refused     Q3: How often do you have six or more drinks on one occasion? Patient refused        OTHER    Name(s) of People in Home Spouse Fatimah Blaine [phone]

## 2023-09-15 NOTE — ASSESSMENT & PLAN NOTE
Mr. Valladares is a 61 y/o male s/p  open distal gastrectomy with BII reconstruction on 8/21/23 and admitted for intra abdominal fluid collection on CT on 9/14/23 and cough. Now with a large intra-abdominal fluid collection.  S/p IR drain placement into intra-abdominal fluid collection.      - NPO  - Will place DANILO drain to low intermittent suction given large output  - TPN via PICC line   - IV B Zosyn and Vanc  - PT, OOB chair  - Ambulate TID  - DVT PPX  - GI PPX

## 2023-09-15 NOTE — PROGRESS NOTES
[facility] Quick [facility]:     Patient observed to be readmitted on 9/14/2023 under Dr Moy's care     Patient is a current home TPN patient of [facility].       TOTAL TPN VOLUME AS 3:1 mix = 1450mL. Patient ran TPN @ home over 18 hours.      Patient to be followed by [facility] staff while admitted for any needs. Please do not discharge patient without ascertaining patient's home TPN has been set up (if still needed at time of discharge).     Please do not hesitate to reach out for any additional needs    Bishnu Torres MS, RDN, LDN  Clinical Liaison & Dietitian  [facility]   Phone: [phone]  [email or web address]

## 2023-09-15 NOTE — PLAN OF CARE
Pt at this time appears comfortable stated good relief with pain mgmt today, complaint of considerable amount of pain this shift. Danilo to abcess placed to low suction and flushed as ordered draining large amt of milky red secretion with foul odor. #2 DANILO to SD with bilious drainage noted. IVF's and Abx therapy continued. Wife at bedside supportive. Voiding in urinal adequate amt of yellow urine. No BM this shift. Consuming small amt of clear liquid diet tolerating well.

## 2023-09-15 NOTE — NURSING
New patient has orders for TPN and lipids. Called pharmacy and spoke with Ekaterina and she informed me that TPN and lipids are ordered at 2pm and that patient would receive TPN and Lipids tomorrow due to late admit. Dr. Clement call and notified that patient will not be receiving TPN and lipids due to late admit. No new orders given at this time.

## 2023-09-15 NOTE — SUBJECTIVE & OBJECTIVE
Interval History: No issues overnight, remains slightly tachycardic. Moderately controlled pain. WBC 24(17). No nausea/emesis. On IV abx    Medications:  Continuous Infusions:   dextrose 5 % and 0.45 % NaCl with KCl 20 mEq 125 mL/hr at 09/15/23 0645    TPN ADULT CENTRAL LINE CUSTOM       Scheduled Meds:   acetaminophen  1,000 mg Oral Q8H    enoxparin  40 mg Subcutaneous Q24H (prophylaxis, 1700)    fat emulsion 20%  250 mL Intravenous Daily    levalbuterol  0.63 mg Nebulization Q6H WAKE    methocarbamol (ROBAXIN) IVPB  500 mg Intravenous Q8H    pantoprazole  40 mg Oral Daily    piperacillin-tazobactam (Zosyn) IV (PEDS and ADULTS) (extended infusion is not appropriate)  4.5 g Intravenous Q8H    vancomycin (VANCOCIN) IV (PEDS and ADULTS)  1,500 mg Intravenous Q12H     PRN Meds:dextrose 10%, glucagon (human recombinant), HYDROmorphone, HYDROmorphone, insulin aspart U-100, melatonin, ondansetron, sodium chloride 0.9%     Review of patient's allergies indicates:   Allergen Reactions    Penicillins Rash     Objective:     Vital Signs (Most Recent):  Temp: 98 °F (36.7 °C) (09/15/23 0717)  Pulse: 109 (09/15/23 0717)  Resp: 20 (09/15/23 0717)  BP: 133/71 (09/15/23 0717)  SpO2: (!) 90 % (09/15/23 0717) Vital Signs (24h Range):  Temp:  [97.3 °F (36.3 °C)-99.5 °F (37.5 °C)] 98 °F (36.7 °C)  Pulse:  [] 109  Resp:  [13-20] 20  SpO2:  [90 %-100 %] 90 %  BP: (101-159)/(59-86) 133/71     Weight: 83.9 kg (184 lb 15.5 oz)  Body mass index is 25.8 kg/m².    Intake/Output - Last 3 Shifts         09/13 0700  09/14 0659 09/14 0700  09/15 0659 09/15 0700  09/16 0659    Drains  1225 130    Other  300     Stool  0     Total Output  1525 130    Net  -1525 -130           Stool Occurrence  0 x              Physical Exam  Constitutional:       Appearance: Normal appearance.   HENT:      Head: Normocephalic and atraumatic.      Nose: Nose normal.      Mouth/Throat:      Mouth: Mucous membranes are moist.      Pharynx: Oropharynx is clear.    Eyes:      Extraocular Movements: Extraocular movements intact.      Conjunctiva/sclera: Conjunctivae normal.   Cardiovascular:      Rate and Rhythm: Normal rate and regular rhythm.   Pulmonary:      Effort: Pulmonary effort is normal.      Breath sounds: Normal breath sounds.   Abdominal:      General: Abdomen is flat.      Palpations: Abdomen is soft.      Comments: RUQ DANILO drain with purulent output. Soft but distended abdomen, some tenderness around the drain    Skin:     General: Skin is warm and dry.      Capillary Refill: Capillary refill takes less than 2 seconds.   Neurological:      General: No focal deficit present.      Mental Status: He is alert.          Significant Labs:  I have reviewed all pertinent lab results within the past 24 hours.  CBC:   Recent Labs   Lab 09/15/23  0625   WBC 24.16*   RBC 3.32*   HGB 9.0*   HCT 26.8*      MCV 81*   MCH 27.1   MCHC 33.6     BMP:   Recent Labs   Lab 09/15/23  0625         K 3.8      CO2 27   BUN 18   CREATININE 0.5   CALCIUM 7.7*   MG 1.7     CMP:   Recent Labs   Lab 09/15/23  0625      CALCIUM 7.7*   ALBUMIN 1.2*   PROT 4.9*      K 3.8   CO2 27      BUN 18   CREATININE 0.5   ALKPHOS 168*   ALT 40   AST 33   BILITOT 1.5*       Significant Diagnostics:  I have reviewed all pertinent imaging results/findings within the past 24 hours.

## 2023-09-15 NOTE — PROGRESS NOTES
Siddhartha Thurman - [facility]  General Surgery  Progress Note    Subjective:       Post-Op Info:  * No surgery found *         Interval History: No issues overnight, remains slightly tachycardic. Moderately controlled pain. WBC 24(17). No nausea/emesis. On IV abx    Medications:  Continuous Infusions:   dextrose 5 % and 0.45 % NaCl with KCl 20 mEq 125 mL/hr at 09/15/23 0645    TPN ADULT CENTRAL LINE CUSTOM       Scheduled Meds:   acetaminophen  1,000 mg Oral Q8H    enoxparin  40 mg Subcutaneous Q24H (prophylaxis, 1700)    fat emulsion 20%  250 mL Intravenous Daily    levalbuterol  0.63 mg Nebulization Q6H WAKE    methocarbamol (ROBAXIN) IVPB  500 mg Intravenous Q8H    pantoprazole  40 mg Oral Daily    piperacillin-tazobactam (Zosyn) IV (PEDS and ADULTS) (extended infusion is not appropriate)  4.5 g Intravenous Q8H    vancomycin (VANCOCIN) IV (PEDS and ADULTS)  1,500 mg Intravenous Q12H     PRN Meds:dextrose 10%, glucagon (human recombinant), HYDROmorphone, HYDROmorphone, insulin aspart U-100, melatonin, ondansetron, sodium chloride 0.9%     Review of patient's allergies indicates:   Allergen Reactions    Penicillins Rash     Objective:     Vital Signs (Most Recent):  Temp: 98 °F (36.7 °C) (09/15/23 0717)  Pulse: 109 (09/15/23 0717)  Resp: 20 (09/15/23 0717)  BP: 133/71 (09/15/23 0717)  SpO2: (!) 90 % (09/15/23 0717) Vital Signs (24h Range):  Temp:  [97.3 °F (36.3 °C)-99.5 °F (37.5 °C)] 98 °F (36.7 °C)  Pulse:  [] 109  Resp:  [13-20] 20  SpO2:  [90 %-100 %] 90 %  BP: (101-159)/(59-86) 133/71     Weight: 83.9 kg (184 lb 15.5 oz)  Body mass index is 25.8 kg/m².    Intake/Output - Last 3 Shifts         09/13 0700  09/14 0659 09/14 0700  09/15 0659 09/15 0700  09/16 0659    Drains  1225 130    Other  300     Stool  0     Total Output  1525 130    Net  -1525 -130           Stool Occurrence  0 x              Physical Exam  Constitutional:       Appearance: Normal appearance.   HENT:      Head: Normocephalic and  atraumatic.      Nose: Nose normal.      Mouth/Throat:      Mouth: Mucous membranes are moist.      Pharynx: Oropharynx is clear.   Eyes:      Extraocular Movements: Extraocular movements intact.      Conjunctiva/sclera: Conjunctivae normal.   Cardiovascular:      Rate and Rhythm: Normal rate and regular rhythm.   Pulmonary:      Effort: Pulmonary effort is normal.      Breath sounds: Normal breath sounds.   Abdominal:      General: Abdomen is flat.      Palpations: Abdomen is soft.      Comments: RUQ DANILO drain with purulent output. Soft but distended abdomen, some tenderness around the drain    Skin:     General: Skin is warm and dry.      Capillary Refill: Capillary refill takes less than 2 seconds.   Neurological:      General: No focal deficit present.      Mental Status: He is alert.          Significant Labs:  I have reviewed all pertinent lab results within the past 24 hours.  CBC:   Recent Labs   Lab 09/15/23  0625   WBC 24.16*   RBC 3.32*   HGB 9.0*   HCT 26.8*      MCV 81*   MCH 27.1   MCHC 33.6     BMP:   Recent Labs   Lab 09/15/23  0625         K 3.8      CO2 27   BUN 18   CREATININE 0.5   CALCIUM 7.7*   MG 1.7     CMP:   Recent Labs   Lab 09/15/23  0625      CALCIUM 7.7*   ALBUMIN 1.2*   PROT 4.9*      K 3.8   CO2 27      BUN 18   CREATININE 0.5   ALKPHOS 168*   ALT 40   AST 33   BILITOT 1.5*       Significant Diagnostics:  I have reviewed all pertinent imaging results/findings within the past 24 hours.    Assessment/Plan:     Gastric adenocarcinoma  Mr. Valladares is a 63 y/o male s/p  open distal gastrectomy with BII reconstruction on 8/21/23 and admitted for intra abdominal fluid collection on CT on 9/14/23 and cough. Now with a large intra-abdominal fluid collection.  S/p IR drain placement into intra-abdominal fluid collection.      - NPO  - bolus LR  - Will place DANILO drain to low intermittent suction given large output  - TPN via PICC line   - IV B Zosyn and  Vanc  - PT, OOB chair  - Ambulate TID  - DVT PPX  - GI PPX           Diana Alvares MD  General Surgery  [facility]

## 2023-09-15 NOTE — NURSING
Nurses Note -- 4 Eyes      9/15/2023   12:54 AM      Skin assessed during: Admit      [] No Altered Skin Integrity Present    []Prevention Measures Documented      [x] Yes- Altered Skin Integrity Present or Discovered   [x] LDA Added if Not in Epic (Describe Wound)   [x] New Altered Skin Integrity was Present on Admit and Documented in LDA   [] Wound Image Taken    Wound Care Consulted? Yes    Attending Nurse:  Naty Rodriguez RN/Staff Member:   Grisel Parson RN

## 2023-09-16 LAB
ALBUMIN SERPL BCP-MCNC: 1.2 G/DL (ref 3.5–5.2)
ALP SERPL-CCNC: 154 U/L (ref 55–135)
ALT SERPL W/O P-5'-P-CCNC: 41 U/L (ref 10–44)
ANION GAP SERPL CALC-SCNC: 8 MMOL/L (ref 8–16)
AST SERPL-CCNC: 29 U/L (ref 10–40)
BASOPHILS # BLD AUTO: 0.04 K/UL (ref 0–0.2)
BASOPHILS NFR BLD: 0.2 % (ref 0–1.9)
BILIRUB SERPL-MCNC: 1.4 MG/DL (ref 0.1–1)
BUN SERPL-MCNC: 19 MG/DL (ref 8–23)
CALCIUM SERPL-MCNC: 7.6 MG/DL (ref 8.7–10.5)
CHLORIDE SERPL-SCNC: 96 MMOL/L (ref 95–110)
CO2 SERPL-SCNC: 29 MMOL/L (ref 23–29)
CREAT SERPL-MCNC: 0.6 MG/DL (ref 0.5–1.4)
DIFFERENTIAL METHOD: ABNORMAL
EOSINOPHIL # BLD AUTO: 0 K/UL (ref 0–0.5)
EOSINOPHIL NFR BLD: 0.2 % (ref 0–8)
ERYTHROCYTE [DISTWIDTH] IN BLOOD BY AUTOMATED COUNT: 15.1 % (ref 11.5–14.5)
EST. GFR  (NO RACE VARIABLE): >60 ML/MIN/1.73 M^2
GLUCOSE SERPL-MCNC: 123 MG/DL (ref 70–110)
HCT VFR BLD AUTO: 28.9 % (ref 40–54)
HGB BLD-MCNC: 9.3 G/DL (ref 14–18)
IMM GRANULOCYTES # BLD AUTO: 0.88 K/UL (ref 0–0.04)
IMM GRANULOCYTES NFR BLD AUTO: 4 % (ref 0–0.5)
LYMPHOCYTES # BLD AUTO: 1.5 K/UL (ref 1–4.8)
LYMPHOCYTES NFR BLD: 6.9 % (ref 18–48)
MAGNESIUM SERPL-MCNC: 1.8 MG/DL (ref 1.6–2.6)
MCH RBC QN AUTO: 27.4 PG (ref 27–31)
MCHC RBC AUTO-ENTMCNC: 32.2 G/DL (ref 32–36)
MCV RBC AUTO: 85 FL (ref 82–98)
MONOCYTES # BLD AUTO: 1.7 K/UL (ref 0.3–1)
MONOCYTES NFR BLD: 7.5 % (ref 4–15)
NEUTROPHILS # BLD AUTO: 17.8 K/UL (ref 1.8–7.7)
NEUTROPHILS NFR BLD: 81.2 % (ref 38–73)
NRBC BLD-RTO: 0 /100 WBC
PHOSPHATE SERPL-MCNC: 3.3 MG/DL (ref 2.7–4.5)
PLATELET # BLD AUTO: 378 K/UL (ref 150–450)
PMV BLD AUTO: 10.1 FL (ref 9.2–12.9)
POCT GLUCOSE: 130 MG/DL (ref 70–110)
POCT GLUCOSE: 132 MG/DL (ref 70–110)
POTASSIUM SERPL-SCNC: 3.9 MMOL/L (ref 3.5–5.1)
PROT SERPL-MCNC: 5 G/DL (ref 6–8.4)
RBC # BLD AUTO: 3.39 M/UL (ref 4.6–6.2)
SODIUM SERPL-SCNC: 133 MMOL/L (ref 136–145)
VANCOMYCIN TROUGH SERPL-MCNC: 10.4 UG/ML (ref 10–22)
WBC # BLD AUTO: 21.96 K/UL (ref 3.9–12.7)

## 2023-09-16 PROCEDURE — B4185 PARENTERAL SOL 10 GM LIPIDS: HCPCS

## 2023-09-16 PROCEDURE — 63600175 PHARM REV CODE 636 W HCPCS

## 2023-09-16 PROCEDURE — 20600001 HC STEP DOWN PRIVATE ROOM

## 2023-09-16 PROCEDURE — 25000003 PHARM REV CODE 250

## 2023-09-16 PROCEDURE — 25000003 PHARM REV CODE 250: Performed by: STUDENT IN AN ORGANIZED HEALTH CARE EDUCATION/TRAINING PROGRAM

## 2023-09-16 PROCEDURE — 63600175 PHARM REV CODE 636 W HCPCS: Performed by: STUDENT IN AN ORGANIZED HEALTH CARE EDUCATION/TRAINING PROGRAM

## 2023-09-16 PROCEDURE — 80202 ASSAY OF VANCOMYCIN: CPT | Performed by: SURGERY

## 2023-09-16 PROCEDURE — 63600175 PHARM REV CODE 636 W HCPCS: Performed by: NURSE PRACTITIONER

## 2023-09-16 PROCEDURE — 25000003 PHARM REV CODE 250: Performed by: NURSE PRACTITIONER

## 2023-09-16 PROCEDURE — 80053 COMPREHEN METABOLIC PANEL: CPT

## 2023-09-16 PROCEDURE — 83735 ASSAY OF MAGNESIUM: CPT

## 2023-09-16 PROCEDURE — 84100 ASSAY OF PHOSPHORUS: CPT

## 2023-09-16 PROCEDURE — 94761 N-INVAS EAR/PLS OXIMETRY MLT: CPT

## 2023-09-16 PROCEDURE — 85025 COMPLETE CBC W/AUTO DIFF WBC: CPT

## 2023-09-16 PROCEDURE — 94640 AIRWAY INHALATION TREATMENT: CPT

## 2023-09-16 PROCEDURE — 27000221 HC OXYGEN, UP TO 24 HOURS

## 2023-09-16 PROCEDURE — 25000242 PHARM REV CODE 250 ALT 637 W/ HCPCS: Performed by: NURSE PRACTITIONER

## 2023-09-16 PROCEDURE — A4217 STERILE WATER/SALINE, 500 ML: HCPCS

## 2023-09-16 RX ADMIN — PIPERACILLIN SODIUM AND TAZOBACTAM SODIUM 4.5 G: 4; .5 INJECTION, POWDER, FOR SOLUTION INTRAVENOUS at 01:09

## 2023-09-16 RX ADMIN — VANCOMYCIN HYDROCHLORIDE 1500 MG: 1.5 INJECTION, POWDER, LYOPHILIZED, FOR SOLUTION INTRAVENOUS at 05:09

## 2023-09-16 RX ADMIN — GABAPENTIN 100 MG: 300 CAPSULE ORAL at 03:09

## 2023-09-16 RX ADMIN — ENOXAPARIN SODIUM 40 MG: 40 INJECTION SUBCUTANEOUS at 04:09

## 2023-09-16 RX ADMIN — MAGNESIUM SULFATE HEPTAHYDRATE: 500 INJECTION, SOLUTION INTRAMUSCULAR; INTRAVENOUS at 09:09

## 2023-09-16 RX ADMIN — ACETAMINOPHEN 1000 MG: 500 TABLET ORAL at 01:09

## 2023-09-16 RX ADMIN — ACETAMINOPHEN 1000 MG: 500 TABLET ORAL at 09:09

## 2023-09-16 RX ADMIN — OXYCODONE HYDROCHLORIDE 10 MG: 10 TABLET ORAL at 06:09

## 2023-09-16 RX ADMIN — LEVALBUTEROL HYDROCHLORIDE 0.63 MG: 0.63 SOLUTION RESPIRATORY (INHALATION) at 08:09

## 2023-09-16 RX ADMIN — GABAPENTIN 100 MG: 300 CAPSULE ORAL at 08:09

## 2023-09-16 RX ADMIN — OXYCODONE HYDROCHLORIDE 10 MG: 10 TABLET ORAL at 08:09

## 2023-09-16 RX ADMIN — LEVALBUTEROL HYDROCHLORIDE 0.63 MG: 0.63 SOLUTION RESPIRATORY (INHALATION) at 02:09

## 2023-09-16 RX ADMIN — OXYCODONE HYDROCHLORIDE 10 MG: 10 TABLET ORAL at 01:09

## 2023-09-16 RX ADMIN — PANTOPRAZOLE SODIUM 40 MG: 40 TABLET, DELAYED RELEASE ORAL at 08:09

## 2023-09-16 RX ADMIN — PIPERACILLIN SODIUM AND TAZOBACTAM SODIUM 4.5 G: 4; .5 INJECTION, POWDER, FOR SOLUTION INTRAVENOUS at 04:09

## 2023-09-16 RX ADMIN — ACETAMINOPHEN 1000 MG: 500 TABLET ORAL at 05:09

## 2023-09-16 RX ADMIN — FLUCONAZOLE 200 MG: 2 INJECTION, SOLUTION INTRAVENOUS at 11:09

## 2023-09-16 RX ADMIN — SOYBEAN OIL 250 ML: 20 INJECTION, SOLUTION INTRAVENOUS at 09:09

## 2023-09-16 RX ADMIN — PIPERACILLIN SODIUM AND TAZOBACTAM SODIUM 4.5 G: 4; .5 INJECTION, POWDER, FOR SOLUTION INTRAVENOUS at 08:09

## 2023-09-16 NOTE — SUBJECTIVE & OBJECTIVE
Interval History: No acute events overnight, HDS, AF. IR drain bilious output, DANILO drain purulent output.     Medications:  Continuous Infusions:   dextrose 5 % and 0.45 % NaCl with KCl 20 mEq 125 mL/hr at 09/15/23 1657    TPN ADULT CENTRAL LINE CUSTOM 60 mL/hr at 09/15/23 2112    TPN ADULT CENTRAL LINE CUSTOM       Scheduled Meds:   acetaminophen  1,000 mg Oral Q8H    enoxparin  40 mg Subcutaneous Q24H (prophylaxis, 1700)    fat emulsion 20%  250 mL Intravenous Daily    fat emulsion 20%  250 mL Intravenous Daily    fluconazole (DIFLUCAN) IV (PEDS and ADULTS)  200 mg Intravenous Q24H    gabapentin  100 mg Oral TID    levalbuterol  0.63 mg Nebulization Q6H WAKE    pantoprazole  40 mg Oral Daily    piperacillin-tazobactam (Zosyn) IV (PEDS and ADULTS) (extended infusion is not appropriate)  4.5 g Intravenous Q8H    vancomycin (VANCOCIN) IV (PEDS and ADULTS)  1,500 mg Intravenous Q12H     PRN Meds:dextrose 10%, glucagon (human recombinant), insulin aspart U-100, melatonin, ondansetron, oxyCODONE, oxyCODONE, sodium chloride 0.9%     Review of patient's allergies indicates:   Allergen Reactions    Penicillins Rash     Objective:     Vital Signs (Most Recent):  Temp: 97.9 °F (36.6 °C) (09/16/23 0726)  Pulse: 95 (09/16/23 0800)  Resp: 18 (09/16/23 0834)  BP: 128/68 (09/16/23 0726)  SpO2: (!) 94 % (09/16/23 0800) Vital Signs (24h Range):  Temp:  [97.9 °F (36.6 °C)-99 °F (37.2 °C)] 97.9 °F (36.6 °C)  Pulse:  [] 95  Resp:  [18-20] 18  SpO2:  [84 %-96 %] 94 %  BP: (124-134)/(67-77) 128/68     Weight: 83.5 kg (184 lb)  Body mass index is 25.66 kg/m².    Intake/Output - Last 3 Shifts         09/14 0700  09/15 0659 09/15 0700  09/16 0659 09/16 0700  09/17 0659    P.O.  60     I.V. (mL/kg)  500 (6)     IV Piggyback  199.8     Total Intake(mL/kg)  759.8 (9.1)     Urine (mL/kg/hr)  250 (0.1)     Drains 1225 1625 75    Other 300      Stool 0 0     Total Output 1525 1875 75    Net -1525 -1115.2 -75           Stool Occurrence 0 x 0  x              Physical Exam  Constitutional:       Appearance: Normal appearance.   HENT:      Head: Normocephalic and atraumatic.      Nose: Nose normal.      Mouth/Throat:      Mouth: Mucous membranes are moist.      Pharynx: Oropharynx is clear.   Eyes:      Extraocular Movements: Extraocular movements intact.      Conjunctiva/sclera: Conjunctivae normal.   Cardiovascular:      Rate and Rhythm: Normal rate and regular rhythm.   Pulmonary:      Effort: Pulmonary effort is normal.      Breath sounds: Normal breath sounds.   Abdominal:      General: Abdomen is flat.      Palpations: Abdomen is soft.      Comments: RUQ DANILO drain with purulent output. Soft but distended abdomen, some tenderness around the drain. IR drain bilious output   Skin:     General: Skin is warm and dry.      Capillary Refill: Capillary refill takes less than 2 seconds.   Neurological:      General: No focal deficit present.      Mental Status: He is alert.          Significant Labs:  I have reviewed all pertinent lab results within the past 24 hours.  CBC:   Recent Labs   Lab 09/16/23  0626   WBC 21.96*   RBC 3.39*   HGB 9.3*   HCT 28.9*      MCV 85   MCH 27.4   MCHC 32.2     CMP:   Recent Labs   Lab 09/16/23  0626   *   CALCIUM 7.6*   ALBUMIN 1.2*   PROT 5.0*   *   K 3.9   CO2 29   CL 96   BUN 19   CREATININE 0.6   ALKPHOS 154*   ALT 41   AST 29   BILITOT 1.4*       Significant Diagnostics:  I have reviewed all pertinent imaging results/findings within the past 24 hours.

## 2023-09-16 NOTE — PROGRESS NOTES
Siddhartha Thurman [facility]  General Surgery  Progress Note    Subjective:     History of Present Illness:  No notes on file    Post-Op Info:  Procedure(s) (LRB):  DRAINAGE, ABDOMEN, LAPAROSCOPIC (N/A)         Interval History: No acute events overnight, HDS, AF. IR drain bilious output, DANILO drain purulent output.     Medications:  Continuous Infusions:   dextrose 5 % and 0.45 % NaCl with KCl 20 mEq 125 mL/hr at 09/15/23 1657    TPN ADULT CENTRAL LINE CUSTOM 60 mL/hr at 09/15/23 2112    TPN ADULT CENTRAL LINE CUSTOM       Scheduled Meds:   acetaminophen  1,000 mg Oral Q8H    enoxparin  40 mg Subcutaneous Q24H (prophylaxis, 1700)    fat emulsion 20%  250 mL Intravenous Daily    fat emulsion 20%  250 mL Intravenous Daily    fluconazole (DIFLUCAN) IV (PEDS and ADULTS)  200 mg Intravenous Q24H    gabapentin  100 mg Oral TID    levalbuterol  0.63 mg Nebulization Q6H WAKE    pantoprazole  40 mg Oral Daily    piperacillin-tazobactam (Zosyn) IV (PEDS and ADULTS) (extended infusion is not appropriate)  4.5 g Intravenous Q8H    vancomycin (VANCOCIN) IV (PEDS and ADULTS)  1,500 mg Intravenous Q12H     PRN Meds:dextrose 10%, glucagon (human recombinant), insulin aspart U-100, melatonin, ondansetron, oxyCODONE, oxyCODONE, sodium chloride 0.9%     Review of patient's allergies indicates:   Allergen Reactions    Penicillins Rash     Objective:     Vital Signs (Most Recent):  Temp: 97.9 °F (36.6 °C) (09/16/23 0726)  Pulse: 95 (09/16/23 0800)  Resp: 18 (09/16/23 0834)  BP: 128/68 (09/16/23 0726)  SpO2: (!) 94 % (09/16/23 0800) Vital Signs (24h Range):  Temp:  [97.9 °F (36.6 °C)-99 °F (37.2 °C)] 97.9 °F (36.6 °C)  Pulse:  [] 95  Resp:  [18-20] 18  SpO2:  [84 %-96 %] 94 %  BP: (124-134)/(67-77) 128/68     Weight: 83.5 kg (184 lb)  Body mass index is 25.66 kg/m².    Intake/Output - Last 3 Shifts         09/14 0700  09/15 0659 09/15 0700  09/16 0659 09/16 0700 09/17 0659    P.O.  60     I.V. (mL/kg)  500 (6)     IV Piggyback   199.8     Total Intake(mL/kg)  759.8 (9.1)     Urine (mL/kg/hr)  250 (0.1)     Drains 1225 1625 75    Other 300      Stool 0 0     Total Output 1525 1875 75    Net -1525 -1115.2 -75           Stool Occurrence 0 x 0 x              Physical Exam  Constitutional:       Appearance: Normal appearance.   HENT:      Head: Normocephalic and atraumatic.      Nose: Nose normal.      Mouth/Throat:      Mouth: Mucous membranes are moist.      Pharynx: Oropharynx is clear.   Eyes:      Extraocular Movements: Extraocular movements intact.      Conjunctiva/sclera: Conjunctivae normal.   Cardiovascular:      Rate and Rhythm: Normal rate and regular rhythm.   Pulmonary:      Effort: Pulmonary effort is normal.      Breath sounds: Normal breath sounds.   Abdominal:      General: Abdomen is flat.      Palpations: Abdomen is soft.      Comments: RUQ DANILO drain with purulent output. Soft but distended abdomen, some tenderness around the drain. IR drain bilious output   Skin:     General: Skin is warm and dry.      Capillary Refill: Capillary refill takes less than 2 seconds.   Neurological:      General: No focal deficit present.      Mental Status: He is alert.          Significant Labs:  I have reviewed all pertinent lab results within the past 24 hours.  CBC:   Recent Labs   Lab 09/16/23  0626   WBC 21.96*   RBC 3.39*   HGB 9.3*   HCT 28.9*      MCV 85   MCH 27.4   MCHC 32.2     CMP:   Recent Labs   Lab 09/16/23  0626   *   CALCIUM 7.6*   ALBUMIN 1.2*   PROT 5.0*   *   K 3.9   CO2 29   CL 96   BUN 19   CREATININE 0.6   ALKPHOS 154*   ALT 41   AST 29   BILITOT 1.4*       Significant Diagnostics:  I have reviewed all pertinent imaging results/findings within the past 24 hours.    Assessment/Plan:     Gastric adenocarcinoma  Mr. Valladares is a 63 y/o male s/p  open distal gastrectomy with BII reconstruction on 8/21/23 and admitted for intra abdominal fluid collection on CT on 9/14/23 and cough. Now with a large  intra-abdominal fluid collection.  S/p IR drain placement into intra-abdominal fluid collection.      - CLD  - TPN via PICC line   - Flush DANILO drain 3x/day with 20 mL saline   - IV B Zosyn and Vanc  - CT scan tomorrow   - monitor drain output   - PT, OOB chair  - Ambulate TID  - DVT PPX  - GI PPX           Angel Coffey MD  General Surgery  [facility]

## 2023-09-16 NOTE — ASSESSMENT & PLAN NOTE
Mr. Valladares is a 63 y/o male s/p  open distal gastrectomy with BII reconstruction on 8/21/23 and admitted for intra abdominal fluid collection on CT on 9/14/23 and cough. Now with a large intra-abdominal fluid collection.  S/p IR drain placement into intra-abdominal fluid collection.      - CLD  - TPN via PICC line   - Flush DANILO drain 3x/day with 20 mL saline   - IV B Zosyn and Vanc  - CT scan tomorrow   - monitor drain output   - PT, OOB chair  - Ambulate TID  - DVT PPX  - GI PPX

## 2023-09-16 NOTE — NURSING
[facility] Plan of Care Note  Dx Abdominal Fluid Collection    Shift Events n/a    Goals of Care: pain control    Neuro: AAOx4    Vital Signs: VSS    Respiratory: O2@3L    Diet: clear liquids. TPN, Lipids    Is patient tolerating current diet? yes    GTTS: D51/2NSwith 20K@125    Urine Output/Bowel Movement:     Drains/Tubes/Tube Feeds (include total output/shift): DANILO to suction 75  DANILO to drainage bag  80    Lines: right upper PICC      Accuchecks:yes    Skin: abdominal incision, breakdown to buttocks    Fall Risk Score: 9    Activity level? chairfast    Any scheduled procedures? CT of abdomen in the morning    Any safety concerns? N/a    Other: n/a

## 2023-09-17 LAB
ANION GAP SERPL CALC-SCNC: 10 MMOL/L (ref 8–16)
BASOPHILS # BLD AUTO: 0.04 K/UL (ref 0–0.2)
BASOPHILS NFR BLD: 0.2 % (ref 0–1.9)
BUN SERPL-MCNC: 16 MG/DL (ref 8–23)
CALCIUM SERPL-MCNC: 7.8 MG/DL (ref 8.7–10.5)
CHLORIDE SERPL-SCNC: 94 MMOL/L (ref 95–110)
CO2 SERPL-SCNC: 28 MMOL/L (ref 23–29)
CREAT SERPL-MCNC: 0.5 MG/DL (ref 0.5–1.4)
DIFFERENTIAL METHOD: ABNORMAL
EOSINOPHIL # BLD AUTO: 0 K/UL (ref 0–0.5)
EOSINOPHIL NFR BLD: 0.1 % (ref 0–8)
ERYTHROCYTE [DISTWIDTH] IN BLOOD BY AUTOMATED COUNT: 14.5 % (ref 11.5–14.5)
EST. GFR  (NO RACE VARIABLE): >60 ML/MIN/1.73 M^2
GLUCOSE SERPL-MCNC: 113 MG/DL (ref 70–110)
HCT VFR BLD AUTO: 27.6 % (ref 40–54)
HGB BLD-MCNC: 8.7 G/DL (ref 14–18)
IMM GRANULOCYTES # BLD AUTO: 0.72 K/UL (ref 0–0.04)
IMM GRANULOCYTES NFR BLD AUTO: 3.7 % (ref 0–0.5)
LYMPHOCYTES # BLD AUTO: 1.3 K/UL (ref 1–4.8)
LYMPHOCYTES NFR BLD: 6.6 % (ref 18–48)
MAGNESIUM SERPL-MCNC: 1.8 MG/DL (ref 1.6–2.6)
MCH RBC QN AUTO: 26.8 PG (ref 27–31)
MCHC RBC AUTO-ENTMCNC: 31.5 G/DL (ref 32–36)
MCV RBC AUTO: 85 FL (ref 82–98)
MONOCYTES # BLD AUTO: 1.6 K/UL (ref 0.3–1)
MONOCYTES NFR BLD: 8.2 % (ref 4–15)
NEUTROPHILS # BLD AUTO: 15.8 K/UL (ref 1.8–7.7)
NEUTROPHILS NFR BLD: 81.2 % (ref 38–73)
NRBC BLD-RTO: 0 /100 WBC
PHOSPHATE SERPL-MCNC: 2.6 MG/DL (ref 2.7–4.5)
PLATELET # BLD AUTO: 422 K/UL (ref 150–450)
PMV BLD AUTO: 9.9 FL (ref 9.2–12.9)
POCT GLUCOSE: 120 MG/DL (ref 70–110)
POCT GLUCOSE: 123 MG/DL (ref 70–110)
POCT GLUCOSE: 124 MG/DL (ref 70–110)
POTASSIUM SERPL-SCNC: 3.7 MMOL/L (ref 3.5–5.1)
RBC # BLD AUTO: 3.25 M/UL (ref 4.6–6.2)
SODIUM SERPL-SCNC: 132 MMOL/L (ref 136–145)
TRIGL SERPL-MCNC: 160 MG/DL (ref 30–150)
VANCOMYCIN TROUGH SERPL-MCNC: 14.7 UG/ML (ref 10–22)
WBC # BLD AUTO: 19.45 K/UL (ref 3.9–12.7)

## 2023-09-17 PROCEDURE — 63600175 PHARM REV CODE 636 W HCPCS: Performed by: NURSE PRACTITIONER

## 2023-09-17 PROCEDURE — 25500020 PHARM REV CODE 255: Performed by: SURGERY

## 2023-09-17 PROCEDURE — 25000003 PHARM REV CODE 250

## 2023-09-17 PROCEDURE — 84478 ASSAY OF TRIGLYCERIDES: CPT | Performed by: SURGERY

## 2023-09-17 PROCEDURE — 80048 BASIC METABOLIC PNL TOTAL CA: CPT | Performed by: SURGERY

## 2023-09-17 PROCEDURE — 94664 DEMO&/EVAL PT USE INHALER: CPT

## 2023-09-17 PROCEDURE — 85025 COMPLETE CBC W/AUTO DIFF WBC: CPT

## 2023-09-17 PROCEDURE — 27000646 HC AEROBIKA DEVICE

## 2023-09-17 PROCEDURE — 25000003 PHARM REV CODE 250: Performed by: NURSE PRACTITIONER

## 2023-09-17 PROCEDURE — 63600175 PHARM REV CODE 636 W HCPCS: Performed by: STUDENT IN AN ORGANIZED HEALTH CARE EDUCATION/TRAINING PROGRAM

## 2023-09-17 PROCEDURE — 99900035 HC TECH TIME PER 15 MIN (STAT)

## 2023-09-17 PROCEDURE — 20600001 HC STEP DOWN PRIVATE ROOM

## 2023-09-17 PROCEDURE — 83735 ASSAY OF MAGNESIUM: CPT | Performed by: SURGERY

## 2023-09-17 PROCEDURE — 94640 AIRWAY INHALATION TREATMENT: CPT

## 2023-09-17 PROCEDURE — 25500020 PHARM REV CODE 255: Performed by: INTERNAL MEDICINE

## 2023-09-17 PROCEDURE — 94761 N-INVAS EAR/PLS OXIMETRY MLT: CPT

## 2023-09-17 PROCEDURE — A4217 STERILE WATER/SALINE, 500 ML: HCPCS

## 2023-09-17 PROCEDURE — 63600175 PHARM REV CODE 636 W HCPCS

## 2023-09-17 PROCEDURE — B4185 PARENTERAL SOL 10 GM LIPIDS: HCPCS

## 2023-09-17 PROCEDURE — 27000221 HC OXYGEN, UP TO 24 HOURS

## 2023-09-17 PROCEDURE — 25000003 PHARM REV CODE 250: Performed by: STUDENT IN AN ORGANIZED HEALTH CARE EDUCATION/TRAINING PROGRAM

## 2023-09-17 PROCEDURE — 80202 ASSAY OF VANCOMYCIN: CPT | Performed by: SURGERY

## 2023-09-17 PROCEDURE — 84100 ASSAY OF PHOSPHORUS: CPT | Performed by: SURGERY

## 2023-09-17 PROCEDURE — 25000242 PHARM REV CODE 250 ALT 637 W/ HCPCS: Performed by: NURSE PRACTITIONER

## 2023-09-17 RX ADMIN — LEVALBUTEROL HYDROCHLORIDE 0.63 MG: 0.63 SOLUTION RESPIRATORY (INHALATION) at 02:09

## 2023-09-17 RX ADMIN — MAGNESIUM SULFATE HEPTAHYDRATE: 500 INJECTION, SOLUTION INTRAMUSCULAR; INTRAVENOUS at 10:09

## 2023-09-17 RX ADMIN — ACETAMINOPHEN 1000 MG: 500 TABLET ORAL at 06:09

## 2023-09-17 RX ADMIN — OXYCODONE HYDROCHLORIDE 10 MG: 10 TABLET ORAL at 10:09

## 2023-09-17 RX ADMIN — IOHEXOL 75 ML: 350 INJECTION, SOLUTION INTRAVENOUS at 01:09

## 2023-09-17 RX ADMIN — ACETAMINOPHEN 1000 MG: 500 TABLET ORAL at 10:09

## 2023-09-17 RX ADMIN — GABAPENTIN 100 MG: 300 CAPSULE ORAL at 02:09

## 2023-09-17 RX ADMIN — VANCOMYCIN HYDROCHLORIDE 1500 MG: 1.5 INJECTION, POWDER, LYOPHILIZED, FOR SOLUTION INTRAVENOUS at 06:09

## 2023-09-17 RX ADMIN — PIPERACILLIN SODIUM AND TAZOBACTAM SODIUM 4.5 G: 4; .5 INJECTION, POWDER, FOR SOLUTION INTRAVENOUS at 09:09

## 2023-09-17 RX ADMIN — OXYCODONE HYDROCHLORIDE 10 MG: 10 TABLET ORAL at 04:09

## 2023-09-17 RX ADMIN — ACETAMINOPHEN 1000 MG: 500 TABLET ORAL at 02:09

## 2023-09-17 RX ADMIN — PANTOPRAZOLE SODIUM 40 MG: 40 TABLET, DELAYED RELEASE ORAL at 09:09

## 2023-09-17 RX ADMIN — ENOXAPARIN SODIUM 40 MG: 40 INJECTION SUBCUTANEOUS at 06:09

## 2023-09-17 RX ADMIN — OXYCODONE HYDROCHLORIDE 10 MG: 10 TABLET ORAL at 06:09

## 2023-09-17 RX ADMIN — DIATRIZOATE MEGLUMINE AND DIATRIZOATE SODIUM 40 ML: 660; 100 LIQUID ORAL; RECTAL at 09:09

## 2023-09-17 RX ADMIN — LEVALBUTEROL HYDROCHLORIDE 0.63 MG: 0.63 SOLUTION RESPIRATORY (INHALATION) at 07:09

## 2023-09-17 RX ADMIN — SOYBEAN OIL 250 ML: 20 INJECTION, SOLUTION INTRAVENOUS at 10:09

## 2023-09-17 RX ADMIN — SODIUM PHOSPHATE, MONOBASIC, MONOHYDRATE AND SODIUM PHOSPHATE, DIBASIC, ANHYDROUS 15 MMOL: 142; 276 INJECTION, SOLUTION INTRAVENOUS at 10:09

## 2023-09-17 RX ADMIN — FLUCONAZOLE 200 MG: 2 INJECTION, SOLUTION INTRAVENOUS at 02:09

## 2023-09-17 RX ADMIN — GABAPENTIN 100 MG: 300 CAPSULE ORAL at 09:09

## 2023-09-17 RX ADMIN — DEXTROSE, SODIUM CHLORIDE, AND POTASSIUM CHLORIDE: 5; .45; .15 INJECTION INTRAVENOUS at 02:09

## 2023-09-17 RX ADMIN — PIPERACILLIN SODIUM AND TAZOBACTAM SODIUM 4.5 G: 4; .5 INJECTION, POWDER, FOR SOLUTION INTRAVENOUS at 01:09

## 2023-09-17 NOTE — PT/OT/SLP PROGRESS
Physical Therapy Screen      Patient Name:  Danish Valladares   MRN:  [id number]    Patient not seen today secondary to Pain and fatigue. Patient in room with 2 family members, reports he has been walking in the hallway with them and going to the bathroom independently. He reports he has not noticed any balance deficits and does not feel that PT services are required at this time. Patient educated to continue ambulating and sitting in chair, and that PT can come evaluate if mobility changes arise. Patient to be discharged from PT, please re-consult if appropriate.

## 2023-09-17 NOTE — SUBJECTIVE & OBJECTIVE
Interval History: No major changes overnight. Fells well this morning. Got up and walked around yesterday. Drain output slowing. Tolerating diet. Labs pending.     Medications:  Continuous Infusions:   dextrose 5 % and 0.45 % NaCl with KCl 20 mEq 75 mL/hr at 09/16/23 0938    TPN ADULT CENTRAL LINE CUSTOM 60 mL/hr at 09/16/23 2122    TPN ADULT CENTRAL LINE CUSTOM       Scheduled Meds:   acetaminophen  1,000 mg Oral Q8H    diatrizoate meglumineand-diatrizoate sodium  30 mL Oral Once    enoxparin  40 mg Subcutaneous Q24H (prophylaxis, 1700)    fat emulsion 20%  250 mL Intravenous Daily    fat emulsion 20%  250 mL Intravenous Daily    fluconazole (DIFLUCAN) IV (PEDS and ADULTS)  200 mg Intravenous Q24H    gabapentin  100 mg Oral TID    levalbuterol  0.63 mg Nebulization Q6H WAKE    pantoprazole  40 mg Oral Daily    piperacillin-tazobactam (Zosyn) IV (PEDS and ADULTS) (extended infusion is not appropriate)  4.5 g Intravenous Q8H    sodium phosphate 15 mmol in dextrose 5 % (D5W) 250 mL IVPB  15 mmol Intravenous Once    vancomycin (VANCOCIN) IV (PEDS and ADULTS)  1,500 mg Intravenous Q12H     PRN Meds:dextrose 10%, glucagon (human recombinant), insulin aspart U-100, melatonin, ondansetron, oxyCODONE, oxyCODONE, sodium chloride 0.9%     Review of patient's allergies indicates:   Allergen Reactions    Penicillins Rash     Objective:     Vital Signs (Most Recent):  Temp: 98.6 °F (37 °C) (09/17/23 0431)  Pulse: 100 (09/17/23 0736)  Resp: 18 (09/17/23 0736)  BP: 136/64 (09/17/23 0431)  SpO2: (!) 94 % (09/17/23 0736) Vital Signs (24h Range):  Temp:  [98.6 °F (37 °C)-99 °F (37.2 °C)] 98.6 °F (37 °C)  Pulse:  [] 100  Resp:  [16-20] 18  SpO2:  [94 %-96 %] 94 %  BP: (128-136)/(60-70) 136/64     Weight: 83.5 kg (184 lb)  Body mass index is 25.66 kg/m².    Intake/Output - Last 3 Shifts         09/15 0700  09/16 0659 09/16 0700  09/17 0659 09/17 0700  09/18 0659    P.O. 60 480     I.V. (mL/kg) 500 (6)      IV Piggyback 199.8       Total Intake(mL/kg) 759.8 (9.1) 480 (5.7)     Urine (mL/kg/hr) 250 (0.1) 1700 (0.8)     Drains 1625 570     Other       Stool 0      Total Output 1875 2270     Net -1115.2 -1790            Stool Occurrence 0 x               Physical Exam  Constitutional:       Appearance: Normal appearance.   HENT:      Head: Normocephalic and atraumatic.      Nose: Nose normal.      Mouth/Throat:      Mouth: Mucous membranes are moist.      Pharynx: Oropharynx is clear.   Eyes:      Extraocular Movements: Extraocular movements intact.      Conjunctiva/sclera: Conjunctivae normal.   Cardiovascular:      Rate and Rhythm: Normal rate and regular rhythm.   Pulmonary:      Effort: Pulmonary effort is normal.      Breath sounds: Normal breath sounds.   Abdominal:      General: Abdomen is flat.      Palpations: Abdomen is soft.      Comments: RUQ DANILO drain with purulent output. Soft but distended abdomen, some tenderness around the drain. IR drain bilious output   Skin:     General: Skin is warm and dry.      Capillary Refill: Capillary refill takes less than 2 seconds.   Neurological:      General: No focal deficit present.      Mental Status: He is alert.          Significant Labs:  I have reviewed all pertinent lab results within the past 24 hours.  CBC:   Recent Labs   Lab 09/17/23  0625   WBC 19.45*   RBC 3.25*   HGB 8.7*   HCT 27.6*      MCV 85   MCH 26.8*   MCHC 31.5*     CMP:   Recent Labs   Lab 09/16/23  0626 09/17/23  0424   * 113*   CALCIUM 7.6* 7.8*   ALBUMIN 1.2*  --    PROT 5.0*  --    * 132*   K 3.9 3.7   CO2 29 28   CL 96 94*   BUN 19 16   CREATININE 0.6 0.5   ALKPHOS 154*  --    ALT 41  --    AST 29  --    BILITOT 1.4*  --        Significant Diagnostics:  I have reviewed all pertinent imaging results/findings within the past 24 hours.

## 2023-09-17 NOTE — PROGRESS NOTES
Siddhartha Thurman - [facility]  General Surgery  Progress Note    Subjective:     History of Present Illness:  No notes on file    Post-Op Info:  Procedure(s) (LRB):  DRAINAGE, ABDOMEN, LAPAROSCOPIC (N/A)         Interval History: No major changes overnight. Fells well this morning. Got up and walked around yesterday. Drain output slowing. Tolerating diet. Labs pending.     Medications:  Continuous Infusions:   dextrose 5 % and 0.45 % NaCl with KCl 20 mEq 75 mL/hr at 09/16/23 0938    TPN ADULT CENTRAL LINE CUSTOM 60 mL/hr at 09/16/23 2122    TPN ADULT CENTRAL LINE CUSTOM       Scheduled Meds:   acetaminophen  1,000 mg Oral Q8H    diatrizoate meglumineand-diatrizoate sodium  30 mL Oral Once    enoxparin  40 mg Subcutaneous Q24H (prophylaxis, 1700)    fat emulsion 20%  250 mL Intravenous Daily    fat emulsion 20%  250 mL Intravenous Daily    fluconazole (DIFLUCAN) IV (PEDS and ADULTS)  200 mg Intravenous Q24H    gabapentin  100 mg Oral TID    levalbuterol  0.63 mg Nebulization Q6H WAKE    pantoprazole  40 mg Oral Daily    piperacillin-tazobactam (Zosyn) IV (PEDS and ADULTS) (extended infusion is not appropriate)  4.5 g Intravenous Q8H    sodium phosphate 15 mmol in dextrose 5 % (D5W) 250 mL IVPB  15 mmol Intravenous Once    vancomycin (VANCOCIN) IV (PEDS and ADULTS)  1,500 mg Intravenous Q12H     PRN Meds:dextrose 10%, glucagon (human recombinant), insulin aspart U-100, melatonin, ondansetron, oxyCODONE, oxyCODONE, sodium chloride 0.9%     Review of patient's allergies indicates:   Allergen Reactions    Penicillins Rash     Objective:     Vital Signs (Most Recent):  Temp: 98.6 °F (37 °C) (09/17/23 0431)  Pulse: 100 (09/17/23 0736)  Resp: 18 (09/17/23 0736)  BP: 136/64 (09/17/23 0431)  SpO2: (!) 94 % (09/17/23 0736) Vital Signs (24h Range):  Temp:  [98.6 °F (37 °C)-99 °F (37.2 °C)] 98.6 °F (37 °C)  Pulse:  [] 100  Resp:  [16-20] 18  SpO2:  [94 %-96 %] 94 %  BP: (128-136)/(60-70) 136/64     Weight: 83.5 kg (184  lb)  Body mass index is 25.66 kg/m².    Intake/Output - Last 3 Shifts         09/15 0700  09/16 0659 09/16 0700 09/17 0659 09/17 0700 09/18 0659    P.O. 60 480     I.V. (mL/kg) 500 (6)      IV Piggyback 199.8      Total Intake(mL/kg) 759.8 (9.1) 480 (5.7)     Urine (mL/kg/hr) 250 (0.1) 1700 (0.8)     Drains 1625 570     Other       Stool 0      Total Output 1875 2270     Net -1115.2 -1790            Stool Occurrence 0 x               Physical Exam  Constitutional:       Appearance: Normal appearance.   HENT:      Head: Normocephalic and atraumatic.      Nose: Nose normal.      Mouth/Throat:      Mouth: Mucous membranes are moist.      Pharynx: Oropharynx is clear.   Eyes:      Extraocular Movements: Extraocular movements intact.      Conjunctiva/sclera: Conjunctivae normal.   Cardiovascular:      Rate and Rhythm: Normal rate and regular rhythm.   Pulmonary:      Effort: Pulmonary effort is normal.      Breath sounds: Normal breath sounds.   Abdominal:      General: Abdomen is flat.      Palpations: Abdomen is soft.      Comments: RUQ DANILO drain with purulent output. Soft but distended abdomen, some tenderness around the drain. IR drain bilious output   Skin:     General: Skin is warm and dry.      Capillary Refill: Capillary refill takes less than 2 seconds.   Neurological:      General: No focal deficit present.      Mental Status: He is alert.          Significant Labs:  I have reviewed all pertinent lab results within the past 24 hours.  CBC:   Recent Labs   Lab 09/17/23  0625   WBC 19.45*   RBC 3.25*   HGB 8.7*   HCT 27.6*      MCV 85   MCH 26.8*   MCHC 31.5*     CMP:   Recent Labs   Lab 09/16/23  0626 09/17/23  0424   * 113*   CALCIUM 7.6* 7.8*   ALBUMIN 1.2*  --    PROT 5.0*  --    * 132*   K 3.9 3.7   CO2 29 28   CL 96 94*   BUN 19 16   CREATININE 0.6 0.5   ALKPHOS 154*  --    ALT 41  --    AST 29  --    BILITOT 1.4*  --        Significant Diagnostics:  I have reviewed all pertinent  imaging results/findings within the past 24 hours.    Assessment/Plan:     Gastric adenocarcinoma  Mr. Valladares is a 61 y/o male s/p  open distal gastrectomy with BII reconstruction on 8/21/23 and admitted for intra abdominal fluid collection on CT on 9/14/23 and cough. Now with a large intra-abdominal fluid collection.  S/p IR drain placement into intra-abdominal fluid collection.      - NPO for CT scan today. Likely OK for CLD following scan.  - TPN via PICC line   - Flush DANILO drain 3x/day with 20 mL saline   - IV Zosyn and Vanc  - CT scan today with IV and PO contrast   - Monitor drain output   - PT, OOB chair  - Ambulate TID  - DVT PPX  - GI PPX           Mino Dockery MD  General Surgery  [facility]

## 2023-09-17 NOTE — ASSESSMENT & PLAN NOTE
Mr. Valladares is a 63 y/o male s/p  open distal gastrectomy with BII reconstruction on 8/21/23 and admitted for intra abdominal fluid collection on CT on 9/14/23 and cough. Now with a large intra-abdominal fluid collection.  S/p IR drain placement into intra-abdominal fluid collection.      - NPO for CT scan today. Likely OK for CLD following scan.  - TPN via PICC line   - Flush DANILO drain 3x/day with 20 mL saline   - IV Zosyn and Vanc  - CT scan today with IV and PO contrast   - Monitor drain output   - PT, OOB chair  - Ambulate TID  - DVT PPX  - GI PPX

## 2023-09-18 ENCOUNTER — ANESTHESIA (OUTPATIENT)
Dept: SURGERY | Facility: HOSPITAL | Age: 63
DRG: 856 | End: 2023-09-18
Payer: COMMERCIAL

## 2023-09-18 LAB
ABO + RH BLD: NORMAL
ANION GAP SERPL CALC-SCNC: 6 MMOL/L (ref 8–16)
BACTERIA SPEC AEROBE CULT: ABNORMAL
BACTERIA SPEC AEROBE CULT: ABNORMAL
BACTERIA SPEC ANAEROBE CULT: ABNORMAL
BACTERIA SPEC ANAEROBE CULT: ABNORMAL
BASOPHILS # BLD AUTO: 0.08 K/UL (ref 0–0.2)
BASOPHILS NFR BLD: 0.5 % (ref 0–1.9)
BLD GP AB SCN CELLS X3 SERPL QL: NORMAL
BUN SERPL-MCNC: 14 MG/DL (ref 8–23)
CALCIUM SERPL-MCNC: 7.5 MG/DL (ref 8.7–10.5)
CHLORIDE SERPL-SCNC: 96 MMOL/L (ref 95–110)
CO2 SERPL-SCNC: 30 MMOL/L (ref 23–29)
CREAT SERPL-MCNC: 0.6 MG/DL (ref 0.5–1.4)
DIFFERENTIAL METHOD: ABNORMAL
EOSINOPHIL # BLD AUTO: 0 K/UL (ref 0–0.5)
EOSINOPHIL NFR BLD: 0.1 % (ref 0–8)
ERYTHROCYTE [DISTWIDTH] IN BLOOD BY AUTOMATED COUNT: 14.8 % (ref 11.5–14.5)
EST. GFR  (NO RACE VARIABLE): >60 ML/MIN/1.73 M^2
GLUCOSE SERPL-MCNC: 111 MG/DL (ref 70–110)
GRAM STN SPEC: NORMAL
GRAM STN SPEC: NORMAL
HCT VFR BLD AUTO: 27.2 % (ref 40–54)
HGB BLD-MCNC: 8.2 G/DL (ref 14–18)
IMM GRANULOCYTES # BLD AUTO: 0.53 K/UL (ref 0–0.04)
IMM GRANULOCYTES NFR BLD AUTO: 3 % (ref 0–0.5)
LYMPHOCYTES # BLD AUTO: 1.1 K/UL (ref 1–4.8)
LYMPHOCYTES NFR BLD: 6.1 % (ref 18–48)
MAGNESIUM SERPL-MCNC: 1.8 MG/DL (ref 1.6–2.6)
MCH RBC QN AUTO: 26.7 PG (ref 27–31)
MCHC RBC AUTO-ENTMCNC: 30.1 G/DL (ref 32–36)
MCV RBC AUTO: 89 FL (ref 82–98)
MONOCYTES # BLD AUTO: 1.6 K/UL (ref 0.3–1)
MONOCYTES NFR BLD: 9.2 % (ref 4–15)
NEUTROPHILS # BLD AUTO: 14.3 K/UL (ref 1.8–7.7)
NEUTROPHILS NFR BLD: 81.1 % (ref 38–73)
NRBC BLD-RTO: 0 /100 WBC
PHOSPHATE SERPL-MCNC: 2.6 MG/DL (ref 2.7–4.5)
PLATELET # BLD AUTO: 375 K/UL (ref 150–450)
PMV BLD AUTO: 10.1 FL (ref 9.2–12.9)
POCT GLUCOSE: 130 MG/DL (ref 70–110)
POCT GLUCOSE: 135 MG/DL (ref 70–110)
POCT GLUCOSE: 137 MG/DL (ref 70–110)
POTASSIUM SERPL-SCNC: 3.9 MMOL/L (ref 3.5–5.1)
RBC # BLD AUTO: 3.07 M/UL (ref 4.6–6.2)
SODIUM SERPL-SCNC: 132 MMOL/L (ref 136–145)
SPECIMEN OUTDATE: NORMAL
WBC # BLD AUTO: 17.66 K/UL (ref 3.9–12.7)

## 2023-09-18 PROCEDURE — 87070 CULTURE OTHR SPECIMN AEROBIC: CPT | Performed by: SURGERY

## 2023-09-18 PROCEDURE — A4217 STERILE WATER/SALINE, 500 ML: HCPCS | Performed by: STUDENT IN AN ORGANIZED HEALTH CARE EDUCATION/TRAINING PROGRAM

## 2023-09-18 PROCEDURE — D9220A PRA ANESTHESIA: ICD-10-PCS | Mod: CRNA,,, | Performed by: NURSE ANESTHETIST, CERTIFIED REGISTERED

## 2023-09-18 PROCEDURE — 63600175 PHARM REV CODE 636 W HCPCS: Performed by: STUDENT IN AN ORGANIZED HEALTH CARE EDUCATION/TRAINING PROGRAM

## 2023-09-18 PROCEDURE — 43235 PR EGD, FLEX, DIAGNOSTIC: ICD-10-PCS | Mod: 59,51,78, | Performed by: SURGERY

## 2023-09-18 PROCEDURE — 49329 UNLSTD LAPS PX ABD PERTM&OMN: CPT | Mod: 78,,, | Performed by: SURGERY

## 2023-09-18 PROCEDURE — 25000003 PHARM REV CODE 250: Performed by: STUDENT IN AN ORGANIZED HEALTH CARE EDUCATION/TRAINING PROGRAM

## 2023-09-18 PROCEDURE — 36000709 HC OR TIME LEV III EA ADD 15 MIN: Performed by: SURGERY

## 2023-09-18 PROCEDURE — 27201423 OPTIME MED/SURG SUP & DEVICES STERILE SUPPLY: Performed by: SURGERY

## 2023-09-18 PROCEDURE — 25000242 PHARM REV CODE 250 ALT 637 W/ HCPCS: Performed by: STUDENT IN AN ORGANIZED HEALTH CARE EDUCATION/TRAINING PROGRAM

## 2023-09-18 PROCEDURE — 76000 PR  FLUOROSCOPE EXAMINATION: ICD-10-PCS | Mod: 26,,, | Performed by: SURGERY

## 2023-09-18 PROCEDURE — 20600001 HC STEP DOWN PRIVATE ROOM

## 2023-09-18 PROCEDURE — 63600175 PHARM REV CODE 636 W HCPCS: Performed by: NURSE ANESTHETIST, CERTIFIED REGISTERED

## 2023-09-18 PROCEDURE — 71000015 HC POSTOP RECOV 1ST HR: Performed by: SURGERY

## 2023-09-18 PROCEDURE — 87102 FUNGUS ISOLATION CULTURE: CPT | Performed by: SURGERY

## 2023-09-18 PROCEDURE — 87076 CULTURE ANAEROBE IDENT EACH: CPT | Performed by: SURGERY

## 2023-09-18 PROCEDURE — 87075 CULTR BACTERIA EXCEPT BLOOD: CPT | Performed by: SURGERY

## 2023-09-18 PROCEDURE — 27000221 HC OXYGEN, UP TO 24 HOURS

## 2023-09-18 PROCEDURE — D9220A PRA ANESTHESIA: ICD-10-PCS | Mod: ANES,,, | Performed by: STUDENT IN AN ORGANIZED HEALTH CARE EDUCATION/TRAINING PROGRAM

## 2023-09-18 PROCEDURE — 87205 SMEAR GRAM STAIN: CPT | Performed by: SURGERY

## 2023-09-18 PROCEDURE — 25000003 PHARM REV CODE 250: Performed by: NURSE PRACTITIONER

## 2023-09-18 PROCEDURE — 85025 COMPLETE CBC W/AUTO DIFF WBC: CPT | Performed by: STUDENT IN AN ORGANIZED HEALTH CARE EDUCATION/TRAINING PROGRAM

## 2023-09-18 PROCEDURE — 71000033 HC RECOVERY, INTIAL HOUR: Performed by: SURGERY

## 2023-09-18 PROCEDURE — 37000008 HC ANESTHESIA 1ST 15 MINUTES: Performed by: SURGERY

## 2023-09-18 PROCEDURE — 49329 PR LAP; W/DRAINAGE OF INTRA-ABDOMINAL ABSCESS: ICD-10-PCS | Mod: 78,,, | Performed by: SURGERY

## 2023-09-18 PROCEDURE — 71000016 HC POSTOP RECOV ADDL HR: Performed by: SURGERY

## 2023-09-18 PROCEDURE — 83735 ASSAY OF MAGNESIUM: CPT | Performed by: SURGERY

## 2023-09-18 PROCEDURE — 25000003 PHARM REV CODE 250: Performed by: SURGERY

## 2023-09-18 PROCEDURE — 63600175 PHARM REV CODE 636 W HCPCS: Performed by: NURSE PRACTITIONER

## 2023-09-18 PROCEDURE — 87077 CULTURE AEROBIC IDENTIFY: CPT | Performed by: SURGERY

## 2023-09-18 PROCEDURE — D9220A PRA ANESTHESIA: Mod: ANES,,, | Performed by: STUDENT IN AN ORGANIZED HEALTH CARE EDUCATION/TRAINING PROGRAM

## 2023-09-18 PROCEDURE — 99900035 HC TECH TIME PER 15 MIN (STAT)

## 2023-09-18 PROCEDURE — 80048 BASIC METABOLIC PNL TOTAL CA: CPT | Performed by: SURGERY

## 2023-09-18 PROCEDURE — 84100 ASSAY OF PHOSPHORUS: CPT | Performed by: SURGERY

## 2023-09-18 PROCEDURE — D9220A PRA ANESTHESIA: Mod: CRNA,,, | Performed by: NURSE ANESTHETIST, CERTIFIED REGISTERED

## 2023-09-18 PROCEDURE — 87186 SC STD MICRODIL/AGAR DIL: CPT | Performed by: SURGERY

## 2023-09-18 PROCEDURE — 37000009 HC ANESTHESIA EA ADD 15 MINS: Performed by: SURGERY

## 2023-09-18 PROCEDURE — 25000003 PHARM REV CODE 250

## 2023-09-18 PROCEDURE — 25000003 PHARM REV CODE 250: Performed by: NURSE ANESTHETIST, CERTIFIED REGISTERED

## 2023-09-18 PROCEDURE — 43235 EGD DIAGNOSTIC BRUSH WASH: CPT | Mod: 59,51,78, | Performed by: SURGERY

## 2023-09-18 PROCEDURE — 86900 BLOOD TYPING SEROLOGIC ABO: CPT | Performed by: SURGERY

## 2023-09-18 PROCEDURE — 25500020 PHARM REV CODE 255: Performed by: SURGERY

## 2023-09-18 PROCEDURE — 94664 DEMO&/EVAL PT USE INHALER: CPT

## 2023-09-18 PROCEDURE — 94761 N-INVAS EAR/PLS OXIMETRY MLT: CPT

## 2023-09-18 PROCEDURE — 36000708 HC OR TIME LEV III 1ST 15 MIN: Performed by: SURGERY

## 2023-09-18 PROCEDURE — 94799 UNLISTED PULMONARY SVC/PX: CPT

## 2023-09-18 PROCEDURE — B4185 PARENTERAL SOL 10 GM LIPIDS: HCPCS | Performed by: STUDENT IN AN ORGANIZED HEALTH CARE EDUCATION/TRAINING PROGRAM

## 2023-09-18 PROCEDURE — 94640 AIRWAY INHALATION TREATMENT: CPT

## 2023-09-18 PROCEDURE — 25000242 PHARM REV CODE 250 ALT 637 W/ HCPCS: Performed by: NURSE PRACTITIONER

## 2023-09-18 PROCEDURE — 76000 FLUOROSCOPY <1 HR PHYS/QHP: CPT | Mod: 26,,, | Performed by: SURGERY

## 2023-09-18 RX ORDER — FENTANYL CITRATE 50 UG/ML
INJECTION, SOLUTION INTRAMUSCULAR; INTRAVENOUS
Status: DISCONTINUED | OUTPATIENT
Start: 2023-09-18 | End: 2023-09-18

## 2023-09-18 RX ORDER — MIDAZOLAM HYDROCHLORIDE 1 MG/ML
INJECTION INTRAMUSCULAR; INTRAVENOUS
Status: DISCONTINUED | OUTPATIENT
Start: 2023-09-18 | End: 2023-09-18

## 2023-09-18 RX ORDER — PROPOFOL 10 MG/ML
VIAL (ML) INTRAVENOUS
Status: DISCONTINUED | OUTPATIENT
Start: 2023-09-18 | End: 2023-09-18

## 2023-09-18 RX ORDER — HYDROMORPHONE HYDROCHLORIDE 1 MG/ML
0.2 INJECTION, SOLUTION INTRAMUSCULAR; INTRAVENOUS; SUBCUTANEOUS EVERY 5 MIN PRN
Status: DISCONTINUED | OUTPATIENT
Start: 2023-09-18 | End: 2023-09-22

## 2023-09-18 RX ORDER — KETAMINE HCL IN 0.9 % NACL 50 MG/5 ML
SYRINGE (ML) INTRAVENOUS
Status: DISCONTINUED | OUTPATIENT
Start: 2023-09-18 | End: 2023-09-18

## 2023-09-18 RX ORDER — DEXAMETHASONE SODIUM PHOSPHATE 4 MG/ML
INJECTION, SOLUTION INTRA-ARTICULAR; INTRALESIONAL; INTRAMUSCULAR; INTRAVENOUS; SOFT TISSUE
Status: DISCONTINUED | OUTPATIENT
Start: 2023-09-18 | End: 2023-09-18

## 2023-09-18 RX ORDER — HALOPERIDOL 5 MG/ML
0.5 INJECTION INTRAMUSCULAR EVERY 10 MIN PRN
Status: DISCONTINUED | OUTPATIENT
Start: 2023-09-18 | End: 2023-09-22

## 2023-09-18 RX ORDER — LIDOCAINE HYDROCHLORIDE 20 MG/ML
INJECTION INTRAVENOUS
Status: DISCONTINUED | OUTPATIENT
Start: 2023-09-18 | End: 2023-09-18

## 2023-09-18 RX ORDER — ONDANSETRON 2 MG/ML
INJECTION INTRAMUSCULAR; INTRAVENOUS
Status: DISCONTINUED | OUTPATIENT
Start: 2023-09-18 | End: 2023-09-18

## 2023-09-18 RX ORDER — SODIUM CHLORIDE 0.9 % (FLUSH) 0.9 %
3 SYRINGE (ML) INJECTION EVERY 4 HOURS PRN
Status: DISCONTINUED | OUTPATIENT
Start: 2023-09-18 | End: 2023-09-18

## 2023-09-18 RX ORDER — DEXMEDETOMIDINE HYDROCHLORIDE 100 UG/ML
INJECTION, SOLUTION INTRAVENOUS
Status: DISCONTINUED | OUTPATIENT
Start: 2023-09-18 | End: 2023-09-18

## 2023-09-18 RX ORDER — ROCURONIUM BROMIDE 10 MG/ML
INJECTION, SOLUTION INTRAVENOUS
Status: DISCONTINUED | OUTPATIENT
Start: 2023-09-18 | End: 2023-09-18

## 2023-09-18 RX ORDER — SUCCINYLCHOLINE CHLORIDE 20 MG/ML
INJECTION INTRAMUSCULAR; INTRAVENOUS
Status: DISCONTINUED | OUTPATIENT
Start: 2023-09-18 | End: 2023-09-18

## 2023-09-18 RX ORDER — TAMSULOSIN HYDROCHLORIDE 0.4 MG/1
0.4 CAPSULE ORAL DAILY
Status: DISCONTINUED | OUTPATIENT
Start: 2023-09-18 | End: 2023-09-25 | Stop reason: HOSPADM

## 2023-09-18 RX ORDER — BUPIVACAINE HYDROCHLORIDE AND EPINEPHRINE 5; 5 MG/ML; UG/ML
INJECTION, SOLUTION EPIDURAL; INTRACAUDAL; PERINEURAL
Status: DISCONTINUED | OUTPATIENT
Start: 2023-09-18 | End: 2023-09-18 | Stop reason: HOSPADM

## 2023-09-18 RX ADMIN — ROCURONIUM BROMIDE 10 MG: 10 INJECTION, SOLUTION INTRAVENOUS at 10:09

## 2023-09-18 RX ADMIN — DEXTROSE, SODIUM CHLORIDE, AND POTASSIUM CHLORIDE: 5; .45; .15 INJECTION INTRAVENOUS at 04:09

## 2023-09-18 RX ADMIN — HYDROMORPHONE HYDROCHLORIDE 0.2 MG: 1 INJECTION, SOLUTION INTRAMUSCULAR; INTRAVENOUS; SUBCUTANEOUS at 02:09

## 2023-09-18 RX ADMIN — PROPOFOL 100 MG: 10 INJECTION, EMULSION INTRAVENOUS at 10:09

## 2023-09-18 RX ADMIN — PANTOPRAZOLE SODIUM 40 MG: 40 TABLET, DELAYED RELEASE ORAL at 08:09

## 2023-09-18 RX ADMIN — MAGNESIUM SULFATE HEPTAHYDRATE: 500 INJECTION, SOLUTION INTRAMUSCULAR; INTRAVENOUS at 10:09

## 2023-09-18 RX ADMIN — ENOXAPARIN SODIUM 40 MG: 40 INJECTION SUBCUTANEOUS at 05:09

## 2023-09-18 RX ADMIN — FENTANYL CITRATE 25 MCG: 50 INJECTION, SOLUTION INTRAMUSCULAR; INTRAVENOUS at 12:09

## 2023-09-18 RX ADMIN — SUGAMMADEX 200 MG: 100 INJECTION, SOLUTION INTRAVENOUS at 12:09

## 2023-09-18 RX ADMIN — ROCURONIUM BROMIDE 40 MG: 10 INJECTION, SOLUTION INTRAVENOUS at 10:09

## 2023-09-18 RX ADMIN — ACETAMINOPHEN 1000 MG: 500 TABLET ORAL at 02:09

## 2023-09-18 RX ADMIN — FLUCONAZOLE 200 MG: 2 INJECTION, SOLUTION INTRAVENOUS at 05:09

## 2023-09-18 RX ADMIN — SUCCINYLCHOLINE CHLORIDE 140 MG: 20 INJECTION, SOLUTION INTRAMUSCULAR; INTRAVENOUS at 10:09

## 2023-09-18 RX ADMIN — SODIUM CHLORIDE: 0.9 INJECTION, SOLUTION INTRAVENOUS at 10:09

## 2023-09-18 RX ADMIN — PIPERACILLIN SODIUM AND TAZOBACTAM SODIUM 4.5 G: 4; .5 INJECTION, POWDER, FOR SOLUTION INTRAVENOUS at 09:09

## 2023-09-18 RX ADMIN — GABAPENTIN 100 MG: 300 CAPSULE ORAL at 09:09

## 2023-09-18 RX ADMIN — OXYCODONE HYDROCHLORIDE 10 MG: 10 TABLET ORAL at 10:09

## 2023-09-18 RX ADMIN — VANCOMYCIN HYDROCHLORIDE 1500 MG: 1.5 INJECTION, POWDER, LYOPHILIZED, FOR SOLUTION INTRAVENOUS at 07:09

## 2023-09-18 RX ADMIN — FENTANYL CITRATE 50 MCG: 50 INJECTION, SOLUTION INTRAMUSCULAR; INTRAVENOUS at 10:09

## 2023-09-18 RX ADMIN — TAMSULOSIN HYDROCHLORIDE 0.4 MG: 0.4 CAPSULE ORAL at 08:09

## 2023-09-18 RX ADMIN — ROCURONIUM BROMIDE 20 MG: 10 INJECTION, SOLUTION INTRAVENOUS at 11:09

## 2023-09-18 RX ADMIN — LEVALBUTEROL HYDROCHLORIDE 0.63 MG: 0.63 SOLUTION RESPIRATORY (INHALATION) at 07:09

## 2023-09-18 RX ADMIN — GABAPENTIN 100 MG: 300 CAPSULE ORAL at 02:09

## 2023-09-18 RX ADMIN — GABAPENTIN 100 MG: 300 CAPSULE ORAL at 08:09

## 2023-09-18 RX ADMIN — DEXMEDETOMIDINE 16 MCG: 100 INJECTION, SOLUTION, CONCENTRATE INTRAVENOUS at 12:09

## 2023-09-18 RX ADMIN — MIDAZOLAM HYDROCHLORIDE 2 MG: 1 INJECTION INTRAMUSCULAR; INTRAVENOUS at 10:09

## 2023-09-18 RX ADMIN — LEVALBUTEROL HYDROCHLORIDE 0.63 MG: 0.63 SOLUTION RESPIRATORY (INHALATION) at 01:09

## 2023-09-18 RX ADMIN — Medication 25 MG: at 11:09

## 2023-09-18 RX ADMIN — DEXAMETHASONE SODIUM PHOSPHATE 4 MG: 4 INJECTION, SOLUTION INTRAMUSCULAR; INTRAVENOUS at 10:09

## 2023-09-18 RX ADMIN — SODIUM CHLORIDE, SODIUM GLUCONATE, SODIUM ACETATE, POTASSIUM CHLORIDE, MAGNESIUM CHLORIDE, SODIUM PHOSPHATE, DIBASIC, AND POTASSIUM PHOSPHATE: .53; .5; .37; .037; .03; .012; .00082 INJECTION, SOLUTION INTRAVENOUS at 11:09

## 2023-09-18 RX ADMIN — SOYBEAN OIL 250 ML: 20 INJECTION, SOLUTION INTRAVENOUS at 09:09

## 2023-09-18 RX ADMIN — OXYCODONE HYDROCHLORIDE 10 MG: 10 TABLET ORAL at 03:09

## 2023-09-18 RX ADMIN — PIPERACILLIN SODIUM AND TAZOBACTAM SODIUM 4.5 G: 4; .5 INJECTION, POWDER, FOR SOLUTION INTRAVENOUS at 04:09

## 2023-09-18 RX ADMIN — OXYCODONE HYDROCHLORIDE 10 MG: 10 TABLET ORAL at 04:09

## 2023-09-18 RX ADMIN — LIDOCAINE HYDROCHLORIDE 100 MG: 20 INJECTION INTRAVENOUS at 10:09

## 2023-09-18 RX ADMIN — ACETAMINOPHEN 1000 MG: 500 TABLET ORAL at 06:09

## 2023-09-18 RX ADMIN — Medication 25 MG: at 10:09

## 2023-09-18 RX ADMIN — LEVALBUTEROL HYDROCHLORIDE 0.63 MG: 0.63 SOLUTION RESPIRATORY (INHALATION) at 09:09

## 2023-09-18 RX ADMIN — SODIUM CHLORIDE 0.3 MCG/KG/MIN: 9 INJECTION, SOLUTION INTRAVENOUS at 10:09

## 2023-09-18 RX ADMIN — VANCOMYCIN HYDROCHLORIDE 1500 MG: 1.5 INJECTION, POWDER, LYOPHILIZED, FOR SOLUTION INTRAVENOUS at 06:09

## 2023-09-18 RX ADMIN — PIPERACILLIN SODIUM AND TAZOBACTAM SODIUM 4.5 G: 4; .5 INJECTION, POWDER, FOR SOLUTION INTRAVENOUS at 01:09

## 2023-09-18 RX ADMIN — ONDANSETRON 4 MG: 2 INJECTION INTRAMUSCULAR; INTRAVENOUS at 10:09

## 2023-09-18 NOTE — PLAN OF CARE
Recommendations    1) ADAT per MD   2) Continue at home TPN regimine: 230 D + 110 AA + IV Lipids to provide 1722 kcals, 110 g PRO, GIR: 1.90   3) Monitor Labs    Goals: Meet % een/epn by next RD f/u  Nutrition Goal Status: progressing towards goal  Communication of RD Recs: other (comment) (POC)

## 2023-09-18 NOTE — ANESTHESIA PROCEDURE NOTES
Intubation    Date/Time: 9/18/2023 10:29 AM    Performed by: Dai Vela CRNA  Authorized by: Mino Wasserman MD    Intubation:     Induction:  Rapid sequence induction    Intubated:  Postinduction    Mask Ventilation:  Not attempted    Attempts:  1    Attempted By:  Student    Method of Intubation:  Video laryngoscopy    Blade:  Cole 4    Laryngeal View Grade: Grade I - full view of cords      Difficult Airway Encountered?: No      Complications:  None    Airway Device:  Oral endotracheal tube    Airway Device Size:  7.5    Style/Cuff Inflation:  Cuffed (inflated to minimal occlusive pressure)    Tube secured:  22    Secured at:  The lips    Placement Verified By:  Capnometry    Complicating Factors:  None    Findings Post-Intubation:  BS equal bilateral and atraumatic/condition of teeth unchanged

## 2023-09-18 NOTE — SUBJECTIVE & OBJECTIVE
Interval History: No significant changes overnight. AFVSS. WBC downtrending. Pain better controlled. DANILO drain output slowing. CT scan yesterday with evidence of persistent intraabdominal fluid. Scheduled for Lap vs open washout today.    Medications:  Continuous Infusions:   dextrose 5 % and 0.45 % NaCl with KCl 20 mEq 75 mL/hr at 09/17/23 1454    TPN ADULT CENTRAL LINE CUSTOM 60 mL/hr at 09/17/23 2213     Scheduled Meds:   acetaminophen  1,000 mg Oral Q8H    enoxparin  40 mg Subcutaneous Q24H (prophylaxis, 1700)    fat emulsion 20%  250 mL Intravenous Daily    fluconazole (DIFLUCAN) IV (PEDS and ADULTS)  200 mg Intravenous Q24H    gabapentin  100 mg Oral TID    levalbuterol  0.63 mg Nebulization Q6H WAKE    pantoprazole  40 mg Oral Daily    piperacillin-tazobactam (Zosyn) IV (PEDS and ADULTS) (extended infusion is not appropriate)  4.5 g Intravenous Q8H    vancomycin (VANCOCIN) IV (PEDS and ADULTS)  1,500 mg Intravenous Q12H     PRN Meds:dextrose 10%, glucagon (human recombinant), insulin aspart U-100, melatonin, ondansetron, oxyCODONE, oxyCODONE, sodium chloride 0.9%     Review of patient's allergies indicates:   Allergen Reactions    Penicillins Rash     Objective:     Vital Signs (Most Recent):  Temp: 98.3 °F (36.8 °C) (09/18/23 0405)  Pulse: 92 (09/18/23 0405)  Resp: 18 (09/18/23 0405)  BP: 135/75 (09/18/23 0405)  SpO2: (!) 92 % (09/18/23 0405) Vital Signs (24h Range):  Temp:  [98.1 °F (36.7 °C)-98.9 °F (37.2 °C)] 98.3 °F (36.8 °C)  Pulse:  [] 92  Resp:  [18-20] 18  SpO2:  [90 %-94 %] 92 %  BP: (122-146)/(64-75) 135/75     Weight: 83.5 kg (184 lb)  Body mass index is 25.66 kg/m².    Intake/Output - Last 3 Shifts         09/16 0700 09/17 0659 09/17 0700 09/18 0659 09/18 0700 09/19 0659    P.O. 480 120     I.V. (mL/kg)       IV Piggyback       Total Intake(mL/kg) 480 (5.7) 120 (1.4)     Urine (mL/kg/hr) 1700 (0.8) 0 (0)     Drains 570 325     Stool       Total Output 2270 325     Net -0366 -963             Urine Occurrence  3 x              Physical Exam  Constitutional:       Appearance: Normal appearance.   HENT:      Head: Normocephalic and atraumatic.      Nose: Nose normal.      Comments: Nasal Cannula     Mouth/Throat:      Mouth: Mucous membranes are moist.      Pharynx: Oropharynx is clear.   Eyes:      Extraocular Movements: Extraocular movements intact.      Conjunctiva/sclera: Conjunctivae normal.   Cardiovascular:      Rate and Rhythm: Normal rate and regular rhythm.   Pulmonary:      Effort: Pulmonary effort is normal.      Breath sounds: Normal breath sounds.   Abdominal:      General: Abdomen is flat.      Palpations: Abdomen is soft.      Comments: RUQ DANILO drain with purulent output. Soft but distended abdomen, some tenderness around the drain. IR drain bilious output   Skin:     General: Skin is warm and dry.      Capillary Refill: Capillary refill takes less than 2 seconds.   Neurological:      General: No focal deficit present.      Mental Status: He is alert.          Significant Labs:  I have reviewed all pertinent lab results within the past 24 hours.  CBC:   Recent Labs   Lab 09/17/23  0625   WBC 19.45*   RBC 3.25*   HGB 8.7*   HCT 27.6*      MCV 85   MCH 26.8*   MCHC 31.5*     CMP:   Recent Labs   Lab 09/16/23  0626 09/17/23  0424   * 113*   CALCIUM 7.6* 7.8*   ALBUMIN 1.2*  --    PROT 5.0*  --    * 132*   K 3.9 3.7   CO2 29 28   CL 96 94*   BUN 19 16   CREATININE 0.6 0.5   ALKPHOS 154*  --    ALT 41  --    AST 29  --    BILITOT 1.4*  --        Significant Diagnostics:  I have reviewed all pertinent imaging results/findings within the past 24 hours.

## 2023-09-18 NOTE — ASSESSMENT & PLAN NOTE
Mr. Valladares is a 63 y/o male s/p  open distal gastrectomy with BII reconstruction on 8/21/23 and admitted for intra abdominal fluid collection on CT on 9/14/23 and cough. Now with a large intra-abdominal fluid collection.  S/p IR drain placement into intra-abdominal fluid collection.      - NPO  - TPN via PICC line   - Flush DANILO drain 3x/day with 20 mL saline   - IV Zosyn and Vanc  - Monitor drain output   - PT, OOB chair  - Ambulate TID  - DVT PPX  - GI PPX    Dispo: To OR today for lap vs open washout / EGD

## 2023-09-18 NOTE — PROGRESS NOTES
"[facility] (2nd Fl)  Adult Nutrition  Progress Note    SUMMARY       Recommendations    1) ADAT per MD   2) Continue at home TPN regimine: 230 D + 110 AA + IV Lipids to provide 1722 kcals, 110 g PRO, GIR: 1.90   3) Monitor Labs    Goals: Meet % een/epn by next RD f/u  Nutrition Goal Status: progressing towards goal  Communication of RD Recs: other (comment) (POC)    Assessment and Plan    Nutrition Problem  Inadequate oral intake    Related to (etiology):   Inability to consume sufficient energy     Signs and Symptoms (as evidenced by):   NPO    Interventions/Recommendations (treatment strategy):  Collaboration with other providers    Nutrition Diagnosis Status:   New    Reason for Assessment    Reason For Assessment: RD follow-up  Diagnosis: gastrointestinal disease  Relevant Medical History: Stomach cancer  Interdisciplinary Rounds: did not attend  General Information Comments: RD f/u. Pt out of room at time of visit. TPN continues. Following.   Nutrition Discharge Planning: adequate intake    Nutrition Risk Screen    Nutrition Risk Screen: tube feeding or parenteral nutrition    Nutrition/Diet History    Spiritual, Cultural Beliefs, Amish Practices, Values that Affect Care: no    Anthropometrics    Temp: 98.6 °F (37 °C)  Height Method: Stated  Height: 5' 11" (180.3 cm)  Height (inches): 71 in  Weight Method: Stated  Weight: 83.5 kg (184 lb) (weight increased due to fluid in my abdomen)  Weight (lb): 184 lb  Ideal Body Weight (IBW), Male: 172 lb  % Ideal Body Weight, Male (lb): 106.98 %  BMI (Calculated): 25.7  BMI Grade: 25 - 29.9 - overweight       Lab/Procedures/Meds    Pertinent Labs Reviewed: reviewed  Pertinent Labs Comments: enoxaparin, gabapentin, pantoprazole, ABX, tamsulosin  Pertinent Medications Reviewed: reviewed  Pertinent Medications Comments: H/H: 8.2/27.2, MCH: 26.7, MCHC: 30.1    Estimated/Assessed Needs    Weight Used For Calorie Calculations: 83.5 kg (184 lb)  Energy Calorie " Requirements (kcal): 1656 (MSJ)  Energy Need Method: Terrell-St Dixon  Protein Requirements: 83 (1g/kg)  Weight Used For Protein Calculations: 83.5 kg (184 lb)     Estimated Fluid Requirement Method: RDA Method  RDA Method (mL): 1656         Nutrition Prescription Ordered    Current Diet Order: NPO  Current Nutrition Support Formula Ordered: Other (Comment) (PN: 230 g D + 110 g AA + IV Lipids)    Evaluation of Received Nutrient/Fluid Intake    Parenteral Calories (kcal): 1722  Parenteral Protein (gm): 110  Lipid Calories (kcals): 500 kcals  GIR (Glucose Infusion Rate) (mg/kg/min): 1.91 mg/kg/min  % Kcal Needs: 100  % Protein Needs: 100  I/O: -4.6 L since admit  Comments: LB 9/12  Tolerance: tolerating  % Intake of Estimated Energy Needs: 75 - 100 %  % Meal Intake: 0 - 25 %    Nutrition Risk    Level of Risk/Frequency of Follow-up: low (f/u 1x/week)     Monitor and Evaluation    Food and Nutrient Intake: parenteral nutrition intake  Food and Nutrient Adminstration: enteral and parenteral nutrition administration  Physical Activity and Function: nutrition-related ADLs and IADLs  Anthropometric Measurements: height/length, weight, weight change, body mass index  Biochemical Data, Medical Tests and Procedures: electrolyte and renal panel, gastrointestinal profile, glucose/endocrine profile, inflammatory profile, lipid profile     Nutrition Follow-Up    RD Follow-up?: Yes    Sudha Ulrich RD, LDN

## 2023-09-18 NOTE — BRIEF OP NOTE
[facility] ([facility])  Brief Operative Note    SUMMARY     Surgery Date: 9/18/2023     Surgeon(s) and Role:     * Eulogio Moy MD - Primary     * Diana Alvares MD - Resident - Chief    Assisting Surgeon: None    Pre-op Diagnosis:  Gastric adenocarcinoma [C16.9]    Post-op Diagnosis:  Post-Op Diagnosis Codes:     * Gastric adenocarcinoma [C16.9]    Procedure(s) (LRB):  DRAINAGE, ABDOMEN, LAPAROSCOPIC (N/A)  EGD (ESOPHAGOGASTRODUODENOSCOPY); flouroscopy, need c-arm in the room (N/A)  LAPAROSCOPY, DIAGNOSTIC    Anesthesia: General    Operative Findings: See operative note    Estimated Blood Loss: Minimal     Estimated Blood Loss has been documented.         Specimens:   Specimen (24h ago, onward)      None            [id number]

## 2023-09-18 NOTE — PROGRESS NOTES
Pharmacokinetic Assessment Follow Up: IV Vancomycin    Vancomycin serum concentration assessment(s):    The trough level was drawn correctly and can be used to guide therapy at this time. The measurement is within the desired definitive target range of 10 to 20 mcg/mL.    Vancomycin Regimen Plan:    Continue regimen to Vancomycin 1500 mg IV every 12 hours with next serum trough concentration measured at 1730 prior to 1830 dose on 9/19    Drug levels (last 3 results):  Recent Labs   Lab Result Units 09/16/23  0554 09/17/23  1806   Vancomycin-Trough ug/mL 10.4 14.7       Pharmacy will continue to follow and monitor vancomycin.    Please contact pharmacy at extension [phone] for questions regarding this assessment.    Thank you for the consult,   Betty Angelo       Patient brief summary:  Danish Valladares is a 62 y.o. male initiated on antimicrobial therapy with IV Vancomycin for treatment of intra-abdominal infection      Drug Allergies:   Review of patient's allergies indicates:   Allergen Reactions    Penicillins Rash       Actual Body Weight:   83.5 kg     Renal Function:   Estimated Creatinine Clearance: 163.2 mL/min (based on SCr of 0.5 mg/dL).,     Dialysis Method (if applicable):  N/A    CBC (last 72 hours):  Recent Labs   Lab Result Units 09/15/23  0625 09/16/23  0626 09/17/23  0625   WBC K/uL 24.16* 21.96* 19.45*   Hemoglobin g/dL 9.0* 9.3* 8.7*   Hematocrit % 26.8* 28.9* 27.6*   Platelets K/uL 440 378 422   Gran % % 82.3* 81.2* 81.2*   Lymph % % 5.5* 6.9* 6.6*   Mono % % 8.0 7.5 8.2   Eosinophil % % 0.0 0.2 0.1   Basophil % % 0.2 0.2 0.2   Differential Method  Automated Automated Automated       Metabolic Panel (last 72 hours):  Recent Labs   Lab Result Units 09/15/23  0625 09/16/23  0626 09/17/23  0424   Sodium mmol/L 137 133* 132*   Potassium mmol/L 3.8 3.9 3.7   Chloride mmol/L 100 96 94*   CO2 mmol/L 27 29 28   Glucose mg/dL 102 123* 113*   BUN mg/dL 18 19 16   Creatinine mg/dL 0.5 0.6 0.5   Albumin  g/dL 1.2* 1.2*  --    Total Bilirubin mg/dL 1.5* 1.4*  --    Alkaline Phosphatase U/L 168* 154*  --    AST U/L 33 29  --    ALT U/L 40 41  --    Magnesium mg/dL 1.7 1.8 1.8   Phosphorus mg/dL 3.4 3.3 2.6*       Vancomycin Administrations:  vancomycin given in the last 96 hours                     vancomycin 1,500 mg in dextrose 5 % (D5W) 250 mL IVPB (Vial-Mate) (mg) 1,500 mg New Bag 09/17/23 1835     1,500 mg New Bag  0621     1,500 mg New Bag 09/16/23 1703     1,500 mg New Bag  0559     1,500 mg New Bag 09/15/23 1841     1,500 mg New Bag  0617    vancomycin (VANCOCIN) 2,000 mg in sodium chloride 0.9% 250 mL IVPB (Vial-Mate) (mg) 2,000 mg New Bag 09/14/23 1500                    Microbiologic Results:  Microbiology Results (last 7 days)       Procedure Component Value Units Date/Time    Blood culture [[id number]] Collected: 09/14/23 1345    Order Status: Completed Specimen: Blood from Line, PICC Right Basilic Updated: 09/17/23 1612     Blood Culture, Routine No Growth to date      No Growth to date      No Growth to date      No Growth to date    Aerobic culture [[id number]]  (Abnormal)  (Susceptibility) Collected: 09/14/23 1528    Order Status: Completed Specimen: Abscess from Abdomen Updated: 09/17/23 1143     Aerobic Bacterial Culture ESCHERICHIA COLI  Many        ENTEROCOCCUS SPECIES  Many  Identification and susceptibility pending      Narrative:      Duodenal stump    Culture, Body Fluid (Aerobic) w/ GS [[id number]]  (Abnormal)  (Susceptibility) Collected: 09/14/23 1530    Order Status: Completed Specimen: Body Fluid from Peritoneal Fluid Updated: 09/17/23 1142     AEROBIC CULTURE - FLUID ESCHERICHIA COLI  Rare        ENTEROCOCCUS SPECIES  Many  Identification and susceptibility pending      Culture, Anaerobe [[id number]] Collected: 09/14/23 1528    Order Status: Completed Specimen: Abscess from Abdomen Updated: 09/16/23 0616     Anaerobic Culture Culture in progress    Narrative:      Duodenal stump    Gram  stain [[id number]] Collected: 09/14/23 1530    Order Status: Completed Specimen: Abscess from Abdomen Updated: 09/14/23 2110     Gram Stain Result Few WBC's      Rare Gram positive cocci      Many Gram positive rods      Many Gram negative rods    Gram stain [[id number]] Collected: 09/14/23 1528    Order Status: Completed Specimen: Abscess from Abdomen Updated: 09/14/23 2107     Gram Stain Result Rare WBC's      Moderate Gram positive cocci      Many Gram positive rods      Many Gram negative rods    Narrative:      Duodenal stump    Culture, Fluid  (Aerobic) [[id number]]     Order Status: Canceled Specimen: Body Fluid     Culture, Anaerobe [[id number]] Collected: 09/14/23 1528    Order Status: Canceled Specimen: Abscess from Abdomen

## 2023-09-18 NOTE — TRANSFER OF CARE
"Anesthesia Transfer of Care Note    Patient: Danish Valladares    Procedure(s) Performed: Procedure(s) (LRB):  DRAINAGE, ABDOMEN, LAPAROSCOPIC (N/A)  EGD (ESOPHAGOGASTRODUODENOSCOPY); flouroscopy, need c-arm in the room (N/A)  LAPAROSCOPY, DIAGNOSTIC    Patient location: PACU    Anesthesia Type: general    Transport from OR: Transported from OR on 6-10 L/min O2 by face mask with adequate spontaneous ventilation    Post pain: adequate analgesia    Post assessment: no apparent anesthetic complications and tolerated procedure well    Post vital signs: stable    Level of consciousness: responds to stimulation    Nausea/Vomiting: no nausea/vomiting    Complications: none    Transfer of care protocol was followed      Last vitals:   Visit Vitals  /78   Pulse 105   Temp 98.1 °F) (Oral)   Resp 16   Ht 5' 11" (1.803 m)   Wt 83.5 kg (184 lb)   SpO2 100   BMI 25.66 kg/m²     "

## 2023-09-18 NOTE — NURSING TRANSFER
Nursing Transfer Note      9/18/2023   3:21 PM    Nurse giving handoff:SHORTY Brito RN PACU  Nurse receiving handoff:Zhang    Reason patient is being transferred: post surgery    Transfer To: 1051    Transfer via bed    Transfer with IV pump/fluid, O2,     Transported by PCT x2    Transfer Vital Signs:  Please see flow sheet       Telemetry: Box Number N/A  Order for Tele Monitor? No    Additional Lines: Oxygen and Zapata Catheter    Medicines sent: none    Any special needs or follow-up needed: routine    Patient belongings transferred with patient:  N/A    Chart send with patient: Yes    Notified: spouse, daughter    Patient reassessed at: 9/18/2023 at 1430  1  Upon arrival to floor: cardiac monitor applied, patient oriented to room, and bed in lowest position

## 2023-09-18 NOTE — PROGRESS NOTES
[facility]  General Surgery  Progress Note    Subjective:     History of Present Illness:  No notes on file    Post-Op Info:  Procedure(s) (LRB):  DRAINAGE, ABDOMEN, LAPAROSCOPIC (N/A)  EGD (ESOPHAGOGASTRODUODENOSCOPY); flouroscopy, need c-arm in the room (N/A)         Interval History: No significant changes overnight. AFVSS. WBC downtrending. Pain better controlled. DANILO drain output slowing. CT scan yesterday with evidence of persistent intraabdominal fluid. Scheduled for Lap vs open washout today.    Medications:  Continuous Infusions:   dextrose 5 % and 0.45 % NaCl with KCl 20 mEq 75 mL/hr at 09/17/23 1454    TPN ADULT CENTRAL LINE CUSTOM 60 mL/hr at 09/17/23 2213     Scheduled Meds:   acetaminophen  1,000 mg Oral Q8H    enoxparin  40 mg Subcutaneous Q24H (prophylaxis, 1700)    fat emulsion 20%  250 mL Intravenous Daily    fluconazole (DIFLUCAN) IV (PEDS and ADULTS)  200 mg Intravenous Q24H    gabapentin  100 mg Oral TID    levalbuterol  0.63 mg Nebulization Q6H WAKE    pantoprazole  40 mg Oral Daily    piperacillin-tazobactam (Zosyn) IV (PEDS and ADULTS) (extended infusion is not appropriate)  4.5 g Intravenous Q8H    vancomycin (VANCOCIN) IV (PEDS and ADULTS)  1,500 mg Intravenous Q12H     PRN Meds:dextrose 10%, glucagon (human recombinant), insulin aspart U-100, melatonin, ondansetron, oxyCODONE, oxyCODONE, sodium chloride 0.9%     Review of patient's allergies indicates:   Allergen Reactions    Penicillins Rash     Objective:     Vital Signs (Most Recent):  Temp: 98.3 °F (36.8 °C) (09/18/23 0405)  Pulse: 92 (09/18/23 0405)  Resp: 18 (09/18/23 0405)  BP: 135/75 (09/18/23 0405)  SpO2: (!) 92 % (09/18/23 0405) Vital Signs (24h Range):  Temp:  [98.1 °F (36.7 °C)-98.9 °F (37.2 °C)] 98.3 °F (36.8 °C)  Pulse:  [] 92  Resp:  [18-20] 18  SpO2:  [90 %-94 %] 92 %  BP: (122-146)/(64-75) 135/75     Weight: 83.5 kg (184 lb)  Body mass index is 25.66 kg/m².    Intake/Output - Last 3 Shifts          09/16 0700  09/17 0659 09/17 0700  09/18 0659 09/18 0700  09/19 0659    P.O. 480 120     I.V. (mL/kg)       IV Piggyback       Total Intake(mL/kg) 480 (5.7) 120 (1.4)     Urine (mL/kg/hr) 1700 (0.8) 0 (0)     Drains 570 325     Stool       Total Output 2270 325     Net -1790 -205            Urine Occurrence  3 x              Physical Exam  Constitutional:       Appearance: Normal appearance.   HENT:      Head: Normocephalic and atraumatic.      Nose: Nose normal.      Comments: Nasal Cannula     Mouth/Throat:      Mouth: Mucous membranes are moist.      Pharynx: Oropharynx is clear.   Eyes:      Extraocular Movements: Extraocular movements intact.      Conjunctiva/sclera: Conjunctivae normal.   Cardiovascular:      Rate and Rhythm: Normal rate and regular rhythm.   Pulmonary:      Effort: Pulmonary effort is normal.      Breath sounds: Normal breath sounds.   Abdominal:      General: Abdomen is flat.      Palpations: Abdomen is soft.      Comments: RUQ DANILO drain with purulent output. Soft but distended abdomen, some tenderness around the drain. IR drain bilious output   Skin:     General: Skin is warm and dry.      Capillary Refill: Capillary refill takes less than 2 seconds.   Neurological:      General: No focal deficit present.      Mental Status: He is alert.          Significant Labs:  I have reviewed all pertinent lab results within the past 24 hours.  CBC:   Recent Labs   Lab 09/17/23  0625   WBC 19.45*   RBC 3.25*   HGB 8.7*   HCT 27.6*      MCV 85   MCH 26.8*   MCHC 31.5*     CMP:   Recent Labs   Lab 09/16/23  0626 09/17/23  0424   * 113*   CALCIUM 7.6* 7.8*   ALBUMIN 1.2*  --    PROT 5.0*  --    * 132*   K 3.9 3.7   CO2 29 28   CL 96 94*   BUN 19 16   CREATININE 0.6 0.5   ALKPHOS 154*  --    ALT 41  --    AST 29  --    BILITOT 1.4*  --        Significant Diagnostics:  I have reviewed all pertinent imaging results/findings within the past 24 hours.    Assessment/Plan:     Gastric  adenocarcinoma  Mr. Valladares is a 63 y/o male s/p  open distal gastrectomy with BII reconstruction on 8/21/23 and admitted for intra abdominal fluid collection on CT on 9/14/23 and cough. Now with a large intra-abdominal fluid collection.  S/p IR drain placement into intra-abdominal fluid collection.      - NPO  - TPN via PICC line   - Flush DANILO drain 3x/day with 20 mL saline   - IV Zosyn and Vanc  - Monitor drain output   - PT, OOB chair  - Ambulate TID  - DVT PPX  - GI PPX    Dispo: To OR today for lap vs open washout / EGD           Mino Dockery MD  General Surgery  [facility]

## 2023-09-19 PROBLEM — L24.A9 IRRITANT CONTACT DERMATITIS DUE FRICTION OR CONTACT WITH OTHER SPECIFIED BODY FLUIDS: Status: ACTIVE | Noted: 2023-01-01

## 2023-09-19 LAB
ANION GAP SERPL CALC-SCNC: 10 MMOL/L (ref 8–16)
BACTERIA BLD CULT: NORMAL
BASOPHILS # BLD AUTO: 0.05 K/UL (ref 0–0.2)
BASOPHILS NFR BLD: 0.2 % (ref 0–1.9)
BUN SERPL-MCNC: 12 MG/DL (ref 8–23)
CALCIUM SERPL-MCNC: 7.2 MG/DL (ref 8.7–10.5)
CHLORIDE SERPL-SCNC: 97 MMOL/L (ref 95–110)
CO2 SERPL-SCNC: 28 MMOL/L (ref 23–29)
CREAT SERPL-MCNC: 0.6 MG/DL (ref 0.5–1.4)
DIFFERENTIAL METHOD: ABNORMAL
EOSINOPHIL # BLD AUTO: 0 K/UL (ref 0–0.5)
EOSINOPHIL NFR BLD: 0 % (ref 0–8)
ERYTHROCYTE [DISTWIDTH] IN BLOOD BY AUTOMATED COUNT: 14.7 % (ref 11.5–14.5)
EST. GFR  (NO RACE VARIABLE): >60 ML/MIN/1.73 M^2
GLUCOSE SERPL-MCNC: 176 MG/DL (ref 70–110)
HCT VFR BLD AUTO: 25.3 % (ref 40–54)
HGB BLD-MCNC: 8.1 G/DL (ref 14–18)
IMM GRANULOCYTES # BLD AUTO: 0.53 K/UL (ref 0–0.04)
IMM GRANULOCYTES NFR BLD AUTO: 2.5 % (ref 0–0.5)
LYMPHOCYTES # BLD AUTO: 1.3 K/UL (ref 1–4.8)
LYMPHOCYTES NFR BLD: 6 % (ref 18–48)
MAGNESIUM SERPL-MCNC: 2 MG/DL (ref 1.6–2.6)
MCH RBC QN AUTO: 27.8 PG (ref 27–31)
MCHC RBC AUTO-ENTMCNC: 32 G/DL (ref 32–36)
MCV RBC AUTO: 87 FL (ref 82–98)
MONOCYTES # BLD AUTO: 1.5 K/UL (ref 0.3–1)
MONOCYTES NFR BLD: 7.3 % (ref 4–15)
NEUTROPHILS # BLD AUTO: 17.6 K/UL (ref 1.8–7.7)
NEUTROPHILS NFR BLD: 84 % (ref 38–73)
NRBC BLD-RTO: 0 /100 WBC
PHOSPHATE SERPL-MCNC: 2.9 MG/DL (ref 2.7–4.5)
PLATELET # BLD AUTO: 391 K/UL (ref 150–450)
PMV BLD AUTO: 10.1 FL (ref 9.2–12.9)
POCT GLUCOSE: 138 MG/DL (ref 70–110)
POTASSIUM SERPL-SCNC: 4.1 MMOL/L (ref 3.5–5.1)
RBC # BLD AUTO: 2.91 M/UL (ref 4.6–6.2)
SODIUM SERPL-SCNC: 135 MMOL/L (ref 136–145)
VANCOMYCIN TROUGH SERPL-MCNC: 13.1 UG/ML (ref 10–22)
WBC # BLD AUTO: 21 K/UL (ref 3.9–12.7)

## 2023-09-19 PROCEDURE — B4185 PARENTERAL SOL 10 GM LIPIDS: HCPCS | Performed by: STUDENT IN AN ORGANIZED HEALTH CARE EDUCATION/TRAINING PROGRAM

## 2023-09-19 PROCEDURE — 25000003 PHARM REV CODE 250: Performed by: STUDENT IN AN ORGANIZED HEALTH CARE EDUCATION/TRAINING PROGRAM

## 2023-09-19 PROCEDURE — 20600001 HC STEP DOWN PRIVATE ROOM

## 2023-09-19 PROCEDURE — 94664 DEMO&/EVAL PT USE INHALER: CPT

## 2023-09-19 PROCEDURE — 85025 COMPLETE CBC W/AUTO DIFF WBC: CPT | Performed by: STUDENT IN AN ORGANIZED HEALTH CARE EDUCATION/TRAINING PROGRAM

## 2023-09-19 PROCEDURE — 63600175 PHARM REV CODE 636 W HCPCS: Performed by: STUDENT IN AN ORGANIZED HEALTH CARE EDUCATION/TRAINING PROGRAM

## 2023-09-19 PROCEDURE — A4217 STERILE WATER/SALINE, 500 ML: HCPCS | Performed by: SURGERY

## 2023-09-19 PROCEDURE — 25000242 PHARM REV CODE 250 ALT 637 W/ HCPCS: Performed by: STUDENT IN AN ORGANIZED HEALTH CARE EDUCATION/TRAINING PROGRAM

## 2023-09-19 PROCEDURE — 99223 PR INITIAL HOSPITAL CARE,LEVL III: ICD-10-PCS | Mod: ,,, | Performed by: NURSE PRACTITIONER

## 2023-09-19 PROCEDURE — 97162 PT EVAL MOD COMPLEX 30 MIN: CPT

## 2023-09-19 PROCEDURE — 63600175 PHARM REV CODE 636 W HCPCS: Performed by: SURGERY

## 2023-09-19 PROCEDURE — 27000221 HC OXYGEN, UP TO 24 HOURS

## 2023-09-19 PROCEDURE — 25000003 PHARM REV CODE 250: Performed by: SURGERY

## 2023-09-19 PROCEDURE — 97116 GAIT TRAINING THERAPY: CPT

## 2023-09-19 PROCEDURE — 94799 UNLISTED PULMONARY SVC/PX: CPT

## 2023-09-19 PROCEDURE — 99900035 HC TECH TIME PER 15 MIN (STAT)

## 2023-09-19 PROCEDURE — 83735 ASSAY OF MAGNESIUM: CPT | Performed by: STUDENT IN AN ORGANIZED HEALTH CARE EDUCATION/TRAINING PROGRAM

## 2023-09-19 PROCEDURE — 94640 AIRWAY INHALATION TREATMENT: CPT

## 2023-09-19 PROCEDURE — 94761 N-INVAS EAR/PLS OXIMETRY MLT: CPT

## 2023-09-19 PROCEDURE — 84100 ASSAY OF PHOSPHORUS: CPT | Performed by: STUDENT IN AN ORGANIZED HEALTH CARE EDUCATION/TRAINING PROGRAM

## 2023-09-19 PROCEDURE — 97165 OT EVAL LOW COMPLEX 30 MIN: CPT

## 2023-09-19 PROCEDURE — 27000646 HC AEROBIKA DEVICE

## 2023-09-19 PROCEDURE — 99223 1ST HOSP IP/OBS HIGH 75: CPT | Mod: ,,, | Performed by: NURSE PRACTITIONER

## 2023-09-19 PROCEDURE — 80048 BASIC METABOLIC PNL TOTAL CA: CPT | Performed by: STUDENT IN AN ORGANIZED HEALTH CARE EDUCATION/TRAINING PROGRAM

## 2023-09-19 PROCEDURE — 80202 ASSAY OF VANCOMYCIN: CPT | Performed by: STUDENT IN AN ORGANIZED HEALTH CARE EDUCATION/TRAINING PROGRAM

## 2023-09-19 RX ADMIN — OXYCODONE HYDROCHLORIDE 10 MG: 10 TABLET ORAL at 06:09

## 2023-09-19 RX ADMIN — PIPERACILLIN SODIUM AND TAZOBACTAM SODIUM 4.5 G: 4; .5 INJECTION, POWDER, FOR SOLUTION INTRAVENOUS at 01:09

## 2023-09-19 RX ADMIN — GABAPENTIN 100 MG: 300 CAPSULE ORAL at 04:09

## 2023-09-19 RX ADMIN — SOYBEAN OIL 250 ML: 20 INJECTION, SOLUTION INTRAVENOUS at 10:09

## 2023-09-19 RX ADMIN — ENOXAPARIN SODIUM 40 MG: 40 INJECTION SUBCUTANEOUS at 06:09

## 2023-09-19 RX ADMIN — ACETAMINOPHEN 1000 MG: 500 TABLET ORAL at 09:09

## 2023-09-19 RX ADMIN — LEVALBUTEROL HYDROCHLORIDE 0.63 MG: 0.63 SOLUTION RESPIRATORY (INHALATION) at 08:09

## 2023-09-19 RX ADMIN — VANCOMYCIN HYDROCHLORIDE 1500 MG: 1.5 INJECTION, POWDER, LYOPHILIZED, FOR SOLUTION INTRAVENOUS at 08:09

## 2023-09-19 RX ADMIN — LEVALBUTEROL HYDROCHLORIDE 0.63 MG: 0.63 SOLUTION RESPIRATORY (INHALATION) at 02:09

## 2023-09-19 RX ADMIN — GABAPENTIN 100 MG: 300 CAPSULE ORAL at 08:09

## 2023-09-19 RX ADMIN — PIPERACILLIN SODIUM AND TAZOBACTAM SODIUM 4.5 G: 4; .5 INJECTION, POWDER, FOR SOLUTION INTRAVENOUS at 09:09

## 2023-09-19 RX ADMIN — ACETAMINOPHEN 1000 MG: 500 TABLET ORAL at 06:09

## 2023-09-19 RX ADMIN — TAMSULOSIN HYDROCHLORIDE 0.4 MG: 0.4 CAPSULE ORAL at 08:09

## 2023-09-19 RX ADMIN — OXYCODONE HYDROCHLORIDE 10 MG: 10 TABLET ORAL at 10:09

## 2023-09-19 RX ADMIN — FLUCONAZOLE 200 MG: 2 INJECTION, SOLUTION INTRAVENOUS at 06:09

## 2023-09-19 RX ADMIN — VANCOMYCIN HYDROCHLORIDE 1500 MG: 1.5 INJECTION, POWDER, LYOPHILIZED, FOR SOLUTION INTRAVENOUS at 06:09

## 2023-09-19 RX ADMIN — PIPERACILLIN SODIUM AND TAZOBACTAM SODIUM 4.5 G: 4; .5 INJECTION, POWDER, FOR SOLUTION INTRAVENOUS at 06:09

## 2023-09-19 RX ADMIN — MAGNESIUM SULFATE HEPTAHYDRATE: 500 INJECTION, SOLUTION INTRAMUSCULAR; INTRAVENOUS at 10:09

## 2023-09-19 RX ADMIN — PANTOPRAZOLE SODIUM 40 MG: 40 TABLET, DELAYED RELEASE ORAL at 08:09

## 2023-09-19 RX ADMIN — LEVALBUTEROL HYDROCHLORIDE 0.63 MG: 0.63 SOLUTION RESPIRATORY (INHALATION) at 07:09

## 2023-09-19 RX ADMIN — GABAPENTIN 100 MG: 300 CAPSULE ORAL at 09:09

## 2023-09-19 RX ADMIN — OXYCODONE HYDROCHLORIDE 10 MG: 10 TABLET ORAL at 01:09

## 2023-09-19 RX ADMIN — ACETAMINOPHEN 1000 MG: 500 TABLET ORAL at 01:09

## 2023-09-19 NOTE — SUBJECTIVE & OBJECTIVE
Interval History: No issues overnight, feeling better today. He is tolerating clears with TPN. Minimal ambulation out of bed. DANILO drain midline with 200/24 hrs (60 cc), DANILO RLQ 130cc/24 hrs (60 cc)    Medications:  Continuous Infusions:   TPN ADULT CENTRAL LINE CUSTOM 60 mL/hr at 09/18/23 2204     Scheduled Meds:   acetaminophen  1,000 mg Oral Q8H    enoxparin  40 mg Subcutaneous Q24H (prophylaxis, 1700)    fat emulsion 20%  250 mL Intravenous Daily    fluconazole (DIFLUCAN) IV (PEDS and ADULTS)  200 mg Intravenous Q24H    gabapentin  100 mg Oral TID    levalbuterol  0.63 mg Nebulization Q6H WAKE    pantoprazole  40 mg Oral Daily    piperacillin-tazobactam (Zosyn) IV (PEDS and ADULTS) (extended infusion is not appropriate)  4.5 g Intravenous Q8H    tamsulosin  0.4 mg Oral Daily    vancomycin (VANCOCIN) IV (PEDS and ADULTS)  1,500 mg Intravenous Q12H     PRN Meds:dextrose 10%, glucagon (human recombinant), haloperidol lactate, HYDROmorphone, insulin aspart U-100, ondansetron, oxyCODONE, oxyCODONE, sodium chloride 0.9%     Review of patient's allergies indicates:   Allergen Reactions    Penicillins Rash     Objective:     Vital Signs (Most Recent):  Temp: 97.9 °F (36.6 °C) (09/19/23 0702)  Pulse: 83 (09/19/23 0755)  Resp: 18 (09/19/23 0755)  BP: 121/62 (09/19/23 0702)  SpO2: 97 % (09/19/23 0755) Vital Signs (24h Range):  Temp:  [97.3 °F (36.3 °C)-100 °F (37.8 °C)] 97.9 °F (36.6 °C)  Pulse:  [] 83  Resp:  [12-22] 18  SpO2:  [91 %-99 %] 97 %  BP: (112-151)/(56-75) 121/62     Weight: 83.5 kg (184 lb) (weight increased due to fluid in my abdomen)  Body mass index is 25.66 kg/m².    Intake/Output - Last 3 Shifts         09/17 0700 09/18 0659 09/18 0700 09/19 0659 09/19 0700 09/20 0659    P.O. 120 360     I.V. (mL/kg)  825 (9.9)     IV Piggyback  1500     TPN  969.6     Total Intake(mL/kg) 120 (1.4) 3654.6 (43.8)     Urine (mL/kg/hr) 0 (0) 3235 (1.6)     Drains 325 330     Total Output 325 3565     Net -205 +89.6             Urine Occurrence 3 x               Physical Exam  Vitals and nursing note reviewed.   Constitutional:       General: He is not in acute distress.     Comments: TPN    Cardiovascular:      Rate and Rhythm: Normal rate.      Pulses: Normal pulses.   Pulmonary:      Effort: Pulmonary effort is normal. No respiratory distress.      Comments: 2L NC  Abdominal:      General: There is no distension.      Palpations: Abdomen is soft.      Tenderness: There is no abdominal tenderness.      Comments: Midline drain to gravity, thin brown output (60 cc overnight). RLQ drain with thin SS output ( 60 cc overnight)   Neurological:      Mental Status: He is alert.          Significant Labs:  I have reviewed all pertinent lab results within the past 24 hours.  CBC:   Recent Labs   Lab 09/19/23 0614   WBC 21.00*   RBC 2.91*   HGB 8.1*   HCT 25.3*      MCV 87   MCH 27.8   MCHC 32.0     BMP:   Recent Labs   Lab 09/19/23  0614   *   *   K 4.1   CL 97   CO2 28   BUN 12   CREATININE 0.6   CALCIUM 7.2*   MG 2.0     CMP:   Recent Labs   Lab 09/16/23  0626 09/17/23  0424 09/19/23  0614   *   < > 176*   CALCIUM 7.6*   < > 7.2*   ALBUMIN 1.2*  --   --    PROT 5.0*  --   --    *   < > 135*   K 3.9   < > 4.1   CO2 29   < > 28   CL 96   < > 97   BUN 19   < > 12   CREATININE 0.6   < > 0.6   ALKPHOS 154*  --   --    ALT 41  --   --    AST 29  --   --    BILITOT 1.4*  --   --     < > = values in this interval not displayed.       Significant Diagnostics:  I have reviewed all pertinent imaging results/findings within the past 24 hours.

## 2023-09-19 NOTE — PLAN OF CARE
Problem: Physical Therapy  Goal: Physical Therapy Goal  Description: PT goals until 9/25/23    1. Pt sit to stand with no AD with mod independence-not met  2. Pt to perform gait 300ft with no AD with supervision.-not met  3. Pt to up/down 6 steps with B UE rail with CGA.-not met  4. Pt to perform B LE exs in sitting or supine x 10 reps to strengthen B LE to improve functional mobility.-not met   Outcome: Ongoing, Progressing   Pt's goals set and pt will benefit from skilled PT services to work towards improved functional mobility including: up/down steps, transfers, and gait.   9/19/2023

## 2023-09-19 NOTE — ASSESSMENT & PLAN NOTE
Mr. Valladares is a 63 y/o male s/p  open distal gastrectomy with BII reconstruction on 8/21/23 and admitted for intra abdominal fluid collection on CT on 9/14/23 and cough. S/p IR drain placement into intra-abdominal fluid collection. Laparoscopic retroperitoneal washout 9/18, with EGD.      - NPO  - TPN via PICC line  - Flush DANILO drain 3x/day with 20 mL saline   - Midline upper drain to gravity  - IV Zosyn and Vanc. Awaiting speciations, GNR and enterococcus   - Strict monitoring of drain outputs   - PT, OOB chair  - Ambulate TID  - DVT PPX  - GI PPX    Dispo: Pending clinical course

## 2023-09-19 NOTE — PT/OT/SLP EVAL
"Physical Therapy Evaluation/co eval with OT    Patient Name:  Danish Valladares   MRN:  [id number]    Recommendations:     Discharge Recommendations: other (see comments)   Discharge Equipment Recommendations: none   Barriers to discharge: Inaccessible home 6 ANIYA    Assessment:     Danish Valladares is a 62 y.o. male admitted with a medical diagnosis of Abdominal fluid collection.  He presents with the following impairments/functional limitations: weakness, impaired balance, decreased safety awareness, impaired cardiopulmonary response to activity, impaired functional mobility, gait instability . Pt is unsafe with functional mobility at this time due to pt requires CGA for transfers and CGA with HHA in wife's hand for gait due to SOB, instability, and weakness. Pt is motivated to progress with functional mobility.     Rehab Prognosis: Good; patient would benefit from acute skilled PT services to address these deficits and reach maximum level of function.    Recent Surgery: Procedure(s) (LRB):  DRAINAGE, ABDOMEN, LAPAROSCOPIC (N/A)  EGD (ESOPHAGOGASTRODUODENOSCOPY); flouroscopy, need c-arm in the room (N/A)  LAPAROSCOPY, DIAGNOSTIC 1 Day Post-Op    Plan:     During this hospitalization, patient to be seen 3 x/week to address the identified rehab impairments via gait training, therapeutic activities, therapeutic exercises, neuromuscular re-education and progress toward the following goals:    Plan of Care Expires:  10/19/23    Subjective   "My legs were really swollen yesterday"    Pain/Comfort:  Pain Rating 1: 5/10  Location - Orientation 1: generalized  Location 1: abdomen  Pain Addressed 1: Reposition, Cessation of Activity  Pain Rating Post-Intervention 1: 5/10    Patients cultural, spiritual, Confucianist conflicts given the current situation: no    Living Environment:  Pt lives with his wife in a 1 story home with 6 ANIYA with B UE rails  Prior to admission, patients level of function was independent.  Equipment used " at home: none.  Upon discharge, patient will have assistance from wife.    Objective:     Communicated with nurse prior to session.  Patient found sitting edge of bed with DANILO drain, peripheral IV, PICC line, telemetry, oxygen (R lateral drain)  upon PT entry to room.    General Precautions: Standard, fall  Orthopedic Precautions:N/A   Braces: N/A  Respiratory Status: Nasal cannula, flow 2 L/min    Exams:  Cognitive Exam:  Patient is oriented to Person, Place, and Time  Sensation:    -       Intact  light/touch B LE  RLE ROM: WFL  RLE Strength: WFL except hip flex 3-/5  LLE ROM: WFL  LLE Strength: WFL    Functional Mobility:  Transfers:     Sit to Stand:  contact guard assistance with hand-held assist  Gait: 140ft with HHA (pt holding his wife's hand) with CGA.Pt performed gait with lateral instability at times and had to rest in standing due to SOB.  Pt's oxygen sat 84% after gait trial on 2 LPM oxygen, pt required ~ 1 1/2 min rest in sitting with verbal cues to slow breathing before the ox sat increased >90%, nurse notified.    AM-PAC 6 CLICK MOBILITY  Total Score:19     Treatment & Education:  Pt sat on the EOB ~ 5 min prior to transfer.  Co-treat with OT due to medical complexity of pt and need for skilled hands for safe intervention.     Patient left up in chair with all lines intact, call button in reach, nurse notified, and wife present.    GOALS:   Multidisciplinary Problems       Physical Therapy Goals          Problem: Physical Therapy    Goal Priority Disciplines Outcome Goal Variances Interventions   Physical Therapy Goal     PT, PT/OT Ongoing, Progressing     Description: PT goals until 9/25/23    1. Pt sit to stand with no AD with mod independence-not met  2. Pt to perform gait 300ft with no AD with supervision.-not met  3. Pt to up/down 6 steps with B UE rail with CGA.-not met  4. Pt to perform B LE exs in sitting or supine x 10 reps to strengthen B LE to improve functional mobility.-not met                         History:     Past Medical History:   Diagnosis Date    Psoriasis     Stomach cancer        Past Surgical History:   Procedure Laterality Date    DIAGNOSTIC LAPAROSCOPY  9/18/2023    Procedure: LAPAROSCOPY, DIAGNOSTIC;  Surgeon: Eulogio Moy MD;  Location: [facility] OR [address] [place] [address];  Service: General;;    ESOPHAGOGASTRODUODENOSCOPY N/A 8/3/2023    Procedure: EGD (ESOPHAGOGASTRODUODENOSCOPY);  Surgeon: Loco Marvin MD;  Location: [facility];  Service: Gastroenterology;  Laterality: N/A;    ESOPHAGOGASTRODUODENOSCOPY N/A 9/18/2023    Procedure: EGD (ESOPHAGOGASTRODUODENOSCOPY); flouroscopy, need c-arm in the room;  Surgeon: Eulogio Moy MD;  Location: [address];  Service: General;  Laterality: N/A;  EGD with Fluoroscopy     GASTRECTOMY N/A 8/21/2023    Procedure: GASTRECTOMY;  Surgeon: Eulogio Moy MD;  Location: [facility] OR [address] [place] [address];  Service: General;  Laterality: N/A;  Open gastrectomy with EGD. Needs Omni retractor, requesting room 24    LAPAROSCOPIC DRAINAGE OF ABDOMEN N/A 9/18/2023    Procedure: DRAINAGE, ABDOMEN, LAPAROSCOPIC;  Surgeon: Eulogio Moy MD;  Location: [address];  Service: General;  Laterality: N/A;    TONSILLECTOMY         Time Tracking:     PT Received On: 09/19/23  PT Start Time: 0826     PT Stop Time: 0831 (PT returned 9:33-9:48=20 min total treatment time)  PT Total Time (min): 5 min     Billable Minutes: Evaluation 10 and Gait Training 10      09/19/2023

## 2023-09-19 NOTE — HPI
Danish Valladares is a 62 year old male who presents to the ED on 9/14/2023 with cough and abdominal distension.  CT on 9/14/2023 with intra abdominal fluid collection measuring 17.5 cm x 17.8 cm in the right abdomen. Mr. Valladares is s/p open distal gastrectomy with BII reconstruction on 8/21/23. He is with c/o of a productive cough with yellow to brown sputum for approximately a week.  Patient was seen in clinic on 9/12/23 by Radha Smith NP/Dr. Moy and was placed on levofloxacin.  Patient is + for a bowel movement 2 days ago and is + flatus. Tolerating very little PO intake with no c/o N/V.  He was on TPN while inpatient for his last hospitalization and d/c'ed on home TPN via PICC line since his discharge on 9/1/2023. Pt admitted to general surgery service for further management. Skin integrity TORSTEN consulted for evaluation of skin injury.

## 2023-09-19 NOTE — NURSING
RLQ DANILO flushed with 20 mL NS per order and RUQ drain flushed with 5mL NS per order. Pt tolerated well. RUQ remains to gravity bag and RLQ remains to bulb suction

## 2023-09-19 NOTE — CONSULTS
Wound consult received on buttocks. Wound evaluated by Sharon Ulloa NP Skin integrity TORSTEN, with orders in place.

## 2023-09-19 NOTE — SUBJECTIVE & OBJECTIVE
Scheduled Meds:   acetaminophen  1,000 mg Oral Q8H    enoxparin  40 mg Subcutaneous Q24H (prophylaxis, 1700)    fat emulsion 20%  250 mL Intravenous Daily    fluconazole (DIFLUCAN) IV (PEDS and ADULTS)  200 mg Intravenous Q24H    gabapentin  100 mg Oral TID    levalbuterol  0.63 mg Nebulization Q6H WAKE    pantoprazole  40 mg Oral Daily    piperacillin-tazobactam (Zosyn) IV (PEDS and ADULTS) (extended infusion is not appropriate)  4.5 g Intravenous Q8H    tamsulosin  0.4 mg Oral Daily    vancomycin (VANCOCIN) IV (PEDS and ADULTS)  1,500 mg Intravenous Q12H     Continuous Infusions:   TPN ADULT CENTRAL LINE CUSTOM 60 mL/hr at 09/18/23 2204    TPN ADULT CENTRAL LINE CUSTOM       PRN Meds:dextrose 10%, glucagon (human recombinant), haloperidol lactate, HYDROmorphone, insulin aspart U-100, ondansetron, oxyCODONE, oxyCODONE, sodium chloride 0.9%    Review of patient's allergies indicates:   Allergen Reactions    Penicillins Rash        Past Medical History:   Diagnosis Date    Psoriasis     Stomach cancer      Past Surgical History:   Procedure Laterality Date    DIAGNOSTIC LAPAROSCOPY  9/18/2023    Procedure: LAPAROSCOPY, DIAGNOSTIC;  Surgeon: Eulogio Moy MD;  Location: [address];  Service: General;;    ESOPHAGOGASTRODUODENOSCOPY N/A 8/3/2023    Procedure: EGD (ESOPHAGOGASTRODUODENOSCOPY);  Surgeon: Loco Marvin MD;  Location: [facility];  Service: Gastroenterology;  Laterality: N/A;    ESOPHAGOGASTRODUODENOSCOPY N/A 9/18/2023    Procedure: EGD (ESOPHAGOGASTRODUODENOSCOPY); flouroscopy, need c-arm in the room;  Surgeon: Eulogio Moy MD;  Location: [address];  Service: General;  Laterality: N/A;  EGD with Fluoroscopy     GASTRECTOMY N/A 8/21/2023    Procedure: GASTRECTOMY;  Surgeon: Euolgio Moy MD;  Location: [facility] OR [address] [place] [address];  Service: General;  Laterality: N/A;  Open gastrectomy with EGD. Needs Omni retractor, requesting room 24    LAPAROSCOPIC DRAINAGE OF ABDOMEN  N/A 9/18/2023    Procedure: DRAINAGE, ABDOMEN, LAPAROSCOPIC;  Surgeon: Eulogio Moy MD;  Location: [facility] OR [address] [place] [address];  Service: General;  Laterality: N/A;    TONSILLECTOMY         Family History    None       Tobacco Use    Smoking status: Former     Current packs/day: 0.00     Types: Cigarettes     Quit date: 5/27/2013     Years since quitting: 10.3    Smokeless tobacco: Never   Substance and Sexual Activity    Alcohol use: Yes     Comment: occ    Drug use: Never    Sexual activity: Yes     Partners: Female     Review of Systems   Skin:  Positive for wound.       Objective:     Vital Signs (Most Recent):  Temp: 97.7 °F (36.5 °C) (09/19/23 1156)  Pulse: 89 (09/19/23 1156)  Resp: 18 (09/19/23 1351)  BP: 126/61 (09/19/23 1156)  SpO2: (!) 93 % (09/19/23 1156) Vital Signs (24h Range):  Temp:  [97.3 °F (36.3 °C)-98.6 °F (37 °C)] 97.7 °F (36.5 °C)  Pulse:  [83-99] 89  Resp:  [13-20] 18  SpO2:  [92 %-97 %] 93 %  BP: (112-127)/(56-68) 126/61     Weight: 83.5 kg (184 lb) (weight increased due to fluid in my abdomen)  Body mass index is 25.66 kg/m².     Physical Exam  Constitutional:       Appearance: Normal appearance.   Skin:     General: Skin is warm and dry.      Findings: Lesion present.   Neurological:      Mental Status: He is alert.          Laboratory:  All pertinent labs reviewed within the last 24 hours.    Diagnostic Results:  None

## 2023-09-19 NOTE — PLAN OF CARE
Dx Abdominal Fluid Collection     Shift Events: to OR for EGD w/ lap drainage     Goals of Care: pain control, ambulation     Neuro: AAOx4     Vital Signs: see flowsheet     Respiratory: O2@3L     Diet: clear liquids. TPN, Lipids     Is patient tolerating current diet? yes     GTTS: D51/2NSwith 20K@75     Urine Output/Bowel Movement: Urine retention this shift, casarez placed during surgery     Drains/Tubes/Tube Feeds (include total output/shift): RUQ to gravity drain, RLQ drain to bulb suction, see RN communication as both need flushing per order     Lines: right upper PICC       Accuchecks:yes Q6     Skin: abdominal incision, breakdown to buttocks, healed midline incision     Fall Risk Score: see flowsheet     Activity level? chairfast     Any scheduled procedures? EGD today w/ lap drainage     Any safety concerns? N/a     Other: n/a  Problem: Adult Inpatient Plan of Care  Goal: Plan of Care Review  Outcome: Ongoing, Progressing     Problem: Infection  Goal: Absence of Infection Signs and Symptoms  Outcome: Ongoing, Progressing     Problem: Impaired Wound Healing  Goal: Optimal Wound Healing  Outcome: Ongoing, Progressing     Problem: Fall Injury Risk  Goal: Absence of Fall and Fall-Related Injury  Outcome: Ongoing, Progressing     Problem: Skin Injury Risk Increased  Goal: Skin Health and Integrity  Outcome: Ongoing, Progressing

## 2023-09-19 NOTE — ASSESSMENT & PLAN NOTE
- consult received for evaluation of skin injury.  - pt presents to the ED on 9/14/2023 with cough and abdominal distension.   - scattered partial thickness lesions noted to sacrum with pink, moist wound base with surrounding blanchable redness.  - likely related to moisture and friction.  - Triad ordered bid/prn.  - sepideh surface utilized. Waffle overlay ordered to bedside.  - pt turns well independently. Encouraged to turn q2h.  - voids via urinal.  - nursing to maintain pressure injury prevention measures and continue wound care per orders.

## 2023-09-19 NOTE — CONSULTS
[facility]  Skin Integrity TORSTEN  Consult Note    Patient Name: Danish Valladares  MRN: [id number]  Admission Date: 9/14/2023  Hospital Length of Stay: 5 days  Attending Physician: Eulogio Moy, *  Primary Care Provider: Yfn Walker MD     Consults  Subjective:     History of Present Illness:  Danish Valladares is a 62 year old male who presents to the ED on 9/14/2023 with cough and abdominal distension.  CT on 9/14/2023 with intra abdominal fluid collection measuring 17.5 cm x 17.8 cm in the right abdomen. Mr. Valladares is s/p open distal gastrectomy with BII reconstruction on 8/21/23. He is with c/o of a productive cough with yellow to brown sputum for approximately a week.  Patient was seen in clinic on 9/12/23 by Radha Smith NP/Dr. oMy and was placed on levofloxacin.  Patient is + for a bowel movement 2 days ago and is + flatus. Tolerating very little PO intake with no c/o N/V.  He was on TPN while inpatient for his last hospitalization and d/c'ed on home TPN via PICC line since his discharge on 9/1/2023. Pt admitted to general surgery service for further management. Skin integrity TORSTEN consulted for evaluation of skin injury.      Scheduled Meds:   acetaminophen  1,000 mg Oral Q8H    enoxparin  40 mg Subcutaneous Q24H (prophylaxis, 1700)    fat emulsion 20%  250 mL Intravenous Daily    fluconazole (DIFLUCAN) IV (PEDS and ADULTS)  200 mg Intravenous Q24H    gabapentin  100 mg Oral TID    levalbuterol  0.63 mg Nebulization Q6H WAKE    pantoprazole  40 mg Oral Daily    piperacillin-tazobactam (Zosyn) IV (PEDS and ADULTS) (extended infusion is not appropriate)  4.5 g Intravenous Q8H    tamsulosin  0.4 mg Oral Daily    vancomycin (VANCOCIN) IV (PEDS and ADULTS)  1,500 mg Intravenous Q12H     Continuous Infusions:   TPN ADULT CENTRAL LINE CUSTOM 60 mL/hr at 09/18/23 2204    TPN ADULT CENTRAL LINE CUSTOM       PRN Meds:dextrose 10%, glucagon (human recombinant), haloperidol lactate,  HYDROmorphone, insulin aspart U-100, ondansetron, oxyCODONE, oxyCODONE, sodium chloride 0.9%    Review of patient's allergies indicates:   Allergen Reactions    Penicillins Rash        Past Medical History:   Diagnosis Date    Psoriasis     Stomach cancer      Past Surgical History:   Procedure Laterality Date    DIAGNOSTIC LAPAROSCOPY  9/18/2023    Procedure: LAPAROSCOPY, DIAGNOSTIC;  Surgeon: Eulogio Moy MD;  Location: [facility] OR [address] [place] [address];  Service: General;;    ESOPHAGOGASTRODUODENOSCOPY N/A 8/3/2023    Procedure: EGD (ESOPHAGOGASTRODUODENOSCOPY);  Surgeon: Loco Marvin MD;  Location: [facility];  Service: Gastroenterology;  Laterality: N/A;    ESOPHAGOGASTRODUODENOSCOPY N/A 9/18/2023    Procedure: EGD (ESOPHAGOGASTRODUODENOSCOPY); flouroscopy, need c-arm in the room;  Surgeon: Eulogio Moy MD;  Location: [facility] OR [address] [place] [address];  Service: General;  Laterality: N/A;  EGD with Fluoroscopy     GASTRECTOMY N/A 8/21/2023    Procedure: GASTRECTOMY;  Surgeon: Eulogio Moy MD;  Location: [facility] OR [address] [place] [address];  Service: General;  Laterality: N/A;  Open gastrectomy with EGD. Needs Omni retractor, requesting room 24    LAPAROSCOPIC DRAINAGE OF ABDOMEN N/A 9/18/2023    Procedure: DRAINAGE, ABDOMEN, LAPAROSCOPIC;  Surgeon: Eulogio Moy MD;  Location: [facility] OR [address] [place] [address];  Service: General;  Laterality: N/A;    TONSILLECTOMY         Family History    None       Tobacco Use    Smoking status: Former     Current packs/day: 0.00     Types: Cigarettes     Quit date: 5/27/2013     Years since quitting: 10.3    Smokeless tobacco: Never   Substance and Sexual Activity    Alcohol use: Yes     Comment: occ    Drug use: Never    Sexual activity: Yes     Partners: Female     Review of Systems   Skin:  Positive for wound.       Objective:     Vital Signs (Most Recent):  Temp: 97.7 °F (36.5 °C) (09/19/23 1156)  Pulse: 89 (09/19/23 1156)  Resp: 18 (09/19/23 1351)  BP: 126/61 (09/19/23  1156)  SpO2: (!) 93 % (09/19/23 1156) Vital Signs (24h Range):  Temp:  [97.3 °F (36.3 °C)-98.6 °F (37 °C)] 97.7 °F (36.5 °C)  Pulse:  [83-99] 89  Resp:  [13-20] 18  SpO2:  [92 %-97 %] 93 %  BP: (112-127)/(56-68) 126/61     Weight: 83.5 kg (184 lb) (weight increased due to fluid in my abdomen)  Body mass index is 25.66 kg/m².     Physical Exam  Constitutional:       Appearance: Normal appearance.   Skin:     General: Skin is warm and dry.      Findings: Lesion present.   Neurological:      Mental Status: He is alert.          Laboratory:  All pertinent labs reviewed within the last 24 hours.    Diagnostic Results:  None        Assessment/Plan:         TORSTEN Skin Integrity Evaluation      Skin Integrity TORSTEN evaluation of patient as part of the comprehensive skin care team.     He has been admitted for 5 days. Skin injury was noted on 8/16/23. POA yes.    Sacrum            Derm  Irritant contact dermatitis due friction or contact with other specified body fluids  - consult received for evaluation of skin injury.  - pt presents to the ED on 9/14/2023 with cough and abdominal distension.   - scattered partial thickness lesions noted to sacrum with pink, moist wound base with surrounding blanchable redness.  - likely related to moisture and friction.  - Triad ordered bid/prn.  - sepideh surface utilized. Waffle overlay ordered to bedside.  - pt turns well independently. Encouraged to turn q2h.  - voids via urinal.  - nursing to maintain pressure injury prevention measures and continue wound care per orders.        Thank you for your consult. I will follow-up with patient. Please contact us if you have any additional questions.       Sharon Ulloa NP  Skin Integrity TORSTEN  [facility]

## 2023-09-19 NOTE — PT/OT/SLP EVAL
Occupational Therapy   Evaluation    Name: Danish Valladares  MRN: [id number]  Admitting Diagnosis: Abdominal fluid collection  Recent Surgery: Procedure(s) (LRB):  DRAINAGE, ABDOMEN, LAPAROSCOPIC (N/A)  EGD (ESOPHAGOGASTRODUODENOSCOPY); flouroscopy, need c-arm in the room (N/A)  LAPAROSCOPY, DIAGNOSTIC 1 Day Post-Op    Recommendations:     Discharge Recommendations: other (see comments)  Discharge Equipment Recommendations:  none  Barriers to discharge:  None    Assessment:     Danish Valladares is a 62 y.o. male with a medical diagnosis of Abdominal fluid collection.  He presents s/p abdominal drainage on 9/18 and a gastrectomy on a prior admission on 8/21. Pt walked ~ 325' with HHA. Performance deficits affecting function: impaired endurance, impaired self care skills, impaired functional mobility, gait instability, impaired balance, decreased coordination, pain.      Rehab Prognosis: Good; patient would benefit from acute skilled OT services to address these deficits and reach maximum level of function.       Plan:     Patient to be seen 3 x/week to address the above listed problems via self-care/home management, therapeutic activities, therapeutic exercises  Plan of Care Expires: 10/19/23  Plan of Care Reviewed with: patient, spouse    Subjective     Occupational Profile:  Living Environment: The patient lives with his wife in a [facility] with [employer], [employer], working as a  at [employer].   Prior to admission, patients level of function was independent.    Equipment used at home: none.  DME owned (not currently used): none.    Upon discharge, patient will have assistance from wife.    Pain/Comfort:  Pain Rating 1: 5/10  Location 1: abdomen    Patients cultural, spiritual, Taoist conflicts given the current situation:      Objective:     Communicated with: rn prior to session.  Patient found sitting edge of bed with DANILO drain, oxygen, peripheral IV upon OT entry to room.    General Precautions:  Standard, fall  Orthopedic Precautions:    Braces:    Respiratory Status: Nasal cannula, flow 2 L/min    Occupational Performance:    Bed Mobility:    Sitting EOB.    Functional Mobility/Transfers:  Patient completed Sit <> Stand Transfer with stand by assistance  with  hand-held assist   Patient completed Bed <> Chair Transfer using Step Transfer technique with stand by assistance with no assistive device  Functional Mobility: Pt walked ~ 325' with HHA.    Activities of Daily Living:  Pt declined but states he needed help with LBD.    Cognitive/Visual Perceptual:  Cognitive/Psychosocial Skills:     -       Oriented to: Person, Place, Time, and Situation   -       Safety awareness/insight to disability: intact     Physical Exam:  BUE AROM/MMT: WNL    AMPAC 6 Click ADL:  AMPAC Total Score: 20    Treatment & Education:  UE ROM/MMT  Bed mobility training / assessment  Functional mobility assessment  Sit/standing balance assessment  Educated on importance of sitting OOB in bedside chair to promote increased strength, endurance & breathing.  Discussed OT POC / Post-acute plan    Patient left up in chair with all lines intact and call button in reach    GOALS:   Multidisciplinary Problems       Occupational Therapy Goals          Problem: Occupational Therapy    Goal Priority Disciplines Outcome Interventions   Occupational Therapy Goal     OT, PT/OT Ongoing, Progressing    Description: Goals to be met by:9/26/23    Patient will increase functional independence with ADLs by performing:    LE Dressing with Supervision.  Grooming while standing at sink with Supervision.  Toileting from toilet with Set-up Assistance for hygiene and clothing management.   Supine to sit with Modified [place].  Toilet transfer to toilet with [place].                         History:     Past Medical History:   Diagnosis Date    Psoriasis     Stomach cancer          Past Surgical History:   Procedure Laterality Date    DIAGNOSTIC  LAPAROSCOPY  9/18/2023    Procedure: LAPAROSCOPY, DIAGNOSTIC;  Surgeon: Eulogio Moy MD;  [facility];  Service: General;;    ESOPHAGOGASTRODUODENOSCOPY N/A 8/3/2023    Procedure: EGD (ESOPHAGOGASTRODUODENOSCOPY);  Surgeon: Loco Marvin MD;  Location: [facility];  Service: Gastroenterology;  Laterality: N/A;    ESOPHAGOGASTRODUODENOSCOPY N/A 9/18/2023    Procedure: EGD (ESOPHAGOGASTRODUODENOSCOPY); flouroscopy, need c-arm in the room;  Surgeon: Eulogio Moy MD;  Location: [facility] OR [address] [place] [address];  Service: General;  Laterality: N/A;  EGD with Fluoroscopy     GASTRECTOMY N/A 8/21/2023    Procedure: GASTRECTOMY;  Surgeon: Eulogio Moy MD;  Location: [facility] OR [address] [place] [address];  Service: General;  Laterality: N/A;  Open gastrectomy with EGD. Needs Omni retractor, requesting room 24    LAPAROSCOPIC DRAINAGE OF ABDOMEN N/A 9/18/2023    Procedure: DRAINAGE, ABDOMEN, LAPAROSCOPIC;  Surgeon: Eulogio Moy MD;  Location: [facility] OR [address] [place] [address];  Service: General;  Laterality: N/A;    TONSILLECTOMY         Time Tracking:     OT Date of Treatment: 09/19/23  OT Start Time: 0933  OT Stop Time: 0947  OT Total Time (min): 14 min    Billable Minutes:Evaluation 14    9/19/2023

## 2023-09-19 NOTE — PLAN OF CARE
[facility] Care Plan     Dx: Abdominal Fluid Collection     Shift Events: Pain Management, Drain Care, IV Antibiotics, TPN , Safety     Goals of Care: Pain Management, Drain Care, IV Antibiotics, TPN , Safety,     Neuro: AAOx4     Vital Signs: WNL     Respiratory: O2@3L/NC     Diet: clear liquids. TPN, Lipids     Is patient tolerating current diet? yes     GTTS: D51/2NSwith 20K@75ml/Hr     Urine Output/Bowel Movement: Urine retention this shift, casarez placed during surgery     Drains/Tubes/Tube Feeds (include total output/shift): RUQ to gravity drain, RLQ drain to bulb suction, see RN communication as both need flushing per order     Lines: right upper PICC       Accuchecks:yes Q6 hour     Skin: abdominal incision, breakdown to buttocks, healed midline incision     Fall Risk Score: see flowsheet     Activity level? Standing activities with assist       Any scheduled procedures? none     Any safety concerns? N/a     Other: n/a     Update given to Soha Mccracken NP.  Gabriel to extubate.  RT notified.

## 2023-09-19 NOTE — PROGRESS NOTES
Siddhartha Thurman - [facility]  General Surgery  Progress Note    Subjective:       Post-Op Info:  Procedure(s) (LRB):  DRAINAGE, ABDOMEN, LAPAROSCOPIC (N/A)  EGD (ESOPHAGOGASTRODUODENOSCOPY); flouroscopy, need c-arm in the room (N/A)  LAPAROSCOPY, DIAGNOSTIC   1 Day Post-Op     Interval History: No issues overnight, feeling better today. He is tolerating clears with TPN. Minimal ambulation out of bed. DANILO drain midline with 200/24 hrs (60 cc), DANILO RLQ 130cc/24 hrs (60 cc)    Medications:  Continuous Infusions:   TPN ADULT CENTRAL LINE CUSTOM 60 mL/hr at 09/18/23 2204     Scheduled Meds:   acetaminophen  1,000 mg Oral Q8H    enoxparin  40 mg Subcutaneous Q24H (prophylaxis, 1700)    fat emulsion 20%  250 mL Intravenous Daily    fluconazole (DIFLUCAN) IV (PEDS and ADULTS)  200 mg Intravenous Q24H    gabapentin  100 mg Oral TID    levalbuterol  0.63 mg Nebulization Q6H WAKE    pantoprazole  40 mg Oral Daily    piperacillin-tazobactam (Zosyn) IV (PEDS and ADULTS) (extended infusion is not appropriate)  4.5 g Intravenous Q8H    tamsulosin  0.4 mg Oral Daily    vancomycin (VANCOCIN) IV (PEDS and ADULTS)  1,500 mg Intravenous Q12H     PRN Meds:dextrose 10%, glucagon (human recombinant), haloperidol lactate, HYDROmorphone, insulin aspart U-100, ondansetron, oxyCODONE, oxyCODONE, sodium chloride 0.9%     Review of patient's allergies indicates:   Allergen Reactions    Penicillins Rash     Objective:     Vital Signs (Most Recent):  Temp: 97.9 °F (36.6 °C) (09/19/23 0702)  Pulse: 83 (09/19/23 0755)  Resp: 18 (09/19/23 0755)  BP: 121/62 (09/19/23 0702)  SpO2: 97 % (09/19/23 0755) Vital Signs (24h Range):  Temp:  [97.3 °F (36.3 °C)-100 °F (37.8 °C)] 97.9 °F (36.6 °C)  Pulse:  [] 83  Resp:  [12-22] 18  SpO2:  [91 %-99 %] 97 %  BP: (112-151)/(56-75) 121/62     Weight: 83.5 kg (184 lb) (weight increased due to fluid in my abdomen)  Body mass index is 25.66 kg/m².    Intake/Output - Last 3 Shifts         09/17 0700  09/18 0659 09/18  0700  09/19 0659 09/19 0700  09/20 0659    P.O. 120 360     I.V. (mL/kg)  825 (9.9)     IV Piggyback  1500     TPN  969.6     Total Intake(mL/kg) 120 (1.4) 3654.6 (43.8)     Urine (mL/kg/hr) 0 (0) 3235 (1.6)     Drains 325 330     Total Output 325 3565     Net -205 +89.6            Urine Occurrence 3 x               Physical Exam  Vitals and nursing note reviewed.   Constitutional:       General: He is not in acute distress.     Comments: TPN    Cardiovascular:      Rate and Rhythm: Normal rate.      Pulses: Normal pulses.   Pulmonary:      Effort: Pulmonary effort is normal. No respiratory distress.      Comments: 2L NC  Abdominal:      General: There is no distension.      Palpations: Abdomen is soft.      Tenderness: There is no abdominal tenderness.      Comments: Midline drain to gravity, thin brown output (60 cc overnight). RLQ drain with thin SS output ( 60 cc overnight)   Neurological:      Mental Status: He is alert.          Significant Labs:  I have reviewed all pertinent lab results within the past 24 hours.  CBC:   Recent Labs   Lab 09/19/23  0614   WBC 21.00*   RBC 2.91*   HGB 8.1*   HCT 25.3*      MCV 87   MCH 27.8   MCHC 32.0     BMP:   Recent Labs   Lab 09/19/23  0614   *   *   K 4.1   CL 97   CO2 28   BUN 12   CREATININE 0.6   CALCIUM 7.2*   MG 2.0     CMP:   Recent Labs   Lab 09/16/23  0626 09/17/23  0424 09/19/23  0614   *   < > 176*   CALCIUM 7.6*   < > 7.2*   ALBUMIN 1.2*  --   --    PROT 5.0*  --   --    *   < > 135*   K 3.9   < > 4.1   CO2 29   < > 28   CL 96   < > 97   BUN 19   < > 12   CREATININE 0.6   < > 0.6   ALKPHOS 154*  --   --    ALT 41  --   --    AST 29  --   --    BILITOT 1.4*  --   --     < > = values in this interval not displayed.       Significant Diagnostics:  I have reviewed all pertinent imaging results/findings within the past 24 hours.    Assessment/Plan:     Gastric adenocarcinoma  Mr. Valladares is a 61 y/o male s/p  open distal gastrectomy  with BII reconstruction on 8/21/23 and admitted for intra abdominal fluid collection on CT on 9/14/23 and cough. S/p IR drain placement into intra-abdominal fluid collection. Laparoscopic abscess washout 9/18, with EGD.      - NPO  - TPN via PICC line  - Flush DANILO drain 3x/day with 20 mL saline   - Midline upper drain to gravity  - IV Zosyn and Vanc. Awaiting speciations, GNR and enterococcus   - Prior Cx resulted E.faecalis/E.coli/Strep pneumonieae, pan sensitive  - Strict monitoring of drain outputs   - PT, OOB chair  - Ambulate TID  - DVT PPX  - GI PPX    Dispo: Pending clinical course           Diana Alvares MD  General Surgery  [facility]

## 2023-09-19 NOTE — PLAN OF CARE
Problem: Occupational Therapy  Goal: Occupational Therapy Goal  Description: Goals to be met by:9/26/23    Patient will increase functional independence with ADLs by performing:    LE Dressing with Supervision.  Grooming while standing at sink with Supervision.  Toileting from toilet with Set-up Assistance for hygiene and clothing management.   Supine to sit with Modified [place].  Toilet transfer to toilet with [place].    Outcome: Ongoing, Progressing

## 2023-09-19 NOTE — OP NOTE
Siddhartha Thurman - [facility]  Surgery Department  Operative Note       Date of Procedure: 9/18/2023       Surgeon(s):  Surgeon(s) and Role:     * Eulogio Moy MD - Primary     * Diana Alvares MD - Resident - Chief  Assisting Surgeon: None    Pre-Operative Diagnosis:   Gastric adenocarcinoma, metastatic to peritoneum  Duodenal stump leak  Intrabdominal abscess    Post-Operative Diagnosis:   Same     Anesthesia: General    Operative Findings:   Well circumscribed right abdominal abscess, drained via right abdominal counterincision  Contrast via epigastric drain c/w duodenal stump leak  Visualization of 4 mm duodenal defect via endoscopy  Patent afferrent/efferent limbs s/p B2 GJ    Procedures:  Reexploration for drainage of peritoneal abscess [[id number]]  Esophagogastroduodenoscopy  Intraoperative fluoroscopy with interpretation/recording of images    Estimated Blood Loss (EBL):  <10 mL      Specimen(s):  culture           Indications:  Danish Valladares presents for the above procedures.  Following palliative gastrectomy for an obstructing gastric cancer, he developed an intrabdominal abscess associated with a duodenal stump leak.  Percutaneous drainage was established without rapid resolution and with thick abscess material.  Operative drainage and endoscopy was recommended.  Risks and benefits were reviewed including bleeding, infection, damage to local structures, need for additional procedures, death, and imponderables.  He understands and gave informed consent to proceed.    Details:  The patient was transported to the operating room and satisfactory anesthesia established.  Preoperative antibiotics were administered.  The patient was placed in the supine, right side bumped position and extremities were padded and protected throughout.  A casarez catheter was placed.  Appropriate lines were placed by anesthesia.    The operative field was prepped and draped in sterile fashion.  A timeout was performed.  A 5 cm  incision was made over the preexisting right sided perc drain.  An optiview trocar was placed parallel to the drain under continuous vision and used to enter the abscess cavity.   Cultures were obtained and the cavity was suctioned of pus.  To improve exposure the fascia was opened a short distance and a GelPoint Mini was placed.  The laparoscope was used in parallel with the suction and graspers to completely clear the cavity of non-bilious pus and fibrinous debris.  The space was tracked over the liver which was visible and into the midline pelvis.  The cavity was irrigated with 1:10 dilute peroxide to further clear the cavity and irrigated with saline until clear.  The cavity was collapsible.  A 19 linda drain was brought in and tunneled inferiorly through a counter incision.  Hemostasis was ensured.  The fascia was closed with interruped figure-8 sutures, the subq was irrigated with dilute betadine and the skin loosely closed with staples. Dressings were applied.    Fluoroscopy was brought in and the epigastric drain was interrogated with 50% Omnipaque.  Continues fluoroscopy demonstrated communication with the duodenal stump consistent with a duodenal stump leak.  Next, to assess for potential future interventions, a pediatric colonoscope was introduced orally, guided through the esophagus into the gastric remnant.  The stomach was normal the GB was normal both limbs of the gastrojejunostomy were intubated and patent.  I was able to traverse the a fair it limb into the duodenum where a small, 4 mm defect was noted.  The drain was not visible.  The defect of a clean base, the duodenum was viable and pliable.  The scope was removed no other abnormalities were noted.    All needle, lap, and sponge counts were reported as correct.  I communicated the intraoperative findings with the family following the procedure.     Condition: Good    Disposition: PACU - hemodynamically stable.    Attestation: I was present and  scrubbed for the key portions of the procedure.

## 2023-09-20 LAB
ALBUMIN SERPL BCP-MCNC: 1.2 G/DL (ref 3.5–5.2)
ALP SERPL-CCNC: 181 U/L (ref 55–135)
ALT SERPL W/O P-5'-P-CCNC: 41 U/L (ref 10–44)
ANION GAP SERPL CALC-SCNC: 4 MMOL/L (ref 8–16)
AST SERPL-CCNC: 35 U/L (ref 10–40)
BACTERIA FLD AEROBE CULT: ABNORMAL
BACTERIA SPEC AEROBE CULT: ABNORMAL
BACTERIA SPEC AEROBE CULT: ABNORMAL
BASOPHILS # BLD AUTO: 0.07 K/UL (ref 0–0.2)
BASOPHILS NFR BLD: 0.5 % (ref 0–1.9)
BILIRUB SERPL-MCNC: 0.9 MG/DL (ref 0.1–1)
BUN SERPL-MCNC: 14 MG/DL (ref 8–23)
CALCIUM SERPL-MCNC: 7.6 MG/DL (ref 8.7–10.5)
CHLORIDE SERPL-SCNC: 96 MMOL/L (ref 95–110)
CO2 SERPL-SCNC: 34 MMOL/L (ref 23–29)
CREAT SERPL-MCNC: 0.6 MG/DL (ref 0.5–1.4)
DIFFERENTIAL METHOD: ABNORMAL
EOSINOPHIL # BLD AUTO: 0.1 K/UL (ref 0–0.5)
EOSINOPHIL NFR BLD: 0.5 % (ref 0–8)
ERYTHROCYTE [DISTWIDTH] IN BLOOD BY AUTOMATED COUNT: 15.1 % (ref 11.5–14.5)
EST. GFR  (NO RACE VARIABLE): >60 ML/MIN/1.73 M^2
GLUCOSE SERPL-MCNC: 97 MG/DL (ref 70–110)
HCT VFR BLD AUTO: 25.7 % (ref 40–54)
HGB BLD-MCNC: 8.2 G/DL (ref 14–18)
IMM GRANULOCYTES # BLD AUTO: 0.63 K/UL (ref 0–0.04)
IMM GRANULOCYTES NFR BLD AUTO: 4.1 % (ref 0–0.5)
LYMPHOCYTES # BLD AUTO: 1.5 K/UL (ref 1–4.8)
LYMPHOCYTES NFR BLD: 9.4 % (ref 18–48)
MAGNESIUM SERPL-MCNC: 2 MG/DL (ref 1.6–2.6)
MCH RBC QN AUTO: 27.5 PG (ref 27–31)
MCHC RBC AUTO-ENTMCNC: 31.9 G/DL (ref 32–36)
MCV RBC AUTO: 86 FL (ref 82–98)
MONOCYTES # BLD AUTO: 1.6 K/UL (ref 0.3–1)
MONOCYTES NFR BLD: 10.3 % (ref 4–15)
NEUTROPHILS # BLD AUTO: 11.7 K/UL (ref 1.8–7.7)
NEUTROPHILS NFR BLD: 75.2 % (ref 38–73)
NRBC BLD-RTO: 0 /100 WBC
PHOSPHATE SERPL-MCNC: 3.4 MG/DL (ref 2.7–4.5)
PLATELET # BLD AUTO: 423 K/UL (ref 150–450)
PMV BLD AUTO: 10 FL (ref 9.2–12.9)
POCT GLUCOSE: 104 MG/DL (ref 70–110)
POCT GLUCOSE: 117 MG/DL (ref 70–110)
POCT GLUCOSE: 125 MG/DL (ref 70–110)
POCT GLUCOSE: 99 MG/DL (ref 70–110)
POTASSIUM SERPL-SCNC: 4 MMOL/L (ref 3.5–5.1)
PROT SERPL-MCNC: 5.2 G/DL (ref 6–8.4)
RBC # BLD AUTO: 2.98 M/UL (ref 4.6–6.2)
SODIUM SERPL-SCNC: 134 MMOL/L (ref 136–145)
WBC # BLD AUTO: 15.5 K/UL (ref 3.9–12.7)

## 2023-09-20 PROCEDURE — 27000646 HC AEROBIKA DEVICE

## 2023-09-20 PROCEDURE — 94667 MNPJ CHEST WALL 1ST: CPT

## 2023-09-20 PROCEDURE — A4217 STERILE WATER/SALINE, 500 ML: HCPCS | Performed by: STUDENT IN AN ORGANIZED HEALTH CARE EDUCATION/TRAINING PROGRAM

## 2023-09-20 PROCEDURE — 94664 DEMO&/EVAL PT USE INHALER: CPT

## 2023-09-20 PROCEDURE — 80053 COMPREHEN METABOLIC PANEL: CPT | Performed by: SURGERY

## 2023-09-20 PROCEDURE — 94668 MNPJ CHEST WALL SBSQ: CPT

## 2023-09-20 PROCEDURE — 84100 ASSAY OF PHOSPHORUS: CPT | Performed by: STUDENT IN AN ORGANIZED HEALTH CARE EDUCATION/TRAINING PROGRAM

## 2023-09-20 PROCEDURE — 25000003 PHARM REV CODE 250: Performed by: STUDENT IN AN ORGANIZED HEALTH CARE EDUCATION/TRAINING PROGRAM

## 2023-09-20 PROCEDURE — 94761 N-INVAS EAR/PLS OXIMETRY MLT: CPT

## 2023-09-20 PROCEDURE — 63600175 PHARM REV CODE 636 W HCPCS: Performed by: STUDENT IN AN ORGANIZED HEALTH CARE EDUCATION/TRAINING PROGRAM

## 2023-09-20 PROCEDURE — 25000003 PHARM REV CODE 250: Performed by: NURSE PRACTITIONER

## 2023-09-20 PROCEDURE — 25000242 PHARM REV CODE 250 ALT 637 W/ HCPCS: Performed by: STUDENT IN AN ORGANIZED HEALTH CARE EDUCATION/TRAINING PROGRAM

## 2023-09-20 PROCEDURE — 63600175 PHARM REV CODE 636 W HCPCS: Performed by: SURGERY

## 2023-09-20 PROCEDURE — 20600001 HC STEP DOWN PRIVATE ROOM

## 2023-09-20 PROCEDURE — 85025 COMPLETE CBC W/AUTO DIFF WBC: CPT | Performed by: STUDENT IN AN ORGANIZED HEALTH CARE EDUCATION/TRAINING PROGRAM

## 2023-09-20 PROCEDURE — B4185 PARENTERAL SOL 10 GM LIPIDS: HCPCS | Performed by: STUDENT IN AN ORGANIZED HEALTH CARE EDUCATION/TRAINING PROGRAM

## 2023-09-20 PROCEDURE — 99900035 HC TECH TIME PER 15 MIN (STAT)

## 2023-09-20 PROCEDURE — 83735 ASSAY OF MAGNESIUM: CPT | Performed by: STUDENT IN AN ORGANIZED HEALTH CARE EDUCATION/TRAINING PROGRAM

## 2023-09-20 PROCEDURE — 97116 GAIT TRAINING THERAPY: CPT

## 2023-09-20 PROCEDURE — 25000003 PHARM REV CODE 250: Performed by: SURGERY

## 2023-09-20 PROCEDURE — 94640 AIRWAY INHALATION TREATMENT: CPT

## 2023-09-20 PROCEDURE — 27000221 HC OXYGEN, UP TO 24 HOURS

## 2023-09-20 RX ORDER — FUROSEMIDE 10 MG/ML
20 INJECTION INTRAMUSCULAR; INTRAVENOUS ONCE
Status: COMPLETED | OUTPATIENT
Start: 2023-09-20 | End: 2023-09-20

## 2023-09-20 RX ORDER — FLUCONAZOLE 200 MG/1
400 TABLET ORAL DAILY
Status: DISCONTINUED | OUTPATIENT
Start: 2023-09-20 | End: 2023-09-25 | Stop reason: HOSPADM

## 2023-09-20 RX ORDER — AMOXICILLIN AND CLAVULANATE POTASSIUM 875; 125 MG/1; MG/1
1 TABLET, FILM COATED ORAL EVERY 12 HOURS
Status: DISCONTINUED | OUTPATIENT
Start: 2023-09-20 | End: 2023-09-25 | Stop reason: HOSPADM

## 2023-09-20 RX ORDER — GUAIFENESIN 600 MG/1
600 TABLET, EXTENDED RELEASE ORAL DAILY
Status: DISCONTINUED | OUTPATIENT
Start: 2023-09-20 | End: 2023-09-25 | Stop reason: HOSPADM

## 2023-09-20 RX ADMIN — AMOXICILLIN AND CLAVULANATE POTASSIUM 1 TABLET: 875; 125 TABLET, FILM COATED ORAL at 09:09

## 2023-09-20 RX ADMIN — PIPERACILLIN SODIUM AND TAZOBACTAM SODIUM 4.5 G: 4; .5 INJECTION, POWDER, FOR SOLUTION INTRAVENOUS at 01:09

## 2023-09-20 RX ADMIN — GABAPENTIN 100 MG: 300 CAPSULE ORAL at 02:09

## 2023-09-20 RX ADMIN — OXYCODONE HYDROCHLORIDE 10 MG: 10 TABLET ORAL at 10:09

## 2023-09-20 RX ADMIN — TAMSULOSIN HYDROCHLORIDE 0.4 MG: 0.4 CAPSULE ORAL at 09:09

## 2023-09-20 RX ADMIN — FLUCONAZOLE 400 MG: 200 TABLET ORAL at 01:09

## 2023-09-20 RX ADMIN — LEVALBUTEROL HYDROCHLORIDE 0.63 MG: 0.63 SOLUTION RESPIRATORY (INHALATION) at 08:09

## 2023-09-20 RX ADMIN — ACETAMINOPHEN 1000 MG: 500 TABLET ORAL at 09:09

## 2023-09-20 RX ADMIN — OXYCODONE HYDROCHLORIDE 10 MG: 10 TABLET ORAL at 06:09

## 2023-09-20 RX ADMIN — MAGNESIUM SULFATE HEPTAHYDRATE: 500 INJECTION, SOLUTION INTRAMUSCULAR; INTRAVENOUS at 09:09

## 2023-09-20 RX ADMIN — ACETAMINOPHEN 1000 MG: 500 TABLET ORAL at 02:09

## 2023-09-20 RX ADMIN — LEVALBUTEROL HYDROCHLORIDE 0.63 MG: 0.63 SOLUTION RESPIRATORY (INHALATION) at 07:09

## 2023-09-20 RX ADMIN — PANTOPRAZOLE SODIUM 40 MG: 40 TABLET, DELAYED RELEASE ORAL at 09:09

## 2023-09-20 RX ADMIN — VANCOMYCIN HYDROCHLORIDE 1500 MG: 1.5 INJECTION, POWDER, LYOPHILIZED, FOR SOLUTION INTRAVENOUS at 09:09

## 2023-09-20 RX ADMIN — OXYCODONE HYDROCHLORIDE 10 MG: 10 TABLET ORAL at 02:09

## 2023-09-20 RX ADMIN — PIPERACILLIN SODIUM AND TAZOBACTAM SODIUM 4.5 G: 4; .5 INJECTION, POWDER, FOR SOLUTION INTRAVENOUS at 10:09

## 2023-09-20 RX ADMIN — GUAIFENESIN 600 MG: 600 TABLET, EXTENDED RELEASE ORAL at 01:09

## 2023-09-20 RX ADMIN — ACETAMINOPHEN 1000 MG: 500 TABLET ORAL at 06:09

## 2023-09-20 RX ADMIN — SOYBEAN OIL 250 ML: 20 INJECTION, SOLUTION INTRAVENOUS at 09:09

## 2023-09-20 RX ADMIN — LEVALBUTEROL HYDROCHLORIDE 0.63 MG: 0.63 SOLUTION RESPIRATORY (INHALATION) at 02:09

## 2023-09-20 RX ADMIN — OXYCODONE HYDROCHLORIDE 10 MG: 10 TABLET ORAL at 01:09

## 2023-09-20 RX ADMIN — ENOXAPARIN SODIUM 40 MG: 40 INJECTION SUBCUTANEOUS at 05:09

## 2023-09-20 RX ADMIN — GABAPENTIN 100 MG: 300 CAPSULE ORAL at 09:09

## 2023-09-20 RX ADMIN — AMOXICILLIN AND CLAVULANATE POTASSIUM 1 TABLET: 875; 125 TABLET, FILM COATED ORAL at 11:09

## 2023-09-20 RX ADMIN — HYDROMORPHONE HYDROCHLORIDE 0.2 MG: 1 INJECTION, SOLUTION INTRAMUSCULAR; INTRAVENOUS; SUBCUTANEOUS at 04:09

## 2023-09-20 RX ADMIN — FUROSEMIDE 20 MG: 10 INJECTION, SOLUTION INTRAMUSCULAR; INTRAVENOUS at 11:09

## 2023-09-20 NOTE — SUBJECTIVE & OBJECTIVE
Interval History: No issues overnight. Tolerating clears with TPN. Having bowel function. Ambulating without difficulty. Upper midline drain with 170cc thin brown output in 24 hrs, RLQ drain with 220/24hrs (60cc/overnight)    Medications:  Continuous Infusions:   TPN ADULT CENTRAL LINE CUSTOM 60 mL/hr at 09/19/23 2258    TPN ADULT CENTRAL LINE CUSTOM       Scheduled Meds:   acetaminophen  1,000 mg Oral Q8H    enoxparin  40 mg Subcutaneous Q24H (prophylaxis, 1700)    fat emulsion 20%  250 mL Intravenous Daily    fat emulsion 20%  250 mL Intravenous Daily    fluconazole (DIFLUCAN) IV (PEDS and ADULTS)  200 mg Intravenous Q24H    gabapentin  100 mg Oral TID    levalbuterol  0.63 mg Nebulization Q6H WAKE    pantoprazole  40 mg Oral Daily    piperacillin-tazobactam (Zosyn) IV (PEDS and ADULTS) (extended infusion is not appropriate)  4.5 g Intravenous Q8H    tamsulosin  0.4 mg Oral Daily     PRN Meds:dextrose 10%, glucagon (human recombinant), haloperidol lactate, HYDROmorphone, insulin aspart U-100, ondansetron, oxyCODONE, oxyCODONE, sodium chloride 0.9%     Review of patient's allergies indicates:   Allergen Reactions    Penicillins Rash     Objective:     Vital Signs (Most Recent):  Temp: 97.9 °F (36.6 °C) (09/20/23 0753)  Pulse: 92 (09/20/23 0754)  Resp: 18 (09/20/23 1035)  BP: 126/63 (09/20/23 0753)  SpO2: (!) 93 % (09/20/23 0754) Vital Signs (24h Range):  Temp:  [97.4 °F (36.3 °C)-98.1 °F (36.7 °C)] 97.9 °F (36.6 °C)  Pulse:  [88-98] 92  Resp:  [16-19] 18  SpO2:  [91 %-96 %] 93 %  BP: (120-129)/(58-63) 126/63     Weight: 83.5 kg (184 lb) (weight increased due to fluid in my abdomen)  Body mass index is 25.66 kg/m².    Intake/Output - Last 3 Shifts         09/18 0700  09/19 0659 09/19 0700 09/20 0659 09/20 0700 09/21 0659    P.O. 360 720     I.V. (mL/kg) 825 (9.9)      Other  25     IV Piggyback 1500      .6 829.6     Total Intake(mL/kg) 3654.6 (43.8) 1574.6 (18.9)     Urine (mL/kg/hr) 3235 (1.6) 3720 (1.9)  125 (0.4)    Drains 330 390 30    Other 25      Total Output 3590 4110 155    Net +64.6 -2535.4 -155                    Physical Exam  Vitals and nursing note reviewed.   Constitutional:       General: He is not in acute distress.     Comments: TPN via PICC   Cardiovascular:      Rate and Rhythm: Normal rate.      Pulses: Normal pulses.   Pulmonary:      Effort: Pulmonary effort is normal. No respiratory distress.   Abdominal:      General: There is no distension.      Palpations: Abdomen is soft.      Tenderness: There is no abdominal tenderness.      Comments: Upper midline drain with thin bilious output; lower DANILO drain with thin output; soft non-tender and non-distended; RLQ incision without erythema or bruising   Neurological:      General: No focal deficit present.      Mental Status: He is alert and oriented to person, place, and time.          Significant Labs:  I have reviewed all pertinent lab results within the past 24 hours.  CBC:   Recent Labs   Lab 09/20/23 0515   WBC 15.50*   RBC 2.98*   HGB 8.2*   HCT 25.7*      MCV 86   MCH 27.5   MCHC 31.9*     BMP:   Recent Labs   Lab 09/20/23  0515   GLU 97   *   K 4.0   CL 96   CO2 34*   BUN 14   CREATININE 0.6   CALCIUM 7.6*   MG 2.0     CMP:   Recent Labs   Lab 09/20/23 0515   GLU 97   CALCIUM 7.6*   ALBUMIN 1.2*   PROT 5.2*   *   K 4.0   CO2 34*   CL 96   BUN 14   CREATININE 0.6   ALKPHOS 181*   ALT 41   AST 35   BILITOT 0.9       Significant Diagnostics:  I have reviewed all pertinent imaging results/findings within the past 24 hours.

## 2023-09-20 NOTE — PLAN OF CARE
Problem: Physical Therapy  Goal: Physical Therapy Goal  Description: PT goals until 9/25/23    1. Pt sit to stand with no AD with mod independence-not met  2. Pt to perform gait 300ft with no AD with supervision.-not met  3. Pt to up/down 6 steps with B UE rail with CGA.-not met  4. Pt to perform B LE exs in sitting or supine x 10 reps to strengthen B LE to improve functional mobility.-not met   Outcome: Ongoing, Progressing   Pt's goals remain appropriate and pt will continue to benefit from skilled PT services to work towards improved functional mobility including:  transfers, up/down steps, and gait.   9/20/2023

## 2023-09-20 NOTE — PROGRESS NOTES
Pharmacokinetic Assessment Follow Up: IV Vancomycin    Vancomycin serum concentration assessment(s):    The trough level was drawn correctly and can be used to guide therapy at this time. The measurement is within the desired definitive target range of 10 to 20 mcg/mL.    Vancomycin Regimen Plan:    Continue regimen of Vancomycin 1500 mg every 12 hours with next serum trough concentration measured at 0800 prior to 4th dose on 9/21/23    Drug levels (last 3 results):  Recent Labs   Lab Result Units 09/17/23  1806 09/19/23  1819   Vancomycin-Trough ug/mL 14.7 13.1       Pharmacy will continue to follow and monitor vancomycin.    Please contact pharmacy at extension [phone] for questions regarding this assessment.    Thank you for the consult,   Grayson Wade       Patient brief summary:  Danish Valladares is a 62 y.o. male initiated on antimicrobial therapy with IV Vancomycin for treatment of intra-abdominal infection        Drug Allergies:   Review of patient's allergies indicates:   Allergen Reactions    Penicillins Rash       Actual Body Weight:   83.5 kg    Renal Function:   Estimated Creatinine Clearance: 136 mL/min (based on SCr of 0.6 mg/dL).,     Dialysis Method (if applicable):  N/A    CBC (last 72 hours):  Recent Labs   Lab Result Units 09/17/23  0625 09/18/23  0910 09/19/23  0614   WBC K/uL 19.45* 17.66* 21.00*   Hemoglobin g/dL 8.7* 8.2* 8.1*   Hematocrit % 27.6* 27.2* 25.3*   Platelets K/uL 422 375 391   Gran % % 81.2* 81.1* 84.0*   Lymph % % 6.6* 6.1* 6.0*   Mono % % 8.2 9.2 7.3   Eosinophil % % 0.1 0.1 0.0   Basophil % % 0.2 0.5 0.2   Differential Method  Automated Automated Automated       Metabolic Panel (last 72 hours):  Recent Labs   Lab Result Units 09/17/23  0424 09/18/23  1056 09/19/23  0614   Sodium mmol/L 132* 132* 135*   Potassium mmol/L 3.7 3.9 4.1   Chloride mmol/L 94* 96 97   CO2 mmol/L 28 30* 28   Glucose mg/dL 113* 111* 176*   BUN mg/dL 16 14 12   Creatinine mg/dL 0.5 0.6 0.6   Magnesium  mg/dL 1.8 1.8 2.0   Phosphorus mg/dL 2.6* 2.6* 2.9       Vancomycin Administrations:  vancomycin given in the last 96 hours                     vancomycin 1,500 mg in dextrose 5 % (D5W) 250 mL IVPB (Vial-Mate) (mg) 1,500 mg New Bag 09/19/23 0647     1,500 mg New Bag 09/18/23 1941     1,500 mg New Bag  0630     1,500 mg New Bag 09/17/23 1835     1,500 mg New Bag  0621     1,500 mg New Bag 09/16/23 1703     1,500 mg New Bag  0559                    Microbiologic Results:  Microbiology Results (last 7 days)       Procedure Component Value Units Date/Time    Blood culture [[id number]] Collected: 09/14/23 1345    Order Status: Completed Specimen: Blood from Line, PICC Right Basilic Updated: 09/19/23 1612     Blood Culture, Routine No growth after 5 days.    Culture, Body Fluid (Aerobic) w/ GS [[id number]]  (Abnormal)  (Susceptibility) Collected: 09/14/23 1530    Order Status: Completed Specimen: Body Fluid from Peritoneal Fluid Updated: 09/19/23 1027     AEROBIC CULTURE - FLUID ESCHERICHIA COLI  Rare        ENTEROCOCCUS FAECALIS  Many        STREPTOCOCCUS PNEUMONIAE  Many  Susceptibility pending      Aerobic culture [[id number]]  (Abnormal) Collected: 09/18/23 1127    Order Status: Completed Specimen: Abscess from Abdomen Updated: 09/19/23 0701     Aerobic Bacterial Culture GRAM NEGATIVE KENNETH  Few  Identification and susceptibility pending        ENTEROCOCCUS SPECIES  Moderate  Identification and susceptibility pending      Narrative:      Right Abdominal Abscess Culture    Culture, Anaerobe [[id number]] Collected: 09/18/23 1127    Order Status: Completed Specimen: Abscess from Abdomen Updated: 09/19/23 0613     Anaerobic Culture Culture in progress    Narrative:      Right Abdominal Abscess Culture    Gram stain [[id number]] Collected: 09/18/23 1127    Order Status: Completed Specimen: Abscess from Abdomen Updated: 09/18/23 1410     Gram Stain Result No WBC's      No organisms seen    Narrative:      Right Abdominal  Abscess Culture    Culture, Anaerobe [[id number]]  (Abnormal) Collected: 09/14/23 1528    Order Status: Completed Specimen: Abscess from Abdomen Updated: 09/18/23 1248     Anaerobic Culture ODORIBACTER SPLANCHNICUS  Many        BACTEROIDES THETAIOTAOMICRON  Many      Narrative:      Duodenal stump    Fungus culture [[id number]] Collected: 09/18/23 1127    Order Status: Sent Specimen: Abscess from Abdomen Updated: 09/18/23 1135    Aerobic culture [[id number]]  (Abnormal)  (Susceptibility) Collected: 09/14/23 1528    Order Status: Completed Specimen: Abscess from Abdomen Updated: 09/18/23 1117     Aerobic Bacterial Culture ESCHERICHIA COLI  Many        ENTEROCOCCUS FAECALIS  Many      Narrative:      Duodenal stump    Gram stain [[id number]] Collected: 09/14/23 1530    Order Status: Completed Specimen: Abscess from Abdomen Updated: 09/14/23 2110     Gram Stain Result Few WBC's      Rare Gram positive cocci      Many Gram positive rods      Many Gram negative rods    Gram stain [[id number]] Collected: 09/14/23 1528    Order Status: Completed Specimen: Abscess from Abdomen Updated: 09/14/23 2107     Gram Stain Result Rare WBC's      Moderate Gram positive cocci      Many Gram positive rods      Many Gram negative rods    Narrative:      Duodenal stump    Culture, Fluid  (Aerobic) [[id number]]     Order Status: Canceled Specimen: Body Fluid     Culture, Anaerobe [[id number]] Collected: 09/14/23 1528    Order Status: Canceled Specimen: Abscess from Abdomen

## 2023-09-20 NOTE — PLAN OF CARE
[facility] Care Plan     Dx: Abdominal Fluid Collection     Shift Events: Pain Management, Drain Care, IV Antibiotics, TPN , Safety     Goals of Care: Pain Management, Drain Care, IV Antibiotics, TPN , Safety,     Neuro: AAOx4     Vital Signs: WNL     Respiratory: O2@3L/NC     Diet: clear liquids. TPN, Lipids     Is patient tolerating current diet? yes     GTTS: TPN and Lipdid     Urine Output/Bowel Movement: Urine retention this shift, casarez placed during surgery     Drains/Tubes/Tube Feeds (include total output/shift): RUQ to gravity drain, RLQ drain to bulb suction, see RN communication as both need flushing per order     Lines: right upper PICC       Accuchecks:yes Q6 hour     Skin: abdominal incision, breakdown to buttocks, healed midline incision     Fall Risk Score: see flowsheet     Activity level? Standing activities with assist       Any scheduled procedures? none     Any safety concerns? N/a     Other: n/a

## 2023-09-20 NOTE — PT/OT/SLP PROGRESS
"Physical Therapy Treatment    Patient Name:  Danish Valladares   MRN:  [id number]    Recommendations:     Discharge Recommendations: other (see comments)  Discharge Equipment Recommendations: none  Barriers to discharge: Inaccessible home 6 ANIYA    Assessment:     Danish Valladares is a 62 y.o. male admitted with a medical diagnosis of Abdominal fluid collection.  He presents with the following impairments/functional limitations: weakness, impaired endurance, impaired functional mobility, gait instability, impaired self care skills, impaired balance, impaired cardiopulmonary response to activity . Pt is unsafe with functional mobility at this time due to pt requires CGA/minimal assist for transfers and minimal assist for gait due to weakness, SOB, and instability. Pt is motivated to progress with functional mobility.     Rehab Prognosis: Good; patient would benefit from acute skilled PT services to address these deficits and reach maximum level of function.    Recent Surgery: Procedure(s) (LRB):  DRAINAGE, ABDOMEN, LAPAROSCOPIC (N/A)  EGD (ESOPHAGOGASTRODUODENOSCOPY); flouroscopy, need c-arm in the room (N/A)  LAPAROSCOPY, DIAGNOSTIC 2 Days Post-Op    Plan:     During this hospitalization, patient to be seen 3 x/week to address the identified rehab impairments via gait training, therapeutic activities, therapeutic exercises, neuromuscular re-education and progress toward the following goals:    Plan of Care Expires:  10/19/23    Subjective   "I still have a lot of fluid"    Pain/Comfort:  Pain Rating 1: 5/10  Location - Orientation 1: generalized  Location 1: abdomen  Pain Addressed 1: Reposition, Cessation of Activity  Pain Rating Post-Intervention 1: 5/10      Objective:     Communicated with nurse prior to session.  Patient found sitting edge of bed with DANILO drain, oxygen, peripheral IV (abdominal drain) upon PT entry to room.     General Precautions: Standard, fall  Orthopedic Precautions: N/A  Braces: " N/A  Respiratory Status: Nasal cannula, flow 2 L/min     Functional Mobility:  Transfers:     Sit to Stand:  contact guard assistance from bed level and minimum assistance from chair with no AD  Gait: 80ft then 100ft with IV pole support with CGA; pt attempted to ambulated with no AD for 5ft and required minimal assist due to instability. Pt's ox sat after 1st gait trial on 2 LPM oxygen: 85%, took ~ 30 sec to increased to 90% after seated rest period on 2 LPM. Nurse present and notified Pt required extended rest period between gait trials due to c/o fatigue and weakness    AM-PAC 6 CLICK MOBILITY  Turning over in bed (including adjusting bedclothes, sheets and blankets)?: 4  Sitting down on and standing up from a chair with arms (e.g., wheelchair, bedside commode, etc.): 3  Moving from lying on back to sitting on the side of the bed?: 3  Moving to and from a bed to a chair (including a wheelchair)?: 3  Need to walk in hospital room?: 3  Climbing 3-5 steps with a railing?: 3  Basic Mobility Total Score: 19     Patient left up in chair with all lines intact, call button in reach, nurse notified, and wife present..    GOALS:   Multidisciplinary Problems       Physical Therapy Goals          Problem: Physical Therapy    Goal Priority Disciplines Outcome Goal Variances Interventions   Physical Therapy Goal     PT, PT/OT Ongoing, Progressing     Description: PT goals until 9/25/23    1. Pt sit to stand with no AD with mod independence-not met  2. Pt to perform gait 300ft with no AD with supervision.-not met  3. Pt to up/down 6 steps with B UE rail with CGA.-not met  4. Pt to perform B LE exs in sitting or supine x 10 reps to strengthen B LE to improve functional mobility.-not met                        Time Tracking:     PT Received On: 09/20/23  PT Start Time: 0850     PT Stop Time: 0916  PT Total Time (min): 26 min     Billable Minutes: Gait Training 26    Treatment Type: Treatment  PT/PTA: PT           09/20/2023

## 2023-09-20 NOTE — ASSESSMENT & PLAN NOTE
Mr. Valladares is a 61 y/o male s/p  open distal gastrectomy with BII reconstruction on 8/21/23 and admitted for intra abdominal fluid collection on CT on 9/14/23 and cough. S/p IR drain placement into intra-abdominal fluid collection. Laparoscopic retroperitoneal washout 9/18, with EGD. Will discuss de-escalating antibiotics given speciation/sensitivities.      - NPO  - TPN via PICC line  - Flush DANILO drain 3x/day with 20 mL saline   - Midline upper drain to gravity  - Will DC fluc/vanc, transition from Zosyn to oral augmentin   - Strict monitoring of drain outputs   - PT, OOB chair  - Ambulate TID  - DVT PPX  - GI PPX    Dispo: Pending clinical course

## 2023-09-20 NOTE — PROGRESS NOTES
VANCOMYCIN DOSING BY PHARMACY DISCONTINUATION NOTE    Danish Valladares is a 62 y.o. male who had been consulted for vancomycin dosing.    The pharmacy consult for vancomycin dosing has been discontinued.     Vancomycin Dosing by Pharmacy Consult will sign-off. Please reconsult if necessary. Thank you for allowing us to participate in this patient's care.     Austin Avila, PharmD, BCPS  Ext. [phone]

## 2023-09-20 NOTE — PROGRESS NOTES
[facility] - [facility]  General Surgery  Progress Note    Subjective:       Post-Op Info:  Procedure(s) (LRB):  DRAINAGE, ABDOMEN, LAPAROSCOPIC (N/A)  EGD (ESOPHAGOGASTRODUODENOSCOPY); flouroscopy, need c-arm in the room (N/A)  LAPAROSCOPY, DIAGNOSTIC   2 Days Post-Op     Interval History: No issues overnight. Tolerating clears with TPN. Having bowel function. Ambulating without difficulty. Upper midline drain with 170cc thin brown output in 24 hrs, RLQ drain with 220/24hrs (60cc/overnight)    Medications:  Continuous Infusions:   TPN ADULT CENTRAL LINE CUSTOM 60 mL/hr at 09/19/23 2258    TPN ADULT CENTRAL LINE CUSTOM       Scheduled Meds:   acetaminophen  1,000 mg Oral Q8H    enoxparin  40 mg Subcutaneous Q24H (prophylaxis, 1700)    fat emulsion 20%  250 mL Intravenous Daily    fat emulsion 20%  250 mL Intravenous Daily    fluconazole (DIFLUCAN) IV (PEDS and ADULTS)  200 mg Intravenous Q24H    gabapentin  100 mg Oral TID    levalbuterol  0.63 mg Nebulization Q6H WAKE    pantoprazole  40 mg Oral Daily    piperacillin-tazobactam (Zosyn) IV (PEDS and ADULTS) (extended infusion is not appropriate)  4.5 g Intravenous Q8H    tamsulosin  0.4 mg Oral Daily     PRN Meds:dextrose 10%, glucagon (human recombinant), haloperidol lactate, HYDROmorphone, insulin aspart U-100, ondansetron, oxyCODONE, oxyCODONE, sodium chloride 0.9%     Review of patient's allergies indicates:   Allergen Reactions    Penicillins Rash     Objective:     Vital Signs (Most Recent):  Temp: 97.9 °F (36.6 °C) (09/20/23 0753)  Pulse: 92 (09/20/23 0754)  Resp: 18 (09/20/23 1035)  BP: 126/63 (09/20/23 0753)  SpO2: (!) 93 % (09/20/23 0754) Vital Signs (24h Range):  Temp:  [97.4 °F (36.3 °C)-98.1 °F (36.7 °C)] 97.9 °F (36.6 °C)  Pulse:  [88-98] 92  Resp:  [16-19] 18  SpO2:  [91 %-96 %] 93 %  BP: (120-129)/(58-63) 126/63     Weight: 83.5 kg (184 lb) (weight increased due to fluid in my abdomen)  Body mass index is 25.66 kg/m².    Intake/Output - Last 3  Shifts         09/18 0700  09/19 0659 09/19 0700 09/20 0659 09/20 0700 09/21 0659    P.O. 360 720     I.V. (mL/kg) 825 (9.9)      Other  25     IV Piggyback 1500      .6 829.6     Total Intake(mL/kg) 3654.6 (43.8) 1574.6 (18.9)     Urine (mL/kg/hr) 3235 (1.6) 3720 (1.9) 125 (0.4)    Drains 330 390 30    Other 25      Total Output 3590 4110 155    Net +64.6 -2535.4 -155                    Physical Exam  Vitals and nursing note reviewed.   Constitutional:       General: He is not in acute distress.     Comments: TPN via PICC   Cardiovascular:      Rate and Rhythm: Normal rate.      Pulses: Normal pulses.   Pulmonary:      Effort: Pulmonary effort is normal. No respiratory distress.   Abdominal:      General: There is no distension.      Palpations: Abdomen is soft.      Tenderness: There is no abdominal tenderness.      Comments: Upper midline drain with thin bilious output; lower DANILO drain with thin output; soft non-tender and non-distended; RLQ incision without erythema or bruising   Neurological:      General: No focal deficit present.      Mental Status: He is alert and oriented to person, place, and time.          Significant Labs:  I have reviewed all pertinent lab results within the past 24 hours.  CBC:   Recent Labs   Lab 09/20/23 0515   WBC 15.50*   RBC 2.98*   HGB 8.2*   HCT 25.7*      MCV 86   MCH 27.5   MCHC 31.9*     BMP:   Recent Labs   Lab 09/20/23 0515   GLU 97   *   K 4.0   CL 96   CO2 34*   BUN 14   CREATININE 0.6   CALCIUM 7.6*   MG 2.0     CMP:   Recent Labs   Lab 09/20/23 0515   GLU 97   CALCIUM 7.6*   ALBUMIN 1.2*   PROT 5.2*   *   K 4.0   CO2 34*   CL 96   BUN 14   CREATININE 0.6   ALKPHOS 181*   ALT 41   AST 35   BILITOT 0.9       Significant Diagnostics:  I have reviewed all pertinent imaging results/findings within the past 24 hours.    Assessment/Plan:     Gastric adenocarcinoma  Mr. Valladares is a 61 y/o male s/p  open distal gastrectomy with BII reconstruction on  8/21/23 and admitted for intra abdominal fluid collection on CT on 9/14/23 and cough. S/p IR drain placement into intra-abdominal fluid collection. Laparoscopic retroperitoneal washout 9/18, with EGD. Will discuss de-escalating antibiotics given speciation/sensitivities.      - NPO  - TPN via PICC line  - Flush DANILO drain 3x/day with 20 mL saline   - Midline upper drain to gravity  - Will DC fluc/vanc, transition from Zosyn to oral augmentin   - Strict monitoring of drain outputs   - PT, OOB chair  - Ambulate TID  - DVT PPX  - GI PPX    Dispo: Pending clinical course           Diana Alvares MD  General Surgery  [facility]

## 2023-09-20 NOTE — ANESTHESIA POSTPROCEDURE EVALUATION
Anesthesia Post Evaluation    Patient: Danish Valladares    Procedure(s) Performed: Procedure(s) (LRB):  DRAINAGE, ABDOMEN, LAPAROSCOPIC (N/A)  EGD (ESOPHAGOGASTRODUODENOSCOPY); flouroscopy, need c-arm in the room (N/A)  LAPAROSCOPY, DIAGNOSTIC    Final Anesthesia Type: general      Patient location during evaluation: PACU  Patient participation: Yes- Able to Participate  Level of consciousness: awake and alert  Post-procedure vital signs: reviewed and stable  Pain management: adequate  Airway patency: patent    PONV status at discharge: No PONV  Anesthetic complications: no      Cardiovascular status: blood pressure returned to baseline  Respiratory status: unassisted  Hydration status: euvolemic  Follow-up not needed.          Vitals Value Taken Time   /59 09/20/23 1203   Temp 36.9 °C (98.4 °F) 09/20/23 1203   Pulse 111 09/20/23 1427   Resp 18 09/20/23 1447   SpO2 93 % 09/20/23 1427         Event Time   Out of Recovery 09/18/2023 13:30:00         Pain/Feliz Score: Pain Rating Prior to Med Admin: 8 (9/20/2023  2:47 PM)  Pain Rating Post Med Admin: 2 (9/20/2023  5:04 AM)

## 2023-09-21 LAB
ALBUMIN SERPL BCP-MCNC: 1.4 G/DL (ref 3.5–5.2)
ALP SERPL-CCNC: 205 U/L (ref 55–135)
ALT SERPL W/O P-5'-P-CCNC: 41 U/L (ref 10–44)
ANION GAP SERPL CALC-SCNC: 5 MMOL/L (ref 8–16)
AST SERPL-CCNC: 27 U/L (ref 10–40)
BASOPHILS # BLD AUTO: 0.06 K/UL (ref 0–0.2)
BASOPHILS NFR BLD: 0.4 % (ref 0–1.9)
BILIRUB SERPL-MCNC: 1.1 MG/DL (ref 0.1–1)
BUN SERPL-MCNC: 15 MG/DL (ref 8–23)
CALCIUM SERPL-MCNC: 7.8 MG/DL (ref 8.7–10.5)
CHLORIDE SERPL-SCNC: 95 MMOL/L (ref 95–110)
CO2 SERPL-SCNC: 34 MMOL/L (ref 23–29)
CREAT SERPL-MCNC: 0.6 MG/DL (ref 0.5–1.4)
DIFFERENTIAL METHOD: ABNORMAL
EOSINOPHIL # BLD AUTO: 0.1 K/UL (ref 0–0.5)
EOSINOPHIL NFR BLD: 0.5 % (ref 0–8)
ERYTHROCYTE [DISTWIDTH] IN BLOOD BY AUTOMATED COUNT: 15.2 % (ref 11.5–14.5)
EST. GFR  (NO RACE VARIABLE): >60 ML/MIN/1.73 M^2
GLUCOSE SERPL-MCNC: 112 MG/DL (ref 70–110)
HCT VFR BLD AUTO: 27.1 % (ref 40–54)
HGB BLD-MCNC: 8.6 G/DL (ref 14–18)
IMM GRANULOCYTES # BLD AUTO: 0.7 K/UL (ref 0–0.04)
IMM GRANULOCYTES NFR BLD AUTO: 4.7 % (ref 0–0.5)
LYMPHOCYTES # BLD AUTO: 1.3 K/UL (ref 1–4.8)
LYMPHOCYTES NFR BLD: 8.8 % (ref 18–48)
MAGNESIUM SERPL-MCNC: 1.9 MG/DL (ref 1.6–2.6)
MCH RBC QN AUTO: 27.1 PG (ref 27–31)
MCHC RBC AUTO-ENTMCNC: 31.7 G/DL (ref 32–36)
MCV RBC AUTO: 86 FL (ref 82–98)
MONOCYTES # BLD AUTO: 1.3 K/UL (ref 0.3–1)
MONOCYTES NFR BLD: 8.5 % (ref 4–15)
NEUTROPHILS # BLD AUTO: 11.5 K/UL (ref 1.8–7.7)
NEUTROPHILS NFR BLD: 77.1 % (ref 38–73)
NRBC BLD-RTO: 0 /100 WBC
PHOSPHATE SERPL-MCNC: 3.1 MG/DL (ref 2.7–4.5)
PLATELET # BLD AUTO: 489 K/UL (ref 150–450)
PMV BLD AUTO: 10 FL (ref 9.2–12.9)
POCT GLUCOSE: 109 MG/DL (ref 70–110)
POCT GLUCOSE: 125 MG/DL (ref 70–110)
POCT GLUCOSE: 125 MG/DL (ref 70–110)
POCT GLUCOSE: 135 MG/DL (ref 70–110)
POTASSIUM SERPL-SCNC: 3.9 MMOL/L (ref 3.5–5.1)
PROT SERPL-MCNC: 5.7 G/DL (ref 6–8.4)
RBC # BLD AUTO: 3.17 M/UL (ref 4.6–6.2)
SODIUM SERPL-SCNC: 134 MMOL/L (ref 136–145)
WBC # BLD AUTO: 14.87 K/UL (ref 3.9–12.7)

## 2023-09-21 PROCEDURE — 84100 ASSAY OF PHOSPHORUS: CPT | Performed by: STUDENT IN AN ORGANIZED HEALTH CARE EDUCATION/TRAINING PROGRAM

## 2023-09-21 PROCEDURE — 25000003 PHARM REV CODE 250

## 2023-09-21 PROCEDURE — 25000003 PHARM REV CODE 250: Performed by: STUDENT IN AN ORGANIZED HEALTH CARE EDUCATION/TRAINING PROGRAM

## 2023-09-21 PROCEDURE — 25000003 PHARM REV CODE 250: Performed by: SURGERY

## 2023-09-21 PROCEDURE — 80053 COMPREHEN METABOLIC PANEL: CPT | Performed by: SURGERY

## 2023-09-21 PROCEDURE — 63600175 PHARM REV CODE 636 W HCPCS: Performed by: STUDENT IN AN ORGANIZED HEALTH CARE EDUCATION/TRAINING PROGRAM

## 2023-09-21 PROCEDURE — 85025 COMPLETE CBC W/AUTO DIFF WBC: CPT | Performed by: STUDENT IN AN ORGANIZED HEALTH CARE EDUCATION/TRAINING PROGRAM

## 2023-09-21 PROCEDURE — 94640 AIRWAY INHALATION TREATMENT: CPT

## 2023-09-21 PROCEDURE — 20600001 HC STEP DOWN PRIVATE ROOM

## 2023-09-21 PROCEDURE — A4217 STERILE WATER/SALINE, 500 ML: HCPCS

## 2023-09-21 PROCEDURE — 94761 N-INVAS EAR/PLS OXIMETRY MLT: CPT

## 2023-09-21 PROCEDURE — 27000221 HC OXYGEN, UP TO 24 HOURS

## 2023-09-21 PROCEDURE — 25000003 PHARM REV CODE 250: Performed by: NURSE PRACTITIONER

## 2023-09-21 PROCEDURE — 94664 DEMO&/EVAL PT USE INHALER: CPT

## 2023-09-21 PROCEDURE — 94799 UNLISTED PULMONARY SVC/PX: CPT

## 2023-09-21 PROCEDURE — 25000242 PHARM REV CODE 250 ALT 637 W/ HCPCS: Performed by: STUDENT IN AN ORGANIZED HEALTH CARE EDUCATION/TRAINING PROGRAM

## 2023-09-21 PROCEDURE — 94668 MNPJ CHEST WALL SBSQ: CPT

## 2023-09-21 PROCEDURE — 63600175 PHARM REV CODE 636 W HCPCS

## 2023-09-21 PROCEDURE — 83735 ASSAY OF MAGNESIUM: CPT | Performed by: STUDENT IN AN ORGANIZED HEALTH CARE EDUCATION/TRAINING PROGRAM

## 2023-09-21 PROCEDURE — 99900035 HC TECH TIME PER 15 MIN (STAT)

## 2023-09-21 PROCEDURE — B4185 PARENTERAL SOL 10 GM LIPIDS: HCPCS

## 2023-09-21 PROCEDURE — 63600175 PHARM REV CODE 636 W HCPCS: Performed by: SURGERY

## 2023-09-21 RX ORDER — POTASSIUM CHLORIDE 20 MEQ/1
40 TABLET, EXTENDED RELEASE ORAL ONCE
Status: COMPLETED | OUTPATIENT
Start: 2023-09-21 | End: 2023-09-21

## 2023-09-21 RX ORDER — FUROSEMIDE 10 MG/ML
30 INJECTION INTRAMUSCULAR; INTRAVENOUS EVERY 4 HOURS
Status: COMPLETED | OUTPATIENT
Start: 2023-09-21 | End: 2023-09-21

## 2023-09-21 RX ADMIN — OXYCODONE HYDROCHLORIDE 10 MG: 10 TABLET ORAL at 09:09

## 2023-09-21 RX ADMIN — GABAPENTIN 100 MG: 300 CAPSULE ORAL at 09:09

## 2023-09-21 RX ADMIN — ENOXAPARIN SODIUM 40 MG: 40 INJECTION SUBCUTANEOUS at 04:09

## 2023-09-21 RX ADMIN — ACETAMINOPHEN 1000 MG: 500 TABLET ORAL at 01:09

## 2023-09-21 RX ADMIN — OXYCODONE HYDROCHLORIDE 10 MG: 10 TABLET ORAL at 07:09

## 2023-09-21 RX ADMIN — TAMSULOSIN HYDROCHLORIDE 0.4 MG: 0.4 CAPSULE ORAL at 08:09

## 2023-09-21 RX ADMIN — FLUCONAZOLE 400 MG: 200 TABLET ORAL at 08:09

## 2023-09-21 RX ADMIN — AMOXICILLIN AND CLAVULANATE POTASSIUM 1 TABLET: 875; 125 TABLET, FILM COATED ORAL at 08:09

## 2023-09-21 RX ADMIN — GABAPENTIN 100 MG: 300 CAPSULE ORAL at 03:09

## 2023-09-21 RX ADMIN — SOYBEAN OIL 250 ML: 20 INJECTION, SOLUTION INTRAVENOUS at 09:09

## 2023-09-21 RX ADMIN — ACETAMINOPHEN 1000 MG: 500 TABLET ORAL at 09:09

## 2023-09-21 RX ADMIN — GUAIFENESIN 600 MG: 600 TABLET, EXTENDED RELEASE ORAL at 08:09

## 2023-09-21 RX ADMIN — AMOXICILLIN AND CLAVULANATE POTASSIUM 1 TABLET: 875; 125 TABLET, FILM COATED ORAL at 09:09

## 2023-09-21 RX ADMIN — GABAPENTIN 100 MG: 300 CAPSULE ORAL at 08:09

## 2023-09-21 RX ADMIN — LEVALBUTEROL HYDROCHLORIDE 0.63 MG: 0.63 SOLUTION RESPIRATORY (INHALATION) at 08:09

## 2023-09-21 RX ADMIN — ACETAMINOPHEN 1000 MG: 500 TABLET ORAL at 05:09

## 2023-09-21 RX ADMIN — OXYCODONE HYDROCHLORIDE 10 MG: 10 TABLET ORAL at 05:09

## 2023-09-21 RX ADMIN — FUROSEMIDE 30 MG: 10 INJECTION, SOLUTION INTRAMUSCULAR; INTRAVENOUS at 01:09

## 2023-09-21 RX ADMIN — LEVALBUTEROL HYDROCHLORIDE 0.63 MG: 0.63 SOLUTION RESPIRATORY (INHALATION) at 09:09

## 2023-09-21 RX ADMIN — PANTOPRAZOLE SODIUM 40 MG: 40 TABLET, DELAYED RELEASE ORAL at 08:09

## 2023-09-21 RX ADMIN — FUROSEMIDE 30 MG: 10 INJECTION, SOLUTION INTRAMUSCULAR; INTRAVENOUS at 11:09

## 2023-09-21 RX ADMIN — POTASSIUM CHLORIDE 40 MEQ: 1500 TABLET, EXTENDED RELEASE ORAL at 11:09

## 2023-09-21 RX ADMIN — OXYCODONE HYDROCHLORIDE 10 MG: 10 TABLET ORAL at 01:09

## 2023-09-21 RX ADMIN — MAGNESIUM SULFATE HEPTAHYDRATE: 500 INJECTION, SOLUTION INTRAMUSCULAR; INTRAVENOUS at 09:09

## 2023-09-21 RX ADMIN — OXYCODONE HYDROCHLORIDE 10 MG: 10 TABLET ORAL at 03:09

## 2023-09-21 NOTE — PT/OT/SLP PROGRESS
Occupational Therapy      Patient Name:  Danish Valladares   MRN:  [id number]    Patient not seen today secondary to  significant fatigue and generalized weakness. Pt was attempted twice (before & after lunch). Upon 2nd attempt, pt reported feeling weaker than before and requested that therapy come tomorrow. Nurse & wife present in room. Will follow-up 09/22/2023.      MATT Gloria  9/21/2023

## 2023-09-21 NOTE — PLAN OF CARE
Problem: Impaired Wound Healing  Goal: Optimal Wound Healing  Outcome: Ongoing, Progressing     Problem: Fall Injury Risk  Goal: Absence of Fall and Fall-Related Injury  Outcome: Ongoing, Progressing     Problem: Skin Injury Risk Increased  Goal: Skin Health and Integrity  Outcome: Ongoing, Progressing   PICC line dressing changed. Drains have been drained and entered into flowsheets. Pt is in no distress at this time. New bag of TPN and Lipids are being administered.

## 2023-09-21 NOTE — SUBJECTIVE & OBJECTIVE
Interval History: NAEON. Pt overall feeling well. Tolerating diet. Denies N/V. Admits flatus. Reports pain is well controlled. OOB as tolerated. Drainage starting to slow down.    Medications:  Continuous Infusions:   TPN ADULT CENTRAL LINE CUSTOM 60 mL/hr at 09/20/23 2133    TPN ADULT CENTRAL LINE CUSTOM       Scheduled Meds:   acetaminophen  1,000 mg Oral Q8H    amoxicillin-clavulanate 875-125mg  1 tablet Oral Q12H    enoxparin  40 mg Subcutaneous Q24H (prophylaxis, 1700)    fat emulsion 20%  250 mL Intravenous Daily    fluconazole  400 mg Oral Daily    furosemide (LASIX) injection  30 mg Intravenous Q4H    gabapentin  100 mg Oral TID    guaiFENesin  600 mg Oral Daily    levalbuterol  0.63 mg Nebulization Q6H WAKE    pantoprazole  40 mg Oral Daily    tamsulosin  0.4 mg Oral Daily     PRN Meds:dextrose 10%, glucagon (human recombinant), haloperidol lactate, HYDROmorphone, insulin aspart U-100, ondansetron, oxyCODONE, oxyCODONE, sodium chloride 0.9%     Review of patient's allergies indicates:   Allergen Reactions    Penicillins Rash     Objective:     Vital Signs (Most Recent):  Temp: 98.6 °F (37 °C) (09/21/23 0957)  Pulse: 94 (09/21/23 0957)  Resp: 16 (09/21/23 0957)  BP: 122/61 (09/21/23 0957)  SpO2: (!) 91 % (09/21/23 0957) Vital Signs (24h Range):  Temp:  [98.4 °F (36.9 °C)-98.8 °F (37.1 °C)] 98.6 °F (37 °C)  Pulse:  [] 94  Resp:  [16-20] 16  SpO2:  [89 %-98 %] 91 %  BP: (116-137)/(59-66) 122/61     Weight: 83.5 kg (184 lb) (weight increased due to fluid in my abdomen)  Body mass index is 25.66 kg/m².    Intake/Output - Last 3 Shifts         09/19 0700 09/20 0659 09/20 0700 09/21 0659 09/21 0700 09/22 0659    P.O. 720 1530     I.V. (mL/kg)       Other 25 10     IV Piggyback       .6      Total Intake(mL/kg) 1574.6 (18.9) 1540 (18.4)     Urine (mL/kg/hr) 3720 (1.9) 4350 (2.2)     Drains 390 140 40    Other       Stool  0     Total Output 4110 4490 40    Net -2535.4 -2950 -40           Stool  Occurrence  0 x              Physical Exam  Vitals and nursing note reviewed.   Constitutional:       General: He is not in acute distress.     Comments: TPN via PICC   Cardiovascular:      Rate and Rhythm: Normal rate.      Pulses: Normal pulses.   Pulmonary:      Effort: Pulmonary effort is normal. No respiratory distress.   Abdominal:      General: There is no distension.      Palpations: Abdomen is soft.      Tenderness: There is no abdominal tenderness.      Comments: Upper midline drain with thin bilious output; lower DANILO drain with thin output; soft non-tender and non-distended; RLQ incision without erythema or bruising   Neurological:      General: No focal deficit present.      Mental Status: He is alert and oriented to person, place, and time.          Significant Labs:  I have reviewed all pertinent lab results within the past 24 hours.    Significant Diagnostics:  I have reviewed all pertinent imaging results/findings within the past 24 hours.

## 2023-09-21 NOTE — ASSESSMENT & PLAN NOTE
Mr. Valladares is a 63 y/o male s/p open distal gastrectomy with BII reconstruction on 8/21/23 and admitted for intra abdominal fluid collection on CT on 9/14/23 and cough. S/p IR drain placement into intra-abdominal fluid collection. Laparoscopic retroperitoneal washout 9/18, with EGD. Progressing well. Abx narrowed based on cultures. Will obtain CT tomorrow to reassess fluid collections.     - CLD  - TPN via PICC line  - Flush DANILO drain 3x/day with 20 mL saline   - Midline upper drain to gravity  - cont oral augmentin   - Strict monitoring of drain outputs   - PT, OOB chair  - Ambulate TID  - DVT PPX  - GI PPX    Dispo: Pending clinical course

## 2023-09-21 NOTE — PROGRESS NOTES
Siddhartha Thurman - [facility]  General Surgery  Progress Note    Subjective:     Post-Op Info:  Procedure(s) (LRB):  DRAINAGE, ABDOMEN, LAPAROSCOPIC (N/A)  EGD (ESOPHAGOGASTRODUODENOSCOPY); flouroscopy, need c-arm in the room (N/A)  LAPAROSCOPY, DIAGNOSTIC   3 Days Post-Op     Interval History: NAEON. Pt overall feeling well. Tolerating diet. Denies N/V. Admits flatus. Reports pain is well controlled. OOB as tolerated. Drainage starting to slow down.    Medications:  Continuous Infusions:   TPN ADULT CENTRAL LINE CUSTOM 60 mL/hr at 09/20/23 2133    TPN ADULT CENTRAL LINE CUSTOM       Scheduled Meds:   acetaminophen  1,000 mg Oral Q8H    amoxicillin-clavulanate 875-125mg  1 tablet Oral Q12H    enoxparin  40 mg Subcutaneous Q24H (prophylaxis, 1700)    fat emulsion 20%  250 mL Intravenous Daily    fluconazole  400 mg Oral Daily    furosemide (LASIX) injection  30 mg Intravenous Q4H    gabapentin  100 mg Oral TID    guaiFENesin  600 mg Oral Daily    levalbuterol  0.63 mg Nebulization Q6H WAKE    pantoprazole  40 mg Oral Daily    tamsulosin  0.4 mg Oral Daily     PRN Meds:dextrose 10%, glucagon (human recombinant), haloperidol lactate, HYDROmorphone, insulin aspart U-100, ondansetron, oxyCODONE, oxyCODONE, sodium chloride 0.9%     Review of patient's allergies indicates:   Allergen Reactions    Penicillins Rash     Objective:     Vital Signs (Most Recent):  Temp: 98.6 °F (37 °C) (09/21/23 0957)  Pulse: 94 (09/21/23 0957)  Resp: 16 (09/21/23 0957)  BP: 122/61 (09/21/23 0957)  SpO2: (!) 91 % (09/21/23 0957) Vital Signs (24h Range):  Temp:  [98.4 °F (36.9 °C)-98.8 °F (37.1 °C)] 98.6 °F (37 °C)  Pulse:  [] 94  Resp:  [16-20] 16  SpO2:  [89 %-98 %] 91 %  BP: (116-137)/(59-66) 122/61     Weight: 83.5 kg (184 lb) (weight increased due to fluid in my abdomen)  Body mass index is 25.66 kg/m².    Intake/Output - Last 3 Shifts         09/19 0700 09/20 0659 09/20 0700 09/21 0659 09/21 0700 09/22 0659    P.O. 315 3653      I.V. (mL/kg)       Other 25 10     IV Piggyback       .6      Total Intake(mL/kg) 1574.6 (18.9) 1540 (18.4)     Urine (mL/kg/hr) 3720 (1.9) 4350 (2.2)     Drains 390 140 40    Other       Stool  0     Total Output 4110 4490 40    Net -2535.4 -2950 -40           Stool Occurrence  0 x              Physical Exam  Vitals and nursing note reviewed.   Constitutional:       General: He is not in acute distress.     Comments: TPN via PICC   Cardiovascular:      Rate and Rhythm: Normal rate.      Pulses: Normal pulses.   Pulmonary:      Effort: Pulmonary effort is normal. No respiratory distress.   Abdominal:      General: There is no distension.      Palpations: Abdomen is soft.      Tenderness: There is no abdominal tenderness.      Comments: Upper midline drain with thin bilious output; lower DANILO drain with thin output; soft non-tender and non-distended; RLQ incision without erythema or bruising   Neurological:      General: No focal deficit present.      Mental Status: He is alert and oriented to person, place, and time.          Significant Labs:  I have reviewed all pertinent lab results within the past 24 hours.    Significant Diagnostics:  I have reviewed all pertinent imaging results/findings within the past 24 hours.    Assessment/Plan:     Gastric adenocarcinoma  Mr. Valladares is a 63 y/o male s/p open distal gastrectomy with BII reconstruction on 8/21/23 and admitted for intra abdominal fluid collection on CT on 9/14/23 and cough. S/p IR drain placement into intra-abdominal fluid collection. Laparoscopic retroperitoneal washout 9/18, with EGD. Progressing well. Abx narrowed based on cultures. Will obtain CT tomorrow to reassess fluid collections.     - CLD  - TPN via PICC line  - Flush DANILO drain 3x/day with 20 mL saline   - Midline upper drain to gravity  - cont oral augmentin   - Strict monitoring of drain outputs   - PT, OOB chair  - Ambulate TID  - DVT PPX  - GI PPX    Dispo: Pending clinical  course           Bubba Chirinos MD  General Surgery  [facility]  GIS

## 2023-09-22 LAB
ALBUMIN SERPL BCP-MCNC: 1.3 G/DL (ref 3.5–5.2)
ALBUMIN SERPL BCP-MCNC: 1.5 G/DL (ref 3.5–5.2)
ALP SERPL-CCNC: 191 U/L (ref 55–135)
ALP SERPL-CCNC: 226 U/L (ref 55–135)
ALT SERPL W/O P-5'-P-CCNC: 31 U/L (ref 10–44)
ALT SERPL W/O P-5'-P-CCNC: 39 U/L (ref 10–44)
ANION GAP SERPL CALC-SCNC: 6 MMOL/L (ref 8–16)
ANION GAP SERPL CALC-SCNC: 6 MMOL/L (ref 8–16)
AST SERPL-CCNC: 23 U/L (ref 10–40)
AST SERPL-CCNC: 26 U/L (ref 10–40)
BACTERIA SPEC ANAEROBE CULT: ABNORMAL
BASOPHILS # BLD AUTO: 0.07 K/UL (ref 0–0.2)
BASOPHILS NFR BLD: 0.6 % (ref 0–1.9)
BILIRUB SERPL-MCNC: 0.8 MG/DL (ref 0.1–1)
BILIRUB SERPL-MCNC: 1 MG/DL (ref 0.1–1)
BUN SERPL-MCNC: 16 MG/DL (ref 8–23)
BUN SERPL-MCNC: 17 MG/DL (ref 8–23)
CALCIUM SERPL-MCNC: 7.6 MG/DL (ref 8.7–10.5)
CALCIUM SERPL-MCNC: 8.3 MG/DL (ref 8.7–10.5)
CHLORIDE SERPL-SCNC: 94 MMOL/L (ref 95–110)
CHLORIDE SERPL-SCNC: 95 MMOL/L (ref 95–110)
CO2 SERPL-SCNC: 31 MMOL/L (ref 23–29)
CO2 SERPL-SCNC: 33 MMOL/L (ref 23–29)
CREAT SERPL-MCNC: 0.6 MG/DL (ref 0.5–1.4)
CREAT SERPL-MCNC: 0.7 MG/DL (ref 0.5–1.4)
DIFFERENTIAL METHOD: ABNORMAL
EOSINOPHIL # BLD AUTO: 0.1 K/UL (ref 0–0.5)
EOSINOPHIL NFR BLD: 0.6 % (ref 0–8)
ERYTHROCYTE [DISTWIDTH] IN BLOOD BY AUTOMATED COUNT: 15.5 % (ref 11.5–14.5)
EST. GFR  (NO RACE VARIABLE): >60 ML/MIN/1.73 M^2
EST. GFR  (NO RACE VARIABLE): >60 ML/MIN/1.73 M^2
GLUCOSE SERPL-MCNC: 134 MG/DL (ref 70–110)
GLUCOSE SERPL-MCNC: 375 MG/DL (ref 70–110)
HCT VFR BLD AUTO: 25 % (ref 40–54)
HGB BLD-MCNC: 8 G/DL (ref 14–18)
IMM GRANULOCYTES # BLD AUTO: 0.56 K/UL (ref 0–0.04)
IMM GRANULOCYTES NFR BLD AUTO: 4.4 % (ref 0–0.5)
LYMPHOCYTES # BLD AUTO: 1.6 K/UL (ref 1–4.8)
LYMPHOCYTES NFR BLD: 12.8 % (ref 18–48)
MAGNESIUM SERPL-MCNC: 1.9 MG/DL (ref 1.6–2.6)
MAGNESIUM SERPL-MCNC: 2 MG/DL (ref 1.6–2.6)
MCH RBC QN AUTO: 28.2 PG (ref 27–31)
MCHC RBC AUTO-ENTMCNC: 32 G/DL (ref 32–36)
MCV RBC AUTO: 88 FL (ref 82–98)
MONOCYTES # BLD AUTO: 1.2 K/UL (ref 0.3–1)
MONOCYTES NFR BLD: 9.3 % (ref 4–15)
NEUTROPHILS # BLD AUTO: 9.2 K/UL (ref 1.8–7.7)
NEUTROPHILS NFR BLD: 72.3 % (ref 38–73)
NRBC BLD-RTO: 0 /100 WBC
PHOSPHATE SERPL-MCNC: 3.2 MG/DL (ref 2.7–4.5)
PHOSPHATE SERPL-MCNC: 4.7 MG/DL (ref 2.7–4.5)
PLATELET # BLD AUTO: 442 K/UL (ref 150–450)
PMV BLD AUTO: 10 FL (ref 9.2–12.9)
POCT GLUCOSE: 126 MG/DL (ref 70–110)
POCT GLUCOSE: 144 MG/DL (ref 70–110)
POTASSIUM SERPL-SCNC: 3.9 MMOL/L (ref 3.5–5.1)
POTASSIUM SERPL-SCNC: 4.7 MMOL/L (ref 3.5–5.1)
PROT SERPL-MCNC: 5.3 G/DL (ref 6–8.4)
PROT SERPL-MCNC: 6.2 G/DL (ref 6–8.4)
RBC # BLD AUTO: 2.84 M/UL (ref 4.6–6.2)
SODIUM SERPL-SCNC: 131 MMOL/L (ref 136–145)
SODIUM SERPL-SCNC: 134 MMOL/L (ref 136–145)
WBC # BLD AUTO: 12.67 K/UL (ref 3.9–12.7)

## 2023-09-22 PROCEDURE — 94640 AIRWAY INHALATION TREATMENT: CPT

## 2023-09-22 PROCEDURE — 25000242 PHARM REV CODE 250 ALT 637 W/ HCPCS: Performed by: STUDENT IN AN ORGANIZED HEALTH CARE EDUCATION/TRAINING PROGRAM

## 2023-09-22 PROCEDURE — 99900035 HC TECH TIME PER 15 MIN (STAT)

## 2023-09-22 PROCEDURE — 25000003 PHARM REV CODE 250: Performed by: NURSE PRACTITIONER

## 2023-09-22 PROCEDURE — 63600175 PHARM REV CODE 636 W HCPCS: Performed by: SURGERY

## 2023-09-22 PROCEDURE — 25500020 PHARM REV CODE 255: Performed by: SURGERY

## 2023-09-22 PROCEDURE — 94668 MNPJ CHEST WALL SBSQ: CPT

## 2023-09-22 PROCEDURE — 83735 ASSAY OF MAGNESIUM: CPT | Performed by: SURGERY

## 2023-09-22 PROCEDURE — 25500020 PHARM REV CODE 255: Performed by: STUDENT IN AN ORGANIZED HEALTH CARE EDUCATION/TRAINING PROGRAM

## 2023-09-22 PROCEDURE — 84100 ASSAY OF PHOSPHORUS: CPT | Mod: 91 | Performed by: SURGERY

## 2023-09-22 PROCEDURE — A4217 STERILE WATER/SALINE, 500 ML: HCPCS | Performed by: SURGERY

## 2023-09-22 PROCEDURE — 85025 COMPLETE CBC W/AUTO DIFF WBC: CPT | Performed by: STUDENT IN AN ORGANIZED HEALTH CARE EDUCATION/TRAINING PROGRAM

## 2023-09-22 PROCEDURE — 25000003 PHARM REV CODE 250: Performed by: STUDENT IN AN ORGANIZED HEALTH CARE EDUCATION/TRAINING PROGRAM

## 2023-09-22 PROCEDURE — 94761 N-INVAS EAR/PLS OXIMETRY MLT: CPT

## 2023-09-22 PROCEDURE — 20600001 HC STEP DOWN PRIVATE ROOM

## 2023-09-22 PROCEDURE — 25000003 PHARM REV CODE 250: Performed by: SURGERY

## 2023-09-22 PROCEDURE — 27000221 HC OXYGEN, UP TO 24 HOURS

## 2023-09-22 PROCEDURE — 97116 GAIT TRAINING THERAPY: CPT

## 2023-09-22 PROCEDURE — B4185 PARENTERAL SOL 10 GM LIPIDS: HCPCS | Performed by: SURGERY

## 2023-09-22 PROCEDURE — 94664 DEMO&/EVAL PT USE INHALER: CPT

## 2023-09-22 PROCEDURE — 63600175 PHARM REV CODE 636 W HCPCS: Performed by: STUDENT IN AN ORGANIZED HEALTH CARE EDUCATION/TRAINING PROGRAM

## 2023-09-22 PROCEDURE — 27000646 HC AEROBIKA DEVICE

## 2023-09-22 PROCEDURE — 80053 COMPREHEN METABOLIC PANEL: CPT | Mod: 91 | Performed by: SURGERY

## 2023-09-22 PROCEDURE — 94799 UNLISTED PULMONARY SVC/PX: CPT

## 2023-09-22 RX ORDER — ONDANSETRON 8 MG/1
8 TABLET, ORALLY DISINTEGRATING ORAL EVERY 6 HOURS PRN
Status: DISCONTINUED | OUTPATIENT
Start: 2023-09-22 | End: 2023-09-25 | Stop reason: HOSPADM

## 2023-09-22 RX ADMIN — GUAIFENESIN 600 MG: 600 TABLET, EXTENDED RELEASE ORAL at 09:09

## 2023-09-22 RX ADMIN — IOHEXOL 15 ML: 350 INJECTION, SOLUTION INTRAVENOUS at 07:09

## 2023-09-22 RX ADMIN — AMOXICILLIN AND CLAVULANATE POTASSIUM 1 TABLET: 875; 125 TABLET, FILM COATED ORAL at 09:09

## 2023-09-22 RX ADMIN — ENOXAPARIN SODIUM 40 MG: 40 INJECTION SUBCUTANEOUS at 05:09

## 2023-09-22 RX ADMIN — MAGNESIUM SULFATE HEPTAHYDRATE: 500 INJECTION, SOLUTION INTRAMUSCULAR; INTRAVENOUS at 09:09

## 2023-09-22 RX ADMIN — LEVALBUTEROL HYDROCHLORIDE 0.63 MG: 0.63 SOLUTION RESPIRATORY (INHALATION) at 08:09

## 2023-09-22 RX ADMIN — ACETAMINOPHEN 1000 MG: 500 TABLET ORAL at 09:09

## 2023-09-22 RX ADMIN — FLUCONAZOLE 400 MG: 200 TABLET ORAL at 09:09

## 2023-09-22 RX ADMIN — GABAPENTIN 100 MG: 300 CAPSULE ORAL at 09:09

## 2023-09-22 RX ADMIN — OXYCODONE HYDROCHLORIDE 10 MG: 10 TABLET ORAL at 02:09

## 2023-09-22 RX ADMIN — TAMSULOSIN HYDROCHLORIDE 0.4 MG: 0.4 CAPSULE ORAL at 09:09

## 2023-09-22 RX ADMIN — OXYCODONE HYDROCHLORIDE 10 MG: 10 TABLET ORAL at 05:09

## 2023-09-22 RX ADMIN — GABAPENTIN 100 MG: 300 CAPSULE ORAL at 02:09

## 2023-09-22 RX ADMIN — OXYCODONE HYDROCHLORIDE 10 MG: 10 TABLET ORAL at 06:09

## 2023-09-22 RX ADMIN — OXYCODONE HYDROCHLORIDE 10 MG: 10 TABLET ORAL at 10:09

## 2023-09-22 RX ADMIN — ACETAMINOPHEN 1000 MG: 500 TABLET ORAL at 04:09

## 2023-09-22 RX ADMIN — PANTOPRAZOLE SODIUM 40 MG: 40 TABLET, DELAYED RELEASE ORAL at 09:09

## 2023-09-22 RX ADMIN — ACETAMINOPHEN 1000 MG: 500 TABLET ORAL at 05:09

## 2023-09-22 RX ADMIN — IOHEXOL 15 ML: 350 INJECTION, SOLUTION INTRAVENOUS at 08:09

## 2023-09-22 RX ADMIN — SMOFLIPID 250 ML: 6; 6; 5; 3 INJECTION, EMULSION INTRAVENOUS at 09:09

## 2023-09-22 RX ADMIN — IOHEXOL 75 ML: 350 INJECTION, SOLUTION INTRAVENOUS at 09:09

## 2023-09-22 NOTE — PLAN OF CARE
Problem: Physical Therapy  Goal: Physical Therapy Goal  Description: PT goals until 9/30/23    1. Pt sit to stand with no AD with mod independence-not met  2. Pt to perform gait 300ft with no AD with supervision.-not met  3. Pt to up/down 6 steps with B UE rail with CGA.-not met  4. Pt to perform B LE exs in sitting or supine x 10 reps to strengthen B LE to improve functional mobility.-not met   Outcome: Ongoing, Progressing   Pt's goals remain appropriate and pt will continue to benefit from skilled PT services to work towards improved functional mobility including: bed mobility, transfers, up/down steps, and gait.   9/22/2023

## 2023-09-22 NOTE — ASSESSMENT & PLAN NOTE
Mr. Valladares is a 63 y/o male s/p open distal gastrectomy with BII reconstruction on 8/21/23 and admitted for intra abdominal fluid collection on CT on 9/14/23 and cough. S/p IR drain placement into intra-abdominal fluid collection. Laparoscopic retroperitoneal washout 9/18, with EGD. Progressing well. Abx narrowed based on cultures. Ct showing almost complete resolution of lower abdominal collections, ravi-hepatic collections remain.     - CLD  - TPN via PICC line  - Flush DANIOL drain 3x/day with 20 mL saline   - Midline upper drain to gravity  - cont oral augmentin   - Strict monitoring of drain outputs   - PT, OOB chair  - Ambulate TID  - DVT PPX  - GI PPX    Dispo: Pending clinical course

## 2023-09-22 NOTE — SUBJECTIVE & OBJECTIVE
Interval History: NAEON. Pt overall feeling well. Tolerating diet. Denies N/V. Admits flatus. Reports pain is well controlled. OOB as tolerated. Drain output continues to decrease however no recordings overnight. No change in color.      Medications:  Continuous Infusions:   TPN ADULT CENTRAL LINE CUSTOM 60 mL/hr at 09/22/23 1201    TPN ADULT CENTRAL LINE CUSTOM       Scheduled Meds:   acetaminophen  1,000 mg Oral Q8H    amoxicillin-clavulanate 875-125mg  1 tablet Oral Q12H    enoxparin  40 mg Subcutaneous Q24H (prophylaxis, 1700)    fluconazole  400 mg Oral Daily    gabapentin  100 mg Oral TID    guaiFENesin  600 mg Oral Daily    levalbuterol  0.63 mg Nebulization Q6H WAKE    lipid (SMOFLIPID)  250 mL Intravenous Daily    pantoprazole  40 mg Oral Daily    tamsulosin  0.4 mg Oral Daily     PRN Meds:dextrose 10%, glucagon (human recombinant), haloperidol lactate, HYDROmorphone, insulin aspart U-100, ondansetron, oxyCODONE, oxyCODONE, sodium chloride 0.9%     Review of patient's allergies indicates:   Allergen Reactions    Penicillins Rash     Objective:     Vital Signs (Most Recent):  Temp: 98.2 °F (36.8 °C) (09/22/23 1212)  Pulse: 103 (09/22/23 1212)  Resp: 20 (09/22/23 1212)  BP: (!) 110/59 (09/22/23 1212)  SpO2: (!) 92 % (09/22/23 1212) Vital Signs (24h Range):  Temp:  [97.7 °F (36.5 °C)-98.4 °F (36.9 °C)] 98.2 °F (36.8 °C)  Pulse:  [] 103  Resp:  [17-21] 20  SpO2:  [90 %-98 %] 92 %  BP: (110-127)/(58-63) 110/59     Weight: 83.5 kg (184 lb) (weight increased due to fluid in my abdomen)  Body mass index is 25.66 kg/m².    Intake/Output - Last 3 Shifts         09/20 0700 09/21 0659 09/21 0700 09/22 0659 09/22 0700 09/23 0659    P.O. 1530      Other 10      TPN       Total Intake(mL/kg) 1540 (18.4)      Urine (mL/kg/hr) 4500 (2.2) 4650 (2.3) 350 (0.7)    Drains 140 100 7    Stool 0  0    Total Output 4640 4750 357    Net -3100 -2930 -357           Stool Occurrence 0 x  0 x             Physical Exam  Vitals  and nursing note reviewed.   Constitutional:       General: He is not in acute distress.     Comments: TPN via PICC   Cardiovascular:      Rate and Rhythm: Normal rate.      Pulses: Normal pulses.   Pulmonary:      Effort: Pulmonary effort is normal. No respiratory distress.   Abdominal:      General: There is no distension.      Palpations: Abdomen is soft.      Tenderness: There is no abdominal tenderness.      Comments: Upper midline drain with thin bilious output; lower DANILO drain with thin output; soft non-tender and non-distended; RLQ incision without erythema or bruising. Midline incision healing appropriately    Neurological:      General: No focal deficit present.      Mental Status: He is alert and oriented to person, place, and time.          Significant Labs:  I have reviewed all pertinent lab results within the past 24 hours.    Significant Diagnostics:  I have reviewed all pertinent imaging results/findings within the past 24 hours.

## 2023-09-22 NOTE — PT/OT/SLP PROGRESS
"Physical Therapy Treatment    Patient Name:  Danish Valladares   MRN:  [id number]    Recommendations:     Discharge Recommendations: other (see comments)  Discharge Equipment Recommendations: none  Barriers to discharge: Inaccessible home 6 ANIYA    Assessment:     Danish Valladares is a 62 y.o. male admitted with a medical diagnosis of Abdominal fluid collection.  He presents with the following impairments/functional limitations: weakness, impaired functional mobility, gait instability, impaired cardiopulmonary response to activity, impaired self care skills .Pt requires CGA for up/down steps, CGA for transfers, and CGA for gait with IV pole support due to instability and SOB. Pt's ox sat 87% after gait trial on RA, nurse notified; pt ox sat increased to 90% after ~ 20 sec seated rest period. Pt is motivated to progress with functional mobility.     Rehab Prognosis: Good; patient would benefit from acute skilled PT services to address these deficits and reach maximum level of function.    Recent Surgery: Procedure(s) (LRB):  DRAINAGE, ABDOMEN, LAPAROSCOPIC (N/A)  EGD (ESOPHAGOGASTRODUODENOSCOPY); flouroscopy, need c-arm in the room (N/A)  LAPAROSCOPY, DIAGNOSTIC 4 Days Post-Op    Plan:     During this hospitalization, patient to be seen 3 x/week to address the identified rehab impairments via gait training, therapeutic activities, therapeutic exercises, neuromuscular re-education and progress toward the following goals:    Plan of Care Expires:  10/19/23    Subjective   "I like sitting by the window and watching the river"  Pain/Comfort:  Pain Rating 1: 6/10  Location - Orientation 1: lower  Location 1: abdomen  Pain Addressed 1: Reposition, Cessation of Activity  Pain Rating Post-Intervention 1: 7/10      Objective:     Communicated with nurse prior to session.  Patient found up in chair with DANILO drain, peripheral IV (abd drain) upon PT entry to room.     General Precautions: Standard, fall  Orthopedic Precautions: " N/A  Braces: N/A  Respiratory Status: Room air     Functional Mobility:  Transfers:     Sit to Stand:  contact guard assistance with no AD  Gait: 300ft then 40ft with IV pole support with CGA. Pt took two steps to the bedside chair with no AD with mild posterior instability pt rested in sitting between gait trials. Pt's ox sat decreased to 87% after 1st gait trial, increased to 90% after a shrt seated rest period   Pt went up/down 4 steps with L UE rail support with CGA.    AM-PAC 6 CLICK MOBILITY  Turning over in bed (including adjusting bedclothes, sheets and blankets)?: 4  Sitting down on and standing up from a chair with arms (e.g., wheelchair, bedside commode, etc.): 3  Moving from lying on back to sitting on the side of the bed?: 3  Moving to and from a bed to a chair (including a wheelchair)?: 3  Need to walk in hospital room?: 3  Climbing 3-5 steps with a railing?: 3  Basic Mobility Total Score: 19     Patient left up in chair with all lines intact, call button in reach, nurse notified, and wife present..    GOALS:   Multidisciplinary Problems       Physical Therapy Goals          Problem: Physical Therapy    Goal Priority Disciplines Outcome Goal Variances Interventions   Physical Therapy Goal     PT, PT/OT Ongoing, Progressing     Description: PT goals until 9/30/23    1. Pt sit to stand with no AD with mod independence-not met  2. Pt to perform gait 300ft with no AD with supervision.-not met  3. Pt to up/down 6 steps with B UE rail with CGA.-not met  4. Pt to perform B LE exs in sitting or supine x 10 reps to strengthen B LE to improve functional mobility.-not met                        Time Tracking:     PT Received On: 09/22/23  PT Start Time: 1058     PT Stop Time: 1122  PT Total Time (min): 24 min     Billable Minutes: Gait Training 24    Treatment Type: Treatment  PT/PTA: PT           09/22/2023

## 2023-09-22 NOTE — PLAN OF CARE
09/22/23 1157   Discharge Reassessment   Assessment Type Discharge Planning Reassessment   Did the patient's condition or plan change since previous assessment? No   Discharge Plan discussed with: Patient   Communicated EDUARDO with patient/caregiver Yes   Discharge Plan A Home Health   Discharge Plan B Home with family   DME Needed Upon Discharge  nutrition supplies   Transition of Care Barriers None   Why the patient remains in the hospital Requires continued medical care   Post-Acute Status   Post-Acute Authorization Home Health;IV Infusion   Home Health Status Set-up Complete/Auth obtained   IV Infusion Status Set-up Complete/Auth obtained   Patient choice form signed by patient/caregiver List with quality metrics by geographic area provided   Discharge Delays None known at this time       Pt not ready for discharge due to: not medically ready  SW will remain available for families in [facility].  Currently pt has d/c plans in progress at this time.    Pt current w/ [facility] Rachel Ziko, LCSW  Case Management/[facility]  [phone]

## 2023-09-22 NOTE — PT/OT/SLP PROGRESS
Occupational Therapy      Patient Name:  Danish Valladares   MRN:  [id number]    Patient not seen today secondary to pt declining therapy today  . Pt stated that he had done all his self care activities and that he had already did his exercises. Pt & wife informed that OT will come on Monday. Pt & wife verbalized understanding. Will follow-up 9/25/2023    MATT Gloria  9/22/2023

## 2023-09-22 NOTE — PROGRESS NOTES
Siddhartha Thurman - [facility]  General Surgery  Progress Note    Subjective:     Post-Op Info:  Procedure(s) (LRB):  DRAINAGE, ABDOMEN, LAPAROSCOPIC (N/A)  EGD (ESOPHAGOGASTRODUODENOSCOPY); flouroscopy, need c-arm in the room (N/A)  LAPAROSCOPY, DIAGNOSTIC   4 Days Post-Op     Interval History: NAEON. Pt overall feeling well. Tolerating diet. Denies N/V. Admits flatus. Reports pain is well controlled. OOB as tolerated. Drain output continues to decrease however no recordings overnight. No change in color.      Medications:  Continuous Infusions:   TPN ADULT CENTRAL LINE CUSTOM 60 mL/hr at 09/22/23 1201    TPN ADULT CENTRAL LINE CUSTOM       Scheduled Meds:   acetaminophen  1,000 mg Oral Q8H    amoxicillin-clavulanate 875-125mg  1 tablet Oral Q12H    enoxparin  40 mg Subcutaneous Q24H (prophylaxis, 1700)    fluconazole  400 mg Oral Daily    gabapentin  100 mg Oral TID    guaiFENesin  600 mg Oral Daily    levalbuterol  0.63 mg Nebulization Q6H WAKE    lipid (SMOFLIPID)  250 mL Intravenous Daily    pantoprazole  40 mg Oral Daily    tamsulosin  0.4 mg Oral Daily     PRN Meds:dextrose 10%, glucagon (human recombinant), haloperidol lactate, HYDROmorphone, insulin aspart U-100, ondansetron, oxyCODONE, oxyCODONE, sodium chloride 0.9%     Review of patient's allergies indicates:   Allergen Reactions    Penicillins Rash     Objective:     Vital Signs (Most Recent):  Temp: 98.2 °F (36.8 °C) (09/22/23 1212)  Pulse: 103 (09/22/23 1212)  Resp: 20 (09/22/23 1212)  BP: (!) 110/59 (09/22/23 1212)  SpO2: (!) 92 % (09/22/23 1212) Vital Signs (24h Range):  Temp:  [97.7 °F (36.5 °C)-98.4 °F (36.9 °C)] 98.2 °F (36.8 °C)  Pulse:  [] 103  Resp:  [17-21] 20  SpO2:  [90 %-98 %] 92 %  BP: (110-127)/(58-63) 110/59     Weight: 83.5 kg (184 lb) (weight increased due to fluid in my abdomen)  Body mass index is 25.66 kg/m².    Intake/Output - Last 3 Shifts         09/20 0700 09/21 0659 09/21 0700 09/22 0659 09/22 0700 09/23 0659    P.O.  1530      Other 10      TPN       Total Intake(mL/kg) 1540 (18.4)      Urine (mL/kg/hr) 4500 (2.2) 4650 (2.3) 350 (0.7)    Drains 140 100 7    Stool 0  0    Total Output 4640 4750 357    Net -3100 -4750 -357           Stool Occurrence 0 x  0 x             Physical Exam  Vitals and nursing note reviewed.   Constitutional:       General: He is not in acute distress.     Comments: TPN via PICC   Cardiovascular:      Rate and Rhythm: Normal rate.      Pulses: Normal pulses.   Pulmonary:      Effort: Pulmonary effort is normal. No respiratory distress.   Abdominal:      General: There is no distension.      Palpations: Abdomen is soft.      Tenderness: There is no abdominal tenderness.      Comments: Upper midline drain with thin bilious output; lower DANILO drain with thin output; soft non-tender and non-distended; RLQ incision without erythema or bruising. Midline incision healing appropriately    Neurological:      General: No focal deficit present.      Mental Status: He is alert and oriented to person, place, and time.          Significant Labs:  I have reviewed all pertinent lab results within the past 24 hours.    Significant Diagnostics:  I have reviewed all pertinent imaging results/findings within the past 24 hours.    Assessment/Plan:     Gastric adenocarcinoma  Mr. Valladares is a 63 y/o male s/p open distal gastrectomy with BII reconstruction on 8/21/23 and admitted for intra abdominal fluid collection on CT on 9/14/23 and cough. S/p IR drain placement into intra-abdominal fluid collection. Laparoscopic retroperitoneal washout 9/18, with EGD. Progressing well. Abx narrowed based on cultures. Ct showing almost complete resolution of lower abdominal collections, ravi-hepatic collections remain.     - CLD  - TPN via PICC line  - Flush DANILO drain 3x/day with 20 mL saline   - Midline upper drain to gravity  - cont oral augmentin   - Strict monitoring of drain outputs   - PT, OOB chair  - Ambulate TID  - DVT PPX  - GI  PPX    Dispo: Pending clinical course           Bubba Chirinos MD  General Surgery  [facility]

## 2023-09-22 NOTE — PLAN OF CARE
POC updated and reviewed at bedside with Mr. Valladares and his wife.     PT remains calm and cooperative with POC this shift.     CT abdomen and pelvis obtained this AM- DANILO drain retracted 10 cm by MD this afternoon. Output remains sanguinous and was 30 mL after the position was adjusted versus 7 and 10 mLs at prior intervals this shift.   Tono did not have measurable output. Both drains flush with no resistance.   PT oob to chair for 4 hours and walked with PT, once more with wife.     Complains of intermittent bilateral abd pain which is moderately relieved with PRN pain meds.     All other VSS. Will continue to monitor closely.             Problem: Adult Inpatient Plan of Care  Goal: Plan of Care Review  Outcome: Ongoing, Progressing  Goal: Patient-Specific Goal (Individualized)  Outcome: Ongoing, Progressing  Goal: Absence of Hospital-Acquired Illness or Injury  Outcome: Ongoing, Progressing  Goal: Optimal Comfort and Wellbeing  Outcome: Ongoing, Progressing  Goal: Readiness for Transition of Care  Outcome: Ongoing, Progressing     Problem: Infection  Goal: Absence of Infection Signs and Symptoms  Outcome: Ongoing, Progressing     Problem: Impaired Wound Healing  Goal: Optimal Wound Healing  Outcome: Ongoing, Progressing     Problem: Fall Injury Risk  Goal: Absence of Fall and Fall-Related Injury  Outcome: Ongoing, Progressing     Problem: Skin Injury Risk Increased  Goal: Skin Health and Integrity  Outcome: Ongoing, Progressing     Problem: Pain Acute  Goal: Acceptable Pain Control and Functional Ability  Outcome: Ongoing, Progressing

## 2023-09-23 LAB
ALBUMIN SERPL BCP-MCNC: 1.4 G/DL (ref 3.5–5.2)
ALP SERPL-CCNC: 253 U/L (ref 55–135)
ALT SERPL W/O P-5'-P-CCNC: 41 U/L (ref 10–44)
ANION GAP SERPL CALC-SCNC: 6 MMOL/L (ref 8–16)
AST SERPL-CCNC: 29 U/L (ref 10–40)
BASOPHILS # BLD AUTO: 0.06 K/UL (ref 0–0.2)
BASOPHILS NFR BLD: 0.5 % (ref 0–1.9)
BILIRUB SERPL-MCNC: 0.7 MG/DL (ref 0.1–1)
BUN SERPL-MCNC: 17 MG/DL (ref 8–23)
CALCIUM SERPL-MCNC: 7.7 MG/DL (ref 8.7–10.5)
CHLORIDE SERPL-SCNC: 97 MMOL/L (ref 95–110)
CO2 SERPL-SCNC: 31 MMOL/L (ref 23–29)
CREAT SERPL-MCNC: 0.6 MG/DL (ref 0.5–1.4)
DIFFERENTIAL METHOD: ABNORMAL
EOSINOPHIL # BLD AUTO: 0.1 K/UL (ref 0–0.5)
EOSINOPHIL NFR BLD: 0.4 % (ref 0–8)
ERYTHROCYTE [DISTWIDTH] IN BLOOD BY AUTOMATED COUNT: 15.8 % (ref 11.5–14.5)
EST. GFR  (NO RACE VARIABLE): >60 ML/MIN/1.73 M^2
GLUCOSE SERPL-MCNC: 109 MG/DL (ref 70–110)
HCT VFR BLD AUTO: 24.8 % (ref 40–54)
HGB BLD-MCNC: 7.8 G/DL (ref 14–18)
IMM GRANULOCYTES # BLD AUTO: 0.54 K/UL (ref 0–0.04)
IMM GRANULOCYTES NFR BLD AUTO: 4.1 % (ref 0–0.5)
LYMPHOCYTES # BLD AUTO: 1.3 K/UL (ref 1–4.8)
LYMPHOCYTES NFR BLD: 10.3 % (ref 18–48)
MAGNESIUM SERPL-MCNC: 1.8 MG/DL (ref 1.6–2.6)
MCH RBC QN AUTO: 27.6 PG (ref 27–31)
MCHC RBC AUTO-ENTMCNC: 31.5 G/DL (ref 32–36)
MCV RBC AUTO: 88 FL (ref 82–98)
MONOCYTES # BLD AUTO: 1.2 K/UL (ref 0.3–1)
MONOCYTES NFR BLD: 9.5 % (ref 4–15)
NEUTROPHILS # BLD AUTO: 9.8 K/UL (ref 1.8–7.7)
NEUTROPHILS NFR BLD: 75.2 % (ref 38–73)
NRBC BLD-RTO: 0 /100 WBC
PHOSPHATE SERPL-MCNC: 2.9 MG/DL (ref 2.7–4.5)
PLATELET # BLD AUTO: 447 K/UL (ref 150–450)
PMV BLD AUTO: 10.3 FL (ref 9.2–12.9)
POTASSIUM SERPL-SCNC: 3.7 MMOL/L (ref 3.5–5.1)
PROT SERPL-MCNC: 5.3 G/DL (ref 6–8.4)
RBC # BLD AUTO: 2.83 M/UL (ref 4.6–6.2)
SODIUM SERPL-SCNC: 134 MMOL/L (ref 136–145)
WBC # BLD AUTO: 13.05 K/UL (ref 3.9–12.7)

## 2023-09-23 PROCEDURE — 25000003 PHARM REV CODE 250: Performed by: STUDENT IN AN ORGANIZED HEALTH CARE EDUCATION/TRAINING PROGRAM

## 2023-09-23 PROCEDURE — A4217 STERILE WATER/SALINE, 500 ML: HCPCS | Performed by: STUDENT IN AN ORGANIZED HEALTH CARE EDUCATION/TRAINING PROGRAM

## 2023-09-23 PROCEDURE — 20600001 HC STEP DOWN PRIVATE ROOM

## 2023-09-23 PROCEDURE — 84100 ASSAY OF PHOSPHORUS: CPT | Performed by: STUDENT IN AN ORGANIZED HEALTH CARE EDUCATION/TRAINING PROGRAM

## 2023-09-23 PROCEDURE — 94668 MNPJ CHEST WALL SBSQ: CPT

## 2023-09-23 PROCEDURE — 99900035 HC TECH TIME PER 15 MIN (STAT)

## 2023-09-23 PROCEDURE — 63600175 PHARM REV CODE 636 W HCPCS

## 2023-09-23 PROCEDURE — 25000242 PHARM REV CODE 250 ALT 637 W/ HCPCS: Performed by: STUDENT IN AN ORGANIZED HEALTH CARE EDUCATION/TRAINING PROGRAM

## 2023-09-23 PROCEDURE — B4185 PARENTERAL SOL 10 GM LIPIDS: HCPCS | Performed by: STUDENT IN AN ORGANIZED HEALTH CARE EDUCATION/TRAINING PROGRAM

## 2023-09-23 PROCEDURE — 83735 ASSAY OF MAGNESIUM: CPT | Performed by: STUDENT IN AN ORGANIZED HEALTH CARE EDUCATION/TRAINING PROGRAM

## 2023-09-23 PROCEDURE — 94761 N-INVAS EAR/PLS OXIMETRY MLT: CPT

## 2023-09-23 PROCEDURE — 94640 AIRWAY INHALATION TREATMENT: CPT

## 2023-09-23 PROCEDURE — 63600175 PHARM REV CODE 636 W HCPCS: Performed by: STUDENT IN AN ORGANIZED HEALTH CARE EDUCATION/TRAINING PROGRAM

## 2023-09-23 PROCEDURE — 80053 COMPREHEN METABOLIC PANEL: CPT | Performed by: SURGERY

## 2023-09-23 PROCEDURE — 27000221 HC OXYGEN, UP TO 24 HOURS

## 2023-09-23 PROCEDURE — 25000003 PHARM REV CODE 250: Performed by: NURSE PRACTITIONER

## 2023-09-23 PROCEDURE — 85025 COMPLETE CBC W/AUTO DIFF WBC: CPT | Performed by: STUDENT IN AN ORGANIZED HEALTH CARE EDUCATION/TRAINING PROGRAM

## 2023-09-23 PROCEDURE — 94664 DEMO&/EVAL PT USE INHALER: CPT

## 2023-09-23 RX ORDER — FUROSEMIDE 10 MG/ML
40 INJECTION INTRAMUSCULAR; INTRAVENOUS ONCE
Status: COMPLETED | OUTPATIENT
Start: 2023-09-23 | End: 2023-09-23

## 2023-09-23 RX ORDER — BISACODYL 10 MG
10 SUPPOSITORY, RECTAL RECTAL ONCE
Status: DISCONTINUED | OUTPATIENT
Start: 2023-09-23 | End: 2023-09-24

## 2023-09-23 RX ADMIN — ENOXAPARIN SODIUM 40 MG: 40 INJECTION SUBCUTANEOUS at 04:09

## 2023-09-23 RX ADMIN — AMOXICILLIN AND CLAVULANATE POTASSIUM 1 TABLET: 875; 125 TABLET, FILM COATED ORAL at 09:09

## 2023-09-23 RX ADMIN — OXYCODONE HYDROCHLORIDE 10 MG: 10 TABLET ORAL at 03:09

## 2023-09-23 RX ADMIN — LEVALBUTEROL HYDROCHLORIDE 0.63 MG: 0.63 SOLUTION RESPIRATORY (INHALATION) at 02:09

## 2023-09-23 RX ADMIN — OXYCODONE HYDROCHLORIDE 10 MG: 10 TABLET ORAL at 06:09

## 2023-09-23 RX ADMIN — GABAPENTIN 100 MG: 300 CAPSULE ORAL at 09:09

## 2023-09-23 RX ADMIN — ACETAMINOPHEN 1000 MG: 500 TABLET ORAL at 09:09

## 2023-09-23 RX ADMIN — LEVALBUTEROL HYDROCHLORIDE 0.63 MG: 0.63 SOLUTION RESPIRATORY (INHALATION) at 10:09

## 2023-09-23 RX ADMIN — OXYCODONE HYDROCHLORIDE 10 MG: 10 TABLET ORAL at 10:09

## 2023-09-23 RX ADMIN — ACETAMINOPHEN 1000 MG: 500 TABLET ORAL at 05:09

## 2023-09-23 RX ADMIN — FLUCONAZOLE 400 MG: 200 TABLET ORAL at 09:09

## 2023-09-23 RX ADMIN — METHYLNALTREXONE BROMIDE 8 MG: 8 INJECTION, SOLUTION SUBCUTANEOUS at 03:09

## 2023-09-23 RX ADMIN — PANTOPRAZOLE SODIUM 40 MG: 40 TABLET, DELAYED RELEASE ORAL at 09:09

## 2023-09-23 RX ADMIN — GUAIFENESIN 600 MG: 600 TABLET, EXTENDED RELEASE ORAL at 09:09

## 2023-09-23 RX ADMIN — TAMSULOSIN HYDROCHLORIDE 0.4 MG: 0.4 CAPSULE ORAL at 09:09

## 2023-09-23 RX ADMIN — MAGNESIUM SULFATE HEPTAHYDRATE: 500 INJECTION, SOLUTION INTRAMUSCULAR; INTRAVENOUS at 10:09

## 2023-09-23 RX ADMIN — FUROSEMIDE 40 MG: 10 INJECTION, SOLUTION INTRAMUSCULAR; INTRAVENOUS at 10:09

## 2023-09-23 RX ADMIN — SMOFLIPID 250 ML: 6; 6; 5; 3 INJECTION, EMULSION INTRAVENOUS at 10:09

## 2023-09-23 RX ADMIN — ACETAMINOPHEN 1000 MG: 500 TABLET ORAL at 03:09

## 2023-09-23 RX ADMIN — GABAPENTIN 100 MG: 300 CAPSULE ORAL at 03:09

## 2023-09-23 NOTE — ASSESSMENT & PLAN NOTE
Mr. Valladares is a 61 y/o male s/p open distal gastrectomy with BII reconstruction on 8/21/23 and admitted for intra abdominal fluid collection on CT on 9/14/23 and cough. S/p IR drain placement into intra-abdominal fluid collection. Laparoscopic retroperitoneal washout 9/18, with EGD. Progressing well. Abx narrowed based on cultures. CT showing almost complete resolution of lower abdominal collections, ravi-hepatic collections remain. Drain pulled back 10 cm on 9/22 to help continue drainage.     - FLD  - TPN via PICC line  - Flush DANILO drain 3x/day with 20 mL saline   - Midline upper drain to gravity  - cont oral augmentin   - Strict monitoring of drain outputs   - PT, OOB chair  - Ambulate TID  - DVT PPX  - GI PPX    Dispo: Pending clinical course

## 2023-09-23 NOTE — SUBJECTIVE & OBJECTIVE
Interval History: NAEON. Pt overall feeling well. Tolerating diet. Denies N/V. Admits flatus but denies BM. Reports pain is well controlled. OOB as tolerated. DANILO drain with 30cc of SS/pus fluid, PTC with 10cc of bile, had air in bag.      Medications:  Continuous Infusions:   TPN ADULT CENTRAL LINE CUSTOM 60 mL/hr at 09/22/23 2142     Scheduled Meds:   acetaminophen  1,000 mg Oral Q8H    amoxicillin-clavulanate 875-125mg  1 tablet Oral Q12H    enoxparin  40 mg Subcutaneous Q24H (prophylaxis, 1700)    fluconazole  400 mg Oral Daily    gabapentin  100 mg Oral TID    guaiFENesin  600 mg Oral Daily    levalbuterol  0.63 mg Nebulization Q6H WAKE    lipid (SMOFLIPID)  250 mL Intravenous Daily    pantoprazole  40 mg Oral Daily    tamsulosin  0.4 mg Oral Daily     PRN Meds:dextrose 10%, glucagon (human recombinant), insulin aspart U-100, ondansetron, oxyCODONE, oxyCODONE, sodium chloride 0.9%     Review of patient's allergies indicates:   Allergen Reactions    Penicillins Rash     Objective:     Vital Signs (Most Recent):  Temp: 97 °F (36.1 °C) (09/23/23 0712)  Pulse: 95 (09/23/23 0712)  Resp: 18 (09/23/23 0712)  BP: 133/85 (09/23/23 0712)  SpO2: (!) 92 % (09/23/23 0712) Vital Signs (24h Range):  Temp:  [97 °F (36.1 °C)-99.1 °F (37.3 °C)] 97 °F (36.1 °C)  Pulse:  [] 95  Resp:  [16-21] 18  SpO2:  [89 %-98 %] 92 %  BP: (110-141)/(59-85) 133/85     Weight: 83.5 kg (184 lb) (weight increased due to fluid in my abdomen)  Body mass index is 25.66 kg/m².    Intake/Output - Last 3 Shifts         09/21 0700 09/22 0659 09/22 0700 09/23 0659 09/23 0700 09/24 0659    P.O.       Other       Total Intake(mL/kg)       Urine (mL/kg/hr) 4650 (2.3) 350 (0.2)     Drains 100 52     Stool  0     Total Output 4750 402     Net -4750 -402            Stool Occurrence  0 x              Physical Exam  Vitals and nursing note reviewed.   Constitutional:       General: He is not in acute distress.     Comments: TPN via PICC   Cardiovascular:       Rate and Rhythm: Normal rate.      Pulses: Normal pulses.   Pulmonary:      Effort: Pulmonary effort is normal. No respiratory distress.   Abdominal:      General: There is no distension.      Palpations: Abdomen is soft.      Tenderness: There is no abdominal tenderness.      Comments: Upper midline drain with thin bilious output  lower DANILO drain with thin output   soft non-tender and non-distended;   Midline incision healing appropriately  Staple line in RLQ with erythema, no fluid collection palpated   Neurological:      General: No focal deficit present.      Mental Status: He is alert and oriented to person, place, and time.          Significant Labs:  I have reviewed all pertinent lab results within the past 24 hours.    Significant Diagnostics:  I have reviewed all pertinent imaging results/findings within the past 24 hours.

## 2023-09-23 NOTE — PROGRESS NOTES
Siddhartha Thurman - [facility]  General Surgery  Progress Note    Subjective:     Post-Op Info:  Procedure(s) (LRB):  DRAINAGE, ABDOMEN, LAPAROSCOPIC (N/A)  EGD (ESOPHAGOGASTRODUODENOSCOPY); flouroscopy, need c-arm in the room (N/A)  LAPAROSCOPY, DIAGNOSTIC   5 Days Post-Op     Interval History: NAEON. Pt overall feeling well. Tolerating diet. Denies N/V. Admits flatus but denies BM. Reports pain is well controlled. OOB as tolerated. DANILO drain with 30cc of SS/pus fluid, PTC with 10cc of bile, had air in bag.      Medications:  Continuous Infusions:   TPN ADULT CENTRAL LINE CUSTOM 60 mL/hr at 09/22/23 2142     Scheduled Meds:   acetaminophen  1,000 mg Oral Q8H    amoxicillin-clavulanate 875-125mg  1 tablet Oral Q12H    enoxparin  40 mg Subcutaneous Q24H (prophylaxis, 1700)    fluconazole  400 mg Oral Daily    gabapentin  100 mg Oral TID    guaiFENesin  600 mg Oral Daily    levalbuterol  0.63 mg Nebulization Q6H WAKE    lipid (SMOFLIPID)  250 mL Intravenous Daily    pantoprazole  40 mg Oral Daily    tamsulosin  0.4 mg Oral Daily     PRN Meds:dextrose 10%, glucagon (human recombinant), insulin aspart U-100, ondansetron, oxyCODONE, oxyCODONE, sodium chloride 0.9%     Review of patient's allergies indicates:   Allergen Reactions    Penicillins Rash     Objective:     Vital Signs (Most Recent):  Temp: 97 °F (36.1 °C) (09/23/23 0712)  Pulse: 95 (09/23/23 0712)  Resp: 18 (09/23/23 0712)  BP: 133/85 (09/23/23 0712)  SpO2: (!) 92 % (09/23/23 0712) Vital Signs (24h Range):  Temp:  [97 °F (36.1 °C)-99.1 °F (37.3 °C)] 97 °F (36.1 °C)  Pulse:  [] 95  Resp:  [16-21] 18  SpO2:  [89 %-98 %] 92 %  BP: (110-141)/(59-85) 133/85     Weight: 83.5 kg (184 lb) (weight increased due to fluid in my abdomen)  Body mass index is 25.66 kg/m².    Intake/Output - Last 3 Shifts         09/21 0700 09/22 0659 09/22 0700 09/23 0659 09/23 0700 09/24 0659    P.O.       Other       Total Intake(mL/kg)       Urine (mL/kg/hr) 4650 (2.3) 350 (0.2)     Drains 100  52     Stool  0     Total Output 4750 402     Net -4750 -402            Stool Occurrence  0 x              Physical Exam  Vitals and nursing note reviewed.   Constitutional:       General: He is not in acute distress.     Comments: TPN via PICC   Cardiovascular:      Rate and Rhythm: Normal rate.      Pulses: Normal pulses.   Pulmonary:      Effort: Pulmonary effort is normal. No respiratory distress.   Abdominal:      General: There is no distension.      Palpations: Abdomen is soft.      Tenderness: There is no abdominal tenderness.      Comments: Upper midline drain with thin bilious output  lower DANILO drain with thin output   soft non-tender and non-distended;   Midline incision healing appropriately  Staple line in RLQ with erythema, no fluid collection palpated   Neurological:      General: No focal deficit present.      Mental Status: He is alert and oriented to person, place, and time.          Significant Labs:  I have reviewed all pertinent lab results within the past 24 hours.    Significant Diagnostics:  I have reviewed all pertinent imaging results/findings within the past 24 hours.    Assessment/Plan:     Gastric adenocarcinoma  Mr. Valladares is a 61 y/o male s/p open distal gastrectomy with BII reconstruction on 8/21/23 and admitted for intra abdominal fluid collection on CT on 9/14/23 and cough. S/p IR drain placement into intra-abdominal fluid collection. Laparoscopic retroperitoneal washout 9/18, with EGD. Progressing well. Abx narrowed based on cultures. CT showing almost complete resolution of lower abdominal collections, ravi-hepatic collections remain. Drain pulled back 10 cm on 9/22 to help continue drainage.     - FLD  - re-order TPN via PICC line  - Flush DANILO drain 3x/day with 20 mL saline   - Midline upper drain to gravity  - cont oral augmentin   - Strict monitoring of drain outputs   - PT, OOB chair  - Ambulate TID  - DVT PPX  - GI PPX    Dispo: Pending clinical course           Bubba  MD Taran  General Surgery  [facility]  [facility]

## 2023-09-24 LAB
ALBUMIN SERPL BCP-MCNC: 1.5 G/DL (ref 3.5–5.2)
ALP SERPL-CCNC: 267 U/L (ref 55–135)
ALT SERPL W/O P-5'-P-CCNC: 41 U/L (ref 10–44)
ANION GAP SERPL CALC-SCNC: 6 MMOL/L (ref 8–16)
AST SERPL-CCNC: 25 U/L (ref 10–40)
BASOPHILS # BLD AUTO: 0.04 K/UL (ref 0–0.2)
BASOPHILS NFR BLD: 0.3 % (ref 0–1.9)
BILIRUB SERPL-MCNC: 0.6 MG/DL (ref 0.1–1)
BUN SERPL-MCNC: 16 MG/DL (ref 8–23)
CALCIUM SERPL-MCNC: 7.9 MG/DL (ref 8.7–10.5)
CHLORIDE SERPL-SCNC: 97 MMOL/L (ref 95–110)
CO2 SERPL-SCNC: 31 MMOL/L (ref 23–29)
CREAT SERPL-MCNC: 0.6 MG/DL (ref 0.5–1.4)
DIFFERENTIAL METHOD: ABNORMAL
EOSINOPHIL # BLD AUTO: 0.1 K/UL (ref 0–0.5)
EOSINOPHIL NFR BLD: 0.6 % (ref 0–8)
ERYTHROCYTE [DISTWIDTH] IN BLOOD BY AUTOMATED COUNT: 16.2 % (ref 11.5–14.5)
EST. GFR  (NO RACE VARIABLE): >60 ML/MIN/1.73 M^2
GLUCOSE SERPL-MCNC: 113 MG/DL (ref 70–110)
HCT VFR BLD AUTO: 24.3 % (ref 40–54)
HGB BLD-MCNC: 7.8 G/DL (ref 14–18)
IMM GRANULOCYTES # BLD AUTO: 0.44 K/UL (ref 0–0.04)
IMM GRANULOCYTES NFR BLD AUTO: 3.7 % (ref 0–0.5)
LYMPHOCYTES # BLD AUTO: 1.4 K/UL (ref 1–4.8)
LYMPHOCYTES NFR BLD: 11.8 % (ref 18–48)
MAGNESIUM SERPL-MCNC: 1.9 MG/DL (ref 1.6–2.6)
MCH RBC QN AUTO: 28.1 PG (ref 27–31)
MCHC RBC AUTO-ENTMCNC: 32.1 G/DL (ref 32–36)
MCV RBC AUTO: 87 FL (ref 82–98)
MONOCYTES # BLD AUTO: 1.2 K/UL (ref 0.3–1)
MONOCYTES NFR BLD: 10.1 % (ref 4–15)
NEUTROPHILS # BLD AUTO: 8.7 K/UL (ref 1.8–7.7)
NEUTROPHILS NFR BLD: 73.5 % (ref 38–73)
NRBC BLD-RTO: 0 /100 WBC
PHOSPHATE SERPL-MCNC: 3.1 MG/DL (ref 2.7–4.5)
PLATELET # BLD AUTO: 455 K/UL (ref 150–450)
PMV BLD AUTO: 9.6 FL (ref 9.2–12.9)
POCT GLUCOSE: 121 MG/DL (ref 70–110)
POCT GLUCOSE: 128 MG/DL (ref 70–110)
POTASSIUM SERPL-SCNC: 3.6 MMOL/L (ref 3.5–5.1)
PROT SERPL-MCNC: 5.6 G/DL (ref 6–8.4)
RBC # BLD AUTO: 2.78 M/UL (ref 4.6–6.2)
SODIUM SERPL-SCNC: 134 MMOL/L (ref 136–145)
TRIGL SERPL-MCNC: 150 MG/DL (ref 30–150)
WBC # BLD AUTO: 11.8 K/UL (ref 3.9–12.7)

## 2023-09-24 PROCEDURE — 25000003 PHARM REV CODE 250: Performed by: STUDENT IN AN ORGANIZED HEALTH CARE EDUCATION/TRAINING PROGRAM

## 2023-09-24 PROCEDURE — 63600175 PHARM REV CODE 636 W HCPCS: Performed by: STUDENT IN AN ORGANIZED HEALTH CARE EDUCATION/TRAINING PROGRAM

## 2023-09-24 PROCEDURE — 80053 COMPREHEN METABOLIC PANEL: CPT | Performed by: SURGERY

## 2023-09-24 PROCEDURE — 83735 ASSAY OF MAGNESIUM: CPT | Performed by: STUDENT IN AN ORGANIZED HEALTH CARE EDUCATION/TRAINING PROGRAM

## 2023-09-24 PROCEDURE — A4217 STERILE WATER/SALINE, 500 ML: HCPCS | Performed by: STUDENT IN AN ORGANIZED HEALTH CARE EDUCATION/TRAINING PROGRAM

## 2023-09-24 PROCEDURE — 25000242 PHARM REV CODE 250 ALT 637 W/ HCPCS: Performed by: STUDENT IN AN ORGANIZED HEALTH CARE EDUCATION/TRAINING PROGRAM

## 2023-09-24 PROCEDURE — 25000003 PHARM REV CODE 250

## 2023-09-24 PROCEDURE — 94664 DEMO&/EVAL PT USE INHALER: CPT

## 2023-09-24 PROCEDURE — 84100 ASSAY OF PHOSPHORUS: CPT | Performed by: STUDENT IN AN ORGANIZED HEALTH CARE EDUCATION/TRAINING PROGRAM

## 2023-09-24 PROCEDURE — 84478 ASSAY OF TRIGLYCERIDES: CPT | Performed by: STUDENT IN AN ORGANIZED HEALTH CARE EDUCATION/TRAINING PROGRAM

## 2023-09-24 PROCEDURE — 94640 AIRWAY INHALATION TREATMENT: CPT

## 2023-09-24 PROCEDURE — 94761 N-INVAS EAR/PLS OXIMETRY MLT: CPT

## 2023-09-24 PROCEDURE — 20600001 HC STEP DOWN PRIVATE ROOM

## 2023-09-24 PROCEDURE — B4185 PARENTERAL SOL 10 GM LIPIDS: HCPCS | Performed by: STUDENT IN AN ORGANIZED HEALTH CARE EDUCATION/TRAINING PROGRAM

## 2023-09-24 PROCEDURE — 25000003 PHARM REV CODE 250: Performed by: NURSE PRACTITIONER

## 2023-09-24 PROCEDURE — 27000221 HC OXYGEN, UP TO 24 HOURS

## 2023-09-24 PROCEDURE — 99900035 HC TECH TIME PER 15 MIN (STAT)

## 2023-09-24 PROCEDURE — 85025 COMPLETE CBC W/AUTO DIFF WBC: CPT | Performed by: STUDENT IN AN ORGANIZED HEALTH CARE EDUCATION/TRAINING PROGRAM

## 2023-09-24 RX ORDER — FUROSEMIDE 40 MG/1
40 TABLET ORAL DAILY
Status: DISCONTINUED | OUTPATIENT
Start: 2023-09-24 | End: 2023-09-25 | Stop reason: HOSPADM

## 2023-09-24 RX ORDER — BISACODYL 10 MG
10 SUPPOSITORY, RECTAL RECTAL ONCE
Status: COMPLETED | OUTPATIENT
Start: 2023-09-24 | End: 2023-09-24

## 2023-09-24 RX ORDER — POLYETHYLENE GLYCOL 3350 17 G/17G
17 POWDER, FOR SOLUTION ORAL DAILY
Status: DISCONTINUED | OUTPATIENT
Start: 2023-09-24 | End: 2023-09-25 | Stop reason: HOSPADM

## 2023-09-24 RX ADMIN — OXYCODONE HYDROCHLORIDE 10 MG: 10 TABLET ORAL at 07:09

## 2023-09-24 RX ADMIN — GABAPENTIN 100 MG: 300 CAPSULE ORAL at 02:09

## 2023-09-24 RX ADMIN — LEVALBUTEROL HYDROCHLORIDE 0.63 MG: 0.63 SOLUTION RESPIRATORY (INHALATION) at 02:09

## 2023-09-24 RX ADMIN — GABAPENTIN 100 MG: 300 CAPSULE ORAL at 09:09

## 2023-09-24 RX ADMIN — ACETAMINOPHEN 1000 MG: 500 TABLET ORAL at 09:09

## 2023-09-24 RX ADMIN — MAGNESIUM SULFATE HEPTAHYDRATE: 500 INJECTION, SOLUTION INTRAMUSCULAR; INTRAVENOUS at 10:09

## 2023-09-24 RX ADMIN — FLUCONAZOLE 400 MG: 200 TABLET ORAL at 09:09

## 2023-09-24 RX ADMIN — PANTOPRAZOLE SODIUM 40 MG: 40 TABLET, DELAYED RELEASE ORAL at 09:09

## 2023-09-24 RX ADMIN — FUROSEMIDE 40 MG: 40 TABLET ORAL at 12:09

## 2023-09-24 RX ADMIN — TAMSULOSIN HYDROCHLORIDE 0.4 MG: 0.4 CAPSULE ORAL at 09:09

## 2023-09-24 RX ADMIN — ACETAMINOPHEN 1000 MG: 500 TABLET ORAL at 05:09

## 2023-09-24 RX ADMIN — BISACODYL 10 MG: 10 SUPPOSITORY RECTAL at 02:09

## 2023-09-24 RX ADMIN — AMOXICILLIN AND CLAVULANATE POTASSIUM 1 TABLET: 875; 125 TABLET, FILM COATED ORAL at 09:09

## 2023-09-24 RX ADMIN — SMOFLIPID 250 ML: 6; 6; 5; 3 INJECTION, EMULSION INTRAVENOUS at 10:09

## 2023-09-24 RX ADMIN — POLYETHYLENE GLYCOL 3350 17 G: 17 POWDER, FOR SOLUTION ORAL at 12:09

## 2023-09-24 RX ADMIN — OXYCODONE HYDROCHLORIDE 10 MG: 10 TABLET ORAL at 09:09

## 2023-09-24 RX ADMIN — OXYCODONE HYDROCHLORIDE 10 MG: 10 TABLET ORAL at 02:09

## 2023-09-24 RX ADMIN — LEVALBUTEROL HYDROCHLORIDE 0.63 MG: 0.63 SOLUTION RESPIRATORY (INHALATION) at 08:09

## 2023-09-24 RX ADMIN — ENOXAPARIN SODIUM 40 MG: 40 INJECTION SUBCUTANEOUS at 06:09

## 2023-09-24 RX ADMIN — GUAIFENESIN 600 MG: 600 TABLET, EXTENDED RELEASE ORAL at 09:09

## 2023-09-24 RX ADMIN — LEVALBUTEROL HYDROCHLORIDE 0.63 MG: 0.63 SOLUTION RESPIRATORY (INHALATION) at 09:09

## 2023-09-24 NOTE — PROGRESS NOTES
Siddhartha Thurman - [facility]  General Surgery  Progress Note    Subjective:       Post-Op Info:  Procedure(s) (LRB):  DRAINAGE, ABDOMEN, LAPAROSCOPIC (N/A)  EGD (ESOPHAGOGASTRODUODENOSCOPY); flouroscopy, need c-arm in the room (N/A)  LAPAROSCOPY, DIAGNOSTIC   6 Days Post-Op     Interval History: No issues overnight, tolerating a soft diet. Passing flatus, no BM today. Ambulating down hallways.     Medications:  Continuous Infusions:   TPN ADULT CENTRAL LINE CUSTOM 60 mL/hr at 09/23/23 2207    TPN ADULT CENTRAL LINE CUSTOM       Scheduled Meds:   acetaminophen  1,000 mg Oral Q8H    amoxicillin-clavulanate 875-125mg  1 tablet Oral Q12H    bisacodyL  10 mg Rectal Once    enoxparin  40 mg Subcutaneous Q24H (prophylaxis, 1700)    fluconazole  400 mg Oral Daily    furosemide  40 mg Oral Daily    gabapentin  100 mg Oral TID    guaiFENesin  600 mg Oral Daily    levalbuterol  0.63 mg Nebulization Q6H WAKE    lipid (SMOFLIPID)  250 mL Intravenous Daily    pantoprazole  40 mg Oral Daily    polyethylene glycol  17 g Oral Daily    tamsulosin  0.4 mg Oral Daily     PRN Meds:dextrose 10%, glucagon (human recombinant), insulin aspart U-100, ondansetron, oxyCODONE, oxyCODONE, sodium chloride 0.9%     Review of patient's allergies indicates:   Allergen Reactions    Penicillins Rash     Objective:     Vital Signs (Most Recent):  Temp: 98.3 °F (36.8 °C) (09/24/23 1107)  Pulse: 87 (09/24/23 1107)  Resp: 20 (09/24/23 1107)  BP: (!) 124/58 (09/24/23 1107)  SpO2: (!) 94 % (09/24/23 1107) Vital Signs (24h Range):  Temp:  [97 °F (36.1 °C)-98.9 °F (37.2 °C)] 98.3 °F (36.8 °C)  Pulse:  [] 87  Resp:  [16-20] 20  SpO2:  [90 %-97 %] 94 %  BP: (108-146)/(54-70) 124/58     Weight: 83.5 kg (184 lb) (weight increased due to fluid in my abdomen)  Body mass index is 25.66 kg/m².    Intake/Output - Last 3 Shifts         09/22 0700 09/23 0659 09/23 0700 09/24 0659 09/24 0700 09/25 0659    P.O.  720     Other  25     TPN  720     Total Intake(mL/kg)  1462  (17.5)     Urine (mL/kg/hr) 1525 (0.8) 4550 (2.3) 200 (0.6)    Drains 52 45     Stool 0 0     Total Output 1577 4595 200    Net -1577 -3130 -200           Urine Occurrence  1 x     Stool Occurrence 0 x 1 x              Physical Exam  Vitals and nursing note reviewed.   Constitutional:       General: He is not in acute distress.  Cardiovascular:      Rate and Rhythm: Normal rate.      Pulses: Normal pulses.   Pulmonary:      Effort: Pulmonary effort is normal. No respiratory distress.   Abdominal:      General: There is distension.      Palpations: Abdomen is soft.      Tenderness: There is no abdominal tenderness.      Comments: Upper midline drain with SS output; soft mildly distended abdomen with some incisional tenderness in RLQ. RLQ drain with SS output, staples with surrounding erythema and mild induration, scarce seepage from lateral aspect    Neurological:      General: No focal deficit present.      Mental Status: He is alert and oriented to person, place, and time.          Significant Labs:  I have reviewed all pertinent lab results within the past 24 hours.  CBC:   Recent Labs   Lab 09/24/23  0406   WBC 11.80   RBC 2.78*   HGB 7.8*   HCT 24.3*   *   MCV 87   MCH 28.1   MCHC 32.1     BMP:   Recent Labs   Lab 09/24/23  0406   *   *   K 3.6   CL 97   CO2 31*   BUN 16   CREATININE 0.6   CALCIUM 7.9*   MG 1.9     CMP:   Recent Labs   Lab 09/24/23  0406   *   CALCIUM 7.9*   ALBUMIN 1.5*   PROT 5.6*   *   K 3.6   CO2 31*   CL 97   BUN 16   CREATININE 0.6   ALKPHOS 267*   ALT 41   AST 25   BILITOT 0.6       Significant Diagnostics:  I have reviewed all pertinent imaging results/findings within the past 24 hours.    Assessment/Plan:     Gastric adenocarcinoma  Mr. Valladares is a 61 y/o male s/p open distal gastrectomy with BII reconstruction on 8/21/23 and admitted for intra abdominal fluid collection on CT on 9/14/23 and cough. S/p IR drain placement into intra-abdominal fluid  collection. Laparoscopic retroperitoneal washout 9/18, with EGD. Progressing well. Abx narrowed based on cultures. CT showing almost complete resolution of lower abdominal collections, ravi-hepatic collections remain. Drain pulled back 10 cm on 9/22 to help continue drainage. Recovering clinically.      - Soft diet  - TPN via PICC line  - Flush DANILO drain 3x/day with 20 mL saline   - PO lasix 40mg  - Daily miralax  - Midline upper drain to gravity  - Abx: augmentin, fluconazole   - Cx bacteroides fragilis/E coli/E. faelis  - Strict monitoring of drain outputs   - PT, OOB chair  - Ambulate TID  - DVT PPX  - GI PPX    Dispo: Pending clinical course. Possible discharge early this week with TPN, drains in place           Diana Alvares MD  General Surgery  [facility]

## 2023-09-24 NOTE — PLAN OF CARE
Pt involved in plan of care and communicating needs throughout shift. PRN pain meds given with moderate relief. Ambulates in room & to RR independently; Remains afebrile. Tolerating diet, voiding without difficulty. No BM this shift; q1h patient rounds. All VSS; no acute events so far this shift. Non skid socks on; Pt. Instructed to call if any assistance is needed. Pt remaining free from falls or injury; Bed locked in lowest position with side rails up x3 & all belongings including call light within reach; Plan of care ongoing; All needs met at this time.        Problem: Adult Inpatient Plan of Care  Goal: Plan of Care Review  Outcome: Ongoing, Progressing  Goal: Patient-Specific Goal (Individualized)  Outcome: Ongoing, Progressing  Goal: Absence of Hospital-Acquired Illness or Injury  Outcome: Ongoing, Progressing  Goal: Optimal Comfort and Wellbeing  Outcome: Ongoing, Progressing  Goal: Readiness for Transition of Care  Outcome: Ongoing, Progressing     Problem: Infection  Goal: Absence of Infection Signs and Symptoms  Outcome: Ongoing, Progressing     Problem: Impaired Wound Healing  Goal: Optimal Wound Healing  Outcome: Ongoing, Progressing     Problem: Fall Injury Risk  Goal: Absence of Fall and Fall-Related Injury  Outcome: Ongoing, Progressing     Problem: Skin Injury Risk Increased  Goal: Skin Health and Integrity  Outcome: Ongoing, Progressing     Problem: Pain Acute  Goal: Acceptable Pain Control and Functional Ability  Outcome: Ongoing, Progressing      Pt provided otter pop for PO challenge.

## 2023-09-24 NOTE — SUBJECTIVE & OBJECTIVE
Interval History: No issues overnight, tolerating a soft diet. Passing flatus, no BM today. Ambulating down hallways.     Medications:  Continuous Infusions:   TPN ADULT CENTRAL LINE CUSTOM 60 mL/hr at 09/23/23 2207    TPN ADULT CENTRAL LINE CUSTOM       Scheduled Meds:   acetaminophen  1,000 mg Oral Q8H    amoxicillin-clavulanate 875-125mg  1 tablet Oral Q12H    bisacodyL  10 mg Rectal Once    enoxparin  40 mg Subcutaneous Q24H (prophylaxis, 1700)    fluconazole  400 mg Oral Daily    furosemide  40 mg Oral Daily    gabapentin  100 mg Oral TID    guaiFENesin  600 mg Oral Daily    levalbuterol  0.63 mg Nebulization Q6H WAKE    lipid (SMOFLIPID)  250 mL Intravenous Daily    pantoprazole  40 mg Oral Daily    polyethylene glycol  17 g Oral Daily    tamsulosin  0.4 mg Oral Daily     PRN Meds:dextrose 10%, glucagon (human recombinant), insulin aspart U-100, ondansetron, oxyCODONE, oxyCODONE, sodium chloride 0.9%     Review of patient's allergies indicates:   Allergen Reactions    Penicillins Rash     Objective:     Vital Signs (Most Recent):  Temp: 98.3 °F (36.8 °C) (09/24/23 1107)  Pulse: 87 (09/24/23 1107)  Resp: 20 (09/24/23 1107)  BP: (!) 124/58 (09/24/23 1107)  SpO2: (!) 94 % (09/24/23 1107) Vital Signs (24h Range):  Temp:  [97 °F (36.1 °C)-98.9 °F (37.2 °C)] 98.3 °F (36.8 °C)  Pulse:  [] 87  Resp:  [16-20] 20  SpO2:  [90 %-97 %] 94 %  BP: (108-146)/(54-70) 124/58     Weight: 83.5 kg (184 lb) (weight increased due to fluid in my abdomen)  Body mass index is 25.66 kg/m².    Intake/Output - Last 3 Shifts         09/22 0700 09/23 0659 09/23 0700  09/24 0659 09/24 0700  09/25 0659    P.O.  720     Other  25     TPN  720     Total Intake(mL/kg)  1465 (17.5)     Urine (mL/kg/hr) 1525 (0.8) 4550 (2.3) 200 (0.6)    Drains 52 45     Stool 0 0     Total Output 1577 4595 200    Net -1577 -3130 -200           Urine Occurrence  1 x     Stool Occurrence 0 x 1 x              Physical Exam  Vitals and nursing note reviewed.    Constitutional:       General: He is not in acute distress.  Cardiovascular:      Rate and Rhythm: Normal rate.      Pulses: Normal pulses.   Pulmonary:      Effort: Pulmonary effort is normal. No respiratory distress.   Abdominal:      General: There is distension.      Palpations: Abdomen is soft.      Tenderness: There is no abdominal tenderness.      Comments: Upper midline drain with SS output; soft mildly distended abdomen with some incisional tenderness in RLQ. RLQ drain with SS output, staples with surrounding erythema and mild induration, scarce seepage from lateral aspect    Neurological:      General: No focal deficit present.      Mental Status: He is alert and oriented to person, place, and time.          Significant Labs:  I have reviewed all pertinent lab results within the past 24 hours.  CBC:   Recent Labs   Lab 09/24/23  0406   WBC 11.80   RBC 2.78*   HGB 7.8*   HCT 24.3*   *   MCV 87   MCH 28.1   MCHC 32.1     BMP:   Recent Labs   Lab 09/24/23  0406   *   *   K 3.6   CL 97   CO2 31*   BUN 16   CREATININE 0.6   CALCIUM 7.9*   MG 1.9     CMP:   Recent Labs   Lab 09/24/23  0406   *   CALCIUM 7.9*   ALBUMIN 1.5*   PROT 5.6*   *   K 3.6   CO2 31*   CL 97   BUN 16   CREATININE 0.6   ALKPHOS 267*   ALT 41   AST 25   BILITOT 0.6       Significant Diagnostics:  I have reviewed all pertinent imaging results/findings within the past 24 hours.

## 2023-09-24 NOTE — ASSESSMENT & PLAN NOTE
Mr. Valladares is a 63 y/o male s/p open distal gastrectomy with BII reconstruction on 8/21/23 and admitted for intra abdominal fluid collection on CT on 9/14/23 and cough. S/p IR drain placement into intra-abdominal fluid collection. Laparoscopic retroperitoneal washout 9/18, with EGD. Progressing well. Abx narrowed based on cultures. CT showing almost complete resolution of lower abdominal collections, ravi-hepatic collections remain. Drain pulled back 10 cm on 9/22 to help continue drainage. Recovering clinically.      - Soft diet  - TPN via PICC line  - Flush DANILO drain 3x/day with 20 mL saline   - PO lasix 40mg  - Daily miralax  - Midline upper drain to gravity  - cont oral augmentin   - Strict monitoring of drain outputs   - PT, OOB chair  - Ambulate TID  - DVT PPX  - GI PPX    Dispo: Pending clinical course

## 2023-09-24 NOTE — PLAN OF CARE
[facility] Plan of Care Note  Dx abdominal fluid collection    Shift Events ambulated in hallway, showered, diet advance to dysphagia soft, 1xBM    Goals of Care: pain management, safety    Neuro: AAOx4    Vital Signs: VSS on RA    Respiratory: RA    Diet: dysphagia soft    Is patient tolerating current diet? Yes, but little appetite    GTTS: TPN @ 60    Urine Output/Bowel Movement: 3L urine output (lasix given), 1 x BM    Drains/Tubes/Tube Feeds (include total output/shift): RLQ DANILO to bulb suction w/40 ml serosanguinous drainage (flushed w/20 ml per order), R abd IR drain w/5 ml green/yellow drainage (flushed w/5 ml per order)    Lines: R PICC, dressing changed 9/21    Accuchecks:n/a    Skin: ML w/DB, RLQ w/staples, ASI on buttocks (triad cream applied)    Fall Risk Score: see flowsheet    Activity level? Standby - up to ambulate in room and hallway    Any scheduled procedures? no    Any safety concerns? no    Other: n/a     Problem: Adult Inpatient Plan of Care  Goal: Plan of Care Review  Outcome: Ongoing, Progressing  Goal: Patient-Specific Goal (Individualized)  Outcome: Ongoing, Progressing  Goal: Absence of Hospital-Acquired Illness or Injury  Outcome: Ongoing, Progressing  Goal: Optimal Comfort and Wellbeing  Outcome: Ongoing, Progressing     Problem: Infection  Goal: Absence of Infection Signs and Symptoms  Outcome: Ongoing, Progressing     Problem: Impaired Wound Healing  Goal: Optimal Wound Healing  Outcome: Ongoing, Progressing     Problem: Fall Injury Risk  Goal: Absence of Fall and Fall-Related Injury  Outcome: Ongoing, Progressing     Problem: Skin Injury Risk Increased  Goal: Skin Health and Integrity  Outcome: Ongoing, Progressing     Problem: Pain Acute  Goal: Acceptable Pain Control and Functional Ability  Outcome: Ongoing, Progressing

## 2023-09-25 VITALS
RESPIRATION RATE: 18 BRPM | OXYGEN SATURATION: 92 % | WEIGHT: 184 LBS | HEIGHT: 71 IN | TEMPERATURE: 98 F | HEART RATE: 82 BPM | BODY MASS INDEX: 25.76 KG/M2 | SYSTOLIC BLOOD PRESSURE: 110 MMHG | DIASTOLIC BLOOD PRESSURE: 59 MMHG

## 2023-09-25 LAB
ALBUMIN SERPL BCP-MCNC: 1.5 G/DL (ref 3.5–5.2)
ALP SERPL-CCNC: 295 U/L (ref 55–135)
ALT SERPL W/O P-5'-P-CCNC: 52 U/L (ref 10–44)
ANION GAP SERPL CALC-SCNC: 6 MMOL/L (ref 8–16)
AST SERPL-CCNC: 33 U/L (ref 10–40)
BASOPHILS # BLD AUTO: 0.06 K/UL (ref 0–0.2)
BASOPHILS NFR BLD: 0.5 % (ref 0–1.9)
BILIRUB SERPL-MCNC: 0.5 MG/DL (ref 0.1–1)
BUN SERPL-MCNC: 16 MG/DL (ref 8–23)
CALCIUM SERPL-MCNC: 7.8 MG/DL (ref 8.7–10.5)
CHLORIDE SERPL-SCNC: 99 MMOL/L (ref 95–110)
CO2 SERPL-SCNC: 29 MMOL/L (ref 23–29)
CREAT SERPL-MCNC: 0.6 MG/DL (ref 0.5–1.4)
DIFFERENTIAL METHOD: ABNORMAL
EOSINOPHIL # BLD AUTO: 0.1 K/UL (ref 0–0.5)
EOSINOPHIL NFR BLD: 0.9 % (ref 0–8)
ERYTHROCYTE [DISTWIDTH] IN BLOOD BY AUTOMATED COUNT: 16.8 % (ref 11.5–14.5)
EST. GFR  (NO RACE VARIABLE): >60 ML/MIN/1.73 M^2
GLUCOSE SERPL-MCNC: 105 MG/DL (ref 70–110)
HCT VFR BLD AUTO: 25.5 % (ref 40–54)
HGB BLD-MCNC: 8 G/DL (ref 14–18)
IMM GRANULOCYTES # BLD AUTO: 0.46 K/UL (ref 0–0.04)
IMM GRANULOCYTES NFR BLD AUTO: 3.9 % (ref 0–0.5)
LYMPHOCYTES # BLD AUTO: 1.5 K/UL (ref 1–4.8)
LYMPHOCYTES NFR BLD: 12.6 % (ref 18–48)
MAGNESIUM SERPL-MCNC: 1.9 MG/DL (ref 1.6–2.6)
MCH RBC QN AUTO: 27.8 PG (ref 27–31)
MCHC RBC AUTO-ENTMCNC: 31.4 G/DL (ref 32–36)
MCV RBC AUTO: 89 FL (ref 82–98)
MONOCYTES # BLD AUTO: 1.2 K/UL (ref 0.3–1)
MONOCYTES NFR BLD: 10.5 % (ref 4–15)
NEUTROPHILS # BLD AUTO: 8.5 K/UL (ref 1.8–7.7)
NEUTROPHILS NFR BLD: 71.6 % (ref 38–73)
NRBC BLD-RTO: 0 /100 WBC
PHOSPHATE SERPL-MCNC: 2.9 MG/DL (ref 2.7–4.5)
PLATELET # BLD AUTO: 483 K/UL (ref 150–450)
PMV BLD AUTO: 10 FL (ref 9.2–12.9)
POCT GLUCOSE: 116 MG/DL (ref 70–110)
POCT GLUCOSE: 118 MG/DL (ref 70–110)
POTASSIUM SERPL-SCNC: 3.8 MMOL/L (ref 3.5–5.1)
PROT SERPL-MCNC: 5.8 G/DL (ref 6–8.4)
RBC # BLD AUTO: 2.88 M/UL (ref 4.6–6.2)
SODIUM SERPL-SCNC: 134 MMOL/L (ref 136–145)
WBC # BLD AUTO: 11.84 K/UL (ref 3.9–12.7)

## 2023-09-25 PROCEDURE — 25000003 PHARM REV CODE 250: Performed by: STUDENT IN AN ORGANIZED HEALTH CARE EDUCATION/TRAINING PROGRAM

## 2023-09-25 PROCEDURE — 85025 COMPLETE CBC W/AUTO DIFF WBC: CPT | Performed by: STUDENT IN AN ORGANIZED HEALTH CARE EDUCATION/TRAINING PROGRAM

## 2023-09-25 PROCEDURE — 84100 ASSAY OF PHOSPHORUS: CPT | Performed by: STUDENT IN AN ORGANIZED HEALTH CARE EDUCATION/TRAINING PROGRAM

## 2023-09-25 PROCEDURE — 25000003 PHARM REV CODE 250: Performed by: NURSE PRACTITIONER

## 2023-09-25 PROCEDURE — 94761 N-INVAS EAR/PLS OXIMETRY MLT: CPT

## 2023-09-25 PROCEDURE — 99900035 HC TECH TIME PER 15 MIN (STAT)

## 2023-09-25 PROCEDURE — 83735 ASSAY OF MAGNESIUM: CPT | Performed by: STUDENT IN AN ORGANIZED HEALTH CARE EDUCATION/TRAINING PROGRAM

## 2023-09-25 PROCEDURE — 25000242 PHARM REV CODE 250 ALT 637 W/ HCPCS: Performed by: STUDENT IN AN ORGANIZED HEALTH CARE EDUCATION/TRAINING PROGRAM

## 2023-09-25 PROCEDURE — 94664 DEMO&/EVAL PT USE INHALER: CPT

## 2023-09-25 PROCEDURE — 94640 AIRWAY INHALATION TREATMENT: CPT

## 2023-09-25 PROCEDURE — 80053 COMPREHEN METABOLIC PANEL: CPT | Performed by: SURGERY

## 2023-09-25 RX ORDER — FLUCONAZOLE 200 MG/1
400 TABLET ORAL DAILY
Qty: 20 TABLET | Refills: 0 | Status: SHIPPED | OUTPATIENT
Start: 2023-09-26 | End: 2023-10-06

## 2023-09-25 RX ORDER — ENOXAPARIN SODIUM 100 MG/ML
40 INJECTION SUBCUTANEOUS EVERY 24 HOURS
Qty: 5.6 ML | Refills: 0 | Status: SHIPPED | OUTPATIENT
Start: 2023-09-25 | End: 2023-10-09

## 2023-09-25 RX ORDER — PANTOPRAZOLE SODIUM 40 MG/1
40 TABLET, DELAYED RELEASE ORAL DAILY
Qty: 30 TABLET | Refills: 11 | Status: SHIPPED | OUTPATIENT
Start: 2023-09-26 | End: 2023-10-31 | Stop reason: ALTCHOICE

## 2023-09-25 RX ORDER — FUROSEMIDE 40 MG/1
40 TABLET ORAL DAILY
Qty: 30 TABLET | Refills: 11 | Status: SHIPPED | OUTPATIENT
Start: 2023-09-26 | End: 2023-10-03 | Stop reason: SDUPTHER

## 2023-09-25 RX ORDER — GABAPENTIN 100 MG/1
100 CAPSULE ORAL 3 TIMES DAILY
Qty: 90 CAPSULE | Refills: 11 | Status: SHIPPED | OUTPATIENT
Start: 2023-09-25 | End: 2023-10-31

## 2023-09-25 RX ORDER — TAMSULOSIN HYDROCHLORIDE 0.4 MG/1
0.4 CAPSULE ORAL DAILY
Qty: 30 CAPSULE | Refills: 11 | Status: SHIPPED | OUTPATIENT
Start: 2023-09-26 | End: 2024-09-25

## 2023-09-25 RX ORDER — OXYCODONE HYDROCHLORIDE 5 MG/1
5 TABLET ORAL EVERY 4 HOURS PRN
Qty: 15 TABLET | Refills: 0 | Status: SHIPPED | OUTPATIENT
Start: 2023-09-25 | End: 2023-10-02 | Stop reason: SDUPTHER

## 2023-09-25 RX ORDER — AMOXICILLIN AND CLAVULANATE POTASSIUM 875; 125 MG/1; MG/1
1 TABLET, FILM COATED ORAL EVERY 12 HOURS
Qty: 20 TABLET | Refills: 0 | Status: SHIPPED | OUTPATIENT
Start: 2023-09-25 | End: 2023-10-05 | Stop reason: SDUPTHER

## 2023-09-25 RX ADMIN — OXYCODONE HYDROCHLORIDE 10 MG: 10 TABLET ORAL at 06:09

## 2023-09-25 RX ADMIN — OXYCODONE HYDROCHLORIDE 10 MG: 10 TABLET ORAL at 12:09

## 2023-09-25 RX ADMIN — FLUCONAZOLE 400 MG: 200 TABLET ORAL at 08:09

## 2023-09-25 RX ADMIN — ACETAMINOPHEN 1000 MG: 500 TABLET ORAL at 06:09

## 2023-09-25 RX ADMIN — AMOXICILLIN AND CLAVULANATE POTASSIUM 1 TABLET: 875; 125 TABLET, FILM COATED ORAL at 08:09

## 2023-09-25 RX ADMIN — LEVALBUTEROL HYDROCHLORIDE 0.63 MG: 0.63 SOLUTION RESPIRATORY (INHALATION) at 09:09

## 2023-09-25 RX ADMIN — TAMSULOSIN HYDROCHLORIDE 0.4 MG: 0.4 CAPSULE ORAL at 08:09

## 2023-09-25 RX ADMIN — LEVALBUTEROL HYDROCHLORIDE 0.63 MG: 0.63 SOLUTION RESPIRATORY (INHALATION) at 02:09

## 2023-09-25 RX ADMIN — FUROSEMIDE 40 MG: 40 TABLET ORAL at 08:09

## 2023-09-25 RX ADMIN — GUAIFENESIN 600 MG: 600 TABLET, EXTENDED RELEASE ORAL at 08:09

## 2023-09-25 RX ADMIN — GABAPENTIN 100 MG: 300 CAPSULE ORAL at 02:09

## 2023-09-25 RX ADMIN — ACETAMINOPHEN 1000 MG: 500 TABLET ORAL at 02:09

## 2023-09-25 RX ADMIN — GABAPENTIN 100 MG: 300 CAPSULE ORAL at 08:09

## 2023-09-25 RX ADMIN — PANTOPRAZOLE SODIUM 40 MG: 40 TABLET, DELAYED RELEASE ORAL at 08:09

## 2023-09-25 NOTE — NURSING
came and removed the RUQ IR drain bag , then put a pouch on top of the drain, no drainage coming out from the IR drain since this morning. DANILO drain on the RLQ stayed in place, dressing wet-to-dry done by MD, pt and wife instructed about how to do the dressing change , also instructions given about how to flush his DANILO drain with 20cc of NS twice a day. All supplies for the NS flush, alcohol swap, gauze and tape provided, measuring cups and sheets provided. Lovenox kit provided, wife already knew how to administer the Lovenox   TPN disconnected from the pt at the time of discharge. Per NP Rachel and Bishnu from [facility], TPN will be delivered to the pt house later today, pt and wife notified.   PICC line stayed in place, both lumens flushed without resistance met. PICC line dressing stayed CDI   No questions at this time

## 2023-09-25 NOTE — PLAN OF CARE
[facility]       HOME  HEALTH ORDERS                                    FACE TO FACE ENCOUNTER      Patient Name: Danish Valladares  YOB: 1960    PCP: Ynf Walker MD   PCP Address: [address] [address]  PCP Phone Number: [phone]  PCP Fax: [phone]    Encounter Date: 09/25/2023    Admit to Home Health    Diagnoses:  Active Hospital Problems    Diagnosis  POA    *Abdominal fluid collection [R18.8]  Yes    Irritant contact dermatitis due friction or contact with other specified body fluids [L24.A9]  Yes    Gastric adenocarcinoma [C16.9]  Yes      Resolved Hospital Problems   No resolved problems to display.       I have seen and examined this patient face to face today. My clinical findings that support the need for the home health skilled services and home bound status are the following:  Weakness/numbness causing balance and gait disturbance due to Surgery making it taxing to leave home.    Allergies:  Review of patient's allergies indicates:   Allergen Reactions    Penicillins Rash       Diet: dysphagic soft diet    Activities: activity as tolerated    Nursing:   SN to complete comprehensive assessment including routine vital signs. Instruct on disease process and s/s of complications to report to MD. Review/verify medication list sent home with the patient at time of discharge  and instruct patient/caregiver as needed. Frequency may be adjusted depending on start of care date.    Notify MD if SBP > 160 or < 90; DBP > 90 or < 50; HR > 120 or < 50; Temp > 101    CONSULTS:    Physical Therapy to evaluate and treat. Evaluate for home safety and equipment needs; Establish/upgrade home exercise program. Perform / instruct on therapeutic exercises, gait training, transfer training, and Range of Motion.      MISCELLANEOUS CARE:  Home Infusion Therapy:   SN to perform Infusion Therapy/Central Line Care.  Review Central  Line Care & Central Line Flush with patient.    Administer (drug and dose): TPN via PICC line      Scrub the Hub: Prior to accessing the line, always perform a 30 second alcohol scrub  Each lumen of the central line is to be flushed at least daily with 10 mL Normal Saline and 3 mL Heparin flush (10 units/mL)  Skilled Nurse (SN) may draw blood from IV access  Blood Draw Procedure:   - Aspirate at least 5 mL of blood   - Discard   - Obtain specimen   - Change injection cap   - Flush with 20 mL Normal Saline followed by a                 3-5 mL Heparin flush (10 units/mL)  Central :   - Sterile dressing changes are done weekly and as needed.   - Use chlor-hexadine scrub to cleanse site, apply Biopatch to insertion site,       apply securement device dressing   - Injection caps are changed weekly and after EVERY lab draw.   - If sterile gauze is under dressing to control oozing,                 dressing change must be performed every 24 hours until gauze is not needed.    TPN ADULT CENTRAL LINE CUSTOM     Ordered Dose: -- Route: Intravenous Frequency: Continuous @ 60 mL/hr over 18 Hours   Volume: 1,440 mL Infusion Site: Central     Start Date/Time (Original Order): 09/14/23 2200 End Date/Time: 09/26/23 2159    Start Date/Time (After Last Reorder): 09/25/23 2200         Order Status: Active   Ordering User: Eulogio Moy MD Ordering Date/Time: Mon Sep 25, 2023 0813   Ordering Provider: Eulogio Moy MD Authorizing Provider: Eulogio Moy MD      Components    Component Order Dose Admin Dose   parenteral amino acid 15 % Solp 140 g 139.95 g   dextrose 70% Solp 230 g 229.999 g   sterile water Solp 58.93 mLs 58.93 mL   sodium acetate 2 mEq/mL Soln 20 mEq 20 mEq   sodium chloride (23.4%) HYPERTONIC 4 mEq/mL Solp 150 mEq 150 mEq   sodium phosphate 3 mmol/mL Soln 30 mmol 30 mmol   potassium phosphate 3 mmol/mL Soln 6 mmol 6 mmol   potassium chloride 2 mEq/mL Soln 70 mEq 70 mEq   magnesium sulfate 4 mEq/mL (50  %) Soln 2 g 2 g   calcium gluconate 100 mg/mL (10%) Soln 1 g 1 g   mvi, (ADULT) no.4 with vit K 3,300 unit- 150 mcg/10 mL Soln 10 mLs 10 mL   trace elements, (Tralement for patients >/= 10kg) Zn(3000mcg/mL)-Cu(300mcg/mL)-Mn (55mcg/mL)-Se (60mcg/mL) 3 mg-0.3 mg-55 mcg-60 mcg/mL Soln 1 mL 1 mL                Fat 20% infusion 250mL QD    PICC line care per nursing protocol    LABS: SN to perform labs: CBC, CMP, Mag, Phos, pre-albumin Q Monday and fax results to Dr. Danish Barros, Dr. Eulogio Moy, Dr. Ash Barros [phone]   Phone [phone] or [phone]      WOUND CARE ORDERS  -yes:  Surgical Wound:  Location: midline abdomen with derma bond. Open to air may shower.   - RUQ IR drain to ostomy pouch. Empty and record output every 12 hours.   - RLQ drain to bulb suction.  Empty and record output every 12 hours. Flush drain with 10cc normal saline every 12 hours.  - RLQ dressing changes daily and prn with wet to dry 4x4 gauze and paper tape.     Medications: Review discharge medications with patient and family and provide education.      Current Discharge Medication List        START taking these medications    Details   amoxicillin-clavulanate 875-125mg (AUGMENTIN) 875-125 mg per tablet Take 1 tablet by mouth every 12 (twelve) hours. for 10 days  Qty: 20 tablet, Refills: 0      fluconazole (DIFLUCAN) 200 MG Tab Take 2 tablets (400 mg total) by mouth once daily. for 10 days  Qty: 20 tablet, Refills: 0      furosemide (LASIX) 40 MG tablet Take 1 tablet (40 mg total) by mouth once daily.  Qty: 30 tablet, Refills: 11      gabapentin (NEURONTIN) 100 MG capsule Take 1 capsule (100 mg total) by mouth 3 (three) times daily.  Qty: 90 capsule, Refills: 11      oxyCODONE (ROXICODONE) 5 MG immediate release tablet Take 1 tablet (5 mg total) by mouth every 4 (four) hours as needed for Pain.  Qty: 15 tablet, Refills: 0    Comments: Quantity prescribed more than 7 day supply? No      pantoprazole (PROTONIX) 40 MG tablet Take  1 tablet (40 mg total) by mouth once daily.  Qty: 30 tablet, Refills: 11      tamsulosin (FLOMAX) 0.4 mg Cap Take 1 capsule (0.4 mg total) by mouth once daily.  Qty: 30 capsule, Refills: 11           CONTINUE these medications which have CHANGED    Details   enoxaparin (LOVENOX) 40 mg/0.4 mL Syrg Inject 0.4 mLs (40 mg total) into the skin every 24 hours at 5:00pm for 14 days  Qty: 5.6 mL, Refills: 0           CONTINUE these medications which have NOT CHANGED    Details   acetaminophen (TYLENOL) 650 MG TbSR Take 1 tablet (650 mg total) by mouth every 8 (eight) hours.  Qty: 90 tablet, Refills: 0      bisacodyL (DULCOLAX) 10 mg Supp Place 1 suppository (10 mg total) rectally daily as needed.  Qty: 10 suppository, Refills: 0      ibuprofen (ADVIL,MOTRIN) 200 MG tablet Take 200 mg by mouth every 6 (six) hours as needed for Pain.      omeprazole (PRILOSEC) 40 MG capsule Take 1 capsule (40 mg total) by mouth once daily.  Qty: 90 capsule, Refills: 0    Associated Diagnoses: Heartburn      ondansetron (ZOFRAN-ODT) 8 MG TbDL Take 8 mg by mouth 3 (three) times daily.      polyethylene glycol (GLYCOLAX) 17 gram/dose powder Use cap to measure 17 grams, mix in liquid and take by mouth once daily.  Qty: 238 g, Refills: 7           STOP taking these medications       levoFLOXacin (LEVAQUIN) 750 MG tablet Comments:   Reason for Stopping:         sucralfate (CARAFATE) 1 gram tablet Comments:   Reason for Stopping:               I certify that this patient is confined to his home and needs intermittent skilled nursing care and physical therapy.      Electronically Signed  _________________________________  Rachel Baker NP  09/25/2023

## 2023-09-25 NOTE — PROGRESS NOTES
[facility] Discharge Planning/Quick Note:      Referral received to resume patient's home TPN. Patient is familiar to [facility]'s services for TPN. Orders received to resume care for TPN over 18 hours. Due to delay in delivering patient's home TPN to the bedside, I recommended to the patient's DC planning team and bedside RN to discharge the patient, and home TPN to be delivered straight to the patient's home on 9/25. Patient independent with home IV therapies, so bedside connection was not needed. Patient's team in agreement with this plan, and patient was subsequently discharged on 9/25.     Please do not hesitate to reach out for any additional needs.    Bishnu Torres MS, RDN, LDN  Clinical Liaison & Dietitian  [facility]   Phone: [phone]  [email or web address]

## 2023-09-25 NOTE — NURSING
Discharge orders in, but per NP Rachel, the pt needs to have the TPN delivered to the room before he can be discharged. NP already talked to the pt and his wife about this , pt has been very eager to go home

## 2023-09-25 NOTE — PT/OT/SLP PROGRESS
Physical Therapy      Patient Name:  Danish Valladares   MRN:  [id number]    Patient not seen today secondary to: anticipated discharge home today. Will follow up per POC if pt does not d/c as anticipated.    9/25/2023

## 2023-09-25 NOTE — SUBJECTIVE & OBJECTIVE
Interval History: No significant changes overnight. AFVSS. Pain controlled. Drain output stable. Having bowel function. Ambulating well.     Medications:  Continuous Infusions:   TPN ADULT CENTRAL LINE CUSTOM 60 mL/hr at 09/24/23 2230    TPN ADULT CENTRAL LINE CUSTOM       Scheduled Meds:   acetaminophen  1,000 mg Oral Q8H    amoxicillin-clavulanate 875-125mg  1 tablet Oral Q12H    enoxparin  40 mg Subcutaneous Q24H (prophylaxis, 1700)    fluconazole  400 mg Oral Daily    furosemide  40 mg Oral Daily    gabapentin  100 mg Oral TID    guaiFENesin  600 mg Oral Daily    levalbuterol  0.63 mg Nebulization Q6H WAKE    lipid (SMOFLIPID)  250 mL Intravenous Daily    lipid (SMOFLIPID)  250 mL Intravenous Daily    pantoprazole  40 mg Oral Daily    polyethylene glycol  17 g Oral Daily    tamsulosin  0.4 mg Oral Daily     PRN Meds:dextrose 10%, glucagon (human recombinant), insulin aspart U-100, ondansetron, oxyCODONE, oxyCODONE, sodium chloride 0.9%     Review of patient's allergies indicates:   Allergen Reactions    Penicillins Rash     Objective:     Vital Signs (Most Recent):  Temp: 97.2 °F (36.2 °C) (09/25/23 0723)  Pulse: 88 (09/25/23 0723)  Resp: 18 (09/25/23 0723)  BP: 117/62 (09/25/23 0723)  SpO2: (!) 91 % (09/25/23 0723) Vital Signs (24h Range):  Temp:  [97.2 °F (36.2 °C)-98.9 °F (37.2 °C)] 97.2 °F (36.2 °C)  Pulse:  [86-94] 88  Resp:  [16-20] 18  SpO2:  [91 %-94 %] 91 %  BP: (110-130)/(56-62) 117/62     Weight: 83.5 kg (184 lb) (weight increased due to fluid in my abdomen)  Body mass index is 25.66 kg/m².    Intake/Output - Last 3 Shifts         09/23 0700 09/24 0659 09/24 0700 09/25 0659 09/25 0700 09/26 0659    P.O. 720 1640     Other 25            Total Intake(mL/kg) 1465 (17.5) 1640 (19.6)     Urine (mL/kg/hr) 4700 (2.3) 2395 (1.2)     Drains 45 100     Stool 0 0     Total Output 3886 0535     Net -4611 -003            Urine Occurrence 1 x 5 x     Stool Occurrence 1 x 1 x              Physical  Exam  Vitals and nursing note reviewed.   Constitutional:       General: He is not in acute distress.  Cardiovascular:      Rate and Rhythm: Normal rate.      Pulses: Normal pulses.   Pulmonary:      Effort: Pulmonary effort is normal. No respiratory distress.   Abdominal:      General: There is no distension.      Palpations: Abdomen is soft.      Tenderness: There is no abdominal tenderness.      Comments: Upper midline drain with SS output; Abdomen soft. Non-distended. RLQ drain with SS output, staples with surrounding erythema and mild induration, Loose packing in place.    Neurological:      General: No focal deficit present.      Mental Status: He is alert and oriented to person, place, and time.          Significant Labs:  I have reviewed all pertinent lab results within the past 24 hours.  CBC:   Recent Labs   Lab 09/25/23  0406   WBC 11.84   RBC 2.88*   HGB 8.0*   HCT 25.5*   *   MCV 89   MCH 27.8   MCHC 31.4*     CMP:   Recent Labs   Lab 09/25/23  0406      CALCIUM 7.8*   ALBUMIN 1.5*   PROT 5.8*   *   K 3.8   CO2 29   CL 99   BUN 16   CREATININE 0.6   ALKPHOS 295*   ALT 52*   AST 33   BILITOT 0.5       Significant Diagnostics:  I have reviewed all pertinent imaging results/findings within the past 24 hours.

## 2023-09-25 NOTE — ASSESSMENT & PLAN NOTE
Mr. Valladares is a 61 y/o male s/p open distal gastrectomy with BII reconstruction on 8/21/23 and admitted for intra abdominal fluid collection on CT on 9/14/23 and cough. S/p IR drain placement into intra-abdominal fluid collection. Laparoscopic retroperitoneal washout 9/18, with EGD. Progressing well. Abx narrowed based on cultures. CT showing almost complete resolution of lower abdominal collections, ravi-hepatic collections remain. Drain pulled back 10 cm on 9/22 to help continue drainage. Recovering clinically.      - Soft diet  - TPN via PICC line  - Flush DANILO drain 3x/day with 20 mL saline   - PO lasix 40mg  - Daily miralax  - Midline upper drain to gravity  - cont oral augmentin and fluconazole  - Strict monitoring of drain outputs   - PT, OOB chair  - Ambulate TID  - DVT PPX  - GI PPX    Dispo:Approaching stability to discharge. Will discuss antibiotic timing. Needs home health/TPN

## 2023-09-25 NOTE — DISCHARGE SUMMARY
[facility]  General Surgery  Discharge Summary      Patient Name: Danish Valladares  MRN: [id number]  Admission Date: 9/14/2023  Hospital Length of Stay: 11 days  Discharge Date and Time:  09/25/2023 11:55 AM  Attending Physician: Eulogio Moy, *   Discharging Provider: Rachel Baker NP  Primary Care Provider: Yfn Walker MD    HPI:   No notes on file    Procedure(s) (LRB):  DRAINAGE, ABDOMEN, LAPAROSCOPIC (N/A)  EGD (ESOPHAGOGASTRODUODENOSCOPY); flouroscopy, need c-arm in the room (N/A)  LAPAROSCOPY, DIAGNOSTIC      Indwelling Lines/Drains at time of discharge:   Lines/Drains/Airways       Peripherally Inserted Central Catheter Line  Duration             PICC Double Lumen 08/17/23 1117 right basilic 39 days              Drain  Duration                  Closed/Suction Drain 09/14/23 1552 Tube - 1 Right RLQ Bulb 12 Fr. 10 days         Closed/Suction Drain 09/18/23 1152 Tube - 1 Right Abdomen Bulb 19 Fr. 7 days                  Hospital Course:   Mr. Valladares is a 61 y/o male s/p open distal gastrectomy with BII reconstruction on 8/21/23 and admitted for intra abdominal fluid collection on CT on 9/14/23 and cough. He is s/p IR drain placement into intra-abdominal fluid collection on 9/14/2023. He is s/p laparoscopic retroperitoneal washout on 9/18/2023, with EGD. Mr. Valladares has progressed well. His Antibiotics were narrowed based on cultures. His CT scan on 9/22/2023 showing almost complete resolution of lower abdominal collections, ravi-hepatic collections remain. The drain was pulled back 10 cm on 9/22/2023 to help continue drainage. He is recovering clinically and stable for discharge to home.  Home with TPN via PICC line.  The patient is tolerating a dysphagia soft diet.  He is voiding and ambulating without difficulty.  Patient with no complaints of nausea, vomiting, chest pain or shortness of breath.  Vital signs stable. Afebrile. Positive for flatus and positive for bowel  sounds.      Physical Exam  Vitals and nursing note reviewed.   Constitutional:       General: He is not in acute distress.  Cardiovascular:      Rate and Rhythm: Normal rate.      Pulses: Normal pulses.   Pulmonary:      Effort: Pulmonary effort is normal. No respiratory distress.   Abdominal:      General: There is no distension.      Palpations: Abdomen is soft.      Tenderness: There is no abdominal tenderness.      Comments: Upper midline drain with SS output; Abdomen soft. Non-distended. RLQ drain with SS output, staples with surrounding erythema and mild induration, Loose packing in place.    Neurological:      General: No focal deficit present.      Mental Status: He is alert and oriented to person, place, and time  Goals of Care Treatment Preferences:  Code Status: Full Code      Consults:   Consults (From admission, onward)          Status Ordering Provider     Inpatient consult to Interventional Radiology  Once        Provider:  (Not yet assigned)    Completed DARLENE ZUNIGA     Inpatient consult to General surgery  Once        Provider:  (Not yet assigned)    Completed SARAH OLSEN            Significant Diagnostic Studies: Labs:   CMP   Recent Labs   Lab 09/24/23  0406 09/25/23  0406   * 134*   K 3.6 3.8   CL 97 99   CO2 31* 29   * 105   BUN 16 16   CREATININE 0.6 0.6   CALCIUM 7.9* 7.8*   PROT 5.6* 5.8*   ALBUMIN 1.5* 1.5*   BILITOT 0.6 0.5   ALKPHOS 267* 295*   AST 25 33   ALT 41 52*   ANIONGAP 6* 6*    and CBC   Recent Labs   Lab 09/24/23  0406 09/25/23  0406   WBC 11.80 11.84   HGB 7.8* 8.0*   HCT 24.3* 25.5*   * 483*       Pending Diagnostic Studies:       None          Final Active Diagnoses:    Diagnosis Date Noted POA    PRINCIPAL PROBLEM:  Abdominal fluid collection [R18.8] 09/14/2023 Yes    Irritant contact dermatitis due friction or contact with other specified body fluids [L24.A9] 09/19/2023 Yes    Gastric adenocarcinoma [C16.9] 08/17/2023 Yes      Problems Resolved  During this Admission:      Discharged Condition: good    Disposition: Home-Health Care [facility]    Follow Up:   Follow-up Information       Eulogio Moy MD Follow up on 10/3/2023.    Specialties: Surgical Oncology, General Surgery  Why: With labs and CT before visit  Contact information:  [address]  [facility] [address]  [phone]                           Patient Instructions:      CT Abdomen Pelvis With Contrast   Standing Status: Future Standing Exp. Date: 09/25/24     Order Specific Question Answer Comments   Is the patient allergic to iodine contrast? No    Is the patient taking metformin or a metformin combination medication?  If so, the patient should hold the medication for 2 days following contrast. No    Does this patient have impaired renal function? No    May the Radiologist modify the order per protocol to meet the clinical needs of the patient? Yes    Oral/Rectal Contrast instructions: Routine Oral Contrast PO/IV contrast     CBC Auto Differential   Standing Status: Future Standing Exp. Date: 09/24/24     COMPREHENSIVE METABOLIC PANEL   Standing Status: Future Standing Exp. Date: 11/23/24     Magnesium   Standing Status: Future Standing Exp. Date: 09/24/24     Phosphorus   Standing Status: Future Standing Exp. Date: 11/23/24     PREALBUMIN   Standing Status: Future Standing Exp. Date: 11/23/24     Diet general     Call MD for:  temperature >100.4     Call MD for:  persistent nausea and vomiting     Call MD for:  severe uncontrolled pain     Call MD for:  redness, tenderness, or signs of infection (pain, swelling, redness, odor or green/yellow discharge around incision site)     Wound care routine (specify)   Order Comments: Wound care routine: Upper drain with pouch to gravity, lower drain to bulb suction. Change gauze dressing daily near staples. Please wash whole area with soap/water, pat dry     Activity as tolerated     Medications:  Reconciled Home Medications:      Medication  List        START taking these medications      amoxicillin-clavulanate 875-125mg 875-125 mg per tablet  Commonly known as: AUGMENTIN  Take 1 tablet by mouth every 12 (twelve) hours. for 10 days     fluconazole 200 MG Tab  Commonly known as: DIFLUCAN  Take 2 tablets (400 mg total) by mouth once daily. for 10 days  Start taking on: September 26, 2023     furosemide 40 MG tablet  Commonly known as: LASIX  Take 1 tablet (40 mg total) by mouth once daily.  Start taking on: September 26, 2023     gabapentin 100 MG capsule  Commonly known as: NEURONTIN  Take 1 capsule (100 mg total) by mouth 3 (three) times daily.     oxyCODONE 5 MG immediate release tablet  Commonly known as: ROXICODONE  Take 1 tablet (5 mg total) by mouth every 4 (four) hours as needed for Pain.     pantoprazole 40 MG tablet  Commonly known as: PROTONIX  Take 1 tablet (40 mg total) by mouth once daily.  Start taking on: September 26, 2023     tamsulosin 0.4 mg Cap  Commonly known as: FLOMAX  Take 1 capsule (0.4 mg total) by mouth once daily.  Start taking on: September 26, 2023            CHANGE how you take these medications      enoxaparin 40 mg/0.4 mL Syrg  Commonly known as: LOVENOX  Inject 0.4 mLs (40 mg total) into the skin every 24 hours at 5:00pm for 14 days  What changed: when to take this            CONTINUE taking these medications      acetaminophen 650 MG Tbsr  Commonly known as: TYLENOL  Take 1 tablet (650 mg total) by mouth every 8 (eight) hours.     bisacodyL 10 mg Supp  Commonly known as: DULCOLAX  Place 1 suppository (10 mg total) rectally daily as needed.     ibuprofen 200 MG tablet  Commonly known as: ADVIL,MOTRIN  Take 200 mg by mouth every 6 (six) hours as needed for Pain.     omeprazole 40 MG capsule  Commonly known as: PRILOSEC  Take 1 capsule (40 mg total) by mouth once daily.     ondansetron 8 MG Tbdl  Commonly known as: ZOFRAN-ODT  Take 8 mg by mouth 3 (three) times daily.     polyethylene glycol 17 gram/dose powder  Commonly  known as: GLYCOLAX  Use cap to measure 17 grams, mix in liquid and take by mouth once daily.            STOP taking these medications      levoFLOXacin 750 MG tablet  Commonly known as: LEVAQUIN     sucralfate 1 gram tablet  Commonly known as: CARAFATE            Time spent on the discharge of patient: 20 minutes    Rachel Baker NP  General Surgery  [facility]

## 2023-09-25 NOTE — PT/OT/SLP DISCHARGE
Occupational Therapy Discharge Summary    Danish Valladares  MRN: [id number]   Principal Problem: Abdominal fluid collection      Patient Discharged from acute Occupational Therapy on 9/25/23.  Please refer to prior OT note dated 9/19/23 for functional status.    Assessment:      Patient has met all goals and is not appropriate for therapy.    Objective:     GOALS:   Multidisciplinary Problems       Occupational Therapy Goals          Problem: Occupational Therapy    Goal Priority Disciplines Outcome Interventions   Occupational Therapy Goal     OT, PT/OT Ongoing, Progressing    Description: Goals to be met by:9/26/23    Patient will increase functional independence with ADLs by performing:    LE Dressing with Supervision.  Grooming while standing at sink with Supervision.  Toileting from toilet with Set-up Assistance for hygiene and clothing management.   Supine to sit with Modified [place].  Toilet transfer to toilet with [place].                         Reasons for Discontinuation of Therapy Services  Satisfactory goal achievement.      Plan:     Patient Discharged to: Home no OT services needed    9/25/2023

## 2023-09-25 NOTE — PROGRESS NOTES
Siddhartha Thurman - [facility]  General Surgery  Progress Note    Subjective:     History of Present Illness:  No notes on file    Post-Op Info:  Procedure(s) (LRB):  DRAINAGE, ABDOMEN, LAPAROSCOPIC (N/A)  EGD (ESOPHAGOGASTRODUODENOSCOPY); flouroscopy, need c-arm in the room (N/A)  LAPAROSCOPY, DIAGNOSTIC   7 Days Post-Op     Interval History: No significant changes overnight. AFVSS. Pain controlled. Drain output stable. Having bowel function. Ambulating well.     Medications:  Continuous Infusions:   TPN ADULT CENTRAL LINE CUSTOM 60 mL/hr at 09/24/23 2230    TPN ADULT CENTRAL LINE CUSTOM       Scheduled Meds:   acetaminophen  1,000 mg Oral Q8H    amoxicillin-clavulanate 875-125mg  1 tablet Oral Q12H    enoxparin  40 mg Subcutaneous Q24H (prophylaxis, 1700)    fluconazole  400 mg Oral Daily    furosemide  40 mg Oral Daily    gabapentin  100 mg Oral TID    guaiFENesin  600 mg Oral Daily    levalbuterol  0.63 mg Nebulization Q6H WAKE    lipid (SMOFLIPID)  250 mL Intravenous Daily    lipid (SMOFLIPID)  250 mL Intravenous Daily    pantoprazole  40 mg Oral Daily    polyethylene glycol  17 g Oral Daily    tamsulosin  0.4 mg Oral Daily     PRN Meds:dextrose 10%, glucagon (human recombinant), insulin aspart U-100, ondansetron, oxyCODONE, oxyCODONE, sodium chloride 0.9%     Review of patient's allergies indicates:   Allergen Reactions    Penicillins Rash     Objective:     Vital Signs (Most Recent):  Temp: 97.2 °F (36.2 °C) (09/25/23 0723)  Pulse: 88 (09/25/23 0723)  Resp: 18 (09/25/23 0723)  BP: 117/62 (09/25/23 0723)  SpO2: (!) 91 % (09/25/23 0723) Vital Signs (24h Range):  Temp:  [97.2 °F (36.2 °C)-98.9 °F (37.2 °C)] 97.2 °F (36.2 °C)  Pulse:  [86-94] 88  Resp:  [16-20] 18  SpO2:  [91 %-94 %] 91 %  BP: (110-130)/(56-62) 117/62     Weight: 83.5 kg (184 lb) (weight increased due to fluid in my abdomen)  Body mass index is 25.66 kg/m².    Intake/Output - Last 3 Shifts         09/23 0700 09/24 0659 09/24 0700 09/25 0659  09/25 0700  09/26 0659    P.O. 720 1640     Other 25            Total Intake(mL/kg) 1465 (17.5) 1640 (19.6)     Urine (mL/kg/hr) 4700 (2.3) 2395 (1.2)     Drains 45 100     Stool 0 0     Total Output 4745 2495     Net -3280 -855            Urine Occurrence 1 x 5 x     Stool Occurrence 1 x 1 x              Physical Exam  Vitals and nursing note reviewed.   Constitutional:       General: He is not in acute distress.  Cardiovascular:      Rate and Rhythm: Normal rate.      Pulses: Normal pulses.   Pulmonary:      Effort: Pulmonary effort is normal. No respiratory distress.   Abdominal:      General: There is no distension.      Palpations: Abdomen is soft.      Tenderness: There is no abdominal tenderness.      Comments: Upper midline drain with SS output; Abdomen soft. Non-distended. RLQ drain with SS output, staples with surrounding erythema and mild induration, Loose packing in place.    Neurological:      General: No focal deficit present.      Mental Status: He is alert and oriented to person, place, and time.          Significant Labs:  I have reviewed all pertinent lab results within the past 24 hours.  CBC:   Recent Labs   Lab 09/25/23  0406   WBC 11.84   RBC 2.88*   HGB 8.0*   HCT 25.5*   *   MCV 89   MCH 27.8   MCHC 31.4*     CMP:   Recent Labs   Lab 09/25/23  0406      CALCIUM 7.8*   ALBUMIN 1.5*   PROT 5.8*   *   K 3.8   CO2 29   CL 99   BUN 16   CREATININE 0.6   ALKPHOS 295*   ALT 52*   AST 33   BILITOT 0.5       Significant Diagnostics:  I have reviewed all pertinent imaging results/findings within the past 24 hours.    Assessment/Plan:     Gastric adenocarcinoma  Mr. Valladares is a 63 y/o male s/p open distal gastrectomy with BII reconstruction on 8/21/23 and admitted for intra abdominal fluid collection on CT on 9/14/23 and cough. S/p IR drain placement into intra-abdominal fluid collection. Laparoscopic retroperitoneal washout 9/18, with EGD. Progressing well. Abx narrowed based  on cultures. CT showing almost complete resolution of lower abdominal collections, ravi-hepatic collections remain. Drain pulled back 10 cm on 9/22 to help continue drainage. Recovering clinically.      - Soft diet  - TPN via PICC line  - Flush DANILO drain 3x/day with 20 mL saline   - PO lasix 40mg  - Daily miralax  - Midline upper drain to gravity  - cont oral augmentin and fluconazole  - Strict monitoring of drain outputs   - PT, OOB chair  - Ambulate TID  - DVT PPX  - GI PPX    Dispo:Approaching stability to discharge. Will discuss antibiotic timing. Needs home health/TPN           Mino Dockery MD  General Surgery  [facility]

## 2023-09-26 ENCOUNTER — EXTERNAL HOME HEALTH (OUTPATIENT)
Dept: HOME HEALTH SERVICES | Facility: HOSPITAL | Age: 63
End: 2023-09-26
Payer: COMMERCIAL

## 2023-09-26 ENCOUNTER — PATIENT OUTREACH (OUTPATIENT)
Dept: ADMINISTRATIVE | Facility: CLINIC | Age: 63
End: 2023-09-26
Payer: COMMERCIAL

## 2023-09-26 NOTE — PROGRESS NOTES
C3 nurse spoke with Danish Valladares  for a TCC post hospital discharge follow up call. The patient has a scheduled [facility] appointment with Phyllis Alatorre NP with [facility] on 10/2/2023 @ 0800.

## 2023-09-26 NOTE — PROGRESS NOTES
"    Post-Op Follow-up Visit:   9/12/2023  Patient ID: Danish Valladares is a 62 y.o. male, born 1960    No chief complaint on file.    Interval History: Mr. Valladares returns to the clinic following  open distal gastrectomy with BII reconstruction on 8/21/23 with Dr. Moy. He states that his abdomen has gotten bigger since discharge from the hospital. He is on TPN and is eating a small amount of full liquids. He is having daily bowel function. Denies fever ,chills, N/V/D, abd pain, chest pain, SOB.     Physical Exam:  /66   Pulse 104   Ht 5' 11" (1.803 m)   Wt 83.9 kg (185 lb)   SpO2 96%   BMI 25.80 kg/m²     General:  Non-toxic, ambulatory  Abd:  Soft, non-tender  Incision:  CDI, abdomen distended and taut- nontender on palpation    Pathology:  Final Pathologic Diagnosis  Abnormal   1.  Peritoneal nodules (excisional biopsy):   - Desmoplasia, inflammation, fibrosis, rare atypical cells     2.  Peritoneal nodules (biopsy):   - Positive for malignancy   - Metastatic poorly differentiated adenocarcinoma     3.  Distal gastrectomy (resection):   - Poorly differentiated carcinoma, see synoptic report below   - NextGen sequencing is requested and results when/if available will be issued separately in the electronic medical record     4.  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 However tumor extends to within 1-2 mm of the distal margin   - Margin status for dysplasia:  All margins negative for dysplasia   - Regional lymph nodes:   - Regional lymph nodes present   - Tumor present and regional lymph nodes   - Number of lymph nodes with tumor:  7   - Distant sites involved:   - Other: Multiple peritoneal nodules   - Pathologic staging: pT4a N3a M1       ICD-10-CM ICD-9-CM    1. Abdominal pain, unspecified abdominal location  R10.9 789.00 CT Abdomen Pelvis With Contrast      DISCONTINUED: levoFLOXacin (LEVAQUIN) 750 MG tablet      2. Gastric adenocarcinoma  C16.9 151.9       Plan   Mr. Valladares returns to the clinic following  open distal gastrectomy with BII reconstruction on 8/21/23 with Dr. Moy. He states that his abdomen has gotten bigger since discharge from the hospital. He is on TPN and is eating a small amount of full liquids. He is having daily bowel function. His abdomen distended and taut- nontender on palpation- CT scan ordered. We discussed his pathology report as well as referral to med onc. Questions were asked and answered to patient's satisfaction.  We discussed the need for continued clinical/radiographic/endoscopic follow-up. Will f/u after CT.      Follow up if symptoms worsen or fail to improve, for post operative/hospital discharge.    Patient seen in conjunction with Dr. Moy.        CARLOS Altamirano, FNP-C  Upper GI / Hepatobiliary Surgical Oncology  [facility] [place], [place]  Office: [phone]  Fax: [phone]

## 2023-09-27 ENCOUNTER — DOCUMENT SCAN (OUTPATIENT)
Dept: HOME HEALTH SERVICES | Facility: HOSPITAL | Age: 63
End: 2023-09-27
Payer: COMMERCIAL

## 2023-09-28 ENCOUNTER — HOSPITAL ENCOUNTER (OUTPATIENT)
Dept: RADIOLOGY | Facility: HOSPITAL | Age: 63
Discharge: HOME | End: 2023-09-28
Attending: NURSE PRACTITIONER
Payer: COMMERCIAL

## 2023-09-28 DIAGNOSIS — T81.43XA POSTPROCEDURAL INTRAABDOMINAL ABSCESS: ICD-10-CM

## 2023-09-28 PROCEDURE — 25500020 PHARM REV CODE 255: Performed by: NURSE PRACTITIONER

## 2023-09-28 PROCEDURE — 74177 CT ABD & PELVIS W/CONTRAST: CPT | Mod: TC

## 2023-09-28 RX ADMIN — IOHEXOL 100 ML: 350 INJECTION, SOLUTION INTRAVENOUS at 02:09

## 2023-09-29 ENCOUNTER — HOSPITAL ENCOUNTER (OUTPATIENT)
Dept: INTERVENTIONAL RADIOLOGY/VASCULAR | Facility: HOSPITAL | Age: 63
Discharge: HOME | End: 2023-09-29
Attending: SURGERY
Payer: COMMERCIAL

## 2023-09-29 VITALS
DIASTOLIC BLOOD PRESSURE: 62 MMHG | BODY MASS INDEX: 23.1 KG/M2 | WEIGHT: 165 LBS | HEIGHT: 71 IN | RESPIRATION RATE: 20 BRPM | OXYGEN SATURATION: 95 % | TEMPERATURE: 98 F | HEART RATE: 85 BPM | SYSTOLIC BLOOD PRESSURE: 113 MMHG

## 2023-09-29 DIAGNOSIS — T81.43XA POSTPROCEDURAL INTRAABDOMINAL ABSCESS: Primary | ICD-10-CM

## 2023-09-29 DIAGNOSIS — T81.43XA POSTPROCEDURAL INTRAABDOMINAL ABSCESS: ICD-10-CM

## 2023-09-29 LAB
GRAM STN SPEC: NORMAL

## 2023-09-29 PROCEDURE — 10160 PNXR ASPIR ABSC HMTMA BULLA: CPT | Mod: ,,, | Performed by: RADIOLOGY

## 2023-09-29 PROCEDURE — 99153 MOD SED SAME PHYS/QHP EA: CPT

## 2023-09-29 PROCEDURE — 87070 CULTURE OTHR SPECIMN AEROBIC: CPT | Performed by: SURGERY

## 2023-09-29 PROCEDURE — 10160 PNXR ASPIR ABSC HMTMA BULLA: CPT

## 2023-09-29 PROCEDURE — 25000003 PHARM REV CODE 250: Performed by: RADIOLOGY

## 2023-09-29 PROCEDURE — 99152 MOD SED SAME PHYS/QHP 5/>YRS: CPT | Mod: ,,, | Performed by: RADIOLOGY

## 2023-09-29 PROCEDURE — 99152 PR MOD CONSCIOUS SEDATION, SAME PHYS, 5+ YRS, FIRST 15 MIN: ICD-10-PCS | Mod: ,,, | Performed by: RADIOLOGY

## 2023-09-29 PROCEDURE — 10160 PR PUNCTURE DRAINAGE, LESION: ICD-10-PCS | Mod: ,,, | Performed by: RADIOLOGY

## 2023-09-29 PROCEDURE — 99152 MOD SED SAME PHYS/QHP 5/>YRS: CPT

## 2023-09-29 PROCEDURE — 87076 CULTURE ANAEROBE IDENT EACH: CPT | Performed by: SURGERY

## 2023-09-29 PROCEDURE — 87205 SMEAR GRAM STAIN: CPT | Performed by: SURGERY

## 2023-09-29 PROCEDURE — 63600175 PHARM REV CODE 636 W HCPCS: Performed by: RADIOLOGY

## 2023-09-29 PROCEDURE — C1769 GUIDE WIRE: HCPCS

## 2023-09-29 PROCEDURE — 75989 IR ABSCESS DRAINAGE WITH TUBE PLACEMENT: ICD-10-PCS | Mod: 26,,, | Performed by: RADIOLOGY

## 2023-09-29 PROCEDURE — 87102 FUNGUS ISOLATION CULTURE: CPT | Performed by: SURGERY

## 2023-09-29 PROCEDURE — 87075 CULTR BACTERIA EXCEPT BLOOD: CPT | Performed by: SURGERY

## 2023-09-29 PROCEDURE — 75989 ABSCESS DRAINAGE UNDER X-RAY: CPT | Mod: TC

## 2023-09-29 PROCEDURE — 75989 ABSCESS DRAINAGE UNDER X-RAY: CPT | Mod: 26,,, | Performed by: RADIOLOGY

## 2023-09-29 RX ORDER — FENTANYL CITRATE 50 UG/ML
INJECTION, SOLUTION INTRAMUSCULAR; INTRAVENOUS
Status: COMPLETED | OUTPATIENT
Start: 2023-09-29 | End: 2023-09-29

## 2023-09-29 RX ORDER — ONDANSETRON 2 MG/ML
4 INJECTION INTRAMUSCULAR; INTRAVENOUS EVERY 6 HOURS PRN
Status: DISCONTINUED | OUTPATIENT
Start: 2023-09-29 | End: 2023-09-30 | Stop reason: HOSPADM

## 2023-09-29 RX ORDER — MIDAZOLAM HYDROCHLORIDE 1 MG/ML
INJECTION INTRAMUSCULAR; INTRAVENOUS
Status: COMPLETED | OUTPATIENT
Start: 2023-09-29 | End: 2023-09-29

## 2023-09-29 RX ORDER — LIDOCAINE HYDROCHLORIDE 10 MG/ML
INJECTION INFILTRATION; PERINEURAL
Status: COMPLETED | OUTPATIENT
Start: 2023-09-29 | End: 2023-09-29

## 2023-09-29 RX ADMIN — VANCOMYCIN HYDROCHLORIDE 1000 MG: 1 INJECTION, POWDER, LYOPHILIZED, FOR SOLUTION INTRAVENOUS at 01:09

## 2023-09-29 RX ADMIN — FENTANYL CITRATE 50 MCG: 50 INJECTION, SOLUTION INTRAMUSCULAR; INTRAVENOUS at 01:09

## 2023-09-29 RX ADMIN — MIDAZOLAM HYDROCHLORIDE 1 MG: 1 INJECTION, SOLUTION INTRAMUSCULAR; INTRAVENOUS at 01:09

## 2023-09-29 RX ADMIN — LIDOCAINE HYDROCHLORIDE 5 ML: 10 INJECTION, SOLUTION INFILTRATION; PERINEURAL at 01:09

## 2023-09-29 NOTE — PROCEDURES
Radiology Post-Procedure Note    Pre Op Diagnosis: Abscess    Post Op Diagnosis: Same     Procedure:right upper quadrant drainage    Procedure performed by: Josh Serrano MD    Written Informed Consent Obtained: Yes    Specimen Removed: YES 40 cc serosanguinous fluid    Estimated Blood Loss: Minimal    Findings: CT was used for localization of abnormal fluid collection. A needle was inserted into the fluid collection and serosanguinous fluid was aspirated.  A wire was inserted into the collection and the tract was dilated.  A 16 Vietnamese all-purpose drainage catheter was inserted and a pigtail loop of the distal end was formed.  The catheter was sutured into place and approximately  40 mL fluid was removed.     A specimen was sent to the lab for further analysis and culture.    The patient tolerated procedure well and there were no complications. Please see procedure report under Imaging for further details.    Josh Serrano M.D.  Interventional Radiology  Department of Radiology

## 2023-09-29 NOTE — NURSING
Vancomycin completed, flushed PICC line, reviewed discharge instructions with pt, stated understood teaching, pt brought to front of hospital via WC into care of spouse, IR care complete

## 2023-09-29 NOTE — DISCHARGE SUMMARY
Radiology Discharge Summary      Hospital Course: No complications    Admit Date: 9/29/2023  Discharge Date: 09/29/2023     Instructions Given to Patient: Yes  Diet: Resume prior diet  Activity: activity as tolerated and no driving for today    Description of Condition on Discharge: Stable  Vital Signs (Most Recent): Temp: 97.5 °F (36.4 °C) (09/29/23 1257)  Pulse: 91 (09/29/23 1402)  Resp: 20 (09/29/23 1402)  BP: 123/67 (09/29/23 1402)  SpO2: (!) 94 % (09/29/23 1402)    Discharge Disposition: Home    Discharge Diagnosis: Abdominal abscess s/p drainage    Follow up: As scheduled    Josh Serrano M.D.  Interventional Radiology  Department of Radiology

## 2023-09-29 NOTE — SEDATION DOCUMENTATION
IR abscess aspiration completed, placed 16Fr closed suction drain, VS stable, vancomycin infusing, continue to monitor, educated pt on drain management, continue to monitor, updated spouse on POC

## 2023-09-29 NOTE — H&P
Radiology History & Physical      SUBJECTIVE:     Chief Complaint: Abscess    History of Present Illness:  Danish Valladares is a 62 y.o. male who presents for abscess drainage.  Past Medical History:   Diagnosis Date    Psoriasis     Stomach cancer      Past Surgical History:   Procedure Laterality Date    DIAGNOSTIC LAPAROSCOPY  9/18/2023    Procedure: LAPAROSCOPY, DIAGNOSTIC;  Surgeon: Eulogio Moy MD;  Location: [address];  Service: General;;    ESOPHAGOGASTRODUODENOSCOPY N/A 8/3/2023    Procedure: EGD (ESOPHAGOGASTRODUODENOSCOPY);  Surgeon: Loco Marvin MD;  Location: [facility];  Service: Gastroenterology;  Laterality: N/A;    ESOPHAGOGASTRODUODENOSCOPY N/A 9/18/2023    Procedure: EGD (ESOPHAGOGASTRODUODENOSCOPY); flouroscopy, need c-arm in the room;  Surgeon: Eulogio Moy MD;  Location: [address];  Service: General;  Laterality: N/A;  EGD with Fluoroscopy     GASTRECTOMY N/A 8/21/2023    Procedure: GASTRECTOMY;  Surgeon: Eulogio Moy MD;  Location: [address];  Service: General;  Laterality: N/A;  Open gastrectomy with EGD. Needs Omni retractor, requesting room 24    LAPAROSCOPIC DRAINAGE OF ABDOMEN N/A 9/18/2023    Procedure: DRAINAGE, ABDOMEN, LAPAROSCOPIC;  Surgeon: Eulogio Moy MD;  Location: [address];  Service: General;  Laterality: N/A;    TONSILLECTOMY         Home Meds:   Prior to Admission medications    Medication Sig Start Date End Date Taking? Authorizing Provider   acetaminophen (TYLENOL) 650 MG TbSR Take 1 tablet (650 mg total) by mouth every 8 (eight) hours. 9/1/23 10/1/23 Yes Bubba Chirinos MD   amoxicillin-clavulanate 875-125mg (AUGMENTIN) 875-125 mg per tablet Take 1 tablet by mouth every 12 (twelve) hours. for 10 days 9/25/23 10/5/23 Yes Rachel Baker NP   enoxaparin (LOVENOX) 40 mg/0.4 mL Syrg Inject 0.4 mLs (40 mg total) into the skin every 24 hours at 5:00pm for 14 days 9/25/23 10/9/23 Yes Olivia,  Rachel, NP   fluconazole (DIFLUCAN) 200 MG Tab Take 2 tablets (400 mg total) by mouth once daily. for 10 days 9/26/23 10/6/23 Yes Rachel Baker NP   furosemide (LASIX) 40 MG tablet Take 1 tablet (40 mg total) by mouth once daily. 9/26/23 9/25/24 Yes Diana Alvares MD   gabapentin (NEURONTIN) 100 MG capsule Take 1 capsule (100 mg total) by mouth 3 (three) times daily. 9/25/23 9/24/24 Yes Diana Alvares MD   oxyCODONE (ROXICODONE) 5 MG immediate release tablet Take 1 tablet (5 mg total) by mouth every 4 (four) hours as needed for Pain. 9/25/23  Yes Diana Alvares MD   pantoprazole (PROTONIX) 40 MG tablet Take 1 tablet (40 mg total) by mouth once daily. 9/26/23 9/25/24 Yes Diana Alvares MD   tamsulosin (FLOMAX) 0.4 mg Cap Take 1 capsule (0.4 mg total) by mouth once daily. 9/26/23 9/25/24 Yes Diana Alvares MD   bisacodyL (DULCOLAX) 10 mg Supp Place 1 suppository (10 mg total) rectally daily as needed.  Patient not taking: Reported on 9/26/2023 9/1/23   Bubba Chirinos MD   ibuprofen (ADVIL,MOTRIN) 200 MG tablet Take 200 mg by mouth every 6 (six) hours as needed for Pain.    Provider, Historical   omeprazole (PRILOSEC) 40 MG capsule Take 1 capsule (40 mg total) by mouth once daily. 7/13/23   Shruthi Gill, MEY   ondansetron (ZOFRAN-ODT) 8 MG TbDL Take 8 mg by mouth 3 (three) times daily. 6/26/23   Provider, Historical   polyethylene glycol (GLYCOLAX) 17 gram/dose powder Use cap to measure 17 grams, mix in liquid and take by mouth once daily.  Patient not taking: Reported on 9/26/2023 9/1/23   Bubba Chirinos MD     Anticoagulants/Antiplatelets: no anticoagulation    Allergies:   Review of patient's allergies indicates:   Allergen Reactions    Penicillins Rash     Sedation History:  no adverse reactions    Review of Systems:   Hematological: no known coagulopathies  Respiratory: no shortness of breath  Cardiovascular: no chest pain  Gastrointestinal: no abdominal  pain  Genito-Urinary: no dysuria  Musculoskeletal: negative  Neurological: no TIA or stroke symptoms         OBJECTIVE:     Vital Signs (Most Recent)  Temp: 97.5 °F (36.4 °C) (09/29/23 1257)  Pulse: 91 (09/29/23 1257)  Resp: 14 (09/29/23 1257)  BP: 117/67 (09/29/23 1257)  SpO2: 97 % (09/29/23 1257)    Physical Exam:  ASA: 3  Mallampati: 2    General: no acute distress  Mental Status: alert and oriented to person, place and time  HEENT: normocephalic, atraumatic  Chest: unlabored breathing  Heart: regular heart rate  Abdomen: nondistended  Extremity: moves all extremities    Laboratory  Lab Results   Component Value Date    INR 1.1 09/14/2023       Lab Results   Component Value Date    WBC 11.84 09/25/2023    HGB 8.0 (L) 09/25/2023    HCT 25.5 (L) 09/25/2023    MCV 89 09/25/2023     (H) 09/25/2023      Lab Results   Component Value Date     09/25/2023     (L) 09/25/2023    K 3.8 09/25/2023    CL 99 09/25/2023    CO2 29 09/25/2023    BUN 16 09/25/2023    CREATININE 0.6 09/25/2023    CALCIUM 7.8 (L) 09/25/2023    MG 1.9 09/25/2023    ALT 52 (H) 09/25/2023    AST 33 09/25/2023    ALBUMIN 1.5 (L) 09/25/2023    BILITOT 0.5 09/25/2023       ASSESSMENT/PLAN:     Sedation Plan: Moderate  Patient will undergo US guided abscess drainage.    Josh Serrano M.D.  Interventional Radiology  Department of Radiology

## 2023-10-02 ENCOUNTER — OFFICE VISIT (OUTPATIENT)
Dept: HOME HEALTH SERVICES | Facility: CLINIC | Age: 63
End: 2023-10-02
Payer: COMMERCIAL

## 2023-10-02 ENCOUNTER — APPOINTMENT (OUTPATIENT)
Dept: LAB | Facility: HOSPITAL | Age: 63
End: 2023-10-02
Attending: SURGERY
Payer: COMMERCIAL

## 2023-10-02 VITALS
SYSTOLIC BLOOD PRESSURE: 110 MMHG | DIASTOLIC BLOOD PRESSURE: 58 MMHG | TEMPERATURE: 97 F | OXYGEN SATURATION: 95 % | HEART RATE: 85 BPM

## 2023-10-02 DIAGNOSIS — C16.9 GASTRIC ADENOCARCINOMA: Primary | ICD-10-CM

## 2023-10-02 DIAGNOSIS — R18.8 ABDOMINAL FLUID COLLECTION: Primary | ICD-10-CM

## 2023-10-02 DIAGNOSIS — C16.2 MALIGNANT NEOPLASM OF BODY OF STOMACH: Primary | ICD-10-CM

## 2023-10-02 DIAGNOSIS — C16.9 GASTRIC ADENOCARCINOMA: ICD-10-CM

## 2023-10-02 PROCEDURE — 3078F PR MOST RECENT DIASTOLIC BLOOD PRESSURE < 80 MM HG: ICD-10-PCS | Mod: CPTII,S$GLB,,

## 2023-10-02 PROCEDURE — 1111F DSCHRG MED/CURRENT MED MERGE: CPT | Mod: CPTII,S$GLB,,

## 2023-10-02 PROCEDURE — 3074F SYST BP LT 130 MM HG: CPT | Mod: CPTII,S$GLB,,

## 2023-10-02 PROCEDURE — 1159F PR MEDICATION LIST DOCUMENTED IN MEDICAL RECORD: ICD-10-PCS | Mod: CPTII,S$GLB,,

## 2023-10-02 PROCEDURE — 3078F DIAST BP <80 MM HG: CPT | Mod: CPTII,S$GLB,,

## 2023-10-02 PROCEDURE — 1159F MED LIST DOCD IN RCRD: CPT | Mod: CPTII,S$GLB,,

## 2023-10-02 PROCEDURE — 1160F RVW MEDS BY RX/DR IN RCRD: CPT | Mod: CPTII,S$GLB,,

## 2023-10-02 PROCEDURE — 3044F HG A1C LEVEL LT 7.0%: CPT | Mod: CPTII,S$GLB,,

## 2023-10-02 PROCEDURE — 3074F PR MOST RECENT SYSTOLIC BLOOD PRESSURE < 130 MM HG: ICD-10-PCS | Mod: CPTII,S$GLB,,

## 2023-10-02 PROCEDURE — 1160F PR REVIEW ALL MEDS BY PRESCRIBER/CLIN PHARMACIST DOCUMENTED: ICD-10-PCS | Mod: CPTII,S$GLB,,

## 2023-10-02 PROCEDURE — 99496 TRANSITIONAL CARE MANAGE SERVICE 7 DAY DISCHARGE: ICD-10-PCS | Mod: S$GLB,,,

## 2023-10-02 PROCEDURE — 1111F PR DISCHARGE MEDS RECONCILED W/ CURRENT OUTPATIENT MED LIST: ICD-10-PCS | Mod: CPTII,S$GLB,,

## 2023-10-02 PROCEDURE — 3044F PR MOST RECENT HEMOGLOBIN A1C LEVEL <7.0%: ICD-10-PCS | Mod: CPTII,S$GLB,,

## 2023-10-02 PROCEDURE — 99496 TRANSJ CARE MGMT HIGH F2F 7D: CPT | Mod: S$GLB,,,

## 2023-10-02 RX ORDER — OXYCODONE HYDROCHLORIDE 5 MG/1
5 TABLET ORAL EVERY 4 HOURS PRN
Qty: 15 TABLET | Refills: 0 | Status: SHIPPED | OUTPATIENT
Start: 2023-10-02 | End: 2023-10-31 | Stop reason: ALTCHOICE

## 2023-10-02 RX ORDER — CYCLOBENZAPRINE HCL 10 MG
10 TABLET ORAL 3 TIMES DAILY PRN
Qty: 30 TABLET | Refills: 0 | Status: SHIPPED | OUTPATIENT
Start: 2023-10-02 | End: 2023-10-02 | Stop reason: CLARIF

## 2023-10-02 NOTE — PROGRESS NOTES
[facility]  Transitional Care Management (TCM) Home Visit    Encounter Provider: ERIS ORDONEZ,   PCP: Yfn Walker MD  Consult Requested By: No ref. provider found  Admit Date: 9/14/23   IP Discharge Date: 9/25/23  Hospital Length of Stay: 11 days  Days since discharge (from IP or SNF): 7 days  [facility]: 9/26  Hospital Diagnosis: Abdominal fluid collection, gastric adenocarcinoma    HISTORY OF PRESENT ILLNESS      Patient ID: Danish Valladares is a 62 y.o. male was recently admitted to the hospital, this is their TCM encounter.    Hospital Course Synopsis:  Mr. Valladares was admitted for intraabdominal fluid collection and subsequent drain place  and washout s/p open distal gastrectomy for gastric adenocarcinoma. Discharged home with TPN to right upper arm PICC and .     Developments since hospitalization and current needs:   Overall doing well since discharge. He had another drain placed on Friday 9/29 after another smaller fluid collection was found on CT scan. Today, the first drain has scant clear drainage in bulb drain, moderate amount of serous drainage noted to second drain. He has a gastric ostomy with liquid green output in collection bag. He is following up with Dr Moy tomorrow at the [facility].     TPN is continued to right upper arm picc line. Hes also supplementing with a soft PO diet. Reports fair appetite. No problems with elimination. He is compliant with lovenox injections at home. He verbalizes needing a refill on his pain medication, states will contact Dr. Fitch office today to get that initiated. He also needs a new primary care physician on the [facility]- he will contact [facility] location to see who is accepting new patients.      DECISION MAKING TODAY       Assessment & Plan:  1. Abdominal fluid collection  Assessment & Plan:  -Currently with 2 Right abdominal drains.  -First drain from hospitalization with scant output. Second placed 3 days  ago with moderate serous drainage.   -Drain sites are being cleaned and drained per instructions. Wife helps in care.   -Continue to monitor drain output.   -Report fever, new pain immediately.  -Continuing PO augmentin at home.  -F/u with Dr Moy as scheduled.      2. Gastric adenocarcinoma   -Gastrectomy incisions healing well.    -Ostomy with dark green output. No complications.   -Plan to follow up with oncology MD on [facility] once fluid collections are treated.   -F/u with Dr. Moy discussed.            Medication List on Discharge:     Medication List            Accurate as of October 2, 2023  9:07 PM. If you have any questions, ask your nurse or doctor.                CONTINUE taking these medications      amoxicillin-clavulanate 875-125mg 875-125 mg per tablet  Commonly known as: AUGMENTIN  Take 1 tablet by mouth every 12 (twelve) hours. for 10 days     bisacodyL 10 mg Supp  Commonly known as: DULCOLAX  Place 1 suppository (10 mg total) rectally daily as needed.     enoxaparin 40 mg/0.4 mL Syrg  Commonly known as: LOVENOX  Inject 0.4 mLs (40 mg total) into the skin every 24 hours at 5:00pm for 14 days     fluconazole 200 MG Tab  Commonly known as: DIFLUCAN  Take 2 tablets (400 mg total) by mouth once daily. for 10 days     furosemide 40 MG tablet  Commonly known as: LASIX  Take 1 tablet (40 mg total) by mouth once daily.     gabapentin 100 MG capsule  Commonly known as: NEURONTIN  Take 1 capsule (100 mg total) by mouth 3 (three) times daily.     ibuprofen 200 MG tablet  Commonly known as: ADVIL,MOTRIN  Take 200 mg by mouth every 6 (six) hours as needed for Pain.     omeprazole 40 MG capsule  Commonly known as: PRILOSEC  Take 1 capsule (40 mg total) by mouth once daily.     ondansetron 8 MG Tbdl  Commonly known as: ZOFRAN-ODT  Take 8 mg by mouth 3 (three) times daily.     oxyCODONE 5 MG immediate release tablet  Commonly known as: ROXICODONE  Take 1 tablet (5 mg total) by mouth every 4 (four) hours as  needed for Pain.     pantoprazole 40 MG tablet  Commonly known as: PROTONIX  Take 1 tablet (40 mg total) by mouth once daily.     polyethylene glycol 17 gram/dose powder  Commonly known as: GLYCOLAX  Use cap to measure 17 grams, mix in liquid and take by mouth once daily.     tamsulosin 0.4 mg Cap  Commonly known as: FLOMAX  Take 1 capsule (0.4 mg total) by mouth once daily.              Medication Reconciliation:  Were medications changed on discharge? Yes  Were medications in the home? Yes  Is the patient taking the medications as directed? Yes  Does the patient understand the medications and changes? Yes  Does updated med list accurately reflects meds patient is currently taking? Yes    ENVIRONMENT OF CARE      Family and/or Caregiver present at visit?  No  Name of Caregiver: Wife Fatimah  History provided by: patient    Advance Care Planning   Advanced Care Planning Status:  Patient has had an ACP conversation  Living Will: No  Power of : No  [place]: No    Does Caregiver have HCPoA: No  Changes today: n/a       Impression upon entering the home:  Physical Dwelling: single family home   Appearance of home environment: cleaniness: clean and home structure: sound structure  Functional Status: minimal assistance  Mobility: ambulatory with device  Nutritional access: available food but inadequate intake  Home Health: Yes,  Agency [facility]    DME/Supplies: rolling walker and TPN pump      Diagnostic tests reviewed/disposition: No diagnosic tests pending after this hospitalization.  Disease/illness education: Cancer  Establishment or re-establishment of referral orders for community resources: No other necessary community resources.   Discussion with other health care providers: No discussion with other health care providers necessary.   Does patient have a PCP at [facility]? Yes   Repatriation plan with PCP? follow-up with PCP within 90d   Does patient have an ostomy (ileostomy, colostomy, suprapubic  catheter, nephrostomy tube, tracheostomy, PEG tube, pleurex catheter, cholecystostomy, etc)? Yes. Is it on problem list?yes  Were BPAs reviewed? Yes    Social History     Socioeconomic History    Marital status:    Tobacco Use    Smoking status: Former     Current packs/day: 0.00     Types: Cigarettes     Quit date: 5/27/2013     Years since quitting: 10.3    Smokeless tobacco: Never   Substance and Sexual Activity    Alcohol use: Yes     Comment: occ    Drug use: Never    Sexual activity: Yes     Partners: Female     Social Determinants of Health     Financial Resource Strain: Low Risk  (9/15/2023)    Overall Financial Resource Strain (CARDIA)     Difficulty of Paying Living Expenses: Not hard at all   Food Insecurity: No Food Insecurity (9/15/2023)    Hunger Vital Sign     Worried About Running Out of Food in the Last Year: Never true     Ran Out of Food in the Last Year: Never true   Transportation Needs: No Transportation Needs (9/15/2023)    PRAPARE - Transportation     Lack of Transportation (Medical): No     Lack of Transportation (Non-Medical): No   Physical Activity: Unknown (9/15/2023)    Exercise Vital Sign     Days of Exercise per Week: Patient refused     Minutes of Exercise per Session: Patient refused   Stress: Unknown (9/15/2023)    [place] [employer] - Occupational Stress Questionnaire     Feeling of Stress : Patient refused   Social Connections: Unknown (9/15/2023)    Social Connection and Isolation Panel [NHANES]     Frequency of Communication with Friends and Family: Once a week     Frequency of Social Gatherings with Friends and Family: Once a week     Attends Jainism Services: Patient refused     Active Member of Clubs or Organizations: Patient refused     Attends Club or Organization Meetings: Patient refused     Marital Status:    Housing Stability: Unknown (9/15/2023)    Housing Stability Vital Sign     Unable to Pay for Housing in the Last Year: No      Unstable Housing in the Last Year: No         OBJECTIVE:     Vital Signs:  Vitals:    10/02/23 0900   BP: (!) 110/58   Pulse: 85   Temp: 97.4 °F (36.3 °C)       Review of Systems   Constitutional:  Positive for fatigue. Negative for activity change, appetite change and fever.   Respiratory:  Negative for cough, chest tightness, shortness of breath and wheezing.    Cardiovascular:  Negative for chest pain, palpitations and leg swelling.   Gastrointestinal:  Negative for abdominal pain, constipation and diarrhea.   Genitourinary:  Negative for difficulty urinating and hematuria.   Musculoskeletal:  Positive for back pain and gait problem. Negative for joint swelling.   Skin:  Positive for wound. Negative for color change.   Neurological:  Positive for weakness. Negative for tremors, seizures and numbness.       Physical Exam:  Physical Exam  Constitutional:       Appearance: Normal appearance.   HENT:      Head: Normocephalic and atraumatic.   Cardiovascular:      Rate and Rhythm: Normal rate and regular rhythm.      Pulses: Normal pulses.      Heart sounds: Normal heart sounds.   Pulmonary:      Effort: Pulmonary effort is normal.      Breath sounds: Normal breath sounds.   Abdominal:      General: The ostomy site is clean. Bowel sounds are normal.      Palpations: Abdomen is soft.   Musculoskeletal:         General: Normal range of motion.      Comments: amb with walker   Skin:     General: Skin is warm and dry.      Capillary Refill: Capillary refill takes less than 2 seconds.      Findings: Wound present.             Comments: Incisions with staples well approximated, no SS of infection   Neurological:      General: No focal deficit present.      Mental Status: He is alert and oriented to person, place, and time. Mental status is at baseline.      Motor: Weakness present.   Psychiatric:         Mood and Affect: Mood normal.         Thought Content: Thought content normal.         INSTRUCTIONS FOR PATIENT:      Scheduled Follow-up, Appts Reviewed with Modifications if Needed: Yes  Future Appointments   Date Time Provider Department Center   10/3/2023  9:50 AM LAB, [facility] THOMAS Mccann   10/3/2023 10:00 AM Radha Smith NP [facility]ard Mccann       Signature:      ERIS ORDONEZ,, NP    Transition of Care Visit:  I have reviewed and updated the history and problem list.  I have reconciled the medication list.  I have discussed the hospitalization and current medical issues, prognosis and plans with the patient/family.

## 2023-10-03 ENCOUNTER — LAB VISIT (OUTPATIENT)
Dept: LAB | Facility: HOSPITAL | Age: 63
End: 2023-10-03
Payer: COMMERCIAL

## 2023-10-03 ENCOUNTER — CLINICAL SUPPORT (OUTPATIENT)
Dept: HEMATOLOGY/ONCOLOGY | Facility: CLINIC | Age: 63
End: 2023-10-03
Payer: COMMERCIAL

## 2023-10-03 ENCOUNTER — OFFICE VISIT (OUTPATIENT)
Dept: SURGERY | Facility: CLINIC | Age: 63
End: 2023-10-03
Payer: COMMERCIAL

## 2023-10-03 VITALS
HEIGHT: 71 IN | WEIGHT: 169.31 LBS | BODY MASS INDEX: 23.7 KG/M2 | SYSTOLIC BLOOD PRESSURE: 102 MMHG | OXYGEN SATURATION: 96 % | HEART RATE: 91 BPM | DIASTOLIC BLOOD PRESSURE: 58 MMHG

## 2023-10-03 DIAGNOSIS — E44.0 MODERATE MALNUTRITION: ICD-10-CM

## 2023-10-03 DIAGNOSIS — T81.43XA POSTPROCEDURAL INTRAABDOMINAL ABSCESS: ICD-10-CM

## 2023-10-03 DIAGNOSIS — C16.9 GASTRIC ADENOCARCINOMA: ICD-10-CM

## 2023-10-03 DIAGNOSIS — Z71.3 NUTRITIONAL COUNSELING: Primary | ICD-10-CM

## 2023-10-03 DIAGNOSIS — T81.43XA POSTPROCEDURAL INTRAABDOMINAL ABSCESS: Primary | ICD-10-CM

## 2023-10-03 LAB
ALBUMIN SERPL BCP-MCNC: 2 G/DL (ref 3.5–5.2)
ALP SERPL-CCNC: 265 U/L (ref 55–135)
ALT SERPL W/O P-5'-P-CCNC: 63 U/L (ref 10–44)
ANION GAP SERPL CALC-SCNC: 8 MMOL/L (ref 8–16)
AST SERPL-CCNC: 29 U/L (ref 10–40)
BACTERIA SPEC AEROBE CULT: NO GROWTH
BASOPHILS # BLD AUTO: 0.05 K/UL (ref 0–0.2)
BASOPHILS NFR BLD: 0.6 % (ref 0–1.9)
BILIRUB SERPL-MCNC: 0.4 MG/DL (ref 0.1–1)
BUN SERPL-MCNC: 26 MG/DL (ref 8–23)
CALCIUM SERPL-MCNC: 8.5 MG/DL (ref 8.7–10.5)
CHLORIDE SERPL-SCNC: 102 MMOL/L (ref 95–110)
CO2 SERPL-SCNC: 26 MMOL/L (ref 23–29)
CREAT SERPL-MCNC: 0.6 MG/DL (ref 0.5–1.4)
DIFFERENTIAL METHOD: ABNORMAL
EOSINOPHIL # BLD AUTO: 0.1 K/UL (ref 0–0.5)
EOSINOPHIL NFR BLD: 0.6 % (ref 0–8)
ERYTHROCYTE [DISTWIDTH] IN BLOOD BY AUTOMATED COUNT: 16.9 % (ref 11.5–14.5)
EST. GFR  (NO RACE VARIABLE): >60 ML/MIN/1.73 M^2
GLUCOSE SERPL-MCNC: 122 MG/DL (ref 70–110)
HCT VFR BLD AUTO: 31.4 % (ref 40–54)
HGB BLD-MCNC: 9.5 G/DL (ref 14–18)
IMM GRANULOCYTES # BLD AUTO: 0.09 K/UL (ref 0–0.04)
IMM GRANULOCYTES NFR BLD AUTO: 1.1 % (ref 0–0.5)
LYMPHOCYTES # BLD AUTO: 1.8 K/UL (ref 1–4.8)
LYMPHOCYTES NFR BLD: 21.2 % (ref 18–48)
MAGNESIUM SERPL-MCNC: 1.8 MG/DL (ref 1.6–2.6)
MCH RBC QN AUTO: 27.4 PG (ref 27–31)
MCHC RBC AUTO-ENTMCNC: 30.3 G/DL (ref 32–36)
MCV RBC AUTO: 91 FL (ref 82–98)
MONOCYTES # BLD AUTO: 0.9 K/UL (ref 0.3–1)
MONOCYTES NFR BLD: 10.2 % (ref 4–15)
NEUTROPHILS # BLD AUTO: 5.6 K/UL (ref 1.8–7.7)
NEUTROPHILS NFR BLD: 66.3 % (ref 38–73)
NRBC BLD-RTO: 0 /100 WBC
PHOSPHATE SERPL-MCNC: 3.3 MG/DL (ref 2.7–4.5)
PLATELET # BLD AUTO: 414 K/UL (ref 150–450)
PMV BLD AUTO: 10 FL (ref 9.2–12.9)
POTASSIUM SERPL-SCNC: 4.1 MMOL/L (ref 3.5–5.1)
PREALB SERPL-MCNC: 20 MG/DL (ref 20–43)
PROT SERPL-MCNC: 7.2 G/DL (ref 6–8.4)
RBC # BLD AUTO: 3.47 M/UL (ref 4.6–6.2)
SODIUM SERPL-SCNC: 136 MMOL/L (ref 136–145)
WBC # BLD AUTO: 8.44 K/UL (ref 3.9–12.7)

## 2023-10-03 PROCEDURE — 3078F DIAST BP <80 MM HG: CPT | Mod: CPTII,S$GLB,,

## 2023-10-03 PROCEDURE — 3074F PR MOST RECENT SYSTOLIC BLOOD PRESSURE < 130 MM HG: ICD-10-PCS | Mod: CPTII,S$GLB,,

## 2023-10-03 PROCEDURE — 99999 PR PBB SHADOW E&M-EST. PATIENT-LVL I: ICD-10-PCS | Mod: PBBFAC,,, | Performed by: DIETITIAN, REGISTERED

## 2023-10-03 PROCEDURE — 84134 ASSAY OF PREALBUMIN: CPT | Performed by: NURSE PRACTITIONER

## 2023-10-03 PROCEDURE — 84100 ASSAY OF PHOSPHORUS: CPT | Performed by: NURSE PRACTITIONER

## 2023-10-03 PROCEDURE — 85025 COMPLETE CBC W/AUTO DIFF WBC: CPT | Performed by: NURSE PRACTITIONER

## 2023-10-03 PROCEDURE — 99024 PR POST-OP FOLLOW-UP VISIT: ICD-10-PCS | Mod: S$GLB,,,

## 2023-10-03 PROCEDURE — 80053 COMPREHEN METABOLIC PANEL: CPT | Performed by: NURSE PRACTITIONER

## 2023-10-03 PROCEDURE — 3078F PR MOST RECENT DIASTOLIC BLOOD PRESSURE < 80 MM HG: ICD-10-PCS | Mod: CPTII,S$GLB,,

## 2023-10-03 PROCEDURE — 83735 ASSAY OF MAGNESIUM: CPT | Performed by: NURSE PRACTITIONER

## 2023-10-03 PROCEDURE — 99999 PR PBB SHADOW E&M-EST. PATIENT-LVL I: CPT | Mod: PBBFAC,,, | Performed by: DIETITIAN, REGISTERED

## 2023-10-03 PROCEDURE — 99999 PR PBB SHADOW E&M-EST. PATIENT-LVL IV: ICD-10-PCS | Mod: PBBFAC,,,

## 2023-10-03 PROCEDURE — 97802 PR MED NUTR THER, 1ST, INDIV, EA 15 MIN: ICD-10-PCS | Mod: S$GLB,,, | Performed by: DIETITIAN, REGISTERED

## 2023-10-03 PROCEDURE — 3074F SYST BP LT 130 MM HG: CPT | Mod: CPTII,S$GLB,,

## 2023-10-03 PROCEDURE — 99024 POSTOP FOLLOW-UP VISIT: CPT | Mod: S$GLB,,,

## 2023-10-03 PROCEDURE — 97802 MEDICAL NUTRITION INDIV IN: CPT | Mod: S$GLB,,, | Performed by: DIETITIAN, REGISTERED

## 2023-10-03 PROCEDURE — 99999 PR PBB SHADOW E&M-EST. PATIENT-LVL IV: CPT | Mod: PBBFAC,,,

## 2023-10-03 PROCEDURE — 3044F PR MOST RECENT HEMOGLOBIN A1C LEVEL <7.0%: ICD-10-PCS | Mod: CPTII,S$GLB,,

## 2023-10-03 PROCEDURE — 36415 COLL VENOUS BLD VENIPUNCTURE: CPT | Performed by: NURSE PRACTITIONER

## 2023-10-03 PROCEDURE — 3044F HG A1C LEVEL LT 7.0%: CPT | Mod: CPTII,S$GLB,,

## 2023-10-03 RX ORDER — MIRTAZAPINE 15 MG/1
15 TABLET, FILM COATED ORAL NIGHTLY
Qty: 30 TABLET | Refills: 11 | Status: SHIPPED | OUTPATIENT
Start: 2023-10-03 | End: 2024-10-02

## 2023-10-03 RX ORDER — FUROSEMIDE 40 MG/1
40 TABLET ORAL EVERY OTHER DAY
Qty: 15 TABLET | Refills: 11 | Status: SHIPPED | OUTPATIENT
Start: 2023-10-03 | End: 2023-10-31 | Stop reason: ALTCHOICE

## 2023-10-03 NOTE — PROGRESS NOTES
"Oncology Nutrition Assessment for Medical Nutrition Therapy  Initial Visit    Danish Valladares   1960    Referring Provider: Radha Smith NP      Reason for Visit: nutrition counseling and education    PMHx:   Past Medical History:   Diagnosis Date    Psoriasis     Stomach cancer        Nutrition Assessment    This is a 62 y.o.male with a medical diagnosis of gastric adenocarcinoma s/p distal gastrectomy 8/21 c/b leak. He was discharged on soft diet and TPN (140g AA + 230g dextrose + 50g lipids - provides 1842 kcal/day). Supposed to be cycling for 18 hours but his wife was unaware of that. He reports he has not eaten much, family reports only a couple bites here and there. His wife is making him protein shakes (protein powder and heavy cream). He also reports he isn't drinking much water.     Weight: 76.8 kg (169 lb 5 oz)  Height: 5' 11" (1.803 m)  BMI: 23.6    Usual BW: 195lb  Weight Change: 25lb loss    Allergies: Penicillins    Current Medications:  Current Outpatient Medications:     amoxicillin-clavulanate 875-125mg (AUGMENTIN) 875-125 mg per tablet, Take 1 tablet by mouth every 12 (twelve) hours. for 10 days, Disp: 20 tablet, Rfl: 0    bisacodyL (DULCOLAX) 10 mg Supp, Place 1 suppository (10 mg total) rectally daily as needed., Disp: 10 suppository, Rfl: 0    enoxaparin (LOVENOX) 40 mg/0.4 mL Syrg, Inject 0.4 mLs (40 mg total) into the skin every 24 hours at 5:00pm for 14 days, Disp: 5.6 mL, Rfl: 0    fluconazole (DIFLUCAN) 200 MG Tab, Take 2 tablets (400 mg total) by mouth once daily. for 10 days, Disp: 20 tablet, Rfl: 0    furosemide (LASIX) 40 MG tablet, Take 1 tablet (40 mg total) by mouth once daily., Disp: 30 tablet, Rfl: 11    gabapentin (NEURONTIN) 100 MG capsule, Take 1 capsule (100 mg total) by mouth 3 (three) times daily., Disp: 90 capsule, Rfl: 11    ibuprofen (ADVIL,MOTRIN) 200 MG tablet, Take 200 mg by mouth every 6 (six) hours as needed for Pain., Disp: , Rfl:     omeprazole (PRILOSEC) " 40 MG capsule, Take 1 capsule (40 mg total) by mouth once daily., Disp: 90 capsule, Rfl: 0    ondansetron (ZOFRAN-ODT) 8 MG TbDL, Take 8 mg by mouth 3 (three) times daily., Disp: , Rfl:     oxyCODONE (ROXICODONE) 5 MG immediate release tablet, Take 1 tablet (5 mg total) by mouth every 4 (four) hours as needed for Pain., Disp: 15 tablet, Rfl: 0    pantoprazole (PROTONIX) 40 MG tablet, Take 1 tablet (40 mg total) by mouth once daily., Disp: 30 tablet, Rfl: 11    polyethylene glycol (GLYCOLAX) 17 gram/dose powder, Use cap to measure 17 grams, mix in liquid and take by mouth once daily., Disp: 238 g, Rfl: 7    tamsulosin (FLOMAX) 0.4 mg Cap, Take 1 capsule (0.4 mg total) by mouth once daily., Disp: 30 capsule, Rfl: 11    Food/medication interactions noted: none    Labs: Reviewed    Nutrition Diagnosis    Problem: moderate malnutrition  Etiology: appetite loss and inadequate energy and protein intake  Signs/Symptoms: reports of inadequate PO intake, weight loss, and both fat & muscle wasting present    Nutrition Intervention    Nutrition Prescription   9551-3466 kcals (25-30kcal/kg)  115-1.8g protein (1.5-1.8g/kg)   1920-2304mL fluid (25-30mL/kg)    Recommendations:  Continue current TPN - discussed cycling for 18 hours  Increase PO intake of soft foods - examples discussed  -also discussed timing of small meals/snacks  Continue to supplement with protein shake  Increase fluid intake - examples discussed    Materials Provided/Reviewed:  written timeline of meals per patient's family request    Nutrition Monitoring and Evaluation    Monitor: energy intake, rate/formulation of TPN, and weight status    Goals: weight maintenance    Follow up: in 1-2 weeks    Communication to referring provider/care team: note available in chart; discussed with SATNAM Smith NP and [facility]    Counseling time: 15 minutes    Cely Hester, MS, RD, LDN

## 2023-10-03 NOTE — ASSESSMENT & PLAN NOTE
-Currently with 2 Right abdominal drains.  -First drain from hospitalization with scant output. Second placed 3 days ago with moderate serous drainage.   -Drain sites are being cleaned and drained per instructions. Wife helps in care.   -Continue to monitor drain output.   -Report fever, new pain immediately.  -Continuing PO augmentin at home.  -F/u with Dr Moy as scheduled.

## 2023-10-04 LAB — BACTERIA SPEC ANAEROBE CULT: ABNORMAL

## 2023-10-05 ENCOUNTER — DOCUMENT SCAN (OUTPATIENT)
Dept: HOME HEALTH SERVICES | Facility: HOSPITAL | Age: 63
End: 2023-10-05
Payer: COMMERCIAL

## 2023-10-05 DIAGNOSIS — T81.43XA POSTPROCEDURAL INTRAABDOMINAL ABSCESS: Primary | ICD-10-CM

## 2023-10-05 RX ORDER — AMOXICILLIN AND CLAVULANATE POTASSIUM 875; 125 MG/1; MG/1
1 TABLET, FILM COATED ORAL EVERY 12 HOURS
Qty: 20 TABLET | Refills: 0 | Status: SHIPPED | OUTPATIENT
Start: 2023-10-05 | End: 2023-10-15

## 2023-10-09 ENCOUNTER — HOSPITAL ENCOUNTER (OUTPATIENT)
Dept: RADIOLOGY | Facility: HOSPITAL | Age: 63
Discharge: HOME | End: 2023-10-09
Attending: SURGERY
Payer: COMMERCIAL

## 2023-10-09 DIAGNOSIS — T81.43XA POSTPROCEDURAL INTRAABDOMINAL ABSCESS: ICD-10-CM

## 2023-10-09 PROCEDURE — 74177 CT ABDOMEN PELVIS WITH CONTRAST: ICD-10-PCS | Mod: 26,,, | Performed by: RADIOLOGY

## 2023-10-09 PROCEDURE — A9698 NON-RAD CONTRAST MATERIALNOC: HCPCS | Mod: PO | Performed by: SURGERY

## 2023-10-09 PROCEDURE — 74177 CT ABD & PELVIS W/CONTRAST: CPT | Mod: 26,,, | Performed by: RADIOLOGY

## 2023-10-09 PROCEDURE — 25500020 PHARM REV CODE 255: Mod: PO | Performed by: SURGERY

## 2023-10-09 PROCEDURE — 74177 CT ABD & PELVIS W/CONTRAST: CPT | Mod: TC,PO

## 2023-10-09 RX ADMIN — IOHEXOL 75 ML: 350 INJECTION, SOLUTION INTRAVENOUS at 01:10

## 2023-10-09 RX ADMIN — IOHEXOL 500 ML: 9 SOLUTION ORAL at 01:10

## 2023-10-10 ENCOUNTER — CLINICAL SUPPORT (OUTPATIENT)
Dept: HEMATOLOGY/ONCOLOGY | Facility: CLINIC | Age: 63
End: 2023-10-10
Payer: COMMERCIAL

## 2023-10-10 ENCOUNTER — OFFICE VISIT (OUTPATIENT)
Dept: SURGERY | Facility: CLINIC | Age: 63
End: 2023-10-10
Payer: COMMERCIAL

## 2023-10-10 VITALS
HEIGHT: 71 IN | WEIGHT: 170.94 LBS | DIASTOLIC BLOOD PRESSURE: 57 MMHG | SYSTOLIC BLOOD PRESSURE: 110 MMHG | BODY MASS INDEX: 23.93 KG/M2 | OXYGEN SATURATION: 97 % | HEART RATE: 87 BPM

## 2023-10-10 DIAGNOSIS — E44.0 MODERATE MALNUTRITION: ICD-10-CM

## 2023-10-10 DIAGNOSIS — C16.9 GASTRIC ADENOCARCINOMA: Primary | ICD-10-CM

## 2023-10-10 DIAGNOSIS — C16.9 GASTRIC ADENOCARCINOMA: ICD-10-CM

## 2023-10-10 DIAGNOSIS — Z71.3 NUTRITIONAL COUNSELING: Primary | ICD-10-CM

## 2023-10-10 PROCEDURE — 97803 PR MED NUTR THER, SUBSQ, INDIV, EA 15 MIN: ICD-10-PCS | Mod: S$GLB,,, | Performed by: DIETITIAN, REGISTERED

## 2023-10-10 PROCEDURE — 99999 PR PBB SHADOW E&M-EST. PATIENT-LVL IV: ICD-10-PCS | Mod: PBBFAC,,,

## 2023-10-10 PROCEDURE — 99999 PR PBB SHADOW E&M-EST. PATIENT-LVL I: ICD-10-PCS | Mod: PBBFAC,,, | Performed by: DIETITIAN, REGISTERED

## 2023-10-10 PROCEDURE — 99999 PR PBB SHADOW E&M-EST. PATIENT-LVL I: CPT | Mod: PBBFAC,,, | Performed by: DIETITIAN, REGISTERED

## 2023-10-10 PROCEDURE — 3078F DIAST BP <80 MM HG: CPT | Mod: CPTII,S$GLB,,

## 2023-10-10 PROCEDURE — 99024 POSTOP FOLLOW-UP VISIT: CPT | Mod: S$GLB,,,

## 2023-10-10 PROCEDURE — 99024 PR POST-OP FOLLOW-UP VISIT: ICD-10-PCS | Mod: S$GLB,,,

## 2023-10-10 PROCEDURE — 97803 MED NUTRITION INDIV SUBSEQ: CPT | Mod: S$GLB,,, | Performed by: DIETITIAN, REGISTERED

## 2023-10-10 PROCEDURE — 99999 PR PBB SHADOW E&M-EST. PATIENT-LVL IV: CPT | Mod: PBBFAC,,,

## 2023-10-10 PROCEDURE — 3074F PR MOST RECENT SYSTOLIC BLOOD PRESSURE < 130 MM HG: ICD-10-PCS | Mod: CPTII,S$GLB,,

## 2023-10-10 PROCEDURE — 3074F SYST BP LT 130 MM HG: CPT | Mod: CPTII,S$GLB,,

## 2023-10-10 PROCEDURE — 3044F PR MOST RECENT HEMOGLOBIN A1C LEVEL <7.0%: ICD-10-PCS | Mod: CPTII,S$GLB,,

## 2023-10-10 PROCEDURE — 3044F HG A1C LEVEL LT 7.0%: CPT | Mod: CPTII,S$GLB,,

## 2023-10-10 PROCEDURE — 3078F PR MOST RECENT DIASTOLIC BLOOD PRESSURE < 80 MM HG: ICD-10-PCS | Mod: CPTII,S$GLB,,

## 2023-10-10 RX ORDER — CYCLOBENZAPRINE HCL 10 MG
10 TABLET ORAL 3 TIMES DAILY
COMMUNITY
Start: 2023-10-02 | End: 2023-10-31

## 2023-10-10 NOTE — PROGRESS NOTES
"Oncology Nutrition Assessment for Medical Nutrition Therapy  Follow-up Visit    Danish Valladares   1960    Referring Provider: No ref. provider found      Reason for Visit: nutrition counseling and education    PMHx:   Past Medical History:   Diagnosis Date    Psoriasis     Stomach cancer        Nutrition Assessment    This is a 62 y.o.male with a medical diagnosis of gastric adenocarcinoma s/p distal gastrectomy 8/21 c/b leak. He was discharged on soft diet and TPN (140g AA + 230g dextrose + 50g lipids - provides 1842 kcal/day).  He reports that cycling TPN for 18 hours is working well. He reports he has actually had an appetite the past couple weeks. Eating 2 large meals/day and not snacking much. He is no longer drinking homemade protein shakes, reports he can't stand the taste anymore. He has tried a "Lil Jaiden" from [employer] and likes these, only has 1g protein though.    Weight: 77.55 kg (170 lb 15.5 oz)  Height: 5' 11" (1.803 m)  BMI: 23.9    Usual BW: 195lb  Weight Change: 25lb loss    Allergies: Penicillins    Current Medications:  Current Outpatient Medications:     amoxicillin-clavulanate 875-125mg (AUGMENTIN) 875-125 mg per tablet, Take 1 tablet by mouth every 12 (twelve) hours. for 10 days, Disp: 20 tablet, Rfl: 0    bisacodyL (DULCOLAX) 10 mg Supp, Place 1 suppository (10 mg total) rectally daily as needed., Disp: 10 suppository, Rfl: 0    furosemide (LASIX) 40 MG tablet, Take 1 tablet (40 mg total) by mouth every other day., Disp: 15 tablet, Rfl: 11    gabapentin (NEURONTIN) 100 MG capsule, Take 1 capsule (100 mg total) by mouth 3 (three) times daily., Disp: 90 capsule, Rfl: 11    ibuprofen (ADVIL,MOTRIN) 200 MG tablet, Take 200 mg by mouth every 6 (six) hours as needed for Pain., Disp: , Rfl:     mirtazapine (REMERON) 15 MG tablet, Take 1 tablet (15 mg total) by mouth every evening., Disp: 30 tablet, Rfl: 11    omeprazole (PRILOSEC) 40 MG capsule, Take 1 capsule (40 mg total) by mouth " once daily., Disp: 90 capsule, Rfl: 0    ondansetron (ZOFRAN-ODT) 8 MG TbDL, Take 8 mg by mouth 3 (three) times daily., Disp: , Rfl:     oxyCODONE (ROXICODONE) 5 MG immediate release tablet, Take 1 tablet (5 mg total) by mouth every 4 (four) hours as needed for Pain., Disp: 15 tablet, Rfl: 0    pantoprazole (PROTONIX) 40 MG tablet, Take 1 tablet (40 mg total) by mouth once daily., Disp: 30 tablet, Rfl: 11    polyethylene glycol (GLYCOLAX) 17 gram/dose powder, Use cap to measure 17 grams, mix in liquid and take by mouth once daily., Disp: 238 g, Rfl: 7    tamsulosin (FLOMAX) 0.4 mg Cap, Take 1 capsule (0.4 mg total) by mouth once daily., Disp: 30 capsule, Rfl: 11  No current facility-administered medications for this visit.    Food/medication interactions noted: none    Labs: Reviewed    Nutrition Diagnosis    Problem: moderate malnutrition  Etiology: appetite loss and inadequate energy and protein intake  Signs/Symptoms: reports of inadequate PO intake, weight loss, and both fat & muscle wasting present    Nutrition Intervention    Nutrition Prescription   3519-3288 kcals (25-30kcal/kg)  116g protein (1.5g/kg)   1939-2327mL fluid (25-30mL/kg)    Recommendations:  Decrease TPN to 90g AA + 150g dextrose + 50g lipids   Increase PO intake of soft foods - examples reviewed again  Encouraged small frequent meals/snacks  Continue to supplement with protein smoothie/drink - discussed alternatives  Increase fluid intake to at least 64oz/day    Materials Provided/Reviewed: none    Nutrition Monitoring and Evaluation    Monitor: energy intake, rate/formulation of TPN, and weight status    Goals: weight maintenance    Follow up: in 1-2 weeks    Communication to referring provider/care team: note available in chart; discussed with SATNAM Smith NP and [facility]    Counseling time: 10 minutes    Cely Hester, MS, RD, LDN

## 2023-10-12 ENCOUNTER — DOCUMENT SCAN (OUTPATIENT)
Dept: HOME HEALTH SERVICES | Facility: HOSPITAL | Age: 63
End: 2023-10-12
Payer: COMMERCIAL

## 2023-10-13 ENCOUNTER — PATIENT MESSAGE (OUTPATIENT)
Dept: HEMATOLOGY/ONCOLOGY | Facility: CLINIC | Age: 63
End: 2023-10-13
Payer: COMMERCIAL

## 2023-10-13 NOTE — PROGRESS NOTES
"Oncology Nutrition Assessment for Medical Nutrition Therapy  Follow-up Visit    Danish Valladares   1960    Referring Provider: No ref. provider found      Reason for Visit: nutrition counseling and education    PMHx:   Past Medical History:   Diagnosis Date    Psoriasis     Stomach cancer        Nutrition Assessment    This is a 62 y.o.male with a medical diagnosis of gastric adenocarcinoma s/p distal gastrectomy 8/21 c/b leak. He was discharged on soft diet and TPN. Last week his appetite improved some and was eating more. We decreased his TPN to 90g AA + 150g dextrose + 50g lipids (provides 1370kcal/day). He reports he is eating much better, has actually gained 9lb since last week. He is eating 3 meals/day and snacks in between.    Weight: 81.45 kg (179 lb 9 oz)  Height: 5' 11" (1.803 m)  BMI: 25.1    Usual BW: 195lb  Weight Change: 25lb loss    Allergies: Penicillins    Current Medications:  Current Outpatient Medications:     amoxicillin-clavulanate 875-125mg (AUGMENTIN) 875-125 mg per tablet, Take 1 tablet by mouth every 12 (twelve) hours. for 10 days, Disp: 20 tablet, Rfl: 0    bisacodyL (DULCOLAX) 10 mg Supp, Place 1 suppository (10 mg total) rectally daily as needed., Disp: 10 suppository, Rfl: 0    cyclobenzaprine (FLEXERIL) 10 MG tablet, Take 10 mg by mouth 3 (three) times daily., Disp: , Rfl:     furosemide (LASIX) 40 MG tablet, Take 1 tablet (40 mg total) by mouth every other day., Disp: 15 tablet, Rfl: 11    gabapentin (NEURONTIN) 100 MG capsule, Take 1 capsule (100 mg total) by mouth 3 (three) times daily., Disp: 90 capsule, Rfl: 11    ibuprofen (ADVIL,MOTRIN) 200 MG tablet, Take 200 mg by mouth every 6 (six) hours as needed for Pain., Disp: , Rfl:     mirtazapine (REMERON) 15 MG tablet, Take 1 tablet (15 mg total) by mouth every evening., Disp: 30 tablet, Rfl: 11    omeprazole (PRILOSEC) 40 MG capsule, Take 1 capsule (40 mg total) by mouth once daily., Disp: 90 capsule, Rfl: 0    ondansetron " (ZOFRAN-ODT) 8 MG TbDL, Take 8 mg by mouth 3 (three) times daily., Disp: , Rfl:     oxyCODONE (ROXICODONE) 5 MG immediate release tablet, Take 1 tablet (5 mg total) by mouth every 4 (four) hours as needed for Pain., Disp: 15 tablet, Rfl: 0    pantoprazole (PROTONIX) 40 MG tablet, Take 1 tablet (40 mg total) by mouth once daily., Disp: 30 tablet, Rfl: 11    polyethylene glycol (GLYCOLAX) 17 gram/dose powder, Use cap to measure 17 grams, mix in liquid and take by mouth once daily., Disp: 238 g, Rfl: 7    tamsulosin (FLOMAX) 0.4 mg Cap, Take 1 capsule (0.4 mg total) by mouth once daily., Disp: 30 capsule, Rfl: 11    Food/medication interactions noted: none    Labs: Reviewed    Nutrition Diagnosis    Problem: moderate malnutrition  Etiology: appetite loss and inadequate energy and protein intake  Signs/Symptoms: reports of inadequate PO intake, weight loss, and both fat & muscle wasting present  Status: Improving    Nutrition Intervention    Nutrition Prescription   7628-1283 kcals (25-30kcal/kg)  114g protein (1.4g/kg)   2036-2444mL fluid (25-30mL/kg)    Recommendations:  Decrease TPN to 60g AA + 100g dextrose + 25g lipids  - discussed with [facility] about sending new pump so he can have longer break  Encouraged small frequent meals/snacks  Make meals/snacks high in calories and protein - examples reviewed  Increase fluid intake to at least 64oz/day    Materials Provided/Reviewed: none    Nutrition Monitoring and Evaluation    Monitor: energy intake, rate/formulation of TPN, and weight status    Goals: weight maintenance    Follow up: in 1-2 weeks    Communication to referring provider/care team: note available in chart; discussed with ILANA Park NP and [facility]    Counseling time: 5 minutes    Cely Hester, MS, RD, LDN

## 2023-10-16 ENCOUNTER — LAB VISIT (OUTPATIENT)
Dept: LAB | Facility: HOSPITAL | Age: 63
End: 2023-10-16
Attending: SURGERY
Payer: COMMERCIAL

## 2023-10-16 DIAGNOSIS — C16.2 MALIGNANT NEOPLASM OF BODY OF STOMACH: Primary | ICD-10-CM

## 2023-10-16 LAB
ALBUMIN SERPL BCP-MCNC: 2.6 G/DL (ref 3.5–5.2)
ALP SERPL-CCNC: 150 U/L (ref 55–135)
ALT SERPL W/O P-5'-P-CCNC: 20 U/L (ref 10–44)
ANION GAP SERPL CALC-SCNC: 6 MMOL/L (ref 8–16)
AST SERPL-CCNC: 13 U/L (ref 10–40)
BASOPHILS # BLD AUTO: 0.06 K/UL (ref 0–0.2)
BASOPHILS NFR BLD: 0.8 % (ref 0–1.9)
BILIRUB SERPL-MCNC: 0.3 MG/DL (ref 0.1–1)
BUN SERPL-MCNC: 23 MG/DL (ref 8–23)
CALCIUM SERPL-MCNC: 8.6 MG/DL (ref 8.7–10.5)
CHLORIDE SERPL-SCNC: 106 MMOL/L (ref 95–110)
CO2 SERPL-SCNC: 26 MMOL/L (ref 23–29)
CREAT SERPL-MCNC: 0.6 MG/DL (ref 0.5–1.4)
DIFFERENTIAL METHOD: ABNORMAL
EOSINOPHIL # BLD AUTO: 0.1 K/UL (ref 0–0.5)
EOSINOPHIL NFR BLD: 0.8 % (ref 0–8)
ERYTHROCYTE [DISTWIDTH] IN BLOOD BY AUTOMATED COUNT: 16 % (ref 11.5–14.5)
EST. GFR  (NO RACE VARIABLE): >60 ML/MIN/1.73 M^2
GLUCOSE SERPL-MCNC: 89 MG/DL (ref 70–110)
HCT VFR BLD AUTO: 32.8 % (ref 40–54)
HGB BLD-MCNC: 10.2 G/DL (ref 14–18)
IMM GRANULOCYTES # BLD AUTO: 0.03 K/UL (ref 0–0.04)
IMM GRANULOCYTES NFR BLD AUTO: 0.4 % (ref 0–0.5)
LYMPHOCYTES # BLD AUTO: 1.9 K/UL (ref 1–4.8)
LYMPHOCYTES NFR BLD: 26.6 % (ref 18–48)
MAGNESIUM SERPL-MCNC: 1.8 MG/DL (ref 1.6–2.6)
MCH RBC QN AUTO: 27.3 PG (ref 27–31)
MCHC RBC AUTO-ENTMCNC: 31.1 G/DL (ref 32–36)
MCV RBC AUTO: 88 FL (ref 82–98)
MONOCYTES # BLD AUTO: 0.8 K/UL (ref 0.3–1)
MONOCYTES NFR BLD: 10.7 % (ref 4–15)
NEUTROPHILS # BLD AUTO: 4.4 K/UL (ref 1.8–7.7)
NEUTROPHILS NFR BLD: 60.7 % (ref 38–73)
NRBC BLD-RTO: 0 /100 WBC
PHOSPHATE SERPL-MCNC: 3.7 MG/DL (ref 2.7–4.5)
PLATELET # BLD AUTO: 274 K/UL (ref 150–450)
PMV BLD AUTO: 10.5 FL (ref 9.2–12.9)
POTASSIUM SERPL-SCNC: 4 MMOL/L (ref 3.5–5.1)
PROT SERPL-MCNC: 6.9 G/DL (ref 6–8.4)
RBC # BLD AUTO: 3.73 M/UL (ref 4.6–6.2)
SODIUM SERPL-SCNC: 138 MMOL/L (ref 136–145)
WBC # BLD AUTO: 7.29 K/UL (ref 3.9–12.7)

## 2023-10-16 PROCEDURE — 84100 ASSAY OF PHOSPHORUS: CPT | Performed by: SURGERY

## 2023-10-16 PROCEDURE — 85025 COMPLETE CBC W/AUTO DIFF WBC: CPT | Performed by: SURGERY

## 2023-10-16 PROCEDURE — 83735 ASSAY OF MAGNESIUM: CPT | Performed by: SURGERY

## 2023-10-16 PROCEDURE — 80053 COMPREHEN METABOLIC PANEL: CPT | Performed by: SURGERY

## 2023-10-17 ENCOUNTER — OFFICE VISIT (OUTPATIENT)
Dept: SURGERY | Facility: CLINIC | Age: 63
End: 2023-10-17
Payer: COMMERCIAL

## 2023-10-17 ENCOUNTER — CLINICAL SUPPORT (OUTPATIENT)
Dept: HEMATOLOGY/ONCOLOGY | Facility: CLINIC | Age: 63
End: 2023-10-17
Payer: COMMERCIAL

## 2023-10-17 VITALS
OXYGEN SATURATION: 97 % | BODY MASS INDEX: 25.14 KG/M2 | WEIGHT: 179.56 LBS | HEIGHT: 71 IN | SYSTOLIC BLOOD PRESSURE: 111 MMHG | DIASTOLIC BLOOD PRESSURE: 59 MMHG | HEART RATE: 88 BPM

## 2023-10-17 DIAGNOSIS — R18.8 ABDOMINAL FLUID COLLECTION: ICD-10-CM

## 2023-10-17 DIAGNOSIS — C16.9 GASTRIC ADENOCARCINOMA: ICD-10-CM

## 2023-10-17 DIAGNOSIS — E43 SEVERE PROTEIN-CALORIE MALNUTRITION: ICD-10-CM

## 2023-10-17 DIAGNOSIS — E44.0 MODERATE MALNUTRITION: ICD-10-CM

## 2023-10-17 DIAGNOSIS — C16.9 GASTRIC ADENOCARCINOMA: Primary | ICD-10-CM

## 2023-10-17 DIAGNOSIS — Z71.3 NUTRITIONAL COUNSELING: Primary | ICD-10-CM

## 2023-10-17 DIAGNOSIS — Z78.9 ON TOTAL PARENTERAL NUTRITION (TPN): ICD-10-CM

## 2023-10-17 PROBLEM — R11.10 EMESIS: Status: RESOLVED | Noted: 2023-01-01 | Resolved: 2023-01-01

## 2023-10-17 PROCEDURE — 1159F PR MEDICATION LIST DOCUMENTED IN MEDICAL RECORD: ICD-10-PCS | Mod: CPTII,S$GLB,, | Performed by: NURSE PRACTITIONER

## 2023-10-17 PROCEDURE — 1160F RVW MEDS BY RX/DR IN RCRD: CPT | Mod: CPTII,S$GLB,, | Performed by: NURSE PRACTITIONER

## 2023-10-17 PROCEDURE — 3044F HG A1C LEVEL LT 7.0%: CPT | Mod: CPTII,S$GLB,, | Performed by: NURSE PRACTITIONER

## 2023-10-17 PROCEDURE — 99999 PR PBB SHADOW E&M-EST. PATIENT-LVL I: ICD-10-PCS | Mod: PBBFAC,,, | Performed by: DIETITIAN, REGISTERED

## 2023-10-17 PROCEDURE — 3074F SYST BP LT 130 MM HG: CPT | Mod: CPTII,S$GLB,, | Performed by: NURSE PRACTITIONER

## 2023-10-17 PROCEDURE — 99024 POSTOP FOLLOW-UP VISIT: CPT | Mod: S$GLB,,, | Performed by: NURSE PRACTITIONER

## 2023-10-17 PROCEDURE — 99999 PR PBB SHADOW E&M-EST. PATIENT-LVL IV: ICD-10-PCS | Mod: PBBFAC,,, | Performed by: NURSE PRACTITIONER

## 2023-10-17 PROCEDURE — 3074F PR MOST RECENT SYSTOLIC BLOOD PRESSURE < 130 MM HG: ICD-10-PCS | Mod: CPTII,S$GLB,, | Performed by: NURSE PRACTITIONER

## 2023-10-17 PROCEDURE — 3044F PR MOST RECENT HEMOGLOBIN A1C LEVEL <7.0%: ICD-10-PCS | Mod: CPTII,S$GLB,, | Performed by: NURSE PRACTITIONER

## 2023-10-17 PROCEDURE — 1159F MED LIST DOCD IN RCRD: CPT | Mod: CPTII,S$GLB,, | Performed by: NURSE PRACTITIONER

## 2023-10-17 PROCEDURE — 3078F PR MOST RECENT DIASTOLIC BLOOD PRESSURE < 80 MM HG: ICD-10-PCS | Mod: CPTII,S$GLB,, | Performed by: NURSE PRACTITIONER

## 2023-10-17 PROCEDURE — 99999 PR PBB SHADOW E&M-EST. PATIENT-LVL I: CPT | Mod: PBBFAC,,, | Performed by: DIETITIAN, REGISTERED

## 2023-10-17 PROCEDURE — 1160F PR REVIEW ALL MEDS BY PRESCRIBER/CLIN PHARMACIST DOCUMENTED: ICD-10-PCS | Mod: CPTII,S$GLB,, | Performed by: NURSE PRACTITIONER

## 2023-10-17 PROCEDURE — 3078F DIAST BP <80 MM HG: CPT | Mod: CPTII,S$GLB,, | Performed by: NURSE PRACTITIONER

## 2023-10-17 PROCEDURE — 99024 PR POST-OP FOLLOW-UP VISIT: ICD-10-PCS | Mod: S$GLB,,, | Performed by: NURSE PRACTITIONER

## 2023-10-17 PROCEDURE — 99999 PR PBB SHADOW E&M-EST. PATIENT-LVL IV: CPT | Mod: PBBFAC,,, | Performed by: NURSE PRACTITIONER

## 2023-10-17 NOTE — PROGRESS NOTES
Post-Op Follow-up Visit:   10/17/2023  Patient ID: Danish Valladares is a 62 y.o. male, born 1960    Chief Complaint   Patient presents with    Post-op Evaluation     8/16/2023: presented to ED with GOO and 30# weight loss  8/21/2023: Distal gastrectomy with Bilroth II reconstruction per Dr. Moy  9/14/2023: re-admitted to [facility], IR drain placement into intra-abd fluid collection  9/18/2023: s/p exp lap drainage of peritoneal abscess, EGD with visualization of duodenal defect c/w duodenal stump leak per Dr. Moy  9/29/2023: IR drain placement    Interval History: This 63 y/o gentleman returns to clinic from [place] with his wife. He was readmitted 9/14/23 for 11 days with abd fluid collection and duodenal stump leak. He was discharged home on 9/25/2023 with TPN and IR drain placement.   He was seen last week in clinic by SATNAM Smith NP. Since then, he remains on TPN x 18 hours a day. He is tolerating oral diet without N/V/D. He is now eating 3 meals daily + snacks. Weight is up 9#. Mr. Valladares has increased his activities at home as well. Denies any pain, only taking Tylenol prn. His wife flushes R Flank IR drain daily; he thinks the thin clear output in bulb appears to be the flush. RUQ IR drain remains sutured in place, pouched with minimal thin, yellow-heath output. He has not emptied the pouch in 1 week. Remains afebrile. Completed abx that were prescribed.     10/9/2023: CT A/P:  Interval placement of a posterior approach right upper quadrant pigtail catheter within a infra hepatic abscess, which is decreased in size.   Removal of the pelvic surgical drain with a thin linear fluid along the prior tract, possibly with rim enhancement, concerning for infection.   Decreased size of left anterior abdominal fluid collection measuring up to 7.3 cm, previously 10 cm.  Near complete resolution of a fluid collection posterior to the descending colon.   Prominent loops of small bowel in the pelvis likely due to  "ileus.     Lab Results   Component Value Date    PREALBUMIN 22 10/09/2023    PREALBUMIN 20 10/03/2023    PREALBUMIN 16 (L) 10/02/2023     Lab Results   Component Value Date    ALBUMIN 2.6 (L) 10/16/2023           Chemistry        Component Value Date/Time     10/16/2023 1331     08/16/2023 1109    K 4.0 10/16/2023 1331    K 3.0 08/16/2023 1109     10/16/2023 1331    CL 84 08/16/2023 1109    CO2 26 10/16/2023 1331    CO2 33 08/16/2023 1109    BUN 23 10/16/2023 1331    BUN 24 (A) 08/16/2023 1109    CREATININE 0.6 10/16/2023 1331    CREATININE 1.3 08/16/2023 1109    GLU 89 10/16/2023 1331     08/16/2023 1109        Component Value Date/Time    CALCIUM 8.6 (L) 10/16/2023 1331    ALKPHOS 150 (H) 10/16/2023 1331    AST 13 10/16/2023 1331    ALT 20 10/16/2023 1331    BILITOT 0.3 10/16/2023 1331        Lab Results   Component Value Date    WBC 7.29 10/16/2023    HGB 10.2 (L) 10/16/2023    HCT 32.8 (L) 10/16/2023    MCV 88 10/16/2023     10/16/2023     Physical Exam:  BP (!) 111/59   Pulse 88   Ht 5' 11" (1.803 m)   Wt 81.4 kg (179 lb 9 oz)   SpO2 97%   BMI 25.04 kg/m²     General:  Non-toxic, ambulatory  Abd:  Soft, non-tender  Incision:  midline abd incision with dermabond intact, edges well approximated without erythema or exudate. R stent pouched with minimal output. R flank IR drain to bulb with clear output.     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 However tumor extends to within 1-2 mm of the distal margin   - Margin status for dysplasia: All margins negative for dysplasia   - Regional lymph nodes:   - Regional lymph nodes present   - Tumor present and regional lymph nodes - Number of lymph nodes with tumor: 7   - Distant sites involved: - Other: Multiple peritoneal nodules   - Pathologic staging: pT4a N3a M1           ICD-10-CM ICD-9-CM    1. Gastric adenocarcinoma  C16.9 151.9 Ambulatory referral/consult to Hematology / Oncology      2. Abdominal fluid collection  R18.8 789.59       3. Severe protein-calorie malnutrition  E43 262       4. On total parenteral nutrition (TPN)  Z78.9 V49.89       Plan   Reduce TPN by 50%, plan for 9 hours/ day. If weight stable with good oral intake in next week, will d/c TPN.   Remove both IR drains  Continue oral diet, focus on protein intake and adequate hydration. Reviewed small frequent meals throughout the day. To see ILANA Hester RD today in clinic.   Increase physical activity, may resume driving since off pain Rx.  F/u on disability paperwork.      Follow up in about 2 weeks (around 10/31/2023). For possible PICC removal.  Refer to med onc, Dr. Moy recommended Dr. Maravilla at [facility] in 2 weeks.     Questions were asked and answered to patient and his wife's satisfaction.      Pt seen in conjunction with Dr. Moy today.         Lulu Park NP  Upper GI / Hepatobiliary Surgical Oncology  [facility] [place], [place]  Office: [phone]  Fax: [phone]

## 2023-10-17 NOTE — Clinical Note
I signed referral to med onc. Dr. Moy would like this gentleman to see Dr. Maravilla in [place] in 2 weeks. Stage IV gastric CA, p4a N3a M1

## 2023-10-19 ENCOUNTER — TELEPHONE (OUTPATIENT)
Dept: HEMATOLOGY/ONCOLOGY | Facility: CLINIC | Age: 63
End: 2023-10-19
Payer: COMMERCIAL

## 2023-10-19 LAB — FUNGUS SPEC CULT: NORMAL

## 2023-10-23 ENCOUNTER — LAB VISIT (OUTPATIENT)
Dept: LAB | Facility: HOSPITAL | Age: 63
End: 2023-10-23
Attending: SURGERY
Payer: COMMERCIAL

## 2023-10-23 DIAGNOSIS — C16.2 MALIGNANT NEOPLASM OF BODY OF STOMACH: Primary | ICD-10-CM

## 2023-10-23 LAB
ALBUMIN SERPL BCP-MCNC: 2.6 G/DL (ref 3.5–5.2)
ALP SERPL-CCNC: 114 U/L (ref 55–135)
ALT SERPL W/O P-5'-P-CCNC: 13 U/L (ref 10–44)
ANION GAP SERPL CALC-SCNC: 8 MMOL/L (ref 8–16)
AST SERPL-CCNC: 11 U/L (ref 10–40)
BASOPHILS # BLD AUTO: 0.05 K/UL (ref 0–0.2)
BASOPHILS NFR BLD: 0.7 % (ref 0–1.9)
BILIRUB SERPL-MCNC: 0.5 MG/DL (ref 0.1–1)
BUN SERPL-MCNC: 20 MG/DL (ref 8–23)
CALCIUM SERPL-MCNC: 8.5 MG/DL (ref 8.7–10.5)
CHLORIDE SERPL-SCNC: 105 MMOL/L (ref 95–110)
CO2 SERPL-SCNC: 25 MMOL/L (ref 23–29)
CREAT SERPL-MCNC: 0.7 MG/DL (ref 0.5–1.4)
DIFFERENTIAL METHOD: ABNORMAL
EOSINOPHIL # BLD AUTO: 0.1 K/UL (ref 0–0.5)
EOSINOPHIL NFR BLD: 1.4 % (ref 0–8)
ERYTHROCYTE [DISTWIDTH] IN BLOOD BY AUTOMATED COUNT: 15.6 % (ref 11.5–14.5)
EST. GFR  (NO RACE VARIABLE): >60 ML/MIN/1.73 M^2
GLUCOSE SERPL-MCNC: 97 MG/DL (ref 70–110)
HCT VFR BLD AUTO: 33.6 % (ref 40–54)
HGB BLD-MCNC: 10.3 G/DL (ref 14–18)
IMM GRANULOCYTES # BLD AUTO: 0.02 K/UL (ref 0–0.04)
IMM GRANULOCYTES NFR BLD AUTO: 0.3 % (ref 0–0.5)
LYMPHOCYTES # BLD AUTO: 2.6 K/UL (ref 1–4.8)
LYMPHOCYTES NFR BLD: 37.3 % (ref 18–48)
MAGNESIUM SERPL-MCNC: 1.7 MG/DL (ref 1.6–2.6)
MCH RBC QN AUTO: 27 PG (ref 27–31)
MCHC RBC AUTO-ENTMCNC: 30.7 G/DL (ref 32–36)
MCV RBC AUTO: 88 FL (ref 82–98)
MONOCYTES # BLD AUTO: 0.7 K/UL (ref 0.3–1)
MONOCYTES NFR BLD: 10.2 % (ref 4–15)
NEUTROPHILS # BLD AUTO: 3.5 K/UL (ref 1.8–7.7)
NEUTROPHILS NFR BLD: 50.1 % (ref 38–73)
NRBC BLD-RTO: 0 /100 WBC
PHOSPHATE SERPL-MCNC: 3.3 MG/DL (ref 2.7–4.5)
PLATELET # BLD AUTO: 261 K/UL (ref 150–450)
PMV BLD AUTO: 10.3 FL (ref 9.2–12.9)
POTASSIUM SERPL-SCNC: 3.7 MMOL/L (ref 3.5–5.1)
PROT SERPL-MCNC: 6.6 G/DL (ref 6–8.4)
RBC # BLD AUTO: 3.81 M/UL (ref 4.6–6.2)
SODIUM SERPL-SCNC: 138 MMOL/L (ref 136–145)
WBC # BLD AUTO: 7.05 K/UL (ref 3.9–12.7)

## 2023-10-23 PROCEDURE — 80053 COMPREHEN METABOLIC PANEL: CPT | Performed by: SURGERY

## 2023-10-23 PROCEDURE — 84100 ASSAY OF PHOSPHORUS: CPT | Performed by: SURGERY

## 2023-10-23 PROCEDURE — 85025 COMPLETE CBC W/AUTO DIFF WBC: CPT | Performed by: SURGERY

## 2023-10-23 PROCEDURE — 83735 ASSAY OF MAGNESIUM: CPT | Performed by: SURGERY

## 2023-10-23 PROCEDURE — 84134 ASSAY OF PREALBUMIN: CPT | Performed by: SURGERY

## 2023-10-24 ENCOUNTER — TELEPHONE (OUTPATIENT)
Dept: HEMATOLOGY/ONCOLOGY | Facility: CLINIC | Age: 63
End: 2023-10-24
Payer: COMMERCIAL

## 2023-10-24 LAB — PREALB SERPL-MCNC: 21 MG/DL (ref 20–43)

## 2023-10-26 NOTE — PROGRESS NOTES
"  Post-Op Follow-up Visit:   10/3/2023  Patient ID: Danish Valladares is a 62 y.o. male, born 1960    Chief Complaint   Patient presents with    Follow-up     Interval History: Mr. Valladares returns to the clinic following gastrectomy on 8/21/23 with Dr. Moy. He was readmitted 9/14/23 for 11 days with abd fluid collection and duodenal stump leak. He was discharged home on 9/25/2023 with TPN and IR drain placement. He remains on TPN x 18 hours a day. He is tolerating oral diet but is eating very little. Denies any pain, only taking Tylenol prn. His wife flushes R Flank IR drain daily; he thinks the thin clear output in bulb appears to be the flush. RUQ IR drain remains sutured in place, pouched with moderate thin, yellow-heath output. He has not emptied the pouch in 1 week. Denies fever ,chills, N/V/D, abd pain, chest pain, SOB.     Physical Exam:  BP (!) 102/58   Pulse 91   Ht 5' 11" (1.803 m)   Wt 76.8 kg (169 lb 5 oz)   SpO2 96%   BMI 23.61 kg/m²     General:  Non-toxic, ambulatory  Abd:  Soft, non-tender  Incision: R flank IR drain with minimal serous output. Upper midline drain with moderate amount of thin yellowish output, RLQ drain with SS output, staples remain      Pathology:  Final Pathologic Diagnosis  Abnormal   1.  Peritoneal nodules (excisional biopsy):   - Desmoplasia, inflammation, fibrosis, rare atypical cells     2.  Peritoneal nodules (biopsy):   - Positive for malignancy   - Metastatic poorly differentiated adenocarcinoma     3.  Distal gastrectomy (resection):   - Poorly differentiated carcinoma, see synoptic report below   - NextGen sequencing is requested and results when/if available will be issued separately in the electronic medical record     4.  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 However tumor extends to within 1-2 mm of the distal margin   - Margin status for dysplasia:  All margins negative for dysplasia   - Regional lymph nodes:   - Regional lymph nodes present   - Tumor present and regional lymph nodes   - Number of lymph nodes with tumor:  7   - Distant sites involved:   - Other: Multiple peritoneal nodules   - Pathologic staging: pT4a N3a M1     MMR immunostain panel performed on prior biopsy: Intact   HER2 immunostain performed on prior biopsy:  Negative (1+)       ICD-10-CM ICD-9-CM    1. Postprocedural intraabdominal abscess  T81.43XA 998.59 CT Abdomen Pelvis With Contrast      mirtazapine (REMERON) 15 MG tablet      furosemide (LASIX) 40 MG tablet      2. Gastric adenocarcinoma  C16.9 151.9       Plan   Mr. Valladares returns to the clinic following gastrectomy on 8/21/23 with Dr. Moy.   RLQ drain removed and dressing applied. Staples to remain at this time.   Discussed care of drains. Pouch exchanged and instructed on use.   Discussed importance of eating small frequent meals with emphasis on calories and protein as well maintaining adequate hydration. TPN to remain at this time. Will get CT A/P before next appt.   He is seeing [address] appreciate recs.   Mirtazapine sent to pharmacy on file.   Lasix every other day.   Questions were asked and answered to patient's satisfaction.  We discussed the need for  continued clinical/radiographic/endoscopic follow-up.     Follow up in about 1 week (around 10/10/2023), or if symptoms worsen or fail to improve.    Patient seen in conjunction with Dr. Moy.          CARLOS Altamirano, FNP-C  Upper GI / Hepatobiliary Surgical Oncology  [facility] [place], [place]  Office: [phone]  Fax: [phone]

## 2023-10-26 NOTE — PROGRESS NOTES
"  Post-Op Follow-up Visit:   10/10/2023  Patient ID: Danish Valladares is a 62 y.o. male, born 1960    Chief Complaint   Patient presents with    Post-op Evaluation     Interval History: Mr. Valladares returns to the clinic following gastrectomy on 8/21/23 with Dr. Moy. He was readmitted 9/14/23 for 11 days with abd fluid collection and duodenal stump leak. He was discharged home on 9/25/2023 with TPN and IR drain placement. He remains on TPN x 18 hours a day. He is tolerating oral diet and reports that appetite is improved.  His wife flushes R Flank IR drain daily; he thinks the thin clear output in bulb appears to be the flush. RUQ IR drain remains sutured in place, pouched with moderate thin, yellow-heath output. He has not emptied the pouch in 1 week. Denies fever ,chills, N/V/D, abd pain, chest pain, SOB.     Physical Exam:  BP (!) 110/57   Pulse 87   Ht 5' 11" (1.803 m)   Wt 77.6 kg (170 lb 15.5 oz)   SpO2 97%   BMI 23.85 kg/m²     General:  Non-toxic, ambulatory  Abd:  Soft, non-tender  Incision:  R flank IR drain with minimal serous output. Upper midline drain with moderate amount of thin yellowish output wound pouch in place , RLQ drain site CDI, staples remain        ICD-10-CM ICD-9-CM    1. Gastric adenocarcinoma  C16.9 151.9       Plan   Mr. Valladares returns to the clinic following gastrectomy on 8/21/23 with Dr. Moy.   Heather to RLQ incision site removed and dressing applied. We discussed care of the site.   Discussed care of drains. Pouch exchanged and instructed on use.   His appetite is improved and he is eating well. Discussed importance of eating small frequent meals with emphasis on calories and protein as well maintaining adequate hydration. TPN to remain at this time will wean as appropraite. Discussed CT A/P- this is improving. He is seeing [facility] appreciate recs.   Continue Mirtazapine. Questions were asked and answered to patient's satisfaction.  We discussed the need for " continued clinical/radiographic/endoscopic follow-up.     Follow up in about 1 week (around 10/17/2023), or if symptoms worsen or fail to improve.         CARLOS Altamirano, GANESHP-C  Upper GI / Hepatobiliary Surgical Oncology  [facility] [place], [place]  Office: [phone]  Fax: [phone]

## 2023-10-31 ENCOUNTER — OFFICE VISIT (OUTPATIENT)
Dept: SURGERY | Facility: CLINIC | Age: 63
End: 2023-10-31
Payer: COMMERCIAL

## 2023-10-31 ENCOUNTER — TELEPHONE (OUTPATIENT)
Dept: HEMATOLOGY/ONCOLOGY | Facility: CLINIC | Age: 63
End: 2023-10-31
Payer: COMMERCIAL

## 2023-10-31 VITALS
BODY MASS INDEX: 24.12 KG/M2 | WEIGHT: 172.31 LBS | SYSTOLIC BLOOD PRESSURE: 103 MMHG | DIASTOLIC BLOOD PRESSURE: 59 MMHG | HEIGHT: 71 IN | OXYGEN SATURATION: 95 % | HEART RATE: 95 BPM

## 2023-10-31 DIAGNOSIS — C16.9 GASTRIC ADENOCARCINOMA: Primary | ICD-10-CM

## 2023-10-31 PROBLEM — L24.A9 IRRITANT CONTACT DERMATITIS DUE FRICTION OR CONTACT WITH OTHER SPECIFIED BODY FLUIDS: Status: RESOLVED | Noted: 2023-09-19 | Resolved: 2023-10-31

## 2023-10-31 PROBLEM — R18.8 ABDOMINAL FLUID COLLECTION: Status: RESOLVED | Noted: 2023-09-14 | Resolved: 2023-10-31

## 2023-10-31 LAB — FUNGUS SPEC CULT: NORMAL

## 2023-10-31 PROCEDURE — 99999 PR PBB SHADOW E&M-EST. PATIENT-LVL III: CPT | Mod: PBBFAC,,, | Performed by: NURSE PRACTITIONER

## 2023-10-31 PROCEDURE — 99024 PR POST-OP FOLLOW-UP VISIT: ICD-10-PCS | Mod: S$GLB,,, | Performed by: NURSE PRACTITIONER

## 2023-10-31 PROCEDURE — 3044F HG A1C LEVEL LT 7.0%: CPT | Mod: CPTII,S$GLB,, | Performed by: NURSE PRACTITIONER

## 2023-10-31 PROCEDURE — 3044F PR MOST RECENT HEMOGLOBIN A1C LEVEL <7.0%: ICD-10-PCS | Mod: CPTII,S$GLB,, | Performed by: NURSE PRACTITIONER

## 2023-10-31 PROCEDURE — 3078F DIAST BP <80 MM HG: CPT | Mod: CPTII,S$GLB,, | Performed by: NURSE PRACTITIONER

## 2023-10-31 PROCEDURE — 99999 PR PBB SHADOW E&M-EST. PATIENT-LVL III: ICD-10-PCS | Mod: PBBFAC,,, | Performed by: NURSE PRACTITIONER

## 2023-10-31 PROCEDURE — 1159F PR MEDICATION LIST DOCUMENTED IN MEDICAL RECORD: ICD-10-PCS | Mod: CPTII,S$GLB,, | Performed by: NURSE PRACTITIONER

## 2023-10-31 PROCEDURE — 1159F MED LIST DOCD IN RCRD: CPT | Mod: CPTII,S$GLB,, | Performed by: NURSE PRACTITIONER

## 2023-10-31 PROCEDURE — 3078F PR MOST RECENT DIASTOLIC BLOOD PRESSURE < 80 MM HG: ICD-10-PCS | Mod: CPTII,S$GLB,, | Performed by: NURSE PRACTITIONER

## 2023-10-31 PROCEDURE — 3074F SYST BP LT 130 MM HG: CPT | Mod: CPTII,S$GLB,, | Performed by: NURSE PRACTITIONER

## 2023-10-31 PROCEDURE — 1160F RVW MEDS BY RX/DR IN RCRD: CPT | Mod: CPTII,S$GLB,, | Performed by: NURSE PRACTITIONER

## 2023-10-31 PROCEDURE — 1160F PR REVIEW ALL MEDS BY PRESCRIBER/CLIN PHARMACIST DOCUMENTED: ICD-10-PCS | Mod: CPTII,S$GLB,, | Performed by: NURSE PRACTITIONER

## 2023-10-31 PROCEDURE — 3074F PR MOST RECENT SYSTOLIC BLOOD PRESSURE < 130 MM HG: ICD-10-PCS | Mod: CPTII,S$GLB,, | Performed by: NURSE PRACTITIONER

## 2023-10-31 PROCEDURE — 99024 POSTOP FOLLOW-UP VISIT: CPT | Mod: S$GLB,,, | Performed by: NURSE PRACTITIONER

## 2023-10-31 NOTE — PROGRESS NOTES
Post-Op Follow-up Visit:   10/31/2023  Patient ID: Danish Valladares is a 62 y.o. male, born 1960    Chief Complaint   Patient presents with    Post-op Evaluation     8/16/2023: presented to ED with GOO and 30# weight loss  8/21/2023: Distal gastrectomy with Bilroth II reconstruction per Dr. Moy  9/14/2023: re-admitted to [facility], IR drain placement into intra-abd fluid collection  9/18/2023: s/p exp lap drainage of peritoneal abscess, EGD with visualization of duodenal defect c/w duodenal stump leak per Dr. Moy  9/29/2023: IR drain placement    Interval History: This 63 y/o gentleman returns to clinic from [place] with his wife. He was readmitted 9/14/23 for 11 days with abd fluid collection and duodenal stump leak. He was discharged home on 9/25/2023 with TPN and IR drain placement. He was last seen in clinic on 10/17/23 when both drains were removed and TPN weaned. He completed last bag of TPN on Wednesday 10/25/23. He does NOT have home scale. Weight is down 7#. He has been eating 3 meals daily, minimal snacking. Tolerating oral diet without N/V/D. BM QD. Drinks water, tea, coke, sprite. No protein supplements. Admits he has been active, busy around the [place] preparing for hunting season. He would like to return to work soon as .     Lab Results   Component Value Date    PREALBUMIN 21 10/23/2023    PREALBUMIN 22 10/09/2023    PREALBUMIN 20 10/03/2023     CMP  Sodium   Date Value Ref Range Status   10/23/2023 138 136 - 145 mmol/L Final   08/16/2023 134 mmol/L Final     Potassium   Date Value Ref Range Status   10/23/2023 3.7 3.5 - 5.1 mmol/L Final   08/16/2023 3.0 mmol/L Final     Chloride   Date Value Ref Range Status   10/23/2023 105 95 - 110 mmol/L Final   08/16/2023 84 mmol/L Final     CO2   Date Value Ref Range Status   10/23/2023 25 23 - 29 mmol/L Final   08/16/2023 33 mmol/L Final     Glucose   Date Value Ref Range Status   10/23/2023 97 70 - 110 mg/dL Final   08/16/2023 109 mg/dL Final  "    BUN   Date Value Ref Range Status   10/23/2023 20 8 - 23 mg/dL Final   08/16/2023 24 (A) 4 - 21 mg/dL Final     Creatinine   Date Value Ref Range Status   10/23/2023 0.7 0.5 - 1.4 mg/dL Final   08/16/2023 1.3 mg/dL Final     Calcium   Date Value Ref Range Status   10/23/2023 8.5 (L) 8.7 - 10.5 mg/dL Final     Total Protein   Date Value Ref Range Status   10/23/2023 6.6 6.0 - 8.4 g/dL Final     Albumin   Date Value Ref Range Status   10/23/2023 2.6 (L) 3.5 - 5.2 g/dL Final     Total Bilirubin   Date Value Ref Range Status   10/23/2023 0.5 0.1 - 1.0 mg/dL Final     Comment:     For infants and newborns, interpretation of results should be based  on gestational age, weight and in agreement with clinical  observations.    Premature Infant recommended reference ranges:  Up to 24 hours.............<8.0 mg/dL  Up to 48 hours............<12.0 mg/dL  3-5 days..................<15.0 mg/dL  6-29 days.................<15.0 mg/dL       Alkaline Phosphatase   Date Value Ref Range Status   10/23/2023 114 55 - 135 U/L Final     AST   Date Value Ref Range Status   10/23/2023 11 10 - 40 U/L Final     ALT   Date Value Ref Range Status   10/23/2023 13 10 - 44 U/L Final     Anion Gap   Date Value Ref Range Status   10/23/2023 8 8 - 16 mmol/L Final     eGFR   Date Value Ref Range Status   10/23/2023 >60.0 >60 mL/min/1.73 m^2 Final     Lab Results   Component Value Date    WBC 7.05 10/23/2023    HGB 10.3 (L) 10/23/2023    HCT 33.6 (L) 10/23/2023    MCV 88 10/23/2023     10/23/2023       Physical Exam:  BP (!) 103/59   Pulse 95   Ht 5' 11" (1.803 m)   Wt 78.1 kg (172 lb 4.6 oz)   SpO2 95%   BMI 24.03 kg/m²     General:  Non-toxic, ambulatory  Abd:  Soft, non-tender  Incision:   midline abd incision healed, edges well approximated without erythema or exudate    Pathology:  Stomach carcinoma synoptic report - Procedure: Other ,distal gastrectomy and duodenum - Tumor site: Stomach - Histologic type: Adenocarcinoma, poorly " cohesive carcinoma (signet ring cell carcinoma) - Tumor size: Extensive tumor present, greater than 1.8 cm - Tumor extent: Invades serosa (visceral peritoneum) and extensively involves attached adipose tissue - Treatment effect: No known pre-surgical therapy - Lymphatic or vascular invasion: Present - Perineural invasion: Present - Margin status for invasive carcinoma - Margin involved by invasive carcinoma: Proximal - Duodenum shave margin additionally submitted in a gross recut from staple line is negative for carcinoma. However tumor extends to within 1-2 mm of the distal margin - Margin status for dysplasia: All margins negative for dysplasia - Regional lymph nodes: - Regional lymph nodes present - Tumor present and regional lymph nodes - Number of lymph nodes with tumor: 7 - Distant sites involved: - Other: Multiple peritoneal nodules - Pathologic staging: pT4a N3a M1       ICD-10-CM ICD-9-CM    1. Gastric adenocarcinoma  C16.9 151.9 Ambulatory referral/consult to General Surgery      Plan     D/c TPN. Remove PICC today.   Continue oral diet, increase protein intake with adequate hydration.   Increase activity, may resume to work.  F/u with Dr. Taiwo BRYANT to discuss systemic chemotherapy options. Discussed possible port placement.     RTC prn   Questions were asked and answered to patient and wife's satisfaction.      Pt seen in conjunction with Dr. Moy today.         Lulu Park NP  Upper GI / Hepatobiliary Surgical Oncology  [facility] [place], [place]  Office: [phone]  Fax: [phone]

## 2023-10-31 NOTE — NURSING
Patient answered phone and agreed to be scheduled with Dr. Maravilla.  He verbalized understanding of date, time and location.  Oncology Navigation   Intake  Date of Diagnosis: 08/17/23  Cancer Type: GI  Internal / External Referral: Internal  Initial Nurse Navigator Contact: 10/31/23  Date Worked: 10/31/23  First Appointment Available: 11/07/23  Appointment Date: 11/07/23  First Available Date vs. Scheduled Date (days): 0     Treatment                              Acuity      Follow Up  No follow-ups on file.

## 2023-11-04 ENCOUNTER — DOCUMENT SCAN (OUTPATIENT)
Dept: HOME HEALTH SERVICES | Facility: HOSPITAL | Age: 63
End: 2023-11-04
Payer: COMMERCIAL

## 2023-11-09 ENCOUNTER — OFFICE VISIT (OUTPATIENT)
Dept: HEMATOLOGY/ONCOLOGY | Facility: CLINIC | Age: 63
End: 2023-11-09
Payer: COMMERCIAL

## 2023-11-09 ENCOUNTER — LAB VISIT (OUTPATIENT)
Dept: LAB | Facility: HOSPITAL | Age: 63
End: 2023-11-09
Attending: INTERNAL MEDICINE
Payer: COMMERCIAL

## 2023-11-09 ENCOUNTER — HOSPITAL ENCOUNTER (OUTPATIENT)
Dept: RADIOLOGY | Facility: HOSPITAL | Age: 63
Discharge: HOME | End: 2023-11-09
Attending: INTERNAL MEDICINE
Payer: COMMERCIAL

## 2023-11-09 VITALS
BODY MASS INDEX: 25.18 KG/M2 | WEIGHT: 179.88 LBS | RESPIRATION RATE: 16 BRPM | SYSTOLIC BLOOD PRESSURE: 125 MMHG | HEART RATE: 80 BPM | HEIGHT: 71 IN | DIASTOLIC BLOOD PRESSURE: 72 MMHG | OXYGEN SATURATION: 98 % | TEMPERATURE: 98 F

## 2023-11-09 DIAGNOSIS — C78.6 SECONDARY MALIGNANCY OF PARIETAL PERITONEUM: ICD-10-CM

## 2023-11-09 DIAGNOSIS — C16.9 GASTRIC ADENOCARCINOMA: Primary | ICD-10-CM

## 2023-11-09 DIAGNOSIS — C16.9 GASTRIC ADENOCARCINOMA: ICD-10-CM

## 2023-11-09 LAB
ALBUMIN SERPL BCP-MCNC: 3.1 G/DL (ref 3.5–5.2)
ALP SERPL-CCNC: 91 U/L (ref 55–135)
ALT SERPL W/O P-5'-P-CCNC: 12 U/L (ref 10–44)
ANION GAP SERPL CALC-SCNC: 8 MMOL/L (ref 8–16)
AST SERPL-CCNC: 12 U/L (ref 10–40)
BASOPHILS # BLD AUTO: 0.05 K/UL (ref 0–0.2)
BASOPHILS NFR BLD: 0.7 % (ref 0–1.9)
BILIRUB SERPL-MCNC: 0.5 MG/DL (ref 0.1–1)
BUN SERPL-MCNC: 9 MG/DL (ref 8–23)
CALCIUM SERPL-MCNC: 8.9 MG/DL (ref 8.7–10.5)
CHLORIDE SERPL-SCNC: 102 MMOL/L (ref 95–110)
CO2 SERPL-SCNC: 29 MMOL/L (ref 23–29)
CREAT SERPL-MCNC: 0.8 MG/DL (ref 0.5–1.4)
DIFFERENTIAL METHOD: ABNORMAL
EOSINOPHIL # BLD AUTO: 0.1 K/UL (ref 0–0.5)
EOSINOPHIL NFR BLD: 0.8 % (ref 0–8)
ERYTHROCYTE [DISTWIDTH] IN BLOOD BY AUTOMATED COUNT: 14.2 % (ref 11.5–14.5)
EST. GFR  (NO RACE VARIABLE): >60 ML/MIN/1.73 M^2
FERRITIN SERPL-MCNC: 150 NG/ML (ref 20–300)
GLUCOSE SERPL-MCNC: 92 MG/DL (ref 70–110)
HCT VFR BLD AUTO: 40.9 % (ref 40–54)
HGB BLD-MCNC: 12.7 G/DL (ref 14–18)
IMM GRANULOCYTES # BLD AUTO: 0.02 K/UL (ref 0–0.04)
IMM GRANULOCYTES NFR BLD AUTO: 0.3 % (ref 0–0.5)
IRON SERPL-MCNC: 56 UG/DL (ref 45–160)
LYMPHOCYTES # BLD AUTO: 2.6 K/UL (ref 1–4.8)
LYMPHOCYTES NFR BLD: 36.8 % (ref 18–48)
MCH RBC QN AUTO: 26.7 PG (ref 27–31)
MCHC RBC AUTO-ENTMCNC: 31.1 G/DL (ref 32–36)
MCV RBC AUTO: 86 FL (ref 82–98)
MONOCYTES # BLD AUTO: 0.6 K/UL (ref 0.3–1)
MONOCYTES NFR BLD: 8.7 % (ref 4–15)
NEUTROPHILS # BLD AUTO: 3.8 K/UL (ref 1.8–7.7)
NEUTROPHILS NFR BLD: 52.7 % (ref 38–73)
NRBC BLD-RTO: 0 /100 WBC
PLATELET # BLD AUTO: 249 K/UL (ref 150–450)
PMV BLD AUTO: 8.9 FL (ref 9.2–12.9)
POTASSIUM SERPL-SCNC: 4.2 MMOL/L (ref 3.5–5.1)
PROT SERPL-MCNC: 7 G/DL (ref 6–8.4)
RBC # BLD AUTO: 4.76 M/UL (ref 4.6–6.2)
SATURATED IRON: 17 % (ref 20–50)
SODIUM SERPL-SCNC: 139 MMOL/L (ref 136–145)
TOTAL IRON BINDING CAPACITY: 323 UG/DL (ref 250–450)
TRANSFERRIN SERPL-MCNC: 218 MG/DL (ref 200–375)
WBC # BLD AUTO: 7.14 K/UL (ref 3.9–12.7)

## 2023-11-09 PROCEDURE — 1159F MED LIST DOCD IN RCRD: CPT | Mod: CPTII,S$GLB,, | Performed by: INTERNAL MEDICINE

## 2023-11-09 PROCEDURE — 3044F HG A1C LEVEL LT 7.0%: CPT | Mod: CPTII,S$GLB,, | Performed by: INTERNAL MEDICINE

## 2023-11-09 PROCEDURE — 99205 OFFICE O/P NEW HI 60 MIN: CPT | Mod: S$GLB,,, | Performed by: INTERNAL MEDICINE

## 2023-11-09 PROCEDURE — 3044F PR MOST RECENT HEMOGLOBIN A1C LEVEL <7.0%: ICD-10-PCS | Mod: CPTII,S$GLB,, | Performed by: INTERNAL MEDICINE

## 2023-11-09 PROCEDURE — 99999 PR PBB SHADOW E&M-EST. PATIENT-LVL V: ICD-10-PCS | Mod: PBBFAC,,, | Performed by: INTERNAL MEDICINE

## 2023-11-09 PROCEDURE — 1159F PR MEDICATION LIST DOCUMENTED IN MEDICAL RECORD: ICD-10-PCS | Mod: CPTII,S$GLB,, | Performed by: INTERNAL MEDICINE

## 2023-11-09 PROCEDURE — 36415 COLL VENOUS BLD VENIPUNCTURE: CPT | Mod: PN | Performed by: INTERNAL MEDICINE

## 2023-11-09 PROCEDURE — 3008F BODY MASS INDEX DOCD: CPT | Mod: CPTII,S$GLB,, | Performed by: INTERNAL MEDICINE

## 2023-11-09 PROCEDURE — 74177 CT ABD & PELVIS W/CONTRAST: CPT | Mod: TC

## 2023-11-09 PROCEDURE — 3074F SYST BP LT 130 MM HG: CPT | Mod: CPTII,S$GLB,, | Performed by: INTERNAL MEDICINE

## 2023-11-09 PROCEDURE — 80053 COMPREHEN METABOLIC PANEL: CPT | Mod: PN | Performed by: INTERNAL MEDICINE

## 2023-11-09 PROCEDURE — 85025 COMPLETE CBC W/AUTO DIFF WBC: CPT | Mod: PN | Performed by: INTERNAL MEDICINE

## 2023-11-09 PROCEDURE — 82728 ASSAY OF FERRITIN: CPT | Performed by: INTERNAL MEDICINE

## 2023-11-09 PROCEDURE — 99999 PR PBB SHADOW E&M-EST. PATIENT-LVL V: CPT | Mod: PBBFAC,,, | Performed by: INTERNAL MEDICINE

## 2023-11-09 PROCEDURE — 3008F PR BODY MASS INDEX (BMI) DOCUMENTED: ICD-10-PCS | Mod: CPTII,S$GLB,, | Performed by: INTERNAL MEDICINE

## 2023-11-09 PROCEDURE — 83540 ASSAY OF IRON: CPT | Performed by: INTERNAL MEDICINE

## 2023-11-09 PROCEDURE — 3078F DIAST BP <80 MM HG: CPT | Mod: CPTII,S$GLB,, | Performed by: INTERNAL MEDICINE

## 2023-11-09 PROCEDURE — 3078F PR MOST RECENT DIASTOLIC BLOOD PRESSURE < 80 MM HG: ICD-10-PCS | Mod: CPTII,S$GLB,, | Performed by: INTERNAL MEDICINE

## 2023-11-09 PROCEDURE — 99205 PR OFFICE/OUTPT VISIT, NEW, LEVL V, 60-74 MIN: ICD-10-PCS | Mod: S$GLB,,, | Performed by: INTERNAL MEDICINE

## 2023-11-09 PROCEDURE — 3074F PR MOST RECENT SYSTOLIC BLOOD PRESSURE < 130 MM HG: ICD-10-PCS | Mod: CPTII,S$GLB,, | Performed by: INTERNAL MEDICINE

## 2023-11-09 PROCEDURE — 25500020 PHARM REV CODE 255: Performed by: INTERNAL MEDICINE

## 2023-11-09 PROCEDURE — 71260 CT THORAX DX C+: CPT | Mod: TC

## 2023-11-09 PROCEDURE — 84466 ASSAY OF TRANSFERRIN: CPT | Performed by: INTERNAL MEDICINE

## 2023-11-09 RX ORDER — OLANZAPINE 5 MG/1
TABLET ORAL
Qty: 30 TABLET | Refills: 2 | Status: SHIPPED | OUTPATIENT
Start: 2023-11-09

## 2023-11-09 RX ORDER — PROCHLORPERAZINE MALEATE 10 MG
10 TABLET ORAL EVERY 6 HOURS PRN
Qty: 60 TABLET | Refills: 3 | Status: SHIPPED | OUTPATIENT
Start: 2023-11-09 | End: 2024-11-08

## 2023-11-09 RX ORDER — ONDANSETRON HYDROCHLORIDE 8 MG/1
8 TABLET, FILM COATED ORAL EVERY 6 HOURS PRN
Qty: 60 TABLET | Refills: 2 | Status: SHIPPED | OUTPATIENT
Start: 2023-11-09 | End: 2024-11-08

## 2023-11-09 RX ADMIN — IOHEXOL 100 ML: 350 INJECTION, SOLUTION INTRAVENOUS at 12:11

## 2023-11-09 NOTE — PROGRESS NOTES
"Subjective     Patient ID: Danish Valladares is a 62 y.o. male.    Chief Complaint: Gastric adenocarcinoma  Referring physician: Dr. Eulogio Moy MD. Surgical oncology    [facility] [place] 62 [facility] [place] with new diagnosis of gastric cancer. He noted abdomen discomfort and vomiting since May 2023  EGD on 8/3/2023: LA Grade C reflux esophagitis with no bleeding.                          Biopsied.                          - Inflammed, nodular gastric antral mucosa with                          ulcerations. R/O neoplasm. Biopsied.                          - Retained gastric contents.                          - Unable to advance scope beyond pyloric channel.   Pathology: Poorly cohesive carcinoma   - An AE1/3 pankeratin immunostain supports the above interpretation   - Background antral-type mucosa with surface erosion and reactive/regenerative epithelial changes   - Oxyntic-type mucosa with minimal chronic inflammation   - No evidence of Helicobacter-like organisms     8/16/2023: presented to ED with GOO and 30# weight loss  8/21/2023: Distal gastrectomy with Bilroth II reconstruction per Dr. Moy  Pathology: Peritoneal nodules (excisional biopsy):   - Desmoplasia, inflammation, fibrosis, rare atypical cells   2.  Peritoneal nodules (biopsy):   - Positive for malignancy   - Metastatic poorly differentiated adenocarcinoma   3.  Distal gastrectomy (resection):   - Poorly differentiated carcinoma, see synoptic report below   - NextGen sequencing CDH1, CPS <1, her 2 negative, RAMON stable        9/14/2023: re-admitted to [facility], IR drain placement into intra-abd fluid collection  9/18/2023: s/p exp lap drainage of peritoneal abscess, EGD with visualization of duodenal defect c/w duodenal stump leak per Dr. Moy  9/29/2023: IR drain placement    10/9/2023 CT scans reveals "Interval placement of a posterior approach right upper quadrant pigtail catheter within a infra hepatic abscess, which is decreased in size. Removal of the " "pelvic surgical drain with a thin linear fluid along the prior tract, possibly with rim enhancement, concerning for infection. Decreased size of left anterior abdominal fluid collection measuring up to 7.3 cm, previously 10 cm.  Near complete resolution of a fluid collection posterior to the descending colon"    He notes being tired easily, eating well and gaining weight  ECOG PS is zero. Here with his wife and daughter. Works as a     Review of Systems   Constitutional:  Negative for appetite change, fatigue and unexpected weight change.   HENT:  Negative for mouth sores.    Eyes:  Negative for visual disturbance.   Respiratory:  Negative for cough and shortness of breath.    Cardiovascular:  Negative for chest pain.   Gastrointestinal:  Negative for abdominal pain and diarrhea.   Genitourinary:  Negative for frequency.   Musculoskeletal:  Negative for back pain.   Integumentary:  Negative for rash.   Neurological:  Negative for headaches.   Hematological:  Negative for adenopathy.   Psychiatric/Behavioral:  The patient is not nervous/anxious.    All other systems reviewed and are negative.       Allergies:  Review of patient's allergies indicates:   Allergen Reactions    Penicillin     Penicillins Rash       Medications:  Current Outpatient Medications   Medication Sig Dispense Refill    ibuprofen (ADVIL,MOTRIN) 200 MG tablet Take 200 mg by mouth every 6 (six) hours as needed for Pain.      mirtazapine (REMERON) 15 MG tablet Take 1 tablet (15 mg total) by mouth every evening. 30 tablet 11    tamsulosin (FLOMAX) 0.4 mg Cap Take 1 capsule (0.4 mg total) by mouth once daily. 30 capsule 11    OLANZapine (ZYPREXA) 5 MG tablet Take for 3 nights after chemo starting the night of chemo 30 tablet 2    ondansetron (ZOFRAN) 8 MG tablet Take 1 tablet (8 mg total) by mouth every 6 (six) hours as needed for Nausea. 60 tablet 2    prochlorperazine (COMPAZINE) 10 MG tablet Take 1 tablet (10 mg total) by mouth every " 6 (six) hours as needed. 60 tablet 3     No current facility-administered medications for this visit.       PMH:  Past Medical History:   Diagnosis Date    Psoriasis     Stomach cancer        PSH:  Past Surgical History:   Procedure Laterality Date    DIAGNOSTIC LAPAROSCOPY  09/18/2023    Procedure: LAPAROSCOPY, DIAGNOSTIC;  Surgeon: Eulogio Moy MD;  Location: [address];  Service: General;;    ESOPHAGOGASTRODUODENOSCOPY N/A 08/03/2023    Procedure: EGD (ESOPHAGOGASTRODUODENOSCOPY);  Surgeon: Loco Marvin MD;  Location: [facility];  Service: Gastroenterology;  Laterality: N/A;    ESOPHAGOGASTRODUODENOSCOPY N/A 09/18/2023    Procedure: EGD (ESOPHAGOGASTRODUODENOSCOPY); flouroscopy, need c-arm in the room;  Surgeon: Eulogio Moy MD;  Location: [address];  Service: General;  Laterality: N/A;  EGD with Fluoroscopy     GASTRECTOMY N/A 08/21/2023    Procedure: GASTRECTOMY;  Surgeon: Eulogio Moy MD;  Location: [address];  Service: General;  Laterality: N/A;  Open gastrectomy with EGD. Needs Omni retractor, requesting room 24    LAPAROSCOPIC DRAINAGE OF ABDOMEN N/A 09/18/2023    Procedure: DRAINAGE, ABDOMEN, LAPAROSCOPIC;  Surgeon: Eulogio Moy MD;  Location: [address];  Service: General;  Laterality: N/A;    TONSILLECTOMY         FamHx:  Family History   Problem Relation Age of Onset    Cancer Father         brain cancer    Colon cancer Neg Hx     Crohn's disease Neg Hx     Esophageal cancer Neg Hx     Stomach cancer Neg Hx     Ulcerative colitis Neg Hx     Liver disease Neg Hx        SocHx:  Social History     Socioeconomic History    Marital status:    Tobacco Use    Smoking status: Former     Current packs/day: 0.00     Types: Cigarettes     Quit date: 5/27/2013     Years since quitting: 10.4    Smokeless tobacco: Never   Substance and Sexual Activity    Alcohol use: Yes     Comment: occ    Drug use: Never    Sexual  activity: Yes     Partners: Female     Social Determinants of Health     Financial Resource Strain: Low Risk  (9/15/2023)    Overall Financial Resource Strain (CARDIA)     Difficulty of Paying Living Expenses: Not hard at all   Food Insecurity: No Food Insecurity (9/15/2023)    Hunger Vital Sign     Worried About Running Out of Food in the Last Year: Never true     Ran Out of Food in the Last Year: Never true   Transportation Needs: No Transportation Needs (9/15/2023)    PRAPARE - Transportation     Lack of Transportation (Medical): No     Lack of Transportation (Non-Medical): No   Physical Activity: Unknown (9/15/2023)    Exercise Vital Sign     Days of Exercise per Week: Patient refused     Minutes of Exercise per Session: Patient refused   Stress: Unknown (9/15/2023)    [employer] - Occupational Stress Questionnaire     Feeling of Stress : Patient refused   Social Connections: Unknown (9/15/2023)    Social Connection and Isolation Panel [NHANES]     Frequency of Communication with Friends and Family: Once a week     Frequency of Social Gatherings with Friends and Family: Once a week     Attends Cheondoism Services: Patient refused     Active Member of Clubs or Organizations: Patient refused     Attends Club or Organization Meetings: Patient refused     Marital Status:    Housing Stability: Unknown (9/15/2023)    Housing Stability Vital Sign     Unable to Pay for Housing in the Last Year: No     Unstable Housing in the Last Year: No       Objective     Physical Exam  Vitals reviewed.   Constitutional:       Appearance: He is well-developed.   HENT:      Mouth/Throat:      Pharynx: No oropharyngeal exudate.   Cardiovascular:      Rate and Rhythm: Normal rate.      Heart sounds: Normal heart sounds.   Pulmonary:      Effort: Pulmonary effort is normal.      Breath sounds: Normal breath sounds. No wheezing.   Abdominal:      General: Bowel sounds are normal.       Palpations: Abdomen is soft.      Tenderness: There is no abdominal tenderness.      Comments: Well healed midline surgical scar+   Musculoskeletal:         General: No tenderness.   Lymphadenopathy:      Cervical: No cervical adenopathy.   Skin:     General: Skin is warm and dry.      Findings: No rash.   Neurological:      Mental Status: He is alert and oriented to person, place, and time.      Coordination: Coordination normal.   Psychiatric:         Thought Content: Thought content normal.         Judgment: Judgment normal.        Assessment and Plan     1. Gastric adenocarcinoma  -     Ambulatory referral/consult to Hematology / Oncology  -     Ambulatory referral/consult to General Surgery; Future; Expected date: 11/16/2023  -     CT Chest Abdomen Pelvis With IV Contrast (XPD); Future; Expected date: 11/09/2023  -     PHARMACOGENOMICS PANEL; Future; Expected date: 11/09/2023  -     CBC w/ DIFF; Standing  -     CMP; Standing  -     ondansetron (ZOFRAN) 8 MG tablet; Take 1 tablet (8 mg total) by mouth every 6 (six) hours as needed for Nausea.  Dispense: 60 tablet; Refill: 2  -     prochlorperazine (COMPAZINE) 10 MG tablet; Take 1 tablet (10 mg total) by mouth every 6 (six) hours as needed.  Dispense: 60 tablet; Refill: 3  -     OLANZapine (ZYPREXA) 5 MG tablet; Take for 3 nights after chemo starting the night of chemo  Dispense: 30 tablet; Refill: 2  -     Ambulatory referral/consult to Chemo School; Future; Expected date: 11/16/2023  -     CT Chest Abdomen Pelvis With IV Contrast (XPD); Future; Expected date: 11/09/2023  -     FERRITIN; Future; Expected date: 11/09/2023  -     Iron and TIBC; Future; Expected date: 11/09/2023  -     Ambulatory referral/consult to Genetics; Future; Expected date: 11/16/2023    2. Secondary malignancy of parietal peritoneum      Route Chart for Scheduling    Med Onc Chart Routing      Follow up with physician . Get auth for FOLFOX, scheduel chemo school and then schedule to see me  with CBC,CMP and start FOLFOX. DC pump on day 3. please schedule consult with genetics and palliative care.   Follow up with TORSTEN    Infusion scheduling note    Injection scheduling note    Labs    Imaging    Pharmacy appointment    Other referrals                Treatment Plan Information   OP GI mFOLFOX6 (oxaliplatin leucovorin fluorouracil) Q2W   Chelsea Maravilla MD   Upcoming Treatment Dates - OP GI mFOLFOX6 (oxaliplatin leucovorin fluorouracil) Q2W    11/21/2023       Chemotherapy       oxaliplatin (ELOXATIN) 85 mg/m2 = 172 mg in dextrose 5 % (D5W) 534.4 mL chemo infusion       leucovorin calcium 400 mg/m2 = 810 mg in dextrose 5 % (D5W) 250 mL infusion       fluorouraciL injection 810 mg       fluorouracil (ADRUCIL) 2,400 mg/m2 = 4,850 mg in sodium chloride 0.9% 100 mL chemo infusion       Antiemetics       PALONOSETRON 0.25MG/DEXAMETHASONE 12MG ORDERABLE  12/5/2023       Chemotherapy       oxaliplatin (ELOXATIN) 85 mg/m2 = 172 mg in dextrose 5 % (D5W) 534.4 mL chemo infusion       leucovorin calcium 400 mg/m2 = 810 mg in dextrose 5 % (D5W) 250 mL infusion       fluorouraciL injection 810 mg       fluorouracil (ADRUCIL) 2,400 mg/m2 = 4,850 mg in sodium chloride 0.9% 100 mL chemo infusion       Antiemetics       PALONOSETRON 0.25MG/DEXAMETHASONE 12MG ORDERABLE  12/19/2023       Chemotherapy       oxaliplatin (ELOXATIN) 85 mg/m2 = 172 mg in dextrose 5 % (D5W) 534.4 mL chemo infusion       leucovorin calcium 400 mg/m2 = 810 mg in dextrose 5 % (D5W) 250 mL infusion       fluorouraciL injection 810 mg       fluorouracil (ADRUCIL) 2,400 mg/m2 = 4,850 mg in sodium chloride 0.9% 100 mL chemo infusion       Antiemetics       PALONOSETRON 0.25MG/DEXAMETHASONE 12MG ORDERABLE  1/2/2024       Chemotherapy       oxaliplatin (ELOXATIN) 85 mg/m2 = 172 mg in dextrose 5 % (D5W) 534.4 mL chemo infusion       leucovorin calcium 400 mg/m2 = 810 mg in dextrose 5 % (D5W) 250 mL infusion       fluorouraciL injection 810 mg        fluorouracil (ADRUCIL) 2,400 mg/m2 = 4,850 mg in sodium chloride 0.9% 100 mL chemo infusion       Antiemetics       PALONOSETRON 0.25MG/DEXAMETHASONE 12MG ORDERABLE      Mr. Valladares has a new diagnosis of gastric cancer with metastasis to the peritoneum.  He is HER2 negative and CPS<1    Obtained CT scans today which did not reveal any measurable disease so he will not be a candidate for the STAR trial.  Discussed standard of care with chemotherapy with FOLFOX, rationale and side effects were reviewed.  Discussed that this is stage IV which is treatable but not curable  He needs a port placement prior to starting chemotherapy, we will schedule chemo school.  Chemo care companion orders were placed  Discussed palliative care referral and he is agreeable so we will go ahead and schedule  Needs to see Genetics as he was noted to have CDH1 on his molecular testing  Labs drawn today for iron studies CBC CMP and pharmacogenomics panel  E scribed Zofran Phenergan and Zyprexa for chemo induced nausea.    Above plan was discussed with the patient and his accompanying wife and daughter and all of his questions were answered to his satisfaction

## 2023-11-09 NOTE — PLAN OF CARE
START ON PATHWAY REGIMEN - Gastroesophageal    GEOS3        Oxaliplatin       Leucovorin       Fluorouracil       Fluorouracil     **Always confirm dose/schedule in your pharmacy ordering system**    Patient Characteristics:  Distant Metastases (cM1/pM1) / Locally Recurrent Disease, Adenocarcinoma -   Esophageal, GE Junction, and Gastric, First Line, HER2 Negative/Unknown, PD?L1   Expression CPS < 5/Negative/Unknown, RAMON/pMMR or MSI Unknown  Therapeutic Status: Distant Metastases (No Additional Staging)  Histology: Adenocarcinoma  Disease Classification: Gastric  Line of Therapy: First Line  HER2 Status: Negative  PD-L1 Expression Status: PD-L1 Negative  Microsatellite/Mismatch Repair Status: RAMON/pMMR  Intent of Therapy:  Non-Curative / Palliative Intent, Discussed with Patient

## 2023-11-10 ENCOUNTER — TELEPHONE (OUTPATIENT)
Dept: HEMATOLOGY/ONCOLOGY | Facility: CLINIC | Age: 63
End: 2023-11-10
Payer: COMMERCIAL

## 2023-11-10 DIAGNOSIS — C16.9 GASTRIC ADENOCARCINOMA: Primary | ICD-10-CM

## 2023-11-10 NOTE — NURSING
Patient offered and accepted education date of 11/15 @ 9 am.  Patient verbalized understanding of dates, time and locations.

## 2023-11-14 ENCOUNTER — TELEPHONE (OUTPATIENT)
Dept: HEMATOLOGY/ONCOLOGY | Facility: CLINIC | Age: 63
End: 2023-11-14
Payer: COMMERCIAL

## 2023-11-14 ENCOUNTER — OFFICE VISIT (OUTPATIENT)
Dept: SURGERY | Facility: CLINIC | Age: 63
End: 2023-11-14
Payer: COMMERCIAL

## 2023-11-14 VITALS
HEART RATE: 106 BPM | DIASTOLIC BLOOD PRESSURE: 71 MMHG | BODY MASS INDEX: 24.72 KG/M2 | WEIGHT: 177.25 LBS | SYSTOLIC BLOOD PRESSURE: 119 MMHG

## 2023-11-14 DIAGNOSIS — C16.9 GASTRIC ADENOCARCINOMA: ICD-10-CM

## 2023-11-14 PROCEDURE — 99999 PR PBB SHADOW E&M-EST. PATIENT-LVL IV: ICD-10-PCS | Mod: PBBFAC,,, | Performed by: STUDENT IN AN ORGANIZED HEALTH CARE EDUCATION/TRAINING PROGRAM

## 2023-11-14 PROCEDURE — 3078F DIAST BP <80 MM HG: CPT | Mod: CPTII,S$GLB,, | Performed by: STUDENT IN AN ORGANIZED HEALTH CARE EDUCATION/TRAINING PROGRAM

## 2023-11-14 PROCEDURE — 3074F SYST BP LT 130 MM HG: CPT | Mod: CPTII,S$GLB,, | Performed by: STUDENT IN AN ORGANIZED HEALTH CARE EDUCATION/TRAINING PROGRAM

## 2023-11-14 PROCEDURE — 3008F BODY MASS INDEX DOCD: CPT | Mod: CPTII,S$GLB,, | Performed by: STUDENT IN AN ORGANIZED HEALTH CARE EDUCATION/TRAINING PROGRAM

## 2023-11-14 PROCEDURE — 99999 PR PBB SHADOW E&M-EST. PATIENT-LVL IV: CPT | Mod: PBBFAC,,, | Performed by: STUDENT IN AN ORGANIZED HEALTH CARE EDUCATION/TRAINING PROGRAM

## 2023-11-14 PROCEDURE — 3044F PR MOST RECENT HEMOGLOBIN A1C LEVEL <7.0%: ICD-10-PCS | Mod: CPTII,S$GLB,, | Performed by: STUDENT IN AN ORGANIZED HEALTH CARE EDUCATION/TRAINING PROGRAM

## 2023-11-14 PROCEDURE — 3008F PR BODY MASS INDEX (BMI) DOCUMENTED: ICD-10-PCS | Mod: CPTII,S$GLB,, | Performed by: STUDENT IN AN ORGANIZED HEALTH CARE EDUCATION/TRAINING PROGRAM

## 2023-11-14 PROCEDURE — 99203 PR OFFICE/OUTPT VISIT, NEW, LEVL III, 30-44 MIN: ICD-10-PCS | Mod: S$GLB,,, | Performed by: STUDENT IN AN ORGANIZED HEALTH CARE EDUCATION/TRAINING PROGRAM

## 2023-11-14 PROCEDURE — 3044F HG A1C LEVEL LT 7.0%: CPT | Mod: CPTII,S$GLB,, | Performed by: STUDENT IN AN ORGANIZED HEALTH CARE EDUCATION/TRAINING PROGRAM

## 2023-11-14 PROCEDURE — 3078F PR MOST RECENT DIASTOLIC BLOOD PRESSURE < 80 MM HG: ICD-10-PCS | Mod: CPTII,S$GLB,, | Performed by: STUDENT IN AN ORGANIZED HEALTH CARE EDUCATION/TRAINING PROGRAM

## 2023-11-14 PROCEDURE — 3074F PR MOST RECENT SYSTOLIC BLOOD PRESSURE < 130 MM HG: ICD-10-PCS | Mod: CPTII,S$GLB,, | Performed by: STUDENT IN AN ORGANIZED HEALTH CARE EDUCATION/TRAINING PROGRAM

## 2023-11-14 PROCEDURE — 99203 OFFICE O/P NEW LOW 30 MIN: CPT | Mod: S$GLB,,, | Performed by: STUDENT IN AN ORGANIZED HEALTH CARE EDUCATION/TRAINING PROGRAM

## 2023-11-14 RX ORDER — SODIUM CHLORIDE 9 MG/ML
INJECTION, SOLUTION INTRAVENOUS CONTINUOUS
Status: CANCELLED | OUTPATIENT
Start: 2023-11-14

## 2023-11-14 NOTE — PROGRESS NOTES
History & Physical    SUBJECTIVE:     History of Present Illness:  Patient is a 62 y.o. male presents with stage IV gastric cancer.  He has previously undergone stomach resection.  He was referred to me for port placement.    Chief Complaint   Patient presents with    Consult     Port       Review of patient's allergies indicates:   Allergen Reactions    Penicillin     Penicillins Rash       Current Outpatient Medications   Medication Sig Dispense Refill    ibuprofen (ADVIL,MOTRIN) 200 MG tablet Take 200 mg by mouth every 6 (six) hours as needed for Pain.      mirtazapine (REMERON) 15 MG tablet Take 1 tablet (15 mg total) by mouth every evening. 30 tablet 11    tamsulosin (FLOMAX) 0.4 mg Cap Take 1 capsule (0.4 mg total) by mouth once daily. 30 capsule 11    OLANZapine (ZYPREXA) 5 MG tablet Take for 3 nights after chemo starting the night of chemo (Patient not taking: Reported on 11/14/2023) 30 tablet 2    ondansetron (ZOFRAN) 8 MG tablet Take 1 tablet (8 mg total) by mouth every 6 (six) hours as needed for Nausea. (Patient not taking: Reported on 11/14/2023) 60 tablet 2    prochlorperazine (COMPAZINE) 10 MG tablet Take 1 tablet (10 mg total) by mouth every 6 (six) hours as needed. (Patient not taking: Reported on 11/14/2023) 60 tablet 3     No current facility-administered medications for this visit.       Past Medical History:   Diagnosis Date    Psoriasis     Stomach cancer      Past Surgical History:   Procedure Laterality Date    DIAGNOSTIC LAPAROSCOPY  09/18/2023    Procedure: LAPAROSCOPY, DIAGNOSTIC;  Surgeon: Eulogio Moy MD;  Location: [facility] OR [address] [place] [address];  Service: General;;    ESOPHAGOGASTRODUODENOSCOPY N/A 08/03/2023    Procedure: EGD (ESOPHAGOGASTRODUODENOSCOPY);  Surgeon: Loco Marvin MD;  Location: [facility];  Service: Gastroenterology;  Laterality: N/A;    ESOPHAGOGASTRODUODENOSCOPY N/A 09/18/2023    Procedure: EGD (ESOPHAGOGASTRODUODENOSCOPY); flouroscopy, need c-arm in the room;   Surgeon: Eulogio Moy MD;  Location: [facility] OR [address] [place] [address];  Service: General;  Laterality: N/A;  EGD with Fluoroscopy     GASTRECTOMY N/A 08/21/2023    Procedure: GASTRECTOMY;  Surgeon: Eulogio Moy MD;  Location: [facility] OR [address] [place] [address];  Service: General;  Laterality: N/A;  Open gastrectomy with EGD. Needs Omni retractor, requesting room 24    LAPAROSCOPIC DRAINAGE OF ABDOMEN N/A 09/18/2023    Procedure: DRAINAGE, ABDOMEN, LAPAROSCOPIC;  Surgeon: Eulogio Moy MD;  Location: [facility] OR [address] [place] [address];  Service: General;  Laterality: N/A;    TONSILLECTOMY       Family History   Problem Relation Age of Onset    Cancer Father         brain cancer    Colon cancer Neg Hx     Crohn's disease Neg Hx     Esophageal cancer Neg Hx     Stomach cancer Neg Hx     Ulcerative colitis Neg Hx     Liver disease Neg Hx      Social History     Tobacco Use    Smoking status: Former     Current packs/day: 0.00     Types: Cigarettes     Quit date: 5/27/2013     Years since quitting: 10.4    Smokeless tobacco: Never   Substance Use Topics    Alcohol use: Yes     Comment: occ    Drug use: Never        Review of Systems:  Review of Systems   Constitutional: Negative.  Negative for fatigue and fever.   HENT: Negative.     Eyes: Negative.    Respiratory: Negative.  Negative for shortness of breath.    Cardiovascular: Negative.  Negative for chest pain.   Gastrointestinal: Negative.    Endocrine: Negative.    Genitourinary: Negative.    Musculoskeletal: Negative.    Skin: Negative.    Allergic/Immunologic: Negative.    Neurological: Negative.    Hematological: Negative.    Psychiatric/Behavioral: Negative.         OBJECTIVE:     Vital Signs (Most Recent)  Pulse: 106 (11/14/23 0803)  BP: 119/71 (11/14/23 0803)     80.4 kg (177 lb 4 oz)     Physical Exam:  Physical Exam  Constitutional:       General: He is not in acute distress.     Appearance: Normal appearance. He is not ill-appearing, toxic-appearing or diaphoretic.   HENT:       Head: Normocephalic.      Nose: Nose normal.   Eyes:      Conjunctiva/sclera: Conjunctivae normal.   Cardiovascular:      Rate and Rhythm: Normal rate and regular rhythm.   Pulmonary:      Effort: Pulmonary effort is normal.   Abdominal:      Palpations: Abdomen is soft.   Musculoskeletal:         General: Normal range of motion.      Cervical back: Normal range of motion.   Skin:     General: Skin is warm.   Neurological:      General: No focal deficit present.      Mental Status: He is alert.   Psychiatric:         Mood and Affect: Mood normal.         ASSESSMENT/PLAN:     62-year-old male status post distal gastrectomy with Billroth II reconstruction presents for port placement for adjuvant chemo    PLAN:  Risks versus benefits of port placement were discussed.  Consent was obtained.  Port placement was tentatively scheduled for next Tuesday the 21st.  Will tentatively go on the left side as patient has an upcoming hunting trip.

## 2023-11-14 NOTE — PATIENT INSTRUCTIONS
Surgery is scheduled for 11/21/23 arrival time will be given by the the preop nurse.  You may receive two calls from the Preop Nurses one a few days before surgery the second the day before surgery with the arrival time.  The preop nurse will call you from [phone]  Nothing to eat or drink after midnight.  Someone to drive you home if you are same day surgery.    THE PREOP NURSE WILL CALL, SOMETIMES AS LATE AS 4 or 5 PM IN THE AFTERNOON THE DAY BEFORE SURGERY.    Shower the night before and the morning of your procedure with Chlorhexidine Surgical Scrub also known as Hibiclens , can be purchased at most Pharmacy's no prescription needed.    Special Instruction:     Your procedure/surgery is scheduled at the [facility] at [address] [facility] [address] in [place] on the first floor. Entrance 2.     Contact Deni Ruiz LPN for questions are concerns. [phone]

## 2023-11-14 NOTE — H&P (VIEW-ONLY)
History & Physical    SUBJECTIVE:     History of Present Illness:  Patient is a 62 y.o. male presents with stage IV gastric cancer.  He has previously undergone stomach resection.  He was referred to me for port placement.    Chief Complaint   Patient presents with    Consult     Port       Review of patient's allergies indicates:   Allergen Reactions    Penicillin     Penicillins Rash       Current Outpatient Medications   Medication Sig Dispense Refill    ibuprofen (ADVIL,MOTRIN) 200 MG tablet Take 200 mg by mouth every 6 (six) hours as needed for Pain.      mirtazapine (REMERON) 15 MG tablet Take 1 tablet (15 mg total) by mouth every evening. 30 tablet 11    tamsulosin (FLOMAX) 0.4 mg Cap Take 1 capsule (0.4 mg total) by mouth once daily. 30 capsule 11    OLANZapine (ZYPREXA) 5 MG tablet Take for 3 nights after chemo starting the night of chemo (Patient not taking: Reported on 11/14/2023) 30 tablet 2    ondansetron (ZOFRAN) 8 MG tablet Take 1 tablet (8 mg total) by mouth every 6 (six) hours as needed for Nausea. (Patient not taking: Reported on 11/14/2023) 60 tablet 2    prochlorperazine (COMPAZINE) 10 MG tablet Take 1 tablet (10 mg total) by mouth every 6 (six) hours as needed. (Patient not taking: Reported on 11/14/2023) 60 tablet 3     No current facility-administered medications for this visit.       Past Medical History:   Diagnosis Date    Psoriasis     Stomach cancer      Past Surgical History:   Procedure Laterality Date    DIAGNOSTIC LAPAROSCOPY  09/18/2023    Procedure: LAPAROSCOPY, DIAGNOSTIC;  Surgeon: Eulogio Moy MD;  Location: [facility] OR [address] [place] [address];  Service: General;;    ESOPHAGOGASTRODUODENOSCOPY N/A 08/03/2023    Procedure: EGD (ESOPHAGOGASTRODUODENOSCOPY);  Surgeon: Loco Marvin MD;  Location: [facility];  Service: Gastroenterology;  Laterality: N/A;    ESOPHAGOGASTRODUODENOSCOPY N/A 09/18/2023    Procedure: EGD (ESOPHAGOGASTRODUODENOSCOPY); flouroscopy, need c-arm in the room;   Surgeon: Eulogio Moy MD;  Location: [facility] OR [address] [place] [address];  Service: General;  Laterality: N/A;  EGD with Fluoroscopy     GASTRECTOMY N/A 08/21/2023    Procedure: GASTRECTOMY;  Surgeon: Eulogio Moy MD;  Location: [facility] OR [address] [place] [address];  Service: General;  Laterality: N/A;  Open gastrectomy with EGD. Needs Omni retractor, requesting room 24    LAPAROSCOPIC DRAINAGE OF ABDOMEN N/A 09/18/2023    Procedure: DRAINAGE, ABDOMEN, LAPAROSCOPIC;  Surgeon: Eulogio Moy MD;  Location: [facility] OR [address] [place] [address];  Service: General;  Laterality: N/A;    TONSILLECTOMY       Family History   Problem Relation Age of Onset    Cancer Father         brain cancer    Colon cancer Neg Hx     Crohn's disease Neg Hx     Esophageal cancer Neg Hx     Stomach cancer Neg Hx     Ulcerative colitis Neg Hx     Liver disease Neg Hx      Social History     Tobacco Use    Smoking status: Former     Current packs/day: 0.00     Types: Cigarettes     Quit date: 5/27/2013     Years since quitting: 10.4    Smokeless tobacco: Never   Substance Use Topics    Alcohol use: Yes     Comment: occ    Drug use: Never        Review of Systems:  Review of Systems   Constitutional: Negative.  Negative for fatigue and fever.   HENT: Negative.     Eyes: Negative.    Respiratory: Negative.  Negative for shortness of breath.    Cardiovascular: Negative.  Negative for chest pain.   Gastrointestinal: Negative.    Endocrine: Negative.    Genitourinary: Negative.    Musculoskeletal: Negative.    Skin: Negative.    Allergic/Immunologic: Negative.    Neurological: Negative.    Hematological: Negative.    Psychiatric/Behavioral: Negative.         OBJECTIVE:     Vital Signs (Most Recent)  Pulse: 106 (11/14/23 0803)  BP: 119/71 (11/14/23 0803)     80.4 kg (177 lb 4 oz)     Physical Exam:  Physical Exam  Constitutional:       General: He is not in acute distress.     Appearance: Normal appearance. He is not ill-appearing, toxic-appearing or diaphoretic.   HENT:       Head: Normocephalic.      Nose: Nose normal.   Eyes:      Conjunctiva/sclera: Conjunctivae normal.   Cardiovascular:      Rate and Rhythm: Normal rate and regular rhythm.   Pulmonary:      Effort: Pulmonary effort is normal.   Abdominal:      Palpations: Abdomen is soft.   Musculoskeletal:         General: Normal range of motion.      Cervical back: Normal range of motion.   Skin:     General: Skin is warm.   Neurological:      General: No focal deficit present.      Mental Status: He is alert.   Psychiatric:         Mood and Affect: Mood normal.         ASSESSMENT/PLAN:     62-year-old male status post distal gastrectomy with Billroth II reconstruction presents for port placement for adjuvant chemo    PLAN:  Risks versus benefits of port placement were discussed.  Consent was obtained.  Port placement was tentatively scheduled for next Tuesday the 21st.  Will tentatively go on the left side as patient has an upcoming hunting trip.

## 2023-11-14 NOTE — TELEPHONE ENCOUNTER
Called and spoke to pt, he wasn't by his calendar, and will call us back to schedule the appt for Genetics.

## 2023-11-15 ENCOUNTER — CLINICAL SUPPORT (OUTPATIENT)
Dept: HEMATOLOGY/ONCOLOGY | Facility: CLINIC | Age: 63
End: 2023-11-15
Payer: COMMERCIAL

## 2023-11-15 ENCOUNTER — PATIENT MESSAGE (OUTPATIENT)
Dept: ADMINISTRATIVE | Facility: OTHER | Age: 63
End: 2023-11-15
Payer: COMMERCIAL

## 2023-11-15 ENCOUNTER — DOCUMENTATION ONLY (OUTPATIENT)
Dept: INFUSION THERAPY | Facility: HOSPITAL | Age: 63
End: 2023-11-15
Payer: COMMERCIAL

## 2023-11-15 ENCOUNTER — PATIENT MESSAGE (OUTPATIENT)
Dept: HEMATOLOGY/ONCOLOGY | Facility: CLINIC | Age: 63
End: 2023-11-15

## 2023-11-15 VITALS
TEMPERATURE: 98 F | WEIGHT: 179.25 LBS | SYSTOLIC BLOOD PRESSURE: 120 MMHG | BODY MASS INDEX: 25.1 KG/M2 | DIASTOLIC BLOOD PRESSURE: 75 MMHG | HEIGHT: 71 IN

## 2023-11-15 DIAGNOSIS — C16.9 GASTRIC ADENOCARCINOMA: ICD-10-CM

## 2023-11-15 PROCEDURE — 99999 PR PBB SHADOW E&M-EST. PATIENT-LVL V: ICD-10-PCS | Mod: PBBFAC,,, | Performed by: NURSE PRACTITIONER

## 2023-11-15 PROCEDURE — 99215 OFFICE O/P EST HI 40 MIN: CPT | Mod: S$GLB,,, | Performed by: NURSE PRACTITIONER

## 2023-11-15 PROCEDURE — 99215 PR OFFICE/OUTPT VISIT, EST, LEVL V, 40-54 MIN: ICD-10-PCS | Mod: S$GLB,,, | Performed by: NURSE PRACTITIONER

## 2023-11-15 PROCEDURE — 99999 PR PBB SHADOW E&M-EST. PATIENT-LVL V: CPT | Mod: PBBFAC,,, | Performed by: NURSE PRACTITIONER

## 2023-11-15 RX ORDER — LIDOCAINE AND PRILOCAINE 25; 25 MG/G; MG/G
CREAM TOPICAL
Qty: 30 G | Refills: 2 | Status: SHIPPED | OUTPATIENT
Start: 2023-11-15 | End: 2024-03-11

## 2023-11-15 NOTE — PROGRESS NOTES
Oncology Nutrition   New Patient Education  Danish Valladares   1960    Nutrition Education   This is a 62 y.o.male with a medical diagnosis of gastric adenocarcinoma & secondary malignancy of parietal peritoneum. He is s/p gastrectomy & duodenum on 8/21/23 complicated by leak.     Met w/ pt to discuss current nutritional status and nutrition as it relates to cancer and cancer treatment. Pt currently moderate nutrition risk due to gastric surgery and diagnosis.   He reports he got down to 165-168lbs post surgery. He has worked up tolerance to 6 small meals/day and gaining weight back. Today he is 179lb 3.7oz. He gets bloated easily with large meals. He has learned to snack throughout the day and eat half of meals. RD expressed adding protein to each snack and gave ideas. Expressed role of RD and will provide ongoing support throughout treatment, diet/recipes, monitor progress.  Pt is a  and has questions about blood borne pathogens and immune system. RD and SW encouraged he ask physician about that. Provided tour of infusion floor.     Wt Readings from Last 10 Encounters:   11/15/23 81.3 kg (179 lb 3.7 oz)   11/14/23 80.4 kg (177 lb 4 oz)   11/09/23 81.6 kg (179 lb 14.3 oz)   10/31/23 78.1 kg (172 lb 4.6 oz)   10/17/23 81.4 kg (179 lb 9 oz)   10/10/23 77.6 kg (170 lb 15.5 oz)   10/03/23 76.8 kg (169 lb 5 oz)   09/29/23 74.8 kg (165 lb)   09/18/23 83.5 kg (184 lb)   09/12/23 83.9 kg (185 lb)      [x] PMHx reviewed  [x] Labs reviewed    Educated on food safety and common nutrition impact symptoms associated with chemotherapy treatment. Reinforced the importance of good hydration. Handouts provided.    Answered all nutrition related questions.     Patient provided with dietitian contact number and advised to call with questions or make future appointment if further intervention is needed. RD to follow throughout tx prn.    Zofia Moy, BRAD, LDN, CNSC  11/15/2023  11:24 AM

## 2023-11-15 NOTE — PROGRESS NOTES
"Subjective:      Name: Danish Valladares  : 1960  MRN: [id number]    CC:  Education    HPI:   Danish Valladares is a 62 y.o. male presents for education for a diagnosis of Gastric adenocarcinoma.    Mr. Perez is a 62 year old with new diagnosis of gastric cancer. He noted abdomen discomfort and vomiting since May 2023  EGD on 8/3/2023: LA Grade C reflux esophagitis with no bleeding.                          Biopsied.                          - Inflammed, nodular gastric antral mucosa with                          ulcerations. R/O neoplasm. Biopsied.                          - Retained gastric contents.                          - Unable to advance scope beyond pyloric channel.   Pathology: Poorly cohesive carcinoma   - An AE1/3 pankeratin immunostain supports the above interpretation   - Background antral-type mucosa with surface erosion and reactive/regenerative epithelial changes   - Oxyntic-type mucosa with minimal chronic inflammation   - No evidence of Helicobacter-like organisms      2023: presented to ED with GOO and 30# weight loss  2023: Distal gastrectomy with Bilroth II reconstruction per Dr. Moy  Pathology: Peritoneal nodules (excisional biopsy):   - Desmoplasia, inflammation, fibrosis, rare atypical cells   2.  Peritoneal nodules (biopsy):   - Positive for malignancy   - Metastatic poorly differentiated adenocarcinoma   3.  Distal gastrectomy (resection):   - Poorly differentiated carcinoma, see synoptic report below   - NextGen sequencing CDH1, CPS <1, her 2 negative, RAMON stable         2023: re-admitted to [facility], IR drain placement into intra-abd fluid collection  2023: s/p exp lap drainage of peritoneal abscess, EGD with visualization of duodenal defect c/w duodenal stump leak per Dr. Moy  2023: IR drain placement     10/9/2023 CT scans reveals "Interval placement of a posterior approach right upper quadrant pigtail catheter within a infra hepatic abscess, which is " "decreased in size. Removal of the pelvic surgical drain with a thin linear fluid along the prior tract, possibly with rim enhancement, concerning for infection. Decreased size of left anterior abdominal fluid collection measuring up to 7.3 cm, previously 10 cm.  Near complete resolution of a fluid collection posterior to the descending colon"      Oncology History   Gastric adenocarcinoma   8/17/2023 Initial Diagnosis    Gastric adenocarcinoma     8/21/2023 Surgery    Surgeon(s): Eulogio Moy MD - Primary      Pre-Operative Diagnosis:   Gastric adenocarcinoma [C16.9]  Gastric outlet obstruction  Severe protein-calorie malnutrition     Post-Operative Diagnosis: Same with peritoneal metastases     Operative Findings:    Plaques grossly consistent with peritoneal metastases on falciform, lesser sac, mesocolon, frozen = atypical cells  Bulky distal gastric cancer with outlet obstruction  Palliative resection with B2 antecolic/isoperistaltic reconstruction     Procedures:  Distal gastrectomy with Bilroth II reconstruction  Biopsy of peritoneal nodules     10/17/2023 Cancer Staged    Staging form: Stomach, AJCC 8th Edition  - Pathologic: Stage IV (pT4a, pN3a, pM1)     11/21/2023 -  Chemotherapy    Treatment Summary   Plan Name: OP GI mFOLFOX6 (oxaliplatin leucovorin fluorouracil) Q2W  Treatment Goal: Palliative  Status: Active  Start Date: 11/21/2023 (Planned)  End Date: 4/25/2024 (Planned)  Provider: Chelsea Maravilla MD  Chemotherapy: fluorouraciL injection 810 mg, 400 mg/m2, Intravenous, Clinic/HOD 1 time, 0 of 12 cycles  oxaliplatin (ELOXATIN) 85 mg/m2 = 172 mg in dextrose 5 % (D5W) 534.4 mL chemo infusion, 85 mg/m2, Intravenous, Clinic/HOD 1 time, 0 of 12 cycles     Secondary malignancy of parietal peritoneum   11/9/2023 Initial Diagnosis    Secondary malignancy of parietal peritoneum     11/21/2023 -  Chemotherapy    Treatment Summary   Plan Name: OP GI mFOLFOX6 (oxaliplatin leucovorin fluorouracil) " Q2W  Treatment Goal: Palliative  Status: Active  Start Date: 11/21/2023 (Planned)  End Date: 4/25/2024 (Planned)  Provider: Chelsea Maravilla MD  Chemotherapy: fluorouraciL injection 810 mg, 400 mg/m2, Intravenous, Clinic/HOD 1 time, 0 of 12 cycles  oxaliplatin (ELOXATIN) 85 mg/m2 = 172 mg in dextrose 5 % (D5W) 534.4 mL chemo infusion, 85 mg/m2, Intravenous, Clinic/HOD 1 time, 0 of 12 cycles            Past Medical History:   Diagnosis Date    Psoriasis     Stomach cancer        Past Surgical History:   Procedure Laterality Date    DIAGNOSTIC LAPAROSCOPY  09/18/2023    Procedure: LAPAROSCOPY, DIAGNOSTIC;  Surgeon: Eulogio Moy MD;  Location: [facility] OR [address] [place] [address];  Service: General;;    ESOPHAGOGASTRODUODENOSCOPY N/A 08/03/2023    Procedure: EGD (ESOPHAGOGASTRODUODENOSCOPY);  Surgeon: Loco Marvin MD;  Location: [facility];  Service: Gastroenterology;  Laterality: N/A;    ESOPHAGOGASTRODUODENOSCOPY N/A 09/18/2023    Procedure: EGD (ESOPHAGOGASTRODUODENOSCOPY); flouroscopy, need c-arm in the room;  Surgeon: Eulogio Moy MD;  Location: [facility] OR [address] [place] [address];  Service: General;  Laterality: N/A;  EGD with Fluoroscopy     GASTRECTOMY N/A 08/21/2023    Procedure: GASTRECTOMY;  Surgeon: Eulogio Moy MD;  Location: [facility] OR [address] [place] [address];  Service: General;  Laterality: N/A;  Open gastrectomy with EGD. Needs Omni retractor, requesting room 24    LAPAROSCOPIC DRAINAGE OF ABDOMEN N/A 09/18/2023    Procedure: DRAINAGE, ABDOMEN, LAPAROSCOPIC;  Surgeon: Eulogio Moy MD;  Location: [facility] OR [address] [place] [address];  Service: General;  Laterality: N/A;    TONSILLECTOMY         Family History   Problem Relation Age of Onset    Cancer Father         brain cancer    Colon cancer Neg Hx     Crohn's disease Neg Hx     Esophageal cancer Neg Hx     Stomach cancer Neg Hx     Ulcerative colitis Neg Hx     Liver disease Neg Hx        Social History     Socioeconomic History    Marital status:    Tobacco Use    Smoking  status: Former     Current packs/day: 0.00     Types: Cigarettes     Quit date: 5/27/2013     Years since quitting: 10.4    Smokeless tobacco: Never   Substance and Sexual Activity    Alcohol use: Yes     Comment: occ    Drug use: Never    Sexual activity: Yes     Partners: Female     Social Determinants of Health     Financial Resource Strain: Low Risk  (9/15/2023)    Overall Financial Resource Strain (CARDIA)     Difficulty of Paying Living Expenses: Not hard at all   Food Insecurity: No Food Insecurity (9/15/2023)    Hunger Vital Sign     Worried About Running Out of Food in the Last Year: Never true     Ran Out of Food in the Last Year: Never true   Transportation Needs: No Transportation Needs (9/15/2023)    PRAPARE - Transportation     Lack of Transportation (Medical): No     Lack of Transportation (Non-Medical): No   Physical Activity: Unknown (9/15/2023)    Exercise Vital Sign     Days of Exercise per Week: Patient refused     Minutes of Exercise per Session: Patient refused   Stress: Unknown (9/15/2023)    Citizen of the [place] of Occupational Health - Occupational Stress Questionnaire     Feeling of Stress : Patient refused   Social Connections: Unknown (9/15/2023)    Social Connection and Isolation Panel [NHANES]     Frequency of Communication with Friends and Family: Once a week     Frequency of Social Gatherings with Friends and Family: Once a week     Attends Anglican Services: Patient refused     Active Member of Clubs or Organizations: Patient refused     Attends Club or Organization Meetings: Patient refused     Marital Status:    Housing Stability: Unknown (9/15/2023)    Housing Stability Vital Sign     Unable to Pay for Housing in the Last Year: No     Unstable Housing in the Last Year: No       Review of patient's allergies indicates:   Allergen Reactions    Penicillin     Penicillins Rash       Review of Systems   All other systems reviewed and are negative.           Objective:     Vitals:  "   11/15/23 0912   BP: 120/75   BP Location: Left arm   Patient Position: Sitting   BP Method: Small (Manual)   Temp: 97.8 °F (36.6 °C)   TempSrc: Skin   Weight: 81.3 kg (179 lb 3.7 oz)   Height: 5' 11" (1.803 m)        Physical Exam  Vitals reviewed.   Constitutional:       General: He is not in acute distress.  HENT:      Head: Normocephalic.   Pulmonary:      Effort: Pulmonary effort is normal.   Neurological:      Mental Status: He is alert and oriented to person, place, and time.   Psychiatric:         Behavior: Behavior normal.         Thought Content: Thought content normal.             Current Outpatient Medications on File Prior to Visit   Medication Sig    ibuprofen (ADVIL,MOTRIN) 200 MG tablet Take 200 mg by mouth every 6 (six) hours as needed for Pain.    mirtazapine (REMERON) 15 MG tablet Take 1 tablet (15 mg total) by mouth every evening.    OLANZapine (ZYPREXA) 5 MG tablet Take for 3 nights after chemo starting the night of chemo    ondansetron (ZOFRAN) 8 MG tablet Take 1 tablet (8 mg total) by mouth every 6 (six) hours as needed for Nausea.    prochlorperazine (COMPAZINE) 10 MG tablet Take 1 tablet (10 mg total) by mouth every 6 (six) hours as needed.    tamsulosin (FLOMAX) 0.4 mg Cap Take 1 capsule (0.4 mg total) by mouth once daily.     No current facility-administered medications on file prior to visit.       CBC:  Lab Results   Component Value Date    WBC 7.14 11/09/2023    HGB 12.7 (L) 11/09/2023    HCT 40.9 11/09/2023    MCV 86 11/09/2023     11/09/2023         CMP:  Sodium   Date Value Ref Range Status   11/09/2023 139 136 - 145 mmol/L Final   08/16/2023 134 mmol/L Final     Potassium   Date Value Ref Range Status   11/09/2023 4.2 3.5 - 5.1 mmol/L Final   08/16/2023 3.0 mmol/L Final     Chloride   Date Value Ref Range Status   11/09/2023 102 95 - 110 mmol/L Final   08/16/2023 84 mmol/L Final     CO2   Date Value Ref Range Status   11/09/2023 29 23 - 29 mmol/L Final   08/16/2023 33 " mmol/L Final     Glucose   Date Value Ref Range Status   11/09/2023 92 70 - 110 mg/dL Final   08/16/2023 109 mg/dL Final     BUN   Date Value Ref Range Status   11/09/2023 9 8 - 23 mg/dL Final   08/16/2023 24 (A) 4 - 21 mg/dL Final     Creatinine   Date Value Ref Range Status   11/09/2023 0.8 0.5 - 1.4 mg/dL Final   08/16/2023 1.3 mg/dL Final     Calcium   Date Value Ref Range Status   11/09/2023 8.9 8.7 - 10.5 mg/dL Final     Total Protein   Date Value Ref Range Status   11/09/2023 7.0 6.0 - 8.4 g/dL Final     Albumin   Date Value Ref Range Status   11/09/2023 3.1 (L) 3.5 - 5.2 g/dL Final     Total Bilirubin   Date Value Ref Range Status   11/09/2023 0.5 0.1 - 1.0 mg/dL Final     Comment:     For infants and newborns, interpretation of results should be based  on gestational age, weight and in agreement with clinical  observations.    Premature Infant recommended reference ranges:  Up to 24 hours.............<8.0 mg/dL  Up to 48 hours............<12.0 mg/dL  3-5 days..................<15.0 mg/dL  6-29 days.................<15.0 mg/dL       Alkaline Phosphatase   Date Value Ref Range Status   11/09/2023 91 55 - 135 U/L Final     AST   Date Value Ref Range Status   11/09/2023 12 10 - 40 U/L Final     ALT   Date Value Ref Range Status   11/09/2023 12 10 - 44 U/L Final     Anion Gap   Date Value Ref Range Status   11/09/2023 8 8 - 16 mmol/L Final       CT Chest Abdomen Pelvis With IV Contrast (XPD)  EXAMINATION:  CT CHEST ABDOMEN PELVIS WITH IV CONTRAST (XPD)    CLINICAL INDICATION: Male, 62 years old. Small bowel cancer, monitor; assess for metastasis    TECHNIQUE: Axial CT images of the chest, abdomen, and pelvis were obtained. Reconstructions were performed.    CONTRAST: 100 mL of Omnipaque 350 IV.    COMPARISON: 9/28/2023 CT scan of the abdomen and pelvis with contrast    FINDINGS:  CHEST:    Lungs reveal normal expansion with no evidence of masses, infiltrates, or pleural effusions. Lungs are relatively  emphysematous. Linear pulmonary scar formation is noted in the right lower lobe coursing in a vertical fashion with evidence of pleural contact. The lungs are mildly emphysematous with minimal expansion of the apical air cells bilaterally. No evidence of enlarged mediastinal or hilar mass. Cardiac chambers maintain normal size and overall aeration.    ABDOMEN/PELVIS:  Liver: Normal size and contour liver with evidence of referenced ovoid low-density mass occupying the posterior segment having resolved in the interval. No residual focus of low density is identified in the region of interest. There is evidence of minimal stranding along the inferior edge of the liver capsule migrating anteriorly.  -Drainage catheter entering from an anterior approach seen from the previous examination has been removed in the interval. Minimal inflammatory changes are identified within the region of interest within the subcapsular space.    Bile Ducts: Not dilated.  Gallbladder: No stones, wall thickening, or pericholecystic fluid.  Pancreas: Normal appearance without focal lesion.  Spleen: Normal in size and contour.  Adrenals: Normal configuration.  Kidneys and Ureters: Normal size and contour. No hydronephrosis.  Bladder: Normal in appearance.  Stomach: Postoperative changes of previous anterior gastrectomy without evidence of any significant breakdown in the region of postoperative changes. There is anastomotic sutures noted in the region of interest.  Small Intestine: Small bowel loops are normal caliber without evidence of mechanical obstruction, although the distal small bowel that show evidence of prominence of the haustral markings secondary to ongoing and radius. There are centralized small bowel loops in the lower abdominal cavity. Moderate mesenteric inflammatory changes are established. No intrinsic stent mesenteric spaces with evidence of a large collection of ascitic fluid occupy the right lower quadrant.  Referenced  left low-density collection in the left pericolic gutter is resolved in the interval with no evidence of residual findings, although there is evidence of fairly prominent inflammatory stranding seen in the adjacent pericolonic fat.  -Reference low-density collection noted along the descending colon has likely shown interval resolution without evidence of any residual findings in the area of interest.  Appendix: Appendix appears normal in caliber without evidence of inflammatory stranding, although the right upper quadrant limits diagnostic evaluation. No evidence of active appendicitis.  Colon: Correlation is evidence of retention of fecal load within the ascending segment.  Vessels: Abdominal aorta appears normal in caliber without evidence of aneurysmal dilatation. There are atheromatous calcifications the distal end of the abdominal aorta and origin point of the bilateral common iliac arteries.  Reproductive Organs: Prostate gland is prominent in size reaching 6.2 cm with evidence of minimal lobulation along the apical margin within the right and the left.  Lymph Nodes: No pathologic mesenteric or retroperitoneal lymph nodes.  Peritoneum: No free air, free fluid, or fluid collection.  Abdominal Wall: Minimal subcutaneous stranding seen along the deep aspect of the body wall cavity more prominent towards the right. This has shown reduction upon comparison.  Musculoskeletal: No acute abnormality or suspicious bony lesion.    IMPRESSION:  1. Marked improvement upon comparison with numerous rim-enhancing collections throughout the abdominal cavity showing relatively decrease volume upon comparison. There is a small elliptical low density collection that is noted left paracolic gutter that is nominal residual relative to the large elliptical fluid collection from previous.  2. Resolution of the large hepatic mass in the inferior right hepatic lobe without evidence of any residual findings.  3. Percutaneous catheter  entering from an anterior approach has been removed in the interval.    This exam was performed according to our departmental dose-optimization program which includes automated exposure control, adjustment of the mA and/or kV according to patient size and/or use of iterative reconstruction technique.    Electronically signed by:  Mino Farnsworth MD  11/09/2023 01:38 PM CST Workstation: [id number]       Assessment:       1. Gastric adenocarcinoma         Plan:     Gastric adenocarcinoma  -     Ambulatory referral/consult to Chemo School  -     Ambulatory referral/consult to Hematology/Oncology/Nutrition  -     Ambulatory referral/consult to Oncology Social Work  -     LIDOcaine-prilocaine (EMLA) cream; Apply topically as needed (APPLY 30 to 60 minutes prior to chemo).  Dispense: 30 g; Refill: 2  -     duke's soln (benadryl 30 mL, mylanta 30 mL, LIDOcaine 30 mL, nystatin 30 mL) 120mL; Take 10 mLs by mouth 4 (four) times daily.  Dispense: 120 mL; Refill: 1  -     Care Companion Enrollment Chemotherapy; [facility]       1.  Treatment plan of FOLFOX every 2 weeks; Zyprexa 5 mg on evenings of Days 1 -3 discussed with patient.  2.  Handouts provided on chemotherapy agents.    3.  Discussed the mechanism of action, potential side effects of this treatment as well as ways to manage them at home.   4.  Dietary modifications discussed.  Detail instructions to be provided by dietician.   5.  Chemotherapy Portfolio supplied with contact information.   6.  Discussed follow-up with the physician for toxicity monitoring throughout treatment.    7.  Scripts were escribed (Zyprexa, Zofran, Compazine, EMLA, Duke's solution) and explained for home use.    8.  Enrolled in Care Compan[facility].  9.  Consented the patient to the treatment plan and the patient was educated on the planned duration of the treatment and schedule of the treatment administration.    50 minutes were spent in coordination of patient's care, record review and  counseling.

## 2023-11-15 NOTE — TELEPHONE ENCOUNTER
He should check to see if he is due for any vaccines, not particularly for chemo.  Should get flu shot

## 2023-11-15 NOTE — PROGRESS NOTES
Oncology   Chemotherapy School Education  Danish Valladares   1960    Social Service Education   This is a 62 y.o.male with a medical diagnosis of gastric adenocarcinoma.    Current Support System: The pt reported having a wife and two adult children who could help in the case he would be feeling too poorly to drive. He spoke of his four grandchildren who bring him drew.  The pt expressed this being a lot to process bc he is use to being his own person and taking care of everything himself. This  acknowledged his concerns and listened as he shared reminding the pt that we are here to support he and his family throughout his treatment.    Met with pt for new pt education. Reviewed role of  during cancer treatment. Educated on role of SW on care team and resources available at the cancer center.   The pt was given a tour of the infusion suite and then was brought to the O bar for his care companion, however they were busy and he could not wait bc he had to get back to work.    Patient provided with social contact number and advised to call with questions or make future appointment if further intervention is needed. SW to follow throughout tx.    Joy Goldstein LCSW  11/15/2023  12:11 PM

## 2023-11-16 ENCOUNTER — PATIENT MESSAGE (OUTPATIENT)
Dept: HEMATOLOGY/ONCOLOGY | Facility: CLINIC | Age: 63
End: 2023-11-16
Payer: COMMERCIAL

## 2023-11-16 ENCOUNTER — TELEPHONE (OUTPATIENT)
Dept: HEMATOLOGY/ONCOLOGY | Facility: CLINIC | Age: 63
End: 2023-11-16
Payer: COMMERCIAL

## 2023-11-16 NOTE — TELEPHONE ENCOUNTER
I spoke with the patient about getting an IO appt scheduled per referral. I explained in detail what we offer and listed some symptoms/side effects that we can help address using our support services. He stated he will discuss with his wife and will let us know if he is interested in scheduling.

## 2023-11-17 ENCOUNTER — TELEPHONE (OUTPATIENT)
Dept: HEMATOLOGY/ONCOLOGY | Facility: CLINIC | Age: 63
End: 2023-11-17
Payer: COMMERCIAL

## 2023-11-17 ENCOUNTER — TELEPHONE (OUTPATIENT)
Dept: GENETICS | Facility: CLINIC | Age: 63
End: 2023-11-17
Payer: COMMERCIAL

## 2023-11-17 ENCOUNTER — PATIENT MESSAGE (OUTPATIENT)
Dept: HEMATOLOGY/ONCOLOGY | Facility: CLINIC | Age: 63
End: 2023-11-17
Payer: COMMERCIAL

## 2023-11-17 LAB
ONEOME COMMENT: NORMAL
ONEOME METHOD: NORMAL

## 2023-11-17 NOTE — NURSING
Spoke with patient to offer treatment on 11/22.  Patient stated he wanted to wait until after Thanksgiving.  He requested a call back on Monday 11/27 to discuss scheduling.  Explained to patient it would be necessary to schedule so that he would have an appt for treatment the week of 11/27.  Tentative schedule for 11/29.  Patient verbalized understanding

## 2023-11-20 ENCOUNTER — TELEPHONE (OUTPATIENT)
Dept: HEMATOLOGY/ONCOLOGY | Facility: CLINIC | Age: 63
End: 2023-11-20
Payer: COMMERCIAL

## 2023-11-20 ENCOUNTER — ANESTHESIA EVENT (OUTPATIENT)
Dept: SURGERY | Facility: HOSPITAL | Age: 63
End: 2023-11-20
Payer: COMMERCIAL

## 2023-11-20 NOTE — TELEPHONE ENCOUNTER
Spoke with patient to see if he was interested in scheduling an IO consult per referral. He voiced he has not given it any thought and won't until after the holidays. I advised to call if interested once he starts treatment.

## 2023-11-21 ENCOUNTER — ANESTHESIA (OUTPATIENT)
Dept: SURGERY | Facility: HOSPITAL | Age: 63
End: 2023-11-21
Payer: COMMERCIAL

## 2023-11-21 ENCOUNTER — HOSPITAL ENCOUNTER (OUTPATIENT)
Dept: RADIOLOGY | Facility: HOSPITAL | Age: 63
Discharge: HOME | End: 2023-11-21
Attending: STUDENT IN AN ORGANIZED HEALTH CARE EDUCATION/TRAINING PROGRAM
Payer: COMMERCIAL

## 2023-11-21 ENCOUNTER — HOSPITAL ENCOUNTER (OUTPATIENT)
Facility: HOSPITAL | Age: 63
Discharge: HOME | End: 2023-11-21
Attending: STUDENT IN AN ORGANIZED HEALTH CARE EDUCATION/TRAINING PROGRAM | Admitting: STUDENT IN AN ORGANIZED HEALTH CARE EDUCATION/TRAINING PROGRAM
Payer: COMMERCIAL

## 2023-11-21 DIAGNOSIS — C16.9 GASTRIC ADENOCARCINOMA: Primary | ICD-10-CM

## 2023-11-21 DIAGNOSIS — Z95.828 PORT-A-CATH IN PLACE: ICD-10-CM

## 2023-11-21 PROCEDURE — 71000015 HC POSTOP RECOV 1ST HR: Mod: PO | Performed by: STUDENT IN AN ORGANIZED HEALTH CARE EDUCATION/TRAINING PROGRAM

## 2023-11-21 PROCEDURE — 71045 X-RAY EXAM CHEST 1 VIEW: CPT | Mod: TC,FY,PO

## 2023-11-21 PROCEDURE — 25000003 PHARM REV CODE 250: Mod: PO | Performed by: STUDENT IN AN ORGANIZED HEALTH CARE EDUCATION/TRAINING PROGRAM

## 2023-11-21 PROCEDURE — 36561 PR INSERT TUNNELED CV CATH WITH PORT: ICD-10-PCS | Mod: LT,,, | Performed by: STUDENT IN AN ORGANIZED HEALTH CARE EDUCATION/TRAINING PROGRAM

## 2023-11-21 PROCEDURE — 25000003 PHARM REV CODE 250: Mod: PO | Performed by: NURSE ANESTHETIST, CERTIFIED REGISTERED

## 2023-11-21 PROCEDURE — 63600175 PHARM REV CODE 636 W HCPCS: Mod: PO | Performed by: ANESTHESIOLOGY

## 2023-11-21 PROCEDURE — D9220A PRA ANESTHESIA: ICD-10-PCS | Mod: ANES,,, | Performed by: ANESTHESIOLOGY

## 2023-11-21 PROCEDURE — D9220A PRA ANESTHESIA: Mod: ANES,,, | Performed by: ANESTHESIOLOGY

## 2023-11-21 PROCEDURE — 37000008 HC ANESTHESIA 1ST 15 MINUTES: Mod: PO | Performed by: STUDENT IN AN ORGANIZED HEALTH CARE EDUCATION/TRAINING PROGRAM

## 2023-11-21 PROCEDURE — 36000706: Mod: PO | Performed by: STUDENT IN AN ORGANIZED HEALTH CARE EDUCATION/TRAINING PROGRAM

## 2023-11-21 PROCEDURE — 36561 INSERT TUNNELED CV CATH: CPT | Mod: LT,,, | Performed by: STUDENT IN AN ORGANIZED HEALTH CARE EDUCATION/TRAINING PROGRAM

## 2023-11-21 PROCEDURE — C1788 PORT, INDWELLING, IMP: HCPCS | Mod: PO | Performed by: STUDENT IN AN ORGANIZED HEALTH CARE EDUCATION/TRAINING PROGRAM

## 2023-11-21 PROCEDURE — 77001 FLUOROGUIDE FOR VEIN DEVICE: CPT | Mod: 26,,, | Performed by: STUDENT IN AN ORGANIZED HEALTH CARE EDUCATION/TRAINING PROGRAM

## 2023-11-21 PROCEDURE — 76000 FLUOROSCOPY <1 HR PHYS/QHP: CPT | Mod: TC,PO

## 2023-11-21 PROCEDURE — 63600175 PHARM REV CODE 636 W HCPCS: Mod: PO | Performed by: STUDENT IN AN ORGANIZED HEALTH CARE EDUCATION/TRAINING PROGRAM

## 2023-11-21 PROCEDURE — 77001 CHG FLUOROGUIDE CNTRL VEN ACCESS,PLACE,REPLACE,REMOVE: ICD-10-PCS | Mod: 26,,, | Performed by: STUDENT IN AN ORGANIZED HEALTH CARE EDUCATION/TRAINING PROGRAM

## 2023-11-21 PROCEDURE — 71045 X-RAY EXAM CHEST 1 VIEW: CPT | Mod: 26,,, | Performed by: RADIOLOGY

## 2023-11-21 PROCEDURE — D9220A PRA ANESTHESIA: Mod: CRNA,,, | Performed by: NURSE ANESTHETIST, CERTIFIED REGISTERED

## 2023-11-21 PROCEDURE — 71045 XR CHEST 1 VIEW: ICD-10-PCS | Mod: 26,,, | Performed by: RADIOLOGY

## 2023-11-21 PROCEDURE — 36000707: Mod: PO | Performed by: STUDENT IN AN ORGANIZED HEALTH CARE EDUCATION/TRAINING PROGRAM

## 2023-11-21 PROCEDURE — 63600175 PHARM REV CODE 636 W HCPCS: Mod: PO | Performed by: NURSE ANESTHETIST, CERTIFIED REGISTERED

## 2023-11-21 PROCEDURE — D9220A PRA ANESTHESIA: ICD-10-PCS | Mod: CRNA,,, | Performed by: NURSE ANESTHETIST, CERTIFIED REGISTERED

## 2023-11-21 PROCEDURE — 71000033 HC RECOVERY, INTIAL HOUR: Mod: PO | Performed by: STUDENT IN AN ORGANIZED HEALTH CARE EDUCATION/TRAINING PROGRAM

## 2023-11-21 PROCEDURE — 37000009 HC ANESTHESIA EA ADD 15 MINS: Mod: PO | Performed by: STUDENT IN AN ORGANIZED HEALTH CARE EDUCATION/TRAINING PROGRAM

## 2023-11-21 DEVICE — KIT POWERPORT SINGLE 8FR: Type: IMPLANTABLE DEVICE | Site: CHEST | Status: FUNCTIONAL

## 2023-11-21 RX ORDER — OXYCODONE HYDROCHLORIDE 5 MG/1
5 TABLET ORAL
Status: DISCONTINUED | OUTPATIENT
Start: 2023-11-21 | End: 2023-12-26

## 2023-11-21 RX ORDER — HALOPERIDOL 5 MG/ML
0.5 INJECTION INTRAMUSCULAR EVERY 10 MIN PRN
Status: ACTIVE | OUTPATIENT
Start: 2023-11-21

## 2023-11-21 RX ORDER — HEPARIN 100 UNIT/ML
SYRINGE INTRAVENOUS
Status: DISCONTINUED | OUTPATIENT
Start: 2023-11-21 | End: 2023-11-21 | Stop reason: HOSPADM

## 2023-11-21 RX ORDER — LIDOCAINE HYDROCHLORIDE 20 MG/ML
INJECTION INTRAVENOUS
Status: DISCONTINUED | OUTPATIENT
Start: 2023-11-21 | End: 2023-11-21

## 2023-11-21 RX ORDER — PROCHLORPERAZINE EDISYLATE 5 MG/ML
5 INJECTION INTRAMUSCULAR; INTRAVENOUS EVERY 4 HOURS PRN
Status: ACTIVE | OUTPATIENT
Start: 2023-11-21

## 2023-11-21 RX ORDER — SODIUM CHLORIDE, SODIUM LACTATE, POTASSIUM CHLORIDE, CALCIUM CHLORIDE 600; 310; 30; 20 MG/100ML; MG/100ML; MG/100ML; MG/100ML
INJECTION, SOLUTION INTRAVENOUS CONTINUOUS
Status: DISPENSED | OUTPATIENT
Start: 2023-11-21

## 2023-11-21 RX ORDER — LIDOCAINE HYDROCHLORIDE AND EPINEPHRINE 10; 10 MG/ML; UG/ML
INJECTION, SOLUTION INFILTRATION; PERINEURAL
Status: DISCONTINUED | OUTPATIENT
Start: 2023-11-21 | End: 2023-11-21 | Stop reason: HOSPADM

## 2023-11-21 RX ORDER — ONDANSETRON 2 MG/ML
INJECTION INTRAMUSCULAR; INTRAVENOUS
Status: DISCONTINUED | OUTPATIENT
Start: 2023-11-21 | End: 2023-11-21

## 2023-11-21 RX ORDER — DIPHENHYDRAMINE HYDROCHLORIDE 50 MG/ML
25 INJECTION INTRAMUSCULAR; INTRAVENOUS EVERY 6 HOURS PRN
Status: ACTIVE | OUTPATIENT
Start: 2023-11-21

## 2023-11-21 RX ORDER — SODIUM CHLORIDE 9 MG/ML
INJECTION, SOLUTION INTRAVENOUS CONTINUOUS
Status: DISCONTINUED | OUTPATIENT
Start: 2023-11-21 | End: 2023-11-21 | Stop reason: HOSPADM

## 2023-11-21 RX ORDER — SODIUM CHLORIDE 0.9 % (FLUSH) 0.9 %
10 SYRINGE (ML) INJECTION ONCE
Status: DISPENSED | OUTPATIENT
Start: 2023-11-21

## 2023-11-21 RX ORDER — PROPOFOL 10 MG/ML
VIAL (ML) INTRAVENOUS CONTINUOUS PRN
Status: DISCONTINUED | OUTPATIENT
Start: 2023-11-21 | End: 2023-11-21

## 2023-11-21 RX ORDER — PROPOFOL 10 MG/ML
VIAL (ML) INTRAVENOUS
Status: DISCONTINUED | OUTPATIENT
Start: 2023-11-21 | End: 2023-11-21

## 2023-11-21 RX ORDER — DEXMEDETOMIDINE HYDROCHLORIDE 100 UG/ML
INJECTION, SOLUTION INTRAVENOUS
Status: DISCONTINUED | OUTPATIENT
Start: 2023-11-21 | End: 2023-11-21

## 2023-11-21 RX ORDER — LIDOCAINE HYDROCHLORIDE 10 MG/ML
1 INJECTION, SOLUTION EPIDURAL; INFILTRATION; INTRACAUDAL; PERINEURAL ONCE
Status: ACTIVE | OUTPATIENT
Start: 2023-11-21

## 2023-11-21 RX ORDER — HYDROMORPHONE HYDROCHLORIDE 2 MG/ML
0.2 INJECTION, SOLUTION INTRAMUSCULAR; INTRAVENOUS; SUBCUTANEOUS EVERY 5 MIN PRN
Status: DISCONTINUED | OUTPATIENT
Start: 2023-11-21 | End: 2023-12-26

## 2023-11-21 RX ORDER — CLINDAMYCIN PHOSPHATE 900 MG/50ML
900 INJECTION, SOLUTION INTRAVENOUS
Status: COMPLETED | OUTPATIENT
Start: 2023-11-21 | End: 2023-11-21

## 2023-11-21 RX ORDER — MEPERIDINE HYDROCHLORIDE 50 MG/ML
12.5 INJECTION INTRAMUSCULAR; INTRAVENOUS; SUBCUTANEOUS ONCE
Status: ACTIVE | OUTPATIENT
Start: 2023-11-21 | End: 2023-11-22

## 2023-11-21 RX ADMIN — ONDANSETRON 4 MG: 2 INJECTION, SOLUTION INTRAMUSCULAR; INTRAVENOUS at 11:11

## 2023-11-21 RX ADMIN — SODIUM CHLORIDE, POTASSIUM CHLORIDE, SODIUM LACTATE AND CALCIUM CHLORIDE: 600; 310; 30; 20 INJECTION, SOLUTION INTRAVENOUS at 10:11

## 2023-11-21 RX ADMIN — DEXMEDETOMIDINE HYDROCHLORIDE 4 MCG: 100 INJECTION, SOLUTION, CONCENTRATE INTRAVENOUS at 11:11

## 2023-11-21 RX ADMIN — PROPOFOL 100 MG: 10 INJECTION, EMULSION INTRAVENOUS at 11:11

## 2023-11-21 RX ADMIN — PROPOFOL 100 MCG/KG/MIN: 10 INJECTION, EMULSION INTRAVENOUS at 11:11

## 2023-11-21 RX ADMIN — CLINDAMYCIN IN 5 PERCENT DEXTROSE 900 MG: 18 INJECTION, SOLUTION INTRAVENOUS at 10:11

## 2023-11-21 RX ADMIN — LIDOCAINE HYDROCHLORIDE 75 MG: 20 INJECTION INTRAVENOUS at 11:11

## 2023-11-21 NOTE — OP NOTE
[facility] Surgery  Surgery Department  Operative Note    SUMMARY     Date of Procedure: 11/21/2023     Procedure: Procedure(s):  [id number]     Surgeon(s) and Role:     * Talon Arias MD - Primary    Assisting Surgeon: None    Pre-Operative Diagnosis: Gastric adenocarcinoma [C16.9]    Post-Operative Diagnosis: Post-Op Diagnosis Codes:     * Gastric adenocarcinoma [C16.9]    Anesthesia: General    Operative Findings (including complications, if any):  Left internal jugular vein port placement tunneled to the left chest wall    Description of Technical Procedures:  This is a 62-year-old male with gastric cancer.  He did consent to port placement.  He was taken to the OR, placed supine, sedated by Anesthesia, the neck and chest were prepped and draped in the usual fashion, a time-out was completed.  Using ultrasound, the left internal jugular vein was cannulated with a hollow bore needle.  A wire was advanced and confirmed to be in appropriate location using ultrasound and fluoroscopy.  A location on the left chest wall was chosen for the port site.  An incision was made sharply.  A port pocket was developed using electrocautery.  The port was secured within the pocket using Vicryl sutures.  The catheter was then tunneled from the chest wall to the wire exit site in the neck.  Using Seldinger technique and under fluoroscopic guidance, the wire was exchanged for a split sheath dilator without apparent complication.  The catheter was advanced down the sheath and the sheath was then removed.  Repeat fluoroscopic imaging confirmed appropriate location of the catheter without kinking.  The port was accessed, aspirated, and then flushed with heparinized saline with ease.  The port pocket was closed in layers with Vicryl and Monocryl.  The wire exit site in the neck was closed with a buried Vicryl suture.  Dermabond was applied as a dressing.  Patient tolerated the entire procedure without apparent  complication, was woken from anesthesia, brought to recovery in stable condition.  All counts were correct.    Significant Surgical Tasks Conducted by the Assistant(s), if Applicable: n/a    Estimated Blood Loss (EBL): *min           Implants:   Implant Name Type Inv. Item Serial No.  Lot No. LRB No. Used Action   KIT POWERPORT SINGLE 8FR - [id number]  KIT POWERPORT SINGLE 8FR  [employer] [id number] Left 1 Implanted       Specimens:   Specimen (24h ago, onward)      None                    Condition: Good    Disposition: PACU - hemodynamically stable.    Attestation: I was present and scrubbed for the entire procedure.

## 2023-11-21 NOTE — DISCHARGE SUMMARY
[facility] - Surgery  Brief Operative Note    Surgery Date: 11/21/2023     Surgeon(s) and Role:     * Talon Arias MD - Primary    Assisting Surgeon: None    Pre-op Diagnosis:  Gastric adenocarcinoma [C16.9]    Post-op Diagnosis:  Post-Op Diagnosis Codes:     * Gastric adenocarcinoma [C16.9]    Procedure(s):  [id number]    Anesthesia: General    Operative Findings:  Left IJ tunneled port    Estimated Blood Loss:  Minimal         Specimens:   Specimen (24h ago, onward)      None              Discharge Note    OUTCOME: Patient tolerated treatment/procedure well without complication and is now ready for discharge.    DISPOSITION: Home or Self Care    FINAL DIAGNOSIS:  Gastric adenocarcinoma    FOLLOWUP: In clinic    DISCHARGE INSTRUCTIONS:    Discharge Procedure Orders   Diet Adult Regular     Notify your health care provider if you experience any of the following:  redness, tenderness, or signs of infection (pain, swelling, redness, odor or green/yellow discharge around incision site)     Notify your health care provider if you experience any of the following:  severe uncontrolled pain     Notify your health care provider if you experience any of the following:  temperature >100.4     No dressing needed   Order Comments: Okay to shower.  No swimming or soaking for 2-3 weeks     Activity as tolerated

## 2023-11-21 NOTE — TRANSFER OF CARE
"Anesthesia Transfer of Care Note    Patient: Danish Valladares    Procedure(s) Performed: Procedure(s):  [id number]-[id number]    Patient location: PACU    Anesthesia Type: MAC    Transport from OR: Transported from OR on room air with adequate spontaneous ventilation    Post pain: adequate analgesia    Post assessment: no apparent anesthetic complications    Post vital signs: stable    Level of consciousness: awake    Nausea/Vomiting: no nausea/vomiting    Complications: none    Transfer of care protocol was followed      Last vitals: Visit Vitals  /73 (BP Location: Right arm, Patient Position: Sitting)   Pulse 90   Temp 36.7 °C (98.1 °F) (Skin)   Resp 16   Ht 5' 11" (1.803 m)   Wt 81.2 kg (179 lb)   SpO2 95%   BMI 24.97 kg/m²     "

## 2023-11-21 NOTE — ANESTHESIA POSTPROCEDURE EVALUATION
Anesthesia Post Evaluation    Patient: Danish Valladares    Procedure(s) Performed: Procedure(s):  [id number]-CATH    Final Anesthesia Type: MAC      Patient location during evaluation: PACU  Patient participation: Yes- Able to Participate  Level of consciousness: awake and alert  Post-procedure vital signs: reviewed and stable  Pain management: adequate  Airway patency: patent    PONV status at discharge: No PONV  Anesthetic complications: no      Cardiovascular status: hemodynamically stable  Respiratory status: unassisted and room air  Hydration status: euvolemic  Follow-up not needed.          Vitals Value Taken Time   /59 11/21/23 1206   Temp 36.4 °C (97.5 °F) 11/21/23 1152   Pulse 86 11/21/23 1206   Resp 16 11/21/23 1206   SpO2 98 % 11/21/23 1206         No case tracking events are documented in the log.      Pain/Feliz Score: Feliz Score: 9 (11/21/2023 11:52 AM)

## 2023-11-21 NOTE — ANESTHESIA PREPROCEDURE EVALUATION
11/21/2023  Danish Valladares is a 62 y.o., male.      Pre-op Assessment    I have reviewed the Patient Summary Reports.     I have reviewed the Nursing Notes. I have reviewed the NPO Status.   I have reviewed the Medications.     Review of Systems  Anesthesia Hx:  No problems with previous Anesthesia                Social:  Former Smoker       Hematology/Oncology:                      Current/Recent Cancer.         surgery   Oncology Comments: Gastric adenocarcinoma      EENT/Dental:  EENT/Dental Normal           Cardiovascular:  Cardiovascular Normal                                            Pulmonary:  Pulmonary Normal                       Renal/:  Renal/ Normal                 Hepatic/GI:        H/o gastrectomy           Musculoskeletal:  Musculoskeletal Normal                Neurological:  Neurology Normal                                      Endocrine:  Endocrine Normal            Dermatological:  Skin Normal    Psych:  Psychiatric Normal                    Physical Exam  General: Well nourished, Cooperative, Alert and Oriented    Airway:  Mallampati: II   Mouth Opening: Normal  TM Distance: Normal  Tongue: Normal  Neck ROM: Normal ROM    Dental:  Dentures    Chest/Lungs:  Normal Respiratory Rate    Heart:  Rate: Normal        Anesthesia Plan  Type of Anesthesia, risks & benefits discussed:    Anesthesia Type: MAC  Intra-op Monitoring Plan: Standard ASA Monitors  Post Op Pain Control Plan: multimodal analgesia and IV/PO Opioids PRN  Induction:  IV  Informed Consent: Informed consent signed with the Patient and all parties understand the risks and agree with anesthesia plan.  All questions answered.   ASA Score: 3    Ready For Surgery From Anesthesia Perspective.     .

## 2023-11-21 NOTE — DISCHARGE INSTRUCTIONS
WOUND CARE    After Surgery    DO:  May shower in 48 hours.  Minimal activity for 48 hours.  May remove outer dressing in 48 hours. If skin glue is present, leave them in place. If they get wet, pat them dry. Skin glue will fall off on their own. Do not pull them off.  Advance diet as tolerated.  Resume home medications as prescribed.    DO NOT:  Do not soak in a tub or pool until directed by your physician.  Do not drive for 24 hours or while taking narcotic pain medication.  Do not take additional tylenol/acetaminophen while taking narcotic pain medication that contains tylenol/acetaminophen.     CALL PHYSICIAN FOR:  Redness, swelling or bleeding.  Fever greater than 101.  Drainage from the incision site that appears infected.  Persistent pain not relieved by pain medication.    RETURN APPOINTMENT: Call to schedule appointment in 10-14 days.    FOR EMERGENCIES: Contact your doctor at [phone].

## 2023-11-22 VITALS
OXYGEN SATURATION: 98 % | HEIGHT: 71 IN | TEMPERATURE: 98 F | DIASTOLIC BLOOD PRESSURE: 62 MMHG | HEART RATE: 86 BPM | WEIGHT: 179 LBS | RESPIRATION RATE: 16 BRPM | SYSTOLIC BLOOD PRESSURE: 120 MMHG | BODY MASS INDEX: 25.06 KG/M2

## 2023-11-28 ENCOUNTER — LAB VISIT (OUTPATIENT)
Dept: LAB | Facility: HOSPITAL | Age: 63
End: 2023-11-28
Attending: INTERNAL MEDICINE
Payer: COMMERCIAL

## 2023-11-28 ENCOUNTER — OFFICE VISIT (OUTPATIENT)
Dept: HEMATOLOGY/ONCOLOGY | Facility: CLINIC | Age: 63
End: 2023-11-28
Payer: COMMERCIAL

## 2023-11-28 VITALS
SYSTOLIC BLOOD PRESSURE: 124 MMHG | RESPIRATION RATE: 16 BRPM | HEART RATE: 90 BPM | WEIGHT: 177.94 LBS | BODY MASS INDEX: 24.91 KG/M2 | OXYGEN SATURATION: 95 % | HEIGHT: 71 IN | TEMPERATURE: 98 F | DIASTOLIC BLOOD PRESSURE: 71 MMHG

## 2023-11-28 DIAGNOSIS — C16.9 GASTRIC ADENOCARCINOMA: Primary | ICD-10-CM

## 2023-11-28 DIAGNOSIS — C16.9 GASTRIC ADENOCARCINOMA: ICD-10-CM

## 2023-11-28 DIAGNOSIS — C78.6 SECONDARY MALIGNANCY OF PARIETAL PERITONEUM: ICD-10-CM

## 2023-11-28 LAB
ALBUMIN SERPL BCP-MCNC: 3.5 G/DL (ref 3.5–5.2)
ALP SERPL-CCNC: 70 U/L (ref 55–135)
ALT SERPL W/O P-5'-P-CCNC: 11 U/L (ref 10–44)
ANION GAP SERPL CALC-SCNC: 8 MMOL/L (ref 8–16)
AST SERPL-CCNC: 12 U/L (ref 10–40)
BASOPHILS # BLD AUTO: 0.04 K/UL (ref 0–0.2)
BASOPHILS NFR BLD: 0.4 % (ref 0–1.9)
BILIRUB SERPL-MCNC: 0.4 MG/DL (ref 0.1–1)
BUN SERPL-MCNC: 9 MG/DL (ref 8–23)
CALCIUM SERPL-MCNC: 9.1 MG/DL (ref 8.7–10.5)
CHLORIDE SERPL-SCNC: 101 MMOL/L (ref 95–110)
CO2 SERPL-SCNC: 30 MMOL/L (ref 23–29)
CREAT SERPL-MCNC: 0.8 MG/DL (ref 0.5–1.4)
DIFFERENTIAL METHOD: ABNORMAL
EOSINOPHIL # BLD AUTO: 0.1 K/UL (ref 0–0.5)
EOSINOPHIL NFR BLD: 0.7 % (ref 0–8)
ERYTHROCYTE [DISTWIDTH] IN BLOOD BY AUTOMATED COUNT: 13.9 % (ref 11.5–14.5)
EST. GFR  (NO RACE VARIABLE): >60 ML/MIN/1.73 M^2
GLUCOSE SERPL-MCNC: 121 MG/DL (ref 70–110)
HCT VFR BLD AUTO: 42.6 % (ref 40–54)
HGB BLD-MCNC: 13.4 G/DL (ref 14–18)
IMM GRANULOCYTES # BLD AUTO: 0.02 K/UL (ref 0–0.04)
IMM GRANULOCYTES NFR BLD AUTO: 0.2 % (ref 0–0.5)
LYMPHOCYTES # BLD AUTO: 3.7 K/UL (ref 1–4.8)
LYMPHOCYTES NFR BLD: 41.6 % (ref 18–48)
MCH RBC QN AUTO: 26.1 PG (ref 27–31)
MCHC RBC AUTO-ENTMCNC: 31.5 G/DL (ref 32–36)
MCV RBC AUTO: 83 FL (ref 82–98)
MONOCYTES # BLD AUTO: 0.9 K/UL (ref 0.3–1)
MONOCYTES NFR BLD: 9.7 % (ref 4–15)
NEUTROPHILS # BLD AUTO: 4.3 K/UL (ref 1.8–7.7)
NEUTROPHILS NFR BLD: 47.4 % (ref 38–73)
NRBC BLD-RTO: 0 /100 WBC
PLATELET # BLD AUTO: 239 K/UL (ref 150–450)
PMV BLD AUTO: 8.4 FL (ref 9.2–12.9)
POTASSIUM SERPL-SCNC: 3.6 MMOL/L (ref 3.5–5.1)
PROT SERPL-MCNC: 7.3 G/DL (ref 6–8.4)
RBC # BLD AUTO: 5.14 M/UL (ref 4.6–6.2)
SODIUM SERPL-SCNC: 139 MMOL/L (ref 136–145)
WBC # BLD AUTO: 8.97 K/UL (ref 3.9–12.7)

## 2023-11-28 PROCEDURE — 1159F MED LIST DOCD IN RCRD: CPT | Mod: CPTII,S$GLB,, | Performed by: INTERNAL MEDICINE

## 2023-11-28 PROCEDURE — 3078F PR MOST RECENT DIASTOLIC BLOOD PRESSURE < 80 MM HG: ICD-10-PCS | Mod: CPTII,S$GLB,, | Performed by: INTERNAL MEDICINE

## 2023-11-28 PROCEDURE — 99215 OFFICE O/P EST HI 40 MIN: CPT | Mod: S$GLB,,, | Performed by: INTERNAL MEDICINE

## 2023-11-28 PROCEDURE — 3044F PR MOST RECENT HEMOGLOBIN A1C LEVEL <7.0%: ICD-10-PCS | Mod: CPTII,S$GLB,, | Performed by: INTERNAL MEDICINE

## 2023-11-28 PROCEDURE — 3008F PR BODY MASS INDEX (BMI) DOCUMENTED: ICD-10-PCS | Mod: CPTII,S$GLB,, | Performed by: INTERNAL MEDICINE

## 2023-11-28 PROCEDURE — 99215 PR OFFICE/OUTPT VISIT, EST, LEVL V, 40-54 MIN: ICD-10-PCS | Mod: S$GLB,,, | Performed by: INTERNAL MEDICINE

## 2023-11-28 PROCEDURE — 3074F SYST BP LT 130 MM HG: CPT | Mod: CPTII,S$GLB,, | Performed by: INTERNAL MEDICINE

## 2023-11-28 PROCEDURE — 99999 PR PBB SHADOW E&M-EST. PATIENT-LVL III: ICD-10-PCS | Mod: PBBFAC,,, | Performed by: INTERNAL MEDICINE

## 2023-11-28 PROCEDURE — 99999 PR PBB SHADOW E&M-EST. PATIENT-LVL III: CPT | Mod: PBBFAC,,, | Performed by: INTERNAL MEDICINE

## 2023-11-28 PROCEDURE — 3044F HG A1C LEVEL LT 7.0%: CPT | Mod: CPTII,S$GLB,, | Performed by: INTERNAL MEDICINE

## 2023-11-28 PROCEDURE — 80053 COMPREHEN METABOLIC PANEL: CPT | Mod: PN | Performed by: INTERNAL MEDICINE

## 2023-11-28 PROCEDURE — 85025 COMPLETE CBC W/AUTO DIFF WBC: CPT | Mod: PN | Performed by: INTERNAL MEDICINE

## 2023-11-28 PROCEDURE — 3074F PR MOST RECENT SYSTOLIC BLOOD PRESSURE < 130 MM HG: ICD-10-PCS | Mod: CPTII,S$GLB,, | Performed by: INTERNAL MEDICINE

## 2023-11-28 PROCEDURE — 3008F BODY MASS INDEX DOCD: CPT | Mod: CPTII,S$GLB,, | Performed by: INTERNAL MEDICINE

## 2023-11-28 PROCEDURE — 1159F PR MEDICATION LIST DOCUMENTED IN MEDICAL RECORD: ICD-10-PCS | Mod: CPTII,S$GLB,, | Performed by: INTERNAL MEDICINE

## 2023-11-28 PROCEDURE — 36415 COLL VENOUS BLD VENIPUNCTURE: CPT | Mod: PN | Performed by: INTERNAL MEDICINE

## 2023-11-28 PROCEDURE — 3078F DIAST BP <80 MM HG: CPT | Mod: CPTII,S$GLB,, | Performed by: INTERNAL MEDICINE

## 2023-11-28 RX ORDER — HEPARIN 100 UNIT/ML
500 SYRINGE INTRAVENOUS
Status: CANCELLED | OUTPATIENT
Start: 2023-12-01

## 2023-11-28 RX ORDER — SODIUM CHLORIDE 0.9 % (FLUSH) 0.9 %
10 SYRINGE (ML) INJECTION
Status: CANCELLED | OUTPATIENT
Start: 2023-12-01

## 2023-11-28 RX ORDER — PROCHLORPERAZINE EDISYLATE 5 MG/ML
10 INJECTION INTRAMUSCULAR; INTRAVENOUS ONCE AS NEEDED
Status: CANCELLED | OUTPATIENT
Start: 2023-12-01

## 2023-11-28 RX ORDER — PROCHLORPERAZINE EDISYLATE 5 MG/ML
10 INJECTION INTRAMUSCULAR; INTRAVENOUS ONCE AS NEEDED
Status: CANCELLED | OUTPATIENT
Start: 2023-11-29

## 2023-11-28 RX ORDER — HEPARIN 100 UNIT/ML
500 SYRINGE INTRAVENOUS
Status: CANCELLED | OUTPATIENT
Start: 2023-11-29

## 2023-11-28 RX ORDER — FLUOROURACIL 50 MG/ML
400 INJECTION, SOLUTION INTRAVENOUS
Status: CANCELLED | OUTPATIENT
Start: 2023-11-29

## 2023-11-28 RX ORDER — SODIUM CHLORIDE 0.9 % (FLUSH) 0.9 %
10 SYRINGE (ML) INJECTION
Status: CANCELLED | OUTPATIENT
Start: 2023-11-29

## 2023-11-28 RX ORDER — EPINEPHRINE 0.3 MG/.3ML
0.3 INJECTION SUBCUTANEOUS ONCE AS NEEDED
Status: CANCELLED | OUTPATIENT
Start: 2023-11-29

## 2023-11-28 RX ORDER — DIPHENHYDRAMINE HYDROCHLORIDE 50 MG/ML
50 INJECTION INTRAMUSCULAR; INTRAVENOUS ONCE AS NEEDED
Status: CANCELLED | OUTPATIENT
Start: 2023-11-29

## 2023-11-28 NOTE — PROGRESS NOTES
"Subjective     Patient ID: Danish Valladares is a 62 y.o. male.    Chief Complaint: Gastric adenocarcinoma (Labs and clearance)  Mr. Perez is a 62 year old with new diagnosis of gastric cancer. He noted abdomen discomfort and vomiting since May 2023  EGD on 8/3/2023: LA Grade C reflux esophagitis with no bleeding.                          Biopsied.                          - Inflammed, nodular gastric antral mucosa with                          ulcerations. R/O neoplasm. Biopsied.                          - Retained gastric contents.                          - Unable to advance scope beyond pyloric channel.   Pathology: Poorly cohesive carcinoma   - An AE1/3 pankeratin immunostain supports the above interpretation   - Background antral-type mucosa with surface erosion and reactive/regenerative epithelial changes   - Oxyntic-type mucosa with minimal chronic inflammation   - No evidence of Helicobacter-like organisms      8/16/2023: presented to ED with GOO and 30# weight loss  8/21/2023: Distal gastrectomy with Bilroth II reconstruction per Dr. Moy  Pathology: Peritoneal nodules (excisional biopsy):   - Desmoplasia, inflammation, fibrosis, rare atypical cells   2.  Peritoneal nodules (biopsy):   - Positive for malignancy   - Metastatic poorly differentiated adenocarcinoma   3.  Distal gastrectomy (resection):   - Poorly differentiated carcinoma, see synoptic report below   - NextGen sequencing CDH1, CPS <1, her 2 negative, RAMON stable      Pharmacogenomic panel: [id number] significant      9/14/2023: re-ad[place] to [facility] [place], IR drain placement into intra-abd fluid collection  9/18/2023: s/p exp lap drainage of peritoneal abscess, EGD with visualization of duodenal defect c/w duodenal stump leak per Dr. Moy  9/29/2023: IR drain placement     10/9/2023 CT scans reveals "Interval placement of a posterior approach right upper quadrant pigtail catheter within a infra hepatic abscess, which is decreased in size. Removal of the " "pelvic surgical drain with a thin linear fluid along the prior tract, possibly with rim enhancement, concerning for infection. Decreased size of left anterior abdominal fluid collection measuring up to 7.3 cm, previously 10 cm.  Near complete resolution of a fluid collection posterior to the descending colon"    HPIMr. Valladares comes in to start palliative chemo with FOLFOX  Not a candidate for START trial secondary to not having measurable disease  He denies any nausea, vomiting, diarrhea, constipation, abdominal pain, weight loss or loss of appetite, chest pain, shortness of breath, leg swelling, fatigue, pain, headache, dizziness, or mood changes. His ECOG PS is zero. He is here by himself   He notes large volume meals make him feel abdomen discomfort. He continues to work as a  about 8 hours/day      Review of Systems   Constitutional:  Negative for appetite change, fatigue and unexpected weight change.   HENT:  Negative for mouth sores.    Eyes:  Negative for visual disturbance.   Respiratory:  Negative for cough and shortness of breath.    Cardiovascular:  Negative for chest pain.   Gastrointestinal:  Negative for abdominal pain and diarrhea.   Genitourinary:  Negative for frequency.   Musculoskeletal:  Negative for back pain.   Integumentary:  Negative for rash.   Neurological:  Negative for headaches.   Hematological:  Negative for adenopathy.   Psychiatric/Behavioral:  The patient is not nervous/anxious.    All other systems reviewed and are negative.         Objective     Physical Exam  Vitals reviewed.   Constitutional:       Appearance: He is well-developed.   HENT:      Mouth/Throat:      Pharynx: No oropharyngeal exudate.   Cardiovascular:      Rate and Rhythm: Normal rate.      Heart sounds: Normal heart sounds.   Pulmonary:      Effort: Pulmonary effort is normal.      Breath sounds: Normal breath sounds. No wheezing.   Abdominal:      General: Bowel sounds are normal.      Palpations: Abdomen " is soft.      Tenderness: There is no abdominal tenderness.   Musculoskeletal:         General: No tenderness.   Lymphadenopathy:      Cervical: No cervical adenopathy.   Skin:     General: Skin is warm and dry.      Findings: No rash.   Neurological:      Mental Status: He is alert and oriented to person, place, and time.      Coordination: Coordination normal.   Psychiatric:         Thought Content: Thought content normal.         Judgment: Judgment normal.              LABS:  WBC   Date Value Ref Range Status   11/28/2023 8.97 3.90 - 12.70 K/uL Final     Hemoglobin   Date Value Ref Range Status   11/28/2023 13.4 (L) 14.0 - 18.0 g/dL Final     POC Hematocrit   Date Value Ref Range Status   08/16/2023 52 36 - 54 %PCV Final     Hematocrit   Date Value Ref Range Status   11/28/2023 42.6 40.0 - 54.0 % Final     Platelets   Date Value Ref Range Status   11/28/2023 239 150 - 450 K/uL Final     Gran # (ANC)   Date Value Ref Range Status   11/28/2023 4.3 1.8 - 7.7 K/uL Final     Gran %   Date Value Ref Range Status   11/28/2023 47.4 38.0 - 73.0 % Final       Chemistry        Component Value Date/Time     11/28/2023 0933     08/16/2023 1109    K 3.6 11/28/2023 0933    K 3.0 08/16/2023 1109     11/28/2023 0933    CL 84 08/16/2023 1109    CO2 30 (H) 11/28/2023 0933    CO2 33 08/16/2023 1109    BUN 9 11/28/2023 0933    BUN 24 (A) 08/16/2023 1109    CREATININE 0.8 11/28/2023 0933    CREATININE 1.3 08/16/2023 1109     (H) 11/28/2023 0933     08/16/2023 1109        Component Value Date/Time    CALCIUM 9.1 11/28/2023 0933    ALKPHOS 70 11/28/2023 0933    AST 12 11/28/2023 0933    ALT 11 11/28/2023 0933    BILITOT 0.4 11/28/2023 0933          Assessment and Plan     1. Gastric adenocarcinoma    2. Secondary malignancy of parietal peritoneum    Other orders  -     palonosetron 0.25mg/dexAMETHasone 12mg in NS IVPB 0.25 mg 50 mL  -     oxaliplatin (ELOXATIN) 85 mg/m2 = 171 mg in dextrose 5 % (D5W)  534.2 mL chemo infusion  -     leucovorin calcium 400 mg/m2 = 805 mg in dextrose 5 % (D5W) 250 mL infusion  -     fluorouraciL injection 805 mg  -     fluorouracil (ADRUCIL) 2,400 mg/m2 = 4,825 mg in sodium chloride 0.9% 100 mL chemo infusion  -     prochlorperazine injection 10 mg  -     EPINEPHrine (EPIPEN) 0.3 mg/0.3 mL pen injection 0.3 mg  -     diphenhydrAMINE injection 50 mg  -     hydrocortisone sodium succinate injection 100 mg  -     dextrose 5 % (D5W) 250 mL flush bag  -     sodium chloride 0.9% 100 mL flush bag  -     sodium chloride 0.9% flush 10 mL  -     heparin, porcine (PF) 100 unit/mL injection flush 500 Units  -     alteplase injection 2 mg  -     prochlorperazine injection 10 mg  -     sodium chloride 0.9% flush 10 mL  -     heparin, porcine (PF) 100 unit/mL injection flush 500 Units  -     alteplase injection 2 mg        Route Chart for Scheduling    Med Onc Chart Routing      Follow up with physician 2 weeks. Schedule CBC,CMp and see me on 12/12. Scehdu;e FOLFOX on 12/13/2023. DC pump on day 3 (Add to comments, labs and doc on Tuesdays, chemo on wednesdays)   Follow up with TORST[facility]    Infusion scheduling note    Injection scheduling note    Labs    Imaging    Pharmacy appointment    Other referrals                  Treatment Plan Information   OP GI mFOLFOX6 (oxaliplatin leucovorin fluorouracil) Q2W   Chelsea Maravilla MD   Upcoming Treatment Dates - OP GI [id number] (oxaliplatin leucovorin fluorouracil) Q2W    11/29/2023       Chemotherapy       oxaliplatin (ELOXATIN) 85 mg/m2 = 172 mg in dextrose 5 % (D5W) 534.4 mL chemo infusion       leucovorin calcium 400 mg/m2 = 810 mg in dextrose 5 % (D5W) 250 mL infusion       fluorouraciL injection 810 mg       fluorouracil (ADRUCIL) 2,400 mg/m2 = 4,850 mg in sodium chloride 0.9% 100 mL chemo infusion       Antiemetics       palonosetron 0.25mg/dexAMETHasone 12mg in NS IVPB 0.25 mg 50 mL  12/13/2023       Chemotherapy       oxaliplatin (ELOXATIN) in dextrose 5  % (D5W) 534.4 mL chemo infusion       leucovorin calcium in dextrose 5 % (D5W) 250 mL infusion       fluorouraciL injection       fluorouracil chemo infusion       Antiemetics       palonosetron 0.25mg/dexAMETHasone 12mg in NS IVPB 0.25 mg 50 mL  12/27/2023       Chemotherapy       oxaliplatin (ELOXATIN) in dextrose 5 % (D5W) 534.4 mL chemo infusion       leucovorin calcium in dextrose 5 % (D5W) 250 mL infusion       fluorouraciL injection       fluorouracil chemo infusion       Antiemetics       palonosetron 0.25mg/dexAMETHasone 12mg in NS IVPB 0.25 mg 50 mL  1/10/2024       Chemotherapy       oxaliplatin (ELOXATIN) in dextrose 5 % (D5W) 534.4 mL chemo infusion       leucovorin calcium in dextrose 5 % (D5W) 250 mL infusion       fluorouraciL injection       fluorouracil chemo infusion       Antiemetics       palonosetron 0.25mg/dexAMETHasone 12mg in NS IVPB 0.25 mg 50 mL           Mr. Valladares will proceed with palliative chemo with FOLFOX (her 2 negative and CPS<1) as scheduled. Rediscussed side effects and reviewed appropriate use of Zyprexa, Zofran and compazine. He will return in 2 weeks for next chemo  Discussed compliance with chemo care.  He did did not qualify for the STAR trial secondary to not having measurable disease on his CT scans  He will use baking soda and salt rinses since [facility] has a high copay. He will let us know if he develops mouth sores.  Also discussed avoidance of cold for the 1st few days after chemotherapy    Above care plan was discussed with patient and all questions were addressed to his  satisfaction

## 2023-11-29 ENCOUNTER — DOCUMENTATION ONLY (OUTPATIENT)
Dept: INFUSION THERAPY | Facility: HOSPITAL | Age: 63
End: 2023-11-29
Payer: COMMERCIAL

## 2023-11-29 ENCOUNTER — DOCUMENT SCAN (OUTPATIENT)
Dept: HOME HEALTH SERVICES | Facility: HOSPITAL | Age: 63
End: 2023-11-29
Payer: COMMERCIAL

## 2023-11-29 ENCOUNTER — INFUSION (OUTPATIENT)
Dept: INFUSION THERAPY | Facility: HOSPITAL | Age: 63
End: 2023-11-29
Attending: INTERNAL MEDICINE
Payer: COMMERCIAL

## 2023-11-29 VITALS
BODY MASS INDEX: 25 KG/M2 | HEIGHT: 71 IN | RESPIRATION RATE: 16 BRPM | SYSTOLIC BLOOD PRESSURE: 132 MMHG | TEMPERATURE: 98 F | DIASTOLIC BLOOD PRESSURE: 71 MMHG | WEIGHT: 178.56 LBS | HEART RATE: 96 BPM

## 2023-11-29 DIAGNOSIS — C78.6 SECONDARY MALIGNANCY OF PARIETAL PERITONEUM: Primary | ICD-10-CM

## 2023-11-29 DIAGNOSIS — C16.9 GASTRIC ADENOCARCINOMA: ICD-10-CM

## 2023-11-29 PROCEDURE — 25000003 PHARM REV CODE 250: Mod: PN | Performed by: INTERNAL MEDICINE

## 2023-11-29 PROCEDURE — 96411 CHEMO IV PUSH ADDL DRUG: CPT | Mod: PN

## 2023-11-29 PROCEDURE — 96416 CHEMO PROLONG INFUSE W/PUMP: CPT | Mod: PN

## 2023-11-29 PROCEDURE — 96367 TX/PROPH/DG ADDL SEQ IV INF: CPT | Mod: PN

## 2023-11-29 PROCEDURE — 63600175 PHARM REV CODE 636 W HCPCS: Mod: PN | Performed by: INTERNAL MEDICINE

## 2023-11-29 PROCEDURE — 96413 CHEMO IV INFUSION 1 HR: CPT | Mod: PN

## 2023-11-29 PROCEDURE — 96415 CHEMO IV INFUSION ADDL HR: CPT | Mod: PN

## 2023-11-29 RX ORDER — DIPHENHYDRAMINE HYDROCHLORIDE 50 MG/ML
50 INJECTION INTRAMUSCULAR; INTRAVENOUS ONCE AS NEEDED
Status: DISCONTINUED | OUTPATIENT
Start: 2023-11-29 | End: 2023-11-29 | Stop reason: HOSPADM

## 2023-11-29 RX ORDER — SODIUM CHLORIDE 0.9 % (FLUSH) 0.9 %
10 SYRINGE (ML) INJECTION
Status: DISCONTINUED | OUTPATIENT
Start: 2023-11-29 | End: 2023-11-29 | Stop reason: HOSPADM

## 2023-11-29 RX ORDER — FLUOROURACIL 50 MG/ML
400 INJECTION, SOLUTION INTRAVENOUS
Status: COMPLETED | OUTPATIENT
Start: 2023-11-29 | End: 2023-11-29

## 2023-11-29 RX ORDER — EPINEPHRINE 0.3 MG/.3ML
0.3 INJECTION SUBCUTANEOUS ONCE AS NEEDED
Status: DISCONTINUED | OUTPATIENT
Start: 2023-11-29 | End: 2023-11-29 | Stop reason: HOSPADM

## 2023-11-29 RX ORDER — HEPARIN 100 UNIT/ML
500 SYRINGE INTRAVENOUS
Status: DISCONTINUED | OUTPATIENT
Start: 2023-11-29 | End: 2023-11-29 | Stop reason: HOSPADM

## 2023-11-29 RX ORDER — PROCHLORPERAZINE EDISYLATE 5 MG/ML
10 INJECTION INTRAMUSCULAR; INTRAVENOUS ONCE AS NEEDED
Status: DISCONTINUED | OUTPATIENT
Start: 2023-11-29 | End: 2023-11-29 | Stop reason: HOSPADM

## 2023-11-29 RX ADMIN — FLUOROURACIL 4825 MG: 50 INJECTION, SOLUTION INTRAVENOUS at 01:11

## 2023-11-29 RX ADMIN — LEUCOVORIN CALCIUM 805 MG: 350 INJECTION, POWDER, LYOPHILIZED, FOR SOLUTION INTRAMUSCULAR; INTRAVENOUS at 11:11

## 2023-11-29 RX ADMIN — PALONOSETRON 0.25 MG: 0.05 INJECTION, SOLUTION INTRAVENOUS at 10:11

## 2023-11-29 RX ADMIN — OXALIPLATIN 171 MG: 100 INJECTION, SOLUTION, CONCENTRATE INTRAVENOUS at 11:11

## 2023-11-29 RX ADMIN — DEXTROSE MONOHYDRATE: 5 INJECTION, SOLUTION INTRAVENOUS at 10:11

## 2023-11-29 RX ADMIN — FLUOROURACIL 805 MG: 50 INJECTION, SOLUTION INTRAVENOUS at 01:11

## 2023-11-29 NOTE — PLAN OF CARE
Problem: Adult Inpatient Plan of Care  Goal: Plan of Care Review  Outcome: Ongoing, Progressing  Flowsheets (Taken 11/29/2023 1444)  Plan of Care Reviewed With:   patient   daughter   Pt tolerated Oxaliplatin infusion well.   No adverse reaction noted.  PAC flushed with NS and connected to pump per protocol.   5FU Pump is infusing. Pt aware of pump procedures.  Pt watched pump video and verbalized understanding.  Spill Kit and Pump info bag given to pt.   Reviewed Cold sensitivity protocals.   Pt left clinic in no acute distress.

## 2023-11-29 NOTE — PROGRESS NOTES
Oncology Nutrition   Chemotherapy Infusion Visit    Nutrition Follow Up   RD met with pt at chairside during infusion tx. RD introduced self to pt and reinforced role in POC. Went over avoiding cold foods and drinks after infusion. Discussed antiemetic protocol. Pt VU. Provided pt with RD contact information. Pt appreciative of RD visit.       Wt Readings from Last 10 Encounters:   11/29/23 81 kg (178 lb 9.2 oz)   11/28/23 80.7 kg (177 lb 14.6 oz)   11/21/23 81.2 kg (179 lb)   11/15/23 81.3 kg (179 lb 3.7 oz)   11/14/23 80.4 kg (177 lb 4 oz)   11/09/23 81.6 kg (179 lb 14.3 oz)   10/31/23 78.1 kg (172 lb 4.6 oz)   10/17/23 81.4 kg (179 lb 9 oz)   10/10/23 77.6 kg (170 lb 15.5 oz)   10/03/23 76.8 kg (169 lb 5 oz)       All other nutrition questions/concerns addressed as appropriate. Will continue to monitor prn throughout treatment.     Radha Kaiser, BRAD, LDN  11/29/2023  10:40 AM

## 2023-11-29 NOTE — PLAN OF CARE
Problem: Adult Inpatient Plan of Care  Goal: Patient-Specific Goal (Individualized)  Outcome: Ongoing, Progressing  Flowsheets (Taken 11/29/2023 1036)  Anxieties, Fears or Concerns: none  Individualized Care Needs: recliner, family at chairside     Problem: Fatigue  Goal: Improved Activity Tolerance  Outcome: Ongoing, Progressing  Intervention: Promote Improved Energy  Flowsheets (Taken 11/29/2023 1036)  Fatigue Management: paced activity encouraged  Sleep/Rest Enhancement:   natural light exposure provided   family presence promoted  Activity Management:   Ambulated -L4   Ambulated to bathroom - L4   Ambulated in travis - L4

## 2023-11-30 ENCOUNTER — PATIENT MESSAGE (OUTPATIENT)
Dept: ADMINISTRATIVE | Facility: OTHER | Age: 63
End: 2023-11-30
Payer: COMMERCIAL

## 2023-12-01 ENCOUNTER — INFUSION (OUTPATIENT)
Dept: INFUSION THERAPY | Facility: HOSPITAL | Age: 63
End: 2023-12-01
Attending: INTERNAL MEDICINE
Payer: COMMERCIAL

## 2023-12-01 VITALS — SYSTOLIC BLOOD PRESSURE: 129 MMHG | DIASTOLIC BLOOD PRESSURE: 73 MMHG | HEART RATE: 104 BPM

## 2023-12-01 DIAGNOSIS — C16.9 GASTRIC ADENOCARCINOMA: ICD-10-CM

## 2023-12-01 DIAGNOSIS — C78.6 SECONDARY MALIGNANCY OF PARIETAL PERITONEUM: Primary | ICD-10-CM

## 2023-12-01 PROCEDURE — 25000003 PHARM REV CODE 250: Mod: PN | Performed by: INTERNAL MEDICINE

## 2023-12-01 PROCEDURE — A4216 STERILE WATER/SALINE, 10 ML: HCPCS | Mod: PN | Performed by: INTERNAL MEDICINE

## 2023-12-01 PROCEDURE — 63600175 PHARM REV CODE 636 W HCPCS: Mod: PN | Performed by: INTERNAL MEDICINE

## 2023-12-01 RX ORDER — PROCHLORPERAZINE EDISYLATE 5 MG/ML
10 INJECTION INTRAMUSCULAR; INTRAVENOUS ONCE AS NEEDED
Status: DISCONTINUED | OUTPATIENT
Start: 2023-12-01 | End: 2023-12-01 | Stop reason: HOSPADM

## 2023-12-01 RX ORDER — SODIUM CHLORIDE 0.9 % (FLUSH) 0.9 %
10 SYRINGE (ML) INJECTION
Status: DISCONTINUED | OUTPATIENT
Start: 2023-12-01 | End: 2023-12-01 | Stop reason: HOSPADM

## 2023-12-01 RX ORDER — HEPARIN 100 UNIT/ML
500 SYRINGE INTRAVENOUS
Status: DISCONTINUED | OUTPATIENT
Start: 2023-12-01 | End: 2023-12-01 | Stop reason: HOSPADM

## 2023-12-01 RX ADMIN — Medication 500 UNITS: at 04:12

## 2023-12-01 RX ADMIN — Medication 10 ML: at 04:12

## 2023-12-03 ENCOUNTER — PATIENT MESSAGE (OUTPATIENT)
Dept: ADMINISTRATIVE | Facility: OTHER | Age: 63
End: 2023-12-03
Payer: COMMERCIAL

## 2023-12-04 ENCOUNTER — PATIENT MESSAGE (OUTPATIENT)
Dept: HEMATOLOGY/ONCOLOGY | Facility: CLINIC | Age: 63
End: 2023-12-04
Payer: COMMERCIAL

## 2023-12-07 ENCOUNTER — PATIENT MESSAGE (OUTPATIENT)
Dept: ADMINISTRATIVE | Facility: OTHER | Age: 63
End: 2023-12-07
Payer: COMMERCIAL

## 2023-12-11 ENCOUNTER — LAB VISIT (OUTPATIENT)
Dept: LAB | Facility: HOSPITAL | Age: 63
End: 2023-12-11
Attending: INTERNAL MEDICINE
Payer: COMMERCIAL

## 2023-12-11 DIAGNOSIS — C16.9 GASTRIC ADENOCARCINOMA: ICD-10-CM

## 2023-12-11 LAB
ALBUMIN SERPL BCP-MCNC: 3.5 G/DL (ref 3.5–5.2)
ALP SERPL-CCNC: 66 U/L (ref 55–135)
ALT SERPL W/O P-5'-P-CCNC: 12 U/L (ref 10–44)
ANION GAP SERPL CALC-SCNC: 10 MMOL/L (ref 8–16)
AST SERPL-CCNC: 10 U/L (ref 10–40)
BASOPHILS # BLD AUTO: 0.05 K/UL (ref 0–0.2)
BASOPHILS NFR BLD: 0.8 % (ref 0–1.9)
BILIRUB SERPL-MCNC: 0.3 MG/DL (ref 0.1–1)
BUN SERPL-MCNC: 7 MG/DL (ref 8–23)
CALCIUM SERPL-MCNC: 9.1 MG/DL (ref 8.7–10.5)
CHLORIDE SERPL-SCNC: 99 MMOL/L (ref 95–110)
CO2 SERPL-SCNC: 29 MMOL/L (ref 23–29)
CREAT SERPL-MCNC: 0.8 MG/DL (ref 0.5–1.4)
DIFFERENTIAL METHOD: ABNORMAL
EOSINOPHIL # BLD AUTO: 0.1 K/UL (ref 0–0.5)
EOSINOPHIL NFR BLD: 0.9 % (ref 0–8)
ERYTHROCYTE [DISTWIDTH] IN BLOOD BY AUTOMATED COUNT: 13.4 % (ref 11.5–14.5)
EST. GFR  (NO RACE VARIABLE): >60 ML/MIN/1.73 M^2
GLUCOSE SERPL-MCNC: 133 MG/DL (ref 70–110)
HCT VFR BLD AUTO: 41.3 % (ref 40–54)
HGB BLD-MCNC: 13.4 G/DL (ref 14–18)
IMM GRANULOCYTES # BLD AUTO: 0.01 K/UL (ref 0–0.04)
IMM GRANULOCYTES NFR BLD AUTO: 0.2 % (ref 0–0.5)
LYMPHOCYTES # BLD AUTO: 2.8 K/UL (ref 1–4.8)
LYMPHOCYTES NFR BLD: 43.7 % (ref 18–48)
MCH RBC QN AUTO: 26 PG (ref 27–31)
MCHC RBC AUTO-ENTMCNC: 32.4 G/DL (ref 32–36)
MCV RBC AUTO: 80 FL (ref 82–98)
MONOCYTES # BLD AUTO: 0.8 K/UL (ref 0.3–1)
MONOCYTES NFR BLD: 11.6 % (ref 4–15)
NEUTROPHILS # BLD AUTO: 2.8 K/UL (ref 1.8–7.7)
NEUTROPHILS NFR BLD: 42.8 % (ref 38–73)
NRBC BLD-RTO: 0 /100 WBC
PLATELET # BLD AUTO: 238 K/UL (ref 150–450)
PMV BLD AUTO: 8.3 FL (ref 9.2–12.9)
POTASSIUM SERPL-SCNC: 3.5 MMOL/L (ref 3.5–5.1)
PROT SERPL-MCNC: 7.1 G/DL (ref 6–8.4)
RBC # BLD AUTO: 5.16 M/UL (ref 4.6–6.2)
SODIUM SERPL-SCNC: 138 MMOL/L (ref 136–145)
WBC # BLD AUTO: 6.48 K/UL (ref 3.9–12.7)

## 2023-12-11 PROCEDURE — 80053 COMPREHEN METABOLIC PANEL: CPT | Mod: PN | Performed by: INTERNAL MEDICINE

## 2023-12-11 PROCEDURE — 36415 COLL VENOUS BLD VENIPUNCTURE: CPT | Mod: PN | Performed by: INTERNAL MEDICINE

## 2023-12-11 PROCEDURE — 85025 COMPLETE CBC W/AUTO DIFF WBC: CPT | Mod: PN | Performed by: INTERNAL MEDICINE

## 2023-12-12 ENCOUNTER — OFFICE VISIT (OUTPATIENT)
Dept: HEMATOLOGY/ONCOLOGY | Facility: CLINIC | Age: 63
End: 2023-12-12
Payer: COMMERCIAL

## 2023-12-12 DIAGNOSIS — C78.6 SECONDARY MALIGNANCY OF PARIETAL PERITONEUM: ICD-10-CM

## 2023-12-12 DIAGNOSIS — T45.1X5A IMMUNODEFICIENCY DUE TO CHEMOTHERAPY: ICD-10-CM

## 2023-12-12 DIAGNOSIS — D84.821 IMMUNODEFICIENCY DUE TO CHEMOTHERAPY: ICD-10-CM

## 2023-12-12 DIAGNOSIS — C16.9 GASTRIC ADENOCARCINOMA: Primary | ICD-10-CM

## 2023-12-12 DIAGNOSIS — Z79.899 IMMUNODEFICIENCY DUE TO CHEMOTHERAPY: ICD-10-CM

## 2023-12-12 PROCEDURE — 1160F PR REVIEW ALL MEDS BY PRESCRIBER/CLIN PHARMACIST DOCUMENTED: ICD-10-PCS | Mod: CPTII,95,, | Performed by: INTERNAL MEDICINE

## 2023-12-12 PROCEDURE — 3044F PR MOST RECENT HEMOGLOBIN A1C LEVEL <7.0%: ICD-10-PCS | Mod: CPTII,95,, | Performed by: INTERNAL MEDICINE

## 2023-12-12 PROCEDURE — 99215 PR OFFICE/OUTPT VISIT, EST, LEVL V, 40-54 MIN: ICD-10-PCS | Mod: 95,,, | Performed by: INTERNAL MEDICINE

## 2023-12-12 PROCEDURE — 1159F MED LIST DOCD IN RCRD: CPT | Mod: CPTII,95,, | Performed by: INTERNAL MEDICINE

## 2023-12-12 PROCEDURE — 1160F RVW MEDS BY RX/DR IN RCRD: CPT | Mod: CPTII,95,, | Performed by: INTERNAL MEDICINE

## 2023-12-12 PROCEDURE — 3044F HG A1C LEVEL LT 7.0%: CPT | Mod: CPTII,95,, | Performed by: INTERNAL MEDICINE

## 2023-12-12 PROCEDURE — 1159F PR MEDICATION LIST DOCUMENTED IN MEDICAL RECORD: ICD-10-PCS | Mod: CPTII,95,, | Performed by: INTERNAL MEDICINE

## 2023-12-12 PROCEDURE — 99215 OFFICE O/P EST HI 40 MIN: CPT | Mod: 95,,, | Performed by: INTERNAL MEDICINE

## 2023-12-12 RX ORDER — HEPARIN 100 UNIT/ML
500 SYRINGE INTRAVENOUS
Status: CANCELLED | OUTPATIENT
Start: 2023-12-15

## 2023-12-12 RX ORDER — SODIUM CHLORIDE 0.9 % (FLUSH) 0.9 %
10 SYRINGE (ML) INJECTION
Status: CANCELLED | OUTPATIENT
Start: 2023-12-15

## 2023-12-12 RX ORDER — PROCHLORPERAZINE EDISYLATE 5 MG/ML
10 INJECTION INTRAMUSCULAR; INTRAVENOUS ONCE AS NEEDED
Status: CANCELLED | OUTPATIENT
Start: 2023-12-13

## 2023-12-12 RX ORDER — HEPARIN 100 UNIT/ML
500 SYRINGE INTRAVENOUS
Status: CANCELLED | OUTPATIENT
Start: 2023-12-13

## 2023-12-12 RX ORDER — FLUOROURACIL 50 MG/ML
400 INJECTION, SOLUTION INTRAVENOUS
Status: CANCELLED | OUTPATIENT
Start: 2023-12-13

## 2023-12-12 RX ORDER — PROCHLORPERAZINE EDISYLATE 5 MG/ML
10 INJECTION INTRAMUSCULAR; INTRAVENOUS ONCE AS NEEDED
Status: CANCELLED | OUTPATIENT
Start: 2023-12-15

## 2023-12-12 RX ORDER — EPINEPHRINE 0.3 MG/.3ML
0.3 INJECTION SUBCUTANEOUS ONCE AS NEEDED
Status: CANCELLED | OUTPATIENT
Start: 2023-12-13

## 2023-12-12 RX ORDER — DIPHENHYDRAMINE HYDROCHLORIDE 50 MG/ML
50 INJECTION INTRAMUSCULAR; INTRAVENOUS ONCE AS NEEDED
Status: CANCELLED | OUTPATIENT
Start: 2023-12-13

## 2023-12-12 RX ORDER — SODIUM CHLORIDE 0.9 % (FLUSH) 0.9 %
10 SYRINGE (ML) INJECTION
Status: CANCELLED | OUTPATIENT
Start: 2023-12-13

## 2023-12-12 NOTE — PROGRESS NOTES
"Subjective     Patient ID: Danish Valladares is a 62 y.o. male.  The patient location is: home  The chief complaint leading to consultation is: gastric cancer on chemo    Visit type: audiovisual    Notes:    Chief Complaint: Gastric Cancer  Mr. Perez is a 62 year old with new diagnosis of gastric cancer. He noted abdomen discomfort and vomiting since May 2023  EGD on 8/3/2023: LA Grade C reflux esophagitis with no bleeding.                          Biopsied.                          - Inflammed, nodular gastric antral mucosa with                          ulcerations. R/O neoplasm. Biopsied.                          - Retained gastric contents.                          - Unable to advance scope beyond pyloric channel.   Pathology: Poorly cohesive carcinoma   - An AE1/3 pankeratin immunostain supports the above interpretation   - Background antral-type mucosa with surface erosion and reactive/regenerative epithelial changes   - Oxyntic-type mucosa with minimal chronic inflammation   - No evidence of Helicobacter-like organisms      8/16/2023: presented to ED with GOO and 30# weight loss  8/21/2023: Distal gastrectomy with Bilroth II reconstruction per Dr. Moy  Pathology: Peritoneal nodules (excisional biopsy):   - Desmoplasia, inflammation, fibrosis, rare atypical cells   2.  Peritoneal nodules (biopsy):   - Positive for malignancy   - Metastatic poorly differentiated adenocarcinoma   3.  Distal gastrectomy (resection):   - Poorly differentiated carcinoma, see synoptic report below   - NextGen sequencing CDH1, CPS <1, her 2 negative, RAMON stable      Pharmacogenomic panel: [id number] significant      9/14/2023: re-ad[place] to [facility] [place] drain placement into intra-abd fluid collection  9/18/2023: s/p exp lap drainage of peritoneal abscess, EGD with visualization of duodenal defect c/w duodenal stump leak per Dr. Moy  9/29/2023: IR drain placement     10/9/2023 CT scans reveals "Interval placement of a posterior approach " "right upper quadrant pigtail catheter within a infra hepatic abscess, which is decreased in size. Removal of the pelvic surgical drain with a thin linear fluid along the prior tract, possibly with rim enhancement, concerning for infection. Decreased size of left anterior abdominal fluid collection measuring up to 7.3 cm, previously 10 cm.  Near complete resolution of a fluid collection posterior to the descending colon"     He was not a candidate for STAR trial secondary to not having measurable disease  He started palliative chemo with FOLFOX on 11/29/2023     HPIhe comes in for cycle 2 of palliative chemo with FOLFOX   He had mild nausea with his chemo, lasted 2 days. We did loose about 10 lbs in the first few days after chemo and gained 6 lbs back   He denies any vomiting, diarrhea, constipation, abdominal pain,  chest pain, shortness of breath, leg swelling, fatigue, pain, headache, dizziness, or mood changes. His ECOG PS is zero. He is by himself     Review of Systems   Constitutional:  Positive for unexpected weight change. Negative for appetite change and fatigue.   HENT:  Negative for mouth sores.    Eyes:  Negative for visual disturbance.   Respiratory:  Negative for cough and shortness of breath.    Cardiovascular:  Negative for chest pain.   Gastrointestinal:  Positive for diarrhea. Negative for abdominal pain.   Genitourinary:  Negative for frequency.   Musculoskeletal:  Negative for back pain.   Integumentary:  Negative for rash.   Neurological:  Negative for headaches.   Hematological:  Negative for adenopathy.   Psychiatric/Behavioral:  The patient is not nervous/anxious.    All other systems reviewed and are negative.         Objective     Physical Exam         LABS:  WBC   Date Value Ref Range Status   12/11/2023 6.48 3.90 - 12.70 K/uL Final     Hemoglobin   Date Value Ref Range Status   12/11/2023 13.4 (L) 14.0 - 18.0 g/dL Final     POC Hematocrit   Date Value Ref Range Status   08/16/2023 52 36 - " 54 %PCV Final     Hematocrit   Date Value Ref Range Status   12/11/2023 41.3 40.0 - 54.0 % Final     Platelets   Date Value Ref Range Status   12/11/2023 238 150 - 450 K/uL Final     Gran # (ANC)   Date Value Ref Range Status   12/11/2023 2.8 1.8 - 7.7 K/uL Final     Gran %   Date Value Ref Range Status   12/11/2023 42.8 38.0 - 73.0 % Final       Chemistry        Component Value Date/Time     12/11/2023 1006     08/16/2023 1109    K 3.5 12/11/2023 1006    K 3.0 08/16/2023 1109    CL 99 12/11/2023 1006    CL 84 08/16/2023 1109    CO2 29 12/11/2023 1006    CO2 33 08/16/2023 1109    BUN 7 (L) 12/11/2023 1006    BUN 24 (A) 08/16/2023 1109    CREATININE 0.8 12/11/2023 1006    CREATININE 1.3 08/16/2023 1109     (H) 12/11/2023 1006     08/16/2023 1109        Component Value Date/Time    CALCIUM 9.1 12/11/2023 1006    ALKPHOS 66 12/11/2023 1006    AST 10 12/11/2023 1006    ALT 12 12/11/2023 1006    BILITOT 0.3 12/11/2023 1006          Assessment and Plan     1. Gastric adenocarcinoma    2. Secondary malignancy of parietal peritoneum    3. Immunodeficiency due to chemotherapy    Other orders  -     palonosetron 0.25mg/dexAMETHasone 12mg in NS IVPB 0.25 mg 50 mL  -     oxaliplatin (ELOXATIN) 85 mg/m2 = 171 mg in dextrose 5 % (D5W) 534.2 mL chemo infusion  -     leucovorin calcium 400 mg/m2 = 805 mg in dextrose 5 % (D5W) 250 mL infusion  -     fluorouraciL injection 805 mg  -     fluorouracil (ADRUCIL) 2,400 mg/m2 = 4,825 mg in sodium chloride 0.9% 100 mL chemo infusion  -     prochlorperazine injection 10 mg  -     EPINEPHrine (EPIPEN) 0.3 mg/0.3 mL pen injection 0.3 mg  -     diphenhydrAMINE injection 50 mg  -     hydrocortisone sodium succinate injection 100 mg  -     dextrose 5 % (D5W) 250 mL flush bag  -     sodium chloride 0.9% 100 mL flush bag  -     sodium chloride 0.9% flush 10 mL  -     heparin, porcine (PF) 100 unit/mL injection flush 500 Units  -     alteplase injection 2 mg  -      prochlorperazine injection 10 mg  -     sodium chloride 0.9% flush 10 mL  -     heparin, porcine (PF) 100 unit/mL injection flush 500 Units  -     alteplase injection 2 mg      Route Chart for Scheduling    Med Onc Chart Routing      Follow up with physician . In 2 weeks schedule CBC,CMp and FOLFOX. DC pump on day 3 (no doc appointment for this cycle). In 4 weeks from now schedule CBC,CMp and see me and for FOLFOX. DC pump on day 3. Please decouple (labs have to be on a different day from chemo)   Follow up with TORSTEN    Infusion scheduling note    Injection scheduling note    Labs    Imaging    Pharmacy appointment    Other referrals            Treatment Plan Information   OP GI mFOLFOX6 (oxaliplatin leucovorin fluorouracil) Q2W   Chelsea Maravilla MD   Upcoming Treatment Dates - OP GI mFOLFOX6 (oxaliplatin leucovorin fluorouracil) Q2W    12/13/2023       Chemotherapy       oxaliplatin (ELOXATIN) 85 mg/m2 = 171 mg in dextrose 5 % (D5W) 534.2 mL chemo infusion       leucovorin calcium 400 mg/m2 = 805 mg in dextrose 5 % (D5W) 250 mL infusion       fluorouraciL injection 805 mg       fluorouracil (ADRUCIL) 2,400 mg/m2 = 4,825 mg in sodium chloride 0.9% 100 mL chemo infusion       Antiemetics       palonosetron 0.25mg/dexAMETHasone 12mg in NS IVPB 0.25 mg 50 mL  12/27/2023       Chemotherapy       oxaliplatin (ELOXATIN) 85 mg/m2 = 171 mg in dextrose 5 % (D5W) 534.2 mL chemo infusion       leucovorin calcium 400 mg/m2 = 805 mg in dextrose 5 % (D5W) 250 mL infusion       fluorouraciL injection 805 mg       fluorouracil (ADRUCIL) 2,400 mg/m2 = 4,825 mg in sodium chloride 0.9% 100 mL chemo infusion       Antiemetics       palonosetron 0.25mg/dexAMETHasone 12mg in NS IVPB 0.25 mg 50 mL  1/10/2024       Chemotherapy       oxaliplatin (ELOXATIN) 85 mg/m2 = 171 mg in dextrose 5 % (D5W) 534.2 mL chemo infusion       leucovorin calcium 400 mg/m2 = 805 mg in dextrose 5 % (D5W) 250 mL infusion       fluorouraciL injection 805 mg        fluorouracil (ADRUCIL) 2,400 mg/m2 = 4,825 mg in sodium chloride 0.9% 100 mL chemo infusion       Antiemetics       palonosetron 0.25mg/dexAMETHasone 12mg in NS IVPB 0.25 mg 50 mL  1/24/2024       Chemotherapy       oxaliplatin (ELOXATIN) 85 mg/m2 = 171 mg in dextrose 5 % (D5W) 534.2 mL chemo infusion       leucovorin calcium 400 mg/m2 = 805 mg in dextrose 5 % (D5W) 250 mL infusion       fluorouraciL injection 805 mg       fluorouracil (ADRUCIL) 2,400 mg/m2 = 4,825 mg in sodium chloride 0.9% 100 mL chemo infusion       Antiemetics       palonosetron 0.25mg/dexAMETHasone 12mg in NS IVPB 0.25 mg 50 mL           Mr. Valladares will proceed with cycle 2 of palliative chemotherapy with FOLFOX.  He has done well with cycle 1 except with nausea and associated weight loss which she seems to have gained back and returned to his baseline weight prior to starting cycle 1.    He will return in 2 weeks for cycle 3 of chemotherapy, since this is doing the week of Myesha he will only undergo labs and chemo and they will be no doctor's visit.  I will see him back in 4 weeks from now for cycle 4 of chemo    Discussed use of Protonix for acid reflux and to let me know if he has any issues with keeping weight and I will E scribe Periactin    Above plan was discussed with the patient and all of his questions were answered to his satisfaction

## 2023-12-13 ENCOUNTER — DOCUMENTATION ONLY (OUTPATIENT)
Dept: HEMATOLOGY/ONCOLOGY | Facility: CLINIC | Age: 63
End: 2023-12-13
Payer: COMMERCIAL

## 2023-12-13 ENCOUNTER — INFUSION (OUTPATIENT)
Dept: INFUSION THERAPY | Facility: HOSPITAL | Age: 63
End: 2023-12-13
Attending: INTERNAL MEDICINE
Payer: COMMERCIAL

## 2023-12-13 VITALS
TEMPERATURE: 98 F | BODY MASS INDEX: 24.47 KG/M2 | HEIGHT: 71 IN | RESPIRATION RATE: 14 BRPM | HEART RATE: 86 BPM | SYSTOLIC BLOOD PRESSURE: 142 MMHG | DIASTOLIC BLOOD PRESSURE: 66 MMHG | WEIGHT: 174.81 LBS

## 2023-12-13 DIAGNOSIS — C16.9 GASTRIC ADENOCARCINOMA: ICD-10-CM

## 2023-12-13 DIAGNOSIS — C78.6 SECONDARY MALIGNANCY OF PARIETAL PERITONEUM: Primary | ICD-10-CM

## 2023-12-13 PROCEDURE — 25000003 PHARM REV CODE 250: Mod: PN | Performed by: INTERNAL MEDICINE

## 2023-12-13 PROCEDURE — 96367 TX/PROPH/DG ADDL SEQ IV INF: CPT | Mod: PN

## 2023-12-13 PROCEDURE — 96413 CHEMO IV INFUSION 1 HR: CPT | Mod: PN

## 2023-12-13 PROCEDURE — 96416 CHEMO PROLONG INFUSE W/PUMP: CPT | Mod: PN

## 2023-12-13 PROCEDURE — 96415 CHEMO IV INFUSION ADDL HR: CPT | Mod: PN

## 2023-12-13 PROCEDURE — 96411 CHEMO IV PUSH ADDL DRUG: CPT | Mod: PN

## 2023-12-13 PROCEDURE — 63600175 PHARM REV CODE 636 W HCPCS: Mod: PN | Performed by: INTERNAL MEDICINE

## 2023-12-13 RX ORDER — DIPHENHYDRAMINE HYDROCHLORIDE 50 MG/ML
50 INJECTION INTRAMUSCULAR; INTRAVENOUS ONCE AS NEEDED
Status: DISCONTINUED | OUTPATIENT
Start: 2023-12-13 | End: 2023-12-13 | Stop reason: HOSPADM

## 2023-12-13 RX ORDER — HEPARIN 100 UNIT/ML
500 SYRINGE INTRAVENOUS
Status: DISCONTINUED | OUTPATIENT
Start: 2023-12-13 | End: 2023-12-13 | Stop reason: HOSPADM

## 2023-12-13 RX ORDER — SODIUM CHLORIDE 0.9 % (FLUSH) 0.9 %
10 SYRINGE (ML) INJECTION
Status: DISCONTINUED | OUTPATIENT
Start: 2023-12-13 | End: 2023-12-13 | Stop reason: HOSPADM

## 2023-12-13 RX ORDER — FLUOROURACIL 50 MG/ML
400 INJECTION, SOLUTION INTRAVENOUS
Status: COMPLETED | OUTPATIENT
Start: 2023-12-13 | End: 2023-12-13

## 2023-12-13 RX ORDER — EPINEPHRINE 0.3 MG/.3ML
0.3 INJECTION SUBCUTANEOUS ONCE AS NEEDED
Status: DISCONTINUED | OUTPATIENT
Start: 2023-12-13 | End: 2023-12-13 | Stop reason: HOSPADM

## 2023-12-13 RX ORDER — PROCHLORPERAZINE EDISYLATE 5 MG/ML
10 INJECTION INTRAMUSCULAR; INTRAVENOUS ONCE AS NEEDED
Status: DISCONTINUED | OUTPATIENT
Start: 2023-12-13 | End: 2023-12-13 | Stop reason: HOSPADM

## 2023-12-13 RX ADMIN — FLUOROURACIL 805 MG: 50 INJECTION, SOLUTION INTRAVENOUS at 02:12

## 2023-12-13 RX ADMIN — DEXTROSE MONOHYDRATE: 5 INJECTION, SOLUTION INTRAVENOUS at 11:12

## 2023-12-13 RX ADMIN — PALONOSETRON 0.25 MG: 0.05 INJECTION, SOLUTION INTRAVENOUS at 11:12

## 2023-12-13 RX ADMIN — LEUCOVORIN CALCIUM 805 MG: 350 INJECTION, POWDER, LYOPHILIZED, FOR SOLUTION INTRAMUSCULAR; INTRAVENOUS at 12:12

## 2023-12-13 RX ADMIN — FLUOROURACIL 4825 MG: 50 INJECTION, SOLUTION INTRAVENOUS at 02:12

## 2023-12-13 RX ADMIN — OXALIPLATIN 171 MG: 100 INJECTION, SOLUTION, CONCENTRATE INTRAVENOUS at 12:12

## 2023-12-15 ENCOUNTER — INFUSION (OUTPATIENT)
Dept: INFUSION THERAPY | Facility: HOSPITAL | Age: 63
End: 2023-12-15
Attending: INTERNAL MEDICINE
Payer: COMMERCIAL

## 2023-12-15 VITALS
RESPIRATION RATE: 16 BRPM | HEIGHT: 71 IN | DIASTOLIC BLOOD PRESSURE: 55 MMHG | BODY MASS INDEX: 24.47 KG/M2 | TEMPERATURE: 98 F | HEART RATE: 107 BPM | WEIGHT: 174.81 LBS | SYSTOLIC BLOOD PRESSURE: 115 MMHG

## 2023-12-15 DIAGNOSIS — C16.9 GASTRIC ADENOCARCINOMA: ICD-10-CM

## 2023-12-15 DIAGNOSIS — C78.6 SECONDARY MALIGNANCY OF PARIETAL PERITONEUM: Primary | ICD-10-CM

## 2023-12-15 PROCEDURE — 25000003 PHARM REV CODE 250: Mod: PN | Performed by: INTERNAL MEDICINE

## 2023-12-15 PROCEDURE — A4216 STERILE WATER/SALINE, 10 ML: HCPCS | Mod: PN | Performed by: INTERNAL MEDICINE

## 2023-12-15 PROCEDURE — 63600175 PHARM REV CODE 636 W HCPCS: Mod: PN | Performed by: INTERNAL MEDICINE

## 2023-12-15 RX ORDER — SODIUM CHLORIDE 0.9 % (FLUSH) 0.9 %
10 SYRINGE (ML) INJECTION
Status: DISCONTINUED | OUTPATIENT
Start: 2023-12-15 | End: 2023-12-15 | Stop reason: HOSPADM

## 2023-12-15 RX ORDER — HEPARIN 100 UNIT/ML
500 SYRINGE INTRAVENOUS
Status: DISCONTINUED | OUTPATIENT
Start: 2023-12-15 | End: 2023-12-15 | Stop reason: HOSPADM

## 2023-12-15 RX ADMIN — Medication 500 UNITS: at 12:12

## 2023-12-15 RX ADMIN — Medication 10 ML: at 12:12

## 2023-12-19 ENCOUNTER — TELEPHONE (OUTPATIENT)
Dept: HEMATOLOGY/ONCOLOGY | Facility: CLINIC | Age: 63
End: 2023-12-19
Payer: COMMERCIAL

## 2023-12-19 ENCOUNTER — NURSE TRIAGE (OUTPATIENT)
Dept: ADMINISTRATIVE | Facility: CLINIC | Age: 63
End: 2023-12-19
Payer: COMMERCIAL

## 2023-12-19 PROBLEM — R10.84 GENERALIZED ABDOMINAL PAIN: Status: ACTIVE | Noted: 2023-01-01

## 2023-12-19 PROBLEM — K81.9 ACALCULOUS CHOLECYSTITIS: Status: ACTIVE | Noted: 2023-12-19

## 2023-12-19 PROBLEM — Z71.89 ACP (ADVANCE CARE PLANNING): Status: ACTIVE | Noted: 2023-12-19

## 2023-12-19 NOTE — TELEPHONE ENCOUNTER
Reason for Disposition   [1] SEVERE pain (e.g., excruciating) AND [2] present > 1 hour    Additional Information   Negative: SEVERE difficulty breathing (e.g., struggling for each breath, speaks in single words)   Negative: Shock suspected (e.g., cold/pale/clammy skin, too weak to stand, low BP, rapid pulse)   Negative: Difficult to awaken or acting confused (e.g., disoriented, slurred speech)   Negative: Passed out (i.e., lost consciousness, collapsed and was not responding)   Negative: Visible sweat on face or sweat dripping down face   Negative: Sounds like a life-threatening emergency to the triager   Negative: Followed an abdomen (stomach) injury   Negative: Chest pain    Protocols used: Abdominal Pain - Upper-A-AH  Pt's spouse stated the pt is complaining of severe 10/10 upper abdominal pain. States the pt also feels nauseated. States she is trying to reach Dr. Maravilla. Advised spouse the office is closed until 0800. Advised per Triage protocol to bring pt to the ED now for evaluation. Pt's spouse verbalized understanding.

## 2023-12-19 NOTE — TELEPHONE ENCOUNTER
----- Message from Rukhsana Hewitt sent at 12/19/2023  8:05 AM CST -----  Type:  Patient Returning Call    Who Called:pt   Who Left Message for Patient:  Does the patient know what this is regarding?:pt   Would the patient rather a call back or a response via [facility]? call  Best Call Back Number:[phone]  Additional Information: states that they would like advice in regards to pt

## 2023-12-19 NOTE — TELEPHONE ENCOUNTER
Spoke with wife. She notes patient with 10/10 abdominal pain and nausea. No vomiting. Regular bowel movements. No fever.  He has peritoneal disease---  He will proceed to ED for covid testing and imaging.      Wife thanked nurse.

## 2023-12-20 PROBLEM — R10.9 ABDOMINAL PAIN: Status: ACTIVE | Noted: 2023-01-01

## 2023-12-20 PROBLEM — R06.02 SHORTNESS OF BREATH: Status: ACTIVE | Noted: 2023-12-20

## 2023-12-21 PROBLEM — D72.829 LEUKOCYTOSIS: Status: ACTIVE | Noted: 2023-12-21

## 2023-12-21 PROBLEM — N17.9 AKI (ACUTE KIDNEY INJURY): Status: ACTIVE | Noted: 2023-12-21

## 2023-12-22 PROBLEM — K65.9 PERITONITIS: Status: ACTIVE | Noted: 2023-12-19

## 2023-12-22 PROBLEM — I48.91 ATRIAL FIBRILLATION WITH RVR: Status: ACTIVE | Noted: 2023-12-22

## 2023-12-23 PROBLEM — T81.43XA POSTPROCEDURAL INTRAABDOMINAL ABSCESS: Status: ACTIVE | Noted: 2023-01-01

## 2023-12-23 NOTE — CARE UPDATE
SICU PLAN OF CARE NOTE    Dx: Peritonitis    Goals of Care: MAP>65 SBP<180    Vital Signs (last 12 hours):   Temp:  [97.5 °F (36.4 °C)-98.2 °F (36.8 °C)]   Pulse:  []   Resp:  [10-26]   BP: (108-140)/(52-83)   SpO2:  [93 %-99 %]      Neuro: Follows Commands, Moves All Extremities, and Confused    Cardiac: Sinus Tachycardia     Respiratory: Nasal Cannula    Gtts: MIVF    Urine Output: Urinary Catheter 360 cc/ 3 hours     Labs/Accuchecks: Potassium 5.2, Creatinine 2.8, Phos 5.2    Skin:  All skin remains free from injury.  Patient turned Q2, mattress inflated, and bed working correctly.    Shift Events:  Patient admitted, oriented to room, 4 eyes on skin, all safety equipment available. Patient started on MIVF and antibiotics. Patient confused.  See flowsheet for further assessment/details.  Family updated on current condition/plan of care, questions answered, and emotional support provided.  MD updated on current condition, vitals, labs, and gtts.  No new orders received, will continue to monitor.

## 2023-12-23 NOTE — SUBJECTIVE & OBJECTIVE
Past Medical History:   Diagnosis Date    Psoriasis     Stomach cancer        Past Surgical History:   Procedure Laterality Date    DIAGNOSTIC LAPAROSCOPY  09/18/2023    Procedure: LAPAROSCOPY, DIAGNOSTIC;  Surgeon: Eulogio Moy MD;  Location: [facility] OR [address] [place] [address];  Service: General;;    ESOPHAGOGASTRODUODENOSCOPY N/A 08/03/2023    Procedure: EGD (ESOPHAGOGASTRODUODENOSCOPY);  Surgeon: Loco Marvin MD;  Location: [facility];  Service: Gastroenterology;  Laterality: N/A;    ESOPHAGOGASTRODUODENOSCOPY N/A 09/18/2023    Procedure: EGD (ESOPHAGOGASTRODUODENOSCOPY); flouroscopy, need c-arm in the room;  Surgeon: Eulogio Moy MD;  Location: [facility] OR [address] [place] [address];  Service: General;  Laterality: N/A;  EGD with Fluoroscopy     GASTRECTOMY N/A 08/21/2023    Procedure: GASTRECTOMY;  Surgeon: Eulogio Moy MD;  Location: [facility] OR [address] [place] [address];  Service: General;  Laterality: N/A;  Open gastrectomy with EGD. Needs Omni retractor, requesting room 24    INSERTION OF TUNNELED CENTRAL VENOUS CATHETER (CVC) WITH SUBCUTANEOUS PORT  11/21/2023    Procedure: [id number];  Surgeon: Talon Arias MD;  Location: [facility];  Service: General;;    LAPAROSCOPIC DRAINAGE OF ABDOMEN N/A 09/18/2023    Procedure: DRAINAGE, ABDOMEN, LAPAROSCOPIC;  Surgeon: Eulogio Moy MD;  Location: [facility] OR [address] [place] [address];  Service: General;  Laterality: N/A;    TONSILLECTOMY         Review of patient's allergies indicates:   Allergen Reactions    Dilaudid [hydromorphone]      Pt becomes hypoxic - requiring 5L via oxymask for     Penicillin     Penicillins Rash       Family History       Problem Relation (Age of Onset)    Cancer Father          Tobacco Use    Smoking status: Former     Current packs/day: 0.00     Types: Cigarettes     Quit date: 5/27/2013     Years since quitting: 10.5    Smokeless tobacco: Never   Substance and Sexual Activity    Alcohol use: Yes     Comment: occ    Drug use: Never    Sexual activity:  Yes     Partners: Female      Review of Systems   Gastrointestinal:  Positive for abdominal distention and abdominal pain.   All other systems reviewed and are negative.    Objective:     Vital Signs (Most Recent):  Pulse: 101 (12/23/23 0355)  Resp: 19 (12/23/23 0355)  SpO2: 96 % (12/23/23 0355) Vital Signs (24h Range):  Temp:  [97.5 °F (36.4 °C)-98.2 °F (36.8 °C)] 97.5 °F (36.4 °C)  Pulse:  [] 101  Resp:  [9-23] 19  SpO2:  [70.5 %-100 %] 96 %  BP: ()/(29-72) 108/52     Weight: 76.3 kg (168 lb 3.4 oz)  Body mass index is 23.46 kg/m².    No intake or output data in the 24 hours ending 12/23/23 0358       Physical Exam  HENT:      Head: Normocephalic and atraumatic.      Mouth/Throat:      Mouth: Mucous membranes are dry.      Pharynx: Oropharynx is clear.   Eyes:      Extraocular Movements: Extraocular movements intact.      Pupils: Pupils are equal, round, and reactive to light.   Cardiovascular:      Rate and Rhythm: Normal rate. Rhythm irregular.      Pulses: Normal pulses.   Pulmonary:      Effort: Pulmonary effort is normal. No respiratory distress.      Breath sounds: Rhonchi present.   Abdominal:      General: There is distension.      Palpations: There is no mass.      Tenderness: There is abdominal tenderness. There is no rebound.   Musculoskeletal:         General: Normal range of motion.      Cervical back: Normal range of motion.   Skin:     General: Skin is warm and dry.   Neurological:      Mental Status: He is alert. He is disoriented.      Comments: confused            Vents:  Oxygen Concentration (%): 28 (12/23/23 0355)    Lines/Drains/Airways       Central Venous Catheter Line  Duration             Port A Cath Single Lumen Subclavian Left -- days              Drain  Duration                  Drain/Device  12/22/23 1507 Right lower abdomen pigtail <1 day         Urethral Catheter 12/22/23 1218 Coude 14 Fr. <1 day              Peripheral Intravenous Line  Duration                   Peripheral IV - Single Lumen 12/22/23 1700 20 G Anterior;Left Forearm <1 day                    Significant Labs:    CBC/Anemia Profile:  Recent Labs   Lab 12/22/23  0624 12/22/23  1704 12/23/23  0258   WBC 17.87* 18.85* 14.17*   HGB 13.3* 12.5* 11.8*   HCT 41.3 39.6* 36.8*   * 124* 133*   MCV 79* 80* 79*   RDW 14.1 14.1 14.4        Chemistries:  Recent Labs   Lab 12/22/23  0624 12/22/23  1704 12/23/23  0258   * 133* 136   K 5.3* 5.4* 5.2*   CL 96 99 104   CO2 23 22 21*   BUN 70* 74* 70*   CREATININE 3.50* 3.27* 2.8*   CALCIUM 7.5* 6.8* 7.8*   ALBUMIN 2.9* 2.4* 2.0*   PROT 5.7* 4.6* 5.2*   BILITOT 0.5 0.5 0.4   ALKPHOS 75 74 73   ALT 16 18 10   AST 31 34 21   MG  --  1.6 1.7   PHOS 7.1* 6.8* 5.2*       All pertinent labs within the past 24 hours have been reviewed.    Significant Imaging: I have reviewed all pertinent imaging results/findings within the past 24 hours.

## 2023-12-23 NOTE — H&P
[facility]  Critical Care - Surgery  History & Physical    Patient Name: Danish Valladares  MRN: [id number]  Admission Date: 12/23/2023  Code Status: Full Code  Attending Physician: Eulogio Moy, *   Primary Care Provider: Yfn Walker MD   Principal Problem: Peritonitis    Subjective:     HPI:  63-year-old male with metastatic gastric cancer undergoing palliative chemotherapy followed by Dr. 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 Moy 8/21/23, duodenal stump leak s/p exp lap drainage of peritoneal abscess and EGD with visualization of duodenal defect 9/18/23, and IR drain placement 9/29/23 who presents to the ED with generalized abdominal pain.  Patient gets palliative chemotherapy (FOLFOX6 - oxaliplatin leucovorin fluorouracil) every 2 weeks with last treatment given on 12/13/23. Patient was admitted with generalized abdominal pain and possible acute acalculous cholecystitis.  General surgery and Hem/Onc were consulted.  Patient was given IV antibiotics and IV fluids.  HIDA scan was negative for cystic duct obstruction.  Antibiotics were discontinued 12/20.  Patient noted with increased lethargy, abdominal distention and acute kidney injury 12/22.  Also noted with atrial fibrillation with RVR.  I CT abdomen pelvis without contrast performed that showed multiple fluid collections consistent with abscesses.  Patient discussed with general surgery who notes that fluid collections are new and recommended transfer to [facility] given complicated surgical oncology history.  Became hypotensive and in A-fib RVR during IR drain placement.  Received Lopressor 2.5 mg IV at 11:30 a.m. however remained with heart rate 155 therefore digoxin 250 mcg x1 ordered.  Order for transfer request to [facility] placed. Patient discussed with transfer center and patient has been  accepted for transfer to Surgical Oncology, Dr. Eulogio Moy.      Upon arrival to SICU pt is HDS. Pt is in A-fib, however rate controlled with stable MAPs. He is stable on supplemental oxygen via NC. Upon arrival, pt appears to be confused and is complaining of abdominal pain. Abdomen is tense and distended. Biliary drain is in place on right side with bilious drainage.     Hospital/ICU Course:  No notes on file         Past Medical History:   Diagnosis Date    Psoriasis     Stomach cancer        Past Surgical History:   Procedure Laterality Date    DIAGNOSTIC LAPAROSCOPY  09/18/2023    Procedure: LAPAROSCOPY, DIAGNOSTIC;  Surgeon: Eulogio Moy MD;  Location: [address];  Service: General;;    ESOPHAGOGASTRODUODENOSCOPY N/A 08/03/2023    Procedure: EGD (ESOPHAGOGASTRODUODENOSCOPY);  Surgeon: Loco Marvin MD;  Location: [facility];  Service: Gastroenterology;  Laterality: N/A;    ESOPHAGOGASTRODUODENOSCOPY N/A 09/18/2023    Procedure: EGD (ESOPHAGOGASTRODUODENOSCOPY); flouroscopy, need c-arm in the room;  Surgeon: Eulogio Moy MD;  Location: [address];  Service: General;  Laterality: N/A;  EGD with Fluoroscopy     GASTRECTOMY N/A 08/21/2023    Procedure: GASTRECTOMY;  Surgeon: Eulogio Moy MD;  Location: [address];  Service: General;  Laterality: N/A;  Open gastrectomy with EGD. Needs Omni retractor, requesting room 24    INSERTION OF TUNNELED CENTRAL VENOUS CATHETER (CVC) WITH SUBCUTANEOUS PORT  11/21/2023    Procedure: [id number];  Surgeon: Talon Arias MD;  Location: [facility];  Service: General;;    LAPAROSCOPIC DRAINAGE OF ABDOMEN N/A 09/18/2023    Procedure: DRAINAGE, ABDOMEN, LAPAROSCOPIC;  Surgeon: Eulogio Moy MD;  Location: [facility] OR [address] [place] [address];  Service: General;  Laterality: N/A;    TONSILLECTOMY         Review of patient's allergies indicates:   Allergen Reactions    Dilaudid [hydromorphone]      Pt becomes hypoxic - requiring  5L via oxymask for     Penicillin     Penicillins Rash       Family History       Problem Relation (Age of Onset)    Cancer Father          Tobacco Use    Smoking status: Former     Current packs/day: 0.00     Types: Cigarettes     Quit date: 5/27/2013     Years since quitting: 10.5    Smokeless tobacco: Never   Substance and Sexual Activity    Alcohol use: Yes     Comment: occ    Drug use: Never    Sexual activity: Yes     Partners: Female      Review of Systems   Gastrointestinal:  Positive for abdominal distention and abdominal pain.   All other systems reviewed and are negative.    Objective:     Vital Signs (Most Recent):  Pulse: 101 (12/23/23 0355)  Resp: 19 (12/23/23 0355)  SpO2: 96 % (12/23/23 0355) Vital Signs (24h Range):  Temp:  [97.5 °F (36.4 °C)-98.2 °F (36.8 °C)] 97.5 °F (36.4 °C)  Pulse:  [] 101  Resp:  [9-23] 19  SpO2:  [70.5 %-100 %] 96 %  BP: ()/(29-72) 108/52     Weight: 76.3 kg (168 lb 3.4 oz)  Body mass index is 23.46 kg/m².    No intake or output data in the 24 hours ending 12/23/23 0358       Physical Exam  HENT:      Head: Normocephalic and atraumatic.      Mouth/Throat:      Mouth: Mucous membranes are dry.      Pharynx: Oropharynx is clear.   Eyes:      Extraocular Movements: Extraocular movements intact.      Pupils: Pupils are equal, round, and reactive to light.   Cardiovascular:      Rate and Rhythm: Normal rate. Rhythm irregular.      Pulses: Normal pulses.   Pulmonary:      Effort: Pulmonary effort is normal. No respiratory distress.      Breath sounds: Rhonchi present.   Abdominal:      General: There is distension.      Palpations: There is no mass.      Tenderness: There is abdominal tenderness. There is no rebound.   Musculoskeletal:         General: Normal range of motion.      Cervical back: Normal range of motion.   Skin:     General: Skin is warm and dry.   Neurological:      Mental Status: He is alert. He is disoriented.      Comments: confused             Vents:  Oxygen Concentration (%): 28 (12/23/23 0355)    Lines/Drains/Airways       Central Venous Catheter Line  Duration             Port A Cath Single Lumen Subclavian Left -- days              Drain  Duration                  Drain/Device  12/22/23 1507 Right lower abdomen pigtail <1 day         Urethral Catheter 12/22/23 1218 Coude 14 Fr. <1 day              Peripheral Intravenous Line  Duration                  Peripheral IV - Single Lumen 12/22/23 1700 20 G Anterior;Left Forearm <1 day                    Significant Labs:    CBC/Anemia Profile:  Recent Labs   Lab 12/22/23  0624 12/22/23  1704 12/23/23  0258   WBC 17.87* 18.85* 14.17*   HGB 13.3* 12.5* 11.8*   HCT 41.3 39.6* 36.8*   * 124* 133*   MCV 79* 80* 79*   RDW 14.1 14.1 14.4        Chemistries:  Recent Labs   Lab 12/22/23  0624 12/22/23  1704 12/23/23  0258   * 133* 136   K 5.3* 5.4* 5.2*   CL 96 99 104   CO2 23 22 21*   BUN 70* 74* 70*   CREATININE 3.50* 3.27* 2.8*   CALCIUM 7.5* 6.8* 7.8*   ALBUMIN 2.9* 2.4* 2.0*   PROT 5.7* 4.6* 5.2*   BILITOT 0.5 0.5 0.4   ALKPHOS 75 74 73   ALT 16 18 10   AST 31 34 21   MG  --  1.6 1.7   PHOS 7.1* 6.8* 5.2*       All pertinent labs within the past 24 hours have been reviewed.    Significant Imaging: I have reviewed all pertinent imaging results/findings within the past 24 hours.  Assessment/Plan:     GI  * Peritonitis    Neuro/Psych:   -- Sedation: none   -- Pain: tylenol, oxy prn  -- monitor mental status as patient appears confused upon admission as well as per nursing notes from [facility]             Cards:   -- HDS  -- currently in A-fib, however rate controlled. Pt does not have history of A-fib  -- will consult cardiology       Pulm:   -- Goal O2 sat > 90%  -- Wean as able      Renal:  -- Keep casarez for strict I/O  -- Cr 2.3, pt without baseline kidney dysfunction  -- Nephrology consulted      FEN / GI:   -- Replace lytes as needed  -- Nutrition: NPO  -- mIVF (d5 1/2 NS) @ 100cc/hr while npo  --  monitor biliary drain output  -- follow up with surg-onc about next steps/if further imaging is needed      ID:   -- Tm: afebrile; WBC 14  -- Abx zosyn 4.5g Q12H      Heme/Onc:   -- H/H stable   -- Daily CBC      Endo:   -- Gluc goal 140-180      PPx:   Feeding: NPO  Analgesia/Sedation: oxy/tylenol prn  Thromboembolic prevention: SCDs, heparin sub Q  HOB >30: yes  Stress Ulcer ppx: n/a  Glucose control: Critical care goal 140-180 g/dl    Lines/Drains/Airway: port-a-cath, PIV, casarez      Dispo/Code Status/Palliative:   -- SICU / Full Code          Critical care was time spent personally by me on the following activities: development of treatment plan with patient or surrogate and bedside caregivers, discussions with consultants, evaluation of patient's response to treatment, examination of patient, ordering and performing treatments and interventions, ordering and review of laboratory studies, ordering and review of radiographic studies, pulse oximetry, re-evaluation of patient's condition.  This critical care time did not overlap with that of any other provider or involve time for any procedures.     Krystal Haq MD  Critical Care - Surgery  [facility]

## 2023-12-23 NOTE — SUBJECTIVE & OBJECTIVE
Interval History:   -Transferred overnight  -No longer in RVR. Hemodynamically stable  -Leukocytosis improving.  -IAIN slightly improved  -Having a productive cough    Medications:  Continuous Infusions:   dextrose 5 % and 0.45 % NaCl 100 mL/hr at 12/23/23 0800     Scheduled Meds:   acetaminophen  1,000 mg Intravenous Q8H    fluconazole (DIFLUCAN) IV (PEDS and ADULTS)  200 mg Intravenous Q24H    heparin (porcine)  5,000 Units Subcutaneous Q8H    lactated ringers  1,000 mL Intravenous Once    levalbuterol  0.63 mg Nebulization Q6H    pantoprazole  40 mg Intravenous Daily    piperacillin-tazobactam (Zosyn) IV (PEDS and ADULTS) (extended infusion is not appropriate)  4.5 g Intravenous Q8H     PRN Meds:dextrose 10%, dextrose 10%, glucagon (human recombinant), naloxone, ondansetron     Review of patient's allergies indicates:   Allergen Reactions    Dilaudid [hydromorphone]      Pt becomes hypoxic - requiring 5L via oxymask for     Penicillin     Penicillins Rash     Objective:     Vital Signs (Most Recent):  Temp: 98.2 °F (36.8 °C) (12/23/23 0700)  Pulse: 100 (12/23/23 0900)  Resp: 17 (12/23/23 0900)  BP: 138/63 (12/23/23 0900)  SpO2: 98 % (12/23/23 0900) Vital Signs (24h Range):  Temp:  [97.5 °F (36.4 °C)-98.2 °F (36.8 °C)] 98.2 °F (36.8 °C)  Pulse:  [] 100  Resp:  [9-26] 17  SpO2:  [70.5 %-100 %] 98 %  BP: ()/(29-83) 138/63     Weight: 76.3 kg (168 lb 3.4 oz)  Body mass index is 23.46 kg/m².    Intake/Output - Last 3 Shifts         12/21 0700 12/22 0659 12/22 0700 12/23 0659 12/23 0700 12/24 0659    I.V. (mL/kg)  197.1 (2.6) 200.1 (2.6)    Other  150     IV Piggyback  96.4 49.9    Total Intake(mL/kg)  443.5 (5.8) 249.9 (3.3)    Urine (mL/kg/hr)  360 195 (0.8)    Other  280 85    Total Output  640 280    Net  -196.5 -30.1                    Physical Exam  HENT:      Head: Normocephalic and atraumatic.      Mouth/Throat:      Mouth: Mucous membranes are dry.      Pharynx: Oropharynx is clear.   Eyes:       Extraocular Movements: Extraocular movements intact.      Pupils: Pupils are equal, round, and reactive to light.   Cardiovascular:      Rate and Rhythm: Normal rate. Rhythm irregular.      Pulses: Normal pulses.   Pulmonary:      Effort: Pulmonary effort is normal. No respiratory distress.      Breath sounds: Rhonchi present.   Abdominal:      Comments: Soft, mildly distended. IR drain with bilious GI contents.   Musculoskeletal:         General: Normal range of motion.      Cervical back: Normal range of motion.   Skin:     General: Skin is warm and dry.   Neurological:      Mental Status: He is alert. He is disoriented.      Comments: confused          Significant Labs:  I have reviewed all pertinent lab results within the past 24 hours.  CBC:   Recent Labs   Lab 12/23/23 0258   WBC 14.17*   RBC 4.65   HGB 11.8*   HCT 36.8*   *   MCV 79*   MCH 25.4*   MCHC 32.1     CMP:   Recent Labs   Lab 12/23/23 0258   GLU 71   CALCIUM 7.8*   ALBUMIN 2.0*   PROT 5.2*      K 5.2*   CO2 21*      BUN 70*   CREATININE 2.8*   ALKPHOS 73   ALT 10   AST 21   BILITOT 0.4       Significant Diagnostics:  I have reviewed all pertinent imaging results/findings within the past 24 hours.

## 2023-12-23 NOTE — HPI
63-year-old male with metastatic gastric cancer undergoing palliative chemotherapy followed by Dr. 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 Moy 8/21/23, duodenal stump leak s/p exp lap drainage of peritoneal abscess and EGD with visualization of duodenal defect 9/18/23, and IR drain placement 9/29/23 who presents to the ED with generalized abdominal pain.  Patient gets palliative chemotherapy (FOLFOX6 - oxaliplatin leucovorin fluorouracil) every 2 weeks with last treatment given on 12/13/23. Patient was admitted with generalized abdominal pain and possible acute acalculous cholecystitis.  General surgery and Hem/Onc were consulted.  Patient was given IV antibiotics and IV fluids.  HIDA scan was negative for cystic duct obstruction.  Antibiotics were discontinued 12/20.  Patient noted with increased lethargy, abdominal distention and acute kidney injury 12/22.  Also noted with atrial fibrillation with RVR.  I CT abdomen pelvis without contrast performed that showed multiple fluid collections consistent with abscesses.  Patient discussed with general surgery who notes that fluid collections are new and recommended transfer to [facility] given complicated surgical oncology history.  Became hypotensive and in A-fib RVR during IR drain placement.  Received Lopressor 2.5 mg IV at 11:30 a.m. however remained with heart rate 155 therefore digoxin 250 mcg x1 ordered.  Order for transfer request to [facility] placed. Patient discussed with transfer center and patient has been accepted for transfer to Surgical Oncology, Dr. Eulogio Moy.      Upon arrival to SICU pt is HDS. Pt is in A-fib, however rate controlled with stable MAPs. He is stable on supplemental oxygen via NC. Upon arrival, pt appears to be confused and is complaining of abdominal pain. Abdomen is tense and distended. Biliary drain is in place  on right side with bilious drainage.

## 2023-12-23 NOTE — CONSULTS
[facility]  Nephrology  Consult Note    Patient Name: Danish Valladares  MRN: [id number]  Admission Date: 12/23/2023  Hospital Length of Stay: 0 days  Attending Provider: Eulogio Moy, *   Primary Care Physician: Yfn Walker MD  Principal Problem:Postprocedural intraabdominal abscess    Inpatient consult to Nephrology  Consult performed by: Darrell Portillo MD  Consult ordered by: Krystal Haq MD  Reason for consult: shahzad        Subjective:     HPI: 63-year-old male with metastatic gastric cancer undergoing palliative chemotherapy followed by Dr. 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 Moy 8/21/23, duodenal stump leak s/p exp lap drainage of peritoneal abscess and EGD with visualization of duodenal defect 9/18/23, and IR drain placement 9/29/23 who presents to the ED with generalized abdominal pain.  Patient gets palliative chemotherapy (FOLFOX6 - oxaliplatin leucovorin fluorouracil) every 2 weeks with last treatment given on 12/13/23. Patient was admitted with generalized abdominal pain and possible acute acalculous cholecystitis.  General surgery and Hem/Onc were consulted.  Patient was given IV antibiotics and IV fluids.  HIDA scan was negative for cystic duct obstruction.  Antibiotics were discontinued 12/20.  Patient noted with increased lethargy, abdominal distention and acute kidney injury 12/22.  Also noted with atrial fibrillation with RVR.  I CT abdomen pelvis without contrast performed that showed multiple fluid collections consistent with abscesses.  Patient discussed with general surgery who notes that fluid collections are new and recommended transfer to [facility] given complicated surgical oncology history.  Became hypotensive and in A-fib RVR during IR drain placement.  Received Lopressor 2.5 mg IV at 11:30 a.m. however remained with heart rate 155  therefore digoxin 250 mcg x1 ordered.  Order for transfer request to [facility] placed. Patient discussed with transfer center and patient has been accepted for transfer to Surgical Oncology, Dr. Eulogio Moy.     Interval History:   Scr trending up    Medications:  Continuous Infusions:   dextrose 5 % and 0.45 % NaCl 100 mL/hr at 12/23/23 1400     Scheduled Meds:   acetaminophen  1,000 mg Intravenous Q8H    fluconazole (DIFLUCAN) IV (PEDS and ADULTS)  200 mg Intravenous Q24H    heparin (porcine)  5,000 Units Subcutaneous Q8H    levalbuterol  0.63 mg Nebulization Q6H    pantoprazole  40 mg Intravenous Daily    piperacillin-tazobactam (Zosyn) IV (PEDS and ADULTS) (extended infusion is not appropriate)  4.5 g Intravenous Q8H     PRN Meds:dextrose 10%, dextrose 10%, glucagon (human recombinant), naloxone, ondansetron     Review of patient's allergies indicates:   Allergen Reactions    Dilaudid [hydromorphone]      Pt becomes hypoxic - requiring 5L via oxymask for     Penicillin     Penicillins Rash     Objective:     Vital Signs (Most Recent):  Temp: 97.6 °F (36.4 °C) (12/23/23 1100)  Pulse: 99 (12/23/23 1400)  Resp: (!) 22 (12/23/23 1400)  BP: 123/63 (12/23/23 1400)  SpO2: 98 % (12/23/23 1400) Vital Signs (24h Range):  Temp:  [97.5 °F (36.4 °C)-98.2 °F (36.8 °C)] 97.6 °F (36.4 °C)  Pulse:  [] 99  Resp:  [9-26] 22  SpO2:  [70.5 %-99 %] 98 %  BP: ()/(29-87) 123/63     Weight: 76.3 kg (168 lb 3.4 oz)  Body mass index is 23.46 kg/m².    Intake/Output - Last 3 Shifts         12/21 0700  12/22 0659 12/22 0700 12/23 0659 12/23 0700  12/24 0659    I.V. (mL/kg)  197.1 (2.6) 574.9 (7.5)    Other  150     IV Piggyback  96.4 1312.1    Total Intake(mL/kg)  443.5 (5.8) 1887 (24.7)    Urine (mL/kg/hr)  360 965 (1.7)    Other  280 190    Total Output  640 1155    Net  -196.5 +732                   Physical Exam  HENT:      Head: Normocephalic and atraumatic.      Mouth/Throat:      Mouth: Mucous membranes are  dry.      Pharynx: Oropharynx is clear.   Eyes:      Extraocular Movements: Extraocular movements intact.      Pupils: Pupils are equal, round, and reactive to light.   Cardiovascular:        Pulses: Normal pulses.   Pulmonary:      Effort: Pulmonary effort is normal. No respiratory distress.      Breath sounds: Rhonchi present.   Abdominal:      Comments: Soft, mildly distended. IR drain with bilious GI contents.   Musculoskeletal:         General: Normal range of motion.      Cervical back: Normal range of motion.   Skin:     General: Skin is warm and dry.          Significant Labs:  I have reviewed all pertinent lab results within the past 24 hours.  CBC:   Recent Labs   Lab 12/23/23 0258   WBC 14.17*   RBC 4.65   HGB 11.8*   HCT 36.8*   *   MCV 79*   MCH 25.4*   MCHC 32.1       CMP:   Recent Labs   Lab 12/23/23 0258 12/23/23  1307   GLU 71 83   CALCIUM 7.8* 7.7*   ALBUMIN 2.0*  --    PROT 5.2*  --     136   K 5.2* 4.4   CO2 21* 21*    105   BUN 70* 63*   CREATININE 2.8* 2.4*   ALKPHOS 73  --    ALT 10  --    AST 21  --    BILITOT 0.4  --          Significant Diagnostics:  I have reviewed all pertinent imaging results/findings within the past 24 hours.  Review of Systems  Assessment/Plan:     Renal/  IAIN (acute kidney injury)  IAIN secondary to instable blood pressure or infection  Given Fena 0.1 most likely prerenal  Scr trending up 1.97->3.5->3.2->2.8->2.4      Plan  Scr trending up , agree with continuing dext 5 % and 0.45% NS@ 100ml/hr  Recommend to obtain renal usg  Uop ~965, electrolyte non emergent there is no acute RRT need  Please monitor input output strictly and avoid nephrotoxin      GI  Acalculous cholecystitis  Per primary        Thank you for your consult. I will follow-up with patient. Please contact us if you have any additional questions.    Darrell Portillo MD  Nephrology  [facility]

## 2023-12-23 NOTE — SUBJECTIVE & OBJECTIVE
Interval History:   Scr trending up    Medications:  Continuous Infusions:   dextrose 5 % and 0.45 % NaCl 100 mL/hr at 12/23/23 1400     Scheduled Meds:   acetaminophen  1,000 mg Intravenous Q8H    fluconazole (DIFLUCAN) IV (PEDS and ADULTS)  200 mg Intravenous Q24H    heparin (porcine)  5,000 Units Subcutaneous Q8H    levalbuterol  0.63 mg Nebulization Q6H    pantoprazole  40 mg Intravenous Daily    piperacillin-tazobactam (Zosyn) IV (PEDS and ADULTS) (extended infusion is not appropriate)  4.5 g Intravenous Q8H     PRN Meds:dextrose 10%, dextrose 10%, glucagon (human recombinant), naloxone, ondansetron     Review of patient's allergies indicates:   Allergen Reactions    Dilaudid [hydromorphone]      Pt becomes hypoxic - requiring 5L via oxymask for     Penicillin     Penicillins Rash     Objective:     Vital Signs (Most Recent):  Temp: 97.6 °F (36.4 °C) (12/23/23 1100)  Pulse: 99 (12/23/23 1400)  Resp: (!) 22 (12/23/23 1400)  BP: 123/63 (12/23/23 1400)  SpO2: 98 % (12/23/23 1400) Vital Signs (24h Range):  Temp:  [97.5 °F (36.4 °C)-98.2 °F (36.8 °C)] 97.6 °F (36.4 °C)  Pulse:  [] 99  Resp:  [9-26] 22  SpO2:  [70.5 %-99 %] 98 %  BP: ()/(29-87) 123/63     Weight: 76.3 kg (168 lb 3.4 oz)  Body mass index is 23.46 kg/m².    Intake/Output - Last 3 Shifts         12/21 0700  12/22 0659 12/22 0700 12/23 0659 12/23 0700 12/24 0659    I.V. (mL/kg)  197.1 (2.6) 574.9 (7.5)    Other  150     IV Piggyback  96.4 1312.1    Total Intake(mL/kg)  443.5 (5.8) 1887 (24.7)    Urine (mL/kg/hr)  360 965 (1.7)    Other  280 190    Total Output  640 1155    Net  -196.5 +732                    Physical Exam  HENT:      Head: Normocephalic and atraumatic.      Mouth/Throat:      Mouth: Mucous membranes are dry.      Pharynx: Oropharynx is clear.   Eyes:      Extraocular Movements: Extraocular movements intact.      Pupils: Pupils are equal, round, and reactive to light.   Cardiovascular:      Rate and Rhythm: Normal rate.  Rhythm irregular.      Pulses: Normal pulses.   Pulmonary:      Effort: Pulmonary effort is normal. No respiratory distress.      Breath sounds: Rhonchi present.   Abdominal:      Comments: Soft, mildly distended. IR drain with bilious GI contents.   Musculoskeletal:         General: Normal range of motion.      Cervical back: Normal range of motion.   Skin:     General: Skin is warm and dry.   Neurological:      Mental Status: He is alert. He is disoriented.      Comments: confused          Significant Labs:  I have reviewed all pertinent lab results within the past 24 hours.  CBC:   Recent Labs   Lab 12/23/23 0258   WBC 14.17*   RBC 4.65   HGB 11.8*   HCT 36.8*   *   MCV 79*   MCH 25.4*   MCHC 32.1       CMP:   Recent Labs   Lab 12/23/23 0258 12/23/23  1307   GLU 71 83   CALCIUM 7.8* 7.7*   ALBUMIN 2.0*  --    PROT 5.2*  --     136   K 5.2* 4.4   CO2 21* 21*    105   BUN 70* 63*   CREATININE 2.8* 2.4*   ALKPHOS 73  --    ALT 10  --    AST 21  --    BILITOT 0.4  --          Significant Diagnostics:  I have reviewed all pertinent imaging results/findings within the past 24 hours.  Review of Systems

## 2023-12-23 NOTE — ASSESSMENT & PLAN NOTE
IAIN secondary to instable blood pressure or infection  Given Fena 0.1 most likely prerenal  Scr trending up 1.97->3.5->3.2->2.8->2.4      Plan  Scr trending up , agree with continuing dext 5 % and 0.45% NS@ 100ml/hr  Recommend to obtain renal usg  Uop ~965, electrolyte non emergent there is no acute RRT need  Please monitor input output strictly and avoid nephrotoxin

## 2023-12-23 NOTE — PROGRESS NOTES
[facility]  General Surgery  Progress Note    Subjective:     History of Present Illness:  No notes on file    Post-Op Info:  * No surgery found *         Interval History:   -Transferred overnight  -No longer in RVR. Hemodynamically stable  -Leukocytosis improving.  -IAIN slightly improved  -Having a productive cough    Medications:  Continuous Infusions:   dextrose 5 % and 0.45 % NaCl 100 mL/hr at 12/23/23 0800     Scheduled Meds:   acetaminophen  1,000 mg Intravenous Q8H    fluconazole (DIFLUCAN) IV (PEDS and ADULTS)  200 mg Intravenous Q24H    heparin (porcine)  5,000 Units Subcutaneous Q8H    lactated ringers  1,000 mL Intravenous Once    levalbuterol  0.63 mg Nebulization Q6H    pantoprazole  40 mg Intravenous Daily    piperacillin-tazobactam (Zosyn) IV (PEDS and ADULTS) (extended infusion is not appropriate)  4.5 g Intravenous Q8H     PRN Meds:dextrose 10%, dextrose 10%, glucagon (human recombinant), naloxone, ondansetron     Review of patient's allergies indicates:   Allergen Reactions    Dilaudid [hydromorphone]      Pt becomes hypoxic - requiring 5L via oxymask for     Penicillin     Penicillins Rash     Objective:     Vital Signs (Most Recent):  Temp: 98.2 °F (36.8 °C) (12/23/23 0700)  Pulse: 100 (12/23/23 0900)  Resp: 17 (12/23/23 0900)  BP: 138/63 (12/23/23 0900)  SpO2: 98 % (12/23/23 0900) Vital Signs (24h Range):  Temp:  [97.5 °F (36.4 °C)-98.2 °F (36.8 °C)] 98.2 °F (36.8 °C)  Pulse:  [] 100  Resp:  [9-26] 17  SpO2:  [70.5 %-100 %] 98 %  BP: ()/(29-83) 138/63     Weight: 76.3 kg (168 lb 3.4 oz)  Body mass index is 23.46 kg/m².    Intake/Output - Last 3 Shifts         12/21 0700 12/22 0659 12/22 0700 12/23 0659 12/23 0700 12/24 0659    I.V. (mL/kg)  197.1 (2.6) 200.1 (2.6)    Other  150     IV Piggyback  96.4 49.9    Total Intake(mL/kg)  443.5 (5.8) 249.9 (3.3)    Urine (mL/kg/hr)  360 195 (0.8)    Other  280 85    Total Output  640 280    Net  -196.5 -30.1                     Physical Exam  HENT:      Head: Normocephalic and atraumatic.      Mouth/Throat:      Mouth: Mucous membranes are dry.      Pharynx: Oropharynx is clear.   Eyes:      Extraocular Movements: Extraocular movements intact.      Pupils: Pupils are equal, round, and reactive to light.   Cardiovascular:      Rate and Rhythm: Normal rate. Rhythm irregular.      Pulses: Normal pulses.   Pulmonary:      Effort: Pulmonary effort is normal. No respiratory distress.      Breath sounds: Rhonchi present.   Abdominal:      Comments: Soft, mildly distended. IR drain with bilious GI contents.   Musculoskeletal:         General: Normal range of motion.      Cervical back: Normal range of motion.   Skin:     General: Skin is warm and dry.   Neurological:      Mental Status: He is alert. He is disoriented.      Comments: confused          Significant Labs:  I have reviewed all pertinent lab results within the past 24 hours.  CBC:   Recent Labs   Lab 12/23/23 0258   WBC 14.17*   RBC 4.65   HGB 11.8*   HCT 36.8*   *   MCV 79*   MCH 25.4*   MCHC 32.1     CMP:   Recent Labs   Lab 12/23/23 0258   GLU 71   CALCIUM 7.8*   ALBUMIN 2.0*   PROT 5.2*      K 5.2*   CO2 21*      BUN 70*   CREATININE 2.8*   ALKPHOS 73   ALT 10   AST 21   BILITOT 0.4       Significant Diagnostics:  I have reviewed all pertinent imaging results/findings within the past 24 hours.  Assessment/Plan:     * Postprocedural intraabdominal abscess  64 yo M with prior duodenal stump leak after palliative distal gastrectomy with B2 recon presenting with acute worsening of his intraabdominal collection after his second cycle of chemo. IR drain placed at OSH with 1.5L of output initially.     -Continue ICU care  -NPO  -Zosyn  -Recommend adding diflucan for fungal coverage.  -Ongoing crystalloid resuscitation   -Protonix  -PRN analgesia/antiemetics  -Drain care, flush drain  -Aggressive pulm hygiene     Will plan to repeat CT scan with PO and IV  contrast in the coming days assuming his IAIN continues to improve        Lencho Bruner MD  General Surgery  [facility]

## 2023-12-23 NOTE — ASSESSMENT & PLAN NOTE
62 yo M with prior duodenal stump leak after palliative distal gastrectomy with B2 recon presenting with acute worsening of his intraabdominal collection after his second cycle of chemo. IR drain placed at OSH with 1.5L of output initially.     -Continue ICU care  -NPO  -Zosyn  -Recommend adding diflucan for fungal coverage.  -Ongoing crystalloid resuscitation   -Protonix  -PRN analgesia/antiemetics  -Drain care, flush drain  -Aggressive pulm hygiene     Will plan to repeat CT scan with PO and IV contrast in the coming days assuming his IAIN continues to improve

## 2023-12-23 NOTE — PROGRESS NOTES
Nurses Note -- 4 Eyes      12/23/2023   2:51 AM      Skin assessed during: Admit      [x] No Altered Skin Integrity Present    [x]Prevention Measures Documented      [] Yes- Altered Skin Integrity Present or Discovered   [] LDA Added if Not in Epic (Describe Wound)   [] New Altered Skin Integrity was Present on Admit and Documented in LDA   [] Wound Image Taken    Wound Care Consulted? No    Attending Nurse:   Bernard Rodriguez RN/Staff Member:   Hanna LANG

## 2023-12-23 NOTE — HPI
Per chart review, Mr Danish Valladares is a 63-year-old male with metastatic gastric cancer undergoing palliative chemotherapy followed by Dr. 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 Moy 8/21/23, duodenal stump leak s/p exp lap drainage of peritoneal abscess and EGD with visualization of duodenal defect 9/18/23, and IR drain placement 9/29/23 who presents to the ED on 12/19/23  with generalized abdominal pain.  Patient gets palliative chemotherapy (FOLFOX6 - oxaliplatin leucovorin fluorouracil) every 2 weeks with last treatment given on 12/13/23. Patient was admitted with generalized abdominal pain and possible acute acalculous cholecystitis.  General surgery and Hem/Onc were consulted.  Patient was given IV antibiotics and IV fluids.  HIDA scan was negative for cystic duct obstruction.  Antibiotics were discontinued 12/20.  Patient noted with increased lethargy, abdominal distention and acute kidney injury 12/22.  Also noted with atrial fibrillation with RVR. CT abdomen pelvis without contrast performed that showed multiple fluid collections consistent with abscesses.  Patient discussed with general surgery who notes that fluid collections are new and recommended transfer to [facility] given complicated surgical oncology history.  Became hypotensive and in A-fib RVR during IR drain placement.  Received Lopressor 2.5 mg IV at 11:30 a.m. however remained with heart rate 155 therefore digoxin 250 mcg x1 ordered.  Order for transfer request to [facility] placed. Patient discussed with transfer center and patient has been accepted for transfer to Surgical Oncology, Dr. Eulogio Moy.     Upon arrival to SICU pt is HDS. Pt is in A-fib, however rate controlled with stable MAPs. He is stable on supplemental oxygen via NC. Upon arrival, pt appears to be confused and is complaining of abdominal pain. Abdomen is  tense and distended. Biliary drain is in place on right side with bilious drainage

## 2023-12-23 NOTE — ASSESSMENT & PLAN NOTE
Neuro/Psych:   -- Sedation: none   -- Pain: tylenol, oxy prn  -- monitor mental status as patient appears confused upon admission as well as per nursing notes from [facility]             Cards:   -- HDS  -- currently in A-fib, however rate controlled. Pt does not have history of A-fib  -- will consult cardiology       Pulm:   -- Goal O2 sat > 90%  -- Wean as able      Renal:  -- Keep casarez for strict I/O  -- Cr 2.3, pt without baseline kidney dysfunction  -- Nephrology consulted      FEN / GI:   -- Replace lytes as needed  -- Nutrition: NPO  -- mIVF (d5 1/2 NS) @ 100cc/hr while npo  -- monitor biliary drain output  -- follow up with surg-onc about next steps/if further imaging is needed      ID:   -- Tm: afebrile; WBC 14  -- Abx zosyn 4.5g Q12H      Heme/Onc:   -- H/H stable   -- Daily CBC      Endo:   -- Gluc goal 140-180      PPx:   Feeding: NPO  Analgesia/Sedation: oxy/tylenol prn  Thromboembolic prevention: SCDs, heparin sub Q  HOB >30: yes  Stress Ulcer ppx: n/a  Glucose control: Critical care goal 140-180 g/dl    Lines/Drains/Airway: port-a-cath, PIV, casarez      Dispo/Code Status/Palliative:   -- SICU / Full Code

## 2023-12-23 NOTE — PROGRESS NOTES
Pharmacist Renal Dose Adjustment Note    Danish Valladares is a 63 y.o. male being treated with the medication piperacillin-tazobactam    Patient Data:    Vital Signs (Most Recent):  Pulse: 101 (12/23/23 0355)  Resp: 19 (12/23/23 0355)  SpO2: 96 % (12/23/23 0355) Vital Signs (72h Range):  Temp:  [97.5 °F (36.4 °C)-98.4 °F (36.9 °C)]   Pulse:  []   Resp:  [9-23]   BP: ()/(29-74)   SpO2:  [70.5 %-100 %]      Recent Labs   Lab 12/22/23  0624 12/22/23  1704 12/23/23  0258   CREATININE 3.50* 3.27* 2.8*     Serum creatinine: 2.8 mg/dL (H) 12/23/23 0258  Estimated creatinine clearance: 28.8 mL/min (A)    Medication: piperacillin-tazobactam 4.5 grams every 12 hours will be changed to piperacillin-tazobactam 4.5 grams every 8 hours    Elizabeth Durham PharmD

## 2023-12-23 NOTE — HPI
63-year-old male with metastatic gastric cancer undergoing palliative chemotherapy followed by Dr. 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 Moy 8/21/23, duodenal stump leak s/p exp lap drainage of peritoneal abscess and EGD with visualization of duodenal defect 9/18/23, and IR drain placement 9/29/23 who presents to the ED with generalized abdominal pain.  Patient gets palliative chemotherapy (FOLFOX6 - oxaliplatin leucovorin fluorouracil) every 2 weeks with last treatment given on 12/13/23. Patient was admitted with generalized abdominal pain and possible acute acalculous cholecystitis.  General surgery and Hem/Onc were consulted.  Patient was given IV antibiotics and IV fluids.  HIDA scan was negative for cystic duct obstruction.  Antibiotics were discontinued 12/20.  Patient noted with increased lethargy, abdominal distention and acute kidney injury 12/22.  Also noted with atrial fibrillation with RVR.  I CT abdomen pelvis without contrast performed that showed multiple fluid collections consistent with abscesses.  Patient discussed with general surgery who notes that fluid collections are new and recommended transfer to [facility] given complicated surgical oncology history.  Became hypotensive and in A-fib RVR during IR drain placement.  Received Lopressor 2.5 mg IV at 11:30 a.m. however remained with heart rate 155 therefore digoxin 250 mcg x1 ordered.  Order for transfer request to [facility] placed. Patient discussed with transfer center and patient has been accepted for transfer to Surgical Oncology, Dr. Eulogio Moy.

## 2023-12-23 NOTE — PLAN OF CARE
SICU PLAN OF CARE NOTE    Dx: Postprocedural intraabdominal abscess    Goals of Care: monitor kidney function; monitor mental status; monitor biliary drain output    Vital Signs (last 12 hours):   Temp:  [97.6 °F (36.4 °C)-98.6 °F (37 °C)]   Pulse:  []   Resp:  [12-28]   BP: (105-138)/(54-87)   SpO2:  [92 %-98 %]      Neuro: Follows Commands, Moves All Extremities, and Confused    Cardiac: NSR    Respiratory: Nasal Cannula 2L    Gtts: MIVF    Urine Output: Urinary Catheter 1240 cc/shift    Drains: DANILO Drain, total output 290 cc / shift    Diet: NPO     Labs/Accuchecks: accuchecks q6h. Labs reviewed.    Skin:  All skin remains free from injury.  Patient turned Q2, waffle mattress inflated, and bed working correctly.    Shift Events:  no acute events this shift. Patient remains disoriented to time, place, and situation.  See flowsheet for further assessment/details.  Family updated on current condition/plan of care, questions answered, and emotional support provided.

## 2023-12-24 PROBLEM — K81.9 ACALCULOUS CHOLECYSTITIS: Status: RESOLVED | Noted: 2023-12-19 | Resolved: 2023-12-24

## 2023-12-24 NOTE — CONSULTS
Consult acknowledged. Discussed with SICU team. 63m with gastric cancer on palliative chemo presenting with intra-abdominal abscesses. Goals are clear with no acute symptom management needs.      -will plan to formally consult 10/26  -if any acute needs arise in the meantime, please contact on-call provider

## 2023-12-24 NOTE — ASSESSMENT & PLAN NOTE
Neuro/Psych:   -- Sedation: none   -- Pain: tylenol, oxy prn  -- monitor mental status as patient appears confused upon admission as well as per nursing notes from [facility]             Cards:   -- HDS  -- currently in A-fib, however rate controlled. Pt does not have history of A-fib      Pulm:   -- Goal O2 sat > 90%  -- Wean as able      Renal:  -- Keep casarez for strict I/O  -- Cr improving now 1.6 from 2.3, pt without baseline kidney dysfunction  -- Nephrology consulted   -- Making adequate urine     FEN / GI:   -- Replace lytes as needed  -- Net positive 395 cc  -- Nutrition: NPO, consider TPN?  -- mIVF (d5 1/2 NS) @ 100cc/hr while npo  -- monitor biliary drain output      ID:   -- Tm: afebrile; WBC 9 from 14  -- Abx zosyn, diflucan      Heme/Onc:   -- H/H stable   -- Daily CBC      Endo:   -- Gluc goal 140-180      PPx:   Feeding: NPO  Analgesia/Sedation: oxy/tylenol prn  Thromboembolic prevention: SCDs, heparin sub Q  HOB >30: yes  Stress Ulcer ppx: n/a  Glucose control: Critical care goal 140-180 g/dl    Lines/Drains/Airway: port-a-cath, PIV, casarez      Dispo/Code Status/Palliative:   -- SICU / Full Code

## 2023-12-24 NOTE — PROGRESS NOTES
[facility]  Nephrology  Progress Note    Patient Name: Danish Valladares  MRN: [id number]  Admission Date: 12/23/2023  Hospital Length of Stay: 1 days  Attending Provider: Eulogio Moy, *   Primary Care Physician: Yfn Walker MD  Principal Problem:Postprocedural intraabdominal abscess    Subjective:     HPI: 63-year-old male with metastatic gastric cancer undergoing palliative chemotherapy followed by Dr. 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 Moy 8/21/23, duodenal stump leak s/p exp lap drainage of peritoneal abscess and EGD with visualization of duodenal defect 9/18/23, and IR drain placement 9/29/23 who presents to the ED with generalized abdominal pain.  Patient gets palliative chemotherapy (FOLFOX6 - oxaliplatin leucovorin fluorouracil) every 2 weeks with last treatment given on 12/13/23. Patient was admitted with generalized abdominal pain and possible acute acalculous cholecystitis.  General surgery and Hem/Onc were consulted.  Patient was given IV antibiotics and IV fluids.  HIDA scan was negative for cystic duct obstruction.  Antibiotics were discontinued 12/20.  Patient noted with increased lethargy, abdominal distention and acute kidney injury 12/22.  Also noted with atrial fibrillation with RVR.  I CT abdomen pelvis without contrast performed that showed multiple fluid collections consistent with abscesses.  Patient discussed with general surgery who notes that fluid collections are new and recommended transfer to [facility] given complicated surgical oncology history.  Became hypotensive and in A-fib RVR during IR drain placement.  Received Lopressor 2.5 mg IV at 11:30 a.m. however remained with heart rate 155 therefore digoxin 250 mcg x1 ordered.  Order for transfer request to [facility] placed. Patient discussed with transfer center and patient  has been accepted for transfer to Surgical Oncology, Dr. Eulogio Moy.     Interval History:   Scr improving down to 1.6 today  Making good urine    Medications:  Continuous Infusions:   dextrose 5 % and 0.45 % NaCl Stopped (12/24/23 0641)     Scheduled Meds:   fluconazole (DIFLUCAN) IV (PEDS and ADULTS)  200 mg Intravenous Q24H    heparin (porcine)  5,000 Units Subcutaneous Q8H    levalbuterol  0.63 mg Nebulization Q6H    pantoprazole  40 mg Intravenous Daily    piperacillin-tazobactam (Zosyn) IV (PEDS and ADULTS) (extended infusion is not appropriate)  4.5 g Intravenous Q8H     PRN Meds:calcium gluconate IVPB, calcium gluconate IVPB, calcium gluconate IVPB, dextrose 10%, dextrose 10%, glucagon (human recombinant), magnesium sulfate IVPB, magnesium sulfate IVPB, melatonin, naloxone, ondansetron, potassium chloride **AND** potassium chloride **AND** potassium chloride, sodium phosphate 15 mmol in dextrose 5 % (D5W) 250 mL IVPB, sodium phosphate 20.01 mmol in dextrose 5 % (D5W) 250 mL IVPB, sodium phosphate 30 mmol in dextrose 5 % (D5W) 250 mL IVPB     Review of patient's allergies indicates:   Allergen Reactions    Dilaudid [hydromorphone]      Pt becomes hypoxic - requiring 5L via oxymask for     Penicillin     Penicillins Rash     Objective:     Vital Signs (Most Recent):  Temp: 98.6 °F (37 °C) (12/24/23 0700)  Pulse: 99 (12/24/23 0700)  Resp: (!) 24 (12/24/23 0700)  BP: (!) 151/72 (12/24/23 0700)  SpO2: 96 % (12/24/23 0700) Vital Signs (24h Range):  Temp:  [97.6 °F (36.4 °C)-98.6 °F (37 °C)] 98.6 °F (37 °C)  Pulse:  [] 99  Resp:  [11-32] 24  SpO2:  [26 %-99 %] 96 %  BP: (105-151)/(54-87) 151/72     Weight: 85.5 kg (188 lb 7.9 oz)  Body mass index is 26.29 kg/m².    Intake/Output - Last 3 Shifts         12/22 0700 12/23 0659 12/23 0700 12/24 0659 12/24 0700 12/25 0659    I.V. (mL/kg) 197.1 (2.6) 2067.8 (24.2) 29.5 (0.3)    Other 150      IV Piggyback 96.4 1662.9 97.4    Total Intake(mL/kg) 443.5 (5.8)  3730.7 (43.6) 126.9 (1.5)    Urine (mL/kg/hr) 360 2790 (1.4) 125 (1.5)    Other 280 420 60    Total Output 640 3210 185    Net -196.5 +520.7 -58.1                   Physical Exam  HENT:      Head: Normocephalic and atraumatic.      Mouth/Throat:      Mouth: Mucous membranes are dry.      Pharynx: Oropharynx is clear.   Eyes:      Extraocular Movements: Extraocular movements intact.      Pupils: Pupils are equal, round, and reactive to light.   Cardiovascular:      Rate and Rhythm: Normal rate. Rhythm irregular.      Pulses: Normal pulses.   Pulmonary:      Effort: Pulmonary effort is normal. No respiratory distress.      Breath sounds: Rhonchi present.   Abdominal:      Comments: Soft, mildly distended. IR drain with bilious GI contents.   Musculoskeletal:         General: Normal range of motion.      Cervical back: Normal range of motion.   Skin:     General: Skin is warm and dry.   Neurological:      Mental Status: He is alert. He is disoriented.      Comments: confused          Significant Labs:  I have reviewed all pertinent lab results within the past 24 hours.  CBC:   Recent Labs   Lab 12/24/23  0333   WBC 9.86   RBC 4.02*   HGB 10.2*   HCT 30.7*   PLT 88*   MCV 76*   MCH 25.4*   MCHC 33.2       CMP:   Recent Labs   Lab 12/24/23  0333   *   CALCIUM 7.9*   ALBUMIN 1.7*   PROT 4.6*      K 3.6   CO2 24      BUN 46*   CREATININE 1.6*   ALKPHOS 78   ALT 9*   AST 17   BILITOT 0.8         Significant Diagnostics:  I have reviewed all pertinent imaging results/findings within the past 24 hours.  Review of Systems  Assessment/Plan:     Renal/  IAIN (acute kidney injury)  IAIN secondary to instable blood pressure or infection  Given Fena 0.1 most likely prerenal  Scr trending up 1.97->3.5->3.2->2.8->2.4    Plan  Scr down to 1.6,  agree with continuing dext 5 % and 0.45% NS@ 100ml/hr  Recommend to obtain renal usg  Uop ~ 2.7, electrolyte non emergent there is no acute RRT need  Please monitor input  output strictly and avoid nephrotoxin          Thank you for your consult. I will follow-up with patient. Please contact us if you have any additional questions.    Darrell Portillo MD  Nephrology  [facility]

## 2023-12-24 NOTE — PROCEDURES
"Danish Valladares is a 63 y.o. male patient.    Temp: 98.4 °F (36.9 °C) (12/24/23 1100)  Pulse: 98 (12/24/23 1100)  Resp: (!) 23 (12/24/23 1100)  BP: 138/75 (12/24/23 1100)  SpO2: 95 % (12/24/23 1100)  Weight: 85.5 kg (188 lb 7.9 oz) (12/23/23 1500)  Height: 5' 11" (180.3 cm) (12/23/23 1500)    PICC  Date/Time: 12/24/2023 12:16 PM  Location procedure was performed: [facility] PICC LINE PLACEMENT  Performed by: Mami Carroll RN  Assisting provider: Jillian Pinto LPN  Consent Done: Yes  Time out: Immediately prior to procedure a time out was called to verify the correct patient, procedure, equipment, support staff and site/side marked as required  Indications: med administration  Anesthesia: local infiltration  Local anesthetic: lidocaine 1% without epinephrine  Anesthetic Total (mL): 3  Preparation: skin prepped with ChloraPrep  Skin prep agent dried: skin prep agent completely dried prior to procedure  Sterile barriers: all five maximum sterile barriers used - cap, mask, sterile gown, sterile gloves, and large sterile sheet  Hand hygiene: hand hygiene performed prior to central venous catheter insertion  Location details: right brachial  Catheter type: double lumen  Catheter size: 5 Fr  Catheter Length: 39cm    Ultrasound guidance: yes  Vessel Caliber: large and patent, compressibility normal  Vascular Doppler: not done  Needle advanced into vessel with real time Ultrasound guidance.  Guidewire confirmed in vessel.  Image recorded and saved.  Sterile sheath used.  no esophageal manometryNumber of attempts: 1  Post-procedure: blood return through all ports, sterile dressing applied and chlorhexidine patch  Technical procedures used: 3CG  Specimens: No  Implants: No  Assessment: placement verified by x-ray  Complications: none          Name Jillian Pinto LPN   12/24/2023    "

## 2023-12-24 NOTE — SUBJECTIVE & OBJECTIVE
Interval History/Significant Events: No acute events overnight. HDS on 2L.         Objective:     Vital Signs (Most Recent):  Temp: 98.5 °F (36.9 °C) (12/23/23 2301)  Pulse: 94 (12/24/23 0630)  Resp: 19 (12/24/23 0630)  BP: (!) 146/70 (12/24/23 0600)  SpO2: 99 % (12/24/23 0630) Vital Signs (24h Range):  Temp:  [97.6 °F (36.4 °C)-98.6 °F (37 °C)] 98.5 °F (36.9 °C)  Pulse:  [] 94  Resp:  [11-32] 19  SpO2:  [26 %-99 %] 99 %  BP: (105-146)/(54-87) 146/70     Weight: 85.5 kg (188 lb 7.9 oz)  Body mass index is 26.29 kg/m².      Intake/Output Summary (Last 24 hours) at 12/24/2023 0642  Last data filed at 12/24/2023 0600  Gross per 24 hour   Intake 3730.65 ml   Output 3210 ml   Net 520.65 ml          Physical Exam  Vitals and nursing note reviewed.   HENT:      Head: Normocephalic and atraumatic.      Mouth/Throat:      Mouth: Mucous membranes are dry.      Pharynx: Oropharynx is clear.   Eyes:      Extraocular Movements: Extraocular movements intact.      Pupils: Pupils are equal, round, and reactive to light.   Cardiovascular:      Rate and Rhythm: Normal rate. Rhythm irregular.      Pulses: Normal pulses.   Pulmonary:      Effort: Pulmonary effort is normal. No respiratory distress.   Abdominal:      Comments: Soft, mildly distended. IR drain with bilious GI contents.   Musculoskeletal:         General: Normal range of motion.      Cervical back: Normal range of motion.   Skin:     General: Skin is warm and dry.   Neurological:      Mental Status: He is alert. He is disoriented.      Comments: confused            Vents:  Oxygen Concentration (%): 28 (12/24/23 0426)    Lines/Drains/Airways       Central Venous Catheter Line  Duration             Port A Cath Single Lumen Subclavian Left -- days              Drain  Duration                  Drain/Device  12/22/23 1507 Right lower abdomen pigtail 1 day         Urethral Catheter 12/22/23 1218 Coude 14 Fr. 1 day              Peripheral Intravenous Line  Duration                   Peripheral IV - Single Lumen 12/22/23 1700 20 G Anterior;Left Forearm 1 day                    Significant Labs:    CBC/Anemia Profile:  Recent Labs   Lab 12/22/23  1704 12/23/23  0258 12/24/23  0333   WBC 18.85* 14.17* 9.86   HGB 12.5* 11.8* 10.2*   HCT 39.6* 36.8* 30.7*   * 133* 88*   MCV 80* 79* 76*   RDW 14.1 14.4 14.2        Chemistries:  Recent Labs   Lab 12/22/23  1704 12/23/23  0258 12/23/23  1307 12/24/23  0333   * 136 136 137   K 5.4* 5.2* 4.4 3.6   CL 99 104 105 104   CO2 22 21* 21* 24   BUN 74* 70* 63* 46*   CREATININE 3.27* 2.8* 2.4* 1.6*   CALCIUM 6.8* 7.8* 7.7* 7.9*   ALBUMIN 2.4* 2.0*  --  1.7*   PROT 4.6* 5.2*  --  4.6*   BILITOT 0.5 0.4  --  0.8   ALKPHOS 74 73  --  78   ALT 18 10  --  9*   AST 34 21  --  17   MG 1.6 1.7  --  1.9   PHOS 6.8* 5.2*  --  1.7*       All pertinent labs within the past 24 hours have been reviewed.    Significant Imaging:  I have reviewed all pertinent imaging results/findings within the past 24 hours.

## 2023-12-24 NOTE — ASSESSMENT & PLAN NOTE
IAIN secondary to instable blood pressure or infection  Given Fena 0.1 most likely prerenal  Scr trending up 1.97->3.5->3.2->2.8->2.4    Plan  Scr down to 1.6,  agree with continuing dext 5 % and 0.45% NS@ 100ml/hr  Recommend to obtain renal usg  Uop ~ 2.7, electrolyte non emergent there is no acute RRT need  Please monitor input output strictly and avoid nephrotoxin

## 2023-12-24 NOTE — PLAN OF CARE
SICU PLAN OF CARE NOTE    Dx: Postprocedural intraabdominal abscess    Goals of Care: monitor kidney function; monitor mental status; monitor biliary drain output     Vital Signs (last 12 hours):   Temp:  [98.3 °F (36.8 °C)-98.5 °F (36.9 °C)]   Pulse:  []   Resp:  [11-26]   BP: (111-146)/(54-70)   SpO2:  [26 %-99 %]      Neuro: Follows Commands, Moves All Extremities, and Confused    Cardiac: NSR/ST 90-100s    Respiratory: Nasal Cannula 2 L/min    Gtts: MIVF  - D5 1/2 NS @ 100 mL/hr    Urine Output: Urinary Catheter 1400 cc/shift    Drains: DANILO Drain, total output 130 cc / shift    Diet: NPO     Labs/Accuchecks: Daily AM labs; Q6 Accuchecks    Skin:  All skin remains free from injury.  Patient turned Q2, mattress inflated, and bed working correctly.    Shift Events: No acute events this shift. Patient remains disoriented to time, place, and situation. See flowsheet for further assessment/details.  Family updated on current condition/plan of care, questions answered, and emotional support provided.  MD updated on current condition, vitals, labs, and gtts.  No new orders received, will continue to monitor.

## 2023-12-24 NOTE — PROGRESS NOTES
[facility]  Critical Care - Surgery  Progress Note    Patient Name: Danish Valladares  MRN: [id number]  Admission Date: 12/23/2023  Hospital Length of Stay: 1 days  Code Status: Full Code  Attending Provider: Eulogio Moy, *  Primary Care Provider: Yfn Walker MD   Principal Problem: Postprocedural intraabdominal abscess    Subjective:     Hospital/ICU Course:  No notes on file    Interval History/Significant Events: No acute events overnight. HDS on 2L. Is hungry and asking for food.         Objective:     Vital Signs (Most Recent):  Temp: 98.5 °F (36.9 °C) (12/23/23 2301)  Pulse: 94 (12/24/23 0630)  Resp: 19 (12/24/23 0630)  BP: (!) 146/70 (12/24/23 0600)  SpO2: 99 % (12/24/23 0630) Vital Signs (24h Range):  Temp:  [97.6 °F (36.4 °C)-98.6 °F (37 °C)] 98.5 °F (36.9 °C)  Pulse:  [] 94  Resp:  [11-32] 19  SpO2:  [26 %-99 %] 99 %  BP: (105-146)/(54-87) 146/70     Weight: 85.5 kg (188 lb 7.9 oz)  Body mass index is 26.29 kg/m².      Intake/Output Summary (Last 24 hours) at 12/24/2023 0642  Last data filed at 12/24/2023 0600  Gross per 24 hour   Intake 3730.65 ml   Output 3210 ml   Net 520.65 ml          Physical Exam  Vitals and nursing note reviewed.   HENT:      Head: Normocephalic and atraumatic.      Mouth/Throat:      Mouth: Mucous membranes are dry.      Pharynx: Oropharynx is clear.   Eyes:      Extraocular Movements: Extraocular movements intact.      Pupils: Pupils are equal, round, and reactive to light.   Cardiovascular:      Rate and Rhythm: Normal rate. Rhythm irregular.      Pulses: Normal pulses.   Pulmonary:      Effort: Pulmonary effort is normal. No respiratory distress.   Abdominal:      Comments: Soft, mildly distended. IR drain with bilious GI contents. Tender in the lower quadrants.   Musculoskeletal:         General: Normal range of motion.      Cervical back: Normal range of motion.   Skin:     General: Skin is warm and dry.   Neurological:      Mental  Status: He is alert. He is disoriented.      Comments: confused            Vents:  Oxygen Concentration (%): 28 (12/24/23 0426)    Lines/Drains/Airways       Central Venous Catheter Line  Duration             Port A Cath Single Lumen Subclavian Left -- days              Drain  Duration                  Drain/Device  12/22/23 1507 Right lower abdomen pigtail 1 day         Urethral Catheter 12/22/23 1218 Coude 14 Fr. 1 day              Peripheral Intravenous Line  Duration                  Peripheral IV - Single Lumen 12/22/23 1700 20 G Anterior;Left Forearm 1 day                    Significant Labs:    CBC/Anemia Profile:  Recent Labs   Lab 12/22/23  1704 12/23/23  0258 12/24/23  0333   WBC 18.85* 14.17* 9.86   HGB 12.5* 11.8* 10.2*   HCT 39.6* 36.8* 30.7*   * 133* 88*   MCV 80* 79* 76*   RDW 14.1 14.4 14.2        Chemistries:  Recent Labs   Lab 12/22/23  1704 12/23/23  0258 12/23/23  1307 12/24/23  0333   * 136 136 137   K 5.4* 5.2* 4.4 3.6   CL 99 104 105 104   CO2 22 21* 21* 24   BUN 74* 70* 63* 46*   CREATININE 3.27* 2.8* 2.4* 1.6*   CALCIUM 6.8* 7.8* 7.7* 7.9*   ALBUMIN 2.4* 2.0*  --  1.7*   PROT 4.6* 5.2*  --  4.6*   BILITOT 0.5 0.4  --  0.8   ALKPHOS 74 73  --  78   ALT 18 10  --  9*   AST 34 21  --  17   MG 1.6 1.7  --  1.9   PHOS 6.8* 5.2*  --  1.7*       All pertinent labs within the past 24 hours have been reviewed.    Significant Imaging:  I have reviewed all pertinent imaging results/findings within the past 24 hours.  Assessment/Plan:     GI  Peritonitis    Neuro/Psych:   -- Sedation: none   -- Pain: tylenol, oxy prn  -- monitor mental status as patient appears confused upon admission as well as per nursing notes from [facility]             Cards:   -- HDS  -- currently in A-fib, however rate controlled. Pt does not have history of A-fib      Pulm:   -- Goal O2 sat > 90%  -- Wean as able      Renal:  -- Keep casarez for strict I/O  -- Cr improving now 1.6 from 2.3, pt without baseline kidney  dysfunction  -- Nephrology consulted   -- Making adequate urine     FEN / GI:   -- Replace lytes as needed  -- Net positive 395 cc  -- Nutrition: NPO, will discuss diet plan with surg onc  -- mIVF (d5 1/2 NS) @ 100cc/hr while npo  -- monitor biliary drain output      ID:   -- Tm: afebrile; WBC 9 from 14  -- Abx zosyn, diflucan      Heme/Onc:   -- H/H stable   -- Daily CBC      Endo:   -- Gluc goal 140-180      PPx:   Feeding: NPO  Analgesia/Sedation: oxy/tylenol prn  Thromboembolic prevention: SCDs, heparin sub Q  HOB >30: yes  Stress Ulcer ppx: n/a  Glucose control: Critical care goal 140-180 g/dl    Lines/Drains/Airway: port-a-cath, PIV, casarez      Dispo/Code Status/Palliative:   -- Possible step down / Full Code          Sarah Chambers MD  Critical Care - Surgery  [facility]

## 2023-12-24 NOTE — SUBJECTIVE & OBJECTIVE
Interval History:   Scr improving down to 1.6 today  Making good urine    Medications:  Continuous Infusions:   dextrose 5 % and 0.45 % NaCl Stopped (12/24/23 0641)     Scheduled Meds:   fluconazole (DIFLUCAN) IV (PEDS and ADULTS)  200 mg Intravenous Q24H    heparin (porcine)  5,000 Units Subcutaneous Q8H    levalbuterol  0.63 mg Nebulization Q6H    pantoprazole  40 mg Intravenous Daily    piperacillin-tazobactam (Zosyn) IV (PEDS and ADULTS) (extended infusion is not appropriate)  4.5 g Intravenous Q8H     PRN Meds:calcium gluconate IVPB, calcium gluconate IVPB, calcium gluconate IVPB, dextrose 10%, dextrose 10%, glucagon (human recombinant), magnesium sulfate IVPB, magnesium sulfate IVPB, melatonin, naloxone, ondansetron, potassium chloride **AND** potassium chloride **AND** potassium chloride, sodium phosphate 15 mmol in dextrose 5 % (D5W) 250 mL IVPB, sodium phosphate 20.01 mmol in dextrose 5 % (D5W) 250 mL IVPB, sodium phosphate 30 mmol in dextrose 5 % (D5W) 250 mL IVPB     Review of patient's allergies indicates:   Allergen Reactions    Dilaudid [hydromorphone]      Pt becomes hypoxic - requiring 5L via oxymask for     Penicillin     Penicillins Rash     Objective:     Vital Signs (Most Recent):  Temp: 98.6 °F (37 °C) (12/24/23 0700)  Pulse: 99 (12/24/23 0700)  Resp: (!) 24 (12/24/23 0700)  BP: (!) 151/72 (12/24/23 0700)  SpO2: 96 % (12/24/23 0700) Vital Signs (24h Range):  Temp:  [97.6 °F (36.4 °C)-98.6 °F (37 °C)] 98.6 °F (37 °C)  Pulse:  [] 99  Resp:  [11-32] 24  SpO2:  [26 %-99 %] 96 %  BP: (105-151)/(54-87) 151/72     Weight: 85.5 kg (188 lb 7.9 oz)  Body mass index is 26.29 kg/m².    Intake/Output - Last 3 Shifts         12/22 0700  12/23 0659 12/23 0700 12/24 0659 12/24 0700 12/25 0659    I.V. (mL/kg) 197.1 (2.6) 2067.8 (24.2) 29.5 (0.3)    Other 150      IV Piggyback 96.4 1662.9 97.4    Total Intake(mL/kg) 443.5 (5.8) 3730.7 (43.6) 126.9 (1.5)    Urine (mL/kg/hr) 360 2790 (1.4) 125 (1.5)     Other 280 420 60    Total Output 640 3210 185    Net -196.5 +520.7 -58.1                    Physical Exam  HENT:      Head: Normocephalic and atraumatic.      Mouth/Throat:      Mouth: Mucous membranes are dry.      Pharynx: Oropharynx is clear.   Eyes:      Extraocular Movements: Extraocular movements intact.      Pupils: Pupils are equal, round, and reactive to light.   Cardiovascular:      Rate and Rhythm: Normal rate. Rhythm irregular.      Pulses: Normal pulses.   Pulmonary:      Effort: Pulmonary effort is normal. No respiratory distress.      Breath sounds: Rhonchi present.   Abdominal:      Comments: Soft, mildly distended. IR drain with bilious GI contents.   Musculoskeletal:         General: Normal range of motion.      Cervical back: Normal range of motion.   Skin:     General: Skin is warm and dry.   Neurological:      Mental Status: He is alert. He is disoriented.      Comments: confused          Significant Labs:  I have reviewed all pertinent lab results within the past 24 hours.  CBC:   Recent Labs   Lab 12/24/23  0333   WBC 9.86   RBC 4.02*   HGB 10.2*   HCT 30.7*   PLT 88*   MCV 76*   MCH 25.4*   MCHC 33.2       CMP:   Recent Labs   Lab 12/24/23  0333   *   CALCIUM 7.9*   ALBUMIN 1.7*   PROT 4.6*      K 3.6   CO2 24      BUN 46*   CREATININE 1.6*   ALKPHOS 78   ALT 9*   AST 17   BILITOT 0.8         Significant Diagnostics:  I have reviewed all pertinent imaging results/findings within the past 24 hours.  Review of Systems

## 2023-12-24 NOTE — SUBJECTIVE & OBJECTIVE
Interval History:   NAEO  Some agitation requiring redirection overnight, but seems less confused this morning.  Still has abdominal pain.  Denies nausea and has an appetite.  Drain thin  Cr significantly improved to 1.6    Medications:  Continuous Infusions:   dextrose 5 % and 0.45 % NaCl Stopped (12/24/23 0805)    TPN ADULT CENTRAL LINE CUSTOM       Scheduled Meds:   acetaminophen  1,000 mg Intravenous Q8H    fat emulsion 20%  250 mL Intravenous Daily    fluconazole (DIFLUCAN) IV (PEDS and ADULTS)  200 mg Intravenous Q24H    heparin (porcine)  5,000 Units Subcutaneous Q8H    levalbuterol  0.63 mg Nebulization Q6H    pantoprazole  40 mg Intravenous Daily    piperacillin-tazobactam (Zosyn) IV (PEDS and ADULTS) (extended infusion is not appropriate)  4.5 g Intravenous Q8H     PRN Meds:calcium gluconate IVPB, calcium gluconate IVPB, calcium gluconate IVPB, dextrose 10%, dextrose 10%, glucagon (human recombinant), magnesium sulfate IVPB, magnesium sulfate IVPB, melatonin, naloxone, ondansetron, potassium chloride **AND** potassium chloride **AND** potassium chloride, prochlorperazine, sodium phosphate 15 mmol in dextrose 5 % (D5W) 250 mL IVPB, sodium phosphate 20.01 mmol in dextrose 5 % (D5W) 250 mL IVPB, sodium phosphate 30 mmol in dextrose 5 % (D5W) 250 mL IVPB     Review of patient's allergies indicates:   Allergen Reactions    Dilaudid [hydromorphone]      Pt becomes hypoxic - requiring 5L via oxymask for     Penicillin     Penicillins Rash     Objective:     Vital Signs (Most Recent):  Temp: 98.6 °F (37 °C) (12/24/23 0700)  Pulse: 100 (12/24/23 1000)  Resp: 19 (12/24/23 1000)  BP: (!) 141/70 (12/24/23 1000)  SpO2: 95 % (12/24/23 1000) Vital Signs (24h Range):  Temp:  [97.6 °F (36.4 °C)-98.6 °F (37 °C)] 98.6 °F (37 °C)  Pulse:  [] 100  Resp:  [11-32] 19  SpO2:  [26 %-99 %] 95 %  BP: (105-151)/(54-87) 141/70     Weight: 85.5 kg (188 lb 7.9 oz)  Body mass index is 26.29 kg/m².    Intake/Output - Last 3 Shifts          12/22 0700  12/23 0659 12/23 0700 12/24 0659 12/24 0700 12/25 0659    I.V. (mL/kg) 197.1 (2.6) 2067.8 (24.2) 68.7 (0.8)    Other 150      IV Piggyback 96.4 1662.9 432.7    Total Intake(mL/kg) 443.5 (5.8) 3730.7 (43.6) 501.4 (5.9)    Urine (mL/kg/hr) 360 2790 (1.4) 445 (1.5)    Other 280 420 70    Total Output 640 3210 515    Net -196.5 +520.7 -13.7                    Physical Exam  HENT:      Head: Normocephalic and atraumatic.      Mouth/Throat:      Pharynx: Oropharynx is clear.   Eyes:      Extraocular Movements: Extraocular movements intact.      Pupils: Pupils are equal, round, and reactive to light.   Cardiovascular:      Rate and Rhythm: Normal rate.      Pulses: Normal pulses.   Pulmonary:      Effort: Pulmonary effort is normal. No respiratory distress.   Abdominal:      Comments: Distended and more firm but not peritonitic. IR drain with bilious GI contents.   Musculoskeletal:         General: Normal range of motion.      Cervical back: Normal range of motion.   Skin:     General: Skin is warm and dry.   Neurological:      Mental Status: He is alert. He is disoriented.      Comments: confused          Significant Labs:  I have reviewed all pertinent lab results within the past 24 hours.  CBC:   Recent Labs   Lab 12/24/23  0333   WBC 9.86   RBC 4.02*   HGB 10.2*   HCT 30.7*   PLT 88*   MCV 76*   MCH 25.4*   MCHC 33.2     CMP:   Recent Labs   Lab 12/24/23  0333   *   CALCIUM 7.9*   ALBUMIN 1.7*   PROT 4.6*      K 3.6   CO2 24      BUN 46*   CREATININE 1.6*   ALKPHOS 78   ALT 9*   AST 17   BILITOT 0.8       Significant Diagnostics:  I have reviewed all pertinent imaging results/findings within the past 24 hours.

## 2023-12-24 NOTE — PLAN OF CARE
SICU PLAN OF CARE NOTE    Dx: Postprocedural intraabdominal abscess    Goals of Care: monitor kidney function; monitor mental status; monitor biliary drain output     Vital Signs (last 12 hours):   Temp:  [97.6 °F (36.4 °C)-98.6 °F (37 °C)]   Pulse:  []   Resp:  [12-28]   BP: (105-138)/(54-87)   SpO2:  [92 %-98 %]       Neuro: Follows Commands, Moves All Extremities, and Confused     Cardiac: NSR     Respiratory: Room air     Gtts: MIVF     Urine Output: Urinary Catheter 1015 cc/shift     Drains: DANILO Drain, total output 120 cc / shift     Diet: NPO     Labs/Accuchecks: accuchecks q6h. Labs reviewed.     Skin:  All skin remains free from injury.  Patient turned Q2, waffle mattress inflated, and bed working correctly.     Shift Events:  Patient intermittently nauseous; emesis x1. KUB completed. PICC place for TPN initiation. See flowsheet for further assessment/details.  Family updated on current condition/plan of care, questions answered, and emotional support provided.

## 2023-12-24 NOTE — PROGRESS NOTES
[facility]  General Surgery  Progress Note    Subjective:     History of Present Illness:  No notes on file    Post-Op Info:  * No surgery found *         Interval History:   NAEO  Some agitation requiring redirection overnight, but seems less confused this morning.  Still has abdominal pain.  Denies nausea and has an appetite.  Drain thin  Cr significantly improved to 1.6    Medications:  Continuous Infusions:   dextrose 5 % and 0.45 % NaCl Stopped (12/24/23 0805)    TPN ADULT CENTRAL LINE CUSTOM       Scheduled Meds:   acetaminophen  1,000 mg Intravenous Q8H    fat emulsion 20%  250 mL Intravenous Daily    fluconazole (DIFLUCAN) IV (PEDS and ADULTS)  200 mg Intravenous Q24H    heparin (porcine)  5,000 Units Subcutaneous Q8H    levalbuterol  0.63 mg Nebulization Q6H    pantoprazole  40 mg Intravenous Daily    piperacillin-tazobactam (Zosyn) IV (PEDS and ADULTS) (extended infusion is not appropriate)  4.5 g Intravenous Q8H     PRN Meds:calcium gluconate IVPB, calcium gluconate IVPB, calcium gluconate IVPB, dextrose 10%, dextrose 10%, glucagon (human recombinant), magnesium sulfate IVPB, magnesium sulfate IVPB, melatonin, naloxone, ondansetron, potassium chloride **AND** potassium chloride **AND** potassium chloride, prochlorperazine, sodium phosphate 15 mmol in dextrose 5 % (D5W) 250 mL IVPB, sodium phosphate 20.01 mmol in dextrose 5 % (D5W) 250 mL IVPB, sodium phosphate 30 mmol in dextrose 5 % (D5W) 250 mL IVPB     Review of patient's allergies indicates:   Allergen Reactions    Dilaudid [hydromorphone]      Pt becomes hypoxic - requiring 5L via oxymask for     Penicillin     Penicillins Rash     Objective:     Vital Signs (Most Recent):  Temp: 98.6 °F (37 °C) (12/24/23 0700)  Pulse: 100 (12/24/23 1000)  Resp: 19 (12/24/23 1000)  BP: (!) 141/70 (12/24/23 1000)  SpO2: 95 % (12/24/23 1000) Vital Signs (24h Range):  Temp:  [97.6 °F (36.4 °C)-98.6 °F (37 °C)] 98.6 °F (37 °C)  Pulse:  []  100  Resp:  [11-32] 19  SpO2:  [26 %-99 %] 95 %  BP: (105-151)/(54-87) 141/70     Weight: 85.5 kg (188 lb 7.9 oz)  Body mass index is 26.29 kg/m².    Intake/Output - Last 3 Shifts         12/22 0700 12/23 0659 12/23 0700 12/24 0659 12/24 0700 12/25 0659    I.V. (mL/kg) 197.1 (2.6) 2067.8 (24.2) 68.7 (0.8)    Other 150      IV Piggyback 96.4 1662.9 432.7    Total Intake(mL/kg) 443.5 (5.8) 3730.7 (43.6) 501.4 (5.9)    Urine (mL/kg/hr) 360 2790 (1.4) 445 (1.5)    Other 280 420 70    Total Output 640 3210 515    Net -196.5 +520.7 -13.7                    Physical Exam  HENT:      Head: Normocephalic and atraumatic.      Mouth/Throat:      Pharynx: Oropharynx is clear.   Eyes:      Extraocular Movements: Extraocular movements intact.      Pupils: Pupils are equal, round, and reactive to light.   Cardiovascular:      Rate and Rhythm: Normal rate.      Pulses: Normal pulses.   Pulmonary:      Effort: Pulmonary effort is normal. No respiratory distress.   Abdominal:      Comments: Distended and more firm but not peritonitic. IR drain with bilious GI contents.   Musculoskeletal:         General: Normal range of motion.      Cervical back: Normal range of motion.   Skin:     General: Skin is warm and dry.   Neurological:      Mental Status: He is alert. He is disoriented.      Comments: confused          Significant Labs:  I have reviewed all pertinent lab results within the past 24 hours.  CBC:   Recent Labs   Lab 12/24/23  0333   WBC 9.86   RBC 4.02*   HGB 10.2*   HCT 30.7*   PLT 88*   MCV 76*   MCH 25.4*   MCHC 33.2     CMP:   Recent Labs   Lab 12/24/23  0333   *   CALCIUM 7.9*   ALBUMIN 1.7*   PROT 4.6*      K 3.6   CO2 24      BUN 46*   CREATININE 1.6*   ALKPHOS 78   ALT 9*   AST 17   BILITOT 0.8       Significant Diagnostics:  I have reviewed all pertinent imaging results/findings within the past 24 hours.  Assessment/Plan:     * Postprocedural intraabdominal abscess  62 yo M with prior duodenal  stump leak after palliative distal gastrectomy with B2 recon presenting with acute worsening of his intraabdominal collection after his second cycle of chemo. IR drain placed at OSH with 1.5L of output initially.     -Continue ICU care  -NPO  -KUB, possible NG if indicated  -Recommend initiating TPN given expected prolonged course without enteral nutrition. PICC  -Zosyn and diflucan  -Ongoing crystalloid resuscitation   -Protonix  -PRN analgesia/antiemetics  -Drain care, flush drain  -Aggressive pulm hygiene. Add CPT  -Palliative care to see patient early next week     Will plan to repeat CT scan with PO and IV contrast in the coming days assuming his IAIN continues to improve        Lencho Bruner MD  General Surgery  [facility] - Surgical Intensive Care

## 2023-12-24 NOTE — CONSULTS
D/L PICC placed in right brachial vein, 39 cm in length with 0 cm exposed. Arm circumference 28 cm. Lot#[id number]

## 2023-12-24 NOTE — ASSESSMENT & PLAN NOTE
64 yo M with prior duodenal stump leak after palliative distal gastrectomy with B2 recon presenting with acute worsening of his intraabdominal collection after his second cycle of chemo. IR drain placed at OSH with 1.5L of output initially.     -Continue ICU care  -NPO  -KUB, possible NG if indicated  -Recommend initiating TPN given expected prolonged course without enteral nutrition. PICC  -Zosyn and diflucan  -Ongoing crystalloid resuscitation   -Protonix  -PRN analgesia/antiemetics  -Drain care, flush drain  -Aggressive pulm hygiene. Add CPT  -Palliative care to see patient early next week     Will plan to repeat CT scan with PO and IV contrast in the coming days assuming his IAIN continues to improve

## 2023-12-25 NOTE — ASSESSMENT & PLAN NOTE
64 yo M with prior duodenal stump leak after palliative distal gastrectomy with B2 recon presenting with acute worsening of his intraabdominal collection after his second cycle of chemo. IR drain placed at OSH with 1.5L of output initially.     -NPO, TPN  -Zosyn and diflucan  -Plan for repeat CT scan in next 24-48h  -Transition to Lovenox  -DC casarez  -Protonix  -PRN analgesia/antiemetics  -Drain care, flush drain  -Aggressive pulm hygiene. CPT  -Palliative care to see patient early next week     Will plan to repeat CT scan with PO and IV contrast in the next 24-48 h. Stepping down today

## 2023-12-25 NOTE — PLAN OF CARE
Nephrology Chart Review      Intake/Output Summary (Last 24 hours) at 12/25/2023 0822  Last data filed at 12/25/2023 0800  Gross per 24 hour   Intake 3587.69 ml   Output 2750 ml   Net 837.69 ml       Vitals:    12/25/23 0615 12/25/23 0630 12/25/23 0645 12/25/23 0700   BP:    (!) 145/71   BP Location:    Left arm   Patient Position:    Lying   Pulse: 100 96 96 99   Resp: (!) 21 19 18 (!) 21   Temp:    98.4 °F (36.9 °C)   TempSrc:    Oral   SpO2: 97% 98% 98% 96%   Weight:       Height:           Recent Labs   Lab 12/23/23  0258 12/23/23  1307 12/24/23  0333 12/25/23  0238    136 137 137   K 5.2* 4.4 3.6 3.3*    105 104 104   CO2 21* 21* 24 24   BUN 70* 63* 46* 24*   CREATININE 2.8* 2.4* 1.6* 1.1   CALCIUM 7.8* 7.7* 7.9* 7.9*   PHOS 5.2*  --  1.7* 1.7*       K 3.3 please give k  Scr return to baseline    No other related issues identified. Please call Nephrology as needed; We will continue to follow.      Darrell Portillo MD  Nephrology Fellow

## 2023-12-25 NOTE — PROGRESS NOTES
[facility] - [facility]  Critical Care - Surgery  Progress Note    Patient Name: Danish Valladares  MRN: [id number]  Admission Date: 12/23/2023  Hospital Length of Stay: 2 days  Code Status: Full Code  Attending Provider: Eulogio Moy, *  Primary Care Provider: Yfn Walker MD   Principal Problem: Postprocedural intraabdominal abscess    Subjective:     Hospital/ICU Course:  No notes on file    Interval History/Significant Events:   NAEO  AF  VSS  Nauseous overnight controlled with anti-emetics  Continues to have vague abdominal pain.         Objective:     Vital Signs (Most Recent):  Temp: 98.6 °F (37 °C) (12/25/23 0301)  Pulse: 96 (12/25/23 0645)  Resp: 18 (12/25/23 0645)  BP: (!) 140/73 (12/25/23 0600)  SpO2: 98 % (12/25/23 0645) Vital Signs (24h Range):  Temp:  [97.8 °F (36.6 °C)-98.6 °F (37 °C)] 98.6 °F (37 °C)  Pulse:  [] 96  Resp:  [13-32] 18  SpO2:  [90 %-100 %] 98 %  BP: (125-162)/(63-79) 140/73     Weight: 85.5 kg (188 lb 7.9 oz)  Body mass index is 26.29 kg/m².      Intake/Output Summary (Last 24 hours) at 12/25/2023 0728  Last data filed at 12/25/2023 0600  Gross per 24 hour   Intake 3334.88 ml   Output 2610 ml   Net 724.88 ml          Physical Exam  Vitals and nursing note reviewed.   HENT:      Head: Normocephalic and atraumatic.      Mouth/Throat:      Mouth: Mucous membranes are dry.      Pharynx: Oropharynx is clear.   Eyes:      Extraocular Movements: Extraocular movements intact.      Pupils: Pupils are equal, round, and reactive to light.   Cardiovascular:      Rate and Rhythm: Normal rate. Rhythm irregular.      Pulses: Normal pulses.   Pulmonary:      Effort: Pulmonary effort is normal. No respiratory distress.   Abdominal:      Comments: Soft, mildly distended. IR drain with bilious GI contents.   Musculoskeletal:         General: Normal range of motion.      Cervical back: Normal range of motion.   Skin:     General: Skin is warm and dry.   Neurological:       General: No focal deficit present.      Mental Status: He is alert.            Vents:  Oxygen Concentration (%): 28 (12/24/23 0426)    Lines/Drains/Airways       Peripherally Inserted Central Catheter Line  Duration             PICC Double Lumen 12/24/23 1218 right brachial <1 day              Central Venous Catheter Line  Duration             Port A Cath Single Lumen Subclavian Left -- days              Drain  Duration                  Drain/Device  12/22/23 1507 Right lower abdomen pigtail 2 days         Urethral Catheter 12/22/23 1218 Coude 14 Fr. 2 days                    Significant Labs:    CBC/Anemia Profile:  Recent Labs   Lab 12/24/23  0333 12/25/23  0238   WBC 9.86 11.68   HGB 10.2* 11.1*   HCT 30.7* 32.1*   PLT 88* 87*   MCV 76* 74*   RDW 14.2 14.4        Chemistries:  Recent Labs   Lab 12/23/23  1307 12/24/23  0333 12/25/23  0238    137 137   K 4.4 3.6 3.3*    104 104   CO2 21* 24 24   BUN 63* 46* 24*   CREATININE 2.4* 1.6* 1.1   CALCIUM 7.7* 7.9* 7.9*   ALBUMIN  --  1.7* 1.7*   PROT  --  4.6* 4.8*   BILITOT  --  0.8 0.7   ALKPHOS  --  78 101   ALT  --  9* 12   AST  --  17 17   MG  --  1.9 1.7   PHOS  --  1.7* 1.7*       Assessment/Plan:     GI  Peritonitis    Neuro/Psych:   -- Sedation: none   -- Pain: tylenol  -- monitor mental status as patient appears confused upon admission as well as per nursing notes from [facility] but has improved 12/25             Cards:   -- HDS  -- currently in A-fib, however rate controlled. Pt does not have history of A-fib  -- D5 1/2 NS @ 100      Pulm:   -- Goal O2 sat > 90%  -- 2L NC satting well   -- Wean as able      Renal:  -- Keep casarez for strict I/O  -- Cr improving now 1.1 from 1.6 from 2.3, pt without baseline kidney dysfunction  -- Nephrology consulted   -- Making adequate urine   2155 cc     FEN / GI:   -- Replace lytes as needed  -- Nutrition: NPO, TPN  -- mIVF (d5 1/2 NS) @ 100cc/hr while npo  -- monitor biliary drain output: 160 cc in past 24 h      ID:    -- Tm: afebrile; WBC 11 (9)  -- Abx zosyn, diflucan      Heme/Onc:   -- H/H stable   -- 11.1 (10.7)  -- Daily CBC      Endo:   -- Gluc goal 140-180      PPx:   Feeding: NPO, TPN  Analgesia/Sedation: tylenol prn  Thromboembolic prevention: SCDs, heparin sub Q  HOB >30: yes  Stress Ulcer ppx: n/a  Glucose control: Critical care goal 140-180 g/dl    Lines/Drains/Airway: port-a-cath, PIV, casarez      Dispo/Code Status/Palliative:   -- Stepdown / Full Code            Raza Kimball MD  Critical Care - Surgery  [facility] - Surgical Intensive Care

## 2023-12-25 NOTE — PROGRESS NOTES
[facility]  General Surgery  Progress Note    Subjective:     History of Present Illness:  No notes on file    Post-Op Info:  * No surgery found *         Interval History:   NAEO  Much more oriented and lucid this morning  Pain controlled  Still having his baseline nausea  Cr improved to 1.1    Medications:  Continuous Infusions:   dextrose 5 % and 0.45 % NaCl Stopped (12/25/23 0557)    TPN ADULT CENTRAL LINE CUSTOM 42 mL/hr at 12/25/23 0900     Scheduled Meds:   acetaminophen  1,000 mg Intravenous Q8H    enoxparin  40 mg Subcutaneous Q24H (prophylaxis, 1700)    fluconazole (DIFLUCAN) IV (PEDS and ADULTS)  200 mg Intravenous Q24H    levalbuterol  0.63 mg Nebulization Q4H WAKE    pantoprazole  40 mg Intravenous Daily    piperacillin-tazobactam (Zosyn) IV (PEDS and ADULTS) (extended infusion is not appropriate)  4.5 g Intravenous Q8H    sodium chloride 0.9%  10 mL Intravenous Q6H     PRN Meds:dextrose 10%, dextrose 10%, glucagon (human recombinant), melatonin, naloxone, ondansetron, prochlorperazine, Flushing PICC/Midline Protocol **AND** sodium chloride 0.9% **AND** sodium chloride 0.9%     Review of patient's allergies indicates:   Allergen Reactions    Dilaudid [hydromorphone]      Pt becomes hypoxic - requiring 5L via oxymask for     Penicillin     Penicillins Rash     Objective:     Vital Signs (Most Recent):  Temp: 98.4 °F (36.9 °C) (12/25/23 0700)  Pulse: 93 (12/25/23 0929)  Resp: 16 (12/25/23 0929)  BP: 132/60 (12/25/23 0900)  SpO2: (!) 94 % (12/25/23 0929) Vital Signs (24h Range):  Temp:  [97.8 °F (36.6 °C)-98.6 °F (37 °C)] 98.4 °F (36.9 °C)  Pulse:  [] 93  Resp:  [13-32] 16  SpO2:  [90 %-100 %] 94 %  BP: (125-162)/(60-79) 132/60     Weight: 85.5 kg (188 lb 7.9 oz)  Body mass index is 26.29 kg/m².    Intake/Output - Last 3 Shifts         12/23 0700 12/24 0659 12/24 0700 12/25 0659 12/25 0700 12/26 0659    I.V. (mL/kg) 2067.8 (24.2) 1558.5 (18.2)     Other       IV Piggyback  1662.9 1361.3 340.9    TPN  542 188.5    Total Intake(mL/kg) 3730.7 (43.6) 3461.8 (40.5) 529.4 (6.2)    Urine (mL/kg/hr) 2790 (1.4) 2455 (1.2) 240 (1.1)    Emesis/NG output  200     Other 420 140 75    Total Output 3210 2795 315    Net +520.7 +666.8 +214.4           Emesis Occurrence  1 x              Physical Exam  HENT:      Head: Normocephalic and atraumatic.      Mouth/Throat:      Pharynx: Oropharynx is clear.   Eyes:      Extraocular Movements: Extraocular movements intact.      Pupils: Pupils are equal, round, and reactive to light.   Cardiovascular:      Rate and Rhythm: Normal rate.      Pulses: Normal pulses.   Pulmonary:      Effort: Pulmonary effort is normal. No respiratory distress.   Abdominal:      Comments: Distended and more firm but not peritonitic. IR drain with bilious GI contents.   Musculoskeletal:         General: Normal range of motion.      Cervical back: Normal range of motion.   Skin:     General: Skin is warm and dry.   Neurological:      Mental Status: He is alert. Mental status is at baseline.      Comments: Much more lucid          Significant Labs:  I have reviewed all pertinent lab results within the past 24 hours.  CBC:   Recent Labs   Lab 12/25/23 0238   WBC 11.68   RBC 4.36*   HGB 11.1*   HCT 32.1*   PLT 87*   MCV 74*   MCH 25.5*   MCHC 34.6     CMP:   Recent Labs   Lab 12/25/23 0238   *   CALCIUM 7.9*   ALBUMIN 1.7*   PROT 4.8*      K 3.3*   CO2 24      BUN 24*   CREATININE 1.1   ALKPHOS 101   ALT 12   AST 17   BILITOT 0.7       Significant Diagnostics:  I have reviewed all pertinent imaging results/findings within the past 24 hours.  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 IR drain placed at OSH with 1.5L of output initially.     -NPO, TPN  -Zosyn and  diflucan  -Plan for repeat CT scan in next 24-48h  -Transition to Lovenox  -DC casarez  -Protonix  -PRN analgesia/antiemetics  -Drain care, flush drain  -Aggressive pulm hygiene. CPT  -Palliative care to see patient early next week     Will plan to repeat CT scan with PO and IV contrast in the next 24-48 h. Stepping down today        Lencho Bruner MD  General Surgery  [facility]

## 2023-12-25 NOTE — SUBJECTIVE & OBJECTIVE
Interval History/Significant Events:   WYATTEO  IRAJ  VSS  Nauseous overnight controlled with anti-emetics  Continues to have vague abdominal pain.         Objective:     Vital Signs (Most Recent):  Temp: 98.6 °F (37 °C) (12/25/23 0301)  Pulse: 96 (12/25/23 0645)  Resp: 18 (12/25/23 0645)  BP: (!) 140/73 (12/25/23 0600)  SpO2: 98 % (12/25/23 0645) Vital Signs (24h Range):  Temp:  [97.8 °F (36.6 °C)-98.6 °F (37 °C)] 98.6 °F (37 °C)  Pulse:  [] 96  Resp:  [13-32] 18  SpO2:  [90 %-100 %] 98 %  BP: (125-162)/(63-79) 140/73     Weight: 85.5 kg (188 lb 7.9 oz)  Body mass index is 26.29 kg/m².      Intake/Output Summary (Last 24 hours) at 12/25/2023 0728  Last data filed at 12/25/2023 0600  Gross per 24 hour   Intake 3334.88 ml   Output 2610 ml   Net 724.88 ml          Physical Exam  Vitals and nursing note reviewed.   HENT:      Head: Normocephalic and atraumatic.      Mouth/Throat:      Mouth: Mucous membranes are dry.      Pharynx: Oropharynx is clear.   Eyes:      Extraocular Movements: Extraocular movements intact.      Pupils: Pupils are equal, round, and reactive to light.   Cardiovascular:      Rate and Rhythm: Normal rate. Rhythm irregular.      Pulses: Normal pulses.   Pulmonary:      Effort: Pulmonary effort is normal. No respiratory distress.   Abdominal:      Comments: Soft, mildly distended. IR drain with bilious GI contents.   Musculoskeletal:         General: Normal range of motion.      Cervical back: Normal range of motion.   Skin:     General: Skin is warm and dry.   Neurological:      General: No focal deficit present.      Mental Status: He is alert.            Vents:  Oxygen Concentration (%): 28 (12/24/23 0426)    Lines/Drains/Airways       Peripherally Inserted Central Catheter Line  Duration             PICC Double Lumen 12/24/23 1218 right brachial <1 day              Central Venous Catheter Line  Duration             Port A Cath Single Lumen Subclavian Left -- days              Drain  Duration                   Drain/Device  12/22/23 1507 Right lower abdomen pigtail 2 days         Urethral Catheter 12/22/23 1218 Coude 14 Fr. 2 days                    Significant Labs:    CBC/Anemia Profile:  Recent Labs   Lab 12/24/23  0333 12/25/23  0238   WBC 9.86 11.68   HGB 10.2* 11.1*   HCT 30.7* 32.1*   PLT 88* 87*   MCV 76* 74*   RDW 14.2 14.4        Chemistries:  Recent Labs   Lab 12/23/23  1307 12/24/23  0333 12/25/23  0238    137 137   K 4.4 3.6 3.3*    104 104   CO2 21* 24 24   BUN 63* 46* 24*   CREATININE 2.4* 1.6* 1.1   CALCIUM 7.7* 7.9* 7.9*   ALBUMIN  --  1.7* 1.7*   PROT  --  4.6* 4.8*   BILITOT  --  0.8 0.7   ALKPHOS  --  78 101   ALT  --  9* 12   AST  --  17 17   MG  --  1.9 1.7   PHOS  --  1.7* 1.7*

## 2023-12-25 NOTE — NURSING TRANSFER
Nursing Transfer Note      12/25/2023   10:42 AM    Nurse giving handoff:Mouna RN  Nurse receiving handoff:RUDOLPH    Reason patient is being transferred: step down    Transfer To: 1032    Transfer via bed    Transfer with 2L to O2, cardiac monitoring    Transported by RN x2      Telemetry: Box Number [id number], Rate 96, Rhythm NSR, and Telemetry  Hazar  Order for Tele Monitor? Yes    Additional Lines: Oxygen, DANILO drain, PICC, Portacath    4eyes on Skin: yes    Medicines sent: TPN, fluconazole    Any special needs or follow-up needed: due to void 1600    Patient belongings transferred with patient: Yes    Chart send with patient: Yes    Notified: spouse, daughter    Upon arrival to floor: cardiac monitor applied, patient oriented to room, call bell in reach, and bed in lowest position

## 2023-12-25 NOTE — SUBJECTIVE & OBJECTIVE
Interval History:   NAEO  Much more oriented and lucid this morning  Pain controlled  Still having his baseline nausea  Cr improved to 1.1    Medications:  Continuous Infusions:   dextrose 5 % and 0.45 % NaCl Stopped (12/25/23 0557)    TPN ADULT CENTRAL LINE CUSTOM 42 mL/hr at 12/25/23 0900     Scheduled Meds:   acetaminophen  1,000 mg Intravenous Q8H    enoxparin  40 mg Subcutaneous Q24H (prophylaxis, 1700)    fluconazole (DIFLUCAN) IV (PEDS and ADULTS)  200 mg Intravenous Q24H    levalbuterol  0.63 mg Nebulization Q4H WAKE    pantoprazole  40 mg Intravenous Daily    piperacillin-tazobactam (Zosyn) IV (PEDS and ADULTS) (extended infusion is not appropriate)  4.5 g Intravenous Q8H    sodium chloride 0.9%  10 mL Intravenous Q6H     PRN Meds:dextrose 10%, dextrose 10%, glucagon (human recombinant), melatonin, naloxone, ondansetron, prochlorperazine, Flushing PICC/Midline Protocol **AND** sodium chloride 0.9% **AND** sodium chloride 0.9%     Review of patient's allergies indicates:   Allergen Reactions    Dilaudid [hydromorphone]      Pt becomes hypoxic - requiring 5L via oxymask for     Penicillin     Penicillins Rash     Objective:     Vital Signs (Most Recent):  Temp: 98.4 °F (36.9 °C) (12/25/23 0700)  Pulse: 93 (12/25/23 0929)  Resp: 16 (12/25/23 0929)  BP: 132/60 (12/25/23 0900)  SpO2: (!) 94 % (12/25/23 0929) Vital Signs (24h Range):  Temp:  [97.8 °F (36.6 °C)-98.6 °F (37 °C)] 98.4 °F (36.9 °C)  Pulse:  [] 93  Resp:  [13-32] 16  SpO2:  [90 %-100 %] 94 %  BP: (125-162)/(60-79) 132/60     Weight: 85.5 kg (188 lb 7.9 oz)  Body mass index is 26.29 kg/m².    Intake/Output - Last 3 Shifts         12/23 0700 12/24 0659 12/24 0700 12/25 0659 12/25 0700  12/26 0659    I.V. (mL/kg) 2067.8 (24.2) 1558.5 (18.2)     Other       IV Piggyback 1662.9 1361.3 340.9    TPN  542 188.5    Total Intake(mL/kg) 3730.7 (43.6) 3461.8 (40.5) 529.4 (6.2)    Urine (mL/kg/hr) 2790 (1.4) 2455 (1.2) 240 (1.1)    Emesis/NG output  200      Other 420 140 75    Total Output 3210 2795 315    Net +520.7 +666.8 +214.4           Emesis Occurrence  1 x              Physical Exam  HENT:      Head: Normocephalic and atraumatic.      Mouth/Throat:      Pharynx: Oropharynx is clear.   Eyes:      Extraocular Movements: Extraocular movements intact.      Pupils: Pupils are equal, round, and reactive to light.   Cardiovascular:      Rate and Rhythm: Normal rate.      Pulses: Normal pulses.   Pulmonary:      Effort: Pulmonary effort is normal. No respiratory distress.   Abdominal:      Comments: Distended and more firm but not peritonitic. IR drain with bilious GI contents.   Musculoskeletal:         General: Normal range of motion.      Cervical back: Normal range of motion.   Skin:     General: Skin is warm and dry.   Neurological:      Mental Status: He is alert. Mental status is at baseline.      Comments: Much more lucid          Significant Labs:  I have reviewed all pertinent lab results within the past 24 hours.  CBC:   Recent Labs   Lab 12/25/23  0238   WBC 11.68   RBC 4.36*   HGB 11.1*   HCT 32.1*   PLT 87*   MCV 74*   MCH 25.5*   MCHC 34.6     CMP:   Recent Labs   Lab 12/25/23  0238   *   CALCIUM 7.9*   ALBUMIN 1.7*   PROT 4.8*      K 3.3*   CO2 24      BUN 24*   CREATININE 1.1   ALKPHOS 101   ALT 12   AST 17   BILITOT 0.7       Significant Diagnostics:  I have reviewed all pertinent imaging results/findings within the past 24 hours.

## 2023-12-25 NOTE — ASSESSMENT & PLAN NOTE
Neuro/Psych:   -- Sedation: none   -- Pain: tylenol  -- monitor mental status as patient appears confused upon admission as well as per nursing notes from [facility] but has improved 12/25             Cards:   -- HDS  -- currently in A-fib, however rate controlled. Pt does not have history of A-fib  -- D5 1/2 NS @ 100      Pulm:   -- Goal O2 sat > 90%  -- 2L NC satting well   -- Wean as able      Renal:  -- Keep casarez for strict I/O  -- Cr improving now 1.1 from 1.6 from 2.3, pt without baseline kidney dysfunction  -- Nephrology consulted   -- Making adequate urine   2155 cc     FEN / GI:   -- Replace lytes as needed  -- Nutrition: NPO, TPN  -- mIVF (d5 1/2 NS) @ 100cc/hr while npo  -- monitor biliary drain output: 160 cc in past 24 h      ID:   -- Tm: afebrile; WBC 11 (9)  -- Abx zosyn, diflucan      Heme/Onc:   -- H/H stable   -- 11.1 (10.7)  -- Daily CBC      Endo:   -- Gluc goal 140-180      PPx:   Feeding: NPO, TPN  Analgesia/Sedation: tylenol prn  Thromboembolic prevention: SCDs, heparin sub Q  HOB >30: yes  Stress Ulcer ppx: n/a  Glucose control: Critical care goal 140-180 g/dl    Lines/Drains/Airway: port-a-cath, PIV, casarez      Dispo/Code Status/Palliative:   -- SICU / Full Code

## 2023-12-25 NOTE — PLAN OF CARE
SICU PLAN OF CARE NOTE    Dx: Postprocedural intraabdominal abscess    Goals of Care: monitor kidney function; monitor mental status; monitor biliary drain output     Vital Signs (last 12 hours):   Temp:  [97.9 °F (36.6 °C)-98.6 °F (37 °C)]   Pulse:  []   Resp:  [13-30]   BP: (125-152)/(63-76)   SpO2:  [90 %-100 %]      Neuro: Follows Commands, Moves All Extremities, and Confused    Cardiac: NSR/ST 70-100s    Respiratory: Nasal Cannula 2 L/min    Gtts: MIVF, TPN, and Lipids     Urine Output: Urinary Catheter 1375 cc/shift    Drains: DANILO Drain, total output 20 cc / shift    Diet: NPO     Labs/Accuchecks: Daily AM labs; Q6 Accuchecks    Skin:  All skin remains free from injury.  Patient turned Q2, mattress inflated, and bed working correctly.    Shift Events:  Pt c/o intermittent nausea, PRN zofran and compazine administered. TPN initiated. See flowsheet for further assessment/details.  Family updated on current condition/plan of care, questions answered, and emotional support provided.  MD updated on current condition, vitals, labs, and gtts.  No new orders received, will continue to monitor.

## 2023-12-26 PROBLEM — Z51.5 PALLIATIVE CARE ENCOUNTER: Status: ACTIVE | Noted: 2023-12-26

## 2023-12-26 NOTE — SUBJECTIVE & OBJECTIVE
Interval History:   -NAEO  -CT scan yesterday demonstrating decent drainage of the main RUQ collection. There appears to be a collection in the mid-right abdomen which does not communicate with this drained collection.  -Mental status at baseline  -Still having a productive cough  -Drain with 255 cc of thin, enteric output    Medications:  Continuous Infusions:   dextrose 5 % and 0.45 % NaCl 50 mL/hr at 12/25/23 1038    TPN ADULT CENTRAL LINE CUSTOM 42 mL/hr at 12/25/23 2208     Scheduled Meds:   enoxparin  40 mg Subcutaneous Q24H (prophylaxis, 1700)    fat emulsion 20%  250 mL Intravenous Daily    fluconazole (DIFLUCAN) IV (PEDS and ADULTS)  200 mg Intravenous Q24H    levalbuterol  0.63 mg Nebulization Q4H WAKE    pantoprazole  40 mg Intravenous Daily    piperacillin-tazobactam (Zosyn) IV (PEDS and ADULTS) (extended infusion is not appropriate)  4.5 g Intravenous Q8H    sodium chloride 0.9%  10 mL Intravenous Q6H     PRN Meds:dextrose 10%, dextrose 10%, glucagon (human recombinant), iohexol, melatonin, naloxone, ondansetron, prochlorperazine, Flushing PICC/Midline Protocol **AND** sodium chloride 0.9% **AND** sodium chloride 0.9%     Review of patient's allergies indicates:   Allergen Reactions    Dilaudid [hydromorphone]      Pt becomes hypoxic - requiring 5L via oxymask for     Penicillin     Penicillins Rash     Objective:     Vital Signs (Most Recent):  Temp: 99.1 °F (37.3 °C) (12/26/23 0611)  Pulse: 90 (12/26/23 0259)  Resp: 18 (12/26/23 0611)  BP: 138/72 (12/26/23 0611)  SpO2: 95 % (12/26/23 0100) Vital Signs (24h Range):  Temp:  [98.4 °F (36.9 °C)-99.1 °F (37.3 °C)] 99.1 °F (37.3 °C)  Pulse:  [] 90  Resp:  [15-23] 18  SpO2:  [91 %-95 %] 95 %  BP: (129-141)/(60-72) 138/72     Weight: 85.5 kg (188 lb 7.9 oz)  Body mass index is 26.29 kg/m².    Intake/Output - Last 3 Shifts         12/24 0700 12/25 0659 12/25 0700 12/26 0659 12/26 0700 12/27 0659    P.O.  120     I.V. (mL/kg) 1558.5 (18.2) 1441.8  (16.9)     IV Piggyback 1361.3 806.9      263.4     Total Intake(mL/kg) 3461.8 (40.5) 2632.1 (30.8)     Urine (mL/kg/hr) 2455 (1.2) 1065 (0.5)     Emesis/NG output 200 0     Other 140 255     Stool  0     Total Output 2795 1320     Net +666.8 +1312.1            Urine Occurrence  3 x     Stool Occurrence  0 x     Emesis Occurrence 1 x 0 x              Physical Exam  HENT:      Head: Normocephalic and atraumatic.      Mouth/Throat:      Pharynx: Oropharynx is clear.   Eyes:      Extraocular Movements: Extraocular movements intact.      Pupils: Pupils are equal, round, and reactive to light.   Cardiovascular:      Rate and Rhythm: Normal rate.      Pulses: Normal pulses.   Pulmonary:      Effort: Pulmonary effort is normal. No respiratory distress.   Abdominal:      Comments: Distended and more firm but not peritonitic. IR drain with bilious GI contents.   Musculoskeletal:         General: Normal range of motion.      Cervical back: Normal range of motion.   Skin:     General: Skin is warm and dry.   Neurological:      Mental Status: He is alert. Mental status is at baseline.      Comments: Much more lucid          Significant Labs:  Labs are pending    Significant Diagnostics:  I have reviewed all pertinent imaging results/findings within the past 24 hours.

## 2023-12-26 NOTE — PLAN OF CARE
Recommendations    TPN recs: 320g + D100g AA + IV lipids to provide 1988 kcal, 100g protein. GIR = 2.60 (Meet 98% EEN, 100% EPN)    Advance diet per MD    RD to monitor and follow    Goals: Meet % EEN, EPN by RD f/u  Nutrition Goal Status: new  Communication of RD Recs:  (POC)

## 2023-12-26 NOTE — PROCEDURES
IR Post-Procedure Note      Pre Op Diagnosis:  abdominal fluid collections    Post Op Diagnosis:  same    Procedure:  Image Guided Drain Placement    Procedure performed by:  Marquise Hinton MD  /  Ashanti Brizuela MD    Written Informed Consent Obtained: Yes    Specimen Removed:  Yes; aspirate from fluid collection    Estimated Blood Loss: minimal    Findings:    CT and US was used for localization of abnormal fluid collection.     Successful placement of 12 [place] all-purpose drainage catheter in the intraabdominal collection.  Approximately  500 mL of serous fluid was removed. A specimen was sent to the lab for further analysis and culture.    Successful aspiration of the fluid collection superior to the bladder. Approximately 50 cc of serous fluid was removed    The patient tolerated procedure well and there were no complications. Please see procedure report under Imaging for further details.    Plan:    Connect tube to suction bulb.    Flush drain with 10 cc normal saline daily.     Consider drain removal once drain output < 10 cc daily for 2 consecutive days with clinical improvement and imaging evidence of fluid collection resolution.       Marquise Hinton MD MSCR  PGY-5 Radiology Resident

## 2023-12-26 NOTE — PLAN OF CARE
[facility] Plan of Care Note  Dx intraabdominal abscess    Shift Events patient transferred to unit at 1115 this morning    Goals of Care: control pain and nausea, move bowels, CT scan of abdomen with contrast    Neuro: WDL    Vital Signs: WDL    Respiratory: Coarse, crackles    Diet: NPO, TPN feedings    Is patient tolerating current diet? Yes    GTTS: TPN,  D5 1/2 @50/hr    Urine Output/Bowel Movement: continent of urine. No bm for a week    Drains/Tubes/Tube Feeds (include total output/shift): double lumen picc line. Garrett drain to right flank, subclavian left clavicle    Lines: see flowsheet      Accuchecks:q6hr    Skin: see flowsheet    Fall Risk Score: 7    Activity level? OOB with 1 person max assist    Any scheduled procedures? CT scan of abdomen    Any safety concerns? no    Other: N/A

## 2023-12-26 NOTE — PLAN OF CARE
Abscess drain procedure completed. Patient tolerated well. Patient AAOx3, no distress noted, respirations even and unlabored, will continue to monitor. VSS. Right lateral back and pelvic procedure sites clean, dry, and intact; no bleeding or hematoma noted. Patient to be transferred to MPU for 1 hour for post-procedural recovery per MD. Report to be given at bedside to MPU RN.  Phone report called to Nam, primary RN.

## 2023-12-26 NOTE — ASSESSMENT & PLAN NOTE
64 yo M with prior duodenal stump leak after palliative distal gastrectomy with B2 recon presenting with acute worsening of his intraabdominal collection after his second cycle of chemo. IR drain placed at OSH with 1.5L of output initially.     -NPO, TPN  -Zosyn and diflucan  -Will discuss additional drain placement in this undrained collection with staff and IR  -Lovenox  -Protonix  -PRN analgesia/antiemetics  -Drain care, flush drain  -Aggressive pulm hygiene. CPT  -Palliative care to see patient early this week     Dispo: [facility]OLPH

## 2023-12-26 NOTE — SUBJECTIVE & OBJECTIVE
Interval History:     Past Medical History:   Diagnosis Date    Psoriasis     Stomach cancer        Past Surgical History:   Procedure Laterality Date    DIAGNOSTIC LAPAROSCOPY  09/18/2023    Procedure: LAPAROSCOPY, DIAGNOSTIC;  Surgeon: Eulogio Moy MD;  [place] [facility] [place] [address] [place] [address];  Service: General;;    ESOPHAGOGASTRODUODENOSCOPY N/A 08/03/2023    Procedure: EGD (ESOPHAGOGASTRODUODENOSCOPY);  Surgeon: Loco Marvin MD;  Location: [facility];  Service: Gastroenterology;  Laterality: N/A;    ESOPHAGOGASTRODUODENOSCOPY N/A 09/18/2023    Procedure: EGD (ESOPHAGOGASTRODUODENOSCOPY); flouroscopy, need c-arm in the room;  Surgeon: Eulogio Moy MD;  [place] [facility] [place] [address] [place] [address];  Service: General;  Laterality: N/A;  EGD with Fluoroscopy     GASTRECTOMY N/A 08/21/2023    Procedure: GASTRECTOMY;  Surgeon: Eulogio Moy MD;  [place] [facility] [place] [address] [place] [address];  Service: General;  Laterality: N/A;  Open gastrectomy with EGD. Needs Omni retractor, requesting room 24    INSERTION OF TUNNELED CENTRAL VENOUS CATHETER (CVC) WITH SUBCUTANEOUS PORT  11/21/2023    Procedure: [id number];  Surgeon: Talon Arias MD;  Location: [facility];  Service: General;;    LAPAROSCOPIC DRAINAGE OF ABDOMEN N/A 09/18/2023    Procedure: DRAINAGE, ABDOMEN, LAPAROSCOPIC;  Surgeon: Eulogio Moy MD;  [place] [facility] [address] [place] [address];  Service: General;  Laterality: N/A;    TONSILLECTOMY         Review of patient's allergies indicates:   Allergen Reactions    Dilaudid [hydromorphone]      Pt becomes hypoxic - requiring 5L via oxymask for     Penicillin     Penicillins Rash       Medications:  Continuous Infusions:   dextrose 5 % and 0.45 % NaCl 50 mL/hr at 12/25/23 1038    TPN ADULT CENTRAL LINE CUSTOM 42 mL/hr at 12/25/23 2208    TPN ADULT CENTRAL LINE CUSTOM       Scheduled Meds:   enoxparin  40 mg Subcutaneous Q24H (prophylaxis, 1700)    fat emulsion 20%  250 mL Intravenous Daily    fluconazole (DIFLUCAN) IV  (PEDS and ADULTS)  200 mg Intravenous Q24H    levalbuterol  0.63 mg Nebulization Q4H WAKE    pantoprazole  40 mg Intravenous Daily    piperacillin-tazobactam (Zosyn) IV (PEDS and ADULTS) (extended infusion is not appropriate)  4.5 g Intravenous Q8H    potassium phosphate IVPB  30 mmol Intravenous Once    sodium chloride 0.9%  10 mL Intravenous Q6H     PRN Meds:dextrose 10%, dextrose 10%, glucagon (human recombinant), iohexol, melatonin, morphine, naloxone, ondansetron, prochlorperazine, Flushing PICC/Midline Protocol **AND** sodium chloride 0.9% **AND** sodium chloride 0.9%    Family History       Problem Relation (Age of Onset)    Cancer Father          Tobacco Use    Smoking status: Former     Current packs/day: 0.00     Types: Cigarettes     Quit date: 5/27/2013     Years since quitting: 10.5    Smokeless tobacco: Never   Substance and Sexual Activity    Alcohol use: Yes     Comment: occ    Drug use: Never    Sexual activity: Yes     Partners: Female       Review of Systems   Constitutional:  Positive for appetite change.   Respiratory:  Positive for cough. Negative for shortness of breath.    Gastrointestinal:  Positive for abdominal distention, abdominal pain and nausea. Negative for blood in stool and vomiting.     Objective:     Vital Signs (Most Recent):  Temp: 99 °F (37.2 °C) (12/26/23 1153)  Pulse: 91 (12/26/23 1153)  Resp: 18 (12/26/23 1153)  BP: 131/65 (12/26/23 1153)  SpO2: (!) 90 % (12/26/23 1153) Vital Signs (24h Range):  Temp:  [98.3 °F (36.8 °C)-99.1 °F (37.3 °C)] 99 °F (37.2 °C)  Pulse:  [] 91  Resp:  [16-20] 18  SpO2:  [90 %-95 %] 90 %  BP: (129-138)/(65-72) 131/65     Weight: 85.5 kg (188 lb 7.9 oz)  Body mass index is 26.29 kg/m².       Physical Exam  Pulmonary:      Effort: Pulmonary effort is normal.   Skin:     General: Skin is warm.   Neurological:      Mental Status: He is alert and oriented to person, place, and time.   Psychiatric:         Mood and Affect: Mood normal.          Behavior: Behavior normal.            Review of Symptoms      Symptom Assessment (ESAS 0-10 Scale)  Pain:  4  Dyspnea:  0  Anxiety:  1  Nausea:  3  Depression:  0  Anorexia:  0  Fatigue:  2  Insomnia:  0  Restlessness:  0  Agitation:  0         Pain Assessment:    Location(s): abdomen    Abdomen       Location: generalized        Quality: Cramping        Quantity: 5/10 in intensity        Chronicity: Onset 2 week(s) ago, gradually worsening        Aggravating Factors: Eating        Alleviating Factors: Opiates        Associated Symptoms: Nausea    ECOG Performance Status stGstrstastdstest:st st1st Living Arrangements:  Lives with spouse    Psychosocial/Cultural:   See Palliative Psychosocial Note: Yes  **Primary  to Follow**  Palliative Care  Consult: Yes        Advance Care Planning   Advance Directives:   Living Will: No    LaPOST: No    Do Not Resuscitate Status: No    Goals of Care: What is most important right now is to focus on symptom/pain control. Accordingly, we have decided that the best plan to meet the patient's goals includes continuing with palliative treatment.         Significant Labs: BMP:   Recent Labs   Lab 12/26/23  0534   *  124*   *  136   K 3.0*  3.0*   CL 98  99   CO2 29  31*   BUN 16  16   CREATININE 0.9  0.8   CALCIUM 7.6*  7.6*   MG 1.6     CBC:   Recent Labs   Lab 12/25/23  0238 12/26/23  0534   WBC 11.68 13.99*   HGB 11.1* 11.1*   HCT 32.1* 33.3*   PLT 87* 95*     CBC:   Recent Labs   Lab 12/26/23  0534   WBC 13.99*   HGB 11.1*   HCT 33.3*   MCV 75*   PLT 95*     BMP:  Recent Labs   Lab 12/26/23  0534   *  124*   *  136   K 3.0*  3.0*   CL 98  99   CO2 29  31*   BUN 16  16   CREATININE 0.9  0.8   CALCIUM 7.6*  7.6*   MG 1.6     LFT:  Lab Results   Component Value Date    AST 19 12/26/2023    AST 18 12/26/2023    ALKPHOS 120 12/26/2023    ALKPHOS 113 12/26/2023    BILITOT 0.6 12/26/2023    BILITOT 0.5 12/26/2023     Albumin:   Albumin  "  Date Value Ref Range Status   12/26/2023 1.5 (L) 3.5 - 5.2 g/dL Final   12/26/2023 1.5 (L) 3.5 - 5.2 g/dL Final     Protein:   Total Protein   Date Value Ref Range Status   12/26/2023 4.6 (L) 6.0 - 8.4 g/dL Final   12/26/2023 4.7 (L) 6.0 - 8.4 g/dL Final     Lactic acid:   No results found for: "LACTATE"    Significant Imaging: I have reviewed all pertinent imaging results/findings within the past 24 hours.    "

## 2023-12-26 NOTE — NURSING
Pt maintained free from falls/trauma/injuries and skin breakdown. Plan of care reviewed. Pt verbalized understanding.   Bedside RN notified daytime medical team regarding pt's concerns of cough and pain. Team states that they will discuss concerns with pt. No new orders.

## 2023-12-26 NOTE — CONSULTS
[facility]  Palliative Medicine  Consult Note    Patient Name: Danish Valladares  MRN: [id number]  Admission Date: 12/23/2023  Hospital Length of Stay: 3 days  Code Status: Full Code   Attending Provider: Eulogio Moy, *  Consulting Provider: GUDELIA Rodriges  Primary Care Physician: Yfn Walker MD  Principal Problem:Postprocedural intraabdominal abscess    Patient information was obtained from patient, spouse/SO, and primary team.      Inpatient consult to Palliative Care  Consult performed by: Elizabeth Nguyen CNS  Consult ordered by: Damaris Kim MD        Assessment/Plan:     Palliative Care  Palliative care encounter  Impression  63-year-old male with metastatic gastric cancer undergoing palliative chemotherapy followed by Dr. 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 Moy 8/21/23. Admitted this hospitalization with sepsis, afib w/RVR, IAIN, and worsening intraabdominal collection/abscess after second cycle of chemo. 1.5L drained at OSH.     Today pt is lying in bed awaiting IR procedure for drainage of abscess. I spoke with him and his wife at . His immediate hope is to feel better after the drainage of abscess and hopefully continue his palliative chemo. He reports that his pain in well controlled with IV morphine,  as he is NPO at this time.     Reason for Consult : Per communication with primary team, palliative to follow for symptom/pain management and continuation of GOC planning, as he was previously seen at outside by Palliative.     ACP/GOC: Patient was initially seen by Palliative Medicine on 12/22/23 at [facility]. At that time pt was wanting to continue all treatment and was seen primarily for pain/symptom management. Per chart review, he knew a consult was placed but didn't feel he needed palliative support until his recent hospitalization.      Pt remains  "hopeful for continued palliative chemotherapy and relief after IR today.      MPOA: Wife, Fatimah, is POA. Pt and wife also report having 2 adult children that are supportive of their decisions.     Symptoms   Pain: Pt reports pain controlled with IV morphine currently, as pt is NPO for procedure today. Pt verbalizes he has had dilaudid in the past and did not like the sedating effects. Will continue to monitor and likely continue previous Palliative recommendations for pain meds when appropriate.      Abdominal Discomfort: Pt reports tight "bloating-like" feeling to abdomen. He feels it will resolve after IR procedure today. Will continue to monitor this after procedure. Pain is likely cancer related, but may be exacerbated at current from abscess.       Recommendations   -Will appreciate recs from primary and Oncology relating to possibility of continuation of palliative chemo.  -Will continue to follow to assist with updated/changing GOC as cancer and related symptoms progress.        Thank you for your consult. I will follow-up with patient. Please contact us if you have any additional questions.    Subjective:     HPI:   Per chart review, Mr Danish Valladares is a 63-year-old male with metastatic gastric cancer undergoing palliative chemotherapy followed by Dr. 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 Moy 8/21/23, duodenal stump leak s/p exp lap drainage of peritoneal abscess and EGD with visualization of duodenal defect 9/18/23, and IR drain placement 9/29/23 who presents to the ED on 12/19/23  with generalized abdominal pain.  Patient gets palliative chemotherapy (FOLFOX6 - oxaliplatin leucovorin fluorouracil) every 2 weeks with last treatment given on 12/13/23. Patient was admitted with generalized abdominal pain and possible acute acalculous cholecystitis.  General surgery and Hem/Onc were consulted.  Patient was " given IV antibiotics and IV fluids.  HIDA scan was negative for cystic duct obstruction.  Antibiotics were discontinued 12/20.  Patient noted with increased lethargy, abdominal distention and acute kidney injury 12/22.  Also noted with atrial fibrillation with RVR. CT abdomen pelvis without contrast performed that showed multiple fluid collections consistent with abscesses.  Patient discussed with general surgery who notes that fluid collections are new and recommended transfer to [facility] given complicated surgical oncology history.  Became hypotensive and in A-fib RVR during IR drain placement.  Received Lopressor 2.5 mg IV at 11:30 a.m. however remained with heart rate 155 therefore digoxin 250 mcg x1 ordered.  Order for transfer request to [facility] placed. Patient discussed with transfer center and patient has been accepted for transfer to Surgical Oncology, Dr. Eulogio Moy.     Upon arrival to SICU pt is HDS. Pt is in A-fib, however rate controlled with stable MAPs. He is stable on supplemental oxygen via NC. Upon arrival, pt appears to be confused and is complaining of abdominal pain. Abdomen is tense and distended. Biliary drain is in place on right side with bilious drainage.       Hospital Course:  No notes on file    Interval History:     Past Medical History:   Diagnosis Date    Psoriasis     Stomach cancer        Past Surgical History:   Procedure Laterality Date    DIAGNOSTIC LAPAROSCOPY  09/18/2023    Procedure: LAPAROSCOPY, DIAGNOSTIC;  Surgeon: Eulogio Moy MD;  Location: [address];  Service: General;;    ESOPHAGOGASTRODUODENOSCOPY N/A 08/03/2023    Procedure: EGD (ESOPHAGOGASTRODUODENOSCOPY);  Surgeon: Loco Marvin MD;  Location: [facility];  Service: Gastroenterology;  Laterality: N/A;    ESOPHAGOGASTRODUODENOSCOPY N/A 09/18/2023    Procedure: EGD (ESOPHAGOGASTRODUODENOSCOPY); flouroscopy, need c-arm in the room;  Surgeon: Eulogio Moy MD;   Location: [facility] OR 2ND FLR;  Service: General;  Laterality: N/A;  EGD with Fluoroscopy     GASTRECTOMY N/A 08/21/2023    Procedure: GASTRECTOMY;  Surgeon: Eulogio Moy MD;  [facility];  Service: General;  Laterality: N/A;  Open gastrectomy with EGD. Needs Omni retractor, requesting room 24    INSERTION OF TUNNELED CENTRAL VENOUS CATHETER (CVC) WITH SUBCUTANEOUS PORT  11/21/2023    Procedure: [id number];  Surgeon: Talon Arias MD;  Location: [facility] OR;  Service: General;;    LAPAROSCOPIC DRAINAGE OF ABDOMEN N/A 09/18/2023    Procedure: DRAINAGE, ABDOMEN, LAPAROSCOPIC;  Surgeon: Eulogio Moy MD;  [facility];  Service: General;  Laterality: N/A;    TONSILLECTOMY         Review of patient's allergies indicates:   Allergen Reactions    Dilaudid [hydromorphone]      Pt becomes hypoxic - requiring 5L via oxymask for     Penicillin     Penicillins Rash       Medications:  Continuous Infusions:   dextrose 5 % and 0.45 % NaCl 50 mL/hr at 12/25/23 1038    TPN ADULT CENTRAL LINE CUSTOM 42 mL/hr at 12/25/23 2208    TPN ADULT CENTRAL LINE CUSTOM       Scheduled Meds:   enoxparin  40 mg Subcutaneous Q24H (prophylaxis, 1700)    fat emulsion 20%  250 mL Intravenous Daily    fluconazole (DIFLUCAN) IV (PEDS and ADULTS)  200 mg Intravenous Q24H    levalbuterol  0.63 mg Nebulization Q4H WAKE    pantoprazole  40 mg Intravenous Daily    piperacillin-tazobactam (Zosyn) IV (PEDS and ADULTS) (extended infusion is not appropriate)  4.5 g Intravenous Q8H    potassium phosphate IVPB  30 mmol Intravenous Once    sodium chloride 0.9%  10 mL Intravenous Q6H     PRN Meds:dextrose 10%, dextrose 10%, glucagon (human recombinant), iohexol, melatonin, morphine, naloxone, ondansetron, prochlorperazine, Flushing PICC/Midline Protocol **AND** sodium chloride 0.9% **AND** sodium chloride 0.9%    Family History       Problem Relation (Age of Onset)    Cancer Father          Tobacco Use     Smoking status: Former     Current packs/day: 0.00     Types: Cigarettes     Quit date: 5/27/2013     Years since quitting: 10.5    Smokeless tobacco: Never   Substance and Sexual Activity    Alcohol use: Yes     Comment: occ    Drug use: Never    Sexual activity: Yes     Partners: Female       Review of Systems   Constitutional:  Positive for appetite change.   Respiratory:  Positive for cough. Negative for shortness of breath.    Gastrointestinal:  Positive for abdominal distention, abdominal pain and nausea. Negative for blood in stool and vomiting.     Objective:     Vital Signs (Most Recent):  Temp: 99 °F (37.2 °C) (12/26/23 1153)  Pulse: 91 (12/26/23 1153)  Resp: 18 (12/26/23 1153)  BP: 131/65 (12/26/23 1153)  SpO2: (!) 90 % (12/26/23 1153) Vital Signs (24h Range):  Temp:  [98.3 °F (36.8 °C)-99.1 °F (37.3 °C)] 99 °F (37.2 °C)  Pulse:  [] 91  Resp:  [16-20] 18  SpO2:  [90 %-95 %] 90 %  BP: (129-138)/(65-72) 131/65     Weight: 85.5 kg (188 lb 7.9 oz)  Body mass index is 26.29 kg/m².       Physical Exam  Pulmonary:      Effort: Pulmonary effort is normal.   Skin:     General: Skin is warm.   Neurological:      Mental Status: He is alert and oriented to person, place, and time.   Psychiatric:         Mood and Affect: Mood normal.         Behavior: Behavior normal.            Review of Symptoms      Symptom Assessment (ESAS 0-10 Scale)  Pain:  4  Dyspnea:  0  Anxiety:  1  Nausea:  3  Depression:  0  Anorexia:  0  Fatigue:  2  Insomnia:  0  Restlessness:  0  Agitation:  0         Pain Assessment:    Location(s): abdomen    Abdomen       Location: generalized        Quality: Cramping        Quantity: 5/10 in intensity        Chronicity: Onset 2 week(s) ago, gradually worsening        Aggravating Factors: Eating        Alleviating Factors: Opiates        Associated Symptoms: Nausea    ECOG Performance Status rdGrdrrdarddrderd:rd rd3rd Living Arrangements:  Lives with spouse    Psychosocial/Cultural:   See Palliative  "Psychosocial Note: Yes  **Primary  to Follow**  Palliative Care  Consult: Yes        Advance Care Planning  Advance Directives:   Living Will: No    LaPOST: No    Do Not Resuscitate Status: No    Goals of Care: What is most important right now is to focus on symptom/pain control. Accordingly, we have decided that the best plan to meet the patient's goals includes continuing with palliative treatment.         Significant Labs: BMP:   Recent Labs   Lab 12/26/23  0534   *  124*   *  136   K 3.0*  3.0*   CL 98  99   CO2 29  31*   BUN 16  16   CREATININE 0.9  0.8   CALCIUM 7.6*  7.6*   MG 1.6     CBC:   Recent Labs   Lab 12/25/23  0238 12/26/23  0534   WBC 11.68 13.99*   HGB 11.1* 11.1*   HCT 32.1* 33.3*   PLT 87* 95*     CBC:   Recent Labs   Lab 12/26/23  0534   WBC 13.99*   HGB 11.1*   HCT 33.3*   MCV 75*   PLT 95*     BMP:  Recent Labs   Lab 12/26/23  0534   *  124*   *  136   K 3.0*  3.0*   CL 98  99   CO2 29  31*   BUN 16  16   CREATININE 0.9  0.8   CALCIUM 7.6*  7.6*   MG 1.6     LFT:  Lab Results   Component Value Date    AST 19 12/26/2023    AST 18 12/26/2023    ALKPHOS 120 12/26/2023    ALKPHOS 113 12/26/2023    BILITOT 0.6 12/26/2023    BILITOT 0.5 12/26/2023     Albumin:   Albumin   Date Value Ref Range Status   12/26/2023 1.5 (L) 3.5 - 5.2 g/dL Final   12/26/2023 1.5 (L) 3.5 - 5.2 g/dL Final     Protein:   Total Protein   Date Value Ref Range Status   12/26/2023 4.6 (L) 6.0 - 8.4 g/dL Final   12/26/2023 4.7 (L) 6.0 - 8.4 g/dL Final     Lactic acid:   No results found for: "LACTATE"    Significant Imaging: I have reviewed all pertinent imaging results/findings within the past 24 hours.      I spent a total of 75 minutes on the day of the visit. This includes face to face time in discussion of goals of care, symptom assessment, coordination of care and emotional support.  This also includes non-face to face time preparing to see the patient " (eg, review of tests/imaging), obtaining and/or reviewing separately obtained history, documenting clinical information in the electronic or other health record, independently interpreting results and communicating results to the patient/family/caregiver, or care coordinator.    Elizabeth Nguyen, CNS  Palliative Medicine  [facility]

## 2023-12-26 NOTE — CONSULTS
"  [facility]  Adult Nutrition  Consult Note    SUMMARY     Recommendations    TPN recs: 320g + D100g AA + IV lipids to provide 1988 kcal, 100g protein. GIR = 2.60 (Meet 98% EEN, 100% EPN)    Advance diet per MD    RD to monitor and follow    Goals: Meet % EEN, EPN by RD f/u  Nutrition Goal Status: new  Communication of RD Recs:  (POC)    Assessment and Plan    Nutrition Problem:  Inadequate energy intake     Related to (etiology):   Inability to consume sufficient energy      Signs and Symptoms (as evidenced by):   NPO     Interventions/Recommendations (treatment strategy):  Collaboration with providers  PN     Nutrition Diagnosis Status:   New      Reason for Assessment    Reason For Assessment: consult  Diagnosis:  (post procedural intraabdominal abscess)  Relevant Medical History: peritonitis, IAIN, leukocytosis  Interdisciplinary Rounds: did not attend    General Information Comments:   RD consulted for TPN. Pt with metastatic gastric cancer undergoing palliative chemotherapy, s/p gastrectomy (8/21),  duodenal stump leak. NPO at this time, continue on TPN. Per previous RD assessment, noted pt usually eat 3-6 small meals per day with snacks, unable to tolerate Ensure/ONS, mild muscle wasting noted. Per wt hx,  lb,  lb likely 2/2 fluid. 1+ edema per chart. Does not meet criteria for malnutrition.     Nutrition Discharge Planning: Pending medical course    Nutrition Risk Screen    Nutrition Risk Screen: tube feeding or parenteral nutrition    Nutrition/Diet History    Spiritual, Cultural Beliefs, Christianity Practices, Values that Affect Care: no    Anthropometrics    Temp: 99.1 °F (37.3 °C)  Height Method: Estimated  Height: 5' 11" (180.3 cm)  Height (inches): 71 in  Weight Method: Bed Scale  Weight: 85.5 kg (188 lb 7.9 oz)  Weight (lb): 188.5 lb  Ideal Body Weight (IBW), Male: 172 lb  % Ideal Body Weight, Male (lb): 109.59 %  BMI (Calculated): 26.3       Lab/Procedures/Meds    Pertinent " Labs Reviewed: reviewed  Pertinent Labs Comments: Na 135, K 3.0, glucose 128, Tpro 4.7, albumin 1.5  Pertinent Medications Reviewed: reviewed  Pertinent Medications Comments: potassium phosphate,    Physical Findings/Assessment         Estimated/Assessed Needs    Weight Used For Calorie Calculations: 85.3 kg (188 lb)  Energy Calorie Requirements (kcal): 2086 kcal  Energy Need Method: [place]-St Jeor (SF 1.25)  Protein Requirements: 86-109g (1.1-1.3g/kg IBW)  Weight Used For Protein Calculations: 78 kg (172 lb) (IBW)  Fluid Requirements (mL): 1ml/kcal or per MD  Estimated Fluid Requirement Method: RDA Method  RDA Method (mL): 2086         Nutrition Prescription Ordered    Current Diet Order: NPO  Current Nutrition Support Formula Ordered:  (TPN 151g D, 50g AA + IL)  Current Nutrition Support Rate Ordered: 42 (ml)    Evaluation of Received Nutrient/Fluid Intake    Parenteral Calories (kcal): 716  Parenteral Protein (gm): 50  Parenteral Fluid (mL): 1008  Lipid Calories (kcals): 500 kcals  GIR (Glucose Infusion Rate) (mg/kg/min): 1.32 mg/kg/min  I/O: + 2.3 L since admit  Energy Calories Required: not meeting needs  Protein Required: not meeting needs  Comments: LBM 12/18  Tolerance: tolerating    Nutrition Risk    Level of Risk/Frequency of Follow-up:  (1x/week)       Monitor and Evaluation    Food and Nutrient Intake: energy intake, food and beverage intake, enteral nutrition intake, parenteral nutrition intake  Food and Nutrient Adminstration: diet order  Physical Activity and Function: nutrition-related ADLs and IADLs  Anthropometric Measurements: height/length, weight, weight change, body mass index  Biochemical Data, Medical Tests and Procedures: electrolyte and renal panel, gastrointestinal profile, glucose/endocrine profile, inflammatory profile, lipid profile  Nutrition-Focused Physical Findings: overall appearance       Nutrition Follow-Up    RD Follow-up?: Yes

## 2023-12-26 NOTE — PROGRESS NOTES
[facility] - [facility]  General Surgery  Progress Note    Subjective:     History of Present Illness:  No notes on file    Post-Op Info:  * No surgery found *         Interval History:   -NAEO  -CT scan yesterday demonstrating decent drainage of the main RUQ collection. There appears to be a collection in the mid-right abdomen which does not communicate with this drained collection.  -Mental status at baseline  -Still having a productive cough  -Drain with 255 cc of thin, enteric output    Medications:  Continuous Infusions:   dextrose 5 % and 0.45 % NaCl 50 mL/hr at 12/25/23 1038    TPN ADULT CENTRAL LINE CUSTOM 42 mL/hr at 12/25/23 2208     Scheduled Meds:   enoxparin  40 mg Subcutaneous Q24H (prophylaxis, 1700)    fat emulsion 20%  250 mL Intravenous Daily    fluconazole (DIFLUCAN) IV (PEDS and ADULTS)  200 mg Intravenous Q24H    levalbuterol  0.63 mg Nebulization Q4H WAKE    pantoprazole  40 mg Intravenous Daily    piperacillin-tazobactam (Zosyn) IV (PEDS and ADULTS) (extended infusion is not appropriate)  4.5 g Intravenous Q8H    sodium chloride 0.9%  10 mL Intravenous Q6H     PRN Meds:dextrose 10%, dextrose 10%, glucagon (human recombinant), iohexol, melatonin, naloxone, ondansetron, prochlorperazine, Flushing PICC/Midline Protocol **AND** sodium chloride 0.9% **AND** sodium chloride 0.9%     Review of patient's allergies indicates:   Allergen Reactions    Dilaudid [hydromorphone]      Pt becomes hypoxic - requiring 5L via oxymask for     Penicillin     Penicillins Rash     Objective:     Vital Signs (Most Recent):  Temp: 99.1 °F (37.3 °C) (12/26/23 0611)  Pulse: 90 (12/26/23 0259)  Resp: 18 (12/26/23 0611)  BP: 138/72 (12/26/23 0611)  SpO2: 95 % (12/26/23 0100) Vital Signs (24h Range):  Temp:  [98.4 °F (36.9 °C)-99.1 °F (37.3 °C)] 99.1 °F (37.3 °C)  Pulse:  [] 90  Resp:  [15-23] 18  SpO2:  [91 %-95 %] 95 %  BP: (129-141)/(60-72) 138/72     Weight: 85.5 kg (188 lb 7.9 oz)  Body mass index is 26.29  kg/m².    Intake/Output - Last 3 Shifts         12/24 0700  12/25 0659 12/25 0700 12/26 0659 12/26 0700 12/27 0659    P.O.  120     I.V. (mL/kg) 1558.5 (18.2) 1441.8 (16.9)     IV Piggyback 1361.3 806.9      263.4     Total Intake(mL/kg) 3461.8 (40.5) 2632.1 (30.8)     Urine (mL/kg/hr) 2455 (1.2) 1065 (0.5)     Emesis/NG output 200 0     Other 140 255     Stool  0     Total Output 2795 1320     Net +666.8 +1312.1            Urine Occurrence  3 x     Stool Occurrence  0 x     Emesis Occurrence 1 x 0 x              Physical Exam  HENT:      Head: Normocephalic and atraumatic.      Mouth/Throat:      Pharynx: Oropharynx is clear.   Eyes:      Extraocular Movements: Extraocular movements intact.      Pupils: Pupils are equal, round, and reactive to light.   Cardiovascular:      Rate and Rhythm: Normal rate.      Pulses: Normal pulses.   Pulmonary:      Effort: Pulmonary effort is normal. No respiratory distress.   Abdominal:      Comments: Distended and more firm but not peritonitic. IR drain with bilious GI contents.   Musculoskeletal:         General: Normal range of motion.      Cervical back: Normal range of motion.   Skin:     General: Skin is warm and dry.   Neurological:      Mental Status: He is alert. Mental status is at baseline.      Comments: Much more lucid          Significant Labs:  Labs are pending    Significant Diagnostics:  I have reviewed all pertinent imaging results/findings within the past 24 hours.  Assessment/Plan:     * Postprocedural intraabdominal abscess  64 yo M with prior duodenal stump leak after palliative distal gastrectomy with B2 recon presenting with acute worsening of his intraabdominal collection after his second cycle of chemo. IR drain placed at OSH with 1.5L of output initially.     -NPO, TPN  -Zosyn and diflucan  -Will discuss additional drain placement in this undrained collection with staff and IR  -Lovenox  -Protonix  -PRN analgesia/antiemetics  -Drain care, flush  drain  -Aggressive pulm hygiene. CPT  -Palliative care to see patient early this week     Dispo: RUDOLPH Bruner MD  General Surgery  [facility]

## 2023-12-26 NOTE — PLAN OF CARE
Renal functions have normalized  Scr 0.8, BUN 16  UOP 1 liter/24h    Will sign off today.  Let us know, if there is any question.    Ana Bhatia  Nephrology Fellow

## 2023-12-26 NOTE — PROGRESS NOTES
Pt  arrived  to  [facility]  #5 from IR  S/P R Flank drain placement / currently pt  has 1 existing   drain anterior  and 1  posterior placed  today  / AAO w/ NAD family  allowed  to  room / 1 hour then  to floor

## 2023-12-26 NOTE — CONSULTS
Percutaneous Drain Placement/Aspiration Consult Note  Interventional Radiology    Consult Requested By: Lencho Bruner MD  Reason for Consult: Undrained mid-right abdominal collection    SUBJECTIVE:     Chief Complaint: duodenal stump leak with intraabdominal fluid collections    History of Present Illness:  Danish Valladares is a 63 y.o. male with a PMHx of metastatic gastric cancer s/p gastrectomy on 8/21/23 c/b duodenal stump leak s/p IR drainage on 9/14/23 and 9/29/23 (drains since removed) who was admitted to OSH on 12/19/23 for abdominal pain and possible acalculous cholecystitis, found to have recurrent duodenal stump leak following palliative chemo treatment 1 week prior. Pt underwent IR drain placement into his intraabdominal fluid collection at OSH on 12/22/23. [place] [facility] [place] higher level of care. Hospital course also notable for Afib with RVR and IAIN. Interventional Radiology has been consulted for image guided percutaneous drain placement for management of  right mid abdominal fluid collection . Pt has had recent imaging including a CT a/p on 12/25/23 which revealed a right mid abdominal fluid collection which does not appear to communicate with the fluid collection that currently contains a percutaneous drain. The pt's WBC is 13.99 and is trending up. No recent blood cultures. Cultures obtained during IR drain placement into intraabdominal collection at OSH on 12/22/23 reveal candida albicans, E coli, lactobacillus, and haemophilus parainfluenzae. He is currently afebrile. The pt is hemodynamically stable. He has been NPO since midnight. He does not take any blood thinners.      Scheduled Meds:   enoxparin  40 mg Subcutaneous Q24H (prophylaxis, 1700)    fat emulsion 20%  250 mL Intravenous Daily    fluconazole (DIFLUCAN) IV (PEDS and ADULTS)  200 mg Intravenous Q24H    levalbuterol  0.63 mg Nebulization Q4H WAKE    pantoprazole  40 mg Intravenous Daily     piperacillin-tazobactam (Zosyn) IV (PEDS and ADULTS) (extended infusion is not appropriate)  4.5 g Intravenous Q8H    potassium phosphate IVPB  30 mmol Intravenous Once    sodium chloride 0.9%  10 mL Intravenous Q6H     Continuous Infusions:   dextrose 5 % and 0.45 % NaCl 50 mL/hr at 12/25/23 1038    TPN ADULT CENTRAL LINE CUSTOM 42 mL/hr at 12/25/23 2208    TPN ADULT CENTRAL LINE CUSTOM       PRN Meds:dextrose 10%, dextrose 10%, glucagon (human recombinant), iohexol, melatonin, morphine, naloxone, ondansetron, prochlorperazine, Flushing PICC/Midline Protocol **AND** sodium chloride 0.9% **AND** sodium chloride 0.9%    Review of patient's allergies indicates:   Allergen Reactions    Dilaudid [hydromorphone]      Pt becomes hypoxic - requiring 5L via oxymask for     Penicillin     Penicillins Rash       Past Medical History:   Diagnosis Date    Psoriasis     Stomach cancer      Past Surgical History:   Procedure Laterality Date    DIAGNOSTIC LAPAROSCOPY  09/18/2023    Procedure: LAPAROSCOPY, DIAGNOSTIC;  Surgeon: Eulogio Moy MD;  Location: [address];  Service: General;;    ESOPHAGOGASTRODUODENOSCOPY N/A 08/03/2023    Procedure: EGD (ESOPHAGOGASTRODUODENOSCOPY);  Surgeon: Loco Marvin MD;  Location: [facility];  Service: Gastroenterology;  Laterality: N/A;    ESOPHAGOGASTRODUODENOSCOPY N/A 09/18/2023    Procedure: EGD (ESOPHAGOGASTRODUODENOSCOPY); flouroscopy, need c-arm in the room;  Surgeon: Eulogio Moy MD;  Location: [address];  Service: General;  Laterality: N/A;  EGD with Fluoroscopy     GASTRECTOMY N/A 08/21/2023    Procedure: GASTRECTOMY;  Surgeon: Eulogio Moy MD;  Location: [address];  Service: General;  Laterality: N/A;  Open gastrectomy with EGD. Needs Omni retractor, requesting room 24    INSERTION OF TUNNELED CENTRAL VENOUS CATHETER (CVC) WITH SUBCUTANEOUS PORT  11/21/2023    Procedure: [id number];  Surgeon: Talon Arias MD;   Location: [facility] OR;  Service: General;;    LAPAROSCOPIC DRAINAGE OF ABDOMEN N/A 09/18/2023    Procedure: DRAINAGE, ABDOMEN, LAPAROSCOPIC;  Surgeon: Eulogio Moy MD;  [facility];  Service: General;  Laterality: N/A;    TONSILLECTOMY       Family History   Problem Relation Age of Onset    Cancer Father         brain cancer    Colon cancer Neg Hx     Crohn's disease Neg Hx     Esophageal cancer Neg Hx     Stomach cancer Neg Hx     Ulcerative colitis Neg Hx     Liver disease Neg Hx      Social History     Tobacco Use    Smoking status: Former     Current packs/day: 0.00     Types: Cigarettes     Quit date: 5/27/2013     Years since quitting: 10.5    Smokeless tobacco: Never   Substance Use Topics    Alcohol use: Yes     Comment: occ    Drug use: Never       OBJECTIVE:     Vital Signs (Most Recent)  Temp: 99 °F (37.2 °C) (12/26/23 1153)  Pulse: 91 (12/26/23 1153)  Resp: 18 (12/26/23 1153)  BP: 131/65 (12/26/23 1153)  SpO2: (!) 90 % (12/26/23 1153)    Physical Exam:  Physical Exam  Vitals and nursing note reviewed.   Constitutional:       General: He is not in acute distress.     Appearance: He is ill-appearing.   HENT:      Head: Normocephalic and atraumatic.   Eyes:      Extraocular Movements: Extraocular movements intact.      Conjunctiva/sclera: Conjunctivae normal.      Pupils: Pupils are equal, round, and reactive to light.   Pulmonary:      Effort: Pulmonary effort is normal. No respiratory distress.   Abdominal:      General: Abdomen is flat. There is no distension.      Comments: Well healed midline incision  RUQ drain containing serous fluid   Skin:     General: Skin is warm and dry.      Coloration: Skin is not jaundiced.   Neurological:      General: No focal deficit present.      Mental Status: He is alert and oriented to person, place, and time.   Psychiatric:         Mood and Affect: Mood normal.         Behavior: Behavior normal.         Thought Content: Thought content normal.          Judgment: Judgment normal.         Laboratory  I have reviewed all pertinent lab results within the past 24 hours.  CBC:   Recent Labs   Lab 12/26/23  0534   WBC 13.99*   RBC 4.43*   HGB 11.1*   HCT 33.3*   PLT 95*   MCV 75*   MCH 25.1*   MCHC 33.3     BMP:   Recent Labs   Lab 12/26/23  0534   *  124*   *  136   K 3.0*  3.0*   CL 98  99   CO2 29  31*   BUN 16  16   CREATININE 0.9  0.8   CALCIUM 7.6*  7.6*   MG 1.6     CMP:   Recent Labs   Lab 12/26/23  0534   *  124*   CALCIUM 7.6*  7.6*   ALBUMIN 1.5*  1.5*   PROT 4.7*  4.6*   *  136   K 3.0*  3.0*   CO2 29  31*   CL 98  99   BUN 16  16   CREATININE 0.9  0.8   ALKPHOS 113  120   ALT 12  11   AST 18  19   BILITOT 0.5  0.6     LFTs:   Recent Labs   Lab 12/26/23  0534   ALT 12  11   AST 18  19   ALKPHOS 113  120   BILITOT 0.5  0.6   PROT 4.7*  4.6*   ALBUMIN 1.5*  1.5*     Coagulation:   Recent Labs   Lab 12/23/23  0258   LABPROT 11.8   INR 1.1   APTT 39.4*     Microbiology Results (last 7 days)       ** No results found for the last 168 hours. **            ASA/Mallampati  ASA: 3  Mallampati: 2    Imaging:  Recent imaging studies including CT a/p on 12/25/23 which was independently reviewed by Josh Serrano MD.     EXAMINATION:  CT ABDOMEN PELVIS WITHOUT CONTRAST     CLINICAL HISTORY:  Abdominal abscess/infection suspected;Suspected persistent duodenal stump leak;     TECHNIQUE:  Using multi-detector helical CT technique, axial CT images of the abdomen and pelvis were obtained without IV contrast.  2D post-processing coronal and sagittal reconstructions was performed.     COMPARISON:  12/22/2023     FINDINGS:  Lack of IV contrast limits evaluation of soft tissue and vascular structures.     Lung base: Stable anterior right middle lobe nodule and atelectasis.  Anterior left upper lobe ground-glass opacity more prominent compared to prior.  Stable small volume right pleural effusion with compression  atelectasis.  Small left pleural effusion with increased volume compared to prior.     Visualized portion of heart: Normal in size with similar trace volume nonspecific pericardial fluid.  Multi-vessel coronary arterial and aortic annular calcifications noted.  Central venous catheter tip at cavoatrial junction.     Liver: Normal in size and attenuation with no focal hepatic abnormality.     Gallbladder: Stable mild wall thickening with no evidence of acute cholecystitis..     Bile duct: No intra-or extrahepatic biliary ductal dilatation.     Spleen: No significant abnormality.     Pancreas: No significant abnormality.     Adrenal glands: Stable mild left adrenal gland nodular thickening.     Genitourinary: Stable moderate symmetric bilateral perinephric soft tissue stranding.  Stable left renal cyst.  The ureters appear normal in course and caliber.  No evidence of nephrolithiasis or hydroureteronephrosis.  The urinary bladder demonstrates no significant abnormality.     Reproductive organs: Stable prostatomegaly.     GI tract: Status post partial gastrectomy.  The visualized loops of small and large bowel show no interval detrimental change.  Appendix unremarkable.     Peritoneum: Near complete resolution of right colic gutter abscess with drainage tube in place.  The large mid abdomen abscess appears similar to recent prior.  Pelvic abscess also appears stable.  Mild decrease in size of left colic gutter fluid collection.  Small volume ascites similar to prior.  Many normal sized mesenteric lymph nodes similar to prior.  Interval decreased amount of scattered intraperitoneal free air from prior.  No new or enlarging fluid collection seen.  No definite lymphadenopathy by CT criteria allowing for limitations.     Retroperitoneum: Normal sized para-aortic lymph nodes similar to prior.     Vasculature: Normal caliber of abdominal aorta with mild calcification.     Abdominal wall: Diffuse anasarca more prominent than  prior.     Bones: Degenerative changes with no acute osseous abnormality.     Impression:     Interval placement of right colic gutter percutaneous drainage tube with near complete resolution of abscess on site.     Redemonstration of multiple abdominopelvic abscesses stable/slightly decreased in size compared to recent prior.  No new or enlarging fluid collection seen.     Mild interval worsening of anasarca.     Mild interval increase in left pleural effusion.     Interval decreased but not yet resolved previous pneumoperitoneum.     Additional stable findings discussed as above.     Electronically signed by resident: Apoorva David  Date:                                            12/25/2023  Time:                                           18:13     Electronically signed by: Joe Donnelly MD  Date:                                            12/25/2023  Time:                                           18:59    ASSESSMENT/PLAN:     Assessment:  63 y.o. male with a PMHx of metastatic gastric cancer s/p gastrectomy on 8/21/23 c/b duodenal stump leak s/p IR drainage on 9/14/23 and 9/29/23 (drains since removed) who has been referred to IR for image guided percutaneous drain placement into right mid abdominal fluid collection for management of  recurrent duodenal stump leak . The procedure was discussed in great detail with the patient including thorough explanations of the potential risks and benefits of percutaneous drain placement/aspiration. Risks include sepsis, severe infection, hemorrhage, damage to surrounding structures, catheter malfunction, inability to remove catheter, and catheter dislodgment. The patient is a candidate for CT guided percutaneous drain placement under moderate sedation. Plan discussed with ordering physician.The pt verbalized understanding of the plan and would like to proceed.    Plan:  Will proceed with CT guided percutaneous drain placement  into right mid abdominal fluid collection under  moderate sedation on 12/26/23.   Please keep pt NPO  Primary team to order any labs/cultures to be drawn on fluid sample collected during the procedure.  Anticoagulation history reviewed.  Coagulation labs reviewed.  Thank you for the consult. Please contact with questions via [employer] secure chat or [employer]    Phyllis Graham PA-C  Interventional Radiology  Spectra: [phone]

## 2023-12-26 NOTE — ASSESSMENT & PLAN NOTE
"Impression  63-year-old male with metastatic gastric cancer undergoing palliative chemotherapy followed by Dr. 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 Moy 8/21/23. Admitted this hospitalization with sepsis, afib w/RVR, IAIN, and worsening intraabdominal collection/abscess after second cycle of chemo. 1.5L drained at OSH.     Today pt is lying in bed awaiting IR procedure for drainage of abscess. I spoke with him and his wife at . His immediate hope is to feel better after the drainage of abscess and hopefully continue his palliative chemo. He reports that his pain in well controlled with IV morphine,  as he is NPO at this time.     Reason for Consult : Per communication with primary team, palliative to follow for symptom/pain management and continuation of GOC planning, as he was previously seen at outside by Palliative.     ACP/GOC: Patient was initially seen by Palliative Medicine on 12/22/[facility] at [facility]. At that time pt was wanting to continue all treatment and was seen primarily for pain/symptom management. Per chart review, he knew a consult was placed but didn't feel he needed palliative support until his recent hospitalization.      Pt remains hopeful for continued palliative chemotherapy and relief after IR today.      MPOA: Wife, Fatimah, is POA. Pt and wife also report having 2 adult children that are supportive of their decisions.     Symptoms   Pain: Pt reports pain controlled with IV morphine currently, as pt is NPO for procedure today. Pt verbalizes he has had dilaudid in the past and did not like the sedating effects. Will continue to monitor and likely continue previous Palliative recommendations for pain meds when appropriate.      Abdominal Discomfort: Pt reports tight "bloating-like" feeling to abdomen. He feels it will resolve after IR procedure today. Will continue to monitor " this after procedure. Pain is likely cancer related, but may be exacerbated at current from abscess.       Recommendations   -Will appreciate recs from primary and Oncology relating to possibility of continuation of palliative chemo.  -Will continue to follow to assist with updated/changing GOC as cancer and related symptoms progress.

## 2023-12-27 ENCOUNTER — DOCUMENTATION ONLY (OUTPATIENT)
Dept: HEMATOLOGY/ONCOLOGY | Facility: CLINIC | Age: 63
End: 2023-12-27
Payer: COMMERCIAL

## 2023-12-27 PROBLEM — R52 PAIN: Status: ACTIVE | Noted: 2023-01-01

## 2023-12-27 NOTE — PLAN OF CARE
Problem: Adult Inpatient Plan of Care  Goal: Plan of Care Review  Outcome: Ongoing, Progressing     Problem: Coping Ineffective (Oncology Care)  Goal: Effective Coping  Outcome: Ongoing, Progressing     Problem: Infection  Goal: Absence of Infection Signs and Symptoms  Outcome: Ongoing, Progressing     Problem: Impaired Wound Healing  Goal: Optimal Wound Healing  Outcome: Ongoing, Progressing     Problem: Skin Injury Risk Increased  Goal: Skin Health and Integrity  Outcome: Ongoing, Progressing

## 2023-12-27 NOTE — SUBJECTIVE & OBJECTIVE
Interval History: No acute events overnight. New IR drain with 180 cc output over 24h, other drain putting out less.     Medications:  Continuous Infusions:   dextrose 5 % and 0.45 % NaCl 50 mL/hr at 12/27/23 0817    TPN ADULT CENTRAL LINE CUSTOM 75 mL/hr at 12/26/23 2206    TPN ADULT CENTRAL LINE CUSTOM       Scheduled Meds:   enoxparin  40 mg Subcutaneous Q24H (prophylaxis, 1700)    fat emulsion 20%  250 mL Intravenous Daily    fat emulsion 20%  250 mL Intravenous Daily    fluconazole (DIFLUCAN) IV (PEDS and ADULTS)  200 mg Intravenous Q24H    levalbuterol  0.63 mg Nebulization Q4H WAKE    pantoprazole  40 mg Intravenous Daily    piperacillin-tazobactam (Zosyn) IV (PEDS and ADULTS) (extended infusion is not appropriate)  4.5 g Intravenous Q8H    sodium chloride 0.9%  10 mL Intravenous Q6H     PRN Meds:dextrose 10%, dextrose 10%, glucagon (human recombinant), iohexol, melatonin, morphine, naloxone, ondansetron, prochlorperazine, Flushing PICC/Midline Protocol **AND** sodium chloride 0.9% **AND** sodium chloride 0.9%     Review of patient's allergies indicates:   Allergen Reactions    Dilaudid [hydromorphone]      Pt becomes hypoxic - requiring 5L via oxymask for     Penicillin     Penicillins Rash     Objective:     Vital Signs (Most Recent):  Temp: 98.3 °F (36.8 °C) (12/27/23 0710)  Pulse: 88 (12/27/23 0846)  Resp: 18 (12/27/23 0846)  BP: 128/63 (12/27/23 0710)  SpO2: (!) 94 % (12/27/23 0846) Vital Signs (24h Range):  Temp:  [98.2 °F (36.8 °C)-99 °F (37.2 °C)] 98.3 °F (36.8 °C)  Pulse:  [] 88  Resp:  [12-24] 18  SpO2:  [90 %-100 %] 94 %  BP: (118-145)/(60-79) 128/63     Weight: 85.5 kg (188 lb 7.9 oz)  Body mass index is 26.29 kg/m².    Intake/Output - Last 3 Shifts         12/25 0700 12/26 0659 12/26 0700 12/27 0659 12/27 0700 12/28 0659    P.O. 120 40     I.V. (mL/kg) 1441.8 (16.9) 360 (4.2)     IV Piggyback 806.9 700     .4 462     Total Intake(mL/kg) 2632.1 (30.8) 1562 (18.3)     Urine  (mL/kg/hr) 1065 (0.5) 1700 (0.8)     Emesis/NG output 0      Drains  180     Other 255 135     Stool 0 0     Total Output 1320 2015     Net +1312.1 -453            Urine Occurrence 3 x 4 x     Stool Occurrence 0 x 0 x     Emesis Occurrence 0 x               Physical Exam  Vitals and nursing note reviewed.   HENT:      Head: Normocephalic and atraumatic.   Cardiovascular:      Rate and Rhythm: Normal rate.      Pulses: Normal pulses.   Pulmonary:      Effort: Pulmonary effort is normal. No respiratory distress.   Abdominal:      General: There is distension.      Tenderness: There is abdominal tenderness. There is no guarding.      Comments: 2x IR drains   Musculoskeletal:         General: Normal range of motion.      Cervical back: Normal range of motion.   Skin:     General: Skin is warm and dry.   Neurological:      Mental Status: He is alert. Mental status is at baseline.      Comments: Much more lucid          Significant Labs:  I have reviewed all pertinent lab results within the past 24 hours.  CBC:   Recent Labs   Lab 12/27/23  0438   WBC 16.45*   RBC 4.63   HGB 11.5*   HCT 34.2*   *   MCV 74*   MCH 24.8*   MCHC 33.6     CMP:   Recent Labs   Lab 12/27/23  0438   *   CALCIUM 7.5*   ALBUMIN 1.5*   PROT 4.7*      K 3.3*   CO2 34*   CL 96   BUN 14   CREATININE 0.8   ALKPHOS 124   ALT 11   AST 17   BILITOT 0.4       Significant Diagnostics:  I have reviewed all pertinent imaging results/findings within the past 24 hours.

## 2023-12-27 NOTE — PLAN OF CARE
[facility] Plan of Care Note  Dx: Intraabdominal abscess procedure     Shift Events : None     Goals of Care: Pain management, safety, & drain care     Neuro: AAO x 4     Vital Signs: WNL     Respiratory: @ LNC     Diet: NPO, Sips with meds, Ice chips, TPN     Is patient tolerating current diet? Yes     GTTS: TPN,Lipids, D51/2 NS@ 50/hr     Urine Output/Bowel Movement: Adequate urine output per urinal, LBM 12/18/23     Drains/Tubes/Tube Feeds (include total output/shift): DANILO drains x 2     Lines: HAM PICC, left subclavian port      Accuchecks:Q6H     Skin: Pelvic incision     Fall Risk Score: 6     Activity level? Standby assistance     Any scheduled procedures? No     Any safety concerns? Family at bedside     Other: N/A

## 2023-12-27 NOTE — ASSESSMENT & PLAN NOTE
Impression  63-year-old male with metastatic gastric cancer undergoing palliative chemotherapy followed by Dr. 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 Moy 8/21/23. Admitted this hospitalization with sepsis, afib w/RVR, IAIN, and worsening intraabdominal collection/abscess after second cycle of chemo. 1.5L drained at OSH.    12/27/23: This am pt is being visited by his family and grandchildren upon my visit. He is reporting that his pain is well controlled and having no other symptoms to address at this time. Due to his family visitation, it is not an appropriate time to discuss GOC. I spoke with his wife who reports that they have a great support system and appreciates our consult. I will go back to continue discussion with patient and wife.      Today pt is lying in bed awaiting IR procedure for drainage of abscess. I spoke with him and his wife at . His immediate hope is to feel better after the drainage of abscess and hopefully continue his palliative chemo. He reports that his pain in well controlled with IV morphine,  as he is NPO at this time.     Reason for Consult : Per communication with primary team, palliative to follow for symptom/pain management and continuation of GOC planning, as he was previously seen at outside by Palliative.     ACP/GOC: Patient was initially seen by Palliative Medicine on 12/22/23 at [facility]. At that time pt was wanting to continue all treatment and was seen primarily for pain/symptom management. Per chart review, he knew a consult was placed but didn't feel he needed palliative support until his recent hospitalization.      Pt remains hopeful for continued palliative chemotherapy and relief after IR today.      MPOA: Wife, Fatimah, is POA. Pt and wife also report having 2 adult children that are supportive of their decisions.     Symptoms   Pain: Pt reports  "pain controlled with IV morphine currently, as pt is NPO for procedure today. Pt verbalizes he has had dilaudid in the past and did not like the sedating effects. Will continue to monitor and likely continue previous Palliative recommendations for pain meds when appropriate.      Abdominal Discomfort: Pt reports tight "bloating-like" feeling to abdomen. He feels it will resolve after IR procedure today. Will continue to monitor this after procedure. Pain is likely cancer related, but may be exacerbated at current from abscess.       Recommendations   -Will appreciate recs from primary and Oncology relating to possibility of continuation of palliative chemo.  -Will continue to follow to assist with updated/changing GOC as cancer and related symptoms progress.  "

## 2023-12-27 NOTE — PROGRESS NOTES
[facility]  General Surgery  Progress Note    Subjective:     History of Present Illness:  No notes on file    Post-Op Info:  * No surgery found *         Interval History: No acute events overnight. New IR drain with 180 cc output over 24h, other drain putting out less.     Medications:  Continuous Infusions:   dextrose 5 % and 0.45 % NaCl 50 mL/hr at 12/27/23 0817    TPN ADULT CENTRAL LINE CUSTOM 75 mL/hr at 12/26/23 2206    TPN ADULT CENTRAL LINE CUSTOM       Scheduled Meds:   enoxparin  40 mg Subcutaneous Q24H (prophylaxis, 1700)    fat emulsion 20%  250 mL Intravenous Daily    fat emulsion 20%  250 mL Intravenous Daily    fluconazole (DIFLUCAN) IV (PEDS and ADULTS)  200 mg Intravenous Q24H    levalbuterol  0.63 mg Nebulization Q4H WAKE    pantoprazole  40 mg Intravenous Daily    piperacillin-tazobactam (Zosyn) IV (PEDS and ADULTS) (extended infusion is not appropriate)  4.5 g Intravenous Q8H    sodium chloride 0.9%  10 mL Intravenous Q6H     PRN Meds:dextrose 10%, dextrose 10%, glucagon (human recombinant), iohexol, melatonin, morphine, naloxone, ondansetron, prochlorperazine, Flushing PICC/Midline Protocol **AND** sodium chloride 0.9% **AND** sodium chloride 0.9%     Review of patient's allergies indicates:   Allergen Reactions    Dilaudid [hydromorphone]      Pt becomes hypoxic - requiring 5L via oxymask for     Penicillin     Penicillins Rash     Objective:     Vital Signs (Most Recent):  Temp: 98.3 °F (36.8 °C) (12/27/23 0710)  Pulse: 88 (12/27/23 0846)  Resp: 18 (12/27/23 0846)  BP: 128/63 (12/27/23 0710)  SpO2: (!) 94 % (12/27/23 0846) Vital Signs (24h Range):  Temp:  [98.2 °F (36.8 °C)-99 °F (37.2 °C)] 98.3 °F (36.8 °C)  Pulse:  [] 88  Resp:  [12-24] 18  SpO2:  [90 %-100 %] 94 %  BP: (118-145)/(60-79) 128/63     Weight: 85.5 kg (188 lb 7.9 oz)  Body mass index is 26.29 kg/m².    Intake/Output - Last 3 Shifts         12/25 0700 12/26 0659 12/26 0700 12/27 0659 12/27 0700 12/28 0659    P.O.  120 40     I.V. (mL/kg) 1441.8 (16.9) 360 (4.2)     IV Piggyback 806.9 700     .4 462     Total Intake(mL/kg) 2632.1 (30.8) 1562 (18.3)     Urine (mL/kg/hr) 1065 (0.5) 1700 (0.8)     Emesis/NG output 0      Drains  180     Other 255 135     Stool 0 0     Total Output 1320 2015     Net +1312.1 -453            Urine Occurrence 3 x 4 x     Stool Occurrence 0 x 0 x     Emesis Occurrence 0 x               Physical Exam  Vitals and nursing note reviewed.   HENT:      Head: Normocephalic and atraumatic.   Cardiovascular:      Rate and Rhythm: Normal rate.      Pulses: Normal pulses.   Pulmonary:      Effort: Pulmonary effort is normal. No respiratory distress.   Abdominal:      General: There is distension.      Tenderness: There is abdominal tenderness. There is no guarding.      Comments: 2x IR drains   Musculoskeletal:         General: Normal range of motion.      Cervical back: Normal range of motion.   Skin:     General: Skin is warm and dry.   Neurological:      Mental Status: He is alert. Mental status is at baseline.      Comments: Much more lucid          Significant Labs:  I have reviewed all pertinent lab results within the past 24 hours.  CBC:   Recent Labs   Lab 12/27/23  0438   WBC 16.45*   RBC 4.63   HGB 11.5*   HCT 34.2*   *   MCV 74*   MCH 24.8*   MCHC 33.6     CMP:   Recent Labs   Lab 12/27/23  0438   *   CALCIUM 7.5*   ALBUMIN 1.5*   PROT 4.7*      K 3.3*   CO2 34*   CL 96   BUN 14   CREATININE 0.8   ALKPHOS 124   ALT 11   AST 17   BILITOT 0.4       Significant Diagnostics:  I have reviewed all pertinent imaging results/findings within the past 24 hours.  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 IR drain placed at OSH with 1.5L of output initially.     -Advance to full  liquids, reorder TPN  -Zosyn and diflucan  -Lovenox  -Protonix  -PRN analgesia/antiemetics  -Drain care, flush drain  -Aggressive pulm hygiene. CPT  -Palliative care to see patient, appreciate assistance    Dispo: RUDOLPH Kim MD  General Surgery  [facility]

## 2023-12-27 NOTE — PLAN OF CARE
12/27/23 1432   Post-Acute Status   Post-Acute Authorization Placement   Post-Acute Placement Status Patient List Provided   Hospital Resources/Appts/Education Provided Provided patient/caregiver with written discharge plan information   Discharge Plan   Discharge Plan A Home Health   Discharge Plan B Home with family       Met with patient to review discharge recommendation of home health and is agreeable to plan    Patient/family provided list of facilities in-network with patient's payor plan. Providers that are owned, operated, or affiliated with [facility] are included on the list.     Notified that referral sent to below listed facilities from in-network list based on proximity to home/family support:     [facility] Phone: [phone]      * COVID-19: Positive patients under care,* COVID-19: Services not specified,* High-Risk Isolation Patients: Willing/Equipped to accept High-Risk Isolation Patients [address] [place] [address] -  12/27/2023 16:31 (CT)  -  -  -  -       [facility] Phone: [phone]      * COVID-19: Positive patients under care,* COVID-19: Services not specified,* High-Risk Isolation Patients: Willing/Equipped to accept High-Risk Isolation Patients [address] [facility] [address], [address] [place] [address] -  12/27/2023 16:31 (CT)  -  -  -  -       [facility] Phone: [phone]      * COVID-19: Positive patients under care,* COVID-19: Services not specified,* High-Risk Isolation Patients: Willing/Equipped to accept High-Risk Isolation Patients [address] [place] [address] -  12/27/2023 16:31 (CT)  -  -  -  -         Patient/family instructed to identify preference.    Preferred Facility: (if more than 1, listed in order of descending preference)  [facility]     If an additional preferred facility not listed above is identified, additional referral to be sent. If above facilities unable  to accept, will send additional referrals to in-network providers.

## 2023-12-27 NOTE — PROGRESS NOTES
"[facility]  Palliative Medicine  Progress Note    Patient Name: Danish Valladares  MRN: [id number]  Admission Date: 2023  Hospital Length of Stay: 4 days  Code Status: Full Code   Attending Provider: Eulogio Moy, *  Consulting Provider: GUDELIA Rodriges  Primary Care Physician: Yfn Walker MD  Principal Problem:Postprocedural intraabdominal abscess    Patient information was obtained from patient, spouse/SO, and primary team.      Assessment/Plan:     Palliative Care  Palliative care encounter  Impression  63-year-old male with metastatic gastric cancer undergoing palliative chemotherapy followed by Dr. 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 Moy 23. Admitted this hospitalization with sepsis, afib w/RVR, IAIN, and worsening intraabdominal collection/abscess after second cycle of chemo. 1.5L drained at OSH.    23: This am pt is being visited by his family and grandchildren upon my visit. He is reporting that his pain is well controlled and having no other symptoms to address at this time. Will come back.                 I was able to speak with pt/wife this afternoon. Wife states that she, , and adult children have had very realistic conversation as a family and do understand the severity of his stage 4 gastric ca. She states that she understands the events of the last week are a likely indication of less time with him. Pt and family have already made  arrangements and are now focusing on quality time while they navigate day to day decisions related to his health. Although they have not made him a DNR, the family has discussed these events and are "prepared to deal with that" if and when the time comes per wife.  Wife and pt seem to have a very realistic understanding of prognosis, but are not ready to officially change code status.          Patient reports no " "bothersome symptoms and that pain well controlled. He plans to begin previously planned pain regiment, per Palliative at OSH, when able.        Today pt is lying in bed awaiting IR procedure for drainage of abscess. I spoke with him and his wife at . His immediate hope is to feel better after the drainage of abscess and hopefully continue his palliative chemo. He reports that his pain in well controlled with IV morphine,  as he is NPO at this time.     Reason for Consult : Per communication with primary team, palliative to follow for symptom/pain management and continuation of GOC planning, as he was previously seen at outside by Palliative.     ACP/GOC: Patient was initially seen by Palliative Medicine on 12/22/23 at [facility]. At that time pt was wanting to continue all treatment and was seen primarily for pain/symptom management. Per chart review, he knew a consult was placed but didn't feel he needed palliative support until his recent hospitalization.      Pt remains hopeful for continued palliative chemotherapy and relief after IR today.      MPOA: Wife, Fatimah, is POA. Pt and wife also report having 2 adult children that are supportive of their decisions.     Symptoms   Pain: Pt reports pain controlled with IV morphine currently, as pt is NPO for procedure today. Pt verbalizes he has had dilaudid in the past and did not like the sedating effects. Will continue to monitor and likely continue previous Palliative recommendations for pain meds when appropriate.      Abdominal Discomfort: Pt reports tight "bloating-like" feeling to abdomen. He feels it will resolve after IR procedure today. Will continue to monitor this after procedure. Pain is likely cancer related, but may be exacerbated at current from abscess.       Recommendations   -Will appreciate recs from primary and Oncology relating to possibility of continuation of palliative chemo.  -Will continue to follow to assist with " updated/changing GOC as cancer and related symptoms progress.        I will follow-up with patient. Please contact us if you have any additional questions.    Subjective:     Chief Complaint: No chief complaint on file.      HPI:   Per chart review, Mr Danish Valladares is a 63-year-old male with metastatic gastric cancer undergoing palliative chemotherapy followed by Dr. 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 Moy 8/21/23, duodenal stump leak s/p exp lap drainage of peritoneal abscess and EGD with visualization of duodenal defect 9/18/23, and IR drain placement 9/29/23 who presents to the ED on 12/19/23  with generalized abdominal pain.  Patient gets palliative chemotherapy (FOLFOX6 - oxaliplatin leucovorin fluorouracil) every 2 weeks with last treatment given on 12/13/23. Patient was admitted with generalized abdominal pain and possible acute acalculous cholecystitis.  General surgery and Hem/Onc were consulted.  Patient was given IV antibiotics and IV fluids.  HIDA scan was negative for cystic duct obstruction.  Antibiotics were discontinued 12/20.  Patient noted with increased lethargy, abdominal distention and acute kidney injury 12/22.  Also noted with atrial fibrillation with RVR. CT abdomen pelvis without contrast performed that showed multiple fluid collections consistent with abscesses.  Patient discussed with general surgery who notes that fluid collections are new and recommended transfer to [facility] given complicated surgical oncology history.  Became hypotensive and in A-fib RVR during IR drain placement.  Received Lopressor 2.5 mg IV at 11:30 a.m. however remained with heart rate 155 therefore digoxin 250 mcg x1 ordered.  Order for transfer request to [facility] placed. Patient discussed with transfer center and patient has been accepted for transfer to Surgical Oncology, Dr. Krishnan  Chinmay.     Upon arrival to SICU pt is HDS. Pt is in A-fib, however rate controlled with stable MAPs. He is stable on supplemental oxygen via NC. Upon arrival, pt appears to be confused and is complaining of abdominal pain. Abdomen is tense and distended. Biliary drain is in place on right side with bilious drainage    Hospital Course:  No notes on file    Interval History: Patient received IR drain yesterday.     Past Medical History:   Diagnosis Date    Psoriasis     Stomach cancer        Past Surgical History:   Procedure Laterality Date    DIAGNOSTIC LAPAROSCOPY  09/18/2023    Procedure: LAPAROSCOPY, DIAGNOSTIC;  Surgeon: Eulogio Moy MD;  Location: [address];  Service: General;;    ESOPHAGOGASTRODUODENOSCOPY N/A 08/03/2023    Procedure: EGD (ESOPHAGOGASTRODUODENOSCOPY);  Surgeon: Loco Marvin MD;  Location: [facility];  Service: Gastroenterology;  Laterality: N/A;    ESOPHAGOGASTRODUODENOSCOPY N/A 09/18/2023    Procedure: EGD (ESOPHAGOGASTRODUODENOSCOPY); flouroscopy, need c-arm in the room;  Surgeon: Eulogio Moy MD;  Location: [address];  Service: General;  Laterality: N/A;  EGD with Fluoroscopy     GASTRECTOMY N/A 08/21/2023    Procedure: GASTRECTOMY;  Surgeon: Eulogio Moy MD;  Location: [address];  Service: General;  Laterality: N/A;  Open gastrectomy with EGD. Needs Omni retractor, requesting room 24    INSERTION OF TUNNELED CENTRAL VENOUS CATHETER (CVC) WITH SUBCUTANEOUS PORT  11/21/2023    Procedure: [id number];  Surgeon: Talon Arias MD;  Location: [facility];  Service: General;;    LAPAROSCOPIC DRAINAGE OF ABDOMEN N/A 09/18/2023    Procedure: DRAINAGE, ABDOMEN, LAPAROSCOPIC;  Surgeon: Eulogio Moy MD;  Location: [address];  Service: General;  Laterality: N/A;    TONSILLECTOMY         Review of patient's allergies indicates:   Allergen Reactions    Dilaudid [hydromorphone]      Pt becomes hypoxic - requiring 5L via  oxymask for     Penicillin     Penicillins Rash       Medications:  Continuous Infusions:   dextrose 5 % and 0.45 % NaCl 50 mL/hr at 12/27/23 0817    TPN ADULT CENTRAL LINE CUSTOM 75 mL/hr at 12/26/23 2206    TPN ADULT CENTRAL LINE CUSTOM       Scheduled Meds:   enoxparin  40 mg Subcutaneous Q24H (prophylaxis, 1700)    fat emulsion 20%  250 mL Intravenous Daily    fluconazole (DIFLUCAN) IV (PEDS and ADULTS)  200 mg Intravenous Q24H    levalbuterol  0.63 mg Nebulization Q4H WAKE    pantoprazole  40 mg Intravenous Daily    piperacillin-tazobactam (Zosyn) IV (PEDS and ADULTS) (extended infusion is not appropriate)  4.5 g Intravenous Q8H    potassium phosphate IVPB  15 mmol Intravenous Once    sodium chloride 0.9%  10 mL Intravenous Q6H     PRN Meds:dextrose 10%, dextrose 10%, glucagon (human recombinant), iohexol, melatonin, morphine, naloxone, ondansetron, prochlorperazine, Flushing PICC/Midline Protocol **AND** sodium chloride 0.9% **AND** sodium chloride 0.9%    Family History       Problem Relation (Age of Onset)    Cancer Father          Tobacco Use    Smoking status: Former     Current packs/day: 0.00     Types: Cigarettes     Quit date: 5/27/2013     Years since quitting: 10.5    Smokeless tobacco: Never   Substance and Sexual Activity    Alcohol use: Yes     Comment: occ    Drug use: Never    Sexual activity: Yes     Partners: Female       Review of Systems   Constitutional:  Positive for appetite change.   Respiratory:  Positive for cough. Negative for shortness of breath.    Gastrointestinal:  Positive for abdominal distention, abdominal pain and nausea. Negative for blood in stool and vomiting.     Objective:     Vital Signs (Most Recent):  Temp: 98.3 °F (36.8 °C) (12/27/23 0710)  Pulse: 96 (12/27/23 1123)  Resp: 18 (12/27/23 0846)  BP: 128/63 (12/27/23 0710)  SpO2: (!) 94 % (12/27/23 0846) Vital Signs (24h Range):  Temp:  [98.2 °F (36.8 °C)-98.7 °F (37.1 °C)] 98.3 °F (36.8 °C)  Pulse:  [] 96  Resp:   [12-24] 18  SpO2:  [90 %-100 %] 94 %  BP: (118-145)/(60-79) 128/63     Weight: 85.5 kg (188 lb 7.9 oz)  Body mass index is 26.29 kg/m².       Physical Exam  Pulmonary:      Effort: Pulmonary effort is normal.   Skin:     General: Skin is warm.   Neurological:      Mental Status: He is alert and oriented to person, place, and time.   Psychiatric:         Mood and Affect: Mood normal.         Behavior: Behavior normal.            Review of Symptoms      Symptom Assessment (ESAS 0-10 Scale)  Pain:  0  Dyspnea:  0  Anxiety:  0  Nausea:  0  Depression:  0  Anorexia:  0  Fatigue:  0  Insomnia:  0  Restlessness:  0  Agitation:  0         Pain Assessment:    Location(s): abdomen    Abdomen       Location: generalized        Quality: Cramping        Quantity: 5/10 in intensity        Chronicity: Onset 2 week(s) ago, gradually worsening        Aggravating Factors: Eating        Alleviating Factors: Opiates        Associated Symptoms: Nausea    ECOG Performance Status stGstrstastdstest:st st1st Living Arrangements:  Lives with spouse    Psychosocial/Cultural:   See Palliative Psychosocial Note: Yes  **Primary  to Follow**  Palliative Care  Consult: Yes        Advance Care Planning  Advance Directives:   Living Will: No    LaPOST: No    Do Not Resuscitate Status: No    Goals of Care: What is most important right now is to focus on symptom/pain control. Accordingly, we have decided that the best plan to meet the patient's goals includes continuing with treatment.         Significant Labs: BMP:   Recent Labs   Lab 12/27/23 0438   *      K 3.3*   CL 96   CO2 34*   BUN 14   CREATININE 0.8   CALCIUM 7.5*   MG 1.6       CBC:   Recent Labs   Lab 12/26/23  0534 12/27/23 0438   WBC 13.99* 16.45*   HGB 11.1* 11.5*   HCT 33.3* 34.2*   PLT 95* 104*       CBC:   Recent Labs   Lab 12/27/23 0438   WBC 16.45*   HGB 11.5*   HCT 34.2*   MCV 74*   *       BMP:  Recent Labs   Lab 12/27/23 0438   *   NA  "138   K 3.3*   CL 96   CO2 34*   BUN 14   CREATININE 0.8   CALCIUM 7.5*   MG 1.6       LFT:  Lab Results   Component Value Date    AST 17 12/27/2023    ALKPHOS 124 12/27/2023    BILITOT 0.4 12/27/2023     Albumin:   Albumin   Date Value Ref Range Status   12/27/2023 1.5 (L) 3.5 - 5.2 g/dL Final     Protein:   Total Protein   Date Value Ref Range Status   12/27/2023 4.7 (L) 6.0 - 8.4 g/dL Final     Lactic acid:   No results found for: "LACTATE"    Significant Imaging: I have reviewed all pertinent imaging results/findings within the past 24 hours.      Elizabeth Nguyen, CNS  Palliative Medicine  [facility]                "

## 2023-12-27 NOTE — SUBJECTIVE & OBJECTIVE
Interval History: Patient received IR drain yesterday.     Past Medical History:   Diagnosis Date    Psoriasis     Stomach cancer        Past Surgical History:   Procedure Laterality Date    DIAGNOSTIC LAPAROSCOPY  09/18/2023    Procedure: LAPAROSCOPY, DIAGNOSTIC;  Surgeon: Eulogio Moy MD;  Location: [facility] OR [address] [place] [address];  Service: General;;    ESOPHAGOGASTRODUODENOSCOPY N/A 08/03/2023    Procedure: EGD (ESOPHAGOGASTRODUODENOSCOPY);  Surgeon: Loco Marvin MD;  Location: [facility];  Service: Gastroenterology;  Laterality: N/A;    ESOPHAGOGASTRODUODENOSCOPY N/A 09/18/2023    Procedure: EGD (ESOPHAGOGASTRODUODENOSCOPY); flouroscopy, need c-arm in the room;  Surgeon: Eulogio Moy MD;  Location: [facility] OR [address] [place] [address];  Service: General;  Laterality: N/A;  EGD with Fluoroscopy     GASTRECTOMY N/A 08/21/2023    Procedure: GASTRECTOMY;  Surgeon: Eulogio Moy MD;  Location: [facility] OR [address] [place] [address];  Service: General;  Laterality: N/A;  Open gastrectomy with EGD. Needs Omni retractor, requesting room 24    INSERTION OF TUNNELED CENTRAL VENOUS CATHETER (CVC) WITH SUBCUTANEOUS PORT  11/21/2023    Procedure: [id number];  Surgeon: Talon Arias MD;  Location: [facility];  Service: General;;    LAPAROSCOPIC DRAINAGE OF ABDOMEN N/A 09/18/2023    Procedure: DRAINAGE, ABDOMEN, LAPAROSCOPIC;  Surgeon: Eulogio Moy MD;  Location: [facility] OR [address] [place] [address];  Service: General;  Laterality: N/A;    TONSILLECTOMY         Review of patient's allergies indicates:   Allergen Reactions    Dilaudid [hydromorphone]      Pt becomes hypoxic - requiring 5L via oxymask for     Penicillin     Penicillins Rash       Medications:  Continuous Infusions:   dextrose 5 % and 0.45 % NaCl 50 mL/hr at 12/27/23 0817    TPN ADULT CENTRAL LINE CUSTOM 75 mL/hr at 12/26/23 2206    TPN ADULT CENTRAL LINE CUSTOM       Scheduled Meds:   enoxparin  40 mg Subcutaneous Q24H (prophylaxis, 1700)    fat emulsion 20%  250 mL Intravenous  Daily    fluconazole (DIFLUCAN) IV (PEDS and ADULTS)  200 mg Intravenous Q24H    levalbuterol  0.63 mg Nebulization Q4H WAKE    pantoprazole  40 mg Intravenous Daily    piperacillin-tazobactam (Zosyn) IV (PEDS and ADULTS) (extended infusion is not appropriate)  4.5 g Intravenous Q8H    potassium phosphate IVPB  15 mmol Intravenous Once    sodium chloride 0.9%  10 mL Intravenous Q6H     PRN Meds:dextrose 10%, dextrose 10%, glucagon (human recombinant), iohexol, melatonin, morphine, naloxone, ondansetron, prochlorperazine, Flushing PICC/Midline Protocol **AND** sodium chloride 0.9% **AND** sodium chloride 0.9%    Family History       Problem Relation (Age of Onset)    Cancer Father          Tobacco Use    Smoking status: Former     Current packs/day: 0.00     Types: Cigarettes     Quit date: 5/27/2013     Years since quitting: 10.5    Smokeless tobacco: Never   Substance and Sexual Activity    Alcohol use: Yes     Comment: occ    Drug use: Never    Sexual activity: Yes     Partners: Female       Review of Systems   Constitutional:  Positive for appetite change.   Respiratory:  Positive for cough. Negative for shortness of breath.    Gastrointestinal:  Positive for abdominal distention, abdominal pain and nausea. Negative for blood in stool and vomiting.     Objective:     Vital Signs (Most Recent):  Temp: 98.3 °F (36.8 °C) (12/27/23 0710)  Pulse: 96 (12/27/23 1123)  Resp: 18 (12/27/23 0846)  BP: 128/63 (12/27/23 0710)  SpO2: (!) 94 % (12/27/23 0846) Vital Signs (24h Range):  Temp:  [98.2 °F (36.8 °C)-98.7 °F (37.1 °C)] 98.3 °F (36.8 °C)  Pulse:  [] 96  Resp:  [12-24] 18  SpO2:  [90 %-100 %] 94 %  BP: (118-145)/(60-79) 128/63     Weight: 85.5 kg (188 lb 7.9 oz)  Body mass index is 26.29 kg/m².       Physical Exam  Pulmonary:      Effort: Pulmonary effort is normal.   Skin:     General: Skin is warm.   Neurological:      Mental Status: He is alert and oriented to person, place, and time.   Psychiatric:          Mood and Affect: Mood normal.         Behavior: Behavior normal.            Review of Symptoms      Symptom Assessment (ESAS 0-10 Scale)  Pain:  0  Dyspnea:  0  Anxiety:  0  Nausea:  0  Depression:  0  Anorexia:  0  Fatigue:  0  Insomnia:  0  Restlessness:  0  Agitation:  0         Pain Assessment:    Location(s): abdomen    Abdomen       Location: generalized        Quality: Cramping        Quantity: 5/10 in intensity        Chronicity: Onset 2 week(s) ago, gradually worsening        Aggravating Factors: Eating        Alleviating Factors: Opiates        Associated Symptoms: Nausea    ECOG Performance Status rdGrdrrdarddrderd:rd rd3rd Living Arrangements:  Lives with spouse    Psychosocial/Cultural:   See Palliative Psychosocial Note: Yes  **Primary  to Follow**  Palliative Care  Consult: Yes        Advance Care Planning   Advance Directives:   Living Will: No    LaPOST: No    Do Not Resuscitate Status: No    Goals of Care: What is most important right now is to focus on symptom/pain control. Accordingly, we have decided that the best plan to meet the patient's goals includes continuing with treatment.         Significant Labs: BMP:   Recent Labs   Lab 12/27/23 0438   *      K 3.3*   CL 96   CO2 34*   BUN 14   CREATININE 0.8   CALCIUM 7.5*   MG 1.6       CBC:   Recent Labs   Lab 12/26/23  0534 12/27/23 0438   WBC 13.99* 16.45*   HGB 11.1* 11.5*   HCT 33.3* 34.2*   PLT 95* 104*       CBC:   Recent Labs   Lab 12/27/23 0438   WBC 16.45*   HGB 11.5*   HCT 34.2*   MCV 74*   *       BMP:  Recent Labs   Lab 12/27/23 0438   *      K 3.3*   CL 96   CO2 34*   BUN 14   CREATININE 0.8   CALCIUM 7.5*   MG 1.6       LFT:  Lab Results   Component Value Date    AST 17 12/27/2023    ALKPHOS 124 12/27/2023    BILITOT 0.4 12/27/2023     Albumin:   Albumin   Date Value Ref Range Status   12/27/2023 1.5 (L) 3.5 - 5.2 g/dL Final     Protein:   Total Protein   Date Value Ref Range Status  "  12/27/2023 4.7 (L) 6.0 - 8.4 g/dL Final     Lactic acid:   No results found for: "LACTATE"    Significant Imaging: I have reviewed all pertinent imaging results/findings within the past 24 hours.    "

## 2023-12-27 NOTE — ASSESSMENT & PLAN NOTE
62 yo M with prior duodenal stump leak after palliative distal gastrectomy with B2 recon presenting with acute worsening of his intraabdominal collection after his second cycle of chemo. IR drain placed at OSH with 1.5L of output initially.     -Advance to full liquids, reorder TPN  -Zosyn and diflucan  -Lovenox  -Protonix  -PRN analgesia/antiemetics  -Drain care, flush drain  -Aggressive pulm hygiene. CPT  -Palliative care to see patient, appreciate assistance    Dispo: RUDOLPH

## 2023-12-27 NOTE — PLAN OF CARE
[facility] Plan of Care Note  Dx intraabdominal abscess procedure    Shift Events patient had a second procedure today to drain abdominal abscess    Goals of Care: patient will tolerate procedure    Neuro: WDL    Vital Signs: WDL    Respiratory: Coarse, crackles throughout    Diet: NPO, on TPN    Is patient tolerating current diet? Yes    GTTS: TPN,Lipids, d51/2 NS@ 50/hr    Urine Output/Bowel Movement: continent of urine, no recent BM    Drains/Tubes/Tube Feeds (include total output/shift): see flowchart, new DANILO drain placed to right flank this evening    Lines: double lumen picc, left subclavian port      Accuchecks:q6 hour    Skin: see flowchart    Fall Risk Score: 6    Activity level? OOB with max assist    Any scheduled procedures? no    Any safety concerns? Family at bedside    Other: N/A

## 2023-12-28 ENCOUNTER — PES CALL (OUTPATIENT)
Dept: HOME HEALTH SERVICES | Facility: CLINIC | Age: 63
End: 2023-12-28
Payer: COMMERCIAL

## 2023-12-28 NOTE — SUBJECTIVE & OBJECTIVE
Interval History: No acute events overnight. Tolerating fulls. New IR drain with increased output over last 24 hours. Pain and distension improved. No nausea/vomiting.    Medications:  Continuous Infusions:   TPN ADULT CENTRAL LINE CUSTOM 75 mL/hr at 12/27/23 2156    TPN ADULT CENTRAL LINE CUSTOM       Scheduled Meds:   enoxparin  40 mg Subcutaneous Q24H (prophylaxis, 1700)    fat emulsion 20%  250 mL Intravenous Daily    fluconazole (DIFLUCAN) IV (PEDS and ADULTS)  200 mg Intravenous Q24H    levalbuterol  0.63 mg Nebulization Q4H WAKE    pantoprazole  40 mg Intravenous Daily    piperacillin-tazobactam (Zosyn) IV (PEDS and ADULTS) (extended infusion is not appropriate)  4.5 g Intravenous Q8H    sodium chloride 0.9%  10 mL Intravenous Q6H     PRN Meds:dextrose 10%, dextrose 10%, glucagon (human recombinant), iohexol, melatonin, morphine, naloxone, ondansetron, prochlorperazine, Flushing PICC/Midline Protocol **AND** sodium chloride 0.9% **AND** sodium chloride 0.9%     Review of patient's allergies indicates:   Allergen Reactions    Dilaudid [hydromorphone]      Pt becomes hypoxic - requiring 5L via oxymask for     Penicillin     Penicillins Rash     Objective:     Vital Signs (Most Recent):  Temp: 98.2 °F (36.8 °C) (12/28/23 0730)  Pulse: 93 (12/28/23 0734)  Resp: 18 (12/28/23 0734)  BP: (!) 142/67 (12/28/23 0730)  SpO2: (!) 91 % (12/28/23 0731) Vital Signs (24h Range):  Temp:  [98 °F (36.7 °C)-99.5 °F (37.5 °C)] 98.2 °F (36.8 °C)  Pulse:  [86-98] 93  Resp:  [18-20] 18  SpO2:  [85 %-95 %] 91 %  BP: (136-149)/(67-77) 142/67     Weight: 85.5 kg (188 lb 7.9 oz)  Body mass index is 26.29 kg/m².    Intake/Output - Last 3 Shifts         12/26 0700  12/27 0659 12/27 0700 12/28 0659 12/28 0700 12/29 0659    P.O. 40      I.V. (mL/kg) 360 (4.2)      IV Piggyback 700            Total Intake(mL/kg) 1562 (18.3)      Urine (mL/kg/hr) 1700 (0.8) 750 (0.4)     Emesis/NG output       Drains 180 425     Other 135 200      Stool 0 0     Total Output 2015 1375     Net -215 -5820            Urine Occurrence 4 x      Stool Occurrence 0 x 0 x              Physical Exam  Vitals and nursing note reviewed.   HENT:      Head: Normocephalic and atraumatic.   Cardiovascular:      Rate and Rhythm: Normal rate.      Pulses: Normal pulses.   Pulmonary:      Effort: Pulmonary effort is normal. No respiratory distress.   Abdominal:      General: There is no distension.      Tenderness: There is abdominal tenderness. There is no guarding.      Comments: 2x IR drains with thin output   Distension improved   Musculoskeletal:         General: Normal range of motion.      Cervical back: Normal range of motion.   Skin:     General: Skin is warm and dry.   Neurological:      Mental Status: He is alert. Mental status is at baseline.      Comments: Much more lucid   Psychiatric:         Mood and Affect: Mood normal.         Behavior: Behavior normal.          Significant Labs:  I have reviewed all pertinent lab results within the past 24 hours.  CBC:   Recent Labs   Lab 12/28/23 0419   WBC 13.44*   RBC 4.48*   HGB 11.4*   HCT 33.6*      MCV 75*   MCH 25.4*   MCHC 33.9     CMP:   Recent Labs   Lab 12/28/23 0419   *   CALCIUM 7.7*   ALBUMIN 1.4*   PROT 4.9*      K 3.6   CO2 36*   CL 95   BUN 15   CREATININE 0.8   ALKPHOS 127   ALT 15   AST 23   BILITOT 0.3       Significant Diagnostics:  I have reviewed all pertinent imaging results/findings within the past 24 hours.

## 2023-12-28 NOTE — PLAN OF CARE
[facility]arth[facility] Buck[facility]  Discharge Reassessment    Primary Care Provider: Yfn Walker MD    Expected Discharge Date: 12/29/2023    Reassessment (most recent)       Discharge Reassessment - 12/28/23 1225          Discharge Reassessment    Assessment Type Discharge Planning Reassessment     Did the patient's condition or plan change since previous assessment? No     Discharge Plan discussed with: Patient     Communicated EDUARDO with patient/caregiver Yes     Discharge Plan A Home Health     Discharge Plan B Home with family     Transition of Care Barriers None     Why the patient remains in the hospital Requires continued medical care                     Home health orders sent to [facility] [place].     [facility] following if patient will need TPN on discharge.

## 2023-12-28 NOTE — PROGRESS NOTES
Siddhartha Thurman - [facility]  General Surgery  Progress Note    Subjective:     History of Present Illness:  No notes on file    Post-Op Info:  * No surgery found *         Interval History: No acute events overnight. Tolerating fulls. New IR drain with increased output over last 24 hours. Pain and distension improved. No nausea/vomiting.    Medications:  Continuous Infusions:   TPN ADULT CENTRAL LINE CUSTOM 75 mL/hr at 12/27/23 2156    TPN ADULT CENTRAL LINE CUSTOM       Scheduled Meds:   enoxparin  40 mg Subcutaneous Q24H (prophylaxis, 1700)    fat emulsion 20%  250 mL Intravenous Daily    fluconazole (DIFLUCAN) IV (PEDS and ADULTS)  200 mg Intravenous Q24H    levalbuterol  0.63 mg Nebulization Q4H WAKE    pantoprazole  40 mg Intravenous Daily    piperacillin-tazobactam (Zosyn) IV (PEDS and ADULTS) (extended infusion is not appropriate)  4.5 g Intravenous Q8H    sodium chloride 0.9%  10 mL Intravenous Q6H     PRN Meds:dextrose 10%, dextrose 10%, glucagon (human recombinant), iohexol, melatonin, morphine, naloxone, ondansetron, prochlorperazine, Flushing PICC/Midline Protocol **AND** sodium chloride 0.9% **AND** sodium chloride 0.9%     Review of patient's allergies indicates:   Allergen Reactions    Dilaudid [hydromorphone]      Pt becomes hypoxic - requiring 5L via oxymask for     Penicillin     Penicillins Rash     Objective:     Vital Signs (Most Recent):  Temp: 98.2 °F (36.8 °C) (12/28/23 0730)  Pulse: 93 (12/28/23 0734)  Resp: 18 (12/28/23 0734)  BP: (!) 142/67 (12/28/23 0730)  SpO2: (!) 91 % (12/28/23 0731) Vital Signs (24h Range):  Temp:  [98 °F (36.7 °C)-99.5 °F (37.5 °C)] 98.2 °F (36.8 °C)  Pulse:  [86-98] 93  Resp:  [18-20] 18  SpO2:  [85 %-95 %] 91 %  BP: (136-149)/(67-77) 142/67     Weight: 85.5 kg (188 lb 7.9 oz)  Body mass index is 26.29 kg/m².    Intake/Output - Last 3 Shifts         12/26 0700 12/27 0659 12/27 0700 12/28 0659 12/28 0700 12/29 0659    P.O. 40      I.V. (mL/kg) 360 (4.2)      IV Piggyback 700             Total Intake(mL/kg) 1562 (18.3)      Urine (mL/kg/hr) 1700 (0.8) 750 (0.4)     Emesis/NG output       Drains 180 425     Other 135 200     Stool 0 0     Total Output 2015 1375     Net -453 -1375            Urine Occurrence 4 x      Stool Occurrence 0 x 0 x              Physical Exam  Vitals and nursing note reviewed.   HENT:      Head: Normocephalic and atraumatic.   Cardiovascular:      Rate and Rhythm: Normal rate.      Pulses: Normal pulses.   Pulmonary:      Effort: Pulmonary effort is normal. No respiratory distress.   Abdominal:      General: There is no distension.      Tenderness: There is abdominal tenderness. There is no guarding.      Comments: 2x IR drains with thin output   Distension improved   Musculoskeletal:         General: Normal range of motion.      Cervical back: Normal range of motion.   Skin:     General: Skin is warm and dry.   Neurological:      Mental Status: He is alert. Mental status is at baseline.      Comments: Much more lucid   Psychiatric:         Mood and Affect: Mood normal.         Behavior: Behavior normal.          Significant Labs:  I have reviewed all pertinent lab results within the past 24 hours.  CBC:   Recent Labs   Lab 12/28/23 0419   WBC 13.44*   RBC 4.48*   HGB 11.4*   HCT 33.6*      MCV 75*   MCH 25.4*   MCHC 33.9     CMP:   Recent Labs   Lab 12/28/23 0419   *   CALCIUM 7.7*   ALBUMIN 1.4*   PROT 4.9*      K 3.6   CO2 36*   CL 95   BUN 15   CREATININE 0.8   ALKPHOS 127   ALT 15   AST 23   BILITOT 0.3       Significant Diagnostics:  I have reviewed all pertinent imaging results/findings within the past 24 hours.  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 IR drain placed at OSH with 1.5L of output initially.     -Advance to  regular diet, reorder TPN  -Zosyn and diflucan  -Lovenox  -Protonix  -PRN analgesia/antiemetics  -Drain care, flush drain  -Aggressive pulm hygiene. CPT  -Palliative care following along for GOC discussion, appreciate assistance    Dispo: RUDOLPH Kim MD  General Surgery  Siddhartha GALDAMEZ

## 2023-12-28 NOTE — ASSESSMENT & PLAN NOTE
62 yo M with prior duodenal stump leak after palliative distal gastrectomy with B2 recon presenting with acute worsening of his intraabdominal collection after his second cycle of chemo. IR drain placed at OSH with 1.5L of output initially.     -Advance to regular diet, reorder TPN  -Zosyn and diflucan  -Lovenox  -Protonix  -PRN analgesia/antiemetics  -Drain care, flush drain  -Aggressive pulm hygiene. CPT  -Palliative care following along for GOC discussion, appreciate assistance    Dispo: RUDOLPH

## 2023-12-28 NOTE — PLAN OF CARE
[facility] Plan of Care Note    Dx: Intraabdominal abscess procedure     Shift Events : None     Goals of Care: Pain management, safety, & drain care     Neuro: AAO x 4     Vital Signs: WNL     Respiratory: 2 LNC     Diet: Full liquids,  TPN     Is patient tolerating current diet? Yes     GTTS: TPN, Lipids     Urine Output/Bowel Movement: Adequate urine output per urinal, LBM 12/18/23     Drains/Tubes/Tube Feeds (include total output/shift): DANILO drains x 2     Lines: HAM PICC, left subclavian port      Accuchecks:Q6H     Skin: Pelvic incision     Fall Risk Score: 6     Activity level? Standby assistance     Any scheduled procedures? No     Any safety concerns? Family at bedside     Other: N/A

## 2023-12-28 NOTE — PLAN OF CARE
[facility]       HOME  HEALTH ORDERS                                    FACE TO FACE ENCOUNTER      Patient Name: Danish Valladares  YOB: 1960    PCP: Yfn Walker MD   PCP Address: [id number] [place] [address]  PCP Phone Number: [phone]  PCP Fax: [phone]    Encounter Date: 01/02/2024    Admit to Home Health    Diagnoses:  Active Hospital Problems    Diagnosis  POA    *Gastric adenocarcinoma [C16.9]  Yes    Metastatic adenocarcinoma [C79.9]  Yes     Chronic    Emphysematous bleb of lung [J43.9]  Yes     Chronic    Pain [R52]  Unknown    Palliative care encounter [Z51.5]  Not Applicable    Postprocedural intraabdominal abscess [T81.43XA]  Yes    Atrial fibrillation with RVR [I48.91]  Yes    Leukocytosis [D72.829]  Yes    IAIN (acute kidney injury) [N17.9]  Yes    Shortness of breath [R06.02]  Yes    Peritonitis [K65.9]  Yes      Resolved Hospital Problems    Diagnosis Date Resolved POA    Acalculous cholecystitis [K81.9] 12/24/2023 Yes       Future Appointments   Date Time Provider Department Center   1/3/2024  2:00 PM Melonie Negro NP [facility] PALLCR [facility] at [facility]   1/9/2024  2:15 PM LAB, [facility] OHS DRAW STATION [facility] LAB [facility] at [facility]   1/9/2024  3:20 PM Chelsea Mraavilla MD [facility] HEMONC [facility] at [facility]   1/10/2024 10:30 AM CHAIR 38, [facility] INFUSION [facility] INF [facility] at [facility]   1/12/2024  4:15 PM INJECTION SCHEDULE, [facility] INFUSION [facility] INF [facility] at [facility]   1/24/2024 10:30 AM CHAIR 05, [facility] INFUSION [facility] INF [facility] at [facility]   1/26/2024  4:15 PM INJECTION SCHEDULE, [facility] OHS INFUSION [facility] INF [facility] at [facility]           I have seen and examined this patient face to face today. My clinical findings that support the need for the home health skilled services and home bound status are the following:  Weakness/numbness causing balance and gait disturbance due to Weakness/Debility, Malignancy/Cancer, and Surgery making it taxing to leave  home.    Allergies:  Review of patient's allergies indicates:   Allergen Reactions    Dilaudid [hydromorphone]      Pt becomes hypoxic - requiring 5L via oxymask for     Penicillin     Penicillins Rash       Diet: regular diet    Activities: activity as tolerated    Nursing:   SN to complete comprehensive assessment including routine vital signs. Instruct on disease process and s/s of complications to report to MD. Review/verify medication list sent home with the patient at time of discharge  and instruct patient/caregiver as needed. Frequency may be adjusted depending on start of care date.    Notify MD if SBP > 160 or < 90; DBP > 90 or < 50; HR > 120 or < 50; Temp > 101    CONSULTS:    Physical Therapy to evaluate and treat. Evaluate for home safety and equipment needs; Establish/upgrade home exercise program. Perform / instruct on therapeutic exercises, gait training, transfer training, and Range of Motion.  Occupational Therapy to evaluate and treat. Evaluate home environment for safety and equipment needs. Perform/Instruct on transfers, ADL training, ROM, and therapeutic exercises.      WOUND CARE ORDERS  - Right lower quadrant IR drain to bulb suction.  Empty and record output every 12 hour.  Flush IR drain with 10cc sterile water every 12 hours.     - Right back IR drain to bulb suction.  Empty and record output every 12 hour.  Flush IR drain with 10cc sterile water every 12 hours.    Medications: Review discharge medications with patient and family and provide education.      Discharge Medication List as of 12/31/2023  5:40 PM        START taking these medications    Details   pantoprazole (PROTONIX) 40 MG tablet Take 1 tablet (40 mg total) by mouth once daily., Starting Mon 1/1/2024, OTC      fluconazole (DIFLUCAN) 200 MG Tab Take 1 tablet (200 mg total) by mouth once daily. for 14 days, Starting Mon 1/1/2024, Until Mon 1/15/2024, Normal      sulfamethoxazole-trimethoprim 800-160mg (BACTRIM DS) 800-160 mg  Tab Take 1 tablet by mouth 2 (two) times daily. for 14 days, Starting Sun 12/31/2023, Until Sun 1/14/2024, Normal           CONTINUE these medications which have NOT CHANGED    Details   acetaminophen (TYLENOL ORAL) Take 2 tablets by mouth every 6 (six) hours as needed (pain)., Historical Med      ascorbic acid (VITAMIN C ORAL) Take 1 tablet by mouth once daily., Historical Med      ergocalciferol, vitamin D2, (VITAMIN D ORAL) Take 1 tablet by mouth once daily., Historical Med      ibuprofen (ADVIL,MOTRIN) 200 MG tablet Take 200 mg by mouth every 6 (six) hours as needed for Pain., Historical Med      LIDOcaine-prilocaine (EMLA) cream Apply topically as needed (APPLY 30 to 60 minutes prior to chemo)., Starting Wed 11/15/2023, Normal      mirtazapine (REMERON) 15 MG tablet Take 1 tablet (15 mg total) by mouth every evening., Starting Tue 10/3/2023, Until Wed 10/2/2024, Normal      multivitamin/iron/folic acid (CENTRUM ORAL) Take 1 tablet by mouth once daily., Historical Med      OLANZapine (ZYPREXA) 5 MG tablet Take for 3 nights after chemo starting the night of chemo, Normal      ondansetron (ZOFRAN) 8 MG tablet Take 1 tablet (8 mg total) by mouth every 6 (six) hours as needed for Nausea., Starting Thu 11/9/2023, Until Fri 11/8/2024 at 2359, Normal      prochlorperazine (COMPAZINE) 10 MG tablet Take 1 tablet (10 mg total) by mouth every 6 (six) hours as needed., Starting Thu 11/9/2023, Until Fri 11/8/2024 at 2359, Normal      tamsulosin (FLOMAX) 0.4 mg Cap Take 1 capsule (0.4 mg total) by mouth once daily., Starting Tue 9/26/2023, Until Wed 9/25/2024, Normal      vitamin E 1000 UNIT capsule Take 1,000 Units by mouth once daily., Historical Med             I certify that this patient is confined to his home and needs intermittent skilled nursing care.      Electronically Signed  _________________________________  Rachel Baker, NP  01/02/2024

## 2023-12-29 NOTE — ASSESSMENT & PLAN NOTE
Impression  63-year-old male with metastatic gastric cancer undergoing palliative chemotherapy followed by Dr. 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 Moy 8/21/23. Admitted this hospitalization with sepsis, afib w/RVR, IAIN, and worsening intraabdominal collection/abscess after second cycle of chemo. 1.5L drained at OSH.      Today pt is lying in bed awaiting IR procedure for drainage of abscess. I spoke with him and his wife at . His immediate hope is to feel better after the drainage of abscess and hopefully continue his palliative chemo. He reports that his pain in well controlled with IV morphine,  as he is NPO at this time.     Reason for Consult : Per communication with primary team, palliative to follow for symptom/pain management and continuation of GOC planning, as he was previously seen at outside by Palliative.     ACP/GOC:12/29/23: Visited pt today and he is stating that his pain is well controlled, requiring minimal pain medication per chart review. Pt denies any other symptoms. He and wife are hopeful to go home next Tuesday.      12/27/23: This am pt is being visited by his family and grandchildren upon my visit. He is reporting that his pain is well controlled and having no other symptoms to address at this time. Due to his family visitation, it is not an appropriate time to discuss GOC. I spoke with his wife who reports that they have a great support system and appreciates our consult. I will go back to continue discussion with patient and wife.    12/26/23Patient was initially seen by Palliative Medicine on 12/22/23 at [facility]. At that time pt was wanting to continue all treatment and was seen primarily for pain/symptom management. Per chart review, he knew a consult was placed but didn't feel he needed palliative support until his recent hospitalization.      Pt remains  "hopeful for continued palliative chemotherapy and relief after IR today.      MPOA: Wife, Fatimah, is POA. Pt and wife also report having 2 adult children that are supportive of their decisions.     Symptoms   Pain: Pt reports pain controlled with IV morphine currently, as pt is NPO for procedure today. Pt verbalizes he has had dilaudid in the past and did not like the sedating effects. Will continue to monitor and likely continue previous Palliative recommendations for pain meds when appropriate.      Abdominal Discomfort: Pt reports tight "bloating-like" feeling to abdomen. He feels it will resolve after IR procedure today. Will continue to monitor this after procedure. Pain is likely cancer related, but may be exacerbated at current from abscess.       Recommendations   -Will appreciate recs from primary and Oncology relating to possibility of continuation of palliative chemo.  -Will continue to follow to assist with updated/changing GOC as cancer and related symptoms progress.  "

## 2023-12-29 NOTE — ASSESSMENT & PLAN NOTE
62 yo M with prior duodenal stump leak after palliative distal gastrectomy with B2 recon presenting with acute worsening of his intraabdominal collection after his second cycle of chemo. IR drain placed at OSH with 1.5L of output initially.     -Regular diet, wean TPN- not reordering  -Transition IV antibiotics to oral per culture results - PO fluconazole and bactrim  -Will rescan this weekend to ensure adequate drainage  -PRN analgesia/antiemetics  -Drain care, flush drain; will place back drain to gravity drainage bag  -Lasix 20 today, will re-eval for re-dose this afternoon  -Aggressive pulm hygiene. CPT  -Palliative care following along for GOC discussion, appreciate assistance  -Lovenox  -Protonix    Dispo: RUDOLPH

## 2023-12-29 NOTE — SUBJECTIVE & OBJECTIVE
Interval History: No acute events. Tolerated regular diet yesterday, no vomiting but did have some nausea. Now resolved.     Medications:  Continuous Infusions:  Scheduled Meds:   cetirizine  10 mg Oral Daily    enoxparin  40 mg Subcutaneous Q24H (prophylaxis, 1700)    [START ON 12/30/2023] fluconazole  200 mg Oral Daily    guaiFENesin  600 mg Oral BID    levalbuterol  0.63 mg Nebulization Q4H WAKE    mirtazapine  15 mg Oral QHS    pantoprazole  40 mg Oral Daily    sodium chloride 0.9%  10 mL Intravenous Q6H    sulfamethoxazole-trimethoprim 800-160mg  1 tablet Oral BID     PRN Meds:dextrose 10%, dextrose 10%, glucagon (human recombinant), iohexol, melatonin, morphine, naloxone, ondansetron, oxyCODONE, prochlorperazine, Flushing PICC/Midline Protocol **AND** sodium chloride 0.9% **AND** sodium chloride 0.9%     Review of patient's allergies indicates:   Allergen Reactions    Dilaudid [hydromorphone]      Pt becomes hypoxic - requiring 5L via oxymask for     Penicillin     Penicillins Rash     Objective:     Vital Signs (Most Recent):  Temp: 98.5 °F (36.9 °C) (12/29/23 1610)  Pulse: 94 (12/29/23 1610)  Resp: 18 (12/29/23 1610)  BP: 129/66 (12/29/23 1610)  SpO2: 95 % (12/29/23 1610) Vital Signs (24h Range):  Temp:  [98.5 °F (36.9 °C)-99.1 °F (37.3 °C)] 98.5 °F (36.9 °C)  Pulse:  [] 94  Resp:  [18-20] 18  SpO2:  [77 %-96 %] 95 %  BP: (129-155)/(65-70) 129/66     Weight: 85.5 kg (188 lb 7.9 oz)  Body mass index is 26.29 kg/m².    Intake/Output - Last 3 Shifts         12/27 0700  12/28 0659 12/28 0700  12/29 0659 12/29 0700  12/30 0659    P.O.   240    I.V. (mL/kg)       Other  30     IV Piggyback       TPN       Total Intake(mL/kg)  30 (0.4) 240 (2.8)    Urine (mL/kg/hr) 750 (0.4) 1325 (0.6) 850 (1)    Drains 425 240 30    Other 200 100     Stool 0 0     Total Output 1377 [id number]    Net -6963 -0156 -334           Urine Occurrence  2 x     Stool Occurrence 0 x 2 x              Physical Exam  Vitals and nursing note  reviewed.   HENT:      Head: Normocephalic and atraumatic.   Cardiovascular:      Rate and Rhythm: Normal rate.      Pulses: Normal pulses.   Pulmonary:      Effort: Pulmonary effort is normal. No respiratory distress.   Abdominal:      General: There is no distension.      Tenderness: There is abdominal tenderness. There is no guarding.      Comments: 2x IR drains with thin output, less bilious in appearance today, more serous in character   Distension improved   Musculoskeletal:         General: Normal range of motion.      Cervical back: Normal range of motion.   Skin:     General: Skin is warm and dry.   Neurological:      Mental Status: He is alert. Mental status is at baseline.   Psychiatric:         Mood and Affect: Mood normal.         Behavior: Behavior normal.          Significant Labs:  I have reviewed all pertinent lab results within the past 24 hours.  CBC:   Recent Labs   Lab 12/29/23  0450   WBC 12.64   RBC 4.29*   HGB 10.7*   HCT 32.2*      MCV 75*   MCH 24.9*   MCHC 33.2     CMP:   Recent Labs   Lab 12/29/23  0450   *   CALCIUM 7.9*   ALBUMIN 1.4*   PROT 5.1*      K 3.6   CO2 39*   CL 94*   BUN 16   CREATININE 0.8   ALKPHOS 127   ALT 27   AST 38   BILITOT 0.3       Significant Diagnostics:  I have reviewed all pertinent imaging results/findings within the past 24 hours.

## 2023-12-29 NOTE — PROGRESS NOTES
Decent night, cough improved  Abd soft, drains thin/bilious    -diurese today  -ambulate  -probably rescan over weekend, aim for home on Tues

## 2023-12-29 NOTE — SUBJECTIVE & OBJECTIVE
Past Medical History:   Diagnosis Date    Psoriasis     Stomach cancer        Past Surgical History:   Procedure Laterality Date    DIAGNOSTIC LAPAROSCOPY  09/18/2023    Procedure: LAPAROSCOPY, DIAGNOSTIC;  Surgeon: Eulogio Moy MD;  Location: [facility] OR [address] [place] [address];  Service: General;;    ESOPHAGOGASTRODUODENOSCOPY N/A 08/03/2023    Procedure: EGD (ESOPHAGOGASTRODUODENOSCOPY);  Surgeon: Loco Marvin MD;  Location: [facility];  Service: Gastroenterology;  Laterality: N/A;    ESOPHAGOGASTRODUODENOSCOPY N/A 09/18/2023    Procedure: EGD (ESOPHAGOGASTRODUODENOSCOPY); flouroscopy, need c-arm in the room;  Surgeon: Eulogio Moy MD;  Location: [facility] OR [address] [place] [address];  Service: General;  Laterality: N/A;  EGD with Fluoroscopy     GASTRECTOMY N/A 08/21/2023    Procedure: GASTRECTOMY;  Surgeon: Eulogio Moy MD;  Location: [facility] OR [address] [place] [address];  Service: General;  Laterality: N/A;  Open gastrectomy with EGD. Needs Omni retractor, requesting room 24    INSERTION OF TUNNELED CENTRAL VENOUS CATHETER (CVC) WITH SUBCUTANEOUS PORT  11/21/2023    Procedure: [id number];  Surgeon: Talon Arias MD;  Location: [facility] OR;  Service: General;;    LAPAROSCOPIC DRAINAGE OF ABDOMEN N/A 09/18/2023    Procedure: DRAINAGE, ABDOMEN, LAPAROSCOPIC;  Surgeon: Eulogio Moy MD;  Location: [facility] OR [address] [place] [address];  Service: General;  Laterality: N/A;    TONSILLECTOMY         Review of patient's allergies indicates:   Allergen Reactions    Dilaudid [hydromorphone]      Pt becomes hypoxic - requiring 5L via oxymask for     Penicillin     Penicillins Rash       Medications:  Continuous Infusions:      Scheduled Meds:   cetirizine  10 mg Oral Daily    enoxparin  40 mg Subcutaneous Q24H (prophylaxis, 1700)    fluconazole (DIFLUCAN) IV (PEDS and ADULTS)  200 mg Intravenous Q24H    guaiFENesin  600 mg Oral BID    levalbuterol  0.63 mg Nebulization Q4H WAKE    magnesium oxide  800 mg Oral Once    mirtazapine  15 mg Oral  QHS    pantoprazole  40 mg Oral Daily    piperacillin-tazobactam (Zosyn) IV (PEDS and ADULTS) (extended infusion is not appropriate)  4.5 g Intravenous Q8H    potassium, sodium phosphates  2 packet Oral Once    sodium chloride 0.9%  10 mL Intravenous Q6H     PRN Meds:dextrose 10%, dextrose 10%, glucagon (human recombinant), iohexol, melatonin, morphine, naloxone, ondansetron, oxyCODONE, prochlorperazine, Flushing PICC/Midline Protocol **AND** sodium chloride 0.9% **AND** sodium chloride 0.9%    Family History       Problem Relation (Age of Onset)    Cancer Father          Tobacco Use    Smoking status: Former     Current packs/day: 0.00     Types: Cigarettes     Quit date: 5/27/2013     Years since quitting: 10.5    Smokeless tobacco: Never   Substance and Sexual Activity    Alcohol use: Yes     Comment: occ    Drug use: Never    Sexual activity: Yes     Partners: Female       Review of Systems   Constitutional:  Positive for appetite change.   Respiratory:  Positive for cough. Negative for shortness of breath.    Gastrointestinal:  Positive for abdominal distention, abdominal pain and nausea. Negative for blood in stool and vomiting.     Objective:     Vital Signs (Most Recent):  Temp: 98.5 °F (36.9 °C) (12/29/23 0716)  Pulse: 90 (12/29/23 1122)  Resp: 18 (12/29/23 1122)  BP: (!) 155/70 (12/29/23 0716)  SpO2: (!) 94 % (12/29/23 1122) Vital Signs (24h Range):  Temp:  [98.5 °F (36.9 °C)-99.1 °F (37.3 °C)] 98.5 °F (36.9 °C)  Pulse:  [] 90  Resp:  [18-20] 18  SpO2:  [84 %-96 %] 94 %  BP: (135-155)/(65-70) 155/70     Weight: 85.5 kg (188 lb 7.9 oz)  Body mass index is 26.29 kg/m².       Physical Exam  Pulmonary:      Effort: Pulmonary effort is normal.   Skin:     General: Skin is warm.   Neurological:      Mental Status: He is alert and oriented to person, place, and time.   Psychiatric:         Mood and Affect: Mood normal.         Behavior: Behavior normal.            Review of Symptoms      Symptom Assessment  "(ESAS 0-10 Scale)  Pain:  0  Dyspnea:  0  Anxiety:  0  Nausea:  0  Depression:  0  Anorexia:  0  Fatigue:  0  Insomnia:  0  Restlessness:  0  Agitation:  0         Pain Assessment:    Location(s): abdomen    Abdomen       Location: generalized        Quality: Cramping        Quantity: 5/10 in intensity        Chronicity: Onset 2 week(s) ago, gradually worsening        Aggravating Factors: Eating        Alleviating Factors: Opiates        Associated Symptoms: Nausea    ECOG Performance Status stGstrstastdstest:st st1st Living Arrangements:  Lives with spouse    Psychosocial/Cultural:   See Palliative Psychosocial Note: Yes  **Primary  to Follow**  Palliative Care  Consult: Yes        Advance Care Planning   Advance Directives:   Living Will: No    LaPOST: No    Do Not Resuscitate Status: No    Goals of Care: What is most important right now is to focus on symptom/pain control. Accordingly, we have decided that the best plan to meet the patient's goals includes continuing with treatment.         Significant Labs: BMP:   Recent Labs   Lab 12/29/23  0450   *      K 3.6   CL 94*   CO2 39*   BUN 16   CREATININE 0.8   CALCIUM 7.9*   MG 1.7       CBC:   Recent Labs   Lab 12/28/23  0419 12/29/23  0450   WBC 13.44* 12.64   HGB 11.4* 10.7*   HCT 33.6* 32.2*    230       CBC:   Recent Labs   Lab 12/29/23 0450   WBC 12.64   HGB 10.7*   HCT 32.2*   MCV 75*          BMP:  Recent Labs   Lab 12/29/23 0450   *      K 3.6   CL 94*   CO2 39*   BUN 16   CREATININE 0.8   CALCIUM 7.9*   MG 1.7       LFT:  Lab Results   Component Value Date    AST 38 12/29/2023    ALKPHOS 127 12/29/2023    BILITOT 0.3 12/29/2023     Albumin:   Albumin   Date Value Ref Range Status   12/29/2023 1.4 (L) 3.5 - 5.2 g/dL Final     Protein:   Total Protein   Date Value Ref Range Status   12/29/2023 5.1 (L) 6.0 - 8.4 g/dL Final     Lactic acid:   No results found for: "LACTATE"    Significant Imaging: I have " reviewed all pertinent imaging results/findings within the past 24 hours.

## 2023-12-29 NOTE — PROGRESS NOTES
[facility] - [facility]  General Surgery  Progress Note    Subjective:     History of Present Illness:  No notes on file    Post-Op Info:  * No surgery found *         Interval History: No acute events. Tolerated regular diet yesterday, no vomiting but did have some nausea. Now resolved.     Medications:  Continuous Infusions:  Scheduled Meds:   cetirizine  10 mg Oral Daily    enoxparin  40 mg Subcutaneous Q24H (prophylaxis, 1700)    [START ON 12/30/2023] fluconazole  200 mg Oral Daily    guaiFENesin  600 mg Oral BID    levalbuterol  0.63 mg Nebulization Q4H WAKE    mirtazapine  15 mg Oral QHS    pantoprazole  40 mg Oral Daily    sodium chloride 0.9%  10 mL Intravenous Q6H    sulfamethoxazole-trimethoprim 800-160mg  1 tablet Oral BID     PRN Meds:dextrose 10%, dextrose 10%, glucagon (human recombinant), iohexol, melatonin, morphine, naloxone, ondansetron, oxyCODONE, prochlorperazine, Flushing PICC/Midline Protocol **AND** sodium chloride 0.9% **AND** sodium chloride 0.9%     Review of patient's allergies indicates:   Allergen Reactions    Dilaudid [hydromorphone]      Pt becomes hypoxic - requiring 5L via oxymask for     Penicillin     Penicillins Rash     Objective:     Vital Signs (Most Recent):  Temp: 98.5 °F (36.9 °C) (12/29/23 1610)  Pulse: 94 (12/29/23 1610)  Resp: 18 (12/29/23 1610)  BP: 129/66 (12/29/23 1610)  SpO2: 95 % (12/29/23 1610) Vital Signs (24h Range):  Temp:  [98.5 °F (36.9 °C)-99.1 °F (37.3 °C)] 98.5 °F (36.9 °C)  Pulse:  [] 94  Resp:  [18-20] 18  SpO2:  [77 %-96 %] 95 %  BP: (129-155)/(65-70) 129/66     Weight: 85.5 kg (188 lb 7.9 oz)  Body mass index is 26.29 kg/m².    Intake/Output - Last 3 Shifts         12/27 0700 12/28 0659 12/28 0700 12/29 0659 12/29 0700  12/30 0659    P.O.   240    I.V. (mL/kg)       Other  30     IV Piggyback       TPN       Total Intake(mL/kg)  30 (0.4) 240 (2.8)    Urine (mL/kg/hr) 750 (0.4) 1325 (0.6) 850 (1)    Drains 425 240 30    Other 200 100     Stool 0 0      Total Output 1375 1665 880    Net -1375 -1635 -640           Urine Occurrence  2 x     Stool Occurrence 0 x 2 x              Physical Exam  Vitals and nursing note reviewed.   HENT:      Head: Normocephalic and atraumatic.   Cardiovascular:      Rate and Rhythm: Normal rate.      Pulses: Normal pulses.   Pulmonary:      Effort: Pulmonary effort is normal. No respiratory distress.   Abdominal:      General: There is no distension.      Tenderness: There is abdominal tenderness. There is no guarding.      Comments: 2x IR drains with thin output, less bilious in appearance today, more serous in character   Distension improved   Musculoskeletal:         General: Normal range of motion.      Cervical back: Normal range of motion.   Skin:     General: Skin is warm and dry.   Neurological:      Mental Status: He is alert. Mental status is at baseline.   Psychiatric:         Mood and Affect: Mood normal.         Behavior: Behavior normal.          Significant Labs:  I have reviewed all pertinent lab results within the past 24 hours.  CBC:   Recent Labs   Lab 12/29/23  0450   WBC 12.64   RBC 4.29*   HGB 10.7*   HCT 32.2*      MCV 75*   MCH 24.9*   MCHC 33.2     CMP:   Recent Labs   Lab 12/29/23  0450   *   CALCIUM 7.9*   ALBUMIN 1.4*   PROT 5.1*      K 3.6   CO2 39*   CL 94*   BUN 16   CREATININE 0.8   ALKPHOS 127   ALT 27   AST 38   BILITOT 0.3       Significant Diagnostics:  I have reviewed all pertinent imaging results/findings within the past 24 hours.  Assessment/Plan:     * Postprocedural intraabdominal abscess  64 yo M with prior duodenal stump leak after palliative distal gastrectomy with B2 recon presenting with acute worsening of his intraabdominal collection after his second cycle of chemo. IR drain placed at OSH with 1.5L of output initially.     -Regular diet, wean TPN- not reordering  -Transition IV antibiotics to oral per culture results - PO fluconazole and bactrim  -Will rescan this  weekend to ensure adequate drainage  -PRN analgesia/antiemetics  -Drain care, flush drain; will place back drain to gravity drainage bag  -Lasix 20 today, will re-eval for re-dose this afternoon  -Aggressive pulm hygiene. CPT  -Palliative care following along for GOC discussion, appreciate assistance  -Lovenox  -Protonix    Dispo: RUDOLPH Garcia MD  General Surgery  [facility] Thurman - [facility]

## 2023-12-29 NOTE — PROGRESS NOTES
[facility]  Palliative Medicine  Progress Note    Patient Name: Danish Valladares  MRN: [id number]  Admission Date: 12/23/2023  Hospital Length of Stay: 6 days  Code Status: Full Code   Attending Provider: Eulogio Moy, *  Consulting Provider: GUDELIA Rodriges  Primary Care Physician: Yfn Walker MD  Principal Problem:Postprocedural intraabdominal abscess    Patient information was obtained from patient, spouse/SO, and primary team.      Assessment/Plan:     Palliative Care  Palliative care encounter  Impression  63-year-old male with metastatic gastric cancer undergoing palliative chemotherapy followed by Dr. 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 Moy 8/21/23. Admitted this hospitalization with sepsis, afib w/RVR, IAIN, and worsening intraabdominal collection/abscess after second cycle of chemo. 1.5L drained at OSH.      Today pt is lying in bed awaiting IR procedure for drainage of abscess. I spoke with him and his wife at . His immediate hope is to feel better after the drainage of abscess and hopefully continue his palliative chemo. He reports that his pain in well controlled with IV morphine,  as he is NPO at this time.     Reason for Consult : Per communication with primary team, palliative to follow for symptom/pain management and continuation of GOC planning, as he was previously seen at outside by Palliative.     ACP/GOC:12/29/23: Visited pt today and he is stating that his pain is well controlled, requiring minimal pain medication per chart review. Pt denies any other symptoms. He and wife are hopeful to go home next Tuesday.      12/27/23: This am pt is being visited by his family and grandchildren upon my visit. He is reporting that his pain is well controlled and having no other symptoms to address at this time. Due to his family visitation, it is not an appropriate time to discuss  "GOC. I spoke with his wife who reports that they have a great support system and appreciates our consult. I will go back to continue discussion with patient and wife.    12/26/23Patient was initially seen by Palliative Medicine on 12/22/23 at [facility]. At that time pt was wanting to continue all treatment and was seen primarily for pain/symptom management. Per chart review, he knew a consult was placed but didn't feel he needed palliative support until his recent hospitalization.      Pt remains hopeful for continued palliative chemotherapy and relief after IR today.      MPOA: Wife, Fatimah, is POA. Pt and wife also report having 2 adult children that are supportive of their decisions.     Symptoms   Pain: Pt reports pain controlled with IV morphine currently, as pt is NPO for procedure today. Pt verbalizes he has had dilaudid in the past and did not like the sedating effects. Will continue to monitor and likely continue previous Palliative recommendations for pain meds when appropriate.      Abdominal Discomfort: Pt reports tight "bloating-like" feeling to abdomen. He feels it will resolve after IR procedure today. Will continue to monitor this after procedure. Pain is likely cancer related, but may be exacerbated at current from abscess.       Recommendations   -Will appreciate recs from primary and Oncology relating to possibility of continuation of palliative chemo.  -Will continue to follow to assist with updated/changing GOC as cancer and related symptoms progress.        I will sign off. Please contact us if you have any additional questions.    Subjective:     Chief Complaint: No chief complaint on file.      HPI:   Per chart review, Mr Danish Valladares is a 63-year-old male with metastatic gastric cancer undergoing palliative chemotherapy followed by Dr. Maravilla, gastric outlet obstruction secondary to gastric cancer s/p palliative open distal gastrectomy with Bilroth II reconstruction per  " [facility] 8/21/23, duodenal stump leak s/p exp lap drainage of peritoneal abscess and EGD with visualization of duodenal defect 9/18/23, and IR drain placement 9/29/23 who presents to the ED on 12/19/23  with generalized abdominal pain.  Patient gets palliative chemotherapy (FOLFOX6 - oxaliplatin leucovorin fluorouracil) every 2 weeks with last treatment given on 12/13/23. Patient was admitted with generalized abdominal pain and possible acute acalculous cholecystitis.  General surgery and Hem/Onc were consulted.  Patient was given IV antibiotics and IV fluids.  HIDA scan was negative for cystic duct obstruction.  Antibiotics were discontinued 12/20.  Patient noted with increased lethargy, abdominal distention and acute kidney injury 12/22.  Also noted with atrial fibrillation with RVR. CT abdomen pelvis without contrast performed that showed multiple fluid collections consistent with abscesses.  Patient discussed with general surgery who notes that fluid collections are new and recommended transfer to [facility] given complicated surgical oncology history.  Became hypotensive and in A-fib RVR during IR drain placement.  Received Lopressor 2.5 mg IV at 11:30 a.m. however remained with heart rate 155 therefore digoxin 250 mcg x1 ordered.  Order for transfer request to [facility] placed. Patient discussed with transfer center and patient has been accepted for transfer to Surgical Oncology, Dr. Eulogio Moy.     Upon arrival to SICU pt is HDS. Pt is in A-fib, however rate controlled with stable MAPs. He is stable on supplemental oxygen via NC. Upon arrival, pt appears to be confused and is complaining of abdominal pain. Abdomen is tense and distended. Biliary drain is in place on right side with bilious drainage    Hospital Course:  No notes on file      Past Medical History:   Diagnosis Date    Psoriasis     Stomach cancer        Past Surgical History:   Procedure Laterality Date    DIAGNOSTIC  LAPAROSCOPY  09/18/2023    Procedure: LAPAROSCOPY, DIAGNOSTIC;  Surgeon: Eulogio Moy MD;  Location: [facility] OR 2ND FLR;  Service: General;;    ESOPHAGOGASTRODUODENOSCOPY N/A 08/03/2023    Procedure: EGD (ESOPHAGOGASTRODUODENOSCOPY);  Surgeon: Loco Marvin MD;  Location: [facility] ENDO;  Service: Gastroenterology;  Laterality: N/A;    ESOPHAGOGASTRODUODENOSCOPY N/A 09/18/2023    Procedure: EGD (ESOPHAGOGASTRODUODENOSCOPY); flouroscopy, need c-arm in the room;  Surgeon: Eulogio Moy MD;  Location: [facility] OR 2ND FLR;  Service: General;  Laterality: N/A;  EGD with Fluoroscopy     GASTRECTOMY N/A 08/21/2023    Procedure: GASTRECTOMY;  Surgeon: Eulogio Moy MD;  Location: [facility] OR 2ND FLR;  Service: General;  Laterality: N/A;  Open gastrectomy with EGD. Needs Omni retractor, requesting room 24    INSERTION OF TUNNELED CENTRAL VENOUS CATHETER (CVC) WITH SUBCUTANEOUS PORT  11/21/2023    Procedure: [id number]-CATH;  Surgeon: Talon Arias MD;  Location: [facility] OR;  Service: General;;    LAPAROSCOPIC DRAINAGE OF ABDOMEN N/A 09/18/2023    Procedure: DRAINAGE, ABDOMEN, LAPAROSCOPIC;  Surgeon: Eulogio Moy MD;  Location: [facility] OR 2ND FLR;  Service: General;  Laterality: N/A;    TONSILLECTOMY         Review of patient's allergies indicates:   Allergen Reactions    Dilaudid [hydromorphone]      Pt becomes hypoxic - requiring 5L via oxymask for     Penicillin     Penicillins Rash       Medications:  Continuous Infusions:      Scheduled Meds:   cetirizine  10 mg Oral Daily    enoxparin  40 mg Subcutaneous Q24H (prophylaxis, 1700)    fluconazole (DIFLUCAN) IV (PEDS and ADULTS)  200 mg Intravenous Q24H    guaiFENesin  600 mg Oral BID    levalbuterol  0.63 mg Nebulization Q4H WAKE    magnesium oxide  800 mg Oral Once    mirtazapine  15 mg Oral QHS    pantoprazole  40 mg Oral Daily    piperacillin-tazobactam (Zosyn) IV (PEDS and ADULTS) (extended infusion is not appropriate)  4.5 g  Intravenous Q8H    potassium, sodium phosphates  2 packet Oral Once    sodium chloride 0.9%  10 mL Intravenous Q6H     PRN Meds:dextrose 10%, dextrose 10%, glucagon (human recombinant), iohexol, melatonin, morphine, naloxone, ondansetron, oxyCODONE, prochlorperazine, Flushing PICC/Midline Protocol **AND** sodium chloride 0.9% **AND** sodium chloride 0.9%    Family History       Problem Relation (Age of Onset)    Cancer Father          Tobacco Use    Smoking status: Former     Current packs/day: 0.00     Types: Cigarettes     Quit date: 5/27/2013     Years since quitting: 10.5    Smokeless tobacco: Never   Substance and Sexual Activity    Alcohol use: Yes     Comment: occ    Drug use: Never    Sexual activity: Yes     Partners: Female       Review of Systems   Constitutional:  Positive for appetite change.   Respiratory:  Positive for cough. Negative for shortness of breath.    Gastrointestinal:  Positive for abdominal distention, abdominal pain and nausea. Negative for blood in stool and vomiting.     Objective:     Vital Signs (Most Recent):  Temp: 98.5 °F (36.9 °C) (12/29/23 0716)  Pulse: 90 (12/29/23 1122)  Resp: 18 (12/29/23 1122)  BP: (!) 155/70 (12/29/23 0716)  SpO2: (!) 94 % (12/29/23 1122) Vital Signs (24h Range):  Temp:  [98.5 °F (36.9 °C)-99.1 °F (37.3 °C)] 98.5 °F (36.9 °C)  Pulse:  [] 90  Resp:  [18-20] 18  SpO2:  [84 %-96 %] 94 %  BP: (135-155)/(65-70) 155/70     Weight: 85.5 kg (188 lb 7.9 oz)  Body mass index is 26.29 kg/m².       Physical Exam  Pulmonary:      Effort: Pulmonary effort is normal.   Skin:     General: Skin is warm.   Neurological:      Mental Status: He is alert and oriented to person, place, and time.   Psychiatric:         Mood and Affect: Mood normal.         Behavior: Behavior normal.            Review of Symptoms      Symptom Assessment (ESAS 0-10 Scale)  Pain:  0  Dyspnea:  0  Anxiety:  0  Nausea:  0  Depression:  0  Anorexia:  0  Fatigue:  0  Insomnia:  0  Restlessness:   "0  Agitation:  0         Pain Assessment:    Location(s): abdomen    Abdomen       Location: generalized        Quality: Cramping        Quantity: 5/10 in intensity        Chronicity: Onset 2 week(s) ago, gradually worsening        Aggravating Factors: Eating        Alleviating Factors: Opiates        Associated Symptoms: Nausea    ECOG Performance Status rdGrdrrdarddrderd:rd rd3rd Living Arrangements:  Lives with spouse    Psychosocial/Cultural:   See Palliative Psychosocial Note: Yes  **Primary  to Follow**  Palliative Care  Consult: Yes        Advance Care Planning  Advance Directives:   Living Will: No    LaPOST: No    Do Not Resuscitate Status: No    Goals of Care: What is most important right now is to focus on symptom/pain control. Accordingly, we have decided that the best plan to meet the patient's goals includes continuing with treatment.         Significant Labs: BMP:   Recent Labs   Lab 12/29/23  0450   *      K 3.6   CL 94*   CO2 39*   BUN 16   CREATININE 0.8   CALCIUM 7.9*   MG 1.7       CBC:   Recent Labs   Lab 12/28/23  0419 12/29/23  0450   WBC 13.44* 12.64   HGB 11.4* 10.7*   HCT 33.6* 32.2*    230       CBC:   Recent Labs   Lab 12/29/23  0450   WBC 12.64   HGB 10.7*   HCT 32.2*   MCV 75*          BMP:  Recent Labs   Lab 12/29/23  0450   *      K 3.6   CL 94*   CO2 39*   BUN 16   CREATININE 0.8   CALCIUM 7.9*   MG 1.7       LFT:  Lab Results   Component Value Date    AST 38 12/29/2023    ALKPHOS 127 12/29/2023    BILITOT 0.3 12/29/2023     Albumin:   Albumin   Date Value Ref Range Status   12/29/2023 1.4 (L) 3.5 - 5.2 g/dL Final     Protein:   Total Protein   Date Value Ref Range Status   12/29/2023 5.1 (L) 6.0 - 8.4 g/dL Final     Lactic acid:   No results found for: "LACTATE"    Significant Imaging: I have reviewed all pertinent imaging results/findings within the past 24 hours.      Elizabeth Nguyen, CNS  Palliative Medicine  [facility] - " [facility]

## 2023-12-29 NOTE — PHYSICIAN QUERY
PT Name: Danish Valladares  MR #: [id number]     DOCUMENTATION CLARIFICATION      CDS: Everette MARIN,RN        Contact information:[email or web address]  This form is a permanent document in the medical record.    Query Date: December 29, 2023    By submitting this query, we are merely seeking further clarification of documentation to reflect the severity of illness of your patient. Please utilize your independent clinical judgment when addressing the question(s) below.     The Medical Record contains the following:   Indicators   Supporting Clinical Findings Location in Medical Record   x Documentation of Sepsis, Septic Shock Severe sepsis  Blood pressure remains stable.  Not requiring pressors.  12/23 [facility] H&P    Blood Culture      Respiratory Culture      Urine Culture     x Other Culture Specimen: Abdomen; Abscess  Fungus Culture: Candida Albicans Many      Aerobic Bacterial Culture: Candida Albicans Many  Aerobic Bacterial Culture: Escherichia Coli Rare  Aerobic Bacterial Culture: Lactobacillus Species Many  Aerobic Bacterial Culture: Haemophilus Parainfluenzae Many  Beta Lactamase Negative  12/22 Fungus Lab from previous encounter        12/22 Aerobic Labs from previous   encounter   x Acute/Chronic Illness Atrial fibrillation with RVR, metastatic gastric cancer, IAIN,peritonitis 12/23  [facility] H&P   x Medication/Treatment Fluconazole 200 mg Intravenous  Every 24 hours  Piperacillin tazobactam 4.5 g  in dextrose 5% in water 100 ml IVPB Intravenous  Every 8 hours  Piperacillin tazobactam 4.5 g in dextrose 5% in water 100 ml IVPB  Intravenous  Every 12 hours  12/23--->present MAR     12/23 ---->present MAR     12/23 MAR    Other        Provider, please further specify the  sepsis  diagnosis: Check All That Apply  [   x] Due to E. Coli   [ x  ] Due to Candida Albicans    [  x ] Due to Lactobacillus Species   [   x] Due to Haemophilus Parainfluenzae   [   ] Due to (please specify):  __________________________________   [   ] Other specification (please specify): ____________________   [   ] Clinically Undetermined       Please document in your progress notes daily for the duration of treatment until resolved, and include in your discharge summary.  Form No. [id number]

## 2023-12-30 NOTE — NURSING
Pt sitting in chair on room air. Sats 85%. Pt denies sob. RR even and unlabored. Nad noted. Resp tx in room initiating breathing tx.      1157 breating tx in progress sats 88%.     1159 sats remian 87-88% o2 2l nc applied. Nad noted.    1205 sats 89%.  Resp tx complete. O2 increased to 3l nc. Sats slowly increased to 92%. Pt unable to complete walking test.  Dr Reddy notified of sats

## 2023-12-30 NOTE — SUBJECTIVE & OBJECTIVE
Interval History: No acute events overnight. Let TPN run out yesterday. Tolerating a regular diet. Transitioned to oral antibiotics yesterday and WBC remained stable. Passing gas, not having bowel function.     Medications:  Continuous Infusions:  Scheduled Meds:   cetirizine  10 mg Oral Daily    enoxparin  40 mg Subcutaneous Q24H (prophylaxis, 1700)    fluconazole  200 mg Oral Daily    guaiFENesin  600 mg Oral BID    levalbuterol  0.63 mg Nebulization Q4H WAKE    mirtazapine  15 mg Oral QHS    pantoprazole  40 mg Oral Daily    sodium chloride 0.9%  10 mL Intravenous Q6H    sulfamethoxazole-trimethoprim 800-160mg  1 tablet Oral BID     PRN Meds:acetaminophen, dextrose 10%, dextrose 10%, glucagon (human recombinant), iohexol, melatonin, morphine, naloxone, ondansetron, oxyCODONE, prochlorperazine, Flushing PICC/Midline Protocol **AND** sodium chloride 0.9% **AND** sodium chloride 0.9%     Review of patient's allergies indicates:   Allergen Reactions    Dilaudid [hydromorphone]      Pt becomes hypoxic - requiring 5L via oxymask for     Penicillin     Penicillins Rash     Objective:     Vital Signs (Most Recent):  Temp: 98.2 °F (36.8 °C) (12/30/23 0711)  Pulse: 88 (12/30/23 0800)  Resp: 18 (12/30/23 0800)  BP: (!) 115/59 (12/30/23 0711)  SpO2: 96 % (12/30/23 0800) Vital Signs (24h Range):  Temp:  [98.2 °F (36.8 °C)-98.6 °F (37 °C)] 98.2 °F (36.8 °C)  Pulse:  [] 88  Resp:  [18] 18  SpO2:  [77 %-98 %] 96 %  BP: (115-143)/(59-68) 115/59     Weight: 85.5 kg (188 lb 7.9 oz)  Body mass index is 26.29 kg/m².    Intake/Output - Last 3 Shifts         12/28 0700 12/29 0659 12/29 0700 12/30 0659 12/30 0700 12/31 0659    P.O.  240     Other 30      Total Intake(mL/kg) 30 (0.4) 240 (2.8)     Urine (mL/kg/hr) 1325 (0.6) 1150 (0.6)     Drains 240 80     Other 100 170     Stool 0      Total Output 1665 1400     Net -1635 -1160            Urine Occurrence 2 x      Stool Occurrence 2 x               Physical Exam  Vitals and  nursing note reviewed.   HENT:      Head: Normocephalic and atraumatic.   Cardiovascular:      Rate and Rhythm: Normal rate.      Pulses: Normal pulses.   Pulmonary:      Effort: Pulmonary effort is normal. No respiratory distress.   Abdominal:      General: There is no distension.      Tenderness: There is abdominal tenderness. There is no guarding.      Comments: 2x IR drains with thin output, less bilious in appearance today, more serous in character  Back drain hooked to gravity bag   Distension improved   Musculoskeletal:         General: Normal range of motion.   Skin:     General: Skin is warm and dry.   Neurological:      Mental Status: He is alert. Mental status is at baseline.   Psychiatric:         Mood and Affect: Mood normal.         Behavior: Behavior normal.          Significant Labs:  I have reviewed all pertinent lab results within the past 24 hours.  CBC:   Recent Labs   Lab 12/30/23  0442   WBC 12.04   RBC 4.33*   HGB 10.8*   HCT 32.7*      MCV 76*   MCH 24.9*   MCHC 33.0     CMP:   Recent Labs   Lab 12/30/23 0442   GLU 77   CALCIUM 7.9*   ALBUMIN 1.4*   PROT 5.4*      K 4.1   CO2 40*   CL 90*   BUN 15   CREATININE 0.9   ALKPHOS 132   ALT 31   AST 34   BILITOT 0.4       Significant Diagnostics:  I have reviewed all pertinent imaging results/findings within the past 24 hours.

## 2023-12-30 NOTE — ASSESSMENT & PLAN NOTE
62 yo M with prior duodenal stump leak after palliative distal gastrectomy with B2 recon presenting with acute worsening of his intraabdominal collection after his second cycle of chemo. IR drain placed at OSH with 1.5L of output initially.     -Regular diet  -Transition IV antibiotics to oral per culture results - PO fluconazole and bactrim  -Will rescan this weekend to ensure adequate drainage  -PRN analgesia/antiemetics  -Drain care, flush drain; back drain to gravity drainage bag  -Aggressive pulm hygiene. CPT  -Palliative care following along for GOC discussion, appreciate assistance  -Lovenox  -Protonix    Dispo: [facility]

## 2023-12-30 NOTE — PROGRESS NOTES
Sidd[facility]artha Thurman - [facility]  General Surgery  Progress Note    Subjective:     History of Present Illness:  No notes on file    Post-Op Info:  * No surgery found *         Interval History: No acute events overnight. Let TPN run out yesterday. Tolerating a regular diet. Transitioned to oral antibiotics yesterday and WBC remained stable. Passing gas, not having bowel function.     Medications:  Continuous Infusions:  Scheduled Meds:   cetirizine  10 mg Oral Daily    enoxparin  40 mg Subcutaneous Q24H (prophylaxis, 1700)    fluconazole  200 mg Oral Daily    guaiFENesin  600 mg Oral BID    levalbuterol  0.63 mg Nebulization Q4H WAKE    mirtazapine  15 mg Oral QHS    pantoprazole  40 mg Oral Daily    sodium chloride 0.9%  10 mL Intravenous Q6H    sulfamethoxazole-trimethoprim 800-160mg  1 tablet Oral BID     PRN Meds:acetaminophen, dextrose 10%, dextrose 10%, glucagon (human recombinant), iohexol, melatonin, morphine, naloxone, ondansetron, oxyCODONE, prochlorperazine, Flushing PICC/Midline Protocol **AND** sodium chloride 0.9% **AND** sodium chloride 0.9%     Review of patient's allergies indicates:   Allergen Reactions    Dilaudid [hydromorphone]      Pt becomes hypoxic - requiring 5L via oxymask for     Penicillin     Penicillins Rash     Objective:     Vital Signs (Most Recent):  Temp: 98.2 °F (36.8 °C) (12/30/23 0711)  Pulse: 88 (12/30/23 0800)  Resp: 18 (12/30/23 0800)  BP: (!) 115/59 (12/30/23 0711)  SpO2: 96 % (12/30/23 0800) Vital Signs (24h Range):  Temp:  [98.2 °F (36.8 °C)-98.6 °F (37 °C)] 98.2 °F (36.8 °C)  Pulse:  [] 88  Resp:  [18] 18  SpO2:  [77 %-98 %] 96 %  BP: (115-143)/(59-68) 115/59     Weight: 85.5 kg (188 lb 7.9 oz)  Body mass index is 26.29 kg/m².    Intake/Output - Last 3 Shifts         12/28 0700 12/29 0659 12/29 0700  12/30 0659 12/30 0700 12/31 0659    P.O.  240     Other 30      Total Intake(mL/kg) 30 (0.4) 240 (2.8)     Urine (mL/kg/hr) 1325 (0.6) 1150 (0.6)     Drains 240 80     Other 100 170      Stool 0      Total Output 1665 1400     Net -1635 -1160            Urine Occurrence 2 x      Stool Occurrence 2 x               Physical Exam  Vitals and nursing note reviewed.   HENT:      Head: Normocephalic and atraumatic.   Cardiovascular:      Rate and Rhythm: Normal rate.      Pulses: Normal pulses.   Pulmonary:      Effort: Pulmonary effort is normal. No respiratory distress.   Abdominal:      General: There is no distension.      Tenderness: There is abdominal tenderness. There is no guarding.      Comments: 2x IR drains with thin output, less bilious in appearance today, more serous in character  Back drain hooked to gravity bag   Distension improved   Musculoskeletal:         General: Normal range of motion.   Skin:     General: Skin is warm and dry.   Neurological:      Mental Status: He is alert. Mental status is at baseline.   Psychiatric:         Mood and Affect: Mood normal.         Behavior: Behavior normal.          Significant Labs:  I have reviewed all pertinent lab results within the past 24 hours.  CBC:   Recent Labs   Lab 12/30/23  0442   WBC 12.04   RBC 4.33*   HGB 10.8*   HCT 32.7*      MCV 76*   MCH 24.9*   MCHC 33.0     CMP:   Recent Labs   Lab 12/30/23  0442   GLU 77   CALCIUM 7.9*   ALBUMIN 1.4*   PROT 5.4*      K 4.1   CO2 40*   CL 90*   BUN 15   CREATININE 0.9   ALKPHOS 132   ALT 31   AST 34   BILITOT 0.4       Significant Diagnostics:  I have reviewed all pertinent imaging results/findings within the past 24 hours.  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 IR drain placed at OSH with 1.5L of output initially.     -Regular diet  -Transition IV antibiotics to oral per culture results - PO fluconazole and bactrim  -Will rescan this weekend to ensure adequate  drainage  -PRN analgesia/antiemetics  -Drain care, flush drain; back drain to gravity drainage bag  -Aggressive pulm hygiene. CPT  -Palliative care following along for GOC discussion, appreciate assistance  -Lovenox  -Protonix    Dispo: [facility] Kim MD  General Surgery  Siddhartha Thurman - [facility]

## 2023-12-30 NOTE — PLAN OF CARE
Problem: Adult Inpatient Plan of Care  Goal: Plan of Care Review  Outcome: Ongoing, Progressing  Goal: Patient-Specific Goal (Individualized)  Outcome: Ongoing, Progressing  Goal: Absence of Hospital-Acquired Illness or Injury  Outcome: Ongoing, Progressing  Goal: Optimal Comfort and Wellbeing  Outcome: Ongoing, Progressing  Goal: Readiness for Transition of Care  Outcome: Ongoing, Progressing     Problem: Fluid and Electrolyte Imbalance (Acute Kidney Injury/Impairment)  Goal: Fluid and Electrolyte Balance  Outcome: Ongoing, Progressing     Problem: Oral Intake Inadequate (Acute Kidney Injury/Impairment)  Goal: Optimal Nutrition Intake  Outcome: Ongoing, Progressing     Problem: Renal Function Impairment (Acute Kidney Injury/Impairment)  Goal: Effective Renal Function  Outcome: Ongoing, Progressing     Problem: Coping Ineffective (Oncology Care)  Goal: Effective Coping  Outcome: Ongoing, Progressing     Problem: Fatigue (Oncology Care)  Goal: Improved Activity Tolerance  Outcome: Ongoing, Progressing     Problem: Oral Intake Altered (Oncology Care)  Goal: Optimal Oral Intake  Outcome: Ongoing, Progressing     Problem: Oral Mucositis (Oncology Care)  Goal: Improved Oral Mucous Membrane Integrity  Outcome: Ongoing, Progressing     Problem: Pain Acute (Oncology Care)  Goal: Optimal Pain Control  Outcome: Ongoing, Progressing     Problem: Infection  Goal: Absence of Infection Signs and Symptoms  Outcome: Ongoing, Progressing     Problem: Impaired Wound Healing  Goal: Optimal Wound Healing  Outcome: Ongoing, Progressing     Problem: Skin Injury Risk Increased  Goal: Skin Health and Integrity  Outcome: Ongoing, Progressing     Problem: Coping Ineffective  Goal: Effective Coping  Outcome: Ongoing, Progressing     Problem: Fall Injury Risk  Goal: Absence of Fall and Fall-Related Injury  Outcome: Ongoing, Progressing     Problem: Restraint, Nonbehavioral (Nonviolent)  Goal: Absence of Harm or Injury  Outcome: Ongoing,  Progressing  [facility] Plan of Care Note  Dx Final diagnoses:  [K65.9] Peritonitis      Shift Events failed attempt top wean o2 sats decreased to 77%. Slowly increased when o2 1l nc placed.  Tpn dc'd    Neuro: aox4    Vital Signs: wnl    Respiratory: wnl    Diet: reg    Urine Output/Bowel Movement: lasix given/    Activity level? Amb ulated in travis x1    Any scheduled procedures? n    Any safety concerns? n

## 2023-12-31 PROBLEM — J43.9 EMPHYSEMATOUS BLEB OF LUNG: Chronic | Status: ACTIVE | Noted: 2023-12-31

## 2023-12-31 PROBLEM — C79.9 METASTATIC ADENOCARCINOMA: Chronic | Status: ACTIVE | Noted: 2023-12-31

## 2023-12-31 NOTE — SUBJECTIVE & OBJECTIVE
Interval History: No acute events. Tolerating regular diet without issues. Had a bowel movement this morning and feels that his distension is improved. Pending CT scan this AM.     Medications:  Continuous Infusions:   dextrose 5 % and 0.45 % NaCl with KCl 20 mEq 100 mL/hr at 12/31/23 0903     Scheduled Meds:   cetirizine  10 mg Oral Daily    enoxparin  40 mg Subcutaneous Q24H (prophylaxis, 1700)    fluconazole  200 mg Oral Daily    guaiFENesin  600 mg Oral BID    levalbuterol  0.63 mg Nebulization Q4H WAKE    mirtazapine  15 mg Oral QHS    pantoprazole  40 mg Oral Daily    sodium chloride 0.9%  10 mL Intravenous Q6H    sulfamethoxazole-trimethoprim 800-160mg  1 tablet Oral BID     PRN Meds:acetaminophen, dextrose 10%, dextrose 10%, glucagon (human recombinant), iohexol, iohexol, melatonin, naloxone, ondansetron, oxyCODONE, oxyCODONE, prochlorperazine, Flushing PICC/Midline Protocol **AND** sodium chloride 0.9% **AND** sodium chloride 0.9%     Review of patient's allergies indicates:   Allergen Reactions    Dilaudid [hydromorphone]      Pt becomes hypoxic - requiring 5L via oxymask for     Penicillin     Penicillins Rash     Objective:     Vital Signs (Most Recent):  Temp: 99.2 °F (37.3 °C) (12/31/23 1129)  Pulse: 79 (12/31/23 1129)  Resp: 18 (12/31/23 1129)  BP: 132/64 (12/31/23 1129)  SpO2: (!) 92 % (12/31/23 1129) Vital Signs (24h Range):  Temp:  [97.5 °F (36.4 °C)-99.2 °F (37.3 °C)] 99.2 °F (37.3 °C)  Pulse:  [62-85] 79  Resp:  [16-18] 18  SpO2:  [85 %-96 %] 92 %  BP: (114-132)/(59-64) 132/64     Weight: 85.5 kg (188 lb 7.9 oz)  Body mass index is 26.29 kg/m².    Intake/Output - Last 3 Shifts         12/29 0700 12/30 0659 12/30 0700 12/31 0659 12/31 0700 01/01 0659    P.O. 240 1220     Other  10     Total Intake(mL/kg) 240 (2.8) 1230 (14.4)     Urine (mL/kg/hr) 1150 (0.6) 1875 (0.9)     Drains 80 100     Other 170 345     Stool       Total Output 1400 2320     Net -1160 -1090                     Physical  Exam  Vitals and nursing note reviewed.   HENT:      Head: Normocephalic and atraumatic.   Eyes:      Extraocular Movements: Extraocular movements intact.      Conjunctiva/sclera: Conjunctivae normal.   Cardiovascular:      Rate and Rhythm: Normal rate.      Pulses: Normal pulses.   Pulmonary:      Effort: Pulmonary effort is normal. No respiratory distress.   Abdominal:      General: There is no distension.      Palpations: Abdomen is soft.      Tenderness: There is abdominal tenderness (improved). There is no guarding.      Comments: 2x IR drains with thin output.   Lateral drain with bland output  Back drain hooked to gravity bag and output is bilious  Distension improved   Musculoskeletal:         General: Normal range of motion.      Cervical back: Normal range of motion.   Skin:     General: Skin is warm and dry.   Neurological:      General: No focal deficit present.      Mental Status: He is alert. Mental status is at baseline.   Psychiatric:         Mood and Affect: Mood normal.         Behavior: Behavior normal.          Significant Labs:  I have reviewed all pertinent lab results within the past 24 hours.  CBC:   Recent Labs   Lab 12/31/23  0457   WBC 9.89   RBC 4.11*   HGB 10.2*   HCT 32.0*      MCV 78*   MCH 24.8*   MCHC 31.9*     CMP:   Recent Labs   Lab 12/31/23  0457   GLU 71   CALCIUM 7.9*   ALBUMIN 1.4*   PROT 5.4*      K 4.2   CO2 37*   CL 92*   BUN 15   CREATININE 0.8   ALKPHOS 121   ALT 28   AST 24   BILITOT 0.4       Significant Diagnostics:  I have reviewed all pertinent imaging results/findings within the past 24 hours.

## 2023-12-31 NOTE — PLAN OF CARE
Problem: Adult Inpatient Plan of Care  Goal: Plan of Care Review  Outcome: Ongoing, Progressing  Goal: Patient-Specific Goal (Individualized)  Outcome: Ongoing, Progressing  Goal: Absence of Hospital-Acquired Illness or Injury  Outcome: Ongoing, Progressing  Goal: Optimal Comfort and Wellbeing  Outcome: Ongoing, Progressing  Goal: Readiness for Transition of Care  Outcome: Ongoing, Progressing     Problem: Fluid and Electrolyte Imbalance (Acute Kidney Injury/Impairment)  Goal: Fluid and Electrolyte Balance  Outcome: Ongoing, Progressing     Problem: Oral Intake Inadequate (Acute Kidney Injury/Impairment)  Goal: Optimal Nutrition Intake  Outcome: Ongoing, Progressing     Problem: Renal Function Impairment (Acute Kidney Injury/Impairment)  Goal: Effective Renal Function  Outcome: Ongoing, Progressing     Problem: Coping Ineffective (Oncology Care)  Goal: Effective Coping  Outcome: Ongoing, Progressing     Problem: Fatigue (Oncology Care)  Goal: Improved Activity Tolerance  Outcome: Ongoing, Progressing     Problem: Oral Intake Altered (Oncology Care)  Goal: Optimal Oral Intake  Outcome: Ongoing, Progressing     Problem: Oral Mucositis (Oncology Care)  Goal: Improved Oral Mucous Membrane Integrity  Outcome: Ongoing, Progressing     Problem: Pain Acute (Oncology Care)  Goal: Optimal Pain Control  Outcome: Ongoing, Progressing     Problem: Infection  Goal: Absence of Infection Signs and Symptoms  Outcome: Ongoing, Progressing     Problem: Impaired Wound Healing  Goal: Optimal Wound Healing  Outcome: Ongoing, Progressing     Problem: Skin Injury Risk Increased  Goal: Skin Health and Integrity  Outcome: Ongoing, Progressing     Problem: Coping Ineffective  Goal: Effective Coping  Outcome: Ongoing, Progressing     Problem: Fall Injury Risk  Goal: Absence of Fall and Fall-Related Injury  Outcome: Ongoing, Progressing     Problem: Restraint, Nonbehavioral (Nonviolent)  Goal: Absence of Harm or Injury  Outcome: Ongoing,  Progressing  [facility] Plan of Care Note  Dx Final diagnoses:  [K65.9] Peritonitis     Shift Events failed attempt to wean o2. Unable to do walking test.     Neuro: aox4    Vital Signs: wnl    Respiratory: o2 3l nc    Diet: reg    Urine Output/Bowel Movement: urinal/ no bm    Activity level? Up to chair    Any scheduled procedures? n    Any safety concerns? n

## 2023-12-31 NOTE — PROGRESS NOTES
Siddhartha Thurman - [facility]  General Surgery  Progress Note    Subjective:     History of Present Illness:  No notes on file    Post-Op Info:  * No surgery found *         Interval History: No acute events. Tolerating regular diet without issues. Had a bowel movement this morning and feels that his distension is improved. Pending CT scan this AM.     Medications:  Continuous Infusions:   dextrose 5 % and 0.45 % NaCl with KCl 20 mEq 100 mL/hr at 12/31/23 0903     Scheduled Meds:   cetirizine  10 mg Oral Daily    enoxparin  40 mg Subcutaneous Q24H (prophylaxis, 1700)    fluconazole  200 mg Oral Daily    guaiFENesin  600 mg Oral BID    levalbuterol  0.63 mg Nebulization Q4H WAKE    mirtazapine  15 mg Oral QHS    pantoprazole  40 mg Oral Daily    sodium chloride 0.9%  10 mL Intravenous Q6H    sulfamethoxazole-trimethoprim 800-160mg  1 tablet Oral BID     PRN Meds:acetaminophen, dextrose 10%, dextrose 10%, glucagon (human recombinant), iohexol, iohexol, melatonin, naloxone, ondansetron, oxyCODONE, oxyCODONE, prochlorperazine, Flushing PICC/Midline Protocol **AND** sodium chloride 0.9% **AND** sodium chloride 0.9%     Review of patient's allergies indicates:   Allergen Reactions    Dilaudid [hydromorphone]      Pt becomes hypoxic - requiring 5L via oxymask for     Penicillin     Penicillins Rash     Objective:     Vital Signs (Most Recent):  Temp: 99.2 °F (37.3 °C) (12/31/23 1129)  Pulse: 79 (12/31/23 1129)  Resp: 18 (12/31/23 1129)  BP: 132/64 (12/31/23 1129)  SpO2: (!) 92 % (12/31/23 1129) Vital Signs (24h Range):  Temp:  [97.5 °F (36.4 °C)-99.2 °F (37.3 °C)] 99.2 °F (37.3 °C)  Pulse:  [62-85] 79  Resp:  [16-18] 18  SpO2:  [85 %-96 %] 92 %  BP: (114-132)/(59-64) 132/64     Weight: 85.5 kg (188 lb 7.9 oz)  Body mass index is 26.29 kg/m².    Intake/Output - Last 3 Shifts         12/29 0700 12/30 0659 12/30 0700 12/31 0659 12/31 0700 01/01 0659    P.O. 240 1220     Other  10     Total Intake(mL/kg) 240 (2.8) 1230 (14.4)     Urine  (mL/kg/hr) 1150 (0.6) 1875 (0.9)     Drains 80 100     Other 170 345     Stool       Total Output 1400 2320     Net -1160 -1090                     Physical Exam  Vitals and nursing note reviewed.   HENT:      Head: Normocephalic and atraumatic.   Eyes:      Extraocular Movements: Extraocular movements intact.      Conjunctiva/sclera: Conjunctivae normal.   Cardiovascular:      Rate and Rhythm: Normal rate.      Pulses: Normal pulses.   Pulmonary:      Effort: Pulmonary effort is normal. No respiratory distress.   Abdominal:      General: There is no distension.      Palpations: Abdomen is soft.      Tenderness: There is abdominal tenderness (improved). There is no guarding.      Comments: 2x IR drains with thin output.   Lateral drain with bland output  Back drain hooked to gravity bag and output is bilious  Distension improved   Musculoskeletal:         General: Normal range of motion.      Cervical back: Normal range of motion.   Skin:     General: Skin is warm and dry.   Neurological:      General: No focal deficit present.      Mental Status: He is alert. Mental status is at baseline.   Psychiatric:         Mood and Affect: Mood normal.         Behavior: Behavior normal.          Significant Labs:  I have reviewed all pertinent lab results within the past 24 hours.  CBC:   Recent Labs   Lab 12/31/23 0457   WBC 9.89   RBC 4.11*   HGB 10.2*   HCT 32.0*      MCV 78*   MCH 24.8*   MCHC 31.9*     CMP:   Recent Labs   Lab 12/31/23 0457   GLU 71   CALCIUM 7.9*   ALBUMIN 1.4*   PROT 5.4*      K 4.2   CO2 37*   CL 92*   BUN 15   CREATININE 0.8   ALKPHOS 121   ALT 28   AST 24   BILITOT 0.4       Significant Diagnostics:  I have reviewed all pertinent imaging results/findings within the past 24 hours.  Assessment/Plan:     Postprocedural intraabdominal abscess  62 yo M with prior duodenal stump leak after palliative distal gastrectomy with B2 recon presenting with acute worsening of his intraabdominal  collection after his second cycle of chemo. IR drain placed at OSH with 1.5L of output initially.     -Regular diet  -Additional IVF given to address his contraction alkalosis on BMP  -Transitioned IV antibiotics to oral per culture results - PO fluconazole and bactrim  -PRN analgesia/antiemetics  -Drain care, flush drain; back drain to gravity drainage bag  -Aggressive pulm hygiene. CPT  -Palliative care following along for GOC discussion, appreciate assistance  -Lovenox  -Protonix    Dispo: [facility], pending scan results     Metastatic adenocarcinoma  Patient with known metastatic disease, nodules within lung as well.   - Patient failed attempts at weaning oxygen, no improvement with breathing treatments, lasix/diuresis, and other pulmonary hygiene.   - Patient qualifies for home oxygen, orders placed    Emphysematous bleb of lung  See Metastatic adenocarcinoma    Shortness of breath  See Metastatic adenocarcinoma         Elizabeth Garcia MD  General Surgery  Siddhartha Thurman - [facility]

## 2023-12-31 NOTE — ASSESSMENT & PLAN NOTE
62 yo M with prior duodenal stump leak after palliative distal gastrectomy with B2 recon presenting with acute worsening of his intraabdominal collection after his second cycle of chemo. IR drain placed at OSH with 1.5L of output initially.     -Regular diet  -Additional IVF given to address his contraction alkalosis on BMP  -Transitioned IV antibiotics to oral per culture results - PO fluconazole and bactrim  -PRN analgesia/antiemetics  -Drain care, flush drain; back drain to gravity drainage bag  -Aggressive pulm hygiene. CPT  -Palliative care following along for GOC discussion, appreciate assistance  -Lovenox  -Protonix    Dispo: [facility], pending scan results

## 2023-12-31 NOTE — ASSESSMENT & PLAN NOTE
Patient with known metastatic disease, nodules within lung as well.   - Patient failed attempts at weaning oxygen, no improvement with breathing treatments, lasix/diuresis, and other pulmonary hygiene.   - Patient qualifies for home oxygen, orders placed

## 2023-12-31 NOTE — NURSING
Dc instructions and incision care given and explained to pt and wife. Oxygen teaching and demonstration given. Verbalized understanding. Pt left floor with o2 c2l nc onnected sats 93%.  Denied need for further teaching. Left floor via w/c her wife

## 2024-01-01 ENCOUNTER — PATIENT MESSAGE (OUTPATIENT)
Dept: SURGERY | Facility: CLINIC | Age: 64
End: 2024-01-01
Payer: COMMERCIAL

## 2024-01-01 ENCOUNTER — PATIENT MESSAGE (OUTPATIENT)
Dept: HEMATOLOGY/ONCOLOGY | Facility: CLINIC | Age: 64
End: 2024-01-01
Payer: COMMERCIAL

## 2024-01-01 ENCOUNTER — DOCUMENTATION ONLY (OUTPATIENT)
Dept: HEMATOLOGY/ONCOLOGY | Facility: CLINIC | Age: 64
End: 2024-01-01
Payer: COMMERCIAL

## 2024-01-01 ENCOUNTER — TELEPHONE (OUTPATIENT)
Dept: SURGERY | Facility: CLINIC | Age: 64
End: 2024-01-01
Payer: COMMERCIAL

## 2024-01-01 ENCOUNTER — OFFICE VISIT (OUTPATIENT)
Dept: HEMATOLOGY/ONCOLOGY | Facility: CLINIC | Age: 64
End: 2024-01-01
Payer: COMMERCIAL

## 2024-01-01 ENCOUNTER — OFFICE VISIT (OUTPATIENT)
Dept: SURGERY | Facility: CLINIC | Age: 64
End: 2024-01-01
Payer: COMMERCIAL

## 2024-01-01 ENCOUNTER — TELEPHONE (OUTPATIENT)
Dept: INTERVENTIONAL RADIOLOGY/VASCULAR | Facility: HOSPITAL | Age: 64
End: 2024-01-01
Payer: COMMERCIAL

## 2024-01-01 ENCOUNTER — HOSPITAL ENCOUNTER (OUTPATIENT)
Dept: RADIOLOGY | Facility: HOSPITAL | Age: 64
Discharge: HOME | End: 2024-01-12
Attending: NURSE PRACTITIONER
Payer: COMMERCIAL

## 2024-01-01 ENCOUNTER — DOCUMENT SCAN (OUTPATIENT)
Dept: HOME HEALTH SERVICES | Facility: HOSPITAL | Age: 64
End: 2024-01-01
Payer: COMMERCIAL

## 2024-01-01 ENCOUNTER — HOSPITAL ENCOUNTER (OUTPATIENT)
Dept: RADIOLOGY | Facility: HOSPITAL | Age: 64
Discharge: HOME | End: 2024-01-05
Attending: SURGERY
Payer: COMMERCIAL

## 2024-01-01 ENCOUNTER — HOSPITAL ENCOUNTER (OUTPATIENT)
Dept: RADIOLOGY | Facility: HOSPITAL | Age: 64
Discharge: HOME | End: 2024-04-17
Attending: INTERNAL MEDICINE
Payer: COMMERCIAL

## 2024-01-01 ENCOUNTER — HOSPITAL ENCOUNTER (OUTPATIENT)
Dept: INTERVENTIONAL RADIOLOGY/VASCULAR | Facility: HOSPITAL | Age: 64
Discharge: HOME | End: 2024-01-18
Attending: SURGERY
Payer: COMMERCIAL

## 2024-01-01 ENCOUNTER — TELEPHONE (OUTPATIENT)
Dept: SURGERY | Facility: HOSPITAL | Age: 64
End: 2024-01-01
Payer: COMMERCIAL

## 2024-01-01 ENCOUNTER — TELEPHONE (OUTPATIENT)
Dept: HEMATOLOGY/ONCOLOGY | Facility: CLINIC | Age: 64
End: 2024-01-01
Payer: COMMERCIAL

## 2024-01-01 ENCOUNTER — HOSPITAL ENCOUNTER (OUTPATIENT)
Facility: HOSPITAL | Age: 64
Discharge: HOME | End: 2024-05-20
Attending: EMERGENCY MEDICINE | Admitting: SURGERY
Payer: COMMERCIAL

## 2024-01-01 ENCOUNTER — PATIENT MESSAGE (OUTPATIENT)
Dept: HEMATOLOGY/ONCOLOGY | Facility: CLINIC | Age: 64
End: 2024-01-01

## 2024-01-01 VITALS
BODY MASS INDEX: 21.6 KG/M2 | HEART RATE: 94 BPM | TEMPERATURE: 98 F | OXYGEN SATURATION: 95 % | TEMPERATURE: 98 F | HEIGHT: 71 IN | HEIGHT: 71 IN | HEIGHT: 71 IN | HEART RATE: 80 BPM | SYSTOLIC BLOOD PRESSURE: 100 MMHG | HEART RATE: 95 BPM | WEIGHT: 154.31 LBS | DIASTOLIC BLOOD PRESSURE: 59 MMHG | OXYGEN SATURATION: 94 % | RESPIRATION RATE: 18 BRPM | BODY MASS INDEX: 21.91 KG/M2 | OXYGEN SATURATION: 95 % | WEIGHT: 156.5 LBS | WEIGHT: 154.75 LBS | BODY MASS INDEX: 21.66 KG/M2 | SYSTOLIC BLOOD PRESSURE: 113 MMHG | DIASTOLIC BLOOD PRESSURE: 53 MMHG | SYSTOLIC BLOOD PRESSURE: 97 MMHG | DIASTOLIC BLOOD PRESSURE: 58 MMHG

## 2024-01-01 VITALS
OXYGEN SATURATION: 95 % | BODY MASS INDEX: 20.92 KG/M2 | WEIGHT: 150 LBS | RESPIRATION RATE: 20 BRPM | SYSTOLIC BLOOD PRESSURE: 92 MMHG | HEART RATE: 102 BPM | DIASTOLIC BLOOD PRESSURE: 53 MMHG

## 2024-01-01 VITALS
DIASTOLIC BLOOD PRESSURE: 60 MMHG | BODY MASS INDEX: 18.06 KG/M2 | SYSTOLIC BLOOD PRESSURE: 95 MMHG | OXYGEN SATURATION: 93 % | TEMPERATURE: 98 F | WEIGHT: 129 LBS | HEIGHT: 71 IN | RESPIRATION RATE: 20 BRPM | HEART RATE: 73 BPM

## 2024-01-01 VITALS
OXYGEN SATURATION: 96 % | SYSTOLIC BLOOD PRESSURE: 91 MMHG | DIASTOLIC BLOOD PRESSURE: 54 MMHG | HEART RATE: 95 BPM | BODY MASS INDEX: 19.26 KG/M2 | WEIGHT: 137.56 LBS | HEIGHT: 71 IN

## 2024-01-01 VITALS
OXYGEN SATURATION: 95 % | WEIGHT: 134.69 LBS | RESPIRATION RATE: 16 BRPM | BODY MASS INDEX: 18.85 KG/M2 | HEIGHT: 71 IN | DIASTOLIC BLOOD PRESSURE: 60 MMHG | HEART RATE: 91 BPM | SYSTOLIC BLOOD PRESSURE: 90 MMHG | TEMPERATURE: 98 F

## 2024-01-01 VITALS
OXYGEN SATURATION: 96 % | RESPIRATION RATE: 16 BRPM | SYSTOLIC BLOOD PRESSURE: 101 MMHG | WEIGHT: 154.75 LBS | BODY MASS INDEX: 21.66 KG/M2 | HEIGHT: 71 IN | TEMPERATURE: 98 F | HEART RATE: 102 BPM | DIASTOLIC BLOOD PRESSURE: 59 MMHG

## 2024-01-01 DIAGNOSIS — C78.6 SECONDARY MALIGNANCY OF PARIETAL PERITONEUM: ICD-10-CM

## 2024-01-01 DIAGNOSIS — L08.9 SOFT TISSUE INFECTION: ICD-10-CM

## 2024-01-01 DIAGNOSIS — C79.9 METASTATIC ADENOCARCINOMA: Chronic | ICD-10-CM

## 2024-01-01 DIAGNOSIS — C16.9 GASTRIC ADENOCARCINOMA: Primary | ICD-10-CM

## 2024-01-01 DIAGNOSIS — T81.43XA POSTPROCEDURAL INTRAABDOMINAL ABSCESS: ICD-10-CM

## 2024-01-01 DIAGNOSIS — G89.3 NEOPLASM RELATED PAIN: Primary | ICD-10-CM

## 2024-01-01 DIAGNOSIS — L02.91 ABSCESS: Primary | ICD-10-CM

## 2024-01-01 DIAGNOSIS — C16.9 GASTRIC ADENOCARCINOMA: ICD-10-CM

## 2024-01-01 DIAGNOSIS — T81.43XA POSTPROCEDURAL INTRAABDOMINAL ABSCESS: Primary | ICD-10-CM

## 2024-01-01 LAB
ACINETOBACTER CALCOACETICUS/BAUMANNII COMPLEX: NOT DETECTED
ALBUMIN SERPL BCP-MCNC: 2 G/DL (ref 3.5–5.2)
ALLENS TEST: NORMAL
ALP SERPL-CCNC: 72 U/L (ref 55–135)
ALT SERPL W/O P-5'-P-CCNC: 16 U/L (ref 10–44)
ANION GAP SERPL CALC-SCNC: 7 MMOL/L (ref 8–16)
ANION GAP SERPL CALC-SCNC: 7 MMOL/L (ref 8–16)
ANION GAP SERPL CALC-SCNC: 8 MMOL/L (ref 8–16)
AST SERPL-CCNC: 16 U/L (ref 10–40)
BACTERIA #/AREA URNS AUTO: ABNORMAL /HPF
BACTERIA BLD CULT: ABNORMAL
BACTERIA BLD CULT: NORMAL
BACTEROIDES FRAGILIS: NOT DETECTED
BASOPHILS # BLD AUTO: 0.01 K/UL (ref 0–0.2)
BASOPHILS # BLD AUTO: 0.04 K/UL (ref 0–0.2)
BASOPHILS NFR BLD: 0.2 % (ref 0–1.9)
BASOPHILS NFR BLD: 0.6 % (ref 0–1.9)
BILIRUB SERPL-MCNC: 0.7 MG/DL (ref 0.1–1)
BILIRUB UR QL STRIP: NEGATIVE
BUN SERPL-MCNC: 22 MG/DL (ref 8–23)
BUN SERPL-MCNC: 27 MG/DL (ref 8–23)
BUN SERPL-MCNC: 28 MG/DL (ref 8–23)
CALCIUM SERPL-MCNC: 8 MG/DL (ref 8.7–10.5)
CALCIUM SERPL-MCNC: 8.4 MG/DL (ref 8.7–10.5)
CALCIUM SERPL-MCNC: 8.7 MG/DL (ref 8.7–10.5)
CANDIDA ALBICANS: NOT DETECTED
CANDIDA AURIS: NOT DETECTED
CANDIDA GLABRATA: NOT DETECTED
CANDIDA KRUSEI: NOT DETECTED
CANDIDA PARAPSILOSIS: NOT DETECTED
CANDIDA TROPICALIS: NOT DETECTED
CHLORIDE SERPL-SCNC: 94 MMOL/L (ref 95–110)
CHLORIDE SERPL-SCNC: 96 MMOL/L (ref 95–110)
CHLORIDE SERPL-SCNC: 97 MMOL/L (ref 95–110)
CLARITY UR REFRACT.AUTO: CLEAR
CO2 SERPL-SCNC: 35 MMOL/L (ref 23–29)
CO2 SERPL-SCNC: 36 MMOL/L (ref 23–29)
CO2 SERPL-SCNC: 38 MMOL/L (ref 23–29)
COLOR UR AUTO: YELLOW
CREAT SERPL-MCNC: 0.7 MG/DL (ref 0.5–1.4)
CRYPTOCOCCUS NEOFORMANS/GATTII: NOT DETECTED
CTX-M GENE (ESBL PRODUCER): NORMAL
DIFFERENTIAL METHOD BLD: ABNORMAL
DIFFERENTIAL METHOD BLD: ABNORMAL
ENTEROBACTER CLOACAE COMPLEX: NOT DETECTED
ENTEROBACTERALES: NOT DETECTED
ENTEROCOCCUS FAECALIS: NOT DETECTED
ENTEROCOCCUS FAECIUM: NOT DETECTED
EOSINOPHIL # BLD AUTO: 0 K/UL (ref 0–0.5)
EOSINOPHIL # BLD AUTO: 0 K/UL (ref 0–0.5)
EOSINOPHIL NFR BLD: 0 % (ref 0–8)
EOSINOPHIL NFR BLD: 0.1 % (ref 0–8)
ERYTHROCYTE [DISTWIDTH] IN BLOOD BY AUTOMATED COUNT: 15.9 % (ref 11.5–14.5)
ERYTHROCYTE [DISTWIDTH] IN BLOOD BY AUTOMATED COUNT: 16 % (ref 11.5–14.5)
ESCHERICHIA COLI: NOT DETECTED
EST. GFR  (NO RACE VARIABLE): >60 ML/MIN/1.73 M^2
GLUCOSE SERPL-MCNC: 113 MG/DL (ref 70–110)
GLUCOSE SERPL-MCNC: 118 MG/DL (ref 70–110)
GLUCOSE SERPL-MCNC: 96 MG/DL (ref 70–110)
GLUCOSE UR QL STRIP: NEGATIVE
HAEMOPHILUS INFLUENZAE: NOT DETECTED
HCT VFR BLD AUTO: 35.2 % (ref 40–54)
HCT VFR BLD AUTO: 36.7 % (ref 40–54)
HCV AB SERPL QL IA: NORMAL
HGB BLD-MCNC: 11.5 G/DL (ref 14–18)
HGB BLD-MCNC: 12 G/DL (ref 14–18)
HGB UR QL STRIP: NEGATIVE
HIV 1+2 AB+HIV1 P24 AG SERPL QL IA: NORMAL
HYALINE CASTS UR QL AUTO: 9 /LPF
IMM GRANULOCYTES # BLD AUTO: 0.03 K/UL (ref 0–0.04)
IMM GRANULOCYTES # BLD AUTO: 0.03 K/UL (ref 0–0.04)
IMM GRANULOCYTES NFR BLD AUTO: 0.4 % (ref 0–0.5)
IMM GRANULOCYTES NFR BLD AUTO: 0.5 % (ref 0–0.5)
IMP GENE (CARBAPENEM RESISTANT): NORMAL
INR PPP: 1 (ref 0.8–1.2)
KETONES UR QL STRIP: NEGATIVE
KLEBSIELLA AEROGENES: NOT DETECTED
KLEBSIELLA OXYTOCA: NOT DETECTED
KLEBSIELLA PNEUMONIAE GROUP: NOT DETECTED
KPC RESISTANCE GENE (CARBAPENEM): NORMAL
LDH SERPL L TO P-CCNC: 1.66 MMOL/L (ref 0.5–2.2)
LEUKOCYTE ESTERASE UR QL STRIP: NEGATIVE
LISTERIA MONOCYTOGENES: NOT DETECTED
LYMPHOCYTES # BLD AUTO: 1.2 K/UL (ref 1–4.8)
LYMPHOCYTES # BLD AUTO: 1.3 K/UL (ref 1–4.8)
LYMPHOCYTES NFR BLD: 17.6 % (ref 18–48)
LYMPHOCYTES NFR BLD: 18.7 % (ref 18–48)
MAGNESIUM SERPL-MCNC: 1.6 MG/DL (ref 1.6–2.6)
MCH RBC QN AUTO: 27.8 PG (ref 27–31)
MCH RBC QN AUTO: 27.9 PG (ref 27–31)
MCHC RBC AUTO-ENTMCNC: 32.7 G/DL (ref 32–36)
MCHC RBC AUTO-ENTMCNC: 32.7 G/DL (ref 32–36)
MCR-1: NORMAL
MCV RBC AUTO: 85 FL (ref 82–98)
MCV RBC AUTO: 85 FL (ref 82–98)
MEC A/C AND MREJ (MRSA): NORMAL
MEC A/C: NORMAL
MICROSCOPIC COMMENT: ABNORMAL
MONOCYTES # BLD AUTO: 0.6 K/UL (ref 0.3–1)
MONOCYTES # BLD AUTO: 0.8 K/UL (ref 0.3–1)
MONOCYTES NFR BLD: 10.4 % (ref 4–15)
MONOCYTES NFR BLD: 10.8 % (ref 4–15)
NDM GENE (CARBAPENEM RESISTANT): NORMAL
NEISSERIA MENINGITIDIS: NOT DETECTED
NEUTROPHILS # BLD AUTO: 4.3 K/UL (ref 1.8–7.7)
NEUTROPHILS # BLD AUTO: 5 K/UL (ref 1.8–7.7)
NEUTROPHILS NFR BLD: 70.2 % (ref 38–73)
NEUTROPHILS NFR BLD: 70.5 % (ref 38–73)
NITRITE UR QL STRIP: NEGATIVE
NRBC BLD-RTO: 0 /100 WBC
NRBC BLD-RTO: 0 /100 WBC
OHS QRS DURATION: 110 MS
OHS QTC CALCULATION: 491 MS
OXA-48-LIKE (CARBAPENEM RESISTANT): NORMAL
PH UR STRIP: 7 [PH] (ref 5–8)
PHOSPHATE SERPL-MCNC: 2.2 MG/DL (ref 2.7–4.5)
PLATELET # BLD AUTO: 230 K/UL (ref 150–450)
PLATELET # BLD AUTO: 243 K/UL (ref 150–450)
PMV BLD AUTO: 8.9 FL (ref 9.2–12.9)
PMV BLD AUTO: 9.3 FL (ref 9.2–12.9)
POTASSIUM SERPL-SCNC: 2.4 MMOL/L (ref 3.5–5.1)
POTASSIUM SERPL-SCNC: 2.5 MMOL/L (ref 3.5–5.1)
POTASSIUM SERPL-SCNC: 2.7 MMOL/L (ref 3.5–5.1)
POTASSIUM SERPL-SCNC: 3.1 MMOL/L (ref 3.5–5.1)
POTASSIUM SERPL-SCNC: 3.1 MMOL/L (ref 3.5–5.1)
PROT SERPL-MCNC: 5.5 G/DL (ref 6–8.4)
PROT UR QL STRIP: ABNORMAL
PROTEUS SPECIES: NOT DETECTED
PROTHROMBIN TIME: 10.6 SEC (ref 9–12.5)
PSEUDOMONAS AERUGINOSA: NOT DETECTED
RBC # BLD AUTO: 4.12 M/UL (ref 4.6–6.2)
RBC # BLD AUTO: 4.31 M/UL (ref 4.6–6.2)
RBC #/AREA URNS AUTO: 37 /HPF (ref 0–4)
SALMONELLA SP: NOT DETECTED
SAMPLE: NORMAL
SERRATIA MARCESCENS: NOT DETECTED
SITE: NORMAL
SODIUM SERPL-SCNC: 139 MMOL/L (ref 136–145)
SODIUM SERPL-SCNC: 139 MMOL/L (ref 136–145)
SODIUM SERPL-SCNC: 140 MMOL/L (ref 136–145)
SP GR UR STRIP: >=1.03 (ref 1–1.03)
STAPHYLOCOCCUS AUREUS: NOT DETECTED
STAPHYLOCOCCUS EPIDERMIDIS: NOT DETECTED
STAPHYLOCOCCUS LUGDUNESIS: NOT DETECTED
STAPHYLOCOCCUS SPECIES: NOT DETECTED
STENOTROPHOMONAS MALTOPHILIA: NOT DETECTED
STREPTOCOCCUS AGALACTIAE: NOT DETECTED
STREPTOCOCCUS PNEUMONIAE: NOT DETECTED
STREPTOCOCCUS PYOGENES: NOT DETECTED
STREPTOCOCCUS SPECIES: NOT DETECTED
URN SPEC COLLECT METH UR: ABNORMAL
VAN A/B (VRE GENE): NORMAL
VIM GENE (CARBAPENEM RESISTANT): NORMAL
WBC # BLD AUTO: 6.16 K/UL (ref 3.9–12.7)
WBC # BLD AUTO: 7.12 K/UL (ref 3.9–12.7)
WBC #/AREA URNS AUTO: 3 /HPF (ref 0–5)

## 2024-01-01 PROCEDURE — 3008F BODY MASS INDEX DOCD: CPT | Mod: CPTII,S$GLB,, | Performed by: INTERNAL MEDICINE

## 2024-01-01 PROCEDURE — 87389 HIV-1 AG W/HIV-1&-2 AB AG IA: CPT | Performed by: EMERGENCY MEDICINE

## 2024-01-01 PROCEDURE — 84132 ASSAY OF SERUM POTASSIUM: CPT | Mod: 91 | Performed by: STUDENT IN AN ORGANIZED HEALTH CARE EDUCATION/TRAINING PROGRAM

## 2024-01-01 PROCEDURE — 99024 POSTOP FOLLOW-UP VISIT: CPT | Mod: 95,,, | Performed by: NURSE PRACTITIONER

## 2024-01-01 PROCEDURE — 1159F MED LIST DOCD IN RCRD: CPT | Mod: CPTII,S$GLB,, | Performed by: SURGERY

## 2024-01-01 PROCEDURE — 96361 HYDRATE IV INFUSION ADD-ON: CPT

## 2024-01-01 PROCEDURE — 99214 OFFICE O/P EST MOD 30 MIN: CPT | Mod: S$GLB,,, | Performed by: INTERNAL MEDICINE

## 2024-01-01 PROCEDURE — 99285 EMERGENCY DEPT VISIT HI MDM: CPT | Mod: 25

## 2024-01-01 PROCEDURE — 96376 TX/PRO/DX INJ SAME DRUG ADON: CPT

## 2024-01-01 PROCEDURE — 99999 PR PBB SHADOW E&M-EST. PATIENT-LVL IV: CPT | Mod: PBBFAC,,, | Performed by: INTERNAL MEDICINE

## 2024-01-01 PROCEDURE — 86803 HEPATITIS C AB TEST: CPT | Performed by: EMERGENCY MEDICINE

## 2024-01-01 PROCEDURE — 81001 URINALYSIS AUTO W/SCOPE: CPT | Performed by: STUDENT IN AN ORGANIZED HEALTH CARE EDUCATION/TRAINING PROGRAM

## 2024-01-01 PROCEDURE — 63600175 PHARM REV CODE 636 W HCPCS: Performed by: STUDENT IN AN ORGANIZED HEALTH CARE EDUCATION/TRAINING PROGRAM

## 2024-01-01 PROCEDURE — 87040 BLOOD CULTURE FOR BACTERIA: CPT

## 2024-01-01 PROCEDURE — 1159F MED LIST DOCD IN RCRD: CPT | Mod: CPTII,S$GLB,, | Performed by: NURSE PRACTITIONER

## 2024-01-01 PROCEDURE — 3078F DIAST BP <80 MM HG: CPT | Mod: CPTII,S$GLB,, | Performed by: SURGERY

## 2024-01-01 PROCEDURE — 3074F SYST BP LT 130 MM HG: CPT | Mod: CPTII,S$GLB,, | Performed by: INTERNAL MEDICINE

## 2024-01-01 PROCEDURE — 3008F BODY MASS INDEX DOCD: CPT | Mod: CPTII,S$GLB,, | Performed by: NURSE PRACTITIONER

## 2024-01-01 PROCEDURE — 74177 CT ABD & PELVIS W/CONTRAST: CPT | Mod: 26,,, | Performed by: RADIOLOGY

## 2024-01-01 PROCEDURE — 99213 OFFICE O/P EST LOW 20 MIN: CPT | Mod: S$GLB,,, | Performed by: NURSE PRACTITIONER

## 2024-01-01 PROCEDURE — 49424 ASSESS CYST CONTRAST INJECT: CPT | Performed by: RADIOLOGY

## 2024-01-01 PROCEDURE — 80048 BASIC METABOLIC PNL TOTAL CA: CPT | Mod: XB | Performed by: STUDENT IN AN ORGANIZED HEALTH CARE EDUCATION/TRAINING PROGRAM

## 2024-01-01 PROCEDURE — 25500020 PHARM REV CODE 255: Performed by: NURSE PRACTITIONER

## 2024-01-01 PROCEDURE — 3074F SYST BP LT 130 MM HG: CPT | Mod: CPTII,S$GLB,, | Performed by: NURSE PRACTITIONER

## 2024-01-01 PROCEDURE — 99999 PR PBB SHADOW E&M-EST. PATIENT-LVL III: CPT | Mod: PBBFAC,,, | Performed by: NURSE PRACTITIONER

## 2024-01-01 PROCEDURE — G0378 HOSPITAL OBSERVATION PER HR: HCPCS

## 2024-01-01 PROCEDURE — 96365 THER/PROPH/DIAG IV INF INIT: CPT

## 2024-01-01 PROCEDURE — 1111F DSCHRG MED/CURRENT MED MERGE: CPT | Mod: CPTII,S$GLB,, | Performed by: INTERNAL MEDICINE

## 2024-01-01 PROCEDURE — 3078F DIAST BP <80 MM HG: CPT | Mod: CPTII,S$GLB,, | Performed by: NURSE PRACTITIONER

## 2024-01-01 PROCEDURE — 63600175 PHARM REV CODE 636 W HCPCS: Performed by: RADIOLOGY

## 2024-01-01 PROCEDURE — 93010 ELECTROCARDIOGRAM REPORT: CPT | Mod: ,,, | Performed by: INTERNAL MEDICINE

## 2024-01-01 PROCEDURE — 96372 THER/PROPH/DIAG INJ SC/IM: CPT | Mod: 59 | Performed by: STUDENT IN AN ORGANIZED HEALTH CARE EDUCATION/TRAINING PROGRAM

## 2024-01-01 PROCEDURE — 25000003 PHARM REV CODE 250: Performed by: STUDENT IN AN ORGANIZED HEALTH CARE EDUCATION/TRAINING PROGRAM

## 2024-01-01 PROCEDURE — A9698 NON-RAD CONTRAST MATERIALNOC: HCPCS | Mod: PO | Performed by: INTERNAL MEDICINE

## 2024-01-01 PROCEDURE — 83605 ASSAY OF LACTIC ACID: CPT

## 2024-01-01 PROCEDURE — 99999 PR PBB SHADOW E&M-EST. PATIENT-LVL IV: CPT | Mod: PBBFAC,,, | Performed by: NURSE PRACTITIONER

## 2024-01-01 PROCEDURE — 87154 CUL TYP ID BLD PTHGN 6+ TRGT: CPT

## 2024-01-01 PROCEDURE — 99152 MOD SED SAME PHYS/QHP 5/>YRS: CPT | Mod: ,,, | Performed by: RADIOLOGY

## 2024-01-01 PROCEDURE — 96366 THER/PROPH/DIAG IV INF ADDON: CPT

## 2024-01-01 PROCEDURE — 96375 TX/PRO/DX INJ NEW DRUG ADDON: CPT

## 2024-01-01 PROCEDURE — 3078F DIAST BP <80 MM HG: CPT | Mod: CPTII,S$GLB,, | Performed by: INTERNAL MEDICINE

## 2024-01-01 PROCEDURE — 25500020 PHARM REV CODE 255: Performed by: SURGERY

## 2024-01-01 PROCEDURE — 99152 MOD SED SAME PHYS/QHP 5/>YRS: CPT | Performed by: RADIOLOGY

## 2024-01-01 PROCEDURE — 96367 TX/PROPH/DG ADDL SEQ IV INF: CPT

## 2024-01-01 PROCEDURE — 99999 PR PBB SHADOW E&M-EST. PATIENT-LVL IV: CPT | Mod: PBBFAC,,, | Performed by: SURGERY

## 2024-01-01 PROCEDURE — 85610 PROTHROMBIN TIME: CPT | Performed by: SURGERY

## 2024-01-01 PROCEDURE — 27200940 IR ABSCESS DRAINAGE WITH TUBE PLACEMENT

## 2024-01-01 PROCEDURE — 63600175 PHARM REV CODE 636 W HCPCS: Performed by: EMERGENCY MEDICINE

## 2024-01-01 PROCEDURE — 99212 OFFICE O/P EST SF 10 MIN: CPT | Mod: S$GLB,,, | Performed by: NURSE PRACTITIONER

## 2024-01-01 PROCEDURE — 99499 UNLISTED E&M SERVICE: CPT | Mod: ,,, | Performed by: SURGERY

## 2024-01-01 PROCEDURE — 83735 ASSAY OF MAGNESIUM: CPT | Performed by: EMERGENCY MEDICINE

## 2024-01-01 PROCEDURE — 3008F BODY MASS INDEX DOCD: CPT | Mod: CPTII,S$GLB,, | Performed by: SURGERY

## 2024-01-01 PROCEDURE — 74177 CT ABD & PELVIS W/CONTRAST: CPT | Mod: TC,PO

## 2024-01-01 PROCEDURE — 3074F SYST BP LT 130 MM HG: CPT | Mod: CPTII,S$GLB,, | Performed by: SURGERY

## 2024-01-01 PROCEDURE — 25500020 PHARM REV CODE 255: Mod: PO | Performed by: INTERNAL MEDICINE

## 2024-01-01 PROCEDURE — 80053 COMPREHEN METABOLIC PANEL: CPT

## 2024-01-01 PROCEDURE — 99024 POSTOP FOLLOW-UP VISIT: CPT | Mod: 95,,, | Performed by: SURGERY

## 2024-01-01 PROCEDURE — 74177 CT ABD & PELVIS W/CONTRAST: CPT | Mod: 26,,, | Performed by: STUDENT IN AN ORGANIZED HEALTH CARE EDUCATION/TRAINING PROGRAM

## 2024-01-01 PROCEDURE — 99214 OFFICE O/P EST MOD 30 MIN: CPT | Mod: 95,,, | Performed by: INTERNAL MEDICINE

## 2024-01-01 PROCEDURE — 74177 CT ABD & PELVIS W/CONTRAST: CPT | Mod: TC

## 2024-01-01 PROCEDURE — 1159F MED LIST DOCD IN RCRD: CPT | Mod: CPTII,S$GLB,, | Performed by: INTERNAL MEDICINE

## 2024-01-01 PROCEDURE — 84100 ASSAY OF PHOSPHORUS: CPT | Performed by: EMERGENCY MEDICINE

## 2024-01-01 PROCEDURE — 36415 COLL VENOUS BLD VENIPUNCTURE: CPT | Performed by: STUDENT IN AN ORGANIZED HEALTH CARE EDUCATION/TRAINING PROGRAM

## 2024-01-01 PROCEDURE — 63600175 PHARM REV CODE 636 W HCPCS

## 2024-01-01 PROCEDURE — 76080 X-RAY EXAM OF FISTULA: CPT | Mod: TC | Performed by: RADIOLOGY

## 2024-01-01 PROCEDURE — 71260 CT THORAX DX C+: CPT | Mod: 26,,, | Performed by: STUDENT IN AN ORGANIZED HEALTH CARE EDUCATION/TRAINING PROGRAM

## 2024-01-01 PROCEDURE — 25000003 PHARM REV CODE 250

## 2024-01-01 PROCEDURE — 99900035 HC TECH TIME PER 15 MIN (STAT)

## 2024-01-01 PROCEDURE — 25000003 PHARM REV CODE 250: Performed by: EMERGENCY MEDICINE

## 2024-01-01 PROCEDURE — 80048 BASIC METABOLIC PNL TOTAL CA: CPT | Performed by: STUDENT IN AN ORGANIZED HEALTH CARE EDUCATION/TRAINING PROGRAM

## 2024-01-01 PROCEDURE — 1111F DSCHRG MED/CURRENT MED MERGE: CPT | Mod: CPTII,S$GLB,, | Performed by: NURSE PRACTITIONER

## 2024-01-01 PROCEDURE — 93005 ELECTROCARDIOGRAM TRACING: CPT

## 2024-01-01 PROCEDURE — 85025 COMPLETE CBC W/AUTO DIFF WBC: CPT | Performed by: STUDENT IN AN ORGANIZED HEALTH CARE EDUCATION/TRAINING PROGRAM

## 2024-01-01 PROCEDURE — 76080 X-RAY EXAM OF FISTULA: CPT | Mod: 26,,, | Performed by: RADIOLOGY

## 2024-01-01 PROCEDURE — 85025 COMPLETE CBC W/AUTO DIFF WBC: CPT

## 2024-01-01 PROCEDURE — 99215 OFFICE O/P EST HI 40 MIN: CPT | Mod: S$GLB,,, | Performed by: SURGERY

## 2024-01-01 RX ORDER — OXYCODONE AND ACETAMINOPHEN 5; 325 MG/1; MG/1
1 TABLET ORAL EVERY 6 HOURS PRN
Qty: 120 TABLET | Refills: 0 | Status: SHIPPED | OUTPATIENT
Start: 2024-01-01

## 2024-01-01 RX ORDER — MIDAZOLAM HYDROCHLORIDE 1 MG/ML
INJECTION INTRAMUSCULAR; INTRAVENOUS
Status: COMPLETED | OUTPATIENT
Start: 2024-01-01 | End: 2024-01-01

## 2024-01-01 RX ORDER — SODIUM CHLORIDE, SODIUM LACTATE, POTASSIUM CHLORIDE, CALCIUM CHLORIDE 600; 310; 30; 20 MG/100ML; MG/100ML; MG/100ML; MG/100ML
INJECTION, SOLUTION INTRAVENOUS CONTINUOUS
Status: DISCONTINUED | OUTPATIENT
Start: 2024-01-01 | End: 2024-01-01 | Stop reason: HOSPADM

## 2024-01-01 RX ORDER — SODIUM,POTASSIUM PHOSPHATES 280-250MG
2 POWDER IN PACKET (EA) ORAL ONCE
Status: DISCONTINUED | OUTPATIENT
Start: 2024-01-01 | End: 2024-01-01

## 2024-01-01 RX ORDER — ENOXAPARIN SODIUM 100 MG/ML
40 INJECTION SUBCUTANEOUS EVERY 24 HOURS
Status: DISCONTINUED | OUTPATIENT
Start: 2024-01-01 | End: 2024-01-01 | Stop reason: HOSPADM

## 2024-01-01 RX ORDER — FENTANYL CITRATE 50 UG/ML
25 INJECTION, SOLUTION INTRAMUSCULAR; INTRAVENOUS EVERY 5 MIN PRN
OUTPATIENT
Start: 2024-01-01

## 2024-01-01 RX ORDER — LIDOCAINE HYDROCHLORIDE 10 MG/ML
1 INJECTION, SOLUTION EPIDURAL; INFILTRATION; INTRACAUDAL; PERINEURAL ONCE AS NEEDED
Status: DISCONTINUED | OUTPATIENT
Start: 2024-01-01 | End: 2024-01-01 | Stop reason: HOSPADM

## 2024-01-01 RX ORDER — POTASSIUM CHLORIDE 7.45 MG/ML
10 INJECTION INTRAVENOUS
Status: COMPLETED | OUTPATIENT
Start: 2024-01-01 | End: 2024-01-01

## 2024-01-01 RX ORDER — LIDOCAINE HYDROCHLORIDE 10 MG/ML
1 INJECTION, SOLUTION EPIDURAL; INFILTRATION; INTRACAUDAL; PERINEURAL ONCE
Status: DISCONTINUED | OUTPATIENT
Start: 2024-01-01 | End: 2024-01-01 | Stop reason: HOSPADM

## 2024-01-01 RX ORDER — SODIUM CHLORIDE 9 MG/ML
INJECTION, SOLUTION INTRAVENOUS CONTINUOUS
Status: DISCONTINUED | OUTPATIENT
Start: 2024-01-01 | End: 2024-01-01 | Stop reason: HOSPADM

## 2024-01-01 RX ORDER — SODIUM CHLORIDE 0.9 % (FLUSH) 0.9 %
10 SYRINGE (ML) INJECTION EVERY 8 HOURS
Qty: 1800 ML | Refills: 0 | Status: SHIPPED | OUTPATIENT
Start: 2024-01-01 | End: 2024-01-01

## 2024-01-01 RX ORDER — LIDOCAINE HYDROCHLORIDE 10 MG/ML
10 INJECTION INFILTRATION; PERINEURAL ONCE
Status: DISCONTINUED | OUTPATIENT
Start: 2024-01-01 | End: 2024-01-01 | Stop reason: HOSPADM

## 2024-01-01 RX ORDER — OLANZAPINE 2.5 MG/1
5 TABLET ORAL NIGHTLY
Status: DISCONTINUED | OUTPATIENT
Start: 2024-01-01 | End: 2024-01-01 | Stop reason: HOSPADM

## 2024-01-01 RX ORDER — HEPARIN 100 UNIT/ML
5 SYRINGE INTRAVENOUS
Status: COMPLETED | OUTPATIENT
Start: 2024-01-01 | End: 2024-01-01

## 2024-01-01 RX ORDER — SULFAMETHOXAZOLE AND TRIMETHOPRIM 800; 160 MG/1; MG/1
1 TABLET ORAL 2 TIMES DAILY
Status: DISCONTINUED | OUTPATIENT
Start: 2024-01-01 | End: 2024-01-01

## 2024-01-01 RX ORDER — PROCHLORPERAZINE EDISYLATE 5 MG/ML
5 INJECTION INTRAMUSCULAR; INTRAVENOUS EVERY 30 MIN PRN
OUTPATIENT
Start: 2024-01-01

## 2024-01-01 RX ORDER — SUCRALFATE 1 G/10ML
1 SUSPENSION ORAL EVERY 6 HOURS
Status: DISCONTINUED | OUTPATIENT
Start: 2024-01-01 | End: 2024-01-01 | Stop reason: HOSPADM

## 2024-01-01 RX ORDER — SULFAMETHOXAZOLE AND TRIMETHOPRIM 800; 160 MG/1; MG/1
1 TABLET ORAL 2 TIMES DAILY
Status: ON HOLD | COMMUNITY
Start: 2024-01-01 | End: 2024-01-01 | Stop reason: HOSPADM

## 2024-01-01 RX ORDER — SODIUM,POTASSIUM PHOSPHATES 280-250MG
1 POWDER IN PACKET (EA) ORAL ONCE
Status: COMPLETED | OUTPATIENT
Start: 2024-01-01 | End: 2024-01-01

## 2024-01-01 RX ORDER — MORPHINE SULFATE 2 MG/ML
2 INJECTION, SOLUTION INTRAMUSCULAR; INTRAVENOUS
Status: DISCONTINUED | OUTPATIENT
Start: 2024-01-01 | End: 2024-01-01 | Stop reason: HOSPADM

## 2024-01-01 RX ORDER — ACETAMINOPHEN 500 MG
500 TABLET ORAL EVERY 6 HOURS
Status: DISCONTINUED | OUTPATIENT
Start: 2024-01-01 | End: 2024-01-01 | Stop reason: HOSPADM

## 2024-01-01 RX ORDER — ACETAMINOPHEN 325 MG/1
650 TABLET ORAL EVERY 8 HOURS PRN
Status: DISCONTINUED | OUTPATIENT
Start: 2024-01-01 | End: 2024-01-01 | Stop reason: HOSPADM

## 2024-01-01 RX ORDER — OXYCODONE HYDROCHLORIDE 5 MG/1
5 TABLET ORAL EVERY 4 HOURS PRN
Status: DISCONTINUED | OUTPATIENT
Start: 2024-01-01 | End: 2024-01-01 | Stop reason: HOSPADM

## 2024-01-01 RX ORDER — TAMSULOSIN HYDROCHLORIDE 0.4 MG/1
0.4 CAPSULE ORAL DAILY
Status: DISCONTINUED | OUTPATIENT
Start: 2024-01-01 | End: 2024-01-01 | Stop reason: HOSPADM

## 2024-01-01 RX ORDER — TALC
6 POWDER (GRAM) TOPICAL NIGHTLY PRN
Status: DISCONTINUED | OUTPATIENT
Start: 2024-01-01 | End: 2024-01-01 | Stop reason: HOSPADM

## 2024-01-01 RX ORDER — ALUMINUM HYDROXIDE, MAGNESIUM HYDROXIDE, AND SIMETHICONE 1200; 120; 1200 MG/30ML; MG/30ML; MG/30ML
30 SUSPENSION ORAL
Status: DISCONTINUED | OUTPATIENT
Start: 2024-01-01 | End: 2024-01-01 | Stop reason: HOSPADM

## 2024-01-01 RX ORDER — ONDANSETRON 8 MG/1
8 TABLET, ORALLY DISINTEGRATING ORAL EVERY 8 HOURS PRN
Status: DISCONTINUED | OUTPATIENT
Start: 2024-01-01 | End: 2024-01-01 | Stop reason: HOSPADM

## 2024-01-01 RX ORDER — CIPROFLOXACIN 500 MG/1
500 TABLET ORAL 2 TIMES DAILY
Status: ON HOLD | COMMUNITY
Start: 2024-01-01 | End: 2024-01-01 | Stop reason: HOSPADM

## 2024-01-01 RX ORDER — NYSTATIN 500000 [USP'U]/1
2 TABLET, COATED ORAL 2 TIMES DAILY
COMMUNITY
Start: 2024-01-01

## 2024-01-01 RX ORDER — FENTANYL CITRATE 50 UG/ML
INJECTION, SOLUTION INTRAMUSCULAR; INTRAVENOUS
Status: COMPLETED | OUTPATIENT
Start: 2024-01-01 | End: 2024-01-01

## 2024-01-01 RX ORDER — AMOXICILLIN AND CLAVULANATE POTASSIUM 875; 125 MG/1; MG/1
1 TABLET, FILM COATED ORAL EVERY 12 HOURS
Qty: 56 TABLET | Refills: 0 | Status: SHIPPED | OUTPATIENT
Start: 2024-01-01 | End: 2024-06-17

## 2024-01-01 RX ORDER — ONDANSETRON 4 MG/1
8 TABLET, FILM COATED ORAL EVERY 6 HOURS PRN
Status: DISCONTINUED | OUTPATIENT
Start: 2024-01-01 | End: 2024-01-01

## 2024-01-01 RX ORDER — SODIUM,POTASSIUM PHOSPHATES 280-250MG
2 POWDER IN PACKET (EA) ORAL ONCE
Status: DISCONTINUED | OUTPATIENT
Start: 2024-01-01 | End: 2024-01-01 | Stop reason: HOSPADM

## 2024-01-01 RX ORDER — OXYCODONE HYDROCHLORIDE 10 MG/1
10 TABLET ORAL EVERY 4 HOURS PRN
Status: DISCONTINUED | OUTPATIENT
Start: 2024-01-01 | End: 2024-01-01 | Stop reason: HOSPADM

## 2024-01-01 RX ORDER — HYDROMORPHONE HYDROCHLORIDE 1 MG/ML
0.5 INJECTION, SOLUTION INTRAMUSCULAR; INTRAVENOUS; SUBCUTANEOUS
Status: DISCONTINUED | OUTPATIENT
Start: 2024-01-01 | End: 2024-01-01

## 2024-01-01 RX ORDER — SODIUM CHLORIDE 0.9 % (FLUSH) 0.9 %
10 SYRINGE (ML) INJECTION
Status: DISCONTINUED | OUTPATIENT
Start: 2024-01-01 | End: 2024-01-01 | Stop reason: HOSPADM

## 2024-01-01 RX ADMIN — IOHEXOL 75 ML: 350 INJECTION, SOLUTION INTRAVENOUS at 03:05

## 2024-01-01 RX ADMIN — OLANZAPINE 5 MG: 2.5 TABLET, FILM COATED ORAL at 08:05

## 2024-01-01 RX ADMIN — POTASSIUM & SODIUM PHOSPHATES POWDER PACK 280-160-250 MG 1 PACKET: 280-160-250 PACK at 08:05

## 2024-01-01 RX ADMIN — POTASSIUM BICARBONATE 40 MEQ: 391 TABLET, EFFERVESCENT ORAL at 03:05

## 2024-01-01 RX ADMIN — PIPERACILLIN SODIUM AND TAZOBACTAM SODIUM 4.5 G: 4; .5 INJECTION, POWDER, FOR SOLUTION INTRAVENOUS at 01:05

## 2024-01-01 RX ADMIN — ACETAMINOPHEN 500 MG: 500 TABLET ORAL at 05:05

## 2024-01-01 RX ADMIN — POTASSIUM CHLORIDE 10 MEQ: 7.46 INJECTION, SOLUTION INTRAVENOUS at 10:05

## 2024-01-01 RX ADMIN — PIPERACILLIN SODIUM AND TAZOBACTAM SODIUM 4.5 G: 4; .5 INJECTION, POWDER, FOR SOLUTION INTRAVENOUS at 06:05

## 2024-01-01 RX ADMIN — SODIUM CHLORIDE, POTASSIUM CHLORIDE, SODIUM LACTATE AND CALCIUM CHLORIDE: 600; 310; 30; 20 INJECTION, SOLUTION INTRAVENOUS at 10:05

## 2024-01-01 RX ADMIN — MIDAZOLAM HYDROCHLORIDE 1 MG: 2 INJECTION, SOLUTION INTRAMUSCULAR; INTRAVENOUS at 08:01

## 2024-01-01 RX ADMIN — OXYCODONE HYDROCHLORIDE 10 MG: 10 TABLET ORAL at 08:05

## 2024-01-01 RX ADMIN — OXYCODONE HYDROCHLORIDE 10 MG: 10 TABLET ORAL at 05:05

## 2024-01-01 RX ADMIN — IOHEXOL 75 ML: 350 INJECTION, SOLUTION INTRAVENOUS at 04:04

## 2024-01-01 RX ADMIN — POTASSIUM CHLORIDE 10 MEQ: 7.46 INJECTION, SOLUTION INTRAVENOUS at 11:05

## 2024-01-01 RX ADMIN — POTASSIUM CHLORIDE 10 MEQ: 7.46 INJECTION, SOLUTION INTRAVENOUS at 12:05

## 2024-01-01 RX ADMIN — MORPHINE SULFATE 2 MG: 2 INJECTION, SOLUTION INTRAMUSCULAR; INTRAVENOUS at 11:05

## 2024-01-01 RX ADMIN — ENOXAPARIN SODIUM 40 MG: 40 INJECTION SUBCUTANEOUS at 06:05

## 2024-01-01 RX ADMIN — IOHEXOL 75 ML: 350 INJECTION, SOLUTION INTRAVENOUS at 12:01

## 2024-01-01 RX ADMIN — IOHEXOL 2 ML: 300 INJECTION, SOLUTION INTRAVENOUS at 08:01

## 2024-01-01 RX ADMIN — TAMSULOSIN HYDROCHLORIDE 0.4 MG: 0.4 CAPSULE ORAL at 08:05

## 2024-01-01 RX ADMIN — FENTANYL CITRATE 50 MCG: 50 INJECTION, SOLUTION INTRAMUSCULAR; INTRAVENOUS at 08:01

## 2024-01-01 RX ADMIN — ALUMINUM HYDROXIDE, MAGNESIUM HYDROXIDE, AND SIMETHICONE 30 ML: 200; 200; 20 SUSPENSION ORAL at 06:05

## 2024-01-01 RX ADMIN — POTASSIUM CHLORIDE 10 MEQ: 7.46 INJECTION, SOLUTION INTRAVENOUS at 02:05

## 2024-01-01 RX ADMIN — POTASSIUM CHLORIDE 10 MEQ: 7.46 INJECTION, SOLUTION INTRAVENOUS at 08:05

## 2024-01-01 RX ADMIN — POTASSIUM CHLORIDE 10 MEQ: 7.46 INJECTION, SOLUTION INTRAVENOUS at 01:05

## 2024-01-01 RX ADMIN — HEPARIN 500 UNITS: 100 SYRINGE at 11:05

## 2024-01-01 RX ADMIN — OXYCODONE HYDROCHLORIDE 10 MG: 10 TABLET ORAL at 01:05

## 2024-01-01 RX ADMIN — IOHEXOL 500 ML: 9 SOLUTION ORAL at 04:04

## 2024-01-01 RX ADMIN — ACETAMINOPHEN 500 MG: 500 TABLET ORAL at 06:05

## 2024-01-01 RX ADMIN — SUCRALFATE 1 G: 1 SUSPENSION ORAL at 06:05

## 2024-01-01 RX ADMIN — IOHEXOL 100 ML: 350 INJECTION, SOLUTION INTRAVENOUS at 06:01

## 2024-01-01 RX ADMIN — SODIUM CHLORIDE, POTASSIUM CHLORIDE, SODIUM LACTATE AND CALCIUM CHLORIDE: 600; 310; 30; 20 INJECTION, SOLUTION INTRAVENOUS at 07:05

## 2024-01-01 RX ADMIN — VANCOMYCIN HYDROCHLORIDE 1250 MG: 1.25 INJECTION, POWDER, LYOPHILIZED, FOR SOLUTION INTRAVENOUS at 02:05

## 2024-01-04 NOTE — TELEPHONE ENCOUNTER
----- Message from Nidia Pinto sent at 1/4/2024  9:47 AM CST -----  Regarding: pt advice  Contact: Kevin [phone]  Kevin/spouse calling to request a callback from the nurse to discuss patient's incision. Pls call

## 2024-01-04 NOTE — DISCHARGE SUMMARY
Siddhartha Thurman - [facility]  General Surgery  Discharge Summary      Patient Name: Danish Valladares  MRN: [id number]  Admission Date: 12/23/2023  Hospital Length of Stay: 8 days  Discharge Date and Time: 12/31/2023  5:45 PM  Attending Physician: No att. providers found   Discharging Provider: Damaris Kim MD  Primary Care Provider: Yfn Walker MD    HPI:   No notes on file    * No surgery found *      Indwelling Lines/Drains at time of discharge:   Lines/Drains/Airways       Drain  Duration                  Drain/Device  12/22/23 1507 Right lower abdomen pigtail 12 days         Closed/Suction Drain 12/26/23 1644 Tube - 1 Inferior;Lateral;Right Back Other (Comment) 12 Fr. 8 days                  Hospital Course: Please see the preoperative H&P and other available documentation for full details related to history prior to this admission.  Briefly, is a 63 y.o. male who was undergoing palliative chemotherapy followed by Dr. 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 Moy 8/21/23, duodenal stump leak s/p exp lap drainage of peritoneal abscess and EGD with visualization of duodenal defect 9/18/23, and IR drain placement 9/29/23 who presents to the ED with generalized abdominal pain. He had new IR drain placed during admission. Diet was advanced as tolerated and the patient's pain was controlled on oral pain medications without problem. Currently, the patient is doing well and is stable and appropriate for discharge at this time. Patient met with palliative care team during admission and had ongoing discussions regarding goals of care. Discharge instructions discussed with patient at bedside, all questions and concerns addressed.            Goals of Care Treatment Preferences:  Code Status: Full Code          What is most important right now is to focus on symptom/pain control.  Accordingly, we have decided that the best plan  "to meet the patient's goals includes continuing with treatment.      Consults:   Consults (From admission, onward)          Status Ordering Provider     Inpatient consult to Palliative Care  Once        Provider:  (Not yet assigned)    Completed KEISHA BLACKBURN     Inpatient consult to Interventional Radiology  Once        Provider:  (Not yet assigned)    Completed SUSANNE JUAREZ     Inpatient consult to Registered Dietitian/Nutritionist  Once        Provider:  (Not yet assigned)    Completed FITZ KUMAR     Inpatient consult to PICC team (NIAS)  Once        Provider:  (Not yet assigned)    Completed DARIAN PORTILLO     Inpatient consult to Palliative Care  Once        Provider:  (Not yet assigned)    Completed FITZ KUMAR     Inpatient consult to Nephrology  Once        Provider:  (Not yet assigned)    Completed AUSTIN LEE            Significant Diagnostic Studies: Labs: BMP: No results for input(s): "GLU", "NA", "K", "CL", "CO2", "BUN", "CREATININE", "CALCIUM", "MG" in the last 48 hours. and CBC No results for input(s): "WBC", "HGB", "HCT", "PLT" in the last 48 hours.    Pending Diagnostic Studies:       None          Final Active Diagnoses:    Diagnosis Date Noted POA    PRINCIPAL PROBLEM:  Gastric adenocarcinoma [C16.9] 08/17/2023 Yes    Metastatic adenocarcinoma [C79.9] 12/31/2023 Yes     Chronic    Emphysematous bleb of lung [J43.9] 12/31/2023 Yes     Chronic    Pain [R52] 12/27/2023 Unknown    Palliative care encounter [Z51.5] 12/26/2023 Not Applicable    Postprocedural intraabdominal abscess [T81.43XA] 12/23/2023 Yes    Atrial fibrillation with RVR [I48.91] 12/22/2023 Yes    Leukocytosis [D72.829] 12/21/2023 Yes    IAIN (acute kidney injury) [N17.9] 12/21/2023 Yes    Shortness of breath [R06.02] 12/20/2023 Yes    Peritonitis [K65.9] 12/19/2023 Yes      Problems Resolved During this Admission:    Diagnosis Date Noted Date Resolved POA    Acalculous cholecystitis [K81.9] 12/19/2023 12/24/2023 Yes " "     Discharged Condition: good    Disposition: Home or Self Care    Follow Up:   Follow-up Information       Eulogio Moy MD. Go in 1 week(s).    Specialties: Surgical Oncology, General Surgery  Why: Follow up with repeat imaging  Contact information:  [address]  [facility] [address]  [phone]                           Patient Instructions:      OXYGEN FOR HOME USE     Order Specific Question Answer Comments   Liter Flow 5    Duration Continuous    Qualifying Test Performed at: Rest    Oxygen saturation: 85    Portable mode: continuous    Route nasal cannula    Device: home concentrator with portable tanks    Length of need (in months): 99 mos    Patient condition with qualifying saturation Other - List qualifying diagnosis and code    Select a diagnosis & list the code in the comments Hypoxia [340131]    Select a diagnosis & list the code in the comments Emphysema lung [245117]    Select a diagnosis & list the code in the comments Metastatic adenocarcinoma to lung [343843]    Height: 5' 11" (1.803 m)    Weight: 85.5 kg (188 lb 7.9 oz)    Alternative treatment measures have been tried or considered and deemed clinically ineffective. Yes      Ambulatory referral/consult to [facility]   Standing Status: Future   Referral Priority: Routine Referral Type: Consultation   Referral Reason: Specialty Services Required   Number of Visits Requested: 1     Diet Adult Regular     Notify your health care provider if you experience any of the following:  increased confusion or weakness     Notify your health care provider if you experience any of the following:  persistent dizziness, light-headedness, or visual disturbances     Notify your health care provider if you experience any of the following:  worsening rash     Notify your health care provider if you experience any of the following:  severe persistent headache     Notify your health care provider if you experience " any of the following:  difficulty breathing or increased cough     Notify your health care provider if you experience any of the following:  severe uncontrolled pain     Notify your health care provider if you experience any of the following:  persistent nausea and vomiting or diarrhea     Notify your health care provider if you experience any of the following:  temperature >100.4     No dressing needed   Order Comments: Continue to flush drain with 15 cc normal saline every 4 hours     Activity as tolerated     Medications:  Reconciled Home Medications:      Medication List        START taking these medications      fluconazole 200 MG Tab  Commonly known as: DIFLUCAN  Take 1 tablet (200 mg total) by mouth once daily. for 14 days     pantoprazole 40 MG tablet  Commonly known as: PROTONIX  Take 1 tablet (40 mg total) by mouth once daily.     sulfamethoxazole-trimethoprim 800-160mg 800-160 mg Tab  Commonly known as: BACTRIM DS  Take 1 tablet by mouth 2 (two) times daily. for 14 days            CHANGE how you take these medications      OLANZapine 5 MG tablet  Commonly known as: ZyPREXA  Take for 3 nights after chemo starting the night of chemo  What changed:   how much to take  how to take this  when to take this  reasons to take this  additional instructions            CONTINUE taking these medications      CENTRUM ORAL  Take 1 tablet by mouth once daily.     ibuprofen 200 MG tablet  Commonly known as: ADVIL,MOTRIN  Take 200 mg by mouth every 6 (six) hours as needed for Pain.     LIDOcaine-prilocaine cream  Commonly known as: EMLA  Apply topically as needed (APPLY 30 to 60 minutes prior to chemo).     mirtazapine 15 MG tablet  Commonly known as: REMERON  Take 1 tablet (15 mg total) by mouth every evening.     ondansetron 8 MG tablet  Commonly known as: ZOFRAN  Take 1 tablet (8 mg total) by mouth every 6 (six) hours as needed for Nausea.     prochlorperazine 10 MG tablet  Commonly known as: COMPAZINE  Take 1 tablet  (10 mg total) by mouth every 6 (six) hours as needed.     tamsulosin 0.4 mg Cap  Commonly known as: FLOMAX  Take 1 capsule (0.4 mg total) by mouth once daily.     TYLENOL ORAL  Take 2 tablets by mouth every 6 (six) hours as needed (pain).     VITAMIN C ORAL  Take 1 tablet by mouth once daily.     VITAMIN D ORAL  Take 1 tablet by mouth once daily.     vitamin E 1000 UNIT capsule  Take 1,000 Units by mouth once daily.            Time spent on the discharge of patient: 10 minutes    Damaris Kim MD  General Surgery  [facility]  [facility]

## 2024-01-04 NOTE — PLAN OF CARE
Siddhartha Catalan [facility]  Discharge Final Note    Primary Care Provider: Yfn Walker MD  Expected Discharge Date: 12/31/2023    Patient medically ready for discharge to home [facility]     Transportation by family.     Is family/patient aware of discharge: yes     Hospital follow up scheduled: yes       Final Discharge Note (most recent)       Final Note - 01/04/24 1125          Final Note    Assessment Type Final Discharge Note     Anticipated Discharge Disposition Home-Health Care [facility]     Hospital Resources/Appts/Education Provided Provided patient/caregiver with written discharge plan information                     Important Message from Medicare         Referral Info (most recent)       Referral Info    No documentation.                 Contact Info       Eulogio Moy MD   Specialty: Surgical Oncology, General Surgery    [facility]  [address]  [facility] [address]   Phone: [phone]       Next Steps: Go in 1 week(s)    Instructions: Follow up with repeat imaging          Future Appointments   Date Time Provider Department Center   1/5/2024 11:00 AM LAB, HEMONC CANCER BLDG [facility]   1/5/2024  1:00 PM [facility] CT1 [facility]   1/9/2024 10:00 AM Lulu Park F, APRN,ANP-C [facility] SURONC [facility]   1/9/2024  2:15 PM LAB, [facility] DRAW STATION [facility]   1/9/2024  3:20 PM Chelsea Maravilla MD [facility] HEMONC [facility]   1/10/2024 10:30 AM CHAIR 44, [facility] INFUSION [facility] INF [facility]   1/12/2024  4:15 PM INJECTION SCHEDULE, ST [facility] INFUSION [facility]F [facility]   1/24/2024 10:30 AM CHAIR 05, [facility] INFUSION [facility] INF [facility]   1/26/2024  4:15 PM INJECTION SCHEDULE, [facility] INFUSION [facility] INF [facility]     Sachin Saleem RN  Case Management  Ext: [phone]  01/04/2024

## 2024-01-04 NOTE — TELEPHONE ENCOUNTER
Received return call. Spoke to Kevin. Reports she is concerned about incision by RLQ that was previously infected.  Reports site is red and incision is yellow white colored. Site is closed. Denies fever, nausea, vomiting, drainage/bleeding and diarrhea. Offered appt today. Pt spouse declined. Pt has appt on Monday and also appt for labs and CT tomorrow. Provided with step by step instructions for sending picture via [employer]. She will send pictures shortly and will send to NP for review. Pt spouse verbalized understanding.

## 2024-01-04 NOTE — HOSPITAL COURSE
Please see the preoperative H&P and other available documentation for full details related to history prior to this admission.  Briefly, is a 63 y.o. male who was undergoing palliative chemotherapy followed by Dr. 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 Moy 8/21/23, duodenal stump leak s/p exp lap drainage of peritoneal abscess and EGD with visualization of duodenal defect 9/18/23, and IR drain placement 9/29/23 who presents to the ED with generalized abdominal pain. He had new IR drain placed during admission. Diet was advanced as tolerated and the patient's pain was controlled on oral pain medications without problem. Currently, the patient is doing well and is stable and appropriate for discharge at this time. Patient met with palliative care team during admission and had ongoing discussions regarding goals of care. Discharge instructions discussed with patient at bedside, all questions and concerns addressed.

## 2024-01-04 NOTE — TELEPHONE ENCOUNTER
Call placed to pt. Informed Np Lulu would like to see him in clinic tomorrow for wound check. Appt details provided including time date and location. Pt verbalized understanding.

## 2024-01-05 NOTE — PROGRESS NOTES
Encounter Date:  2024    Patient ID: Danish Valladares  Age:  63 y.o. :  1960    Chief Complaint:  RLQ skin red, 2 IR drains in place with stage IV cancer     2023: presented to ED with GOO and 30# weight loss  2023: Distal gastrectomy with Bilroth II reconstruction per Dr. Moy  2023: re-admitted to [facility], IR drain placement into intra-abd fluid collection  2023: s/p exp lap drainage of peritoneal abscess, EGD with visualization of duodenal defect c/w duodenal stump leak per Dr. Moy  2023: IR drain placement  2023: 2 cycles of palliative FOLFOX per Dr. Maravilla  2023: IR drain placed at [facility]  2023 - 2023: admitted to [facility] and transferred to [facility], IR drain placed     Interval History:  Mr. Valladares returns to clinic in a w/c from [place], accompanied by his wife.   He was diagnosed with stage IV cancer in 2023 and underwent palliative distal gastrectomy with post op complications. In November, he received 2  cycles of palliative chemo FOLFOX with Dr. Maravilla. After cycle 2, last month he was admitted to local hospital, underwent IR aspiration of intra-abd abscess with drain placement 26, then transferred to [facility]. A second IR drain was placed on . He was d/c home on O2 with regular diet, instructed to flush drains, on oral abx.   He is scheduled for CT and labs today and requested to be seen, concerned about RLQ possible skin infection. This site is sore, warm and tender to touch, erythematous, and hard. IR in R flank connected to bag with daily output ranging 90-170cc. RLQ IR drain to bulb with daily total output range of 30-60cc. This drain is flushed daily with 10cc by his wife. Taking diflucan QD and Bactrim BID as prescribed. Remains afebrile.  He is not using oxygen today in clinic, citing he only received tanks with 4 hours of oxygen.   Mr. Valladares reports a poor appetite, tolerating small portions of 3 meals plus  "snack. Oral diet recall includes chicken, chandler pie, steak. He is not drinking any protein supplements. C/o dysgeusia. Denies N/V. Weight is down 16# over the past 2 months.   Activity is very limited, citing fatigue, "sleep too much."   Mr. Valladares is scheduled for next cycle of chemo on 1/10/24.     New Data:  Imaging:  I personally reviewed the following images:   12/31/2023: CT A/P:   Postoperative changes from distal gastrectomy with Billroth 2 reconstruction.     Multiple peripherally enhancing collections of air and fluid scattered throughout the peritoneum, compatible with known duodenal stump leak.     The collection centered in the right pericolic gutter has increased in size despite a drainage catheter, concerning for drain malfunction. Correlate with drain output.     The largest collection in the right hemiabdomen has decreased in size after placement of a percutaneous drainage catheter.  Other fluid collections have also decreased in size.      11/9/2023: staging CT C/A/P:  1. Marked improvement upon comparison with numerous rim-enhancing collections throughout the abdominal cavity showing relatively decrease volume upon comparison. There is a small elliptical low density collection that is noted left paracolic gutter that is nominal residual relative to the large elliptical fluid collection from previous.  2. Resolution of the large hepatic mass in the inferior right hepatic lobe without evidence of any residual findings.  3. Percutaneous catheter entering from an anterior approach has been removed in the interval.    Labs:      Chemistry        Component Value Date/Time     12/31/2023 0457     08/16/2023 1109    K 4.2 12/31/2023 0457    K 3.0 08/16/2023 1109    CL 92 (L) 12/31/2023 0457    CL 84 08/16/2023 1109    CO2 37 (H) 12/31/2023 0457    CO2 33 08/16/2023 1109    BUN 15 12/31/2023 0457    BUN 24 (A) 08/16/2023 1109    CREATININE 0.8 12/31/2023 0457    CREATININE 1.3 08/16/2023 " 1109    GLU 71 12/31/2023 0457     08/16/2023 1109        Component Value Date/Time    CALCIUM 7.9 (L) 12/31/2023 0457    ALKPHOS 121 12/31/2023 0457    AST 24 12/31/2023 0457    ALT 28 12/31/2023 0457    BILITOT 0.4 12/31/2023 0457        Lab Results   Component Value Date    WBC 9.89 12/31/2023    HGB 10.2 (L) 12/31/2023    HCT 32.0 (L) 12/31/2023    MCV 78 (L) 12/31/2023     12/31/2023       Lab Results   Component Value Date    CEA 2.5 08/16/2023     Lab Results   Component Value Date    ALBUMIN 1.4 (L) 12/31/2023     Lab Results   Component Value Date    PREALBUMIN 21 10/23/2023    PREALBUMIN 22 10/09/2023    PREALBUMIN 20 10/03/2023     Past Medical History:   Diagnosis Date    Emphysema, unspecified     Hypoxia     Psoriasis     Stomach cancer      Past Surgical History:   Procedure Laterality Date    DIAGNOSTIC LAPAROSCOPY  09/18/2023    Procedure: LAPAROSCOPY, DIAGNOSTIC;  Surgeon: Eulogio Moy MD;  Location: [address];  Service: General;;    ESOPHAGOGASTRODUODENOSCOPY N/A 08/03/2023    Procedure: EGD (ESOPHAGOGASTRODUODENOSCOPY);  Surgeon: Loco Marvin MD;  Location: [facility];  Service: Gastroenterology;  Laterality: N/A;    ESOPHAGOGASTRODUODENOSCOPY N/A 09/18/2023    Procedure: EGD (ESOPHAGOGASTRODUODENOSCOPY); flouroscopy, need c-arm in the room;  Surgeon: Eulogio Moy MD;  Location: [address];  Service: General;  Laterality: N/A;  EGD with Fluoroscopy     GASTRECTOMY N/A 08/21/2023    Procedure: GASTRECTOMY;  Surgeon: Eulogio Moy MD;  Location: [address];  Service: General;  Laterality: N/A;  Open gastrectomy with EGD. Needs Omni retractor, requesting room 24    INSERTION OF TUNNELED CENTRAL VENOUS CATHETER (CVC) WITH SUBCUTANEOUS PORT  11/21/2023    Procedure: [id number];  Surgeon: Talon Arias MD;  Location: [facility];  Service: General;;    LAPAROSCOPIC DRAINAGE OF ABDOMEN N/A 09/18/2023    Procedure: DRAINAGE,  ABDOMEN, LAPAROSCOPIC;  Surgeon: Eulogio Moy MD;  Location: [facility] OR [address] [place] [address];  Service: General;  Laterality: N/A;    TONSILLECTOMY       Current Outpatient Medications on File Prior to Visit   Medication Sig Dispense Refill    acetaminophen (TYLENOL ORAL) Take 2 tablets by mouth every 6 (six) hours as needed (pain).      ascorbic acid (VITAMIN C ORAL) Take 1 tablet by mouth once daily.      ergocalciferol, vitamin D2, (VITAMIN D ORAL) Take 1 tablet by mouth once daily.      fluconazole (DIFLUCAN) 200 MG Tab Take 1 tablet (200 mg total) by mouth once daily. for 14 days 14 tablet 0    ibuprofen (ADVIL,MOTRIN) 200 MG tablet Take 200 mg by mouth every 6 (six) hours as needed for Pain.      LIDOcaine-prilocaine (EMLA) cream Apply topically as needed (APPLY 30 to 60 minutes prior to chemo). (Patient taking differently: Apply 1 g topically as needed (APPLY 30 to 60 minutes prior to chemo).) 30 g 2    mirtazapine (REMERON) 15 MG tablet Take 1 tablet (15 mg total) by mouth every evening. 30 tablet 11    multivitamin/iron/folic acid (CENTRUM ORAL) Take 1 tablet by mouth once daily.      OLANZapine (ZYPREXA) 5 MG tablet Take for 3 nights after chemo starting the night of chemo (Patient taking differently: Take 5 mg by mouth as needed (for 3 nights after chemo starting the night of chemo).) 30 tablet 2    ondansetron (ZOFRAN) 8 MG tablet Take 1 tablet (8 mg total) by mouth every 6 (six) hours as needed for Nausea. 60 tablet 2    pantoprazole (PROTONIX) 40 MG tablet Take 1 tablet (40 mg total) by mouth once daily.      prochlorperazine (COMPAZINE) 10 MG tablet Take 1 tablet (10 mg total) by mouth every 6 (six) hours as needed. (Patient taking differently: Take 10 mg by mouth every 6 (six) hours as needed (nausea).) 60 tablet 3    sulfamethoxazole-trimethoprim 800-160mg (BACTRIM DS) 800-160 mg Tab Take 1 tablet by mouth 2 (two) times daily. for 14 days 28 tablet 0    tamsulosin (FLOMAX) 0.4 mg Cap Take 1 capsule (0.4  "mg total) by mouth once daily. 30 capsule 11    vitamin E 1000 UNIT capsule Take 1,000 Units by mouth once daily.       Current Facility-Administered Medications on File Prior to Visit   Medication Dose Route Frequency Provider Last Rate Last Admin    diphenhydrAMINE injection 25 mg  25 mg Intravenous Q6H PRN Luis Pickett MD        haloperidol lactate injection 0.5 mg  0.5 mg Intravenous Q10 Min PRN Luis Pickett MD        lactated ringers infusion   Intravenous Continuous Luis Pickett MD 10 mL/hr at 11/21/23 1031 Restarted at 11/21/23 1105    LIDOcaine (PF) 10 mg/ml (1%) injection 10 mg  1 mL Intradermal Once Luis Pickett MD        prochlorperazine injection Soln 5 mg  5 mg Intravenous Q4H PRN Luis Pickett MD        sodium chloride 0.9% flush 10 mL  10 mL Intravenous Once Luis Pickett MD         Review of patient's allergies indicates:   Allergen Reactions    Dilaudid [hydromorphone]      Pt becomes hypoxic - requiring 5L via oxymask for     Penicillin     Penicillins Rash       Family History:  His family history includes Cancer in his father.     Social History:   reports that he quit smoking about 10 years ago. His smoking use included cigarettes. He has never used smokeless tobacco. He reports current alcohol use. He reports that he does not use drugs.     ROS:    Pertinent positive/negatives detailed in HPI, all other systems negative.     Physical Exam:  BP (!) 100/59   Pulse 94   Temp 97.7 °F (36.5 °C)   Ht 5' 11" (1.803 m)   Wt 71 kg (156 lb 8.4 oz)   SpO2 (!) 94%   BMI 21.83 kg/m²     Constitutional:  Non-toxic, no acute distress.    Eyes:  Sclerae anicteric, gaze symmetrical  Neck:  Trachea midline,  FROM  Resp:  Easy work of breathing  Abd:  Soft, RLQ tender with redness; RLQ drain sutured in place. R flank drain sutured in place.     Musculoskeletal:  Ambulatory, + muscle wasting.    Neuro:  No gross deficits  Psych:  Awake, alert, oriented.  Answers and " asks questions appropriately      ICD-10-CM ICD-9-CM    1. Gastric adenocarcinoma  C16.9 151.9       2. Secondary malignancy of parietal peritoneum  C78.6 197.6       3. Postprocedural intraabdominal abscess  T81.43XA 998.59       Plan   This 62 y/o gentleman diagnosed with stage IV cancer with peritoneal mets was hospitalized after 2 cycles of palliative FOLFOX with intra- abd fluid collection. Both drains flushed easily with 10cc sterile water. Opened RLQ skin ~ 0.5cm and expressed pus. Applied dry gauze dressing and instructed to change QD. Reinforced flush both drains QD as instructed. Encouraged small frequent meals throughout day. Drink at least 2-3 protein supplements daily.   Complete oral abx course as prescribed.   Await lab and CT scan results today.   He is on the clinic schedule next Tuesday as hospital f/u.    RTC 1/9/24  Scheduled to see Dr. Maravilla same day as his next chemo cycle is scheduled 1/10/24.     Questions were asked and answered to patient's satisfaction.   Will discuss case with Dr. Moy.            Lulu Park NP  Upper GI / Hepatobiliary Surgical Oncology  [facility], [place]  Office: [phone]  Fax: [phone]      ADDENDUM:  Reviewed the following with Dr. Moy.   He instructed to flush both drain 2-3 x day. If he is doing OK next week, may postpone next clinic visit to from 1/9/24 to 1/16/24 with repeat CT scan. He will discuss holding chemo with Dr. Maravilla.     Chemistry        Component Value Date/Time     (L) 01/05/2024 1110     08/16/2023 1109    K 4.3 01/05/2024 1110    K 3.0 08/16/2023 1109    CL 98 01/05/2024 1110    CL 84 08/16/2023 1109    CO2 27 01/05/2024 1110    CO2 33 08/16/2023 1109    BUN 22 01/05/2024 1110    BUN 24 (A) 08/16/2023 1109    CREATININE 1.3 01/05/2024 1110    CREATININE 1.3 08/16/2023 1109    GLU 87 01/05/2024 1110     08/16/2023 1109        Component Value Date/Time    CALCIUM 8.4 (L) 01/05/2024 1114     ALKPHOS 120 01/05/2024 1110    AST 18 01/05/2024 1110    ALT 18 01/05/2024 1110    BILITOT 0.5 01/05/2024 1110        Lab Results   Component Value Date    WBC 11.30 01/05/2024    HGB 11.1 (L) 01/05/2024    HCT 34.7 (L) 01/05/2024    MCV 78 (L) 01/05/2024     01/05/2024     CT A/P:  1. Multiple peripherally enhancing air and fluid collections throughout the abdomen, stable or decreased in size as compared to prior.  No new or enlarging collections.  Drains appear to be appropriately positioned.  2. There is some subcutaneous fluid right anterolateral abdominal wall which may communicate with the right colic gutter fluid collection.  Developing fistula/sinus tract not excluded.  3. Resolution of bilateral pleural effusions.  4. Few mm nodule right middle lobe series 2, image 9.  Attention on follow-up.

## 2024-01-08 NOTE — PROGRESS NOTES
Reviewed chart. Wife messaged clinic during weekend that pt has post-op temp of 100 while taking tylenol. Spoke with Dr. Maravilla's Nurse Gracy HUNT About infusion schedule and fu with Dr. Maravilla, she will message to see when they would like to continue tx and if he needs to be seen in clinic today.   Will continue to follow.

## 2024-01-08 NOTE — TELEPHONE ENCOUNTER
F/u from last week clinic visit.   C/o soreness to RLQ wound. Saturday RLQ opened more with pus drainage per wife who also noted low grade fever . Taking Tylenol for generalized discomfort, aches.   Kevin, his wife, has been flushing both drains TID but is out of syringes. She is requesting Rx sent to [facility].   She also recorded output from drains over the weekend.  IR R flank drain to bag = 40cc total on Saturday, 120 cc total on Sunday  IR RLQ drain to bulb = 30cc total both days  He reports a decent appetite, eating well but only drinking 1/2 protein supplement daily.   He would like to keep appt scheduled tomorrow for wound check.   Chemo infusion for later this week cancelled, but he will f/u with Dr. Maravilla in the next week or so.

## 2024-01-09 PROBLEM — Z51.5 PALLIATIVE CARE ENCOUNTER: Status: RESOLVED | Noted: 2023-01-01 | Resolved: 2024-01-01

## 2024-01-09 PROBLEM — N17.9 AKI (ACUTE KIDNEY INJURY): Status: RESOLVED | Noted: 2023-01-01 | Resolved: 2024-01-01

## 2024-01-09 PROBLEM — R52 PAIN: Status: RESOLVED | Noted: 2023-12-27 | Resolved: 2024-01-09

## 2024-01-09 PROBLEM — D72.829 LEUKOCYTOSIS: Status: RESOLVED | Noted: 2023-01-01 | Resolved: 2024-01-01

## 2024-01-09 PROBLEM — R06.02 SHORTNESS OF BREATH: Status: RESOLVED | Noted: 2023-01-01 | Resolved: 2024-01-01

## 2024-01-09 NOTE — PROGRESS NOTES
"  Post-Op Follow-up Visit:   1/9/2024  Patient ID: Danish Valladares is a 63 y.o. male, born 1960    CC: Gastric cancer with carcinomatosis;  RLQ wound, 2 IR drains in place      8/16/2023: presented to ED with GOO and 30# weight loss  8/21/2023: Distal gastrectomy with Bilroth II reconstruction per Dr. Moy  9/14/2023: re-admitted to [facility], IR drain placement into intra-abd fluid collection  9/18/2023: s/p exp lap drainage of peritoneal abscess, EGD with visualization of duodenal defect c/w duodenal stump leak per Dr. Moy  9/29/2023: IR drain placement  11/2023: 2 cycles of palliative FOLFOX per Dr. Maravilla  12/22/2023: IR drain placed at [facility]; septic from recurrent duodenal stump leak  12/23/2023 - 12/31/2023: admitted to [facility] and transferred to [facility], IR drain placed 12/26    Interval History:  Mr. Valladares returns to clinic from [place], accompanied by his wife.   He was diagnosed with stage IV cancer in August 2023 and underwent palliative distal gastrectomy with post op complications. In November, he received 2 cycles of palliative chemo FOLFOX with Dr. Maravilla. After cycle 2, last month he was admitted to local hospital, underwent IR aspiration of intra-abd abscess with drain placement 12/22/23, then transferred to [facility]. A second IR drain was placed on 12/26/23.   Mr. Valladares was seen last week in clinic with RLQ wound opened for pus exudate and drains flushed.   His wife has been flushing both drains TID, pulling back to withdraw flush, empties both QD and records output. R flank drain to bag has higher output. He noticed yesterday when she flushed RLQ drain, it "dripped down my skin." He is not wearing oxygen, reporting has not used it at home since hospital stay.  Still has poor appetite, not drinking protein supplements. Still losing weight.   Pending f/u with Dr. Maravilla.     1/5/2024: CT A/P:  1. Multiple peripherally enhancing air and fluid collections throughout the abdomen, stable or " "decreased in size as compared to prior.  No new or enlarging collections.  Drains appear to be appropriately positioned.  2. There is some subcutaneous fluid right anterolateral abdominal wall which may communicate with the right colic gutter fluid collection.  Developing fistula/sinus tract not excluded.  3. Resolution of bilateral pleural effusions.  4. Few mm nodule right middle lobe series 2, image 9.  Attention on follow-up.      Lab Results   Component Value Date    ALBUMIN 2.0 (L) 01/05/2024       Chemistry        Component Value Date/Time     (L) 01/05/2024 1110     08/16/2023 1109    K 4.3 01/05/2024 1110    K 3.0 08/16/2023 1109    CL 98 01/05/2024 1110    CL 84 08/16/2023 1109    CO2 27 01/05/2024 1110    CO2 33 08/16/2023 1109    BUN 22 01/05/2024 1110    BUN 24 (A) 08/16/2023 1109    CREATININE 1.3 01/05/2024 1110    CREATININE 1.3 08/16/2023 1109    GLU 87 01/05/2024 1110     08/16/2023 1109        Component Value Date/Time    CALCIUM 8.4 (L) 01/05/2024 1110    ALKPHOS 120 01/05/2024 1110    AST 18 01/05/2024 1110    ALT 18 01/05/2024 1110    BILITOT 0.5 01/05/2024 1110        Lab Results   Component Value Date    WBC 11.30 01/05/2024    HGB 11.1 (L) 01/05/2024    HCT 34.7 (L) 01/05/2024    MCV 78 (L) 01/05/2024     01/05/2024       Physical Exam:  BP (!) 97/53   Pulse 95   Ht 5' 11" (1.803 m)   Wt 70 kg (154 lb 5.2 oz)   SpO2 95%   BMI 21.52 kg/m²     General:  Non-toxic, ambulatory  Abd:  Soft, non-tender  Incision:          ICD-10-CM ICD-9-CM    1. Gastric adenocarcinoma  C16.9 151.9 CT Abdomen Pelvis With IV Contrast NO Oral Contrast      2. Secondary malignancy of parietal peritoneum  C78.6 197.6       3. Postprocedural intraabdominal abscess  T81.43XA 998.59       Plan   Reviewed CT scan from last week  Continue to flush IR drains TID, empty drains and record output  Change gauze dressing to RLQ QD  Continue oral abx until Rx complete. Monitor temperature and notify " this office if fever >100.5  Return oxygen tanks as he is not using at home  Continue oral diet, focus on protein, encouraged to resume protein supplements. Consider referral to RD.    Repeat CT scan 1/12 Friday and RTC Tuesday 1/16.     Questions were asked and answered to patient and his wife's satisfaction.    Case discussed with Dr. Moy who agrees with the above plan of care.           Lulu Park NP  Upper GI / Hepatobiliary Surgical Oncology  [facility], [place]  Office: [phone]  Fax: [phone]

## 2024-01-12 NOTE — NURSING
Patient saw Dr. Maravilla 1/11.  He will follow up with her in 4 weeks with labs.  As of now all chemo canceled.

## 2024-01-16 NOTE — Clinical Note
Good morning Josh,  Brian placed an IR drain on 12/26/23 while he was inpatient. R flank drain was placed 12/22/23 on [facility] before he was transferred here. He had CT scan 1/5/24, then repeat CT scan 1/12/24, which Dr. Moy would like to review with you.  Thanks, Lulu

## 2024-01-16 NOTE — PROGRESS NOTES
The patient location is: home   The chief complaint leading to consultation is: IR drains x 2, CT results    Visit type: audiovisual    Face to Face time with patient: 25 minutes  35 minutes of total time spent on the encounter, which includes face to face time and non-face to face time preparing to see the patient (eg, review of tests), Obtaining and/or reviewing separately obtained history, Documenting clinical information in the electronic or other health record, Independently interpreting results (not separately reported) and communicating results to the patient/family/caregiver, or Care coordination (not separately reported).     Each patient to whom he or she provides medical services by telemedicine is:  (1) informed of the relationship between the physician and patient and the respective role of any other health care provider with respect to management of the patient; and (2) notified that he or she may decline to receive medical services by telemedicine and may withdraw from such care at any time.    Post-Op Follow-up Visit:   1/16/2024  Patient ID: Danish Valladares is a 63 y.o. male, born 1960    Chief Complaint   Patient presents with    Post-op Evaluation     CC: Gastric cancer with carcinomatosis;  RLQ wound, 2 IR drains in place      8/16/2023: presented to ED with GOO and 30# weight loss  8/21/2023: Distal gastrectomy with Bilroth II reconstruction per Dr. Moy  9/14/2023: re-admitted to [facility], IR drain placement into intra-abd fluid collection  9/18/2023: s/p exp lap drainage of peritoneal abscess, EGD with visualization of duodenal defect c/w duodenal stump leak per Dr. Moy  9/29/2023: IR drain placement  11/2023: 2 cycles of palliative FOLFOX per Dr. Maravilla  12/22/2023: IR drain placed at [facility]; septic from recurrent duodenal stump leak  12/23/2023 - 12/31/2023: admitted to [facility] and transferred to [facility], IR drain placed 12/26    Interval History: This 64 y/o gentleman remains at  home with his wife in [place]. He was diagnosed with stage IV cancer in August 2023 and underwent palliative distal gastrectomy with post op complications. In November, he received 2 cycles of palliative chemo FOLFOX with Dr. Maravilla. After cycle 2, last month he was admitted to local hospital, underwent IR aspiration of intra-abd abscess with drain placement 12/22/23, then transferred to [facility]. A second IR drain was placed on 12/26/23.   Mr. Valladares was seen last week in clinic. Since then, he saw Dr. Maravilla last week who is holding chemotherapy for now. He had CT scan Friday. He completed oral antibiotics.   His wife continues to flush both drains TID. R flank drain does pull back after flush, total output in bag averages 30-40cc/day. RLQ drain flushes through RLQ wound, nothing when she pulls back after flush, bulb averages 10-20cc/day. Remains afebrile.   He reports good appetite, tolerating oral diet without N/V. C/o constipation, last BM 1/12/2024, took Miralax over weekend. Drinks 1 protein shake QD, 2 bottles water, OJ, apple juice, Sprite. He is not using home scale from [employer] , but he thinks his weight is down. He is only walking around the house for activity.     1/12/2024: CT A/P:  Multiple peripherally enhancing air and fluid collections throughout the abdomen, with some collections decreased in size from prior.  No new or enlarging collections.  Percutaneous drainage catheters in stable position within the collections in the right hemiabdomen and right pericolic gutter.     Similar appearance of subcutaneous fluid in the right anterolateral abdominal wall, with communication to the adjacent collection not excluded.    1/5/2024: CT A/P:  1. Multiple peripherally enhancing air and fluid collections throughout the abdomen, stable or decreased in size as compared to prior.  No new or enlarging collections.  Drains appear to be appropriately positioned.  2. There is some subcutaneous fluid right  anterolateral abdominal wall which may communicate with the right colic gutter fluid collection.  Developing fistula/sinus tract not excluded.  3. Resolution of bilateral pleural effusions.  4. Few mm nodule right middle lobe series 2, image 9.  Attention on follow-up.    Lab Results   Component Value Date    ALBUMIN 2.0 (L) 01/05/2024      Chemistry        Component Value Date/Time     (L) 01/05/2024 1110     08/16/2023 1109    K 4.3 01/05/2024 1110    K 3.0 08/16/2023 1109    CL 98 01/05/2024 1110    CL 84 08/16/2023 1109    CO2 27 01/05/2024 1110    CO2 33 08/16/2023 1109    BUN 22 01/05/2024 1110    BUN 24 (A) 08/16/2023 1109    CREATININE 1.3 01/05/2024 1110    CREATININE 1.3 08/16/2023 1109    GLU 87 01/05/2024 1110     08/16/2023 1109        Component Value Date/Time    CALCIUM 8.4 (L) 01/05/2024 1110    ALKPHOS 120 01/05/2024 1110    AST 18 01/05/2024 1110    ALT 18 01/05/2024 1110    BILITOT 0.5 01/05/2024 1110        Lab Results   Component Value Date    WBC 11.30 01/05/2024    HGB 11.1 (L) 01/05/2024    HCT 34.7 (L) 01/05/2024    MCV 78 (L) 01/05/2024     01/05/2024       Physical Exam: virtual visit only          Pathology:  - Procedure: Other ,distal gastrectomy and duodenum - Tumor site: Stomach - Histologic type: Adenocarcinoma, 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 However tumor extends to within 1-2 mm of the distal margin - Margin status for dysplasia: All margins negative for  dysplasia - Regional lymph nodes: - Regional lymph nodes present - Tumor present and regional lymph nodes - Number of lymph nodes with tumor: 7 - Distant sites involved: - Other: Multiple peritoneal nodules - Pathologic staging: pT4a N3a M1      ICD-10-CM ICD-9-CM    1. Gastric adenocarcinoma  C16.9 151.9       2. Metastatic adenocarcinoma  C79.9 199.1       3. Postprocedural intraabdominal abscess  T81.43XA 998.59       Plan     Reviewed CT imaging and case with Dr. Moy, who recommended f/u with IR for possible RLQ anterior drain removal, may benefit from additional IR drain placement.  Continue to flush both IR drains TID and record output.   Continue oral diet, focus on protein intake. Monitor home scale weight once a week. Recommended resume using chemo care companion scale and sync to EPIC.  Increase water intake.   Increase physical activity as tolerated  Take Miralax QD to promote BM QD.     Scheduled for f/u clinic visit and labs with Dr. Maravilla on 2/8/24.     Questions were asked and answered to patient and his wife's satisfaction.            Lulu Park NP  Upper GI / Hepatobiliary Surgical Oncology  [facility] [place], [place]  Office: [phone]  Fax: [phone]

## 2024-01-17 NOTE — NURSING
Pre-procedure call complete.  Pt instructed not to eat or drink anything after midnight the night before procedure.  Pt aware will need someone to provide transport home and monitor pt 8 hours post procedure.  No driving for at least 24 hours after procedure.   Patient reports he does not take blood pressure, heart medications, seizure and anti-rejection medications.   Do not take sleep medication (including OTC) the night before procedure.  Arrival time and location given.  Expected length of stay reviewed.  Covid screening completed.  Pt verbalized understanding of all pre-procedure instructions.

## 2024-01-18 NOTE — NURSING
Patient received s/p abscess drain removal from anterior RLQ in MPU bay 4. Posterior pigtail drain remains. Procedure site dressing to RLQ is clean and dry. Patient to recover for one hour and discharge home with wife. Fall precautions reviewed. Bed in lowest, locked position. Call light within reach.

## 2024-01-18 NOTE — NURSING
Pt arrived to 173 for abdominal abscess drain placement. Pt oriented to unit and staff. Plan of care reviewed with patient, patient verbalizes understanding. Comfort measures utilized. Pt safely transferred from stretcher to procedural table. Fall risk reviewed with patient, fall risk interventions maintained. Safety strap applied, positioner pillows utilized to minimize pressure points. Blankets applied. Pt prepped and draped utilizing standard sterile technique. Patient placed on continuous monitoring, as required by sedation policy. Timeouts completed utilizing standard universal time-out, per department and facility policy. RN to remain at bedside, continuous monitoring maintained. Pt resting comfortably. Denies pain/discomfort. Will continue to monitor. See flow sheets for monitoring, medication administration, and updates.

## 2024-01-18 NOTE — NURSING
Patient presents to MPU for IR pre-procedure. PIV started, STAT labs drawn and walked to lab. Outpatient admission assessment completed. Procedure consent verified and witnessed. IR board notified patient ready.

## 2024-01-18 NOTE — DISCHARGE INSTRUCTIONS
Please call with any questions or concerns.      Monday through Friday 8:00 am - 4:30 pm    Interventional Radiology clinic  [phone] (Option 4 to speak with a nurse)    After Hours     Ask the  for the Radiology Resident on call  [phone]

## 2024-01-18 NOTE — PROCEDURES
IR procedure note       Pre Op Diagnosis: gastric cancer  Post Op Diagnosis: Same    Procedure: tube check and removal    Procedure performed by:  Marquise Hinton MD / Igor Leroy MD    Written Informed Consent Obtained: Yes  Specimen Removed: NO  Estimated Blood Loss: Minimal    Findings:   Moderate sedation used. CT showed improvement of RLQ collection. Contrast and saline injection via the existing R lateral tube showed communication with the other RLQ collection and skin fistulous/sinus tract. 8 cc of yellowish fluid was aspirated. Decision was made to remove the drain after discussion with referring provider.    Patient tolerated procedure well.     Plan to repeat CT in one week to assess collections.    Marquise Hinton MD MSCR  PGY-5 Radiology Resident

## 2024-01-18 NOTE — NURSING
Abdominal abscess drain replacement complete. Pt tolerated well. VSS. No signs or symptoms of distress noted. Pt will be transferred to MPU bed escorted by RN and report to RN on arrival.

## 2024-01-18 NOTE — NURSING
Procedure recovery complete. VSS. Procedure site dressing to RLQ is clean, dry, and intact. Patient tolerated PO nutrition with no N/V and voided without difficulty. Patient verbalizes understanding of discharge instructions including when to call the doctor. PIV removed. Patient discharged home with wife.

## 2024-02-05 NOTE — PROGRESS NOTES
Encounter Date:  2024    Patient ID: Danish Valladares  Age:  63 y.o. :  1960    Chief Complaint:  Gastric cancer with carcinomatosis;  RLQ wound with IR drain     2023: presented to ED with GOO and 30# weight loss  2023: Distal gastrectomy with Bilroth II reconstruction per Dr. Moy  2023: re-admitted to [facility], IR drain placement into intra-abd fluid collection  2023: s/p exp lap drainage of peritoneal abscess, EGD with visualization of duodenal defect c/w duodenal stump leak per Dr. Moy  2023: IR drain placement  2023: s/p port placement per Dr. Arias; 2 cycles of palliative FOLFOX per Dr. Maravilla  2023: IR drain placed at [facility]; septic from recurrent duodenal stump leak  2023 - 2023: admitted to [facility] and transferred to [facility], IR drain placed 2024: IR removed R lateral tube    Interval History:  Mr. Valladares returns to clinic from [place] accompanied by his wife. He was seen in clinic last month. Since then, IR removed 1 drain on 24. He cancelled palliative care appt last week. CT scan done over the weekend improved. He is scheduled for labs and to see Dr. Maravilla later this week.   He has been suffering with low grade fever, cough, nausea and vomiting. His weight is down 4# from last month. Home scale weight down, using [facility] scale. Admits to decreased activity.   C/o constipation. Drinks 1 protein shake QD, 2 bottles water, OJ, apple juice, Sprite.     New Data:  Vitals:        Imaging:  Dr. Moy and I personally reviewed the following images:   2/3/24: CT A/P:  The gallbladder is normal in size.  There is a thin rim of pericholecystic fluid.   There is persistent free fluid within the retroperitoneum including a 1.0 x 8.8 cm transverse fluid collection best identified on image 252 of series 8, but without internal air bubbles.   On the previous examinations, multiple interloop abscesses were present.    There is a single remaining pigtail drain lying within the right mid abdomen entering from the posterior right flank.  The pigtail drain has been effective and there is no residual fluid along its tip.   Punctate areas of free fluid with air locules are noted particularly in the epigastrium, image 136 of series 8 as demarcated by the arrow, but stable in appearance from the previous exam.  Impression:   Continued improvement with respect to a decrease in the size and extent of multiple interloop abscesses within the abdomen and pelvis.  Only a single pigtail drain remains in place.  Fluid collections and positioning as discussed above.  No detrimental interval change from the previous exam.    Labs:    Lab Results   Component Value Date    ALBUMIN 2.0 (L) 01/05/2024     Lab Results   Component Value Date    PREALBUMIN 21 10/23/2023    PREALBUMIN 22 10/09/2023    PREALBUMIN 20 10/03/2023       Chemistry        Component Value Date/Time     (L) 01/05/2024 1110     08/16/2023 1109    K 4.3 01/05/2024 1110    K 3.0 08/16/2023 1109    CL 98 01/05/2024 1110    CL 84 08/16/2023 1109    CO2 27 01/05/2024 1110    CO2 33 08/16/2023 1109    BUN 22 01/05/2024 1110    BUN 24 (A) 08/16/2023 1109    CREATININE 1.3 01/05/2024 1110    CREATININE 1.3 08/16/2023 1109    GLU 87 01/05/2024 1110     08/16/2023 1109        Component Value Date/Time    CALCIUM 8.4 (L) 01/05/2024 1110    ALKPHOS 120 01/05/2024 1110    AST 18 01/05/2024 1110    ALT 18 01/05/2024 1110    BILITOT 0.5 01/05/2024 1110        Lab Results   Component Value Date    WBC 11.30 01/05/2024    HGB 11.1 (L) 01/05/2024    HCT 34.7 (L) 01/05/2024    MCV 78 (L) 01/05/2024     01/05/2024 8/21/2023: Pathology:   - Procedure:  Other ,distal gastrectomy and duodenum  - Tumor site:  Stomach  - Histologic type:  Adenocarcinoma, 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 However tumor extends to within 1-2 mm of the distal margin  - Margin status for dysplasia:  All margins negative for dysplasia  - Regional lymph nodes:  - Regional lymph nodes present  - Tumor present and regional lymph nodes  - Number of lymph nodes with tumor:  7  - Distant sites involved:  - Other: Multiple peritoneal nodules  - Pathologic staging: pT4a N3a M1       Past Medical History:   Diagnosis Date    Emphysema, unspecified     Hypoxia     Psoriasis     Stomach cancer      Past Surgical History:   Procedure Laterality Date    DIAGNOSTIC LAPAROSCOPY  09/18/2023    Procedure: LAPAROSCOPY, DIAGNOSTIC;  Surgeon: Eulogio Moy MD;  Location: [address];  Service: General;;    ESOPHAGOGASTRODUODENOSCOPY N/A 08/03/2023    Procedure: EGD (ESOPHAGOGASTRODUODENOSCOPY);  Surgeon: Loco Marvin MD;  [facility];  Service: Gastroenterology;  Laterality: N/A;    ESOPHAGOGASTRODUODENOSCOPY N/A 09/18/2023    Procedure: EGD (ESOPHAGOGASTRODUODENOSCOPY); flouroscopy, need c-arm in the room;  Surgeon: Eulogio Moy MD;  Location: [address];  Service: General;  Laterality: N/A;  EGD with Fluoroscopy     GASTRECTOMY N/A 08/21/2023    Procedure: GASTRECTOMY;  Surgeon: Eulogio Moy MD;  Location: [address];  Service: General;  Laterality: N/A;  Open gastrectomy with EGD. Needs Omni retractor, requesting room 24    INSERTION OF TUNNELED CENTRAL VENOUS CATHETER (CVC) WITH SUBCUTANEOUS PORT  11/21/2023    Procedure: [id number];  Surgeon:  Talon Arias MD;  Location: [facility] OR;  Service: General;;    LAPAROSCOPIC DRAINAGE OF ABDOMEN N/A 09/18/2023    Procedure: DRAINAGE, ABDOMEN, LAPAROSCOPIC;  Surgeon: Eulogio Moy MD;  [facility];  Service: General;  Laterality: N/A;    TONSILLECTOMY       Current Outpatient Medications on File Prior to Visit   Medication Sig Dispense Refill    acetaminophen (TYLENOL ORAL) Take 2 tablets by mouth every 6 (six) hours as needed (pain).      ascorbic acid (VITAMIN C ORAL) Take 1 tablet by mouth once daily.      ergocalciferol, vitamin D2, (VITAMIN D ORAL) Take 1 tablet by mouth once daily.      ibuprofen (ADVIL,MOTRIN) 200 MG tablet Take 200 mg by mouth every 6 (six) hours as needed for Pain.      LIDOcaine-prilocaine (EMLA) cream Apply topically as needed (APPLY 30 to 60 minutes prior to chemo). (Patient taking differently: Apply 1 g topically as needed (APPLY 30 to 60 minutes prior to chemo).) 30 g 2    mirtazapine (REMERON) 15 MG tablet Take 1 tablet (15 mg total) by mouth every evening. 30 tablet 11    multivitamin/iron/folic acid (CENTRUM ORAL) Take 1 tablet by mouth once daily.      OLANZapine (ZYPREXA) 5 MG tablet Take for 3 nights after chemo starting the night of chemo (Patient taking differently: Take 5 mg by mouth as needed (for 3 nights after chemo starting the night of chemo).) 30 tablet 2    ondansetron (ZOFRAN) 8 MG tablet Take 1 tablet (8 mg total) by mouth every 6 (six) hours as needed for Nausea. 60 tablet 2    pantoprazole (PROTONIX) 40 MG tablet Take 1 tablet (40 mg total) by mouth once daily.      prochlorperazine (COMPAZINE) 10 MG tablet Take 1 tablet (10 mg total) by mouth every 6 (six) hours as needed. (Patient taking differently: Take 10 mg by mouth every 6 (six) hours as needed (nausea).) 60 tablet 3    sodium chloride 0.9% (NORMAL SALINE FLUSH) injection 10 mLs by Intra-Catheter route every 8 (eight) hours. 1800 mL 0    tamsulosin (FLOMAX) 0.4 mg Cap Take 1 capsule  (0.4 mg total) by mouth once daily. 30 capsule 11    vitamin E 1000 UNIT capsule Take 1,000 Units by mouth once daily.       Current Facility-Administered Medications on File Prior to Visit   Medication Dose Route Frequency Provider Last Rate Last Admin    diphenhydrAMINE injection 25 mg  25 mg Intravenous Q6H PRN Luis Pickett MD        haloperidol lactate injection 0.5 mg  0.5 mg Intravenous Q10 Min PRN Luis Pickett MD        lactated ringers infusion   Intravenous Continuous Luis Pickett MD 10 mL/hr at 11/21/23 1031 Restarted at 11/21/23 1105    LIDOcaine (PF) 10 mg/ml (1%) injection 10 mg  1 mL Intradermal Once Luis Pickett MD        prochlorperazine injection Soln 5 mg  5 mg Intravenous Q4H PRN Luis Pickett MD        sodium chloride 0.9% flush 10 mL  10 mL Intravenous Once Luis Pickett MD         Review of patient's allergies indicates:   Allergen Reactions    Dilaudid [hydromorphone]      Pt becomes hypoxic - requiring 5L via oxymask for     Penicillin     Penicillins Rash     Family History:  His family history includes Cancer in his father.     Social History:   reports that he quit smoking about 10 years ago. His smoking use included cigarettes. He has never used smokeless tobacco. He reports current alcohol use. He reports that he does not use drugs.     ROS:    Pertinent positive/negatives detailed in HPI, all other systems negative.     Physical Exam:  BP (!) 92/53 (BP Location: Left arm, Patient Position: Sitting)   Pulse 102   Resp 20   Wt 68.1 kg (150 lb 0.4 oz)   SpO2 95%   BMI 20.92 kg/m²     Constitutional:  Non-toxic, no acute distress.    Eyes:  Sclerae anicteric, gaze symmetrical  Neck:  Trachea midline,  FROM  Resp:  Easy work of breathing  CV:  Regular pulse  Abd:  Soft, non-tender, no masses, no ascites  Musculoskeletal:  Ambulatory, normal gait, no muscle wasting.  Extremities are symmetrical without lymphedema.  Neuro:  No gross deficits  Psych:   Awake, alert, oriented.  Answers and asks questions appropriately      ICD-10-CM ICD-9-CM    1. Gastric adenocarcinoma  C16.9 151.9       2. Postprocedural intraabdominal abscess  T81.43XA 998.59       Plan     This 62 y/o gentleman       F/u with [facility] following med onc and palliative care appointments.       Dr. Eulogio Moy  Upper GI / Hepatobiliary Surgical Oncology  [facility] [place], [place]  Office: [phone]  Fax: [phone]

## 2024-03-11 NOTE — PROGRESS NOTES
"      Encounter Date:  3/12/2024    Patient ID: Danish Valladares  Age:  63 y.o. :  1960    Chief Complaint:   Gastric cancer with carcinomatosis;  RLQ wound with IR drain     2023: presented to ED with GOO and 30# weight loss  2023: Distal gastrectomy with Bilroth II reconstruction per Dr. Moy  2023: re-admitted to [facility], IR drain placement into intra-abd fluid collection  2023: s/p exp lap drainage of peritoneal abscess, EGD with visualization of duodenal defect c/w duodenal stump leak per Dr. Moy  2023: IR drain placement  2023: s/p port placement per Dr. Arias; 2 cycles of palliative FOLFOX per Dr. Maravilla  2023: IR drain placed at [facility]; septic from recurrent duodenal stump leak  2023 - 2023: admitted to [facility] and transferred to [facility], IR drain placed 2024: IR removed R lateral tube    Interval History:  Mr. Valladares returns to clinic from [place] accompanied by his wife. He had VV last week with Dr. Moy, who reviewed QOL and risks/ benefits to drain. R posterior flank drain remains intact. He reports no drainage since Dr. Moy applied pouch. His wife changed pouch once since last appt.   States he is feeling good today but admits to "ups and downs". Denies any pain. Admits to decreased appetite, denies nausea and diarrhea. BM QD- QOD. Vomits once a week. He drinks Sprite, some water, occasionally makes a homemade shake with protein powder. States he cannot tolerate ready-made protein supplements r/t lactulose intolerance. Weight is down >10# in past month.   He has not seen med onc (last seen on 24) or palliative care (cancelled 24) since last appt.     Data:  Imaging:  I personally reviewed the following images:   2024: CT A/P:  Continued improvement with respect to a decrease in the size and extent of multiple interloop abscesses within the abdomen and pelvis. Only a single pigtail drain remains in place. Fluid " collections and positioning as discussed above. No detrimental interval change from the previous exam.     Labs:    Lab Results   Component Value Date    ALBUMIN 2.0 (L) 01/05/2024     Lab Results   Component Value Date    PREALBUMIN 21 10/23/2023    PREALBUMIN 22 10/09/2023    PREALBUMIN 20 10/03/2023         Chemistry        Component Value Date/Time     (L) 01/05/2024 1110     08/16/2023 1109    K 4.3 01/05/2024 1110    K 3.0 08/16/2023 1109    CL 98 01/05/2024 1110    CL 84 08/16/2023 1109    CO2 27 01/05/2024 1110    CO2 33 08/16/2023 1109    BUN 22 01/05/2024 1110    BUN 24 (A) 08/16/2023 1109    CREATININE 1.3 01/05/2024 1110    CREATININE 1.3 08/16/2023 1109    GLU 87 01/05/2024 1110     08/16/2023 1109        Component Value Date/Time    CALCIUM 8.4 (L) 01/05/2024 1110    ALKPHOS 120 01/05/2024 1110    AST 18 01/05/2024 1110    ALT 18 01/05/2024 1110    BILITOT 0.5 01/05/2024 1110        Lab Results   Component Value Date    WBC 11.30 01/05/2024    HGB 11.1 (L) 01/05/2024    HCT 34.7 (L) 01/05/2024    MCV 78 (L) 01/05/2024     01/05/2024       Past Medical History:   Diagnosis Date    Emphysema, unspecified     Hypoxia     Psoriasis     Stomach cancer      Past Surgical History:   Procedure Laterality Date    DIAGNOSTIC LAPAROSCOPY  09/18/2023    Procedure: LAPAROSCOPY, DIAGNOSTIC;  Surgeon: Eulogio Moy MD;  Location: [facility] OR [address] [place] [address];  Service: General;;    ESOPHAGOGASTRODUODENOSCOPY N/A 08/03/2023    Procedure: EGD (ESOPHAGOGASTRODUODENOSCOPY);  Surgeon: Lcoo Marvin MD;  Location: [facility];  Service: Gastroenterology;  Laterality: N/A;    ESOPHAGOGASTRODUODENOSCOPY N/A 09/18/2023    Procedure: EGD (ESOPHAGOGASTRODUODENOSCOPY); flouroscopy, need c-arm in the room;  Surgeon: Eulogio Moy MD;  Location: [facility] OR [address] [place] [address];  Service: General;  Laterality: N/A;  EGD with Fluoroscopy     GASTRECTOMY N/A 08/21/2023    Procedure: GASTRECTOMY;  Surgeon: Chinmay,  Eulogio Whelan MD;  Location: [facility] OR 2ND FLR;  Service: General;  Laterality: N/A;  Open gastrectomy with EGD. Needs Omni retractor, requesting room 24    INSERTION OF TUNNELED CENTRAL VENOUS CATHETER (CVC) WITH SUBCUTANEOUS PORT  11/21/2023    Procedure: [id number];  Surgeon: Talon Arias MD;  Location: [facility] OR;  Service: General;;    LAPAROSCOPIC DRAINAGE OF ABDOMEN N/A 09/18/2023    Procedure: DRAINAGE, ABDOMEN, LAPAROSCOPIC;  Surgeon: Eulogio Moy MD;  Location: [facility] OR 2ND FLR;  Service: General;  Laterality: N/A;    TONSILLECTOMY       Current Outpatient Medications on File Prior to Visit   Medication Sig Dispense Refill    acetaminophen (TYLENOL ORAL) Take 2 tablets by mouth every 6 (six) hours as needed (pain).      ascorbic acid (VITAMIN C ORAL) Take 1 tablet by mouth once daily.      ergocalciferol, vitamin D2, (VITAMIN D ORAL) Take 1 tablet by mouth once daily.      ibuprofen (ADVIL,MOTRIN) 200 MG tablet Take 200 mg by mouth every 6 (six) hours as needed for Pain.      LIDOcaine-prilocaine (EMLA) cream Apply topically as needed (APPLY 30 to 60 minutes prior to chemo). (Patient not taking: Reported on 2/6/2024) 30 g 2    mirtazapine (REMERON) 15 MG tablet Take 1 tablet (15 mg total) by mouth every evening. 30 tablet 11    multivitamin/iron/folic acid (CENTRUM ORAL) Take 1 tablet by mouth once daily.      OLANZapine (ZYPREXA) 5 MG tablet Take for 3 nights after chemo starting the night of chemo (Patient taking differently: Take 5 mg by mouth as needed (for 3 nights after chemo starting the night of chemo).) 30 tablet 2    ondansetron (ZOFRAN) 8 MG tablet Take 1 tablet (8 mg total) by mouth every 6 (six) hours as needed for Nausea. 60 tablet 2    pantoprazole (PROTONIX) 40 MG tablet Take 1 tablet (40 mg total) by mouth once daily.      prochlorperazine (COMPAZINE) 10 MG tablet Take 1 tablet (10 mg total) by mouth every 6 (six) hours as needed. (Patient taking differently: Take 10  "mg by mouth every 6 (six) hours as needed (nausea).) 60 tablet 3    tamsulosin (FLOMAX) 0.4 mg Cap Take 1 capsule (0.4 mg total) by mouth once daily. 30 capsule 11    vitamin E 1000 UNIT capsule Take 1,000 Units by mouth once daily.       Current Facility-Administered Medications on File Prior to Visit   Medication Dose Route Frequency Provider Last Rate Last Admin    diphenhydrAMINE injection 25 mg  25 mg Intravenous Q6H PRN Luis Pickett MD        haloperidol lactate injection 0.5 mg  0.5 mg Intravenous Q10 Min PRN Luis Pickett MD        lactated ringers infusion   Intravenous Continuous Luis Pickett MD 10 mL/hr at 11/21/23 1031 Restarted at 11/21/23 1105    LIDOcaine (PF) 10 mg/ml (1%) injection 10 mg  1 mL Intradermal Once Luis Pickett MD        prochlorperazine injection Soln 5 mg  5 mg Intravenous Q4H PRN Luis Pickett MD        sodium chloride 0.9% flush 10 mL  10 mL Intravenous Once Luis Pickett MD         Review of patient's allergies indicates:   Allergen Reactions    Dilaudid [hydromorphone]      Pt becomes hypoxic - requiring 5L via oxymask for     Penicillin     Penicillins Rash       Family History:  His family history includes Cancer in his father.     Social History:   reports that he quit smoking about 10 years ago. His smoking use included cigarettes. He has never used smokeless tobacco. He reports current alcohol use. He reports that he does not use drugs.     ROS:    Pertinent positive/negatives detailed in HPI, all other systems negative.     Physical Exam:  BP (!) 91/54   Pulse 95   Ht 5' 11" (1.803 m)   Wt 62.4 kg (137 lb 9.1 oz)   SpO2 96%   BMI 19.19 kg/m²     Constitutional:  Non-toxic, no acute distress.    Eyes:  Sclerae anicteric, gaze symmetrical  Neck:  Trachea midline,  FROM  Resp:  Easy work of breathing  Abd:  Soft, non-tender, no masses, no ascites  Musculoskeletal:  Ambulatory, normal gait, + muscle wasting.  Extremities are symmetrical " without lymphedema.  Neuro:  No gross deficits  Psych:  Awake, alert, oriented.  Answers and asks questions appropriately      ICD-10-CM ICD-9-CM    1. Gastric adenocarcinoma  C16.9 151.9       2. Postprocedural intraabdominal abscess  T81.43XA 998.59       Plan     This 64 y/o gentleman underwent distal gastrectomy per Dr. Moy in 8/2023. Post op complications include intra-abdominal fluid collection and peritoneal abscess, requiring exp lap and drain placement in 9/23. He received 2 cycles of adj chemo per Dr. Maravilla, then was admitted with sepsis r/t recurrent duodenal stump leak. He had additional IR drain placed in 12/2023 during hospitalization. Repeat CT scan last month reviewed, decreased in size. He requested drain removal and discussed with Dr. Moy last week.   Removed R posterior flank IR drain, applied dry gauze. Encouraged small frequent meals/ snacks throughout the day, every 2-3 hours. Drink 2-3 protein supplements daily. Reviewed orthostatic hypotension, not currently taking any BP meds. Recommended increase liquid intake, needs better hydration, suggested water, Gatorade, Pedialyte. Questions were asked and answered to patient and his wife's satisfaction.      Follow up if symptoms worsen or fail to improve.      Discussed case with Dr. Moy, who agrees with the above plan of care.           Lulu Park NP  Upper GI / Hepatobiliary Surgical Oncology  [facility] [place], [place]  Office: [phone]  Fax: [phone]

## 2024-03-12 PROBLEM — K65.9 PERITONITIS: Status: RESOLVED | Noted: 2023-12-19 | Resolved: 2024-03-12

## 2024-03-12 PROBLEM — T81.43XA POSTPROCEDURAL INTRAABDOMINAL ABSCESS: Status: RESOLVED | Noted: 2023-12-23 | Resolved: 2024-03-12

## 2024-03-12 NOTE — PROGRESS NOTES
The patient location is: home  The chief complaint leading to consultation is: f/u drain    Visit type: audiovisual    Each patient to whom he or she provides medical services by telemedicine is:  (1) informed of the relationship between the physician and patient and the respective role of any other health care provider with respect to management of the patient; and (2) notified that he or she may decline to receive medical services by telemedicine and may withdraw from such care at any time.    Notes:     Mr. Valladares is having good days and bad days.  Increasing weakness, diet is up and down.  He has not seen palliative care or Dr. Maravilla.  He has been going to hyperbarics and says anterior drain site has healed over.  Output from drain which is pouched to gravity has been scant.  I talked to him again about situation, he is not a candidate for and doesn't want any more chemotherapy.  He is not fit for work and will send a letter for him.  We talked about QOL options, risks/benefits of drain removal and agree that he would probably be best served by removing it.  RTC next week.

## 2024-04-01 NOTE — TELEPHONE ENCOUNTER
Yes, he was scheduled and canceled with us stating he didn't want to follow up with oncology.    I will get him scheduled.      Edda

## 2024-04-01 NOTE — TELEPHONE ENCOUNTER
Gracy check his follow-up I saw him on 01/11/2024 and wanted to have him come back in 4 weeks with labs and looks like he fell off he has been seeing [facility] and had a CT scan on 02/03/2024 which revealed improvement in his abscesses in the belly    So can we set him up with a CT chest abdomen pelvis CBC and CMP and see me

## 2024-04-18 NOTE — PROGRESS NOTES
"Subjective     Patient ID: Danish Valladares is a 63 y.o. male.    Chief Complaint: Gastric adenocarcinoma  Mr. Perez is a 62 year old with new diagnosis of gastric cancer. He noted abdomen discomfort and vomiting since May 2023  EGD on 8/3/2023: LA Grade C reflux esophagitis with no bleeding.                          Biopsied.                          - Inflammed, nodular gastric antral mucosa with                          ulcerations. R/O neoplasm. Biopsied.                          - Retained gastric contents.                          - Unable to advance scope beyond pyloric channel.   Pathology: Poorly cohesive carcinoma   - An AE1/3 pankeratin immunostain supports the above interpretation   - Background antral-type mucosa with surface erosion and reactive/regenerative epithelial changes   - Oxyntic-type mucosa with minimal chronic inflammation   - No evidence of Helicobacter-like organisms      8/16/2023: presented to ED with GOO and 30# weight loss  8/21/2023: Distal gastrectomy with Bilroth II reconstruction per Dr. Moy  Pathology: Peritoneal nodules (excisional biopsy):   - Desmoplasia, inflammation, fibrosis, rare atypical cells   2.  Peritoneal nodules (biopsy):   - Positive for malignancy   - Metastatic poorly differentiated adenocarcinoma   3.  Distal gastrectomy (resection):   - Poorly differentiated carcinoma, see synoptic report below   - NextGen sequencing CDH1, CPS <1, her 2 negative, RAMON stable      Pharmacogenomic panel: [id number] significant      9/14/2023: re-admitted to [facility], IR drain placement into intra-abd fluid collection  9/18/2023: s/p exp lap drainage of peritoneal abscess, EGD with visualization of duodenal defect c/w duodenal stump leak per Dr. Moy  9/29/2023: IR drain placement     10/9/2023 CT scans reveals "Interval placement of a posterior approach right upper quadrant pigtail catheter within a infra hepatic abscess, which is decreased in size. Removal of the pelvic surgical drain " "with a thin linear fluid along the prior tract, possibly with rim enhancement, concerning for infection. Decreased size of left anterior abdominal fluid collection measuring up to 7.3 cm, previously 10 cm.  Near complete resolution of a fluid collection posterior to the descending colon"     He was not a candidate for STAR trial secondary to not having measurable disease  He started palliative chemo with FOLFOX on 11/29/2023         He completed cycle 2 of palliative chemo with FOLFOX on 12/12/2023   He was hospitalized from 12/19/2023-12/23/2023 at [facility] and then transferred to [facility] and then discharged from there on 1/3/2024.     CT abdomen on 12/19 at [facility]: Atelectatic changes in the lung bases. Small amount of ascites. Distension of the gallbladder if clinically indicated would recommend a HIDA scan.  HIDA scan at [facility] on 12/20/2023: The cystic and common bile ducts are patent. The gallbladder ejection fraction is 6% at 30 minutes.  This suggests gallbladder dysfunction.     CT abdomen on 12/22/2023 at [facility]: Multiple fluid collections consistent with abscess. Small effusions bilaterally with bibasilar atelectasis.     On 12/22/2023 he had placement of 12 Angolan multipurpose drain into the right pericolic fluid collection.      He was then transferred to [facility].      CT scans on 12/25/2023 at [facility]: Interval placement of right colic gutter percutaneous drainage tube with near complete resolution of abscess on site.Redemonstration of multiple abdominopelvic abscesses stable/slightly decreased in size compared to recent prior.  No new or enlarging fluid collection seen.Mild interval worsening of anasarca. Mild interval increase in left pleural effusion. Interval decreased but not yet resolved previous pneumoperitoneum"     On 12/26/2023 he had "Percutaneous drainage of intra-abdominal fluid collection, yielding 500 mL of serous fluid.  Twelve Angolan all-purpose catheter was placed"     Percutaneous aspiration of pelvic " "collection, yielding 50 mL of serous fluid. No drainage catheter was left in place.     CT scans on 12/31/2023: Postoperative changes from distal gastrectomy with Billroth 2 reconstruction. Multiple peripherally enhancing collections of air and fluid scattered throughout the peritoneum, compatible with known duodenal stump leak. The collection centered in the right pericolic gutter has increased in size despite a drainage catheter, concerning for drain malfunction. Correlate with drain output. The largest collection in the right hemiabdomen has decreased in size after placement of a percutaneous drainage catheter.  Other fluid collections have also decreased in size"           CT scans on 1/2/2024: Multiple peripherally enhancing air and fluid collections throughout the abdomen, stable or decreased in size as compared to prior.  No new or enlarging collections.  Drains appear to be appropriately positioned. There is some subcutaneous fluid right anterolateral abdominal wall which may communicate with the right colic gutter fluid collection.  Developing fistula/sinus tract not excluded.  Resolution of bilateral pleural effusions"        He was discharged on 1/3/2024 on oral antibiotics     CT scans from 2/3/2024 "Continued improvement with respect to a decrease in the size and extent of multiple interloop abscesses within the abdomen and pelvis. Only a single pigtail drain remains in place. Fluid collections and positioning as discussed above. No detrimental interval change from the previous exam."  He had drain removal on 3/12/2024    HPIHe comes in for CT scans which reveal Postoperative changes of distal gastrectomy.  2. Interval increased diffuse small bowel wall thickening and enhancement consistent with enteritis.  Several loops of small bowel are mildly dilated without focal transition point, favor bowel ileus.  3. Diffuse mesenteric edema and trace fluid in the peritoneal cavity.  No definite residual " intra-abdominal fluid collection or abscess.  4. Multiple new pulmonary micro nodules in the right lower lobe, most of which appear clustered in a tree-in-bud distribution suggestive for small airways infection/inflammation.  Additional new micronodule in the left upper lobe.  Stable right upper lobe pulmonary nodule measuring 0.7 cm and additional stable micro nodules bilaterally.  Attention on follow-up.    He notes diarrhea ocassional once a while but mostly soft stools since surgery. No fever.  Abdomen pain only when he eats too much  Ocassional nausea    Review of Systems   Constitutional:  Positive for fatigue. Negative for appetite change and unexpected weight change.   HENT:  Negative for mouth sores.    Eyes:  Negative for visual disturbance.   Respiratory:  Negative for cough and shortness of breath.    Cardiovascular:  Negative for chest pain.   Gastrointestinal:  Positive for diarrhea. Negative for abdominal pain.   Genitourinary:  Negative for frequency.   Musculoskeletal:  Negative for back pain.   Integumentary:  Negative for rash.   Neurological:  Negative for headaches.   Hematological:  Negative for adenopathy.   Psychiatric/Behavioral:  The patient is not nervous/anxious.    All other systems reviewed and are negative.         Objective     Physical Exam         LABs:  WBC   Date Value Ref Range Status   04/17/2024 6.53 3.90 - 12.70 K/uL Final     Hemoglobin   Date Value Ref Range Status   04/17/2024 12.4 (L) 14.0 - 18.0 g/dL Final     POC Hematocrit   Date Value Ref Range Status   08/16/2023 52 36 - 54 %PCV Final     Hematocrit   Date Value Ref Range Status   04/17/2024 37.0 (L) 40.0 - 54.0 % Final     Platelets   Date Value Ref Range Status   04/17/2024 291 150 - 450 K/uL Final     Gran # (ANC)   Date Value Ref Range Status   04/17/2024 3.2 1.8 - 7.7 K/uL Final     Gran %   Date Value Ref Range Status   04/17/2024 49.0 38.0 - 73.0 % Final       Chemistry        Component Value Date/Time    NA  140 04/17/2024 1535     08/16/2023 1109    K 2.9 (L) 04/17/2024 1535    K 3.0 08/16/2023 1109    CL 93 (L) 04/17/2024 1535    CL 84 08/16/2023 1109    CO2 31 (H) 04/17/2024 1535    CO2 33 08/16/2023 1109    BUN 13 04/17/2024 1535    BUN 24 (A) 08/16/2023 1109    CREATININE 0.9 04/17/2024 1535    CREATININE 1.3 08/16/2023 1109    GLU 94 04/17/2024 1535     08/16/2023 1109        Component Value Date/Time    CALCIUM 8.9 04/17/2024 1535    ALKPHOS 195 (H) 04/17/2024 1535    AST 18 04/17/2024 1535    ALT 17 04/17/2024 1535    BILITOT 0.4 04/17/2024 1535          Assessment and Plan     1. Gastric adenocarcinoma  -     CBC w/ DIFF; Future; Expected date: 04/18/2024  -     CMP; Future; Expected date: 04/18/2024  -     CT Chest Abdomen Pelvis With IV Contrast (XPD) Routine Oral Contrast; Future; Expected date: 04/18/2024    2. Secondary malignancy of parietal peritoneum        Route Chart for Scheduling    Med Onc Chart Routing      Follow up with physician . Schedule with palliative care pl;ease, he says he is now ready to see them. In 3 months scheduel CBC, CMP, CT chest, abdomen/pelvis and see me   Follow up with [facility]    Infusion scheduling note    Injection scheduling note    Labs    Imaging    Pharmacy appointment    Other referrals                 Mr. Valladares comes in for a follow-up, he does not want to do chemo, he is agreeable to follow-up with palliative care.   He wishes to follow-up with scans so will return in 3 months with scans    Above care plan was discussed with patient and accompanying wife and all questions were addressed to their satisfaction

## 2024-05-06 NOTE — TELEPHONE ENCOUNTER
----- Message from Cynthia Maharaj sent at 5/6/2024 11:12 AM CDT -----  Regarding: Medical Records  Contact: Beth [phone]  Beth/ Dr Serena Salgado Office is calling to get medical records faxed to [phone] please call

## 2024-05-06 NOTE — TELEPHONE ENCOUNTER
Spoke with cami. CT reports dated 2/3/2024, 1/12/2024 and 1/5/2024 along with labs dated 4/17/2024 faxed to number below as requested. Fax confirmation received.

## 2024-05-19 NOTE — ED PROVIDER NOTES
Encounter Date: 5/19/2024       History     Chief Complaint   Patient presents with    Abscess     2 red painful lumps to abd, hx intraabdominal abscess and stomach CA. Denies fever but has felt weak     This patient is a 63 y.o. male who presents to the [facility] ED for emergent evaluation for abdominal abscess with drainage and fever. Hx of advanced gastric adenocarcinoma (s/p B2 resection 8/21/23), post-operative course complicated duodenal stump leak requiring laparoscopic washout (9/18/23), multiple intra-abdominal IR drains. At last follow-up visit on 3/5/24, last IR drain was removed from right flank after low output. He is not actively on any treatment for his gastric cancer, on palliative. Patient says he has been otherwise doing well and tries to stay active gardening and working outside, but his appetite remains low and he has lost about 20 lbs in the last month.        The history is provided by the patient and medical records. No  was used.     Review of patient's allergies indicates:   Allergen Reactions    Dilaudid [hydromorphone]      Pt becomes hypoxic - requiring 5L via oxymask for     Penicillin     Penicillins Rash     Past Medical History:   Diagnosis Date    Emphysema, unspecified     Hypoxia     Psoriasis     Stomach cancer      Past Surgical History:   Procedure Laterality Date    DIAGNOSTIC LAPAROSCOPY  09/18/2023    Procedure: LAPAROSCOPY, DIAGNOSTIC;  Surgeon: Eulogio Moy MD;  Location: [address];  Service: General;;    ESOPHAGOGASTRODUODENOSCOPY N/A 08/03/2023    Procedure: EGD (ESOPHAGOGASTRODUODENOSCOPY);  Surgeon: Loco Marvin MD;  Location: [facility];  Service: Gastroenterology;  Laterality: N/A;    ESOPHAGOGASTRODUODENOSCOPY N/A 09/18/2023    Procedure: EGD (ESOPHAGOGASTRODUODENOSCOPY); flouroscopy, need c-arm in the room;  Surgeon: Eulogio Moy MD;  Location: [address];  Service: General;  Laterality: N/A;  EGD with  Pravastatin refilled.     Jacqueline Pierre MD     Fluoroscopy     GASTRECTOMY N/A 08/21/2023    Procedure: GASTRECTOMY;  Surgeon: Eulogio Moy MD;  Location: [facility] OR 2ND FLR;  Service: General;  Laterality: N/A;  Open gastrectomy with EGD. Needs Omni retractor, requesting room 24    INSERTION OF TUNNELED CENTRAL VENOUS CATHETER (CVC) WITH SUBCUTANEOUS PORT  11/21/2023    Procedure: [id number];  Surgeon: Talon Arias MD;  Location: [facility] OR;  Service: General;;    LAPAROSCOPIC DRAINAGE OF ABDOMEN N/A 09/18/2023    Procedure: DRAINAGE, ABDOMEN, LAPAROSCOPIC;  Surgeon: Eulogio Moy MD;  Location: [facility] OR 2ND FLR;  Service: General;  Laterality: N/A;    TONSILLECTOMY       Family History   Problem Relation Name Age of Onset    Cancer Father          brain cancer    Colon cancer Neg Hx      Crohn's disease Neg Hx      Esophageal cancer Neg Hx      Stomach cancer Neg Hx      Ulcerative colitis Neg Hx      Liver disease Neg Hx       Social History     Tobacco Use    Smoking status: Former     Current packs/day: 0.00     Types: Cigarettes     Quit date: 5/27/2013     Years since quitting: 10.9    Smokeless tobacco: Never   Substance Use Topics    Alcohol use: Yes     Comment: occ    Drug use: Never     Review of Systems    Physical Exam     Initial Vitals [05/19/24 1243]   BP Pulse Resp Temp SpO2   (!) 83/50 87 18 97.9 °F (36.6 °C) (!) 94 %      MAP       --         Physical Exam    Nursing note and vitals reviewed.  Constitutional: He appears well-developed.   HENT:   Head: Normocephalic and atraumatic.   Nose: Nose normal.   Eyes: Conjunctivae and EOM are normal. Pupils are equal, round, and reactive to light.   Neck: Neck supple.   Normal range of motion.  Cardiovascular:  Normal rate, normal heart sounds and intact distal pulses.           Pulmonary/Chest: Breath sounds normal.   Abdominal: Abdomen is soft. Bowel sounds are normal.   Abdomen soft, scaphoid abdomen, nondistended, nontender to palpation. 3 cm x 4 cm skin abscess, not  currently draining, fluctuant  Midline soft tissue bulge, did not appreciate hernia   Musculoskeletal:         General: Normal range of motion.      Cervical back: Normal range of motion and neck supple.     Neurological: He is alert and oriented to person, place, and time. He has normal strength. GCS score is 15. GCS eye subscore is 4. GCS verbal subscore is 5. GCS motor subscore is 6.   Skin: Skin is warm and dry. Capillary refill takes less than 2 seconds.   Psychiatric: He has a normal mood and affect. His behavior is normal. Judgment and thought content normal.         ED Course   Procedures  Labs Reviewed   CBC W/ AUTO DIFFERENTIAL - Abnormal; Notable for the following components:       Result Value    RBC 4.12 (*)     Hemoglobin 11.5 (*)     Hematocrit 35.2 (*)     RDW 16.0 (*)     MPV 8.9 (*)     All other components within normal limits    Narrative:     Release to patient->Immediate   COMPREHENSIVE METABOLIC PANEL - Abnormal; Notable for the following components:    Potassium 2.4 (*)     CO2 35 (*)     BUN 28 (*)     Calcium 8.4 (*)     Total Protein 5.5 (*)     Albumin 2.0 (*)     All other components within normal limits    Narrative:     Release to patient->Immediate   URINALYSIS, REFLEX TO URINE CULTURE - Abnormal; Notable for the following components:    Specific Gravity, UA >=1.030 (*)     Protein, UA 1+ (*)     All other components within normal limits    Narrative:     Specimen Source->Urine   PHOSPHORUS - Abnormal; Notable for the following components:    Phosphorus 2.2 (*)     All other components within normal limits    Narrative:     add on mg and phos per  /order#[id number] and [id number] @   05/19/2024  15:56         Release to patient->Immediate   POTASSIUM - Abnormal; Notable for the following components:    Potassium 2.7 (*)     All other components within normal limits   URINALYSIS MICROSCOPIC - Abnormal; Notable for the following components:    RBC, UA 37 (*)     Hyaline Casts,  UA 9 (*)     All other components within normal limits    Narrative:     Specimen Source->Urine   HIV 1 / 2 ANTIBODY    Narrative:     Release to patient->Immediate   HEPATITIS C ANTIBODY    Narrative:     Release to patient->Immediate   MAGNESIUM   PHOSPHORUS   MAGNESIUM    Narrative:     add on mg and phos per  /order#[id number] and [id number] @   05/19/2024  15:56         Release to patient->Immediate   ISTAT LACTATE     EKG Readings: (Independently Interpreted)   Initial Reading: No STEMI.       Imaging Results               CT Abdomen Pelvis With IV Contrast NO Oral Contrast (Final result)  Result time 05/19/24 16:34:40      Final result by Christel Ceballos MD (05/19/24 16:34:40)                   Impression:      Accumulation of air within the upper anterior abdomen, anterior to the left hepatic lobe extending through the anterior abdominal wall, midline and right paramidline region, could represent a perforated viscus or air leak at the bowel suture within the right upper abdomen region.  Examination could be facilitated with the administration of oral contrast.    There is a fluid collection in the left upper abdomen region which do not appear to be contained within the bowel, a leak or abscess cannot be excluded.    Significant amount of fluid throughout the abdomen.    Left pleural effusion slightly worse compared to the prior study.    This report was flagged in Epic as abnormal.      Electronically signed by: Christel Ceballos MD  Date:    05/19/2024  Time:    16:34               Narrative:    EXAMINATION:  CT ABDOMEN PELVIS WITH IV CONTRAST    CLINICAL HISTORY:  Abdominal abscess/infection suspected;    TECHNIQUE:  Low dose axial images, sagittal and coronal reformations were obtained from the lung bases to the pubic symphysis following the IV administration of 75 mL of Omnipaque 350  axial and coronal images reformatted.    COMPARISON:  04/17/2024 CT chest, abdomen and  pelvis    FINDINGS:  Small left pleural effusion, worse from the prior study, subsegmental atelectasis at the dependent portion of the left lung base.  No pericardial effusion.    The liver appears normal.  The gallbladder is present, no radiopaque stones seen within.  Upper normal size intra and extrahepatic biliary ducts, similar to the prior exam.    Gastrojejunostomy.  Limited evaluation of the bowel without the use of oral contrast.    The pancreas is diffusely atrophic, similar to the prior exam.    The spleen, bilateral adrenal glands and bilateral kidneys appear within normal limits.  No hydronephrosis.  3.1 cm cyst left kidney, unchanged.    The aorta tapers normally, there is moderate atherosclerotic plaque.  No definite para-aortic or mesenteric adenopathy.    The bladder and prostate demonstrate nothing unusual.    Lobulated air collection right upper abdominal region, anterior to the left hepatic lobe extending through the anterior abdominal wall, midline and right paramidline, not far from a bowel suture line.  Could represent an air leak, lobulated air containing loops of bowel is consider less likely but could have similar appearance.  Moderate stool retention.    There is a loculated fluid in the left upper abdominal region, an area measuring 7.8 x 3.6 x 7.9 cm.  The osseous structures demonstrate no osseous lesions.                                       X-Ray Chest AP Portable (Final result)  Result time 05/19/24 15:32:29      Final result by Tiffany Schmid MD (05/19/24 15:32:29)                   Impression:      No acute cardiopulmonary process is identified.      Electronically signed by: Tiffany Schmid MD  Date:    05/19/2024  Time:    15:32               Narrative:    EXAMINATION:  XR CHEST AP PORTABLE    CLINICAL HISTORY:  Sepsis;    TECHNIQUE:  Single frontal view of the chest was performed.    COMPARISON:  December 24, 2023    FINDINGS:  Indwelling left-sided central venous catheter  remains.  The cardiac silhouette is not enlarged.  Pulmonary vascularity is not increased.  Lungs are free of lobar consolidation and alveolar edema.  There is no large pleural effusion or pneumothorax.  Visualized osseous structures are stable.                                    X-Rays:   Independently Interpreted Readings:   Chest X-Ray: Normal heart size.  No infiltrates.  No acute abnormalities.   Abdomen:   Abdomen and Pelvis CT with Contrast - Possible perforated viscus of the right upper region. Increasing plural effusion as compared to prior study     Medications   LIDOcaine HCL 10 mg/ml (1%) injection 10 mL (10 mLs Intradermal Not Given 5/19/24 1430)   acetaminophen tablet 500 mg (500 mg Oral Given 5/20/24 0557)   OLANZapine tablet 5 mg (5 mg Oral Given 5/19/24 2027)   sucralfate 100 mg/mL suspension 1 g (1 g Oral Not Given 5/20/24 0600)   aluminum-magnesium hydroxide-simethicone 200-200-20 mg/5 mL suspension 30 mL (30 mLs Oral Not Given 5/20/24 0645)   tamsulosin 24 hr capsule 0.4 mg (0.4 mg Oral Given 5/20/24 0800)   LIDOcaine (PF) 10 mg/ml (1%) injection 10 mg (has no administration in time range)   sodium chloride 0.9% flush 10 mL (has no administration in time range)   ondansetron disintegrating tablet 8 mg (has no administration in time range)   melatonin tablet 6 mg (has no administration in time range)   acetaminophen tablet 650 mg (has no administration in time range)   enoxaparin injection 40 mg (40 mg Subcutaneous Given 5/19/24 1819)   oxyCODONE immediate release tablet 5 mg (has no administration in time range)   oxyCODONE immediate release tablet Tab 10 mg (10 mg Oral Given 5/20/24 0556)   lactated ringers infusion ( Intravenous New Bag 5/20/24 0732)   piperacillin-tazobactam (ZOSYN) 4.5 g in dextrose 5 % in water (D5W) 100 mL IVPB (MB+) (0 g Intravenous Stopped 5/20/24 0530)   morphine injection 2 mg (2 mg Intravenous Given 5/19/24 2306)   vancomycin 1,250 mg in dextrose 5 % (D5W) 250 mL IVPB  (Vial-Mate) (0 mg Intravenous Stopped 5/19/24 1620)   potassium bicarbonate disintegrating tablet 40 mEq (40 mEq Oral Given 5/19/24 1517)   iohexoL (OMNIPAQUE 350) injection 75 mL (75 mLs Intravenous Given 5/19/24 1555)   potassium, sodium phosphates 280-160-250 mg packet 1 packet (1 packet Oral Given 5/19/24 2028)   potassium chloride 10 mEq in 100 mL IVPB (10 mEq Intravenous New Bag 5/20/24 0239)     Medical Decision Making  63 y.o. male as described above presenting with worsening of abdominal abscess and possible perforated viscus on CT. Labs collected ad revealed hypokalemia which was repleted with 40 mg oral K. Blood cultures collected and patient started on vancomycin.     Clinically dehydrated and skin abscess of the right side worsening from prior carted encounters. Given weight loss, poor oral intake and hypokalemia, would favor observation tonight for surgical intervention and evaluation in the morning. Will re-hydrate and initiate broad spectrum antibiotics.  General surgery consulted and agreed to follow and manage patient while in hospital.     Amount and/or Complexity of Data Reviewed  Labs: ordered. Decision-making details documented in ED Course.  Radiology: ordered. Decision-making details documented in ED Course.  ECG/medicine tests:  Decision-making details documented in ED Course.  Discussion of management or test interpretation with external provider(s): I discussed the case with General surgery who agreed to evaluate the patient and admit to their service.  Appreciate recs    Risk  Prescription drug management.  Parenteral controlled substances.  Decision regarding hospitalization.                                      Clinical Impression:  Final diagnoses:  [L08.9] Soft tissue infection  [L02.91] Abscess (Primary)          ED Disposition Condition    Observation                 Mikayla Bolaños MD  Resident  05/20/24 8112

## 2024-05-19 NOTE — CONSULTS
SURGICAL ONCOLOGY  Consultation      REASON FOR CONSULT:  Skin abscess, poor intake with weight loss      SUBJECTIVE:     HISTORY OF PRESENT ILLNESS:  Danish Valladares is a 63 y.o. male with advanced gastric adenocarcinoma (s/p B2 resection 8/21/23), post-operative course complicated duodenal stump leak requiring laparoscopic washout (9/18/23), multiple intra-abdominal IR drains. At last follow-up visit on 3/5/24, last IR drain was removed from right flank after low output. He is not actively on any treatment for his gastric cancer, on palliative. Last seen by Dr. Maravilla on 4/18/24.     Patient notes that he has been doing alright at home, he tries to stay active gardening and working outside, but his appetite remains low and he has lost about 20 lbs in the last month. No fever/chills at home. 2 fluctuant abdominal fluid collections, not draining that are increasing in size. In the ED, he remains hemodynamically stable, WBC 6.6. K 2.4.     General Surgery has been consulted for evaluation. CT ordered, has not been performed. He notes swelling and erythema to this 4 cm x 3 cm abscess, central punctum but is not draining. No fever/chills. Normal bowel habits. No other symptoms.       MEDICATIONS:  Home Medications:  Current Facility-Administered Medications on File Prior to Encounter   Medication Dose Route Frequency Provider Last Rate Last Admin    diphenhydrAMINE injection 25 mg  25 mg Intravenous Q6H PRN Luis Pickett MD        haloperidol lactate injection 0.5 mg  0.5 mg Intravenous Q10 Min PRN Luis Pickett MD        lactated ringers infusion   Intravenous Continuous Luis Pickett MD 10 mL/hr at 11/21/23 1031 Restarted at 11/21/23 1105    LIDOcaine (PF) 10 mg/ml (1%) injection 10 mg  1 mL Intradermal Once Luis Pickett MD        prochlorperazine injection Soln 5 mg  5 mg Intravenous Q4H PRN Luis Pickett MD        sodium chloride 0.9% flush 10 mL  10 mL Intravenous Once Piyush  Luis BRIONES MD         Current Outpatient Medications on File Prior to Encounter   Medication Sig Dispense Refill    acetaminophen (TYLENOL ORAL) Take 2 tablets by mouth every 6 (six) hours as needed (pain). (Patient not taking: Reported on 4/18/2024)      ascorbic acid (VITAMIN C ORAL) Take 1 tablet by mouth once daily.      ciprofloxacin HCl (CIPRO) 500 MG tablet Take 500 mg by mouth 2 (two) times daily.      ergocalciferol, vitamin D2, (VITAMIN D ORAL) Take 1 tablet by mouth once daily.      ibuprofen (ADVIL,MOTRIN) 200 MG tablet Take 200 mg by mouth every 6 (six) hours as needed for Pain.      mirtazapine (REMERON) 15 MG tablet Take 1 tablet (15 mg total) by mouth every evening. (Patient not taking: Reported on 3/12/2024) 30 tablet 11    multivitamin/iron/folic acid (CENTRUM ORAL) Take 1 tablet by mouth once daily.      nystatin (MYCOSTATIN) 500,000 unit Tab Take 2 tablets by mouth 2 (two) times daily.      OLANZapine (ZYPREXA) 5 MG tablet Take for 3 nights after chemo starting the night of chemo (Patient not taking: Reported on 3/12/2024) 30 tablet 2    ondansetron (ZOFRAN) 8 MG tablet Take 1 tablet (8 mg total) by mouth every 6 (six) hours as needed for Nausea. (Patient not taking: Reported on 3/12/2024) 60 tablet 2    pantoprazole (PROTONIX) 40 MG tablet Take 1 tablet (40 mg total) by mouth once daily. (Patient not taking: Reported on 4/18/2024)      prochlorperazine (COMPAZINE) 10 MG tablet Take 1 tablet (10 mg total) by mouth every 6 (six) hours as needed. (Patient not taking: Reported on 3/12/2024) 60 tablet 3    sulfamethoxazole-trimethoprim 800-160mg (BACTRIM DS) 800-160 mg Tab Take 1 tablet by mouth 2 (two) times daily. (Patient not taking: Reported on 4/18/2024)      tamsulosin (FLOMAX) 0.4 mg Cap Take 1 capsule (0.4 mg total) by mouth once daily. 30 capsule 11    vitamin E 1000 UNIT capsule Take 1,000 Units by mouth once daily.       Inpatient Medications:   LIDOcaine HCL 10 mg/ml (1%)  10 mL  Intradermal Once    vancomycin (VANCOCIN) IV (PEDS and ADULTS)  20 mg/kg Intravenous ED 1 Time     Infusions:  PRN Medications:    ALLERGIES:    Review of patient's allergies indicates:   Allergen Reactions    Dilaudid [hydromorphone]      Pt becomes hypoxic - requiring 5L via oxymask for     Penicillin     Penicillins Rash       PAST MEDICAL HISTORY:    Past Medical History:   Diagnosis Date    Emphysema, unspecified     Hypoxia     Psoriasis     Stomach cancer        SURGICAL HISTORY:  Past Surgical History:   Procedure Laterality Date    DIAGNOSTIC LAPAROSCOPY  09/18/2023    Procedure: LAPAROSCOPY, DIAGNOSTIC;  Surgeon: Eulogio Moy MD;  Location: [address];  Service: General;;    ESOPHAGOGASTRODUODENOSCOPY N/A 08/03/2023    Procedure: EGD (ESOPHAGOGASTRODUODENOSCOPY);  Surgeon: Loco Marvin MD;  Location: [facility];  Service: Gastroenterology;  Laterality: N/A;    ESOPHAGOGASTRODUODENOSCOPY N/A 09/18/2023    Procedure: EGD (ESOPHAGOGASTRODUODENOSCOPY); flouroscopy, need c-arm in the room;  Surgeon: Eulogio Moy MD;  Location: [address];  Service: General;  Laterality: N/A;  EGD with Fluoroscopy     GASTRECTOMY N/A 08/21/2023    Procedure: GASTRECTOMY;  Surgeon: Eulogio Moy MD;  Location: [address];  Service: General;  Laterality: N/A;  Open gastrectomy with EGD. Needs Omni retractor, requesting room 24    INSERTION OF TUNNELED CENTRAL VENOUS CATHETER (CVC) WITH SUBCUTANEOUS PORT  11/21/2023    Procedure: [id number];  Surgeon: Talon Arias MD;  Location: [facility];  Service: General;;    LAPAROSCOPIC DRAINAGE OF ABDOMEN N/A 09/18/2023    Procedure: DRAINAGE, ABDOMEN, LAPAROSCOPIC;  Surgeon: Eulogio Moy MD;  Location: [address];  Service: General;  Laterality: N/A;    TONSILLECTOMY         FAMILY HISTORY:  Family History   Problem Relation Name Age of Onset    Cancer Father          brain cancer    Colon cancer Neg Hx       "Crohn's disease Neg Hx      Esophageal cancer Neg Hx      Stomach cancer Neg Hx      Ulcerative colitis Neg Hx      Liver disease Neg Hx         SOCIAL HISTORY:  Social History     Tobacco Use    Smoking status: Former     Current packs/day: 0.00     Types: Cigarettes     Quit date: 5/27/2013     Years since quitting: 10.9    Smokeless tobacco: Never   Substance Use Topics    Alcohol use: Yes     Comment: occ    Drug use: Never        REVIEW OF SYSTEMS:  A 10-point review of systems is negative except for the above mentioned in the HPI.    OBJECTIVE:     Most Recent Vitals:  Temp: 97.9 °F (36.6 °C) (05/19/24 1243)  Pulse: 72 (05/19/24 1357)  Resp: 16 (05/19/24 1357)  BP: 103/64 (05/19/24 1333)  SpO2: (!) 94 % (05/19/24 1357)    24-Hour Vitals:  Temp:  [97.9 °F (36.6 °C)]   Pulse:  [72-87]   Resp:  [16-18]   BP: ()/(50-64)   SpO2:  [94 %-96 %]     24-Hour I&O:No intake or output data in the 24 hours ending 05/19/24 1534    PHYSICAL EXAM:  AAO, NAD, well developed and well nourished.  Head normocephalic, atraumatic.  Trachea midline, neck supple.  Respirations unlabored with good inspiratory effort.  Heart regular rate and rhythm.  Abdomen soft, scaphoid abdomen, nondistended, nontender to palpation.     3 cm x 4 cm skin abscess, not currently draining, fluctuant      Midline soft tissue bulge, did not appreciate hernia      LABORATORY VALUES:  Recent Labs   Lab 05/19/24  1329   WBC 6.16   HGB 11.5*   HCT 35.2*        Recent Labs   Lab 05/19/24  1329      K 2.4*   CL 97   CO2 35*   BUN 28*   CREATININE 0.7   GLU 96   CALCIUM 8.4*   AST 16   ALT 16   ALKPHOS 72   BILITOT 0.7   PROT 5.5*   ALBUMIN 2.0*   No results for input(s): "INR", "PTT", "LABHEPA", "LACTATE", "TROPONINI", "CPK", "CPKMB", "MB", "BNP" in the last 72 hours.No results for input(s): "PH", "PCO2", "PO2", "HCO3" in the last 72 hours.    DIAGNOSTIC STUDIES:  CT: Reviewed-- pending, ordered    ASSESSMENT:     Danish Valladares is a 63 " y.o. male admitted to [facility] on 5/19/2024 fordehydration and a skin abscess of the right side. Given weight loss, poor oral intake and hypokalemia, would favor observation tonight. CT scan ordered, to evaluate for intra-abdominal abscesses. Will re-hydrate and initiate broad spectrum antibiotics.       PLAN:  Will admit to observation under surgical oncology  No acute surgical intervention indicated at this time.  Zosyn  Ok for regular diet, oral medications   Replacing K, will recheck in 4 hours, might need additional replacements  Protein shakes  OOB, ambulate  Will restart pertinent home medications   DVT PPX      -- Diana Alvares M.D  General Surgery PGY5  Pager: [phone]

## 2024-05-19 NOTE — ED NOTES
Abscess on pts abdomen began leaking out orange tinted drainage. Gauze applied and taped over abscess.

## 2024-05-19 NOTE — TELEPHONE ENCOUNTER
"Received outpatient call to the surgery intern pager by patient's wife. She states that over the past few days she has noted an enlarging "bump" on the patients R upper abdomen that was not there previously. She states that the area is warm to the touch and it looks as though there may be pus underneath the area. Denies fever, chills, chest pain, SOB, n/v, and drainage from the area. She is concerned that this may represent new tumor or an abscess. I recommended that he should be evaluated in the [facility] ED. She states they live in [place], but that they will drive to the [facility] ED.    Ivan Andre MD  General Surgery  5/19/2024    "

## 2024-05-19 NOTE — H&P
SURGICAL ONCOLOGY  HISTORY AND PHYSICAL         See consult note from 5/19 for full history and physical.         -- Diana Alvares M.D  General Surgery PGY5  Pager: [phone]

## 2024-05-20 NOTE — HOSPITAL COURSE
Please see the H&P and other available documentation for full details related to history prior to this admission.  Briefly, Danish Valladares is a 63 y.o. male who was admitted for an infection.    His collection began to spontaneously drain, and there was enteric contents. 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 We placed a pouch over his draining collection. Given his untreatable gastric cancer, there are no good management options for this gentleman. We had a discussion about goals of care since there are no treatment options for him, and they would like to make it out to a family trip in the next month. I believe this is very reasonable and told them to treat the output like diarrhea and to stay hydrated. He is in contact with palliative care physicians close to home. He will continue to pouch his fistula and change it at home

## 2024-05-20 NOTE — ASSESSMENT & PLAN NOTE
Danish Valladares is a 63 y.o. male with advanced gastric adenocarcinoma (s/p B2 resection 8/21/23), post-operative course complicated duodenal stump leak requiring laparoscopic washout (9/18/23), multiple intra-abdominal IR drains presenting with enterocutaneous fistula. ECF currently with wound manager in place, with good control of output. Patient already engaged with palliative at home. Patient afebrile, hemodynamically stable, normal WBC.     -No current plan for surgical intervention.   -Wound manager to ECF for effluent control  -Regular diet + Boost plus  -IVF, wean as tolerates PO  -Zosyn  -Tylenol, oxycodone PRN    Dispo: [facility], potential home in 24-48 hours

## 2024-05-20 NOTE — PLAN OF CARE
[facility] Plan of Care Note  Dx Gastric adenocarcinoma    Shift Events patient has an ostomy attached to a RLQ abscess. Patient being sent home. Instructions on ostomy care provided at bedside with patient and spouse    Neuro: WDL    Vital Signs: WDL    Respiratory: WDL    Diet: NPO    Urine Output/Bowel Movement: continent of bowel and bladder

## 2024-05-20 NOTE — CARE UPDATE
SURGICAL ONCOLOGY  CARE UPDATE      CT reviewed, soft tissue abscess began spontaneously draining - expressed > 100 ml purulence at bedside. Patient did not tolerate further bedside procedures, after discussion with proceed to OR tomorrow for definitive incision and drainage, washout of abscess. Wound cleaned at bedside and ostomy appliance applied, due to degree of output.     Will obtain informed consent. Regular diet tonight, NPO at midnight.     -- Diana Alvares M.D  General Surgery PGY5  Pager: [phone]

## 2024-05-20 NOTE — PLAN OF CARE
[facility]  Discharge Assessment    Per MD team, patient is medically ready for d/c at this time. No post-acute needs anticipated. Plan is to d/c to home w/family. Will continue to follow for needs.    Primary Care Provider: Yfn Walker MD     Discharge Assessment (most recent)       BRIEF DISCHARGE ASSESSMENT - 05/20/24 7922          Discharge Planning    Assessment Type Discharge Planning Brief Assessment     Resource/Environmental Concerns none     Support Systems Spouse/significant other     Equipment Currently Used at Home walker, rolling;shower chair (P)    Patient owns, but does not use.    Current Living Arrangements home (P)      Patient/Family Anticipated Services at Transition none (P)      DME Needed Upon Discharge  none (P)      Discharge Plan A Home with family (P)      Discharge Plan B Home with family;Home Health (P)         Physical Activity    On average, how many days per week do you engage in moderate to strenuous exercise (like a brisk walk)? -- (P)    SDOH completed December 2023.                  Discharge Plan A and Plan B have been determined by review of patient's clinical status, future medical and therapeutic needs, and coverage/benefits for post-acute care in coordination with multidisciplinary team members.    RAMON Lu, LMSW    Case Management Department  [facility]

## 2024-05-20 NOTE — SUBJECTIVE & OBJECTIVE
Interval History: No acute events overnight. Wound manager in place with succus in bag. Patient notes greatly improved pain.     Medications:  Continuous Infusions:   lactated ringers   Intravenous Continuous 125 mL/hr at 05/20/24 0732 New Bag at 05/20/24 0732     Scheduled Meds:   acetaminophen  500 mg Oral Q6H    aluminum-magnesium hydroxide-simethicone  30 mL Oral QID (AC & HS)    enoxparin  40 mg Subcutaneous Daily    LIDOcaine HCL 10 mg/ml (1%)  10 mL Intradermal Once    OLANZapine  5 mg Oral QHS    piperacillin-tazobactam (Zosyn) IV (PEDS and ADULTS) (extended infusion is not appropriate)  4.5 g Intravenous Q8H    sucralfate  1 g Oral Q6H    tamsulosin  0.4 mg Oral Daily     PRN Meds:  Current Facility-Administered Medications:     acetaminophen, 650 mg, Oral, Q8H PRN    LIDOcaine (PF) 10 mg/ml (1%), 1 mL, Intradermal, Once PRN    melatonin, 6 mg, Oral, Nightly PRN    morphine, 2 mg, Intravenous, Q3H PRN    ondansetron, 8 mg, Oral, Q8H PRN    oxyCODONE, 5 mg, Oral, Q4H PRN    oxyCODONE, 10 mg, Oral, Q4H PRN    sodium chloride 0.9%, 10 mL, Intravenous, PRN     Review of patient's allergies indicates:   Allergen Reactions    Dilaudid [hydromorphone]      Pt becomes hypoxic - requiring 5L via oxymask for     Penicillin     Penicillins Rash     Objective:     Vital Signs (Most Recent):  Temp: 97.8 °F (36.6 °C) (05/20/24 0740)  Pulse: 73 (05/20/24 0740)  Resp: 20 (05/20/24 0740)  BP: 95/60 (05/20/24 0740)  SpO2: (!) 93 % (05/20/24 0740) Vital Signs (24h Range):  Temp:  [97.6 °F (36.4 °C)-98.2 °F (36.8 °C)] 97.8 °F (36.6 °C)  Pulse:  [72-88] 73  Resp:  [16-20] 20  SpO2:  [92 %-96 %] 93 %  BP: ()/(50-70) 95/60     Weight: 58.5 kg (128 lb 15.5 oz)  Body mass index is 17.99 kg/m².    Intake/Output - Last 3 Shifts         05/18 0700 05/19 0659 05/19 0700 05/20 0659 05/20 0700  05/21 0659    P.O.  360     IV Piggyback  250     Total Intake(mL/kg)  610 (10.4)     Urine (mL/kg/hr)  400     Other  650 100    Total  Output  1050 100    Net  -440 -100                    Physical Exam  Vitals and nursing note reviewed.   Constitutional:       Appearance: He is ill-appearing.   HENT:      Head: Normocephalic.   Eyes:      Extraocular Movements: Extraocular movements intact.   Cardiovascular:      Rate and Rhythm: Normal rate.   Pulmonary:      Effort: Pulmonary effort is normal. No respiratory distress.   Abdominal:      Comments: Soft, non-distended, right of midline wound manager with succus and air in bag, mild fluctuance just left of midline   Musculoskeletal:         General: Normal range of motion.      Cervical back: Normal range of motion.   Neurological:      General: No focal deficit present.      Mental Status: He is alert.   Psychiatric:         Mood and Affect: Mood normal.         Behavior: Behavior normal.         Thought Content: Thought content normal.          Significant Labs:  I have reviewed all pertinent lab results within the past 24 hours.  CBC:   Recent Labs   Lab 05/20/24  0446   WBC 7.12   RBC 4.31*   HGB 12.0*   HCT 36.7*      MCV 85   MCH 27.8   MCHC 32.7     CMP:   Recent Labs   Lab 05/19/24  1329 05/19/24  1824 05/20/24  0446   GLU 96   < > 118*   CALCIUM 8.4*   < > 8.0*   ALBUMIN 2.0*  --   --    PROT 5.5*  --   --       < > 139   K 2.4*   < > 3.1*   CO2 35*   < > 36*   CL 97   < > 96   BUN 28*   < > 22   CREATININE 0.7   < > 0.7   ALKPHOS 72  --   --    ALT 16  --   --    AST 16  --   --    BILITOT 0.7  --   --     < > = values in this interval not displayed.       Significant Diagnostics:  I have reviewed all pertinent imaging results/findings within the past 24 hours.

## 2024-05-20 NOTE — PROGRESS NOTES
[facility]  General Surgery  Progress Note    Subjective:     History of Present Illness:  Danish Valladares is a 63 y.o. male with advanced gastric adenocarcinoma (s/p B2 resection 8/21/23), post-operative course complicated duodenal stump leak requiring laparoscopic washout (9/18/23), multiple intra-abdominal IR drains. At last follow-up visit on 3/5/24, last IR drain was removed from right flank after low output. He is not actively on any treatment for his gastric cancer, on palliative. Last seen by Dr. Maravilla on 4/18/24.      Patient notes that he has been doing alright at home, he tries to stay active gardening and working outside, but his appetite remains low and he has lost about 20 lbs in the last month. No fever/chills at home. 2 fluctuant abdominal fluid collections, not draining that are increasing in size. In the ED, he remains hemodynamically stable, WBC 6.6. K 2.4.      General Surgery has been consulted for evaluation. CT ordered, has not been performed. He notes swelling and erythema to this 4 cm x 3 cm abscess, central punctum but is not draining. No fever/chills. Normal bowel habits. No other symptoms.     Post-Op Info:  Procedure(s) (LRB):  Incision and Drainage (N/A)   Day of Surgery     Interval History: No acute events overnight. Wound manager in place with succus in bag. Patient notes greatly improved pain.     Medications:  Continuous Infusions:   lactated ringers   Intravenous Continuous 125 mL/hr at 05/20/24 0732 New Bag at 05/20/24 0732     Scheduled Meds:   acetaminophen  500 mg Oral Q6H    aluminum-magnesium hydroxide-simethicone  30 mL Oral QID (AC & HS)    enoxparin  40 mg Subcutaneous Daily    LIDOcaine HCL 10 mg/ml (1%)  10 mL Intradermal Once    OLANZapine  5 mg Oral QHS    piperacillin-tazobactam (Zosyn) IV (PEDS and ADULTS) (extended infusion is not appropriate)  4.5 g Intravenous Q8H    sucralfate  1 g Oral Q6H    tamsulosin  0.4 mg Oral Daily     PRN Meds:  Current  Facility-Administered Medications:     acetaminophen, 650 mg, Oral, Q8H PRN    LIDOcaine (PF) 10 mg/ml (1%), 1 mL, Intradermal, Once PRN    melatonin, 6 mg, Oral, Nightly PRN    morphine, 2 mg, Intravenous, Q3H PRN    ondansetron, 8 mg, Oral, Q8H PRN    oxyCODONE, 5 mg, Oral, Q4H PRN    oxyCODONE, 10 mg, Oral, Q4H PRN    sodium chloride 0.9%, 10 mL, Intravenous, PRN     Review of patient's allergies indicates:   Allergen Reactions    Dilaudid [hydromorphone]      Pt becomes hypoxic - requiring 5L via oxymask for     Penicillin     Penicillins Rash     Objective:     Vital Signs (Most Recent):  Temp: 97.8 °F (36.6 °C) (05/20/24 0740)  Pulse: 73 (05/20/24 0740)  Resp: 20 (05/20/24 0740)  BP: 95/60 (05/20/24 0740)  SpO2: (!) 93 % (05/20/24 0740) Vital Signs (24h Range):  Temp:  [97.6 °F (36.4 °C)-98.2 °F (36.8 °C)] 97.8 °F (36.6 °C)  Pulse:  [72-88] 73  Resp:  [16-20] 20  SpO2:  [92 %-96 %] 93 %  BP: ()/(50-70) 95/60     Weight: 58.5 kg (128 lb 15.5 oz)  Body mass index is 17.99 kg/m².    Intake/Output - Last 3 Shifts         05/18 0700 05/19 0659 05/19 0700 05/20 0659 05/20 0700 05/21 0659    P.O.  360     IV Piggyback  250     Total Intake(mL/kg)  610 (10.4)     Urine (mL/kg/hr)  400     Other  650 100    Total Output  1050 100    Net  -440 -100                    Physical Exam  Vitals and nursing note reviewed.   Constitutional:       Appearance: He is ill-appearing.   HENT:      Head: Normocephalic.   Eyes:      Extraocular Movements: Extraocular movements intact.   Cardiovascular:      Rate and Rhythm: Normal rate.   Pulmonary:      Effort: Pulmonary effort is normal. No respiratory distress.   Abdominal:      Comments: Soft, non-distended, right of midline wound manager with succus and air in bag, mild fluctuance just left of midline   Musculoskeletal:         General: Normal range of motion.      Cervical back: Normal range of motion.   Neurological:      General: No focal deficit present.      Mental  Status: He is alert.   Psychiatric:         Mood and Affect: Mood normal.         Behavior: Behavior normal.         Thought Content: Thought content normal.          Significant Labs:  I have reviewed all pertinent lab results within the past 24 hours.  CBC:   Recent Labs   Lab 05/20/24  0446   WBC 7.12   RBC 4.31*   HGB 12.0*   HCT 36.7*      MCV 85   MCH 27.8   MCHC 32.7     CMP:   Recent Labs   Lab 05/19/24  1329 05/19/24  1824 05/20/24  0446   GLU 96   < > 118*   CALCIUM 8.4*   < > 8.0*   ALBUMIN 2.0*  --   --    PROT 5.5*  --   --       < > 139   K 2.4*   < > 3.1*   CO2 35*   < > 36*   CL 97   < > 96   BUN 28*   < > 22   CREATININE 0.7   < > 0.7   ALKPHOS 72  --   --    ALT 16  --   --    AST 16  --   --    BILITOT 0.7  --   --     < > = values in this interval not displayed.       Significant Diagnostics:  I have reviewed all pertinent imaging results/findings within the past 24 hours.  Assessment/Plan:     Gastric adenocarcinoma  Danish Valladares is a 63 y.o. male with advanced gastric adenocarcinoma (s/p B2 resection 8/21/23), post-operative course complicated duodenal stump leak requiring laparoscopic washout (9/18/23), multiple intra-abdominal IR drains presenting with enterocutaneous fistula. ECF currently with wound manager in place, with good control of output. Patient already engaged with palliative at home. Patient afebrile, hemodynamically stable, normal WBC.     -No current plan for surgical intervention.   -Wound manager to ECF for effluent control  -Regular diet + Boost plus  -IVF, wean as tolerates PO  -Zosyn  -Tylenol, oxycodone PRN    Dispo: [facility], potential home in 24-48 hours        Chito Priest MD  General Surgery  [facility]

## 2024-05-20 NOTE — PLAN OF CARE
CHW met with patient/family at bedside. Patient experience rounding completed and reviewed the following.     Do you know your discharge plan? Yes or No,    If yes, what is the plan? (Home, Home Health, Rehab, SNF, LTAC, or Other)    Home    Have you discussed your needs and preferences with your SW/CM? Yes or No   Yes    If you are discharging home, do you have help at home? Yes or No   Yes    Do you think you will need help additional at home at discharge? Yes or No   No    Do you currently have difficulty keeping up with bills, affording medicine or buying food? Yes or No   No    Assigned SW/CM notified of any patient/family needs or concerns. Appropriate resources provided to address patient's needs.     Elizabeth Jay CHW  Case Management   [phone]

## 2024-05-20 NOTE — NURSING
.Nurses Note -- 4 Eyes      5/20/2024   1:50 AM      Skin assessed during: Admit      [] No Altered Skin Integrity Present    []Prevention Measures Documented      [] Yes- Altered Skin Integrity Present or Discovered   [] LDA Added if Not in Epic (Describe Wound)   [x] New Altered Skin Integrity was Present on Admit and Documented in LDA   [x] Wound Image Taken    Wound Care Consulted? Yes    Attending Nurse:  Vida Rodriguez RN/Staff Member:  Sara Owens RN/ Vida Stacy RN

## 2024-05-20 NOTE — PLAN OF CARE
[facility]  Discharge Final Note    Observed d/c orders placed for this patient. No post-acute needs noted. Patient d/c to home w/family via private vehicle.    Primary Care Provider: Yfn Walker MD    Expected Discharge Date: 5/20/2024    Final Discharge Note (most recent)       Final Note - 05/20/24 1244          Final Note    Assessment Type Final Discharge Note (P)      What phone number can be called within the next 1-3 days to see how you are doing after discharge? [phone] (P)         Post-Acute Status    Post-Acute Authorization Other (P)      Other Status No Post-Acute Service Needs (P)      Discharge Delays None known at this time (P)                      Important Message from Medicare             RAMON Lu, LMSW    Case Management Department  [facility]

## 2024-05-20 NOTE — NURSING
"[facility] Plan of Care Note    Dx: Abscess [L02.91]  Soft tissue infection [L08.9]    Shift Events: Critical K+---> K+ Riders X 6, Pain Management, IV Antibiotics, Drain Management, safety     Goals of Care: Pain Management, IV Antibiotics, Drain Management, safety     Neuro: [id number]    Vital Signs: BP 95/60 (BP Location: Right arm, Patient Position: Lying)   Pulse 73   Temp 97.8 °F (36.6 °C) (Oral)   Resp 20   Ht 5' 11" (1.803 m)   Wt 58.5 kg (128 lb 15.5 oz)   SpO2 (!) 93%   BMI 17.99 kg/m²     Respiratory: Room Air     Diet: Diet NPO  Dietary nutrition supplements Boost Plus - Any flavor    Is patient tolerating current diet? N/a    GTTS: LR @100ml/hr    Urine Output/Bowel Movement:     Intake/Output Summary (Last 24 hours) at 5/20/2024 0755  Last data filed at 5/20/2024 0735  Gross per 24 hour   Intake 610 ml   Output 1150 ml   Net -540 ml          Drains/Tubes/Tube Feeds (include total output/shift):   Output by Drain (mL) 05/18/24 0701 - 05/18/24 1900 05/18/24 1901 - 05/19/24 0700 05/19/24 0701 - 05/19/24 1900 05/19/24 1901 - 05/20/24 0700 05/20/24 0701 - 05/20/24 0755   Requested LDAs do not have output data documented.      RLQ Abscess with ostomy bag : see flow sheet     Lines: Port A Cath Left Subclavian       Accuchecks:n/a    Skin: Redness to buttock and back     Fall Risk Score: 12    Activity level? 3    Any scheduled procedures? 5/20/2024 IR for drain and I&D     Any safety concerns? Fall precaution     Other:    "

## 2024-05-20 NOTE — DISCHARGE SUMMARY
[facility]  General Surgery  Discharge Summary      Patient Name: Danish Valladares  MRN: [id number]  Admission Date: 5/19/2024  Hospital Length of Stay: 0 days  Discharge Date and Time:  05/20/2024 9:00 AM  Attending Physician: Ash Barros MD   Discharging Provider: Ector Navarro MD  Primary Care Provider: Yfn Walker MD    HPI:   Danish Valladares is a 63 y.o. male with advanced gastric adenocarcinoma (s/p B2 resection 8/21/23), post-operative course complicated duodenal stump leak requiring laparoscopic washout (9/18/23), multiple intra-abdominal IR drains. At last follow-up visit on 3/5/24, last IR drain was removed from right flank after low output. He is not actively on any treatment for his gastric cancer, on palliative. Last seen by Dr. Maravilla on 4/18/24.      Patient notes that he has been doing alright at home, he tries to stay active gardening and working outside, but his appetite remains low and he has lost about 20 lbs in the last month. No fever/chills at home. 2 fluctuant abdominal fluid collections, not draining that are increasing in size. In the ED, he remains hemodynamically stable, WBC 6.6. K 2.4.      General Surgery has been consulted for evaluation. CT ordered, has not been performed. He notes swelling and erythema to this 4 cm x 3 cm abscess, central punctum but is not draining. No fever/chills. Normal bowel habits. No other symptoms.     Procedure(s) (LRB):  Incision and Drainage (N/A)      Indwelling Lines/Drains at time of discharge:   Lines/Drains/Airways       Central Venous Catheter Line  Duration             Port A Cath Single Lumen 05/19/24 1340 Subclavian Left <1 day              Drain  Duration                  Drain/Device  05/19/24 1940 Right lower flank other (see comments) <1 day                  Hospital Course: Please see the H&P and other available documentation for full details related to history prior to this admission.  Briefly, Danish Valladares is a 63  y.o. male who was admitted for an infection.    His collection began to spontaneously drain, and there was enteric contents. 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 We placed a pouch over his draining collection. Given his untreatable gastric cancer, there are no good management options for this gentleman. We had a discussion about goals of care since there are no treatment options for him, and they would like to make it out to a family trip in the next month. I believe this is very reasonable and told them to treat the output like diarrhea and to stay hydrated. He is in contact with palliative care physicians close to home. He will continue to pouch his fistula and change it at home          Goals of Care Treatment Preferences:  Code Status: Full Code          What is most important right now is to focus on symptom/pain control.  Accordingly, we have decided that the best plan to meet the patient's goals includes continuing with treatment.      Consults:   Consults (From admission, onward)          Status Ordering Provider     Inpatient consult to General Surgery  Once        Provider:  (Not yet assigned)    Completed LEIDY IVERSON            Significant Diagnostic Studies: Labs: CMP   Recent Labs   Lab 05/19/24  1329 05/19/24  1824 05/19/24  1957 05/19/24  2334 05/20/24  0446     --  139  --  139   K 2.4*   < > 2.5* 3.1* 3.1*   CL 97  --  94*  --  96   CO2 35*  --  38*  --  36*   GLU 96  --  113*  --  118*   BUN 28*  --  27*  --  22   CREATININE 0.7  --  0.7  --  0.7   CALCIUM 8.4*  --  8.7  --  8.0*   PROT 5.5*  --   --   --   --    ALBUMIN 2.0*  --   --   --   --    BILITOT 0.7  --   --   --   --    ALKPHOS 72  --   --   --   --    AST 16  --   --   --   --    ALT 16  --   --   --   --    ANIONGAP 8  --  7*  --  7*    < > = values in this interval not displayed.    and CBC   Recent Labs   Lab 05/19/24  1329 05/20/24  0446    WBC 6.16 7.12   HGB 11.5* 12.0*   HCT 35.2* 36.7*    243       Pending Diagnostic Studies:       None          Final Active Diagnoses:    Diagnosis Date Noted POA    PRINCIPAL PROBLEM:  Gastric adenocarcinoma [C16.9] 08/17/2023 Yes      Problems Resolved During this Admission:      Discharged Condition: stable    Disposition: Home or Self Care    Follow Up:    Patient Instructions:      Diet Adult Regular     Lifting restrictions     No driving until:   Order Comments: No driving until off narcotics and able to move arms freely     Notify your health care provider if you experience any of the following:  temperature >100.4     Notify your health care provider if you experience any of the following:  persistent nausea and vomiting or diarrhea     Notify your health care provider if you experience any of the following:  severe uncontrolled pain     Notify your health care provider if you experience any of the following:  redness, tenderness, or signs of infection (pain, swelling, redness, odor or green/yellow discharge around incision site)     Notify your health care provider if you experience any of the following:  difficulty breathing or increased cough     Notify your health care provider if you experience any of the following:  severe persistent headache     Notify your health care provider if you experience any of the following:  worsening rash     Notify your health care provider if you experience any of the following:  persistent dizziness, light-headedness, or visual disturbances     Notify your health care provider if you experience any of the following:  increased confusion or weakness     Medications:  Reconciled Home Medications:      Medication List        START taking these medications      amoxicillin-clavulanate 875-125mg 875-125 mg per tablet  Commonly known as: AUGMENTIN  Take 1 tablet by mouth every 12 (twelve) hours.            CONTINUE taking these medications      CENTRUM ORAL  Take 1  tablet by mouth once daily.     ibuprofen 200 MG tablet  Commonly known as: ADVIL,MOTRIN  Take 200 mg by mouth every 6 (six) hours as needed for Pain.     nystatin 500,000 unit Tab  Commonly known as: MYCOSTATIN  Take 2 tablets by mouth 2 (two) times daily.     tamsulosin 0.4 mg Cap  Commonly known as: FLOMAX  Take 1 capsule (0.4 mg total) by mouth once daily.     TYLENOL ORAL  Take 2 tablets by mouth every 6 (six) hours as needed (pain).     VITAMIN C ORAL  Take 1 tablet by mouth once daily.     VITAMIN D ORAL  Take 1 tablet by mouth once daily.     vitamin E 1000 UNIT capsule  Take 1,000 Units by mouth once daily.            STOP taking these medications      ciprofloxacin HCl 500 MG tablet  Commonly known as: CIPRO     sulfamethoxazole-trimethoprim 800-160mg 800-160 mg Tab  Commonly known as: BACTRIM DS            ASK your doctor about these medications      mirtazapine 15 MG tablet  Commonly known as: REMERON  Take 1 tablet (15 mg total) by mouth every evening.     OLANZapine 5 MG tablet  Commonly known as: ZyPREXA  Take for 3 nights after chemo starting the night of chemo     ondansetron 8 MG tablet  Commonly known as: ZOFRAN  Take 1 tablet (8 mg total) by mouth every 6 (six) hours as needed for Nausea.     pantoprazole 40 MG tablet  Commonly known as: PROTONIX  Take 1 tablet (40 mg total) by mouth once daily.     prochlorperazine 10 MG tablet  Commonly known as: COMPAZINE  Take 1 tablet (10 mg total) by mouth every 6 (six) hours as needed.            Time spent on the discharge of patient: 15 minutes    Ector Navarro MD  General Surgery  [facility]

## 2024-05-20 NOTE — HPI
Danish Valladares is a 63 y.o. male with advanced gastric adenocarcinoma (s/p B2 resection 8/21/23), post-operative course complicated duodenal stump leak requiring laparoscopic washout (9/18/23), multiple intra-abdominal IR drains. At last follow-up visit on 3/5/24, last IR drain was removed from right flank after low output. He is not actively on any treatment for his gastric cancer, on palliative. Last seen by Dr. Maravilla on 4/18/24.      Patient notes that he has been doing alright at home, he tries to stay active gardening and working outside, but his appetite remains low and he has lost about 20 lbs in the last month. No fever/chills at home. 2 fluctuant abdominal fluid collections, not draining that are increasing in size. In the ED, he remains hemodynamically stable, WBC 6.6. K 2.4.      General Surgery has been consulted for evaluation. CT ordered, has not been performed. He notes swelling and erythema to this 4 cm x 3 cm abscess, central punctum but is not draining. No fever/chills. Normal bowel habits. No other symptoms.

## 2024-05-23 NOTE — PROGRESS NOTES
"Subjective     Patient ID: Danish Valladares is a 63 y.o. male.  The patient location is: home  The chief complaint leading to consultation is: Gastric cancer    Visit type: audiovisual      Chief Complaint: Gastric Cancer  Mr. Perez is a 63 year old with new diagnosis of gastric cancer. He noted abdomen discomfort and vomiting since May 2023  EGD on 8/3/2023: LA Grade C reflux esophagitis with no bleeding.                          Biopsied.                          - Inflammed, nodular gastric antral mucosa with                          ulcerations. R/O neoplasm. Biopsied.                          - Retained gastric contents.                          - Unable to advance scope beyond pyloric channel.   Pathology: Poorly cohesive carcinoma   - An AE1/3 pankeratin immunostain supports the above interpretation   - Background antral-type mucosa with surface erosion and reactive/regenerative epithelial changes   - Oxyntic-type mucosa with minimal chronic inflammation   - No evidence of Helicobacter-like organisms      8/16/2023: presented to ED with GOO and 30# weight loss  8/21/2023: Distal gastrectomy with Bilroth II reconstruction per Dr. Moy  Pathology: Peritoneal nodules (excisional biopsy):   - Desmoplasia, inflammation, fibrosis, rare atypical cells   2.  Peritoneal nodules (biopsy):   - Positive for malignancy   - Metastatic poorly differentiated adenocarcinoma   3.  Distal gastrectomy (resection):   - Poorly differentiated carcinoma, see synoptic report below   - NextGen sequencing CDH1, CPS <1, her 2 negative, RAMON stable      Pharmacogenomic panel: [id number] significant      9/14/2023: re-admitted to [facility], IR drain placement into intra-abd fluid collection  9/18/2023: s/p exp lap drainage of peritoneal abscess, EGD with visualization of duodenal defect c/w duodenal stump leak per Dr. Moy  9/29/2023: IR drain placement     10/9/2023 CT scans reveals "Interval placement of a posterior approach right upper " "quadrant pigtail catheter within a infra hepatic abscess, which is decreased in size. Removal of the pelvic surgical drain with a thin linear fluid along the prior tract, possibly with rim enhancement, concerning for infection. Decreased size of left anterior abdominal fluid collection measuring up to 7.3 cm, previously 10 cm.  Near complete resolution of a fluid collection posterior to the descending colon"     He was not a candidate for STAR trial secondary to not having measurable disease  He started palliative chemo with FOLFOX on 11/29/2023         He completed cycle 2 of palliative chemo with FOLFOX on 12/12/2023   He was hospitalized from 12/19/2023-12/23/2023 at [facility] [place] [facility] [place].     CT abdomen on 12/19 at [facility]: Atelectatic changes in the lung bases. Small amount of ascites. Distension of the gallbladder if clinically indicated would recommend a HIDA scan.  HIDA scan at [facility] on 12/20/2023: The cystic and common bile ducts are patent. The gallbladder ejection fraction is 6% at 30 minutes.  This suggests gallbladder dysfunction.     CT abdomen on 12/22/2023 at [facility]: Multiple fluid collections consistent with abscess. Small effusions bilaterally with bibasilar atelectasis.     On 12/22/2023 he had placement of 12 Greenlandic multipurpose drain into the right pericolic fluid collection.      [place] [facility] [place] [facility] [place] Interval placement of right colic gutter percutaneous drainage tube with near complete resolution of abscess on site.Redemonstration of multiple abdominopelvic abscesses stable/slightly decreased in size compared to recent prior.  No new or enlarging fluid collection seen.Mild interval worsening of anasarca. Mild interval increase in left pleural effusion. Interval decreased but not yet resolved previous pneumoperitoneum"     On 12/26/2023 he had "Percutaneous drainage of intra-abdominal fluid collection, " "yielding 500 mL of serous fluid.  Twelve Maltese all-purpose catheter was placed"     Percutaneous aspiration of pelvic collection, yielding 50 mL of serous fluid. No drainage catheter was left in place.     CT scans on 12/31/2023: Postoperative changes from distal gastrectomy with Billroth 2 reconstruction. Multiple peripherally enhancing collections of air and fluid scattered throughout the peritoneum, compatible with known duodenal stump leak. The collection centered in the right pericolic gutter has increased in size despite a drainage catheter, concerning for drain malfunction. Correlate with drain output. The largest collection in the right hemiabdomen has decreased in size after placement of a percutaneous drainage catheter.  Other fluid collections have also decreased in size"           CT scans on 1/2/2024: Multiple peripherally enhancing air and fluid collections throughout the abdomen, stable or decreased in size as compared to prior.  No new or enlarging collections.  Drains appear to be appropriately positioned. There is some subcutaneous fluid right anterolateral abdominal wall which may communicate with the right colic gutter fluid collection.  Developing fistula/sinus tract not excluded.  Resolution of bilateral pleural effusions"        He was discharged on 1/3/2024 on oral antibiotics      CT scans from 2/3/2024 "Continued improvement with respect to a decrease in the size and extent of multiple interloop abscesses within the abdomen and pelvis. Only a single pigtail drain remains in place. Fluid collections and positioning as discussed above. No detrimental interval change from the previous exam."  He had drain removal on 3/12/2024      CT scans from 05/19/2024 eval Postoperative changes of distal gastrectomy.  2. Interval increased diffuse small bowel wall thickening and enhancement consistent with enteritis.  Several loops of small bowel are mildly dilated without focal transition point, favor " "bowel ileus.  3. Diffuse mesenteric edema and trace fluid in the peritoneal cavity.  No definite residual intra-abdominal fluid collection or abscess.  4. Multiple new pulmonary micro nodules in the right lower lobe, most of which appear clustered in a tree-in-bud distribution suggestive for small airways infection/inflammation.  Additional new micronodule in the left upper lobe.  Stable right upper lobe pulmonary nodule measuring 0.7 cm and additional stable micro nodules bilaterally.  Attention on follow-up.    HPIHe was recently admitted on 5/19/2024 with "complicated chronic duodenal stump leak after palliative distal gastrectomy for stage IV gastric cancer. His CT imaging shows a fistulous connection, and he had I&D in the ED. This is pouched,"  CT scans from 5/19/2024 revealed "Accumulation of air within the upper anterior abdomen, anterior to the left hepatic lobe extending through the anterior abdominal wall, midline and right paramidline region, could represent a perforated viscus or air leak at the bowel suture within the right upper abdomen region.  Examination could be facilitated with the administration of oral contrast. There is a fluid collection in the left upper abdomen region which do not appear to be contained within the bowel, a leak or abscess cannot be excluded. Significant amount of fluid throughout the abdomen. Left pleural effusion slightly worse compared to the prior study"  He now notes that another site opened up and they noted stomach contents are leaking as well so son placed another pouch.    Review of Systems   Constitutional:  Positive for appetite change and unexpected weight change.   HENT:  Negative for mouth sores.    Eyes:  Positive for visual disturbance.   Respiratory:  Positive for cough and shortness of breath.    Cardiovascular:  Negative for chest pain.   Gastrointestinal:  Positive for abdominal pain and diarrhea.   Genitourinary:  Positive for frequency. "   Musculoskeletal:  Positive for back pain.   Integumentary:  Negative for rash.   Neurological:  Negative for headaches.   Hematological:  Negative for adenopathy.   Psychiatric/Behavioral:  The patient is not nervous/anxious.           Objective     Physical Exam           Assessment and Plan     1. Neoplasm related pain  -     oxyCODONE-acetaminophen (PERCOCET) 5-325 mg per tablet; Take 1 tablet by mouth every 6 (six) hours as needed for Pain.  Dispense: 120 tablet; Refill: 0    2. Gastric adenocarcinoma    3. Secondary malignancy of parietal peritoneum        Mr. Valladares was added on today's secondary to recent complication when he was hospitalized with duodenal stump leak through his skin, he had an ostomy bag placed in the hospital since he was too extensive to operate plus he did not want to stay for debridement.  He now has a another surface leak, his son placed another ostomy bag on the other leak and he is being seen as an add on since he was too weak to come into the clinic.  Discussed poor prognosis extensively with them also discussed with Dr. sAh Barros in Surgical Oncology.  Reviewed starting hospice since the only thing we can do is try to work on keeping him comfortable in his disease is too far advanced to be able to do anything.  They understand and hospice was initiated today.  IE scribed Percocet to help him before hospice comes  Also notified Dr. Santillan palliative care    Above plan reviewed with the patient and all of his questions were answered to their satisfaction         No follow-ups on file.

## 2024-05-23 NOTE — TELEPHONE ENCOUNTER
Spoke with pt's wife. She is reporting that a second area developed on Mr. Valladares's abdomen and has burst. Wife reports that she placed a second bag over the area and wants to make sure she did the right thing. Agreed that that was appropriate. She is concerned that Mr. Valladares is getting weaker. States he has appt with Palliative Care tomorrow and would like appt to be moved to today.Informed her I would try to send message.

## 2024-05-23 NOTE — TELEPHONE ENCOUNTER
----- Message from Peace James sent at 5/23/2024  8:06 AM CDT -----  Regarding: Consult/Advisory  Contact: Kevin wife     Consult/Advisory     Name Of Caller:Kevin mata        Contact Preference:[phone]          Nature of call:Patients wife is calling about his bianca that are bursting on stomach and he was discharge on Monday.Wife states that she doesn't know what to do. Requesting a call back

## 2024-05-23 NOTE — TELEPHONE ENCOUNTER
The abscess is under the ostomy bag---they have limited supplies---  I can schedule as a virtual and ask them to remove bag?    ~Gracy

## 2024-05-23 NOTE — TELEPHONE ENCOUNTER
"Spoke with patient's wife. She states abdominal abscess on incision line burst last night---and they placed ostomy bag over the area----but she is very concerned about the area and how he is in a vicious nonstop cycle. She is requesting patient to be seen by a provider to make sure "everything" is looking OK.    Patient is very weak ---and she is not even sure she can get him into clinic today, on her own.    Message routed to dr harvey (should I send to dr garcia?)  "

## 2024-05-23 NOTE — TELEPHONE ENCOUNTER
Spoke with wife. She will send over photo to dr harvey/radha as soon as she can.      Message routed to dr barahona

## 2024-05-23 NOTE — PROGRESS NOTES
HAKAN received request to assist in setting up hospice today. Orders are placed. HAKAN called [facility] [phone] and spoke with Airam OLIVAREZ regarding admit. HAKAN asked if pt can be admitted today. Airam OLIVAREZ said [facility] will admit today but may not sure can arrange DME /Meds until the AM. Dr Maravilla has been updated.     Airam OLIVAREZ with [facility] will contact pt and make arrangements for admit today.     Anjelica Dejesus, [place]

## (undated) DEVICE — ELECTRODE REM PLYHSV RETURN 9

## (undated) DEVICE — GELPOINT MINI KIT ENDOSCOPIC

## (undated) DEVICE — SUT VICRYL PLUS 3-0 SH 18IN

## (undated) DEVICE — SYR 30CC LUER LOCK

## (undated) DEVICE — TRAY MINOR GEN SURG OMC

## (undated) DEVICE — DRAPE ABDOMINAL TIBURON 14X11

## (undated) DEVICE — SUT MCRYL PLUS 4-0 PS2 27IN

## (undated) DEVICE — SPONGE COTTON TRAY 4X4IN

## (undated) DEVICE — DECANTER FLUID TRNSF WHITE 9IN

## (undated) DEVICE — STAPLER GIA HANDLE STD

## (undated) DEVICE — LUBRICANT SURGILUBE 2 OZ

## (undated) DEVICE — TROCAR ENDOPATH XCEL 5MM 7.5CM

## (undated) DEVICE — SEE MEDLINE ITEM 156902

## (undated) DEVICE — COVER PROBE US 5.5X58L NON LTX

## (undated) DEVICE — TUBING SUC UNIV W/CONN 12FT

## (undated) DEVICE — SUT MONOCRYL 4-0 PS-2

## (undated) DEVICE — DRESSING MEPORE 3.6 X 10

## (undated) DEVICE — SUT 0 VICRYL / UR6 (J603)

## (undated) DEVICE — STRAP OR TABLE 5IN X 72IN

## (undated) DEVICE — CONTAINER SPECIMEN STRL 4OZ

## (undated) DEVICE — SUT 2-0 12-18IN SILK

## (undated) DEVICE — LOOP VESSEL BLUE MINI 2/CARD

## (undated) DEVICE — KIT SAHARA DRAPE DRAW/LIFT

## (undated) DEVICE — TIP YANKAUERS BULB NO VENT

## (undated) DEVICE — SUT CHROMIC 3-0 SH 27IN GUT

## (undated) DEVICE — SOL WATER STRL IRR 1000ML

## (undated) DEVICE — SUT SILK 2-0 SH 18IN BLACK

## (undated) DEVICE — APPLICATOR CHLORAPREP ORN 26ML

## (undated) DEVICE — SUT CHROMIC 2-0 SH 27IN BRN

## (undated) DEVICE — SUT 1 48IN PDS II VIO MONO

## (undated) DEVICE — BLADE SURG CARBON STEEL SZ11

## (undated) DEVICE — TROCAR KII BLLN 12MM 10CM

## (undated) DEVICE — DRAPE C ARM 42 X 120 10/BX

## (undated) DEVICE — SOL CLEARIFY VISUALIZATION LAP

## (undated) DEVICE — TROCAR ENDOPATH XCEL 15MM 10CM

## (undated) DEVICE — CLIP LIGATION MEDIUM CLIPS 1

## (undated) DEVICE — ADHESIVE DERMABOND ADVANCED

## (undated) DEVICE — SYR SLIP TIP 20CC

## (undated) DEVICE — PAD K-THERMIA 24IN X 60IN

## (undated) DEVICE — SUT PDS II 0 CT-1 VIL MONO

## (undated) DEVICE — SUT 3-0 VICRYL / SH (J416)

## (undated) DEVICE — STAPLER ENDO GIA 60MM MED THCK

## (undated) DEVICE — SUT 3-0 12-18IN SILK

## (undated) DEVICE — SUT CTD VICRYL VIL BR CR/SH

## (undated) DEVICE — TRAY CATH 1-LYR URIMTR 16FR

## (undated) DEVICE — SUT SILK 2-0 STRANDS 30IN

## (undated) DEVICE — SEALER LIGASURE MARYLAND 23CM

## (undated) DEVICE — GLOVE SURG BIOGEL LATEX SZ 7.5

## (undated) DEVICE — SEE L#120831

## (undated) DEVICE — TROCAR ENDOPATH XCEL 12MM 10CM

## (undated) DEVICE — SUT VICRYL BR 1 GEN 27 CT-1

## (undated) DEVICE — STAPLER SKIN PROXIMATE WIDE

## (undated) DEVICE — DRAPE THREE-QTR REINF 53X77IN

## (undated) DEVICE — DRAPE T TRNSVRS LAP 102X78X121

## (undated) DEVICE — NDL 22GA X1 1/2 REG BEVEL

## (undated) DEVICE — TRAY CATH FOL SIL URIMTR 16FR

## (undated) DEVICE — Device

## (undated) DEVICE — BAG TISS RETRV MONARCH 10MM

## (undated) DEVICE — BNDG COFLEX FOAM LF2 ST 4X5YD

## (undated) DEVICE — SUT SILK 3-0 STRANDS 30IN

## (undated) DEVICE — SUT SILK 3-0 SH 18IN BLACK

## (undated) DEVICE — HEMOSTAT SURGICEL 2X3IN

## (undated) DEVICE — NDL HYPO REG 25G X 1 1/2

## (undated) DEVICE — NDL INSUF ULTRA VERESS 120MM

## (undated) DEVICE — PACK ECLIPSE SET-UP W/O DRAPE

## (undated) DEVICE — PENCIL ROCKER SWITCH 10FT CORD

## (undated) DEVICE — SOL NS 1000CC

## (undated) DEVICE — TRAY GENERAL SURGERY OMC

## (undated) DEVICE — SUT CTD VICRYL 0 VIL BR/CT

## (undated) DEVICE — STAPLE TRI-STAPLE SUL 60MM 2.0

## (undated) DEVICE — TUBING HF INSUFFLATION W/ FLTR

## (undated) DEVICE — PACK BASIC

## (undated) DEVICE — DRAIN PENROSE STD 18X1/2IN

## (undated) DEVICE — TOWEL OR XRAY WHITE 17X26IN

## (undated) DEVICE — DRESSING ABSRBNT ISLAND 3.6X8

## (undated) DEVICE — SUT 2-0 VICRYL / SH (J417)

## (undated) DEVICE — NDL 18GA X1 1/2 REG BEVEL

## (undated) DEVICE — TOWEL OR DISP STRL BLUE 4/PK

## (undated) DEVICE — SYR 10CC LUER LOCK

## (undated) DEVICE — SUT 4/0 27IN PDS II VIO MON